# Patient Record
Sex: FEMALE | Race: WHITE | NOT HISPANIC OR LATINO | Employment: OTHER | URBAN - METROPOLITAN AREA
[De-identification: names, ages, dates, MRNs, and addresses within clinical notes are randomized per-mention and may not be internally consistent; named-entity substitution may affect disease eponyms.]

---

## 2017-04-12 ENCOUNTER — APPOINTMENT (OUTPATIENT)
Dept: LAB | Facility: HOSPITAL | Age: 82
End: 2017-04-12
Attending: INTERNAL MEDICINE
Payer: MEDICARE

## 2017-04-12 ENCOUNTER — TRANSCRIBE ORDERS (OUTPATIENT)
Dept: ADMINISTRATIVE | Facility: HOSPITAL | Age: 82
End: 2017-04-12

## 2017-04-12 DIAGNOSIS — E03.9 HYPOTHYROIDISM, UNSPECIFIED TYPE: ICD-10-CM

## 2017-04-12 DIAGNOSIS — E78.5 HYPERLIPIDEMIA, UNSPECIFIED HYPERLIPIDEMIA TYPE: Primary | ICD-10-CM

## 2017-04-12 DIAGNOSIS — I15.9 SECONDARY HYPERTENSION: ICD-10-CM

## 2017-04-12 DIAGNOSIS — E78.5 HYPERLIPIDEMIA, UNSPECIFIED HYPERLIPIDEMIA TYPE: ICD-10-CM

## 2017-04-12 DIAGNOSIS — E55.9 VITAMIN D DEFICIENCY: ICD-10-CM

## 2017-04-12 DIAGNOSIS — M81.0 AGE RELATED OSTEOPOROSIS, UNSPECIFIED PATHOLOGICAL FRACTURE PRESENCE: ICD-10-CM

## 2017-04-12 LAB
25(OH)D3 SERPL-MCNC: 35.2 NG/ML (ref 30–100)
ALBUMIN SERPL BCP-MCNC: 3.6 G/DL (ref 3.5–5)
ALP SERPL-CCNC: 86 U/L (ref 46–116)
ALT SERPL W P-5'-P-CCNC: 34 U/L (ref 12–78)
ANION GAP SERPL CALCULATED.3IONS-SCNC: 8 MMOL/L (ref 4–13)
AST SERPL W P-5'-P-CCNC: 32 U/L (ref 5–45)
BILIRUB SERPL-MCNC: 0.7 MG/DL (ref 0.2–1)
BUN SERPL-MCNC: 31 MG/DL (ref 5–25)
CALCIUM SERPL-MCNC: 9.7 MG/DL (ref 8.3–10.1)
CHLORIDE SERPL-SCNC: 103 MMOL/L (ref 100–108)
CHOLEST SERPL-MCNC: 169 MG/DL (ref 50–200)
CO2 SERPL-SCNC: 30 MMOL/L (ref 21–32)
CREAT SERPL-MCNC: 1.05 MG/DL (ref 0.6–1.3)
ERYTHROCYTE [DISTWIDTH] IN BLOOD BY AUTOMATED COUNT: 12.3 % (ref 11.6–15.1)
GFR SERPL CREATININE-BSD FRML MDRD: 50.6 ML/MIN/1.73SQ M
GLUCOSE P FAST SERPL-MCNC: 86 MG/DL (ref 65–99)
HCT VFR BLD AUTO: 38.3 % (ref 34.8–46.1)
HDLC SERPL-MCNC: 64 MG/DL (ref 40–60)
HGB BLD-MCNC: 12.6 G/DL (ref 11.5–15.4)
LDLC SERPL CALC-MCNC: 84 MG/DL (ref 0–100)
MCH RBC QN AUTO: 32.1 PG (ref 26.8–34.3)
MCHC RBC AUTO-ENTMCNC: 32.9 G/DL (ref 31.4–37.4)
MCV RBC AUTO: 98 FL (ref 82–98)
PLATELET # BLD AUTO: 154 THOUSANDS/UL (ref 149–390)
PMV BLD AUTO: 11 FL (ref 8.9–12.7)
POTASSIUM SERPL-SCNC: 4.2 MMOL/L (ref 3.5–5.3)
PROT SERPL-MCNC: 7.7 G/DL (ref 6.4–8.2)
RBC # BLD AUTO: 3.92 MILLION/UL (ref 3.81–5.12)
SODIUM SERPL-SCNC: 141 MMOL/L (ref 136–145)
T3 SERPL-MCNC: 0.6 NG/ML (ref 0.6–1.8)
T4 FREE SERPL-MCNC: 1.29 NG/DL (ref 0.76–1.46)
TRIGL SERPL-MCNC: 104 MG/DL
TSH SERPL DL<=0.05 MIU/L-ACNC: 1.81 UIU/ML (ref 0.36–3.74)
WBC # BLD AUTO: 8.79 THOUSAND/UL (ref 4.31–10.16)

## 2017-04-12 PROCEDURE — 85027 COMPLETE CBC AUTOMATED: CPT

## 2017-04-12 PROCEDURE — 80061 LIPID PANEL: CPT

## 2017-04-12 PROCEDURE — 82306 VITAMIN D 25 HYDROXY: CPT

## 2017-04-12 PROCEDURE — 84439 ASSAY OF FREE THYROXINE: CPT

## 2017-04-12 PROCEDURE — 80053 COMPREHEN METABOLIC PANEL: CPT

## 2017-04-12 PROCEDURE — 84480 ASSAY TRIIODOTHYRONINE (T3): CPT

## 2017-04-12 PROCEDURE — 36415 COLL VENOUS BLD VENIPUNCTURE: CPT

## 2017-04-12 PROCEDURE — 84443 ASSAY THYROID STIM HORMONE: CPT

## 2017-05-19 ENCOUNTER — HOSPITAL ENCOUNTER (EMERGENCY)
Facility: HOSPITAL | Age: 82
Discharge: HOME | End: 2017-05-19
Attending: EMERGENCY MEDICINE | Admitting: EMERGENCY MEDICINE
Payer: MEDICARE

## 2017-05-19 ENCOUNTER — APPOINTMENT (EMERGENCY)
Dept: CT IMAGING | Facility: HOSPITAL | Age: 82
End: 2017-05-19
Payer: MEDICARE

## 2017-05-19 VITALS
DIASTOLIC BLOOD PRESSURE: 76 MMHG | OXYGEN SATURATION: 96 % | BODY MASS INDEX: 30.66 KG/M2 | HEART RATE: 85 BPM | RESPIRATION RATE: 18 BRPM | SYSTOLIC BLOOD PRESSURE: 178 MMHG | WEIGHT: 173.06 LBS | TEMPERATURE: 97.5 F

## 2017-05-19 DIAGNOSIS — S02.2XXA CLOSED FRACTURE OF NASAL BONE, INITIAL ENCOUNTER: ICD-10-CM
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
## 2017-05-19 DIAGNOSIS — S00.81XA FACIAL ABRASION, INITIAL ENCOUNTER: ICD-10-CM

## 2017-05-19 PROCEDURE — 70450 CT HEAD/BRAIN W/O DYE: CPT

## 2017-05-19 PROCEDURE — 72125 CT NECK SPINE W/O DYE: CPT

## 2017-05-19 PROCEDURE — 99284 EMERGENCY DEPT VISIT MOD MDM: CPT

## 2017-05-19 PROCEDURE — 70486 CT MAXILLOFACIAL W/O DYE: CPT

## 2017-05-19 RX ORDER — BACITRACIN, NEOMYCIN, POLYMYXIN B 400; 3.5; 5 [USP'U]/G; MG/G; [USP'U]/G
1 OINTMENT TOPICAL ONCE
Status: COMPLETED | OUTPATIENT
Start: 2017-05-19 | End: 2017-05-19

## 2017-05-19 RX ORDER — LIDOCAINE HYDROCHLORIDE 20 MG/ML
5 INJECTION, SOLUTION EPIDURAL; INFILTRATION; INTRACAUDAL; PERINEURAL ONCE
Status: DISCONTINUED | OUTPATIENT
Start: 2017-05-19 | End: 2017-05-19 | Stop reason: HOSPADM

## 2017-05-19 RX ADMIN — BACITRACIN ZINC, NEOMYCIN, POLYMYXIN B 1 LARGE APPLICATION: 400; 3.5; 5 OINTMENT TOPICAL at 15:34

## 2017-07-10 ENCOUNTER — APPOINTMENT (OUTPATIENT)
Dept: PHYSICAL THERAPY | Facility: CLINIC | Age: 82
End: 2017-07-10
Payer: MEDICARE

## 2017-07-13 ENCOUNTER — APPOINTMENT (OUTPATIENT)
Dept: PHYSICAL THERAPY | Facility: CLINIC | Age: 82
End: 2017-07-13
Payer: MEDICARE

## 2017-07-13 PROCEDURE — G8991 OTHER PT/OT GOAL STATUS: HCPCS

## 2017-07-13 PROCEDURE — 97161 PT EVAL LOW COMPLEX 20 MIN: CPT

## 2017-07-13 PROCEDURE — G8990 OTHER PT/OT CURRENT STATUS: HCPCS

## 2017-07-13 PROCEDURE — 97140 MANUAL THERAPY 1/> REGIONS: CPT

## 2017-07-17 ENCOUNTER — APPOINTMENT (OUTPATIENT)
Dept: PHYSICAL THERAPY | Facility: CLINIC | Age: 82
End: 2017-07-17
Payer: MEDICARE

## 2017-07-17 PROCEDURE — 97110 THERAPEUTIC EXERCISES: CPT

## 2017-07-17 PROCEDURE — 97140 MANUAL THERAPY 1/> REGIONS: CPT

## 2017-07-20 ENCOUNTER — APPOINTMENT (OUTPATIENT)
Dept: PHYSICAL THERAPY | Facility: CLINIC | Age: 82
End: 2017-07-20
Payer: MEDICARE

## 2017-07-20 PROCEDURE — 97140 MANUAL THERAPY 1/> REGIONS: CPT

## 2017-07-24 ENCOUNTER — APPOINTMENT (OUTPATIENT)
Dept: PHYSICAL THERAPY | Facility: CLINIC | Age: 82
End: 2017-07-24
Payer: MEDICARE

## 2017-07-24 PROCEDURE — 97110 THERAPEUTIC EXERCISES: CPT

## 2017-07-24 PROCEDURE — 97140 MANUAL THERAPY 1/> REGIONS: CPT

## 2017-07-27 ENCOUNTER — APPOINTMENT (OUTPATIENT)
Dept: PHYSICAL THERAPY | Facility: CLINIC | Age: 82
End: 2017-07-27
Payer: MEDICARE

## 2017-07-27 PROCEDURE — 97140 MANUAL THERAPY 1/> REGIONS: CPT

## 2017-07-27 PROCEDURE — 97110 THERAPEUTIC EXERCISES: CPT

## 2017-07-31 ENCOUNTER — APPOINTMENT (OUTPATIENT)
Dept: PHYSICAL THERAPY | Facility: CLINIC | Age: 82
End: 2017-07-31
Payer: MEDICARE

## 2017-07-31 PROCEDURE — 97140 MANUAL THERAPY 1/> REGIONS: CPT

## 2017-07-31 PROCEDURE — 97110 THERAPEUTIC EXERCISES: CPT

## 2017-08-03 ENCOUNTER — APPOINTMENT (OUTPATIENT)
Dept: PHYSICAL THERAPY | Facility: CLINIC | Age: 82
End: 2017-08-03
Payer: MEDICARE

## 2017-08-07 ENCOUNTER — APPOINTMENT (OUTPATIENT)
Dept: PHYSICAL THERAPY | Facility: CLINIC | Age: 82
End: 2017-08-07
Payer: MEDICARE

## 2017-08-07 PROCEDURE — 97140 MANUAL THERAPY 1/> REGIONS: CPT

## 2017-08-07 PROCEDURE — 97110 THERAPEUTIC EXERCISES: CPT

## 2017-08-10 ENCOUNTER — APPOINTMENT (OUTPATIENT)
Dept: PHYSICAL THERAPY | Facility: CLINIC | Age: 82
End: 2017-08-10
Payer: MEDICARE

## 2017-08-10 PROCEDURE — 97110 THERAPEUTIC EXERCISES: CPT

## 2017-08-14 ENCOUNTER — APPOINTMENT (OUTPATIENT)
Dept: PHYSICAL THERAPY | Facility: CLINIC | Age: 82
End: 2017-08-14
Payer: MEDICARE

## 2017-08-14 PROCEDURE — 97110 THERAPEUTIC EXERCISES: CPT

## 2017-08-14 PROCEDURE — 97140 MANUAL THERAPY 1/> REGIONS: CPT

## 2017-08-14 PROCEDURE — G8990 OTHER PT/OT CURRENT STATUS: HCPCS

## 2017-08-14 PROCEDURE — G8991 OTHER PT/OT GOAL STATUS: HCPCS

## 2017-08-16 ENCOUNTER — APPOINTMENT (OUTPATIENT)
Dept: PHYSICAL THERAPY | Facility: CLINIC | Age: 82
End: 2017-08-16
Payer: MEDICARE

## 2017-08-16 PROCEDURE — 97140 MANUAL THERAPY 1/> REGIONS: CPT

## 2017-08-16 PROCEDURE — 97110 THERAPEUTIC EXERCISES: CPT

## 2017-08-21 ENCOUNTER — APPOINTMENT (OUTPATIENT)
Dept: PHYSICAL THERAPY | Facility: CLINIC | Age: 82
End: 2017-08-21
Payer: MEDICARE

## 2017-08-21 PROCEDURE — 97140 MANUAL THERAPY 1/> REGIONS: CPT

## 2017-08-21 PROCEDURE — 97110 THERAPEUTIC EXERCISES: CPT

## 2017-08-23 ENCOUNTER — APPOINTMENT (OUTPATIENT)
Dept: PHYSICAL THERAPY | Facility: CLINIC | Age: 82
End: 2017-08-23
Payer: MEDICARE

## 2017-08-23 PROCEDURE — 97110 THERAPEUTIC EXERCISES: CPT

## 2017-08-23 PROCEDURE — 97140 MANUAL THERAPY 1/> REGIONS: CPT

## 2017-08-28 ENCOUNTER — APPOINTMENT (OUTPATIENT)
Dept: PHYSICAL THERAPY | Facility: CLINIC | Age: 82
End: 2017-08-28
Payer: MEDICARE

## 2017-08-28 PROCEDURE — 97140 MANUAL THERAPY 1/> REGIONS: CPT

## 2017-08-28 PROCEDURE — 97110 THERAPEUTIC EXERCISES: CPT

## 2017-08-31 ENCOUNTER — APPOINTMENT (OUTPATIENT)
Dept: PHYSICAL THERAPY | Facility: CLINIC | Age: 82
End: 2017-08-31
Payer: MEDICARE

## 2017-08-31 PROCEDURE — 97110 THERAPEUTIC EXERCISES: CPT

## 2017-08-31 PROCEDURE — 97140 MANUAL THERAPY 1/> REGIONS: CPT

## 2017-09-05 ENCOUNTER — APPOINTMENT (OUTPATIENT)
Dept: PHYSICAL THERAPY | Facility: CLINIC | Age: 82
End: 2017-09-05
Payer: MEDICARE

## 2017-09-05 PROCEDURE — 97110 THERAPEUTIC EXERCISES: CPT

## 2017-09-05 PROCEDURE — 97140 MANUAL THERAPY 1/> REGIONS: CPT

## 2017-09-07 ENCOUNTER — APPOINTMENT (OUTPATIENT)
Dept: PHYSICAL THERAPY | Facility: CLINIC | Age: 82
End: 2017-09-07
Payer: MEDICARE

## 2017-09-07 PROCEDURE — 97110 THERAPEUTIC EXERCISES: CPT

## 2017-09-07 PROCEDURE — G8990 OTHER PT/OT CURRENT STATUS: HCPCS

## 2017-09-07 PROCEDURE — 97140 MANUAL THERAPY 1/> REGIONS: CPT

## 2017-09-07 PROCEDURE — G8991 OTHER PT/OT GOAL STATUS: HCPCS

## 2017-09-11 ENCOUNTER — APPOINTMENT (OUTPATIENT)
Dept: PHYSICAL THERAPY | Facility: CLINIC | Age: 82
End: 2017-09-11
Payer: MEDICARE

## 2017-09-11 PROCEDURE — 97110 THERAPEUTIC EXERCISES: CPT

## 2017-09-11 PROCEDURE — 97140 MANUAL THERAPY 1/> REGIONS: CPT

## 2017-09-13 ENCOUNTER — APPOINTMENT (OUTPATIENT)
Dept: PHYSICAL THERAPY | Facility: CLINIC | Age: 82
End: 2017-09-13
Payer: MEDICARE

## 2017-09-13 PROCEDURE — 97140 MANUAL THERAPY 1/> REGIONS: CPT

## 2017-09-13 PROCEDURE — 97110 THERAPEUTIC EXERCISES: CPT

## 2017-09-14 ENCOUNTER — APPOINTMENT (OUTPATIENT)
Dept: PHYSICAL THERAPY | Facility: CLINIC | Age: 82
End: 2017-09-14
Payer: MEDICARE

## 2017-09-18 ENCOUNTER — APPOINTMENT (OUTPATIENT)
Dept: PHYSICAL THERAPY | Facility: CLINIC | Age: 82
End: 2017-09-18
Payer: MEDICARE

## 2017-09-21 ENCOUNTER — APPOINTMENT (OUTPATIENT)
Dept: PHYSICAL THERAPY | Facility: CLINIC | Age: 82
End: 2017-09-21
Payer: MEDICARE

## 2017-09-21 PROCEDURE — 97110 THERAPEUTIC EXERCISES: CPT

## 2017-09-25 ENCOUNTER — APPOINTMENT (OUTPATIENT)
Dept: PHYSICAL THERAPY | Facility: CLINIC | Age: 82
End: 2017-09-25
Payer: MEDICARE

## 2017-09-25 PROCEDURE — 97110 THERAPEUTIC EXERCISES: CPT

## 2017-09-27 ENCOUNTER — APPOINTMENT (OUTPATIENT)
Dept: PHYSICAL THERAPY | Facility: CLINIC | Age: 82
End: 2017-09-27
Payer: MEDICARE

## 2017-09-27 PROCEDURE — 97110 THERAPEUTIC EXERCISES: CPT

## 2017-09-27 PROCEDURE — 97140 MANUAL THERAPY 1/> REGIONS: CPT

## 2017-10-02 ENCOUNTER — APPOINTMENT (OUTPATIENT)
Dept: PHYSICAL THERAPY | Facility: CLINIC | Age: 82
End: 2017-10-02
Payer: MEDICARE

## 2017-10-02 PROCEDURE — 97140 MANUAL THERAPY 1/> REGIONS: CPT

## 2017-10-02 PROCEDURE — 97110 THERAPEUTIC EXERCISES: CPT

## 2017-10-04 ENCOUNTER — APPOINTMENT (OUTPATIENT)
Dept: PHYSICAL THERAPY | Facility: CLINIC | Age: 82
End: 2017-10-04
Payer: MEDICARE

## 2017-10-04 PROCEDURE — 97140 MANUAL THERAPY 1/> REGIONS: CPT

## 2017-10-04 PROCEDURE — 97110 THERAPEUTIC EXERCISES: CPT

## 2017-10-09 ENCOUNTER — APPOINTMENT (OUTPATIENT)
Dept: PHYSICAL THERAPY | Facility: CLINIC | Age: 82
End: 2017-10-09
Payer: MEDICARE

## 2017-10-09 PROCEDURE — 97140 MANUAL THERAPY 1/> REGIONS: CPT

## 2017-10-09 PROCEDURE — 97110 THERAPEUTIC EXERCISES: CPT

## 2017-10-12 ENCOUNTER — APPOINTMENT (OUTPATIENT)
Dept: PHYSICAL THERAPY | Facility: CLINIC | Age: 82
End: 2017-10-12
Payer: MEDICARE

## 2017-10-12 PROCEDURE — 97110 THERAPEUTIC EXERCISES: CPT

## 2017-10-12 PROCEDURE — 97140 MANUAL THERAPY 1/> REGIONS: CPT

## 2017-10-16 ENCOUNTER — APPOINTMENT (OUTPATIENT)
Dept: PHYSICAL THERAPY | Facility: CLINIC | Age: 82
End: 2017-10-16
Payer: MEDICARE

## 2017-10-16 PROCEDURE — 97110 THERAPEUTIC EXERCISES: CPT

## 2017-10-16 PROCEDURE — G8990 OTHER PT/OT CURRENT STATUS: HCPCS

## 2017-10-16 PROCEDURE — 97140 MANUAL THERAPY 1/> REGIONS: CPT

## 2017-10-16 PROCEDURE — G8991 OTHER PT/OT GOAL STATUS: HCPCS

## 2017-10-16 PROCEDURE — G8992 OTHER PT/OT  D/C STATUS: HCPCS

## 2017-10-18 ENCOUNTER — APPOINTMENT (OUTPATIENT)
Dept: PHYSICAL THERAPY | Facility: CLINIC | Age: 82
End: 2017-10-18
Payer: MEDICARE

## 2017-11-02 ENCOUNTER — APPOINTMENT (OUTPATIENT)
Dept: LAB | Facility: HOSPITAL | Age: 82
End: 2017-11-02
Attending: INTERNAL MEDICINE
Payer: MEDICARE

## 2017-11-02 ENCOUNTER — TRANSCRIBE ORDERS (OUTPATIENT)
Dept: ADMINISTRATIVE | Facility: HOSPITAL | Age: 82
End: 2017-11-02

## 2017-11-02 ENCOUNTER — APPOINTMENT (OUTPATIENT)
Dept: LAB | Facility: HOSPITAL | Age: 82
End: 2017-11-02
Payer: MEDICARE

## 2017-11-02 DIAGNOSIS — E03.9 MYXEDEMA HEART DISEASE: ICD-10-CM

## 2017-11-02 DIAGNOSIS — E55.9 AVITAMINOSIS D: ICD-10-CM

## 2017-11-02 DIAGNOSIS — I10 ESSENTIAL HYPERTENSION, MALIGNANT: ICD-10-CM

## 2017-11-02 DIAGNOSIS — M81.0 SENILE OSTEOPOROSIS: ICD-10-CM

## 2017-11-02 DIAGNOSIS — M35.00 SICCA SYNDROME (HCC): ICD-10-CM

## 2017-11-02 DIAGNOSIS — E78.2 MIXED HYPERLIPIDEMIA: ICD-10-CM

## 2017-11-02 DIAGNOSIS — I51.9 MYXEDEMA HEART DISEASE: ICD-10-CM

## 2017-11-02 DIAGNOSIS — E03.9 MYXEDEMA HEART DISEASE: Primary | ICD-10-CM

## 2017-11-02 DIAGNOSIS — I51.9 MYXEDEMA HEART DISEASE: Primary | ICD-10-CM

## 2017-11-02 DIAGNOSIS — M35.00 SICCA SYNDROME (HCC): Primary | ICD-10-CM

## 2017-11-02 LAB
25(OH)D3 SERPL-MCNC: 37.4 NG/ML (ref 30–100)
ALBUMIN SERPL BCP-MCNC: 3.7 G/DL (ref 3.5–5)
ALP SERPL-CCNC: 63 U/L (ref 46–116)
ALT SERPL W P-5'-P-CCNC: 33 U/L (ref 12–78)
ANION GAP SERPL CALCULATED.3IONS-SCNC: 7 MMOL/L (ref 4–13)
AST SERPL W P-5'-P-CCNC: 27 U/L (ref 5–45)
BILIRUB SERPL-MCNC: 0.4 MG/DL (ref 0.2–1)
BUN SERPL-MCNC: 41 MG/DL (ref 5–25)
CALCIUM SERPL-MCNC: 9.1 MG/DL (ref 8.3–10.1)
CHLORIDE SERPL-SCNC: 104 MMOL/L (ref 100–108)
CHOLEST SERPL-MCNC: 144 MG/DL (ref 50–200)
CO2 SERPL-SCNC: 29 MMOL/L (ref 21–32)
CREAT SERPL-MCNC: 1.31 MG/DL (ref 0.6–1.3)
ERYTHROCYTE [DISTWIDTH] IN BLOOD BY AUTOMATED COUNT: 14.3 % (ref 11.6–15.1)
GFR SERPL CREATININE-BSD FRML MDRD: 38 ML/MIN/1.73SQ M
GLUCOSE P FAST SERPL-MCNC: 88 MG/DL (ref 65–99)
HCT VFR BLD AUTO: 38.6 % (ref 34.8–46.1)
HDLC SERPL-MCNC: 44 MG/DL (ref 40–60)
HGB BLD-MCNC: 12.8 G/DL (ref 11.5–15.4)
LDLC SERPL CALC-MCNC: 67 MG/DL (ref 0–100)
MCH RBC QN AUTO: 31.2 PG (ref 26.8–34.3)
MCHC RBC AUTO-ENTMCNC: 33.2 G/DL (ref 31.4–37.4)
MCV RBC AUTO: 94 FL (ref 82–98)
PLATELET # BLD AUTO: 159 THOUSANDS/UL (ref 149–390)
PMV BLD AUTO: 10.5 FL (ref 8.9–12.7)
POTASSIUM SERPL-SCNC: 4.4 MMOL/L (ref 3.5–5.3)
PROT SERPL-MCNC: 7.5 G/DL (ref 6.4–8.2)
RBC # BLD AUTO: 4.1 MILLION/UL (ref 3.81–5.12)
SODIUM SERPL-SCNC: 140 MMOL/L (ref 136–145)
T4 FREE SERPL-MCNC: 1.19 NG/DL (ref 0.76–1.46)
TRIGL SERPL-MCNC: 165 MG/DL
TSH SERPL DL<=0.05 MIU/L-ACNC: 4.69 UIU/ML (ref 0.36–3.74)
WBC # BLD AUTO: 5.99 THOUSAND/UL (ref 4.31–10.16)

## 2017-11-02 PROCEDURE — 84443 ASSAY THYROID STIM HORMONE: CPT

## 2017-11-02 PROCEDURE — 84439 ASSAY OF FREE THYROXINE: CPT

## 2017-11-02 PROCEDURE — 85027 COMPLETE CBC AUTOMATED: CPT

## 2017-11-02 PROCEDURE — 36415 COLL VENOUS BLD VENIPUNCTURE: CPT

## 2017-11-02 PROCEDURE — 82306 VITAMIN D 25 HYDROXY: CPT

## 2017-11-02 PROCEDURE — 80061 LIPID PANEL: CPT

## 2017-11-02 PROCEDURE — 80053 COMPREHEN METABOLIC PANEL: CPT

## 2017-12-11 ENCOUNTER — TRANSCRIBE ORDERS (OUTPATIENT)
Dept: ADMINISTRATIVE | Facility: HOSPITAL | Age: 82
End: 2017-12-11

## 2017-12-11 DIAGNOSIS — M54.5 LOW BACK PAIN, UNSPECIFIED BACK PAIN LATERALITY, UNSPECIFIED CHRONICITY, WITH SCIATICA PRESENCE UNSPECIFIED: Primary | ICD-10-CM

## 2017-12-13 ENCOUNTER — GENERIC CONVERSION - ENCOUNTER (OUTPATIENT)
Dept: OTHER | Facility: OTHER | Age: 82
End: 2017-12-13

## 2017-12-13 ENCOUNTER — HOSPITAL ENCOUNTER (OUTPATIENT)
Dept: CT IMAGING | Facility: HOSPITAL | Age: 82
Discharge: HOME | End: 2017-12-13
Payer: MEDICARE

## 2017-12-13 DIAGNOSIS — M54.5 LOW BACK PAIN, UNSPECIFIED BACK PAIN LATERALITY, UNSPECIFIED CHRONICITY, WITH SCIATICA PRESENCE UNSPECIFIED: ICD-10-CM

## 2017-12-13 PROCEDURE — 72131 CT LUMBAR SPINE W/O DYE: CPT

## 2018-01-04 ENCOUNTER — APPOINTMENT (OUTPATIENT)
Dept: PHYSICAL THERAPY | Facility: CLINIC | Age: 83
End: 2018-01-04
Payer: MEDICARE

## 2018-01-04 PROCEDURE — 97110 THERAPEUTIC EXERCISES: CPT

## 2018-01-04 PROCEDURE — G8991 OTHER PT/OT GOAL STATUS: HCPCS

## 2018-01-04 PROCEDURE — G8990 OTHER PT/OT CURRENT STATUS: HCPCS

## 2018-01-04 PROCEDURE — 97162 PT EVAL MOD COMPLEX 30 MIN: CPT

## 2018-01-08 ENCOUNTER — APPOINTMENT (OUTPATIENT)
Dept: PHYSICAL THERAPY | Facility: CLINIC | Age: 83
End: 2018-01-08
Payer: MEDICARE

## 2018-01-08 PROCEDURE — 97110 THERAPEUTIC EXERCISES: CPT

## 2018-01-11 ENCOUNTER — APPOINTMENT (OUTPATIENT)
Dept: PHYSICAL THERAPY | Facility: CLINIC | Age: 83
End: 2018-01-11
Payer: MEDICARE

## 2018-01-11 PROCEDURE — 97110 THERAPEUTIC EXERCISES: CPT

## 2018-01-15 ENCOUNTER — APPOINTMENT (OUTPATIENT)
Dept: PHYSICAL THERAPY | Facility: CLINIC | Age: 83
End: 2018-01-15
Payer: MEDICARE

## 2018-01-17 ENCOUNTER — APPOINTMENT (OUTPATIENT)
Dept: PHYSICAL THERAPY | Facility: CLINIC | Age: 83
End: 2018-01-17
Payer: MEDICARE

## 2018-01-19 ENCOUNTER — APPOINTMENT (OUTPATIENT)
Dept: PHYSICAL THERAPY | Facility: CLINIC | Age: 83
End: 2018-01-19
Payer: MEDICARE

## 2018-01-23 ENCOUNTER — APPOINTMENT (OUTPATIENT)
Dept: PHYSICAL THERAPY | Facility: CLINIC | Age: 83
End: 2018-01-23
Payer: MEDICARE

## 2018-01-23 PROCEDURE — 97110 THERAPEUTIC EXERCISES: CPT

## 2018-01-26 ENCOUNTER — OFFICE VISIT (OUTPATIENT)
Dept: PHYSICAL THERAPY | Facility: CLINIC | Age: 83
End: 2018-01-26
Payer: MEDICARE

## 2018-01-26 DIAGNOSIS — M51.36 DEGENERATION OF LUMBAR INTERVERTEBRAL DISC: ICD-10-CM

## 2018-01-26 DIAGNOSIS — M79.604 RIGHT LEG PAIN: ICD-10-CM

## 2018-01-26 DIAGNOSIS — M47.816 LUMBAR SPONDYLOSIS: Primary | ICD-10-CM

## 2018-01-26 PROCEDURE — 97110 THERAPEUTIC EXERCISES: CPT | Performed by: PHYSICAL THERAPIST

## 2018-01-26 NOTE — PROGRESS NOTES
Daily Note     Today's date: 2018  Patient name: Diana Ronquillo  : 1935  MRN: [id number]  Referring provider: Joanna Bejarano MD  Dx:   Encounter Diagnoses   Name Primary?  Lumbar spondylosis Yes    Degeneration of lumbar intervertebral disc     Right leg pain                   Subjective: states that her back feels good today but was doing a lot of walking yesterday and it hurt      Objective: See treatment diary below    Precautions: falls, anticoagulation    Daily Treatment Diary     Manual              PAs/ UPAs prn                          TrP release prn                                           Exercise Diary              Bike 8'            TM - 4% grade 1 0 mph x  8'            K->C B w/pball 20"x4            LTR pball 10x            Pelvic tilts A-P 10x10"            Abdominal bracing             Seated flexion stretch 20"x4                         Partial curl ups 10x3"            TB: LPD             Hip flexor stretch over edge of table 1' ea            Sciatic nerve slider seated 20x            Piriformis stretch fig 4 w/ pball 20"x4            Sit ->stand 10x                                                                                              Modalities                                                           Assessment: requires min cueing for technique w/ exercises; pain is provoked by walking; added elevated TM this visit w/ emphasis on lumbar stabilization      Plan: Continue per plan of care  and Progress treatment as tolerated

## 2018-01-30 ENCOUNTER — OFFICE VISIT (OUTPATIENT)
Dept: PHYSICAL THERAPY | Facility: CLINIC | Age: 83
End: 2018-01-30
Payer: MEDICARE

## 2018-01-30 DIAGNOSIS — M51.36 DEGENERATION OF LUMBAR INTERVERTEBRAL DISC: ICD-10-CM

## 2018-01-30 DIAGNOSIS — M79.604 RIGHT LEG PAIN: ICD-10-CM

## 2018-01-30 DIAGNOSIS — M47.816 LUMBAR SPONDYLOSIS: Primary | ICD-10-CM

## 2018-01-30 PROCEDURE — 97110 THERAPEUTIC EXERCISES: CPT | Performed by: PHYSICAL THERAPIST

## 2018-01-30 NOTE — PROGRESS NOTES
Daily Note     Today's date: 2018  Patient name: Rosemarie Reason  : 1935  MRN: [id number]  Referring provider: Marina Hoover MD  Dx:   Encounter Diagnoses   Name Primary?  Lumbar spondylosis Yes    Degeneration of lumbar intervertebral disc     Right leg pain                   Subjective: states she is fatigued today - yesterday was a busy day; however, reports no back pain upon arrival to PT today      Objective: See treatment diary below    Precautions: falls, anticoagulation    Daily Treatment Diary     Manual             PAs/ UPAs prn                          TrP release prn                                           Exercise Diary             Bike 8' 8'           TM - 4% grade 1 0 mph x  8' 1 0 mph x  8'           K->C B w/pball 20"x4 20"x5           LTR pball 10x 10x           Pelvic tilts A-P 10x10" 10x           Abdominal bracing             Seated flexion stretch 20"x4 20"x4                        Partial curl ups w/pball 10x3" 2x 10x3"           TB: LPD             Hip flexor stretch over edge of table 1' ea 1'ea           Sciatic nerve slider seated 20x 20x           Piriformis stretch fig 4 w/ pball 20"x4 20"X5           Sit ->stand 10x 10x cueing           hooklying abd w/TB  10x10"                                                                                Modalities                                                           Assessment: able to walk on elevated TM today for 8' w/out pain provocation; transferring sit to stand has tendency to allow knees to move inward into less stable position, performed w/ cueing and mirror feedback to maintain knees over feet; added hooklying abd w/ TB for further hip strengthening      Plan: Continue per plan of care  and Progress treatment as tolerated

## 2018-02-02 ENCOUNTER — OFFICE VISIT (OUTPATIENT)
Dept: PHYSICAL THERAPY | Facility: CLINIC | Age: 83
End: 2018-02-02
Payer: MEDICARE

## 2018-02-02 DIAGNOSIS — M47.816 LUMBAR SPONDYLOSIS: Primary | ICD-10-CM

## 2018-02-02 DIAGNOSIS — M51.36 DEGENERATION OF LUMBAR INTERVERTEBRAL DISC: ICD-10-CM

## 2018-02-02 DIAGNOSIS — M79.604 RIGHT LEG PAIN: ICD-10-CM

## 2018-02-02 PROCEDURE — 97110 THERAPEUTIC EXERCISES: CPT | Performed by: PHYSICAL THERAPIST

## 2018-02-02 PROCEDURE — 97140 MANUAL THERAPY 1/> REGIONS: CPT | Performed by: PHYSICAL THERAPIST

## 2018-02-02 NOTE — PROGRESS NOTES
Daily Note     Today's date: 2018  Patient name: Michael Nuñez  : 1935  MRN: [id number]  Referring provider: Doug Martins MD  Dx:   No diagnosis found  Start Time:   Subjective: states that she had a lot of pain last night; did not do anything different but was doing a lot of sitting at a  bought a new car last night; states that her R foot started tingling last night      Objective: See treatment diary below    Precautions: falls, anticoagulation    Daily Treatment Diary     Manual  2          Lumbar mobs in SL - B Grade lll   performed                       TrP release prn                                           Exercise Diary            Bike 8' 8' 8'          TM - 4% grade 1 0 mph x  8' 1 0 mph x  8' 1 0 mph  5'          K->C B w/pball 20"x4 20"x5 20"x5          LTR pball 10x 10x 10x          Pelvic tilts A-P 10x10" 10x 20x          Abdominal bracing             Seated flexion stretch 20"x4 20"x4 20"x4                       Partial curl ups w/pball 10x3" 2x 10x3" 10x3"  x2          TB: LPD             Hip flexor stretch over edge of table 1' ea 1'ea 1/ea          Sciatic nerve slider seated 20x 20x reviewed          Piriformis stretch fig 4 w/ pball 20"x4 20"X5 np          Sit ->stand 10x 10x cueing 10x          hooklying abd w/TB  10x10" np          Mini squats w/ TB resist abd   GTB  10x5"                                                                  Modalities                                                           Assessment: stopped TM today after 5' due to increased LBP; pain resolved w/ exercises and manuals in therapy today    Plan: Continue per plan of care  and Progress treatment as tolerated

## 2018-02-06 ENCOUNTER — OFFICE VISIT (OUTPATIENT)
Dept: PHYSICAL THERAPY | Facility: CLINIC | Age: 83
End: 2018-02-06
Payer: MEDICARE

## 2018-02-06 DIAGNOSIS — M79.604 RIGHT LEG PAIN: ICD-10-CM

## 2018-02-06 DIAGNOSIS — M51.36 DEGENERATION OF LUMBAR INTERVERTEBRAL DISC: ICD-10-CM

## 2018-02-06 DIAGNOSIS — M47.816 LUMBAR SPONDYLOSIS: Primary | ICD-10-CM

## 2018-02-06 PROCEDURE — 97110 THERAPEUTIC EXERCISES: CPT | Performed by: PHYSICAL THERAPIST

## 2018-02-06 PROCEDURE — 97140 MANUAL THERAPY 1/> REGIONS: CPT | Performed by: PHYSICAL THERAPIST

## 2018-02-06 NOTE — PROGRESS NOTES
Daily Note     Today's date: 2018  Patient name: Michael Nuñez  : 1935  MRN: [id number]  Referring provider: Doug Martins MD  Dx:   Encounter Diagnoses   Name Primary?  Lumbar spondylosis Yes    Degeneration of lumbar intervertebral disc     Right leg pain                   Subjective: reports intermittent pain in the R L/S region, hip RLE; cannot relate symptoms to any particular activity or position    Objective: See treatment diary below    Precautions: falls, anticoagulation    Daily Treatment Diary     Manual           Lumbar mobs in SL - B Grade lll/ prone PA's   performed perfomred                      TrP release R gluteal mm    perfomred                                       Exercise Diary           Bike 8' 8' 8' 8'         TM - 4% grade 1 0 mph x  8' 1 0 mph x  8' 1 0 mph  5' 1 0  Mph  5'         K->C B w/pball 20"x4 20"x5 20"x5 20'X5         LTR pball 10x 10x 10x 10x         Pelvic tilts A-P 10x10" 10x 20x          Abdominal bracing    w/TM         Seated flexion stretch 20"x4 20"x4 20"x4 np                      Partial curl ups w/pball 10x3" 2x 10x3" 10x3"  x2 10x3"         TB: LPD             Hip flexor stretch over edge of table 1' ea 1'ea 1/ea 1' ea         Sciatic nerve slider seated 20x 20x reviewed np         Piriformis stretch fig 4 w/ pball 20"x4 20"X5 np 20"x5         Sit ->stand 10x 10x cueing 10x np         hooklying abd w/TB  10x10" np nv         Mini squats w/ TB resist abd   GTB  10x5" nv         Rolling on mat     nv                                                    Modalities                                                           Assessment: reported onset of anterior R hip pain after walking on TM for 5'; difficulty w/ mat mobility/ rolling and supine<->sit; will work on mobility nv; (+) Trendelenbery RLE which may be contributing to symptoms - needs gluteal strengthening    Plan: Continue per plan of care    and Progress treatment as tolerated

## 2018-02-09 ENCOUNTER — OFFICE VISIT (OUTPATIENT)
Dept: PHYSICAL THERAPY | Facility: CLINIC | Age: 83
End: 2018-02-09
Payer: MEDICARE

## 2018-02-09 DIAGNOSIS — M51.36 DEGENERATION OF LUMBAR INTERVERTEBRAL DISC: ICD-10-CM

## 2018-02-09 DIAGNOSIS — M47.816 LUMBAR SPONDYLOSIS: Primary | ICD-10-CM

## 2018-02-09 DIAGNOSIS — M79.604 RIGHT LEG PAIN: ICD-10-CM

## 2018-02-09 PROCEDURE — 97110 THERAPEUTIC EXERCISES: CPT | Performed by: PHYSICAL THERAPIST

## 2018-02-09 PROCEDURE — 97140 MANUAL THERAPY 1/> REGIONS: CPT | Performed by: PHYSICAL THERAPIST

## 2018-02-09 NOTE — PROGRESS NOTES
Daily Note     Today's date: 2018  Patient name: Tiffanie Sanders  : 1935  MRN: [id number]  Referring provider: Maria Luisa Lainez MD  Dx:   Encounter Diagnoses   Name Primary?     Lumbar spondylosis Yes    Degeneration of lumbar intervertebral disc     Right leg pain                   Subjective: reports pain from R buttock to lateral lower leg; no c/o LBP    Objective: See treatment diary below    Precautions: falls, anticoagulation    Daily Treatment Diary     Manual          Lumbar mobs in SL - B Grade lll/ prone PA's   performed perfomred np                     TrP release R gluteal mm     performed                                      Exercise Diary          Bike 8' 8' 8' 8' 8'        TM - 4% grade 1 0 mph x  8' 1 0 mph x  8' 1 0 mph  5' 1 0  Mph  5' 4'        K->C B w/pball 20"x4 20"x5 20"x5 20'X5 hep        LTR pball 10x 10x 10x 10x hep        Pelvic tilts A-P 10x10" 10x 20x  np        Abdominal bracing    w/TM 2/TM        Seated flexion stretch 20"x4 20"x4 20"x4 np np                     Partial curl ups w/pball 10x3" 2x 10x3" 10x3"  x2 10x3" 10x3"        TB: LPD             Hip flexor stretch over edge of table 1' ea 1'ea 1/ea 1' ea HEP        Sciatic nerve slider seated 20x 20x reviewed np NP        Piriformis stretch fig 4 w/ pball 20"x4 20"X5 np 20"x5 NP        Sit ->stand 10x 10x cueing 10x np NP        hooklying abd w/TB  10x10" np nv 10x10"        Mini squats w/ TB resist abd   GTB  10x5" nv np        Rolling on mat     nv UE and LE : B        clamshells     10x5" ea                                      Modalities                                                           Assessment: exhibits TrP's R gluteal mm, weakness in gluteal mm, (+) Trendelenberg gait and poor movement patterns all of which are likely contributing to symptoms    Plan: Continue PT w/ emphasis on gluteal strengthening, improved movement patterns, increasing core strength

## 2018-02-13 ENCOUNTER — OFFICE VISIT (OUTPATIENT)
Dept: PHYSICAL THERAPY | Facility: CLINIC | Age: 83
End: 2018-02-13
Payer: MEDICARE

## 2018-02-13 DIAGNOSIS — M47.816 LUMBAR SPONDYLOSIS: Primary | ICD-10-CM

## 2018-02-13 DIAGNOSIS — M79.604 RIGHT LEG PAIN: ICD-10-CM

## 2018-02-13 DIAGNOSIS — M51.36 DEGENERATION OF LUMBAR INTERVERTEBRAL DISC: ICD-10-CM

## 2018-02-13 PROCEDURE — 97110 THERAPEUTIC EXERCISES: CPT | Performed by: PHYSICAL THERAPIST

## 2018-02-13 PROCEDURE — 97140 MANUAL THERAPY 1/> REGIONS: CPT | Performed by: PHYSICAL THERAPIST

## 2018-02-13 NOTE — PROGRESS NOTES
Daily Note     Today's date: 2018  Patient name: Kelley Cordero  : 1935  MRN: [id number]  Referring provider: Abimael Carbajal MD  Dx:   Encounter Diagnoses   Name Primary?     Lumbar spondylosis Yes    Degeneration of lumbar intervertebral disc     Right leg pain        Start Time: 817          Subjective: reports no changes in leg symptoms; not feeling well today - has a cold    Objective: See treatment diary below    Precautions: falls, anticoagulation    Daily Treatment Diary     Manual         Lumbar mobs in SL - B Grade lll/ prone PA's   performed perfomred np performed                    TrP release R gluteal mm     performed performed                                     Exercise Diary         Bike 8' 8' 8' 8' 8' 8'       TM - 4% grade 1 0 mph x  8' 1 0 mph x  8' 1 0 mph  5' 1 0  Mph  5' 4' np       K->C B w/pball 20"x4 20"x5 20"x5 20'X5 hep hep       LTR pball 10x 10x 10x 10x hep hep       Pelvic tilts A-P 10x10" 10x 20x  np 15x cues       Abdominal bracing    w/TM w/TM        Seated flexion stretch 20"x4 20"x4 20"x4 np np np                    Partial curl ups w/pball 10x3" 2x 10x3" 10x3"  x2 10x3" 10x3" 10x3"  x2       TB: LPD             Hip flexor stretch over edge of table 1' ea 1'ea 1/ea 1' ea HEP hep       Sciatic nerve slider seated 20x 20x reviewed np NP np       Piriformis stretch fig 4 w/ pball 20"x4 20"X5 np 20"x5 NP np       Sit ->stand 10x 10x cueing 10x np NP np       hooklying abd w/TB  10x10" np nv 10x10" 10x10"  x2 GTB       Mini squats w/ TB resist abd   GTB  10x5" nv np 10x5"  GTB       Rolling on mat     nv UE and LE : B np       Clamshells B     10x5" ea 10x5" x2       Gait training w/ SPC      nv                        Modalities                                                           Assessment: it is recommended that patient use a SPC - states that her doctor gave her the same advice but her  does not want her to use a cane  Updated written    Plan: Continue PT w/ emphasis on gluteal strengthening, improved movement patterns, increasing core strength

## 2018-02-16 ENCOUNTER — APPOINTMENT (OUTPATIENT)
Dept: PHYSICAL THERAPY | Facility: CLINIC | Age: 83
End: 2018-02-16
Payer: MEDICARE

## 2018-02-20 ENCOUNTER — APPOINTMENT (OUTPATIENT)
Dept: PHYSICAL THERAPY | Facility: CLINIC | Age: 83
End: 2018-02-20
Payer: MEDICARE

## 2018-02-23 ENCOUNTER — EVALUATION (OUTPATIENT)
Dept: PHYSICAL THERAPY | Facility: CLINIC | Age: 83
End: 2018-02-23
Payer: MEDICARE

## 2018-02-23 DIAGNOSIS — M47.816 LUMBAR SPONDYLOSIS: Primary | ICD-10-CM

## 2018-02-23 DIAGNOSIS — M51.36 DEGENERATION OF LUMBAR INTERVERTEBRAL DISC: ICD-10-CM

## 2018-02-23 DIAGNOSIS — M79.604 RIGHT LEG PAIN: ICD-10-CM

## 2018-02-23 PROCEDURE — G8990 OTHER PT/OT CURRENT STATUS: HCPCS | Performed by: PHYSICAL THERAPIST

## 2018-02-23 PROCEDURE — G8991 OTHER PT/OT GOAL STATUS: HCPCS | Performed by: PHYSICAL THERAPIST

## 2018-02-23 PROCEDURE — 97140 MANUAL THERAPY 1/> REGIONS: CPT | Performed by: PHYSICAL THERAPIST

## 2018-02-23 PROCEDURE — 97110 THERAPEUTIC EXERCISES: CPT | Performed by: PHYSICAL THERAPIST

## 2018-02-23 NOTE — PROGRESS NOTES
PT Discharge    Today's date: 2018  Patient name: Owen Triplett  : 1935  MRN: [id number]  Referring provider: Angel Bee MD  Dx:   Encounter Diagnosis     ICD-10-CM    1  Lumbar spondylosis M47 816    2  Degeneration of lumbar intervertebral disc M51 36    3  Right leg pain M79 604      Assessment/Plan This patient demonstrates minimal progress since beginning therapy; symptoms are intermittent, severe at times and usually provoked by standing and walking; it has been suggested that patient use a cane for ambulation but she is not currently willing to consider this option  She is independent with hep consisting of ROM and strengthening exercises  Sh has achieved the maximum benefit from therapy at this time and will be DC 'd from Pt  STGs: (2-4 weeks)  1  Increase ROM R hip extension to at least neutral  2  Decrease pain 2-3 levels  3   Reduce/ eliminate RLE symptoms  4  Able to lie on R side w/out pain: partially met  LTGs: (6-8 weeks)  1  Independent HEP -  MET  2  Increase B PSLR to 70 degrees  3  Increase FOTO score   4  Able to transfer sit <->stnad without use of arms without difficulty - partially met  5  Able to wal fo rat lest 20 minutes  6           (-) slump - MET      Subjective; states that at times her RLE pain is good sometimes and really bad other times; is sometimes able to lie on R side, other times not able  Aggravated by walking, sometimes sitting, sometimes hurts when she goes to bed  Relieved by sitting, resting    Objective Precautions: falls, anticoagulation  Pain scale: 0-8/10  Posture: forward flexed at hips in stance, iliac crest elevated, contralateral greater trochanter elevated  Trunk ROM     Flexion AROM   Severe limitation, no reversal of lordosis            Extension  AROM  Moderate limitation         R lateral flexion AROM    Moderate limitation, (+) R buttock pain       L lateral flexion AROM    Moderate limitation        R rotation AROM   Moderate limitation      L rotation AROM   Moderate limitation    FUNCTION:  Standing tolerance: 1 min  Walking tolerance: 5 min           Special tests:    PSLR R 68 degrees (-); L 55 degrees (-)      Neural tension tests:  Slump: (-)    Palpation: decreased tenderness R gluteal region; no pain w/ lumbar PA's    right     Hip   AROM  PROM  Strength  flexion   5/5   extension  -5 degrees 5/5   Abduction   4/5   adduction   5/5   ER   5/5   IR   5/5     Knee   AROM  PROM  Strength  Flexion   5/5   Extension   5/5     Ankle   AROM  PROM  Strength  DF   5/5   PF      Inversion   5/5   Eversion   5/5     right extremity ROM / strength WFL    Precautions: falls, anticoagulation    Daily Treatment Diary     Manual  1/26 1/30 2/2 2/6 2/9 2/12 2/23      Lumbar mobs in SL - B Grade lll/ prone PA's   performed perfomred np performed performed                   TrP release R gluteal mm     performed performed np                                    Exercise Diary  1/30 1/30 2/2 2/6 2/9 2/12 2/23      Bike 8' 8' 8' 8' 8' 8' np      TM - 4% grade 1 0 mph x  8' 1 0 mph x  8' 1 0 mph  5' 1 0  Mph  5' 4' np np      K->C B w/pball 20"x4 20"x5 20"x5 20'X5 hep hep hep      LTR pball 10x 10x 10x 10x hep hep hep      Pelvic tilts A-P 10x10" 10x 20x  np 15x cues 20x cues      Abdominal bracing    w/TM w/TM        Seated flexion stretch 20"x4 20"x4 20"x4 np np np np                   Partial curl ups w/pball 10x3" 2x 10x3" 10x3"  x2 10x3" 10x3" 10x3"  x2 np      HS stretch       Seated  1'      Hip flexor stretch over edge of table 1' ea 1'ea 1/ea 1' ea HEP hep hep      Sciatic nerve slider seated 20x 20x reviewed np NP np np      Piriformis stretch fig 4 w/ pball 20"x4 20"X5 np 20"x5 NP np np      Sit ->stand 10x 10x cueing 10x np NP np np      hooklying abd w/TB  10x10" np nv 10x10" 10x10"  x2 GTB 10x10"      Mini squats w/ TB resist abd   GTB  10x5" nv np 10x5"  GTB np      Rolling on mat     nv UE and LE : B np np Italo B     10x5" ea 10x5" x2 10x5"      Gait training w/ SPC      nv 50'      Hip hike       10x5"          Modalities                                                           Flowsheet Rows    Flowsheet Row Most Recent Value   PT/OT G-Codes   Current Score  51   FOTO information reviewed  Yes   Assessment Type  Re-evaluation   G code set  Other PT/OT Primary   Other PT Primary Current Status ()  CJ   Other PT Primary Goal Status ()  Rebekah Dean

## 2018-02-23 NOTE — LETTER
2018    Debra Wilkinson MD  [address] [address]    Patient: Emi Luis   YOB: 1935   Date of Visit: 2018     Encounter Diagnosis     ICD-10-CM    1  Lumbar spondylosis M47 816    2  Degeneration of lumbar intervertebral disc M51 36    3  Right leg pain M79 604          Debra Wilkinson MD  [address] [address]  VIA Facsimile: [phone]          {SL AMB PT/OT NOTE [id number]}    Today's date: 2018  Patient name: Emi Luis  : 1935  MRN: [id number]  Referring provider: Reinaldo Moeller MD  Dx:   Encounter Diagnosis     ICD-10-CM    1  Lumbar spondylosis M47 816    2  Degeneration of lumbar intervertebral disc M51 36    3   Right leg pain M79 604                   Assessment/Plan    Subjective; states that at times her RLE pain is good sometimes and really bad other times; is sometimes able to lie on R side, other times not able  Aggravated by walking, sometimes sitting, sometimes hurts when she goes to bed  Relieved by sitting, resting    Objective Precautions: falls, anticoagulation  Pain scale: 0-8/10  Posture:  Trunk ROM     Flexion AROM   Severe limitation, no reversal of lordosis            Extension  AROM  Moderate limitation         R lateral flexion AROM    Moderate limitation, (+) R buttock pain       L lateral flexion AROM    Moderate limitation        R rotation AROM   Moderate limitation      L rotation AROM   Moderate limitation    FUNCTION:  Standing tolerance: 1 min  Walking tolerance: 5 min           Special tests:    PSLR R 68 degrees (-); L 55 degrees (-)      Neural tension tests:  Slump: (-)    Palpation:  Soft tissue:   PA's/UPA's:      lum  right     Hip   AROM  PROM  Strength  flexion   5/5   extension  -5 degrees 5/5   Abduction   4/5   adduction   5/5   ER   5/5   IR   5/5         Knee   AROM  PROM  Strength  Flexion   5/5   Extension 5/5     Ankle   AROM  PROM  Strength  DF   5/5   PF      Inversion   5/5   Eversion   5/5     right extremity ROM / strength [facility]    Daily Treatment Diary     Manual  1/26 1/30 2/2 2/6 2/9 2/12 2/23      Lumbar mobs in SL - B Grade lll/ prone PA's   performed perfomred np performed                    TrP release R gluteal mm     performed performed                                     Exercise Diary  1/30 1/30 2/2 2/6 2/9 2/12 2/23      Bike 8' 8' 8' 8' 8' 8' np      TM - 4% grade 1 0 mph x  8' 1 0 mph x  8' 1 0 mph  5' 1 0  Mph  5' 4' np np      K->C B w/pball 20"x4 20"x5 20"x5 20'X5 hep hep       LTR pball 10x 10x 10x 10x hep hep       Pelvic tilts A-P 10x10" 10x 20x  np 15x cues       Abdominal bracing    w/TM w/TM        Seated flexion stretch 20"x4 20"x4 20"x4 np np np                    Partial curl ups w/pball 10x3" 2x 10x3" 10x3"  x2 10x3" 10x3" 10x3"  x2       TB: LPD             Hip flexor stretch over edge of table 1' ea 1'ea 1/ea 1' ea HEP hep       Sciatic nerve slider seated 20x 20x reviewed np NP np       Piriformis stretch fig 4 w/ pball 20"x4 20"X5 np 20"x5 NP np       Sit ->stand 10x 10x cueing 10x np NP np       hooklying abd w/TB  10x10" np nv 10x10" 10x10"  x2 GTB       Mini squats w/ TB resist abd   GTB  10x5" nv np 10x5"  GTB       Rolling on mat     nv UE and LE : B np       Clamshells B     10x5" ea 10x5" x2 10x5"      Gait training w/ SPC      nv                        Modalities                                                               Precautions: ***    Daily Treatment Diary     Manual                                                                                   Exercise Diary                                                                                                                                                                                                                                                                                      Modalities

## 2018-02-23 NOTE — LETTER
2018    Lauri Ordaz MD  [address] [facility] [facility] [address] [id number]    Patient: Li Dixon   YOB: 1935   Date of Visit: 2018     Encounter Diagnosis     ICD-10-CM    1  Lumbar spondylosis M47 816    2  Degeneration of lumbar intervertebral disc M51 36    3  Right leg pain M79 604        Dear Dr Shane German:    Please review the attached Plan of Care from ALEXANDREA LAUREN recent visit  Please verify that you agree therapy should continue by signing the attached document and sending it back to our office  If you have any questions or concerns, please don't hesitate to call  Sincerely,    Sami Thompson PT      Referring Provider:      I certify that I have read the below Plan of Care and certify the need for these services furnished under this plan of treatment while under my care  Lauri Ordaz MD  [address] [facility] [address]  VIA Facsimile: [phone]          {SL AMB PT/OT NOTE PTVN:[id number]}    Today's date: 2018  Patient name: Li Dixon  : 1935  MRN: [id number]  Referring provider: Ree Rogel MD  Dx:   Encounter Diagnosis     ICD-10-CM    1  Lumbar spondylosis M47 816    2  Degeneration of lumbar intervertebral disc M51 36    3   Right leg pain M79 604                   Assessment/Plan    Subjective; states that at times her RLE pain is good sometimes and really bad other times; is sometimes able to lie on R side, other times not able  Aggravated by walking, sometimes sitting, sometimes hurts when she goes to bed  Relieved by sitting, resting    Objective Precautions: falls, anticoagulation  Pain scale: 0-8/10  Posture:  Trunk ROM     Flexion AROM   Severe limitation, no reversal of lordosis            Extension  AROM  Moderate limitation         R lateral flexion AROM    Moderate limitation, (+) R buttock pain       L lateral flexion AROM    Moderate limitation        R rotation AROM   Moderate limitation      L rotation AROM   Moderate limitation    FUNCTION:  Standing tolerance: 1 min  Walking tolerance: 5 min           Special tests:    PSLR R 68 degrees (-); L 55 degrees (-)      Neural tension tests:  Slump: (-)    Palpation:  Soft tissue:   PA's/UPA's:      lum  right     Hip   AROM  PROM  Strength  flexion   5/5   extension  -5 degrees 5/5   Abduction   4/5   adduction   5/5   ER   5/5   IR   5/5         Knee   AROM  PROM  Strength  Flexion   5/5   Extension   5/5     Ankle   AROM  PROM  Strength  DF   5/5   PF      Inversion   5/5   Eversion   5/5     right extremity ROM / strength [facility]    Daily Treatment Diary     Manual  1/26 1/30 2/2 2/6 2/9 2/12 2/23      Lumbar mobs in SL - B Grade lll/ prone PA's   performed perfomred np performed                    TrP release R gluteal mm     performed performed                                     Exercise Diary  1/30 1/30 2/2 2/6 2/9 2/12 2/23      Bike 8' 8' 8' 8' 8' 8' np      TM - 4% grade 1 0 mph x  8' 1 0 mph x  8' 1 0 mph  5' 1 0  Mph  5' 4' np np      K->C B w/pball 20"x4 20"x5 20"x5 20'X5 hep hep       LTR pball 10x 10x 10x 10x hep hep       Pelvic tilts A-P 10x10" 10x 20x  np 15x cues       Abdominal bracing    w/TM w/TM        Seated flexion stretch 20"x4 20"x4 20"x4 np np np                    Partial curl ups w/pball 10x3" 2x 10x3" 10x3"  x2 10x3" 10x3" 10x3"  x2       TB: LPD             Hip flexor stretch over edge of table 1' ea 1'ea 1/ea 1' ea HEP hep       Sciatic nerve slider seated 20x 20x reviewed np NP np       Piriformis stretch fig 4 w/ pball 20"x4 20"X5 np 20"x5 NP np       Sit ->stand 10x 10x cueing 10x np NP np       hooklying abd w/TB  10x10" np nv 10x10" 10x10"  x2 GTB       Mini squats w/ TB resist abd   GTB  10x5" nv np 10x5"  GTB       Rolling on mat     nv UE and LE : B np       Clamshells B     10x5" ea 10x5" x2 10x5"      Gait training w/ SPC      nv Modalities                                                               Precautions: ***    Daily Treatment Diary     Manual                                                                                   Exercise Diary                                                                                                                                                                                                                                                                                      Modalities

## 2018-02-23 NOTE — LETTER
2018    Michael Bar MD  [address] [address]    Patient: Scalret Wang   YOB: 1935   Date of Visit: 2018     Encounter Diagnosis     ICD-10-CM    1  Lumbar spondylosis M47 816    2  Degeneration of lumbar intervertebral disc M51 36    3  Right leg pain M79 604            PT Discharge    Today's date: 2018  Patient name: Scarlet Wang  : 1935  MRN: [id number]  Referring provider: Sabrina Litten, MD  Dx:   Encounter Diagnosis     ICD-10-CM    1  Lumbar spondylosis M47 816    2  Degeneration of lumbar intervertebral disc M51 36    3  Right leg pain M79 604      Assessment/Plan This patient demonstrates minimal progress since beginning therapy; symptoms are intermittent, severe at times and usually provoked by standing and walking; it has been suggested that patient use a cane for ambulation but she is not currently willing to consider this option  She is independent with hep consisting of ROM and strengthening exercises  Sh has achieved the maximum benefit from therapy at this time and will be DC 'd from Pt  STGs: (2-4 weeks)  1  Increase ROM R hip extension to at least neutral  2  Decrease pain 2-3 levels  3   Reduce/ eliminate RLE symptoms  4  Able to lie on R side w/out pain: partially met  LTGs: (6-8 weeks)  1  Independent HEP -  MET  2  Increase B PSLR to 70 degrees  3  Increase FOTO score   4  Able to transfer sit <->stnad without use of arms without difficulty - partially met  5  Able to wal fo rat lest 20 minutes  6           (-) slump - MET      Subjective; states that at times her RLE pain is good sometimes and really bad other times; is sometimes able to lie on R side, other times not able  Aggravated by walking, sometimes sitting, sometimes hurts when she goes to bed  Relieved by sitting, resting    Objective Precautions: falls, anticoagulation  Pain scale: 0-8/10  Posture: forward flexed at hips in stance, iliac crest elevated, contralateral greater trochanter elevated  Trunk ROM     Flexion AROM   Severe limitation, no reversal of lordosis            Extension  AROM  Moderate limitation         R lateral flexion AROM    Moderate limitation, (+) R buttock pain       L lateral flexion AROM    Moderate limitation        R rotation AROM   Moderate limitation      L rotation AROM   Moderate limitation    FUNCTION:  Standing tolerance: 1 min  Walking tolerance: 5 min           Special tests:    PSLR R 68 degrees (-); L 55 degrees (-)      Neural tension tests:  Slump: (-)    Palpation: decreased tenderness R gluteal region; no pain w/ lumbar PA's    right     Hip   AROM  PROM  Strength  flexion   5/5   extension  -5 degrees 5/5   Abduction   4/5   adduction   5/5   ER   5/5   IR   5/5     Knee   AROM  PROM  Strength  Flexion   5/5   Extension   5/5     Ankle   AROM  PROM  Strength  DF   5/5   PF      Inversion   5/5   Eversion   5/5     right extremity ROM / strength WFL    Precautions: falls, anticoagulation    Daily Treatment Diary     Manual  1/26 1/30 2/2 2/6 2/9 2/12 2/23      Lumbar mobs in SL - B Grade lll/ prone PA's   performed perfomred np performed performed                   TrP release R gluteal mm     performed performed np                                    Exercise Diary  1/30 1/30 2/2 2/6 2/9 2/12 2/23      Bike 8' 8' 8' 8' 8' 8' np      TM - 4% grade 1 0 mph x  8' 1 0 mph x  8' 1 0 mph  5' 1 0  Mph  5' 4' np np      K->C B w/pball 20"x4 20"x5 20"x5 20'X5 hep hep hep      LTR pball 10x 10x 10x 10x hep hep hep      Pelvic tilts A-P 10x10" 10x 20x  np 15x cues 20x cues      Abdominal bracing    w/TM w/TM        Seated flexion stretch 20"x4 20"x4 20"x4 np np np np                   Partial curl ups w/pball 10x3" 2x 10x3" 10x3"  x2 10x3" 10x3" 10x3"  x2 np      HS stretch       Seated  1'      Hip flexor stretch over edge of table 1' ea 1'ea 1/ea 1' ea HEP hep hep      Sciatic nerve slider seated 20x 20x reviewed np NP np np      Piriformis stretch fig 4 w/ pball 20"x4 20"X5 np 20"x5 NP np np      Sit ->stand 10x 10x cueing 10x np NP np np      hooklying abd w/TB  10x10" np nv 10x10" 10x10"  x2 GTB 10x10"      Mini squats w/ TB resist abd   GTB  10x5" nv np 10x5"  GTB np      Rolling on mat     nv UE and LE : B np np      Clamshells B     10x5" ea 10x5" x2 10x5"      Gait training w/ SPC      nv 50'      Hip hike       10x5"          Modalities                                                           Flowsheet Rows    Flowsheet Row Most Recent Value   PT/OT G-Codes   Current Score  51   FOTO information reviewed  Yes   Assessment Type  Re-evaluation   G code set  Other PT/OT Primary   Other PT Primary Current Status ()  CJ   Other PT Primary Goal Status ()  Ashley Coronado

## 2018-02-27 ENCOUNTER — APPOINTMENT (OUTPATIENT)
Dept: PHYSICAL THERAPY | Facility: CLINIC | Age: 83
End: 2018-02-27
Payer: MEDICARE

## 2018-03-06 ENCOUNTER — APPOINTMENT (OUTPATIENT)
Dept: LAB | Facility: HOSPITAL | Age: 83
End: 2018-03-06
Attending: INTERNAL MEDICINE
Payer: MEDICARE

## 2018-03-06 ENCOUNTER — TRANSCRIBE ORDERS (OUTPATIENT)
Dept: ADMINISTRATIVE | Facility: HOSPITAL | Age: 83
End: 2018-03-06

## 2018-03-06 DIAGNOSIS — M81.0 SENILE OSTEOPOROSIS: ICD-10-CM

## 2018-03-06 DIAGNOSIS — I10 ESSENTIAL HYPERTENSION, MALIGNANT: ICD-10-CM

## 2018-03-06 DIAGNOSIS — M35.01 KERATOCONJUNCTIVITIS SICCA (HCC): Primary | ICD-10-CM

## 2018-03-06 DIAGNOSIS — I51.9 MYXEDEMA HEART DISEASE: ICD-10-CM

## 2018-03-06 DIAGNOSIS — E55.9 AVITAMINOSIS D: ICD-10-CM

## 2018-03-06 DIAGNOSIS — E78.2 MIXED HYPERLIPIDEMIA: ICD-10-CM

## 2018-03-06 DIAGNOSIS — M35.01 KERATOCONJUNCTIVITIS SICCA (HCC): ICD-10-CM

## 2018-03-06 DIAGNOSIS — E78.2 MIXED HYPERLIPIDEMIA: Primary | ICD-10-CM

## 2018-03-06 DIAGNOSIS — E03.9 MYXEDEMA HEART DISEASE: ICD-10-CM

## 2018-03-06 LAB
ALBUMIN SERPL BCP-MCNC: 3.6 G/DL (ref 3.5–5)
ALP SERPL-CCNC: 54 U/L (ref 46–116)
ALT SERPL W P-5'-P-CCNC: 36 U/L (ref 12–78)
ANION GAP SERPL CALCULATED.3IONS-SCNC: 9 MMOL/L (ref 4–13)
AST SERPL W P-5'-P-CCNC: 33 U/L (ref 5–45)
BASOPHILS # BLD AUTO: 0.04 THOUSANDS/ΜL (ref 0–0.1)
BASOPHILS NFR BLD AUTO: 1 % (ref 0–1)
BILIRUB SERPL-MCNC: 0.5 MG/DL (ref 0.2–1)
BILIRUB UR QL STRIP: NEGATIVE
BUN SERPL-MCNC: 31 MG/DL (ref 5–25)
C3 SERPL-MCNC: 124 MG/DL (ref 90–180)
C4 SERPL-MCNC: 24 MG/DL (ref 10–40)
CALCIUM SERPL-MCNC: 8.8 MG/DL (ref 8.3–10.1)
CHLORIDE SERPL-SCNC: 101 MMOL/L (ref 100–108)
CHOLEST SERPL-MCNC: 162 MG/DL (ref 50–200)
CLARITY UR: CLEAR
CO2 SERPL-SCNC: 30 MMOL/L (ref 21–32)
COLOR UR: YELLOW
CREAT SERPL-MCNC: 1.21 MG/DL (ref 0.6–1.3)
EOSINOPHIL # BLD AUTO: 0.65 THOUSAND/ΜL (ref 0–0.61)
EOSINOPHIL NFR BLD AUTO: 10 % (ref 0–6)
ERYTHROCYTE [DISTWIDTH] IN BLOOD BY AUTOMATED COUNT: 12.9 % (ref 11.6–15.1)
ERYTHROCYTE [SEDIMENTATION RATE] IN BLOOD: 14 MM/HOUR (ref 0–15)
GFR SERPL CREATININE-BSD FRML MDRD: 42 ML/MIN/1.73SQ M
GLUCOSE P FAST SERPL-MCNC: 87 MG/DL (ref 65–99)
GLUCOSE UR STRIP-MCNC: NEGATIVE MG/DL
HCT VFR BLD AUTO: 38.6 % (ref 34.8–46.1)
HDLC SERPL-MCNC: 44 MG/DL (ref 40–60)
HGB BLD-MCNC: 13.2 G/DL (ref 11.5–15.4)
HGB UR QL STRIP.AUTO: NEGATIVE
KETONES UR STRIP-MCNC: NEGATIVE MG/DL
LDLC SERPL CALC-MCNC: 83 MG/DL (ref 0–100)
LEUKOCYTE ESTERASE UR QL STRIP: NEGATIVE
LYMPHOCYTES # BLD AUTO: 2.12 THOUSANDS/ΜL (ref 0.6–4.47)
LYMPHOCYTES NFR BLD AUTO: 34 % (ref 14–44)
MCH RBC QN AUTO: 32 PG (ref 26.8–34.3)
MCHC RBC AUTO-ENTMCNC: 34.2 G/DL (ref 31.4–37.4)
MCV RBC AUTO: 94 FL (ref 82–98)
MONOCYTES # BLD AUTO: 0.79 THOUSAND/ΜL (ref 0.17–1.22)
MONOCYTES NFR BLD AUTO: 13 % (ref 4–12)
NEUTROPHILS # BLD AUTO: 2.67 THOUSANDS/ΜL (ref 1.85–7.62)
NEUTS SEG NFR BLD AUTO: 42 % (ref 43–75)
NITRITE UR QL STRIP: NEGATIVE
PH UR STRIP.AUTO: 7 [PH] (ref 4.5–8)
PLATELET # BLD AUTO: 164 THOUSANDS/UL (ref 149–390)
PMV BLD AUTO: 11 FL (ref 8.9–12.7)
POTASSIUM SERPL-SCNC: 4.2 MMOL/L (ref 3.5–5.3)
PROT SERPL-MCNC: 7.4 G/DL (ref 6.4–8.2)
PROT UR STRIP-MCNC: NEGATIVE MG/DL
RBC # BLD AUTO: 4.13 MILLION/UL (ref 3.81–5.12)
SODIUM SERPL-SCNC: 140 MMOL/L (ref 136–145)
SP GR UR STRIP.AUTO: 1.01 (ref 1–1.03)
T3 SERPL-MCNC: 0.7 NG/ML (ref 0.6–1.8)
T4 FREE SERPL-MCNC: 1.23 NG/DL (ref 0.76–1.46)
TRIGL SERPL-MCNC: 175 MG/DL
TSH SERPL DL<=0.05 MIU/L-ACNC: 3.64 UIU/ML (ref 0.36–3.74)
UROBILINOGEN UR QL STRIP.AUTO: 0.2 E.U./DL
WBC # BLD AUTO: 6.27 THOUSAND/UL (ref 4.31–10.16)

## 2018-03-06 PROCEDURE — 82595 ASSAY OF CRYOGLOBULIN: CPT

## 2018-03-06 PROCEDURE — 84480 ASSAY TRIIODOTHYRONINE (T3): CPT

## 2018-03-06 PROCEDURE — 36415 COLL VENOUS BLD VENIPUNCTURE: CPT

## 2018-03-06 PROCEDURE — 86160 COMPLEMENT ANTIGEN: CPT

## 2018-03-06 PROCEDURE — 84165 PROTEIN E-PHORESIS SERUM: CPT

## 2018-03-06 PROCEDURE — 84443 ASSAY THYROID STIM HORMONE: CPT

## 2018-03-06 PROCEDURE — 80053 COMPREHEN METABOLIC PANEL: CPT

## 2018-03-06 PROCEDURE — 84439 ASSAY OF FREE THYROXINE: CPT

## 2018-03-06 PROCEDURE — 81003 URINALYSIS AUTO W/O SCOPE: CPT | Performed by: INTERNAL MEDICINE

## 2018-03-06 PROCEDURE — 84165 PROTEIN E-PHORESIS SERUM: CPT | Performed by: PATHOLOGY

## 2018-03-06 PROCEDURE — 85652 RBC SED RATE AUTOMATED: CPT

## 2018-03-06 PROCEDURE — 85025 COMPLETE CBC W/AUTO DIFF WBC: CPT

## 2018-03-06 PROCEDURE — 80061 LIPID PANEL: CPT

## 2018-03-08 LAB
ALBUMIN SERPL ELPH-MCNC: 4.46 G/DL (ref 3.5–5)
ALBUMIN SERPL ELPH-MCNC: 60.3 % (ref 52–65)
ALPHA1 GLOB SERPL ELPH-MCNC: 0.29 G/DL (ref 0.1–0.4)
ALPHA1 GLOB SERPL ELPH-MCNC: 3.9 % (ref 2.5–5)
ALPHA2 GLOB SERPL ELPH-MCNC: 0.76 G/DL (ref 0.4–1.2)
ALPHA2 GLOB SERPL ELPH-MCNC: 10.3 % (ref 7–13)
BETA GLOB ABNORMAL SERPL ELPH-MCNC: 0.51 G/DL (ref 0.4–0.8)
BETA1 GLOB SERPL ELPH-MCNC: 6.9 % (ref 5–13)
BETA2 GLOB SERPL ELPH-MCNC: 6.8 % (ref 2–8)
BETA2+GAMMA GLOB SERPL ELPH-MCNC: 0.5 G/DL (ref 0.2–0.5)
GAMMA GLOB ABNORMAL SERPL ELPH-MCNC: 0.87 G/DL (ref 0.5–1.6)
GAMMA GLOB SERPL ELPH-MCNC: 11.8 % (ref 12–22)
IGG/ALB SER: 1.52 {RATIO} (ref 1.1–1.8)
PROT PATTERN SERPL ELPH-IMP: ABNORMAL
PROT SERPL-MCNC: 7.4 G/DL (ref 6.4–8.2)

## 2018-03-13 LAB — CRYOGLOB SER QL 1D COLD INC: NORMAL

## 2018-04-12 DIAGNOSIS — M81.0 AGE-RELATED OSTEOPOROSIS WITHOUT CURRENT PATHOLOGICAL FRACTURE: Primary | ICD-10-CM

## 2018-05-07 ENCOUNTER — TRANSCRIBE ORDERS (OUTPATIENT)
Dept: ADMINISTRATIVE | Facility: HOSPITAL | Age: 83
End: 2018-05-07

## 2018-05-07 DIAGNOSIS — M25.511 RIGHT SHOULDER PAIN, UNSPECIFIED CHRONICITY: Primary | ICD-10-CM

## 2018-05-08 ENCOUNTER — APPOINTMENT (OUTPATIENT)
Dept: LAB | Facility: HOSPITAL | Age: 83
End: 2018-05-08
Attending: INTERNAL MEDICINE
Payer: MEDICARE

## 2018-05-08 DIAGNOSIS — M81.0 AGE-RELATED OSTEOPOROSIS WITHOUT CURRENT PATHOLOGICAL FRACTURE: ICD-10-CM

## 2018-05-08 LAB
ALBUMIN SERPL BCP-MCNC: 4 G/DL (ref 3.5–5)
CALCIUM SERPL-MCNC: 9.6 MG/DL (ref 8.3–10.1)

## 2018-05-08 PROCEDURE — 82310 ASSAY OF CALCIUM: CPT

## 2018-05-08 PROCEDURE — 36415 COLL VENOUS BLD VENIPUNCTURE: CPT

## 2018-05-08 PROCEDURE — 82040 ASSAY OF SERUM ALBUMIN: CPT

## 2018-05-16 ENCOUNTER — HOSPITAL ENCOUNTER (OUTPATIENT)
Dept: RADIOLOGY | Facility: HOSPITAL | Age: 83
Discharge: HOME | End: 2018-05-16
Admitting: RADIOLOGY
Payer: MEDICARE

## 2018-05-16 ENCOUNTER — HOSPITAL ENCOUNTER (OUTPATIENT)
Dept: RADIOLOGY | Facility: HOSPITAL | Age: 83
Discharge: HOME | End: 2018-05-16
Payer: MEDICARE

## 2018-05-16 ENCOUNTER — HOSPITAL ENCOUNTER (OUTPATIENT)
Dept: INFUSION CENTER | Facility: HOSPITAL | Age: 83
Discharge: HOME | End: 2018-05-16
Payer: MEDICARE

## 2018-05-16 VITALS
OXYGEN SATURATION: 93 % | SYSTOLIC BLOOD PRESSURE: 144 MMHG | TEMPERATURE: 97.7 F | HEART RATE: 74 BPM | RESPIRATION RATE: 18 BRPM | DIASTOLIC BLOOD PRESSURE: 61 MMHG

## 2018-05-16 DIAGNOSIS — M25.511 RIGHT SHOULDER PAIN, UNSPECIFIED CHRONICITY: ICD-10-CM

## 2018-05-16 PROCEDURE — 96401 CHEMO ANTI-NEOPL SQ/IM: CPT

## 2018-05-16 PROCEDURE — 73200 CT UPPER EXTREMITY W/O DYE: CPT

## 2018-05-16 PROCEDURE — 77002 NEEDLE LOCALIZATION BY XRAY: CPT

## 2018-05-16 PROCEDURE — 23350 INJECTION FOR SHOULDER X-RAY: CPT

## 2018-05-16 RX ORDER — 0.9 % SODIUM CHLORIDE 0.9 %
5 VIAL (ML) INJECTION
Status: COMPLETED | OUTPATIENT
Start: 2018-05-16 | End: 2018-05-16

## 2018-05-16 RX ORDER — LIDOCAINE HYDROCHLORIDE 10 MG/ML
5 INJECTION, SOLUTION INFILTRATION; PERINEURAL
Status: COMPLETED | OUTPATIENT
Start: 2018-05-16 | End: 2018-05-16

## 2018-05-16 RX ADMIN — SODIUM CHLORIDE 3 ML: 9 INJECTION, SOLUTION INTRAMUSCULAR; INTRAVENOUS; SUBCUTANEOUS at 17:00

## 2018-05-16 RX ADMIN — DENOSUMAB 60 MG: 60 INJECTION SUBCUTANEOUS at 09:20

## 2018-05-16 RX ADMIN — LIDOCAINE HYDROCHLORIDE 2 ML: 10 INJECTION, SOLUTION INFILTRATION; PERINEURAL at 17:00

## 2018-05-16 RX ADMIN — IOHEXOL 3 ML: 300 INJECTION, SOLUTION INTRAVENOUS at 16:58

## 2018-05-16 NOTE — PLAN OF CARE
Patient will remain free of falls Progressing      Patient/family/caregiver demonstrates understanding of disease process, treatment plan, medications, and discharge instructions Progressing

## 2018-07-14 ENCOUNTER — HOSPITAL ENCOUNTER (INPATIENT)
Facility: HOSPITAL | Age: 83
LOS: 3 days | Discharge: HOME | DRG: 919 | End: 2018-07-18
Attending: EMERGENCY MEDICINE | Admitting: INTERNAL MEDICINE
Payer: MEDICARE

## 2018-07-14 DIAGNOSIS — M35.00 SJOGREN'S SYNDROME (HCC): ICD-10-CM

## 2018-07-14 DIAGNOSIS — Z96.611 STATUS POST REPLACEMENT OF RIGHT SHOULDER JOINT: ICD-10-CM

## 2018-07-14 DIAGNOSIS — N18.9 CHRONIC KIDNEY DISEASE: ICD-10-CM

## 2018-07-14 DIAGNOSIS — J90 PLEURAL EFFUSION ON RIGHT: ICD-10-CM

## 2018-07-14 DIAGNOSIS — J30.9 ALLERGIC RHINITIS, UNSPECIFIED SEASONALITY, UNSPECIFIED TRIGGER: ICD-10-CM

## 2018-07-14 DIAGNOSIS — D64.9 SYMPTOMATIC ANEMIA: Primary | ICD-10-CM

## 2018-07-14 DIAGNOSIS — J96.01 ACUTE RESPIRATORY FAILURE WITH HYPOXIA (HCC): ICD-10-CM

## 2018-07-14 DIAGNOSIS — D62 ACUTE POSTHEMORRHAGIC ANEMIA: ICD-10-CM

## 2018-07-14 PROCEDURE — 93005 ELECTROCARDIOGRAM TRACING: CPT

## 2018-07-14 RX ORDER — CELECOXIB 100 MG/1
100 CAPSULE ORAL 2 TIMES DAILY
COMMUNITY
End: 2018-07-18 | Stop reason: HOSPADM

## 2018-07-14 RX ORDER — ESTRADIOL 0.1 MG/G
2 CREAM VAGINAL DAILY
COMMUNITY
End: 2019-05-21

## 2018-07-14 RX ORDER — POLYVINYL ALCOHOL 14 MG/ML
1 SOLUTION/ DROPS OPHTHALMIC AS NEEDED
COMMUNITY

## 2018-07-14 RX ORDER — ASCORBIC ACID 1000 MG
60 TABLET ORAL DAILY
COMMUNITY
End: 2019-05-21

## 2018-07-14 RX ORDER — KETOTIFEN FUMARATE 0.35 MG/ML
1 SOLUTION/ DROPS OPHTHALMIC 2 TIMES DAILY
COMMUNITY
End: 2018-10-03

## 2018-07-15 ENCOUNTER — APPOINTMENT (INPATIENT)
Dept: CT IMAGING | Facility: HOSPITAL | Age: 83
DRG: 919 | End: 2018-07-15
Payer: MEDICARE

## 2018-07-15 ENCOUNTER — APPOINTMENT (EMERGENCY)
Dept: RADIOLOGY | Facility: HOSPITAL | Age: 83
DRG: 919 | End: 2018-07-15
Payer: MEDICARE

## 2018-07-15 PROBLEM — N18.9 CHRONIC KIDNEY DISEASE: Status: ACTIVE | Noted: 2018-07-15

## 2018-07-15 PROBLEM — J96.01 ACUTE RESPIRATORY FAILURE WITH HYPOXIA (HCC): Status: ACTIVE | Noted: 2018-07-15

## 2018-07-15 PROBLEM — Z96.611 STATUS POST REPLACEMENT OF RIGHT SHOULDER JOINT: Status: ACTIVE | Noted: 2018-07-15

## 2018-07-15 PROBLEM — D72.829 LEUKOCYTOSIS: Status: ACTIVE | Noted: 2018-07-15

## 2018-07-15 PROBLEM — D64.9 SYMPTOMATIC ANEMIA: Status: ACTIVE | Noted: 2018-07-15

## 2018-07-15 LAB
ABO GROUP BLD BPU: NORMAL
ABO GROUP BLD: NORMAL
ALBUMIN SERPL BCP-MCNC: 2.5 G/DL (ref 3.5–5)
ALP SERPL-CCNC: 40 U/L (ref 46–116)
ALT SERPL W P-5'-P-CCNC: 21 U/L (ref 12–78)
ANION GAP SERPL CALCULATED.3IONS-SCNC: 6 MMOL/L (ref 4–13)
ANION GAP SERPL CALCULATED.3IONS-SCNC: 8 MMOL/L (ref 4–13)
APTT PPP: 53 SECONDS (ref 24–36)
AST SERPL W P-5'-P-CCNC: 33 U/L (ref 5–45)
BACTERIA UR QL AUTO: ABNORMAL /HPF
BASOPHILS # BLD AUTO: 0 THOUSANDS/ΜL (ref 0–0.1)
BASOPHILS NFR BLD AUTO: 0 % (ref 0–1)
BILIRUB DIRECT SERPL-MCNC: 0.11 MG/DL (ref 0–0.2)
BILIRUB SERPL-MCNC: 0.3 MG/DL (ref 0.2–1)
BILIRUB UR QL STRIP: NEGATIVE
BLD GP AB SCN SERPL QL: NEGATIVE
BPU ID: NORMAL
BUN SERPL-MCNC: 36 MG/DL (ref 5–25)
BUN SERPL-MCNC: 41 MG/DL (ref 5–25)
CALCIUM SERPL-MCNC: 7.8 MG/DL (ref 8.3–10.1)
CALCIUM SERPL-MCNC: 8.1 MG/DL (ref 8.3–10.1)
CHLORIDE SERPL-SCNC: 101 MMOL/L (ref 100–108)
CHLORIDE SERPL-SCNC: 101 MMOL/L (ref 100–108)
CLARITY UR: CLEAR
CO2 SERPL-SCNC: 25 MMOL/L (ref 21–32)
CO2 SERPL-SCNC: 27 MMOL/L (ref 21–32)
COLOR UR: YELLOW
CREAT SERPL-MCNC: 1.16 MG/DL (ref 0.6–1.3)
CREAT SERPL-MCNC: 1.24 MG/DL (ref 0.6–1.3)
CROSSMATCH: NORMAL
DAT POLY-SP REAG RBC QL: NEGATIVE
EOSINOPHIL # BLD AUTO: 0.04 THOUSAND/ΜL (ref 0–0.61)
EOSINOPHIL NFR BLD AUTO: 0 % (ref 0–6)
ERYTHROCYTE [DISTWIDTH] IN BLOOD BY AUTOMATED COUNT: 13.9 % (ref 11.6–15.1)
ERYTHROCYTE [DISTWIDTH] IN BLOOD BY AUTOMATED COUNT: 14.4 % (ref 11.6–15.1)
GFR SERPL CREATININE-BSD FRML MDRD: 41 ML/MIN/1.73SQ M
GFR SERPL CREATININE-BSD FRML MDRD: 45 ML/MIN/1.73SQ M
GLUCOSE SERPL-MCNC: 141 MG/DL (ref 65–140)
GLUCOSE SERPL-MCNC: 144 MG/DL (ref 65–140)
GLUCOSE UR STRIP-MCNC: NEGATIVE MG/DL
HCT VFR BLD AUTO: 13 % (ref 34.8–46.1)
HCT VFR BLD AUTO: 13.6 % (ref 34.8–46.1)
HCT VFR BLD AUTO: 17.5 % (ref 34.8–46.1)
HCT VFR BLD AUTO: 23.1 % (ref 34.8–46.1)
HCT VFR BLD AUTO: 23.8 % (ref 34.8–46.1)
HGB BLD-MCNC: 4.2 G/DL (ref 11.5–15.4)
HGB BLD-MCNC: 4.3 G/DL (ref 11.5–15.4)
HGB BLD-MCNC: 5.6 G/DL (ref 11.5–15.4)
HGB BLD-MCNC: 7.9 G/DL (ref 11.5–15.4)
HGB BLD-MCNC: 8 G/DL (ref 11.5–15.4)
HGB UR QL STRIP.AUTO: NEGATIVE
IMM GRANULOCYTES # BLD AUTO: 0.22 THOUSAND/UL (ref 0–0.2)
IMM GRANULOCYTES NFR BLD AUTO: 2 % (ref 0–2)
INR PPP: 1.39 (ref 0.86–1.17)
KETONES UR STRIP-MCNC: NEGATIVE MG/DL
LDH SERPL-CCNC: 260 U/L (ref 81–234)
LEUKOCYTE ESTERASE UR QL STRIP: ABNORMAL
LYMPHOCYTES # BLD AUTO: 2.37 THOUSANDS/ΜL (ref 0.6–4.47)
LYMPHOCYTES NFR BLD AUTO: 20 % (ref 14–44)
MCH RBC QN AUTO: 30.6 PG (ref 26.8–34.3)
MCH RBC QN AUTO: 31.6 PG (ref 26.8–34.3)
MCHC RBC AUTO-ENTMCNC: 32 G/DL (ref 31.4–37.4)
MCHC RBC AUTO-ENTMCNC: 32.3 G/DL (ref 31.4–37.4)
MCV RBC AUTO: 96 FL (ref 82–98)
MCV RBC AUTO: 98 FL (ref 82–98)
MONOCYTES # BLD AUTO: 2.06 THOUSAND/ΜL (ref 0.17–1.22)
MONOCYTES NFR BLD AUTO: 17 % (ref 4–12)
MUCOUS THREADS UR QL AUTO: ABNORMAL
NEUTROPHILS # BLD AUTO: 7.13 THOUSANDS/ΜL (ref 1.85–7.62)
NEUTS SEG NFR BLD AUTO: 61 % (ref 43–75)
NITRITE UR QL STRIP: NEGATIVE
NON-SQ EPI CELLS URNS QL MICRO: ABNORMAL /HPF
NRBC BLD AUTO-RTO: 1 /100 WBCS
OTHER STN SPEC: ABNORMAL
PH UR STRIP.AUTO: 6 [PH] (ref 4.5–8)
PLATELET # BLD AUTO: 122 THOUSANDS/UL (ref 149–390)
PLATELET # BLD AUTO: 123 THOUSANDS/UL (ref 149–390)
PMV BLD AUTO: 11.3 FL (ref 8.9–12.7)
PMV BLD AUTO: 11.6 FL (ref 8.9–12.7)
POTASSIUM SERPL-SCNC: 4.5 MMOL/L (ref 3.5–5.3)
POTASSIUM SERPL-SCNC: 4.6 MMOL/L (ref 3.5–5.3)
PROT SERPL-MCNC: 5.2 G/DL (ref 6.4–8.2)
PROT UR STRIP-MCNC: NEGATIVE MG/DL
PROTHROMBIN TIME: 16.3 SECONDS (ref 11.8–14.2)
RBC # BLD AUTO: 1.33 MILLION/UL (ref 3.81–5.12)
RBC # BLD AUTO: 1.83 MILLION/UL (ref 3.81–5.12)
RBC #/AREA URNS AUTO: ABNORMAL /HPF
RH BLD: POSITIVE
SODIUM SERPL-SCNC: 134 MMOL/L (ref 136–145)
SODIUM SERPL-SCNC: 134 MMOL/L (ref 136–145)
SP GR UR STRIP.AUTO: 1.01 (ref 1–1.03)
SPECIMEN EXPIRATION DATE: NORMAL
TROPONIN I SERPL-MCNC: 0.03 NG/ML
TROPONIN I SERPL-MCNC: 0.06 NG/ML
TROPONIN I SERPL-MCNC: 0.14 NG/ML
UNIT DISPENSE STATUS: NORMAL
UNIT PRODUCT CODE: NORMAL
UNIT RH: NORMAL
UROBILINOGEN UR QL STRIP.AUTO: 0.2 E.U./DL
WBC # BLD AUTO: 11.82 THOUSAND/UL (ref 4.31–10.16)
WBC # BLD AUTO: 13.58 THOUSAND/UL (ref 4.31–10.16)
WBC #/AREA URNS AUTO: ABNORMAL /HPF

## 2018-07-15 PROCEDURE — 80048 BASIC METABOLIC PNL TOTAL CA: CPT | Performed by: EMERGENCY MEDICINE

## 2018-07-15 PROCEDURE — 83010 ASSAY OF HAPTOGLOBIN QUANT: CPT | Performed by: GENERAL PRACTICE

## 2018-07-15 PROCEDURE — 30233N1 TRANSFUSION OF NONAUTOLOGOUS RED BLOOD CELLS INTO PERIPHERAL VEIN, PERCUTANEOUS APPROACH: ICD-10-PCS | Performed by: EMERGENCY MEDICINE

## 2018-07-15 PROCEDURE — 86850 RBC ANTIBODY SCREEN: CPT | Performed by: EMERGENCY MEDICINE

## 2018-07-15 PROCEDURE — 84484 ASSAY OF TROPONIN QUANT: CPT | Performed by: EMERGENCY MEDICINE

## 2018-07-15 PROCEDURE — 74176 CT ABD & PELVIS W/O CONTRAST: CPT

## 2018-07-15 PROCEDURE — 86920 COMPATIBILITY TEST SPIN: CPT

## 2018-07-15 PROCEDURE — 85014 HEMATOCRIT: CPT | Performed by: GENERAL PRACTICE

## 2018-07-15 PROCEDURE — 86901 BLOOD TYPING SEROLOGIC RH(D): CPT | Performed by: EMERGENCY MEDICINE

## 2018-07-15 PROCEDURE — 96374 THER/PROPH/DIAG INJ IV PUSH: CPT

## 2018-07-15 PROCEDURE — 86880 COOMBS TEST DIRECT: CPT | Performed by: NURSE PRACTITIONER

## 2018-07-15 PROCEDURE — 85730 THROMBOPLASTIN TIME PARTIAL: CPT | Performed by: EMERGENCY MEDICINE

## 2018-07-15 PROCEDURE — 84484 ASSAY OF TROPONIN QUANT: CPT | Performed by: NURSE PRACTITIONER

## 2018-07-15 PROCEDURE — 99223 1ST HOSP IP/OBS HIGH 75: CPT | Performed by: GENERAL PRACTICE

## 2018-07-15 PROCEDURE — 85018 HEMOGLOBIN: CPT | Performed by: EMERGENCY MEDICINE

## 2018-07-15 PROCEDURE — 85025 COMPLETE CBC W/AUTO DIFF WBC: CPT | Performed by: EMERGENCY MEDICINE

## 2018-07-15 PROCEDURE — 36415 COLL VENOUS BLD VENIPUNCTURE: CPT | Performed by: EMERGENCY MEDICINE

## 2018-07-15 PROCEDURE — 99222 1ST HOSP IP/OBS MODERATE 55: CPT | Performed by: ORTHOPAEDIC SURGERY

## 2018-07-15 PROCEDURE — 81001 URINALYSIS AUTO W/SCOPE: CPT | Performed by: EMERGENCY MEDICINE

## 2018-07-15 PROCEDURE — 85027 COMPLETE CBC AUTOMATED: CPT | Performed by: NURSE PRACTITIONER

## 2018-07-15 PROCEDURE — 85018 HEMOGLOBIN: CPT | Performed by: GENERAL PRACTICE

## 2018-07-15 PROCEDURE — 99285 EMERGENCY DEPT VISIT HI MDM: CPT

## 2018-07-15 PROCEDURE — 80076 HEPATIC FUNCTION PANEL: CPT | Performed by: EMERGENCY MEDICINE

## 2018-07-15 PROCEDURE — P9021 RED BLOOD CELLS UNIT: HCPCS

## 2018-07-15 PROCEDURE — 80048 BASIC METABOLIC PNL TOTAL CA: CPT | Performed by: NURSE PRACTITIONER

## 2018-07-15 PROCEDURE — 71045 X-RAY EXAM CHEST 1 VIEW: CPT

## 2018-07-15 PROCEDURE — 83615 LACTATE (LD) (LDH) ENZYME: CPT | Performed by: NURSE PRACTITIONER

## 2018-07-15 PROCEDURE — 71275 CT ANGIOGRAPHY CHEST: CPT

## 2018-07-15 PROCEDURE — 85610 PROTHROMBIN TIME: CPT | Performed by: EMERGENCY MEDICINE

## 2018-07-15 PROCEDURE — 86900 BLOOD TYPING SEROLOGIC ABO: CPT | Performed by: EMERGENCY MEDICINE

## 2018-07-15 PROCEDURE — 85014 HEMATOCRIT: CPT | Performed by: EMERGENCY MEDICINE

## 2018-07-15 RX ORDER — PANTOPRAZOLE SODIUM 40 MG/1
40 TABLET, DELAYED RELEASE ORAL
Status: DISCONTINUED | OUTPATIENT
Start: 2018-07-15 | End: 2018-07-18 | Stop reason: HOSPADM

## 2018-07-15 RX ORDER — OXYCODONE HYDROCHLORIDE 5 MG/1
5 TABLET ORAL EVERY 4 HOURS PRN
Status: DISCONTINUED | OUTPATIENT
Start: 2018-07-15 | End: 2018-07-18 | Stop reason: HOSPADM

## 2018-07-15 RX ORDER — DOCUSATE SODIUM 100 MG/1
100 CAPSULE, LIQUID FILLED ORAL 2 TIMES DAILY
Status: DISCONTINUED | OUTPATIENT
Start: 2018-07-15 | End: 2018-07-18 | Stop reason: HOSPADM

## 2018-07-15 RX ORDER — ONDANSETRON 2 MG/ML
4 INJECTION INTRAMUSCULAR; INTRAVENOUS EVERY 6 HOURS PRN
Status: DISCONTINUED | OUTPATIENT
Start: 2018-07-15 | End: 2018-07-18 | Stop reason: HOSPADM

## 2018-07-15 RX ORDER — ASCORBIC ACID 1000 MG
60 TABLET ORAL DAILY
Status: DISCONTINUED | OUTPATIENT
Start: 2018-07-15 | End: 2018-07-15 | Stop reason: CLARIF

## 2018-07-15 RX ORDER — MAGNESIUM GLUCONATE 27 MG(500)
250 TABLET ORAL DAILY
Status: DISCONTINUED | OUTPATIENT
Start: 2018-07-15 | End: 2018-07-18 | Stop reason: HOSPADM

## 2018-07-15 RX ORDER — LEVOTHYROXINE SODIUM 112 UG/1
112 TABLET ORAL
Status: DISCONTINUED | OUTPATIENT
Start: 2018-07-15 | End: 2018-07-18 | Stop reason: HOSPADM

## 2018-07-15 RX ORDER — ACETAMINOPHEN 325 MG/1
650 TABLET ORAL EVERY 6 HOURS PRN
Status: DISCONTINUED | OUTPATIENT
Start: 2018-07-15 | End: 2018-07-18 | Stop reason: HOSPADM

## 2018-07-15 RX ORDER — POLYVINYL ALCOHOL 14 MG/ML
1 SOLUTION/ DROPS OPHTHALMIC AS NEEDED
Status: DISCONTINUED | OUTPATIENT
Start: 2018-07-15 | End: 2018-07-18 | Stop reason: HOSPADM

## 2018-07-15 RX ORDER — VITAMIN E 268 MG
400 CAPSULE ORAL DAILY
Status: DISCONTINUED | OUTPATIENT
Start: 2018-07-15 | End: 2018-07-18 | Stop reason: HOSPADM

## 2018-07-15 RX ORDER — MELATONIN
2000 DAILY
Status: DISCONTINUED | OUTPATIENT
Start: 2018-07-15 | End: 2018-07-18 | Stop reason: HOSPADM

## 2018-07-15 RX ORDER — HYDROXYCHLOROQUINE SULFATE 200 MG/1
200 TABLET, FILM COATED ORAL DAILY
Status: DISCONTINUED | OUTPATIENT
Start: 2018-07-15 | End: 2018-07-18 | Stop reason: HOSPADM

## 2018-07-15 RX ORDER — PRAVASTATIN SODIUM 40 MG
40 TABLET ORAL
Status: DISCONTINUED | OUTPATIENT
Start: 2018-07-15 | End: 2018-07-18 | Stop reason: HOSPADM

## 2018-07-15 RX ORDER — POLYVINYL ALCOHOL 14 MG/ML
1 SOLUTION/ DROPS OPHTHALMIC EVERY 12 HOURS
Status: DISCONTINUED | OUTPATIENT
Start: 2018-07-15 | End: 2018-07-15

## 2018-07-15 RX ORDER — ESTRADIOL 0.1 MG/G
2 CREAM VAGINAL DAILY
Status: DISCONTINUED | OUTPATIENT
Start: 2018-07-15 | End: 2018-07-18 | Stop reason: HOSPADM

## 2018-07-15 RX ORDER — FENTANYL CITRATE 50 UG/ML
25 INJECTION, SOLUTION INTRAMUSCULAR; INTRAVENOUS ONCE
Status: COMPLETED | OUTPATIENT
Start: 2018-07-15 | End: 2018-07-15

## 2018-07-15 RX ORDER — OXYCODONE HYDROCHLORIDE 5 MG/1
2.5 TABLET ORAL EVERY 4 HOURS PRN
Status: DISCONTINUED | OUTPATIENT
Start: 2018-07-15 | End: 2018-07-18 | Stop reason: HOSPADM

## 2018-07-15 RX ORDER — ASCORBIC ACID 500 MG
1000 TABLET ORAL DAILY
Status: DISCONTINUED | OUTPATIENT
Start: 2018-07-15 | End: 2018-07-18 | Stop reason: HOSPADM

## 2018-07-15 RX ORDER — CHOLECALCIFEROL (VITAMIN D3) 10 MCG
1 TABLET ORAL DAILY
Status: DISCONTINUED | OUTPATIENT
Start: 2018-07-15 | End: 2018-07-18 | Stop reason: HOSPADM

## 2018-07-15 RX ADMIN — IODIXANOL 85 ML: 320 INJECTION, SOLUTION INTRAVASCULAR at 02:25

## 2018-07-15 RX ADMIN — DOCUSATE SODIUM 100 MG: 100 CAPSULE, LIQUID FILLED ORAL at 13:52

## 2018-07-15 RX ADMIN — ACETAMINOPHEN 650 MG: 325 TABLET, FILM COATED ORAL at 13:03

## 2018-07-15 RX ADMIN — Medication 400 UNITS: at 08:33

## 2018-07-15 RX ADMIN — HYDROXYCHLOROQUINE SULFATE 200 MG: 200 TABLET, FILM COATED ORAL at 08:34

## 2018-07-15 RX ADMIN — FENTANYL CITRATE 25 MCG: 50 INJECTION, SOLUTION INTRAMUSCULAR; INTRAVENOUS at 01:27

## 2018-07-15 RX ADMIN — VITAMIN D, TAB 1000IU (100/BT) 2000 UNITS: 25 TAB at 08:32

## 2018-07-15 RX ADMIN — NEPHROCAP 1 CAPSULE: 1 CAP ORAL at 08:32

## 2018-07-15 RX ADMIN — PANTOPRAZOLE SODIUM 40 MG: 40 TABLET, DELAYED RELEASE ORAL at 06:26

## 2018-07-15 RX ADMIN — LEVOTHYROXINE SODIUM 112 MCG: 112 TABLET ORAL at 06:26

## 2018-07-15 RX ADMIN — Medication 30 MG: at 08:33

## 2018-07-15 RX ADMIN — PRAVASTATIN SODIUM 40 MG: 40 TABLET ORAL at 17:07

## 2018-07-15 RX ADMIN — Medication 1 TABLET: at 08:32

## 2018-07-15 RX ADMIN — Medication 250 MG: at 08:34

## 2018-07-15 RX ADMIN — OXYCODONE HYDROCHLORIDE AND ACETAMINOPHEN 1000 MG: 500 TABLET ORAL at 08:32

## 2018-07-15 RX ADMIN — DOCUSATE SODIUM 100 MG: 100 CAPSULE, LIQUID FILLED ORAL at 17:08

## 2018-07-15 RX ADMIN — OXYCODONE HYDROCHLORIDE 2.5 MG: 5 TABLET ORAL at 14:22

## 2018-07-15 RX ADMIN — PANTOPRAZOLE SODIUM 40 MG: 40 TABLET, DELAYED RELEASE ORAL at 17:08

## 2018-07-15 NOTE — H&P
H&P- Gail Garcia 1935, [de-identified] y o  female MRN: [id number]    Unit/Bed#: -01 Encounter: [id number]    Primary Care Provider: Elba Ruffin   Date and time admitted to hospital: 7/14/2018 11:31 PM        * Acute respiratory failure with hypoxia ([facility] [address] )   Assessment & Plan    SpO2 88% for EMS  Patient was placed on 3 L nasal cannula and SpO2 went up to 98%  Continue O2 and titrate to keep SpO2 greater than 92%  Symptomatic anemia   Assessment & Plan    Initial hgb in ED was 4 2  Recheck was 4 3  Pt having generalized weakness and hypoxia  Continue blood transfusion as ordered in ED  Will recheck hgb after second unit is transfused  Administer O2 to keep SPO2 >92%  Status post replacement of right shoulder joint   Assessment & Plan    Patient had arthroplasty of right shoulder on 07/13 at [facility]  Right arm is in immobilizer  Will consult Orthopedics for any recommendations while patient is hospitalized in this facility  Leukocytosis   Assessment & Plan    Most likely due to recent surgery  Patient afebrile  No signs of infection at incision  Recheck CBC in a m  History of pulmonary embolus (PE)   Assessment & Plan    Patient takes Coumadin for remote history of P and DVTs  Coumadin was on hold x1 week for surgery but was restarted yesterday after arriving home  INR currently 1 39  Will hold Coumadin due to hemoglobin of 4 2  Restart on hemoglobin to  Hypertension   Assessment & Plan    Will hold Cozaar he for now as BP running on the low side  Chronic kidney disease   Assessment & Plan    Creatinine 1 24  Down from 1 4 on 07/13 at [facility]  Continue to monitor kidney function  Hypothyroidism in adult   Assessment & Plan    Continue levothyroxine  S[facility]og[facility]'s syndrome ([facility] )   Assessment & Plan    Continue Plaquenil and eyedrops as ordered            VTE Prophylaxis: Pharmacologic VTE Prophylaxis contraindicated due to Hemoglobin 4 2   / sequential compression device   Code Status:  Full code  POLST: There is no POLST form on file for this patient (pre-hospital)  Discussion with family:   present during admission    Anticipated Length of Stay:  Patient will be admitted on an Inpatient basis with an anticipated length of stay of  > 2 midnights  Justification for Hospital Stay:  Blood transfusion    Total Time for Visit, including Counseling / Coordination of Care: 30 minutes  Greater than 50% of this total time spent on direct patient counseling and coordination of care  Chief Complaint:   Generalized weakness    History of Present Illness:    Lola Stephen is a [de-identified] y o  female with history of recent right shoulder replacement, as Sjogren's syndrome, hypothyroidism, PE and DVT, and hypertension who presents with c/o generalized weakness  Pt had R shoulder replacement on 7/13 at [facility]  She returned home yesterday after being hospitalized for 1 day  SPO2 88% on RA noted by EMS  Coumadin was placed on hold for 1 week prior to surgery and was receiving Lovenox injections during that time  Pt coumadin was restarted last evening  In ED hgb was 4 2, WBCs 11 82, he he platelets 674, INR 8 65, troponin 0 03  Chest x-ray negative  CTA to rule out P was done he which was negative for PE and showed soft tissue swelling in right shoulder  Patient had a guaiac-negative stool in the ED  Denies fevers or chills  No nausea, vomiting, or diarrhea  Review of Systems:    Review of Systems   Constitutional: Positive for activity change and fatigue  Negative for appetite change, chills and fever  Respiratory: Negative for apnea, cough and shortness of breath  Cardiovascular: Negative for chest pain, palpitations and leg swelling  Gastrointestinal: Negative for abdominal distention, abdominal pain, constipation, diarrhea, nausea and vomiting  Genitourinary: Negative for dysuria  Musculoskeletal: Positive for arthralgias (R shoulder since surgery) and joint swelling (R shoulder)  Neurological: Positive for weakness  Negative for dizziness, seizures, syncope, facial asymmetry, light-headedness, numbness and headaches  Hematological: Bruises/bleeds easily  Psychiatric/Behavioral: Negative for agitation and confusion  The patient is not nervous/anxious  All other systems reviewed and are negative  Past Medical and Surgical History:     Past Medical History:   Diagnosis Date    Disease of thyroid gland     Hyperlipidemia     Hypertension     [facility]     Sjogren's syndrome ([facility] )        Past Surgical History:   Procedure Laterality Date    HYSTERECTOMY      JOINT REPLACEMENT      SHOULDER SURGERY      right    TOE SURGERY         Meds/Allergies:    Prior to Admission medications    Medication Sig Start Date End Date Taking? Authorizing Provider   celecoxib (CeleBREX) 100 mg capsule Take 100 mg by mouth 2 (two) times a day   Yes Historical Provider, MD   Cranberry 1000 MG CAPS Take 4,200 mg by mouth 2 (two) times a day   Yes Historical Provider, MD   denosumab (PROLIA) 60 mg/mL Inject 60 mg under the skin every 6 (six) months   Yes Historical Provider, MD   estradiol (ESTRACE) 0 1 mg/g vaginal cream Insert 2 g into the vagina daily   Yes Historical Provider, MD   Ginkgo Biloba 40 MG TABS Take 60 mg by mouth daily   Yes Historical Provider, MD   ketotifen (ZADITOR) 0 025 % ophthalmic solution 1 drop 2 (two) times a day   Yes Historical Provider, MD   polyvinyl alcohol (LIQUIFILM TEARS) 1 4 % ophthalmic solution Administer 1 drop to both eyes as needed for dry eyes   Yes Historical Provider, MD   Ascorbic Acid (VITAMIN C) 1000 MG tablet Take 1,000 mg by mouth daily  Historical Provider, MD   b complex vitamins tablet Take 1 tablet by mouth daily      Historical Provider, MD   cevimeline (EVOXAC) 30 MG capsule Take 30 mg by mouth 3 (three) times a day      Historical Provider, MD   Cholecalciferol (VITAMIN D) 2000 UNITS tablet Take 2,000 Units by mouth daily  Historical Provider, MD   Coenzyme Q10 (CO Q 10) 10 MG CAPS Take 1 tablet by mouth daily  Historical Provider, MD   cycloSPORINE (RESTASIS) 0 05 % ophthalmic emulsion Administer 1 drop to both eyes 2 (two) times a day  Historical Provider, MD   DHA-EPA-Flaxseed Oil-Vitamin E (THERA TEARS) CAPS Take 4 capsules by mouth daily  Historical Provider, MD   hydroxychloroquine (PLAQUENIL) 200 mg tablet Take 200 mg by mouth daily  Historical Provider, MD   levothyroxine (SYNTHROID) 112 mcg tablet Take 112 mcg by mouth daily  Historical Provider, MD   LOSARTAN POTASSIUM-HCTZ PO Take 12 5 mg by mouth daily  Historical Provider, MD   Magnesium 250 MG TABS Take 1 tablet by mouth daily  Historical Provider, MD   Multiple Vitamins-Minerals (OCUVITE PO) Take 1 tablet by mouth daily  Historical Provider, MD   omeprazole (PriLOSEC) 20 mg delayed release capsule Take 20 mg by mouth 2 (two) times a day  Historical Provider, MD   Potassium Gluconate  MG TBCR Take 1 tablet by mouth daily  Historical Provider, MD   simvastatin (ZOCOR) 20 mg tablet Take 20 mg by mouth daily at bedtime  Historical Provider, MD   UNABLE TO FIND 2 tablets daily  Osteo bioflex    Historical Provider, MD   vitamin E, tocopherol, 400 units capsule Take 400 Units by mouth daily  Historical Provider, MD   warfarin (COUMADIN) 2 mg tablet Take 1 mg by mouth daily      Historical Provider, MD     I have reviewed home medications with patient personally  Allergies:    Allergies   Allergen Reactions    Sulfa Antibiotics        Social History:     Marital Status: /Civil Union   Occupation: Retired  Patient Pre-hospital Living Situation: Lives with   Patient Pre-hospital Level of Mobility: Independent  Patient Pre-hospital Diet Restrictions: None  Substance Use History:   History   Alcohol Use No History   Smoking Status    Never Smoker   Smokeless Tobacco    Never Used     History   Drug Use No       Family History:    non-contributory    Physical Exam:     Vitals:   Blood Pressure: (!) 95/42 (07/15/18 0330)  Pulse: 89 (07/15/18 0330)  Temperature: 98 1 °F (36 7 °C) (07/15/18 0330)  Temp Source: Oral (07/15/18 0330)  Respirations: 16 (07/15/18 0330)  Height: 5' 3" (160 cm) (07/15/18 0242)  Weight - Scale: 69 8 kg (153 lb 14 1 oz) (07/15/18 0242)  SpO2: 93 % (07/15/18 0242)    Physical Exam   Constitutional: She is oriented to person, place, and time  She appears well-developed and well-nourished  She is cooperative  No distress  Nasal cannula in place  HENT:   Head: Normocephalic and atraumatic  Eyes: EOM are normal  Pupils are equal, round, and reactive to light  Neck: Normal range of motion  Neck supple  Cardiovascular: Normal rate, regular rhythm, normal heart sounds and intact distal pulses  Exam reveals no gallop and no friction rub  No murmur heard  Pulmonary/Chest: Effort normal and breath sounds normal  No respiratory distress  She has no wheezes  She has no rales  Abdominal: Soft  Bowel sounds are normal  She exhibits no distension  There is no tenderness  Musculoskeletal: She exhibits no edema  Right shoulder: She exhibits decreased range of motion, tenderness and swelling  Large amount of swelling noted to right shoulder and upper arm  Neurological: She is alert and oriented to person, place, and time  GCS eye subscore is 4  GCS verbal subscore is 5  GCS motor subscore is 6  Skin: Skin is warm and dry  Ecchymosis (Multiple ecchymotic areas noted to bilateral upper extremities and abdominal area ) noted  Psychiatric: She has a normal mood and affect  Judgment normal    Nursing note and vitals reviewed        Additional Data:     Lab Results: I have personally reviewed pertinent films in PACS      Results from last 7 days  Lab Units 07/15/18  0686 07/15/18  0010   WBC Thousand/uL  --  11 82*   HEMOGLOBIN g/dL 4 3* 4 2*   HEMATOCRIT % 13 6* 13 0*   PLATELETS Thousands/uL  --  123*   NEUTROS PCT %  --  61   LYMPHS PCT %  --  20   MONOS PCT %  --  17*   EOS PCT %  --  0       Results from last 7 days  Lab Units 07/15/18  0010   SODIUM mmol/L 134*   POTASSIUM mmol/L 4 5   CHLORIDE mmol/L 101   CO2 mmol/L 27   BUN mg/dL 41*   CREATININE mg/dL 1 24   CALCIUM mg/dL 7 8*   TOTAL PROTEIN g/dL 5 2*   BILIRUBIN TOTAL mg/dL 0 30   ALK PHOS U/L 40*   ALT U/L 21   AST U/L 33   GLUCOSE RANDOM mg/dL 144*       Results from last 7 days  Lab Units 07/15/18  0010   INR  1 39*               Imaging: I have personally reviewed pertinent reports  XR chest 1 view portable   ED Interpretation by Reilly Thomas DO (07/15 0046)   Unremarkable      CTA ED chest PE study    (Results Pending)       EKG, Pathology, and Other Studies Reviewed on Admission:   · EKG:  Sinus rhythm with new Q-waves in leads III and AVF    Allscripts / Epic Records Reviewed: Yes     ** Please Note: This note has been constructed using a voice recognition system   **

## 2018-07-15 NOTE — NURSING NOTE
In attempting to hang blood to assist primary RNNidia, blood bank system is down according to Jesus Alberto Altamirano in the lab  Unable to scan in unit of PRBC's  Will administer per protocol with two RN's checking

## 2018-07-15 NOTE — ASSESSMENT & PLAN NOTE
SpO2 88% for EMS  Patient was placed on 3 L nasal cannula and SpO2 went up to 98%  Continue O2 and titrate to keep SpO2 greater than 92%

## 2018-07-15 NOTE — CONSULTS
Orthopedics   Tate Garcia [de-identified] y o  female MRN: [id number]  Unit/Bed#: -01      Chief Complaint:   Right shoulder total shoulder    HPI:  [de-identified] y  o female status post right total shoulder 3 days ago at [facility]  Reports her pain is at 6 out 10 but no other issues at this time  Review of Systems:   · Skin: Normal  · Neuro: See HPI  · Musculoskeletal: See HPI  · 14 point review of systems negative except as stated above     Past Medical History:   Past Medical History:   Diagnosis Date    Disease of thyroid gland     Hyperlipidemia     Hypertension     PE [facility]embolism) ([facility] )     Sjogren's syndrome ([facility] )        Past Surgical History:   Past Surgical History:   Procedure Laterality Date    HYSTERECTOMY      JOINT REPLACEMENT      SHOULDER SURGERY      right    TOE SURGERY         Family History:  Family history reviewed and non-contributory  History reviewed  No pertinent family history  Social History:  Social History     Social History    Marital status: /Civil Union     Spouse name: N/A    Number of children: N/A    Years of education: N/A     Social History Main Topics    Smoking status: Never Smoker    Smokeless tobacco: Never Used    Alcohol use No    Drug use: No    Sexual activity: Not Asked     Other Topics Concern    None     Social History Narrative    None       Allergies:    Allergies   Allergen Reactions    Sulfa Antibiotics            Labs:    0  Lab Value Date/Time   HCT 17 5 (L) 07/15/2018 0538   HCT 13 6 (L) 07/15/2018 0136   HCT 13 0 (L) 07/15/2018 0010   HCT 31 7 (L) 01/25/2015 0631   HCT 31 1 (L) 01/23/2015 0645   HCT 32 5 (L) 01/22/2015 0630   HGB 5 6 (LL) 07/15/2018 0538   HGB 4 3 (LL) 07/15/2018 0136   HGB 4 2 (LL) 07/15/2018 0010   HGB 10 3 (L) 01/25/2015 0631   HGB 10 1 (L) 01/23/2015 0645   HGB 11 2 (L) 01/22/2015 0630   INR 1 39 (H) 07/15/2018 0010   INR 1 66 (H) 01/25/2015 0630   WBC 13 58 (H) 07/15/2018 0538   WBC 11 82 (H) 07/15/2018 0010   WBC 6 27 03/06/2018 0729   WBC 7 11 01/25/2015 0631   WBC 9 41 01/23/2015 0645   WBC 9 46 01/22/2015 0630   ESR 14 03/06/2018 0729       Meds:    Current Facility-Administered Medications:     acetaminophen (TYLENOL) tablet 650 mg, 650 mg, Oral, Q6H PRN, CATHERINE Bear    ascorbic acid (VITAMIN C) tablet 1,000 mg, 1,000 mg, Oral, Daily, CATHERINE De Leon, 1,000 mg at 07/15/18 6692    b complex-vitamin C-folic acid (NEPHROCAPS) capsule 1 capsule, 1 capsule, Oral, Daily, CATHERINE De Leon, 1 capsule at 07/15/18 6668    cholecalciferol (VITAMIN D3) tablet 2,000 Units, 2,000 Units, Oral, Daily, CATHERINE Bear, 2,000 Units at 07/15/18 5736    co-enzyme Q-10 capsule 30 mg, 30 mg, Oral, Daily, CATHERINE De Leon, 30 mg at 07/15/18 6688    estradiol (ESTRACE) vaginal cream 2 g, 2 g, Vaginal, Daily, CATHERINE Bear    hydroxychloroquine (PLAQUENIL) tablet 200 mg, 200 mg, Oral, Daily, CATHERINE De Leon, 200 mg at 07/15/18 8716    levothyroxine tablet 112 mcg, 112 mcg, Oral, Early Morning, CATHERINE De Leon, 112 mcg at 07/15/18 5520    magnesium gluconate (MAGONATE) tablet 250 mg, 250 mg, Oral, Daily, CATHERINE De Leon, 250 mg at 07/15/18 0834    multivitamin-minerals (CENTRUM) tablet 1 tablet, 1 tablet, Oral, Daily, CATHERINE Bear, 1 tablet at 07/15/18 0832    ondansetron (ZOFRAN) injection 4 mg, 4 mg, Intravenous, Q6H PRN, CATHERINE Bear    pantoprazole (PROTONIX) EC tablet 40 mg, 40 mg, Oral, BID AC, CATHERINE De Leon, 40 mg at 07/15/18 6285    polyvinyl alcohol (LIQUIFILM TEARS) 1 4 % ophthalmic solution 1 drop, 1 drop, Both Eyes, Q12H, CATHERINE De Leon    polyvinyl alcohol (LIQUIFILM TEARS) 1 4 % ophthalmic solution 1 drop, 1 drop, Both Eyes, PRN, CATHERINE Marie    pravastatin (PRAVACHOL) tablet 40 mg, 40 mg, Oral, Daily With Dinner, CATHERINE Marie    vitamin E (tocopherol) capsule 400 Units, 400 Units, Oral, Daily, Soniya JewellCATHERINE claros, 400 Units at 07/15/18 1638    Blood Culture:   No results found for: BLOODCX    Wound Culture:   No results found for: WOUNDCULT    Ins and Outs:  I/O last 24 hours: In: 850 [I V :500; Blood:350]  Out: -           Physical Exam:   BP (!) 97/47   Pulse 91   Temp 98 3 °F (36 8 °C) (Oral)   Resp 22   Ht 5' 3" (1 6 m)   Wt 69 8 kg (153 lb 14 1 oz)   SpO2 99%   BMI 27 26 kg/m²   Gen: Alert and oriented to person, place, time  HEENT: EOMI, eyes clear, moist mucus membranes, hearing intact  Respiratory: Bilateral chest rise  No audible wheezing found  Cardiovascular: Regular Rate and Rhythm  Abdomen: soft nontender/nondistended  Musculoskeletal: right upper extremity  · Skin incision is clean dry intact there is a dressing on her looks like a Tegaderm dressing there is some ecchymosis as well  There is edema and swelling her compartments are soft  She is neurovascularly intact  · I did not check axillary motor nerve due to pain but sensory distribution of axillary nerve intact  · 2+ rad pulse        Tertiary: no tenderness over all other joints/long bones as except already stated         _*_*_*_*_*_*_*_*_*_*_*_*_*_*_*_*_*_*_*_*_*_*_*_*_*_*_*_*_*_*_*_*_*_*_*_*_*_*_*_*_*    Assessment:  82 y  o female status post right total shoulder  says it was a reverse total shoulder     Plan:   · Defer all postoperative care to primary orthopedic surgeon  Patient should have discharge instructions from this surgeon regarding weight-bearing activity etc   says he does have some of that paperwork at home  I have instructed him to bring that in so we can get a copy and utilize that information  Patient tells me that the dressings do not get change in till she is seen in the office  No further orthopedic intervention follow up with primary orthopedic surgeon at [facility] MARY GRACE Stephens DO

## 2018-07-15 NOTE — ASSESSMENT & PLAN NOTE
Patient had arthroplasty of right shoulder on 07/13 at [facility]  Right arm is in immobilizer  Will consult Orthopedics for any recommendations while patient is hospitalized in this facility

## 2018-07-15 NOTE — ED NOTES
Repeat hemoglobin drawn; IV from EMS finished infusing at [address]  Previous hemoglobin was drawn after IV NSS was turned off for 5 minutes  Will wait for repeat hemoglobin results  Pt was medicated for right shoulder pain  She continues to deny shortness of breath/chest pain  Oxygen saturation has been 98% on 3 liters oxygen; decreased nasal cannula to 2 liters at this time and will monitor oxygen saturations       Cara Gowers, RN  07/15/18 3275

## 2018-07-15 NOTE — ED NOTES
at bedside with pt; is aware that we are waiting for lab results  Immobilizer intact to right shoulder; right shoulder incision is approximated; purplish bruising on right shoulder       Lucia Villa RN  07/15/18 0029

## 2018-07-15 NOTE — ASSESSMENT & PLAN NOTE
Most likely due to recent surgery  Patient afebrile  No signs of infection at incision  Recheck CBC in sanaz Krishnan

## 2018-07-15 NOTE — ED NOTES
IV NSS solution in left hand was turned off for five minutes prior to my starting IV in left antecubital and drawing the labs       Bibi Monk RN  07/15/18 0880

## 2018-07-15 NOTE — ED PROVIDER NOTES
History  Chief Complaint   Patient presents with    Weakness - Generalized     Right shoulder replacement on Thursday at [facility] and discharged home yesterday; today feels very weak  Denies chest pain/sob  Oxygen sats on room air 88% for EMS  History of PE--currently on lovenox  This 47-year-old female with history of Sjogren syndrome, hypertension, hyperlipidemia and remote pulmonary embolism on warfarin anticoagulation complains of weakness  This started after returning home yesterday from a 1 night stay right shoulder surgery at [facility]    She had been taken off Coumadin for 1 week but was using Lovenox during that time  She has started using Coumadin once again  Medics found her to be hypoxemic with a blood pressure 98 systolic  She looks very pale but denies recent black or tarry or bloody stool or any other evidence of bleeding  Patient denies chest pain palpitations nausea vomiting diarrhea new pain and focal neurologic changes  Prior to Admission Medications   Prescriptions Last Dose Informant Patient Reported? Taking? Ascorbic Acid (VITAMIN C) 1000 MG tablet   Yes No   Sig: Take 1,000 mg by mouth daily  Cholecalciferol (VITAMIN D) 2000 UNITS tablet   Yes No   Sig: Take 2,000 Units by mouth daily  Coenzyme Q10 (CO Q 10) 10 MG CAPS   Yes No   Sig: Take 1 tablet by mouth daily  Cranberry 1000 MG CAPS   Yes Yes   Sig: Take 4,200 mg by mouth 2 (two) times a day   DHA-EPA-Flaxseed Oil-Vitamin E (THERA TEARS) CAPS   Yes No   Sig: Take 4 capsules by mouth daily  Ginkgo Biloba 40 MG TABS   Yes Yes   Sig: Take 60 mg by mouth daily   LOSARTAN POTASSIUM-HCTZ PO   Yes No   Sig: Take 12 5 mg by mouth daily  Magnesium 250 MG TABS   Yes No   Sig: Take 1 tablet by mouth daily  Multiple Vitamins-Minerals (OCUVITE PO)   Yes No   Sig: Take 1 tablet by mouth daily  Potassium Gluconate  MG TBCR   Yes No   Sig: Take 1 tablet by mouth daily     UNABLE TO FIND   Yes No   Si tablets daily  Osteo bioflex   b complex vitamins tablet   Yes No   Sig: Take 1 tablet by mouth daily  celecoxib (CeleBREX) 100 mg capsule   Yes Yes   Sig: Take 100 mg by mouth 2 (two) times a day   cevimeline (EVOXAC) 30 MG capsule   Yes No   Sig: Take 30 mg by mouth 3 (three) times a day     cycloSPORINE (RESTASIS) 0 05 % ophthalmic emulsion   Yes No   Sig: Administer 1 drop to both eyes 2 (two) times a day  denosumab (PROLIA) 60 mg/mL   Yes Yes   Sig: Inject 60 mg under the skin every 6 (six) months   estradiol (ESTRACE) 0 1 mg/g vaginal cream   Yes Yes   Sig: Insert 2 g into the vagina daily   hydroxychloroquine (PLAQUENIL) 200 mg tablet   Yes No   Sig: Take 200 mg by mouth daily  ketotifen (ZADITOR) 0 025 % ophthalmic solution   Yes Yes   Si drop 2 (two) times a day   levothyroxine (SYNTHROID) 112 mcg tablet   Yes No   Sig: Take 112 mcg by mouth daily  omeprazole (PriLOSEC) 20 mg delayed release capsule   Yes No   Sig: Take 20 mg by mouth 2 (two) times a day  polyvinyl alcohol (LIQUIFILM TEARS) 1 4 % ophthalmic solution   Yes Yes   Sig: Administer 1 drop to both eyes as needed for dry eyes   simvastatin (ZOCOR) 20 mg tablet   Yes No   Sig: Take 20 mg by mouth daily at bedtime  vitamin E, tocopherol, 400 units capsule   Yes No   Sig: Take 400 Units by mouth daily  warfarin (COUMADIN) 2 mg tablet   Yes No   Sig: Take 1 mg by mouth daily        Facility-Administered Medications: None       Past Medical History:   Diagnosis Date    Disease of thyroid gland     Hyperlipidemia     Hypertension     PE [facility]boembolism) [facility] [address]     Sjogren's syndrome ([facility] [address] )        Past Surgical History:   Procedure Laterality Date    HYSTERECTOMY      JOINT REPLACEMENT      SHOULDER SURGERY      right    TOE SURGERY         History reviewed  No pertinent family history  I have reviewed and agree with the history as documented      Social History   Substance Use Topics    Smoking status: Never Smoker    Smokeless tobacco: Never Used    Alcohol use No        Review of Systems   Constitutional: Positive for activity change, appetite change and fatigue  Negative for chills, diaphoresis and fever  HENT: Negative  Eyes: Negative  Frequent dry eyes   Respiratory: Negative  Cardiovascular: Negative  Gastrointestinal: Positive for constipation  Negative for blood in stool, diarrhea, nausea and vomiting  Endocrine: Negative  Genitourinary: Negative  Musculoskeletal: Negative  Arthralgias:  right shoulder pain status post recent surgery  Skin: Negative  Allergic/Immunologic: Negative  Neurological: Negative  Hematological: Negative  Psychiatric/Behavioral: Negative  All other systems reviewed and are negative  Physical Exam  Physical Exam   Constitutional: She is oriented to person, place, and time  She appears well-developed and well-nourished  No distress  HENT:   Head: Normocephalic and atraumatic  Eyes: Conjunctivae and EOM are normal  Pupils are equal, round, and reactive to light  Neck: Normal range of motion  Neck supple  No JVD present  Cardiovascular: Normal rate, regular rhythm and intact distal pulses  No murmur heard  Pulmonary/Chest: Effort normal and breath sounds normal  No stridor  Abdominal: Soft  Bowel sounds are normal  She exhibits no mass  There is no tenderness  There is no guarding  Genitourinary: Rectal exam shows guaiac negative stool  Musculoskeletal: Normal range of motion  She exhibits no edema  Neurological: She is alert and oriented to person, place, and time  She has normal reflexes  No cranial nerve deficit  Coordination normal    Skin: Skin is warm and dry  Capillary refill takes less than 2 seconds  No rash noted  She is not diaphoretic  There is pallor  Psychiatric: She has a normal mood and affect  Nursing note and vitals reviewed        Vital Signs  ED Triage Vitals [07/14/18 2340] Temperature Pulse Respirations Blood Pressure SpO2   98 2 °F (36 8 °C) 96 16 116/58 98 %      Temp Source Heart Rate Source Patient Position - Orthostatic VS BP Location FiO2 (%)   Temporal Monitor Lying Left arm --      Pain Score       No Pain           Vitals:    07/15/18 0145 07/15/18 0200 07/15/18 0242 07/15/18 0330   BP: (!) 93/45 (!) 94/47 (!) 94/44 (!) 95/42   Pulse: 93 88 98 89   Patient Position - Orthostatic VS:   Lying Lying       Visual Acuity      ED Medications  Medications   ascorbic acid (VITAMIN C) tablet 1,000 mg (not administered)   b complex-vitamin C-folic acid (NEPHROCAPS) capsule 1 capsule (not administered)   cholecalciferol (VITAMIN D3) tablet 2,000 Units (not administered)   Co Q 10 CAPS 10 mg (not administered)   polyvinyl alcohol (LIQUIFILM TEARS) 1 4 % ophthalmic solution 1 drop (not administered)   Ginkgo Biloba TABS 60 mg (not administered)   estradiol (ESTRACE) vaginal cream 2 g (not administered)   hydroxychloroquine (PLAQUENIL) tablet 200 mg (not administered)   levothyroxine tablet 112 mcg (not administered)   magnesium gluconate (MAGONATE) tablet 250 mg (not administered)   multivitamin-minerals (CENTRUM) tablet 1 tablet (not administered)   pantoprazole (PROTONIX) EC tablet 40 mg (not administered)   polyvinyl alcohol (LIQUIFILM TEARS) 1 4 % ophthalmic solution 1 drop (not administered)   pravastatin (PRAVACHOL) tablet 40 mg (not administered)   vitamin E (tocopherol) capsule 400 Units (not administered)   ondansetron (ZOFRAN) injection 4 mg (not administered)   acetaminophen (TYLENOL) tablet 650 mg (not administered)   fentanyl citrate (PF) 100 MCG/2ML 25 mcg (25 mcg Intravenous Given 7/15/18 0127)   iodixanol (VISIPAQUE) 320 MG/ML injection 85 mL (85 mL Intravenous Given 7/15/18 0225)       Diagnostic Studies  Results Reviewed     Procedure Component Value Units Date/Time    UA w Reflex to Microscopic [[id number]] Collected:  07/15/18 9110    Lab Status:  No result Specimen: Urine from Urine, Clean Catch     Hemoglobin and hematocrit, blood [[id number]]  (Abnormal) Collected:  07/15/18 0136    Lab Status:  Final result Specimen:  Blood from Arm, Left Updated:  07/15/18 0231     Hemoglobin 4 3 (LL) g/dL      Hematocrit 13 6 (L) %     Protime-INR [[id number]]  (Abnormal) Collected:  07/15/18 0010    Lab Status:  Final result Specimen:  Blood from Arm, Left Updated:  07/15/18 0101     Protime 16 3 (H) seconds      INR 1 39 (H)    Narrative:       No clots    APTT [[id number]]  (Abnormal) Collected:  07/15/18 0010    Lab Status:  Final result Specimen:  Blood from Arm, Left Updated:  07/15/18 0101     PTT 53 (H) seconds     Narrative:       No clots    CBC and differential [[id number]]  (Abnormal) Collected:  07/15/18 0010    Lab Status:  Final result Specimen:  Blood from Arm, Left Updated:  07/15/18 0058     WBC 11 82 (H) Thousand/uL      RBC 1 33 (L) Million/uL      Hemoglobin 4 2 (LL) g/dL      Hematocrit 13 0 (L) %      MCV 98 fL      MCH 31 6 pg      MCHC 32 3 g/dL      RDW 13 9 %      MPV 11 3 fL      Platelets 215 (L) Thousands/uL      nRBC 1 /100 WBCs      Neutrophils Relative 61 %      Immat GRANS % 2 %      Lymphocytes Relative 20 %      Monocytes Relative 17 (H) %      Eosinophils Relative 0 %      Basophils Relative 0 %      Neutrophils Absolute 7 13 Thousands/µL      Immature Grans Absolute 0 22 (H) Thousand/uL      Lymphocytes Absolute 2 37 Thousands/µL      Monocytes Absolute 2 06 (H) Thousand/µL      Eosinophils Absolute 0 04 Thousand/µL      Basophils Absolute 0 00 Thousands/µL     Basic metabolic panel [[id number]]  (Abnormal) Collected:  07/15/18 0010    Lab Status:  Final result Specimen:  Blood from Arm, Left Updated:  07/15/18 0056     Sodium 134 (L) mmol/L      Potassium 4 5 mmol/L      Chloride 101 mmol/L      CO2 27 mmol/L      Anion Gap 6 mmol/L      BUN 41 (H) mg/dL      Creatinine 1 24 mg/dL      Glucose 144 (H) mg/dL      Calcium 7 8 (L) mg/dL      eGFR 41 ml/min/1 73sq m     Narrative:         National Kidney Disease Education Program recommendations are as follows:  GFR calculation is accurate only with a steady state creatinine  Chronic Kidney disease less than 60 ml/min/1 73 sq  meters  Kidney failure less than 15 ml/min/1 73 sq  meters  Hepatic function panel [[id number]]  (Abnormal) Collected:  07/15/18 0010    Lab Status:  Final result Specimen:  Blood from Arm, Left Updated:  07/15/18 0056     Total Bilirubin 0 30 mg/dL      Bilirubin, Direct 0 11 mg/dL      Alkaline Phosphatase 40 (L) U/L      AST 33 U/L      ALT 21 U/L      Total Protein 5 2 (L) g/dL      Albumin 2 5 (L) g/dL     Troponin I [[id number]]  (Normal) Collected:  07/15/18 0010    Lab Status:  Final result Specimen:  Blood from Arm, Left Updated:  07/15/18 0050     Troponin I 0 03 ng/mL                  XR chest 1 view portable   ED Interpretation by Heidi Oliveira DO (07/15 0046)   Unremarkable      CTA ED chest PE study    (Results Pending)              Procedures  ECG 12 Lead Documentation  Date/Time: 7/14/2018 11:42 PM  Performed by: Lucita Warner by: Elise Marrero     ECG reviewed by me, the ED Provider: yes    Patient location:  ED  Previous ECG:     Previous ECG:  Compared to current    Comparison ECG info:    Compared to January 21, 2015, there new Q-waves in lead 3 and lead AVF  Rate:     ECG rate assessment: normal    Rhythm:     Rhythm: sinus rhythm    Ectopy:     Ectopy: none    QRS:     QRS axis:  Normal  Conduction:     Conduction: normal    ST segments:     ST segments:  Normal  T waves:     T waves: normal    Q waves:     Q waves:  III and aVF           Phone Contacts  ED Phone Contact    ED Course        CTA of the chest reading arrived at 3:00 a m  Impression from [facility]:  No definite pulmonary embolism  Atelectasis or infiltrate in the lower lobes with mild atelectasis in the upper lobes    Soft tissue swelling around the right shoulder replacement consistent with recent postoperative change                          MDM  Number of Diagnoses or Management Options  Chronic kidney disease:   Sjogren's syndrome ([facility] [address] ):   Symptomatic anemia: new and requires workup     Amount and/or Complexity of Data Reviewed  Clinical lab tests: ordered and reviewed  Tests in the radiology section of CPT®: ordered and reviewed  Review and summarize past medical records: yes  Discuss the patient with other providers: yes  Independent visualization of images, tracings, or specimens: yes      CritCare Time    Disposition  Final diagnoses:   Symptomatic anemia   Chronic kidney disease   [facility] )     Time reflects when diagnosis was documented in both MDM as applicable and the Disposition within this note     Time User Action Codes Description Comment    7/15/2018  1:44 AM Anurag Julianne Add [D64 9] Symptomatic anemia     7/15/2018  1:45 AM Anurag Julianne Add [N18 9] Chronic kidney disease     7/15/2018  1:45 AM Soo LOPEZ Add [M35 00] Sjogren's syndrome ([facility] [address] )     7/15/2018  3:32 AM Rina Tinajero Modify [D64 9] Symptomatic anemia     7/15/2018  3:32 AM Macritchie, [facility] Modify [N18 9] Chronic kidney disease     7/15/2018  3:32 AM Macritchie, [facility] Add [F54 614] Status post replacement of right shoulder joint     7/15/2018  3:32 AM Macrit[facility], [facility] Modify [D64 9] Symptomatic anemia     7/15/2018  3:32 AM Macritchie, [facility] Modify [N18 9] Chronic kidney disease     7/15/2018  3:32 AM Macritchie, [facility] Modify [T65 739] Status post replacement of right shoulder joint     7/15/2018  3:32 AM Macritchie, [facility] Modify [D64 9] Symptomatic anemia     7/15/2018  3:32 AM Macritchie, [facility] Modify [N18 9] Chronic kidney disease     7/15/2018  3:32 AM Macritchie, [facility] Modify [D64 9] Symptomatic anemia     7/15/2018  3:32 AM Macritchie, [facility] Modify [N18 9] Chronic kidney disease     7/15/2018  3:36 AM Macritchie, [facility] Modify [D64 9] Symptomatic anemia     7/15/2018  3:36 AM Chuck Hernandez Modify [N18 9] Chronic kidney disease     7/15/2018  3:37 AM LevFrida killian Modify [D64 9] Symptomatic anemia     7/15/2018  3:37 AM Fish Frida Sour Modify [N18 9] Chronic kidney disease       ED Disposition     ED Disposition Condition Comment    Admit  Case was discussed with  Admitting NP and the patient's admission status was agreed to be Admission Status: inpatient status to the service of Dr Hoffman [facility]   Follow-up Information    None         Current Discharge Medication List      CONTINUE these medications which have NOT CHANGED    Details   celecoxib (CeleBREX) 100 mg capsule Take 100 mg by mouth 2 (two) times a day      Cranberry 1000 MG CAPS Take 4,200 mg by mouth 2 (two) times a day      denosumab (PROLIA) 60 mg/mL Inject 60 mg under the skin every 6 (six) months      estradiol (ESTRACE) 0 1 mg/g vaginal cream Insert 2 g into the vagina daily      Ginkgo Biloba 40 MG TABS Take 60 mg by mouth daily      ketotifen (ZADITOR) 0 025 % ophthalmic solution 1 drop 2 (two) times a day      polyvinyl alcohol (LIQUIFILM TEARS) 1 4 % ophthalmic solution Administer 1 drop to both eyes as needed for dry eyes      Ascorbic Acid (VITAMIN C) 1000 MG tablet Take 1,000 mg by mouth daily  b complex vitamins tablet Take 1 tablet by mouth daily  cevimeline (EVOXAC) 30 MG capsule Take 30 mg by mouth 3 (three) times a day        Cholecalciferol (VITAMIN D) 2000 UNITS tablet Take 2,000 Units by mouth daily  Coenzyme Q10 (CO Q 10) 10 MG CAPS Take 1 tablet by mouth daily  cycloSPORINE (RESTASIS) 0 05 % ophthalmic emulsion Administer 1 drop to both eyes 2 (two) times a day  DHA-EPA-Flaxseed Oil-Vitamin E (THERA TEARS) CAPS Take 4 capsules by mouth daily  hydroxychloroquine (PLAQUENIL) 200 mg tablet Take 200 mg by mouth daily  levothyroxine (SYNTHROID) 112 mcg tablet Take 112 mcg by mouth daily  LOSARTAN POTASSIUM-HCTZ PO Take 12 5 mg by mouth daily        Magnesium 250 MG TABS Take 1 tablet by mouth daily  Multiple Vitamins-Minerals (OCUVITE PO) Take 1 tablet by mouth daily  omeprazole (PriLOSEC) 20 mg delayed release capsule Take 20 mg by mouth 2 (two) times a day  Potassium Gluconate  MG TBCR Take 1 tablet by mouth daily  simvastatin (ZOCOR) 20 mg tablet Take 20 mg by mouth daily at bedtime  UNABLE TO FIND 2 tablets daily  Osteo bioflex      vitamin E, tocopherol, 400 units capsule Take 400 Units by mouth daily  warfarin (COUMADIN) 2 mg tablet Take 1 mg by mouth daily             No discharge procedures on file      ED Provider  Electronically Signed by           Dariusz Hurley DO  07/15/18 8648

## 2018-07-15 NOTE — ASSESSMENT & PLAN NOTE
Patient takes Coumadin for remote history of P and DVTs  Coumadin was on hold x1 week for surgery but was restarted yesterday after arriving home  INR currently 1 39  Will hold Coumadin due to hemoglobin of 4 2  Restart on hemoglobin to

## 2018-07-15 NOTE — ASSESSMENT & PLAN NOTE
Creatinine 1 24  Down from 1 4 on 07/13 at [facility]  Continue to monitor kidney function

## 2018-07-15 NOTE — ED NOTES
CTA done; report called to nurse upstairs  RN upstairs will release unit of blood; is aware we still need urine sample and repeat hemoglobin results pending       Eve Wilhelm RN  07/15/18 5505

## 2018-07-15 NOTE — PLAN OF CARE
Problem: Potential for Falls  Goal: Patient will remain free of falls  INTERVENTIONS:  - Assess patient frequently for physical needs  -  Identify cognitive and physical deficits and behaviors that affect risk of falls    -  [place] fall precautions as indicated by assessment   - Educate patient/family on patient safety including physical limitations  - Instruct patient to call for assistance with activity based on assessment  - Modify environment to reduce risk of injury  - Consider OT/PT consult to assist with strengthening/mobility   Outcome: Not Progressing      Problem: PAIN - ADULT  Goal: Verbalizes/displays adequate comfort level or baseline comfort level  Interventions:  - Encourage patient to monitor pain and request assistance  - Assess pain using appropriate pain scale  - Administer analgesics based on type and severity of pain and evaluate response  - Implement non-pharmacological measures as appropriate and evaluate response  - Consider cultural and social influences on pain and pain management  - Notify physician/advanced practitioner if interventions unsuccessful or patient reports new pain  Outcome: Not Progressing      Problem: INFECTION - ADULT  Goal: Absence or prevention of progression during hospitalization  INTERVENTIONS:  - Assess and monitor for signs and symptoms of infection  - Monitor lab/diagnostic results  - Monitor all insertion sites, i e  indwelling lines, tubes, and drains  - Monitor endotracheal (as able) and nasal secretions for changes in amount and color  - [place] appropriate cooling/warming therapies per order  - Administer medications as ordered  - Instruct and encourage patient and family to use good hand hygiene technique  - Identify and instruct in appropriate isolation precautions for identified infection/condition  Outcome: Not Progressing      Problem: SAFETY ADULT  Goal: Patient will remain free of falls  INTERVENTIONS:  - Assess patient frequently for physical needs  -  Identify cognitive and physical deficits and behaviors that affect risk of falls    -  [place] fall precautions as indicated by assessment   - Educate patient/family on patient safety including physical limitations  - Instruct patient to call for assistance with activity based on assessment  - Modify environment to reduce risk of injury  - Consider OT/PT consult to assist with strengthening/mobility   Outcome: Not Progressing    Goal: Maintain or return to baseline ADL function  INTERVENTIONS:  -  Assess patient's ability to carry out ADLs; assess patient's baseline for ADL function and identify physical deficits which impact ability to perform ADLs (bathing, care of mouth/teeth, toileting, grooming, dressing, etc )  - Assess/evaluate cause of self-care deficits   - Assess range of motion  - Assess patient's mobility; develop plan if impaired  - Assess patient's need for assistive devices and provide as appropriate  - Encourage maximum independence but intervene and supervise when necessary  ¯ Involve family in performance of ADLs  ¯ Assess for home care needs following discharge   ¯ Request OT consult to assist with ADL evaluation and planning for discharge  ¯ Provide patient education as appropriate  Outcome: Not Progressing    Goal: Maintain or return mobility status to optimal level  INTERVENTIONS:  - Assess patient's baseline mobility status (ambulation, transfers, stairs, etc )    - Identify cognitive and physical deficits and behaviors that affect mobility  - Identify mobility aids required to assist with transfers and/or ambulation (gait belt, sit-to-stand, lift, walker, cane, etc )  - [place] fall precautions as indicated by assessment  - Record patient progress and toleration of activity level on Mobility SBAR; progress patient to next Phase/Stage  - Instruct patient to call for assistance with activity based on assessment  - Request Rehabilitation consult to assist with strengthening/weightbearing, etc   Outcome: Not Progressing      Problem: DISCHARGE PLANNING  Goal: Discharge to home or other facility with appropriate resources  INTERVENTIONS:  - Identify barriers to discharge w/patient and caregiver  - Arrange for needed discharge resources and transportation as appropriate  - Identify discharge learning needs (meds, wound care, etc )  - Arrange for interpretive services to assist at discharge as needed  - Refer to Case Management Department for coordinating discharge planning if the patient needs post-hospital services based on physician/advanced practitioner order or complex needs related to functional status, cognitive ability, or social support system  Outcome: Not Progressing      Problem: Knowledge Deficit  Goal: Patient/family/caregiver demonstrates understanding of disease process, treatment plan, medications, and discharge instructions  Complete learning assessment and assess knowledge base    Interventions:  - Provide teaching at level of understanding  - Provide teaching via preferred learning methods  Outcome: Not Progressing      Problem: METABOLIC, FLUID AND ELECTROLYTES - ADULT  Goal: Electrolytes maintained within normal limits  INTERVENTIONS:  - Monitor labs and assess patient for signs and symptoms of electrolyte imbalances  - Administer electrolyte replacement as ordered  - Monitor response to electrolyte replacements, including repeat lab results as appropriate  - Instruct patient on fluid and nutrition as appropriate  Outcome: Not Progressing    Goal: Fluid balance maintained  INTERVENTIONS:  - Monitor labs and assess for signs and symptoms of volume excess or deficit  - Monitor I/O and WT  - Instruct patient on fluid and nutrition as appropriate  Outcome: Not Progressing      Problem: HEMATOLOGIC - ADULT  Goal: Maintains hematologic stability  INTERVENTIONS  - Assess for signs and symptoms of bleeding or hemorrhage  - Monitor labs  - Administer supportive blood products/factors as ordered and appropriate  Outcome: Not Progressing

## 2018-07-15 NOTE — ED NOTES
Pt's pain has been relieved from fentanyl; remains alert and oriented       Cristy Dean RN  07/15/18 2181

## 2018-07-15 NOTE — ASSESSMENT & PLAN NOTE
Initial hgb in ED was 4 2  Recheck was 4 3  Pt having generalized weakness and hypoxia  Continue blood transfusion as ordered in ED  Will recheck hgb after second unit is transfused  Administer O2 to keep SPO2 >92%

## 2018-07-16 ENCOUNTER — APPOINTMENT (INPATIENT)
Dept: NON INVASIVE DIAGNOSTICS | Facility: HOSPITAL | Age: 83
DRG: 919 | End: 2018-07-16
Attending: GENERAL PRACTICE
Payer: MEDICARE

## 2018-07-16 LAB
ABO GROUP BLD BPU: NORMAL
ANION GAP SERPL CALCULATED.3IONS-SCNC: 5 MMOL/L (ref 4–13)
ATRIAL RATE: 98 BPM
BPU ID: NORMAL
BUN SERPL-MCNC: 23 MG/DL (ref 5–25)
CALCIUM SERPL-MCNC: 8.3 MG/DL (ref 8.3–10.1)
CHLORIDE SERPL-SCNC: 103 MMOL/L (ref 100–108)
CO2 SERPL-SCNC: 27 MMOL/L (ref 21–32)
CREAT SERPL-MCNC: 1 MG/DL (ref 0.6–1.3)
CROSSMATCH: NORMAL
ERYTHROCYTE [DISTWIDTH] IN BLOOD BY AUTOMATED COUNT: 15.5 % (ref 11.6–15.1)
GFR SERPL CREATININE-BSD FRML MDRD: 53 ML/MIN/1.73SQ M
GLUCOSE SERPL-MCNC: 104 MG/DL (ref 65–140)
HCT VFR BLD AUTO: 22.4 % (ref 34.8–46.1)
HCT VFR BLD AUTO: 23.6 % (ref 34.8–46.1)
HGB BLD-MCNC: 7.6 G/DL (ref 11.5–15.4)
HGB BLD-MCNC: 7.9 G/DL (ref 11.5–15.4)
INR PPP: 1.25 (ref 0.86–1.17)
MAGNESIUM SERPL-MCNC: 1.9 MG/DL (ref 1.6–2.6)
MCH RBC QN AUTO: 30.3 PG (ref 26.8–34.3)
MCHC RBC AUTO-ENTMCNC: 33.9 G/DL (ref 31.4–37.4)
MCV RBC AUTO: 89 FL (ref 82–98)
P AXIS: 58 DEGREES
PHOSPHATE SERPL-MCNC: 1.7 MG/DL (ref 2.3–4.1)
PLATELET # BLD AUTO: 110 THOUSANDS/UL (ref 149–390)
PMV BLD AUTO: 11.2 FL (ref 8.9–12.7)
POTASSIUM SERPL-SCNC: 4.1 MMOL/L (ref 3.5–5.3)
PR INTERVAL: 156 MS
PROTHROMBIN TIME: 15 SECONDS (ref 11.8–14.2)
QRS AXIS: 45 DEGREES
QRSD INTERVAL: 82 MS
QT INTERVAL: 352 MS
QTC INTERVAL: 449 MS
RBC # BLD AUTO: 2.51 MILLION/UL (ref 3.81–5.12)
RETICS # AUTO: ABNORMAL 10*3/UL (ref 14097–95744)
RETICS # CALC: 3.48 % (ref 0.37–1.87)
SODIUM SERPL-SCNC: 135 MMOL/L (ref 136–145)
T WAVE AXIS: 22 DEGREES
UNIT DISPENSE STATUS: NORMAL
UNIT PRODUCT CODE: NORMAL
UNIT RH: NORMAL
VENTRICULAR RATE: 98 BPM
WBC # BLD AUTO: 10.99 THOUSAND/UL (ref 4.31–10.16)

## 2018-07-16 PROCEDURE — G8979 MOBILITY GOAL STATUS: HCPCS

## 2018-07-16 PROCEDURE — 93010 ELECTROCARDIOGRAM REPORT: CPT | Performed by: INTERNAL MEDICINE

## 2018-07-16 PROCEDURE — 85610 PROTHROMBIN TIME: CPT | Performed by: GENERAL PRACTICE

## 2018-07-16 PROCEDURE — 85045 AUTOMATED RETICULOCYTE COUNT: CPT | Performed by: NURSE PRACTITIONER

## 2018-07-16 PROCEDURE — 97163 PT EVAL HIGH COMPLEX 45 MIN: CPT

## 2018-07-16 PROCEDURE — 93306 TTE W/DOPPLER COMPLETE: CPT | Performed by: INTERNAL MEDICINE

## 2018-07-16 PROCEDURE — G8978 MOBILITY CURRENT STATUS: HCPCS

## 2018-07-16 PROCEDURE — 93306 TTE W/DOPPLER COMPLETE: CPT

## 2018-07-16 PROCEDURE — 97110 THERAPEUTIC EXERCISES: CPT

## 2018-07-16 PROCEDURE — 83735 ASSAY OF MAGNESIUM: CPT | Performed by: GENERAL PRACTICE

## 2018-07-16 PROCEDURE — 85027 COMPLETE CBC AUTOMATED: CPT | Performed by: GENERAL PRACTICE

## 2018-07-16 PROCEDURE — 80048 BASIC METABOLIC PNL TOTAL CA: CPT | Performed by: GENERAL PRACTICE

## 2018-07-16 PROCEDURE — 84100 ASSAY OF PHOSPHORUS: CPT | Performed by: GENERAL PRACTICE

## 2018-07-16 PROCEDURE — 85018 HEMOGLOBIN: CPT | Performed by: INTERNAL MEDICINE

## 2018-07-16 PROCEDURE — P9021 RED BLOOD CELLS UNIT: HCPCS

## 2018-07-16 PROCEDURE — 85014 HEMATOCRIT: CPT | Performed by: INTERNAL MEDICINE

## 2018-07-16 PROCEDURE — 99232 SBSQ HOSP IP/OBS MODERATE 35: CPT | Performed by: INTERNAL MEDICINE

## 2018-07-16 RX ORDER — FLUTICASONE PROPIONATE 50 MCG
2 SPRAY, SUSPENSION (ML) NASAL DAILY
Status: DISCONTINUED | OUTPATIENT
Start: 2018-07-16 | End: 2018-07-18 | Stop reason: HOSPADM

## 2018-07-16 RX ORDER — WARFARIN SODIUM 3 MG/1
3 TABLET ORAL
Status: COMPLETED | OUTPATIENT
Start: 2018-07-16 | End: 2018-07-16

## 2018-07-16 RX ADMIN — CEFAZOLIN SODIUM 1000 MG: 1 SOLUTION INTRAVENOUS at 23:30

## 2018-07-16 RX ADMIN — OXYCODONE HYDROCHLORIDE AND ACETAMINOPHEN 1000 MG: 500 TABLET ORAL at 08:43

## 2018-07-16 RX ADMIN — PANTOPRAZOLE SODIUM 40 MG: 40 TABLET, DELAYED RELEASE ORAL at 17:05

## 2018-07-16 RX ADMIN — NEPHROCAP 1 CAPSULE: 1 CAP ORAL at 08:43

## 2018-07-16 RX ADMIN — Medication 400 UNITS: at 08:43

## 2018-07-16 RX ADMIN — MAGNESIUM HYDROXIDE 30 ML: 400 SUSPENSION ORAL at 08:44

## 2018-07-16 RX ADMIN — PANTOPRAZOLE SODIUM 40 MG: 40 TABLET, DELAYED RELEASE ORAL at 06:02

## 2018-07-16 RX ADMIN — HYDROXYCHLOROQUINE SULFATE 200 MG: 200 TABLET, FILM COATED ORAL at 08:43

## 2018-07-16 RX ADMIN — VITAMIN D, TAB 1000IU (100/BT) 2000 UNITS: 25 TAB at 08:43

## 2018-07-16 RX ADMIN — PRAVASTATIN SODIUM 40 MG: 40 TABLET ORAL at 17:04

## 2018-07-16 RX ADMIN — Medication 30 MG: at 08:44

## 2018-07-16 RX ADMIN — FLUTICASONE PROPIONATE 2 SPRAY: 50 SPRAY, METERED NASAL at 08:59

## 2018-07-16 RX ADMIN — Medication 1 TABLET: at 08:43

## 2018-07-16 RX ADMIN — CEFAZOLIN SODIUM 1000 MG: 1 SOLUTION INTRAVENOUS at 08:57

## 2018-07-16 RX ADMIN — LEVOTHYROXINE SODIUM 112 MCG: 112 TABLET ORAL at 06:02

## 2018-07-16 RX ADMIN — WARFARIN SODIUM 3 MG: 3 TABLET ORAL at 17:04

## 2018-07-16 RX ADMIN — DOCUSATE SODIUM 100 MG: 100 CAPSULE, LIQUID FILLED ORAL at 08:43

## 2018-07-16 RX ADMIN — CEFAZOLIN SODIUM 1000 MG: 1 SOLUTION INTRAVENOUS at 16:24

## 2018-07-16 RX ADMIN — Medication 250 MG: at 08:43

## 2018-07-16 NOTE — PROGRESS NOTES
Progress Note - Loreta Garcia [de-identified] y o  female MRN: [id number]    Unit/Bed#: -01 Encounter: [id number]      Assessment:  Principal Problem:    Acute respiratory failure with hypoxia (HCC)  Active Problems:    Symptomatic anemia    Status post replacement of right shoulder joint    Leukocytosis    Sjogren's syndrome ([address] )    Hypertension    Hypothyroidism in adult    Chronic kidney disease    History of pulmonary embolus (PE)  Resolved Problems:    * No resolved hospital problems  *        Plan:  · Acute respiratory failure with hypoxia-now is stable on room air-likely triggered by the severe anemia she presented with  · Postop right shoulder replacement with large hematoma-ortho following-CT scan done to rule out PE shows inflammatory changes across the chest wall-because she did have some low-grade leukocytosis on presentation I will start her on some cefazolin  · History of PE-this occurred postop hysterectomy in 1970s and she has been on Coumadin since then  She is not aware of any testing that was done for inherited disorders-has been on 4 mg alternating with 3 mg as an outpatient and also had been given Lovenox for 4 days bridging after her surgery  INR is subtherapeutic today at 1 25-will resume 3 mg dose today-patient does monitor with the home inr machine  · Hypertension-was low on presentation 89/44-at 109/51-continue to hold on losartan/H CTz meds today  · Hypothyroidism-on Synthroid 112 mics daily  · Mild troponin elevation-may be stress-related-for echo today  · Constipation-will add milk of Mag today  · Sinus congestion-patient uses Flonase at home will restart  this medication    Subjective:   No complaints of shortness of breath this a m   No nausea or vomiting  Shoulder pain is under fairly good control  Is able to move her fingers in the right arm without discomfort    Support sling is in place on the right arm  ROS  Comprehensive system review negative other than noted above    Objective:     Vitals: Blood pressure 109/51, pulse 85, temperature 99 1 °F (37 3 °C), resp  rate 20, height 5' 3" (1 6 m), weight 68 7 kg (151 lb 7 3 oz), SpO2 94 %  ,Body mass index is 26 83 kg/m²    Current Facility-Administered Medications   Medication Dose Route Frequency Provider Last Rate Last Dose    acetaminophen (TYLENOL) tablet 650 mg  650 mg Oral Q6H PRN CATHERINE De Leon   650 mg at 07/15/18 1303    ascorbic acid (VITAMIN C) tablet 1,000 mg  1,000 mg Oral Daily CATHERINE De Leon   1,000 mg at 07/15/18 2187    b complex-vitamin C-folic acid (NEPHROCAPS) capsule 1 capsule  1 capsule Oral Daily CATHERINE De Leon   1 capsule at 07/15/18 6012    ceFAZolin (ANCEF) IVPB (premix) 1,000 mg  1,000 mg Intravenous Q8H Shannon Fly, DO        cholecalciferol (VITAMIN D3) tablet 2,000 Units  2,000 Units Oral Daily CATHERINE De Leon   2,000 Units at 07/15/18 9533    co-enzyme Q-10 capsule 30 mg  30 mg Oral Daily CATHERINE De Leon   30 mg at 07/15/18 1047    docusate sodium (COLACE) capsule 100 mg  100 mg Oral BID Chantell Driscoll, DO   100 mg at 07/15/18 1708    estradiol (ESTRACE) vaginal cream 2 g  2 g Vaginal Daily CATHERINE De Leon        hydroxychloroquine (PLAQUENIL) tablet 200 mg  200 mg Oral Daily CATHERINE De Leon   200 mg at 07/15/18 0834    levothyroxine tablet 112 mcg  112 mcg Oral Early Morning CATHERINE De Leon   112 mcg at 07/16/18 0602    magnesium gluconate (MAGONATE) tablet 250 mg  250 mg Oral Daily CATHERINE De Leon   250 mg at 07/15/18 0834    multivitamin-minerals (CENTRUM) tablet 1 tablet  1 tablet Oral Daily CATHERINE De Leon   1 tablet at 07/15/18 0832    ondansetron (ZOFRAN) injection 4 mg  4 mg Intravenous Q6H PRN Clinton Oscar, CRNP        oxyCODONE (ROXICODONE) IR tablet 2 5 mg  2 5 mg Oral Q4H PRN Chantell Driscoll DO   2 5 mg at 07/15/18 1422    oxyCODONE (ROXICODONE) IR tablet 5 mg  5 mg Oral Q4H PRN Chantell Driscoll,   pantoprazole (PROTONIX) EC tablet 40 mg  40 mg Oral BID AC Soniya Whitten, CRNP   40 mg at 07/16/18 0602    polyvinyl alcohol (LIQUIFILM TEARS) 1 4 % ophthalmic solution 1 drop  1 drop Both Eyes PRN Lunsford Agapito, CATHERINE        pravastatin (PRAVACHOL) tablet 40 mg  40 mg Oral Daily With [employer], CRNP   40 mg at 07/15/18 1707    vitamin E (tocopherol) capsule 400 Units  400 Units Oral Daily [place]unsford Cldonna, CRNP   400 Units at 07/15/18 9154     Prescriptions Prior to Admission   Medication    celecoxib (CeleBREX) 100 mg capsule    Cranberry 1000 MG CAPS    denosumab (PROLIA) 60 mg/mL    estradiol (ESTRACE) 0 1 mg/g vaginal cream    Ginkgo Biloba 40 MG TABS    ketotifen (ZADITOR) 0 025 % ophthalmic solution    polyvinyl alcohol (LIQUIFILM TEARS) 1 4 % ophthalmic solution    Ascorbic Acid (VITAMIN C) 1000 MG tablet    b complex vitamins tablet    cevimeline (EVOXAC) 30 MG capsule    Cholecalciferol (VITAMIN D) 2000 UNITS tablet    Coenzyme Q10 (CO Q 10) 10 MG CAPS    cycloSPORINE (RESTASIS) 0 05 % ophthalmic emulsion    DHA-EPA-Flaxseed Oil-Vitamin E (THERA TEARS) CAPS    hydroxychloroquine (PLAQUENIL) 200 mg tablet    levothyroxine (SYNTHROID) 112 mcg tablet    LOSARTAN POTASSIUM-HCTZ PO    Magnesium 250 MG TABS    Multiple Vitamins-Minerals (OCUVITE PO)    omeprazole (PriLOSEC) 20 mg delayed release capsule    Potassium Gluconate  MG TBCR    simvastatin (ZOCOR) 20 mg tablet    UNABLE TO FIND    vitamin E, tocopherol, 400 units capsule    warfarin (COUMADIN) 2 mg tablet         Intake/Output Summary (Last 24 hours) at 07/16/18 0723  Last data filed at 07/16/18 0601   Gross per 24 hour   Intake              830 ml   Output             1375 ml   Net             -545 ml       Physical Exam:  General appearance: alert and oriented, in no acute distress  Neck: no adenopathy, no JVD and thyroid not enlarged, symmetric, no tenderness/mass/nodules  Lungs: clear to auscultation bilaterally  Heart: regular rate and rhythm, S1, S2 normal, no murmur, click, rub or gallop  Abdomen: soft, non-tender; bowel sounds normal; no masses,  no organomegaly  Extremities: Marked hematoma of the right shoulder extending into the chest wall with no marked increased warmth  Skin: Ecchymosis across the shoulder and upper arm right shoulder incision appears clean without any erythema or drainage  Neurologic:  No focal motor deficits  No sensory loss       Lab, Imaging and other studies: I have personally reviewed pertinent reports  Results from last 7 days  Lab Units 07/16/18  0504 07/15/18  2039 07/15/18  1306 07/15/18  0538  07/15/18  0010   WBC Thousand/uL 10 99*  --   --  13 58*  --  11 82*   HEMOGLOBIN g/dL 7 6* 7 9* 8 0* 5 6*  < > 4 2*   HEMATOCRIT % 22 4* 23 1* 23 8* 17 5*  < > 13 0*   PLATELETS Thousands/uL 110*  --   --  122*  --  123*   NEUTROS PCT %  --   --   --   --   --  61   LYMPHS PCT %  --   --   --   --   --  20   MONOS PCT %  --   --   --   --   --  17*   EOS PCT %  --   --   --   --   --  0   < > = values in this interval not displayed      Results from last 7 days  Lab Units 07/16/18  0504 07/15/18  0538 07/15/18  0010   SODIUM mmol/L 135* 134* 134*   POTASSIUM mmol/L 4 1 4 6 4 5   CHLORIDE mmol/L 103 101 101   CO2 mmol/L 27 25 27   BUN mg/dL 23 36* 41*   CREATININE mg/dL 1 00 1 16 1 24   CALCIUM mg/dL 8 3 8 1* 7 8*   TOTAL PROTEIN g/dL  --   --  5 2*   BILIRUBIN TOTAL mg/dL  --   --  0 30   ALK PHOS U/L  --   --  40*   ALT U/L  --   --  21   AST U/L  --   --  33   GLUCOSE RANDOM mg/dL 104 141* 144*     Lab Results   Component Value Date    TROPONINI 0 14 (H) 07/15/2018    TROPONINI 0 06 (H) 07/15/2018    TROPONINI 0 03 07/15/2018       Results from last 7 days  Lab Units 07/16/18  0504 07/15/18  0010   INR  1 25* 1 39*     No results found for: Stone Aw, SPUTUMCULTUR    Imaging:  Results for orders placed during the hospital encounter of 07/14/18   XR chest 1 view portable    Narrative CHEST     INDICATION:   Hypoxemia  Shortness of breath    COMPARISON:  January 24, 2015, June 13, 2008    EXAM PERFORMED/VIEWS:  XR CHEST PORTABLE      FINDINGS:    The heart mediastinum are stable  The heart is mildly enlarged  The lungs are clear  No pneumothorax or pleural effusion  Osseous structures appear within normal limits for patient age  Impression No acute cardiopulmonary disease  Workstation performed: [id number]       Results for orders placed in visit on 01/21/15   XR chest pa & lateral    Narrative CHEST   INDICATION- Productive cough, fever, chills and weakness beginning last   night  COMPARISON- 4/6/2011  VIEWS-  Frontal and lateral projections    2 images   FINDINGS-   The cardiomediastinal silhouette is stable  Bibasilar consolidation left worse than right concerning for pneumonia  Bony thorax unremarkable  IMPRESSION-   Bibasilar consolidation left worse than right concerning for pneumonia  This concurs with preliminary ED interpretation  Transcribed on- [id number]           GALEN Licea MD        Reading Radiologist- GALEN Stallworth MD        Electronically Signed- GALEN Stallworth MD        Released Date Time- 01/21/15 1906      ------------------------------------------------------------------------------   [address]        PATIENT CENTERED ROUNDS: I have performed rounds with the nursing staff            Selina Dover DO

## 2018-07-16 NOTE — PHYSICAL THERAPY NOTE
Physical Therapy Evaluation ([id number])    Patient Name: Wanda Molina    EOOVJ'G Date: 7/16/2018     Problem List  Patient Active Problem List   Diagnosis    Sjogren's syndrome ([facility] )    History of pulmonary embolus (PE)    Hypertension    Hypothyroidism in adult    Chronic kidney disease    Symptomatic anemia    Status post replacement of right shoulder joint    Leukocytosis    Acute respiratory failure with hypoxia (HCC)        Past Medical History  Past Medical History:   Diagnosis Date    Disease of thyroid gland     Hyperlipidemia     Hypertension     PE (p[facility] thromboembolism) ([facility] )     Sjogren's syndrome ([facility] )         Past Surgical History  Past Surgical History:   Procedure Laterality Date    HYSTERECTOMY      JOINT REPLACEMENT      SHOULDER SURGERY      right    TOE SURGERY        07/16/18 1127   Note Type   Note type Eval/Treat   Pain Assessment   Pain Assessment 0-10   Pain Score No Pain   Home Living   Type of Home House   Home Layout Stairs to enter with rails;Multi-level; Able to live on main level with bedroom/bathroom  (1st setup and 1 MCKENNA)   Bathroom Equipment Grab bars around toilet;Grab bars in [employer] [address];Walker  (was not using prior )   Prior Function   Level of [place] Independent with ADLs and functional mobility   Lives With Spouse   Receives Help From Family   ADL Assistance Needs assistance  (recently requiring assist 2* R TSR ~3 days ago in abd sling )   IADLs Needs assistance   Falls in the last 6 months 0   Vocational Retired   Restrictions/Precautions   [employer] [place] Bearing Precautions Per Order Yes   RUE Weight Bearing Per Order NWB  (in abd sling-)   Braces or Orthoses Sling  (R UE at all times per ortho recs)   Other Precautions Pain; Fall Risk;Telemetry;Multiple lines   General   Additional Pertinent History pt is s/p recent (~3 days PTA) R TSA at [facility] pt and spouse d/c instructions for NWB and to NOT remover sling or participate in therapy whatsoever (besides hand AROM) until scheduled f/u w/ ortho in ~2 weeks- [facility]) here consulted and advises to follow 1* ortho instructions  Family/Caregiver Present Yes  (spouse )   Cognition   Orientation Level Oriented X4   Memory Within functional limits   Following Commands Follows all commands and directions without difficulty   RUE Assessment   RUE Assessment X  (in abd sling- hand AROM WNL )   LUE Assessment   LUE Assessment WFL   RLE Assessment   RLE Assessment WFL   LLE Assessment   LLE Assessment WFL   Coordination   Movements are Fluid and Coordinated 0   Coordination and Movement Description slow guarded; LE instabiltiy    Light Touch   RLE Light Touch Grossly intact   LLE Light Touch Grossly intact   Bed Mobility   Additional Comments pt was OOB to chair on arrival    Transfers   Sit to Stand 4  Minimal assistance  (HHA for balance provided on L )   Stand to Sit 5  Supervision   Additional items Verbal cues   Stand pivot 4  Minimal assistance  (HHA/ CGA)   Additional items Increased time required;Verbal cues   Ambulation/Elevation   Gait pattern Excessively slow   Gait Assistance 4  Minimal assist  (HHA for balance on L )   Additional items Assist x 1;Verbal cues; Tactile cues   Assistive Device None  (HHA on L )   Distance 120' w/ HHA provided on L for balance only    Balance   Static Sitting Normal   Dynamic Sitting Good   Static Standing Fair -   Dynamic Standing Poor +   Ambulatory Poor +   Endurance Deficit   Endurance Deficit Yes   Endurance Deficit Description fatigue and reported "weak" from baseline    Activity Tolerance   Activity Tolerance Patient limited by fatigue   Nurse Made Aware yes- updated   Assessment   Prognosis Good   Problem List Decreased strength;Decreased range of motion;Decreased endurance; Impaired balance;Decreased mobility; Decreased coordination;Obesity; Decreased skin integrity;Orthopedic restrictions;Pain   Assessment Pt is [de-identified] y o  female seen for PT evaluation s/p [facility] w/ dx of acute respiratory failure w/ hypoxia; symptomatic anemia w/ Hgb<5 on admission w/ recent R TSR approx 3 days PTA  Pt is s/p multiple transfusions and undergoing w/u  PT now consulted for assessment of mobility and d/c needs  PMhx (as above) and significant for R TSA; w/ abd sling and NWB to R UE); hx of PE; and Sjogren's syndrome and  personal factors currently affecting pt's physical performance include: MCKENNA; ortho precautions; advanced age and environmental barriers   Prior to admission, pt reports being fully indep and not using AD w/ exception of s/p d/c home from [place] where spouse was "holding hand" walking her 2* recent surgery and weakness- pt was sponge bathig 2* report of being instructed to not remove sling until ortho f/u  Upon evaluation, pt currently is requiring  Trey for functional transfers and Trey for ambulation w/ '  Pt presents functioning below baseline and currently w/ overall mobility deficits 2* to:  generalized weakness/ deconditioning; decreased endurance; decreased activity tolerance; decreased coordination; impaired balance; gait deviations; decreased safety awareness; SOB/CISNEROS; fatigue limited insight into current deficits;  multiple lines; hearing impairment/ visual impairments;   Ortho restriction and ongoing w/u and monitoring in SDU  Pt currently at risk for falls  (Please find additional objective findings from PT assessment regarding body systems outlined above ) Pt will continue to benefit from skilled PT interventions to address stated impairments; to maximize functional potential; for ongoing pt/ family training; and DME needs  PT is Anticipating  that with continued progress pt to d/c home with family support and [facility] PT w/ ortho f/u as planned  when medically cleared  Dax Salinas   Pt/ family agreeable to plan and goals as stated on evaluation  Goals   Patient Goals go home    STG Expiration Date 07/26/18   Short Term Goal #1 In 10 days pt will complete: 1) Bed mobility skills with MI2) Functional transfers with MII 3) Ambulation with least restrictive AD MI NWB to R UE in sling x 200' without LOB and stable vitals for safe home and community ambulation   4) Stair training up/ down 1 step/s with GINGER rail/s to safely enter home  5) Improve balance grades to Good 6) Improve LE strength grades by 1   7) LE HEP independently  8) PT for ongoing pt and family education; DME needs and D/C planning to promote highest level of function in least restrictive environment  Treatment Day 0   Plan   Treatment/Interventions Functional transfer training;LE strengthening/ROM; Elevations; Therapeutic exercise; Endurance training;Patient/family training;Equipment eval/education; Bed mobility;Gait training;Spoke to nursing;OT   PT Frequency (3-5x/wk )   Recommendation   Recommendation Home with family support;Home PT   PT - OK to Discharge Yes  (w/ spouse when medically cleared)   Modified [place] Scale   Modified [place] Scale 4   Barthel Index   Feeding 5   Bathing 0   Grooming Score 0   Dressing Score 5   Bladder Score 10   Bowels Score 10   Toilet Use Score 5   Transfers (Bed/Chair) Score 10   Mobility (Level Surface) Score 10   Stairs Score 0   Barthel Index Score 55            PHYSICAL THERAPY TREATMENT [id number])  Pt seen for skilled PT session following evaluation consisting of instruction in seated therex/ HEP instruction; for increased LE strengthening to assist w/ increasing independence w/ transfers and gait  Pt tolerates therex w/o complaints or increase in pain  2x 15 of GS; AP; LAQ; seated marching   Spouse also present  Will continue to  benefit from repeated instruction for correct technique and compliance      Benji Dobbins, PT

## 2018-07-16 NOTE — SOCIAL WORK
Met with Pt  Pt presents AA&Ox3  Discussed role of   Pt lives with  in [facility], [address]  Pt is independent with adls and ambulation  Pt has cane and walker  Pt [facility] to [facility] on [address]  Acknowledge PT recommendations for home PT  Spoke with Pt, Pt is declining [facility] and home PT services  Will follow

## 2018-07-16 NOTE — PLAN OF CARE
Problem: PHYSICAL THERAPY ADULT  Goal: Performs mobility at highest level of function for planned discharge setting  See evaluation for individualized goals  Treatment/Interventions: Functional transfer training, LE strengthening/ROM, Elevations, Therapeutic exercise, Endurance training, Patient/family training, Equipment eval/education, Bed mobility, Gait training, Spoke to nursing, OT          See flowsheet documentation for full assessment, interventions and recommendations  Prognosis: Good  Problem List: Decreased strength, Decreased range of motion, Decreased endurance, Impaired balance, Decreased mobility, Decreased coordination, Obesity, Decreased skin integrity, Orthopedic restrictions, Pain  Assessment: Pt is [de-identified] y o  female seen for PT evaluation s/p admit to [facility] on 7/14/2018 w/ dx of acute respiratory failure w/ hypoxia; symptomatic anemia w/ Hgb<5 on admission w/ recent R TSR approx 3 days PTA  Pt is s/p multiple transfusions and undergoing w/u  PT now consulted for assessment of mobility and d/c needs  PMhx (as above) and significant for R TSA; w/ abd sling and NWB to R UE); hx of PE; and Sjogren's syndrome and  personal factors currently affecting pt's physical performance include: MCKENNA; ortho precautions; advanced age and environmental barriers   Prior to admission, pt reports being fully indep and not using AD w/ exception of s/p d/c home from [place] where spouse was "holding hand" walking her 2* recent surgery and weakness- pt was sponge bathig 2* report of being instructed to not remove sling until ortho f/u  Upon evaluation, pt currently is requiring  Trey for functional transfers and Trey for ambulation w/ '     Pt presents functioning below baseline and currently w/ overall mobility deficits 2* to:  generalized weakness/ deconditioning; decreased endurance; decreased activity tolerance; decreased coordination; impaired balance; gait deviations; decreased safety awareness; SOB/CISNEROS; fatigue limited insight into current deficits;  multiple lines; hearing impairment/ visual impairments;   Ortho restriction and ongoing w/u and monitoring in SDU  Pt currently at risk for falls  (Please find additional objective findings from PT assessment regarding body systems outlined above ) Pt will continue to benefit from skilled PT interventions to address stated impairments; to maximize functional potential; for ongoing pt/ family training; and DME needs  PT is Anticipating  that with continued progress pt to d/c home with family support and [place] [id number] PT w/ ortho f/u as planned  when medically cleared  Genna Arriaga Pt/ family agreeable to plan and goals as stated on evaluation  Recommendation: Home with family support, Home PT     PT - OK to Discharge: Yes (w/ spouse when medically cleared)    See flowsheet documentation for full assessment

## 2018-07-16 NOTE — CASE MANAGEMENT
Initial Clinical Review    Admission: Date/Time/Statement: 7/15/18 @ 0146     Orders Placed This Encounter   Procedures    Inpatient Admission (expected length of stay for this patient is greater than two midnights)     Standing Status:   Standing     Number of Occurrences:   1     Order Specific Question:   Admitting Physician     Answer:   Kayden Sneed [[id number]]     Order Specific Question:   Level of Care     Answer:   Med Surg [[id number]]     Order Specific Question:   Estimated length of stay     Answer:   More than 2 Midnights     Order Specific Question:   Certification     Answer:   I certify that inpatient services are medically necessary for this patient for a duration of greater than two midnights  See H&P and MD Progress Notes for additional information about the patient's course of treatment  ED: Date/Time/Mode of Arrival:   ED Arrival Information     Expected Arrival Acuity Means of Arrival Escorted By Service Admission Type    - 7/14/2018 23:31 Urgent Ambulance [facility] Adelaida Guerrero) Family Medicine Urgent    Arrival Complaint    WEAKNESS      Chief Complaint:   Chief Complaint   Patient presents with    Weakness - Generalized     Right shoulder replacement on Thursday at [facility] and discharged home yesterday; today feels very weak  Denies chest pain/sob  Oxygen sats on room air 88% for EMS  History of PE--currently on lovenox  History of Illness: This 80-year-old female with history of Sjogren syndrome, hypertension, hyperlipidemia and remote pulmonary embolism on warfarin anticoagulation complains of weakness  This started after returning home yesterday from a 1 night stay right shoulder surgery at [facility]    She had been taken off Coumadin for 1 week but was using Lovenox during that time  She has started using Coumadin once again  Medics found her to be hypoxemic with a blood pressure 98 systolic    She looks very pale but denies recent black or tarry or bloody stool or any other evidence of bleeding  Patient denies chest pain palpitations nausea vomiting diarrhea new pain and focal neurologic changes  ED Vital Signs:   ED Triage Vitals [07/14/18 2340]   Temperature Pulse Respirations Blood Pressure SpO2   98 2 °F (36 8 °C) 96 16 116/58 98 %      Temp Source Heart Rate Source Patient Position - Orthostatic VS BP Location FiO2 (%)   Temporal Monitor Lying Left arm --      Pain Score       No Pain        Wt Readings from Last 1 Encounters:   07/16/18 68 7 kg (151 lb 7 3 oz)   Vital Signs (abnormal):   BP 94/44, 95/42  Abnormal Labs/Diagnostic Test Results:   WBC 11 82 HGB 4 2 PT 16 3 INR 1 39 PTT 53  BUN 41 GLUC 144 CA 7 8 GFR 41 ALK PHOS 40 TPROT 5 2 ALB 2 5  U/A+LEUK  TROP 0 14  CXR=No acute cardiopulmonary disease  CTA CHEST=1  No definite pulmonary embolus  2   Scattered atelectasis and air trapping  3   Postoperative changes of the right shoulder status post recent arthroplasty with suspected hematoma in the supraspinatus muscle  Inflammatory changes throughout the right lateral chest wall and breast all likely postoperative as well  CT A/P=No evidence of retroperitoneal hemorrhage  EKG=Comparison ECG info:    Compared to January 21, 2015, there new Q-waves in lead 3 and lead AVF  Rate:     ECG rate assessment: normal    Rhythm:     Rhythm: sinus rhythm    Ectopy:     Ectopy: none    QRS:     QRS axis:  Normal  Conduction:     Conduction: normal    ST segments:     ST segments:  Normal  T waves:     T waves: normal    Q waves:     Q waves:  III and aVF  ED Treatment:   Medication Administration from 07/14/2018 2331 to 07/15/2018 0231       Date/Time Order Dose Route Action Action by Comments     07/15/2018 0127 fentanyl citrate (PF) 100 MCG/2ML 25 mcg 25 mcg Intravenous Given Jonathan Serna RN      07/15/2018 0225 iodixanol (VISIPAQUE) 320 MG/ML injection 85 mL 85 mL Intravenous Given Curtis Randall       Past Medical/Surgical History:    Active Ambulatory Problems     Diagnosis Date Noted    No Active Ambulatory Problems     Resolved Ambulatory Problems     Diagnosis Date Noted    No Resolved Ambulatory Problems     Past Medical History:   Diagnosis Date    Disease of thyroid gland     Hyperlipidemia     Hypertension     PE [facility]boembol[facility]     Sjogren's syndrome ([facility] [address] )    Admitting Diagnosis: Sjogren's syndrome (HCC) [M35 00]  Weakness [R53 1]  Chronic kidney disease [N18 9]  Symptomatic anemia [D64 9]  Age/Sex: [de-identified] y o  female  Assessment/Plan:   Acute respiratory failure with hypoxia (HCC)   Assessment & Plan     SpO2 88% for EMS  Patient was placed on 3 L nasal cannula and SpO2 went up to 98%  Continue O2 and titrate to keep SpO2 greater than 92%        Symptomatic anemia   Assessment & Plan     Initial hgb in ED was 4 2  Recheck was 4 3  Pt having generalized weakness and hypoxia  Continue blood transfusion as ordered in ED  Will recheck hgb after second unit is transfused  Administer O2 to keep SPO2 >92%  History of pulmonary embolus (PE)   Assessment & Plan     Patient takes Coumadin for remote history of P and DVTs  Coumadin was on hold x1 week for surgery but was restarted yesterday after arriving home  INR currently 1 39  Will hold Coumadin due to hemoglobin of 4 2  Restart on hemoglobin to     Admission Orders:  TELEMETRY  TRANSFUSE PRBC'S  CONSULT ORTHO  O2 TO KEEP SATS>92%  PT/OT EVAL & TX  CONSULT HEMATOLOGY  VENODYNES  Scheduled Meds:   Current Facility-Administered Medications:  acetaminophen 650 mg Oral Q6H PRN CATHERINE Anderson    vitamin C 1,000 mg Oral Daily CATHERINE De Leon    b complex-vitamin C-folic acid 1 capsule Oral Daily CATHERINE De Leon    cefazolin 1,000 mg Intravenous Q8H Shannon Fly, DO Last Rate: Stopped (07/16/18 9840)   cholecalciferol 2,000 Units Oral Daily CATHERINE De Leon    co-enzyme Q-10 30 mg Oral Daily CATHERINE De Leon    docusate sodium 100 mg Oral BID Karis Monique,     estradiol 2 g Vaginal Daily CATHERINE Arnold    fluticasone 2 spray Each Nare Daily Shannon Lyles,     hydroxychloroquine 200 mg Oral Daily [employer], CATHERINE    levothyroxine 112 mcg Oral Early Morning CATHERINE De Leon    magnesium gluconate 250 mg Oral Daily CATHERINE De Leon    magnesium hydroxide 30 mL Oral Daily PRN Berenice Claire,     multivitamin-minerals 1 tablet Oral Daily CATHERINE De Leon    ondansetron 4 mg Intravenous Q6H PRN Lono CATHERINE Yoon    oxyCODONE 2 5 mg Oral Q4H PRN Karis Monique DO    oxyCODONE 5 mg Oral Q4H PRN Karis Monique,     pantoprazole 40 mg Oral BID AC CATHERINE De Leon    polyvinyl alcohol 1 drop Both Eyes PRN [employer], CATHERINE    pravastatin 40 mg Oral Daily With [employer], CATHERINE    vitamin E (tocopherol) 400 Units Oral Daily CATHERINE De Leon    warfarin 3 mg Oral Once (warfarin) Shannon Lyles, DO      Continuous Infusions:    PRN Meds:   acetaminophen    magnesium hydroxide    ondansetron    oxyCODONE    oxyCODONE    polyvinyl alcohol

## 2018-07-17 ENCOUNTER — APPOINTMENT (INPATIENT)
Dept: RADIOLOGY | Facility: HOSPITAL | Age: 83
DRG: 919 | End: 2018-07-17
Payer: MEDICARE

## 2018-07-17 PROBLEM — D72.829 LEUKOCYTOSIS: Status: RESOLVED | Noted: 2018-07-15 | Resolved: 2018-07-17

## 2018-07-17 LAB
ALBUMIN SERPL BCP-MCNC: 2.9 G/DL (ref 3.5–5)
ALP SERPL-CCNC: 60 U/L (ref 46–116)
ALT SERPL W P-5'-P-CCNC: 28 U/L (ref 12–78)
ANION GAP SERPL CALCULATED.3IONS-SCNC: 6 MMOL/L (ref 4–13)
AST SERPL W P-5'-P-CCNC: 47 U/L (ref 5–45)
BILIRUB SERPL-MCNC: 1.4 MG/DL (ref 0.2–1)
BUN SERPL-MCNC: 17 MG/DL (ref 5–25)
CALCIUM SERPL-MCNC: 8.1 MG/DL (ref 8.3–10.1)
CHLORIDE SERPL-SCNC: 100 MMOL/L (ref 100–108)
CO2 SERPL-SCNC: 27 MMOL/L (ref 21–32)
CREAT SERPL-MCNC: 1 MG/DL (ref 0.6–1.3)
ERYTHROCYTE [DISTWIDTH] IN BLOOD BY AUTOMATED COUNT: 15.5 % (ref 11.6–15.1)
GFR SERPL CREATININE-BSD FRML MDRD: 53 ML/MIN/1.73SQ M
GLUCOSE SERPL-MCNC: 131 MG/DL (ref 65–140)
HCT VFR BLD AUTO: 23.4 % (ref 34.8–46.1)
HCT VFR BLD AUTO: 23.9 % (ref 34.8–46.1)
HGB BLD-MCNC: 7.8 G/DL (ref 11.5–15.4)
HGB BLD-MCNC: 7.9 G/DL (ref 11.5–15.4)
INR PPP: 1.24 (ref 0.86–1.17)
MCH RBC QN AUTO: 31.1 PG (ref 26.8–34.3)
MCHC RBC AUTO-ENTMCNC: 33.8 G/DL (ref 31.4–37.4)
MCV RBC AUTO: 92 FL (ref 82–98)
NT-PROBNP SERPL-MCNC: 8916 PG/ML
PLATELET # BLD AUTO: 124 THOUSANDS/UL (ref 149–390)
PMV BLD AUTO: 11.2 FL (ref 8.9–12.7)
POTASSIUM SERPL-SCNC: 4 MMOL/L (ref 3.5–5.3)
PROT SERPL-MCNC: 6.5 G/DL (ref 6.4–8.2)
PROTHROMBIN TIME: 14.9 SECONDS (ref 11.8–14.2)
RBC # BLD AUTO: 2.54 MILLION/UL (ref 3.81–5.12)
SODIUM SERPL-SCNC: 133 MMOL/L (ref 136–145)
WBC # BLD AUTO: 11.81 THOUSAND/UL (ref 4.31–10.16)

## 2018-07-17 PROCEDURE — 85018 HEMOGLOBIN: CPT | Performed by: INTERNAL MEDICINE

## 2018-07-17 PROCEDURE — 85610 PROTHROMBIN TIME: CPT | Performed by: INTERNAL MEDICINE

## 2018-07-17 PROCEDURE — 71045 X-RAY EXAM CHEST 1 VIEW: CPT

## 2018-07-17 PROCEDURE — 85027 COMPLETE CBC AUTOMATED: CPT | Performed by: INTERNAL MEDICINE

## 2018-07-17 PROCEDURE — 99233 SBSQ HOSP IP/OBS HIGH 50: CPT | Performed by: INTERNAL MEDICINE

## 2018-07-17 PROCEDURE — 83880 ASSAY OF NATRIURETIC PEPTIDE: CPT | Performed by: INTERNAL MEDICINE

## 2018-07-17 PROCEDURE — 80053 COMPREHEN METABOLIC PANEL: CPT | Performed by: INTERNAL MEDICINE

## 2018-07-17 PROCEDURE — 85014 HEMATOCRIT: CPT | Performed by: INTERNAL MEDICINE

## 2018-07-17 RX ORDER — FUROSEMIDE 10 MG/ML
40 INJECTION INTRAMUSCULAR; INTRAVENOUS
Status: COMPLETED | OUTPATIENT
Start: 2018-07-17 | End: 2018-07-17

## 2018-07-17 RX ORDER — POTASSIUM CHLORIDE 20 MEQ/1
20 TABLET, EXTENDED RELEASE ORAL DAILY
Status: COMPLETED | OUTPATIENT
Start: 2018-07-17 | End: 2018-07-17

## 2018-07-17 RX ADMIN — CEFAZOLIN SODIUM 1000 MG: 1 SOLUTION INTRAVENOUS at 15:32

## 2018-07-17 RX ADMIN — CEFAZOLIN SODIUM 1000 MG: 1 SOLUTION INTRAVENOUS at 08:09

## 2018-07-17 RX ADMIN — Medication 400 UNITS: at 08:09

## 2018-07-17 RX ADMIN — ONDANSETRON 4 MG: 2 INJECTION, SOLUTION INTRAMUSCULAR; INTRAVENOUS at 01:45

## 2018-07-17 RX ADMIN — PRAVASTATIN SODIUM 40 MG: 40 TABLET ORAL at 15:32

## 2018-07-17 RX ADMIN — Medication 250 MG: at 08:07

## 2018-07-17 RX ADMIN — LEVOTHYROXINE SODIUM 112 MCG: 112 TABLET ORAL at 05:19

## 2018-07-17 RX ADMIN — FUROSEMIDE 40 MG: 10 INJECTION, SOLUTION INTRAMUSCULAR; INTRAVENOUS at 08:05

## 2018-07-17 RX ADMIN — FUROSEMIDE 40 MG: 10 INJECTION, SOLUTION INTRAMUSCULAR; INTRAVENOUS at 15:34

## 2018-07-17 RX ADMIN — POTASSIUM CHLORIDE 20 MEQ: 1500 TABLET, EXTENDED RELEASE ORAL at 08:06

## 2018-07-17 RX ADMIN — Medication 1 TABLET: at 08:06

## 2018-07-17 RX ADMIN — HYDROXYCHLOROQUINE SULFATE 200 MG: 200 TABLET, FILM COATED ORAL at 08:08

## 2018-07-17 RX ADMIN — PANTOPRAZOLE SODIUM 40 MG: 40 TABLET, DELAYED RELEASE ORAL at 15:32

## 2018-07-17 RX ADMIN — VITAMIN D, TAB 1000IU (100/BT) 2000 UNITS: 25 TAB at 08:06

## 2018-07-17 RX ADMIN — Medication 30 MG: at 08:08

## 2018-07-17 RX ADMIN — NEPHROCAP 1 CAPSULE: 1 CAP ORAL at 08:06

## 2018-07-17 RX ADMIN — PANTOPRAZOLE SODIUM 40 MG: 40 TABLET, DELAYED RELEASE ORAL at 06:09

## 2018-07-17 RX ADMIN — OXYCODONE HYDROCHLORIDE AND ACETAMINOPHEN 1000 MG: 500 TABLET ORAL at 08:06

## 2018-07-17 RX ADMIN — DOCUSATE SODIUM 100 MG: 100 CAPSULE, LIQUID FILLED ORAL at 08:06

## 2018-07-17 NOTE — PROGRESS NOTES
Progress Note - Kevan Garcia [de-identified] y o  female MRN: [id number]    Unit/Bed#: -01 Encounter: [id number]      Assessment:    Principal Problem:    Acute respiratory failure with hypoxia ([facility] [address] )  Active Problems:    Sjogren's syndrome ([facility] [address] )    History of pulmonary embolus (PE)    Hypertension    Hypothyroidism in adult    Chronic kidney disease    Symptomatic anemia    Status post replacement of right shoulder joint  Resolved Problems:    Leukocytosis      Plan:  Patient's issues that she still remains hypoxic  Check BNP and a chest x-ray  Give diuretics  Patient clinically appears to be fluid overloaded probably from her fluid resuscitation  Check INR restarting Coumadin  DC Ancef is I see no indication for that  Subjective:   Denies chest pain or shortness of breath  Was hoping she could go home today  The pain in the right shoulder appears to be controlled  Objective:     Vitals: Blood pressure 131/60, pulse 97, temperature 100 °F (37 8 °C), resp  rate 16, height 5' 3" (1 6 m), weight 68 5 kg (151 lb 0 2 oz), SpO2 92 %  ,Body mass index is 26 75 kg/m²  Intake/Output Summary (Last 24 hours) at 07/17/18 0718  Last data filed at 07/17/18 0536   Gross per 24 hour   Intake             1180 ml   Output             1150 ml   Net               30 ml       Physical Exam:    Alert and awake in no acute distress  Neck supple, no lymphadenopathy, no JVD  Lungs few crackles and wheezes  Heart regular rate and rythm, normal heart sounds  Abdomen soft, active bowel sounds, non-tender, non-distended  Extremities: no cyanosis, clubbing or edema          Invasive Devices     Peripheral Intravenous Line            Peripheral IV 07/16/18 Left Arm less than 1 day                            Lab, Imaging and other studies: I have personally reviewed pertinent reports         Results from last 7 days  Lab Units 07/17/18  0523 07/16/18  1805 07/16/18  0504  07/15/18  0538  07/15/18  0010   WBC Thousand/uL 11 81*  --  10 99*  --  13 58*  --  11 82*   HEMOGLOBIN g/dL 7 9* 7 9* 7 6*  < > 5 6*  < > 4 2*   HEMATOCRIT % 23 4* 23 6* 22 4*  < > 17 5*  < > 13 0*   PLATELETS Thousands/uL 124*  --  110*  --  122*  --  123*   NEUTROS PCT %  --   --   --   --   --   --  61   LYMPHS PCT %  --   --   --   --   --   --  20   MONOS PCT %  --   --   --   --   --   --  17*   EOS PCT %  --   --   --   --   --   --  0   < > = values in this interval not displayed  Results from last 7 days  Lab Units 07/17/18  0523 07/16/18  0504 07/15/18  0538 07/15/18  0010   SODIUM mmol/L 133* 135* 134* 134*   POTASSIUM mmol/L 4 0 4 1 4 6 4 5   CHLORIDE mmol/L 100 103 101 101   CO2 mmol/L 27 27 25 27   BUN mg/dL 17 23 36* 41*   CREATININE mg/dL 1 00 1 00 1 16 1 24   CALCIUM mg/dL 8 1* 8 3 8 1* 7 8*   TOTAL PROTEIN g/dL 6 5  --   --  5 2*   BILIRUBIN TOTAL mg/dL 1 40*  --   --  0 30   ALK PHOS U/L 60  --   --  40*   ALT U/L 28  --   --  21   AST U/L 47*  --   --  33   GLUCOSE RANDOM mg/dL 131 104 141* 144*     Lab Results   Component Value Date    TROPONINI 0 14 (H) 07/15/2018    TROPONINI 0 06 (H) 07/15/2018    TROPONINI 0 03 07/15/2018       Results from last 7 days  Lab Units 07/16/18  0504 07/15/18  0010   INR  1 25* 1 39*     No results found for: Francesca Carrillo SPUTUMCULTSOCORRO    Imaging:  Results for orders placed during the hospital encounter of 07/14/18   XR chest 1 view portable    Narrative CHEST     INDICATION:   Hypoxemia  Shortness of breath    COMPARISON:  January 24, 2015, June 13, 2008    EXAM PERFORMED/VIEWS:  XR CHEST PORTABLE      FINDINGS:    The heart mediastinum are stable  The heart is mildly enlarged  The lungs are clear  No pneumothorax or pleural effusion  Osseous structures appear within normal limits for patient age  Impression No acute cardiopulmonary disease          Workstation performed: [id number]       Results for orders placed in visit on 01/21/15   XR chest pa & lateral    Narrative CHEST INDICATION- Productive cough, fever, chills and weakness beginning last   night  COMPARISON- 4/6/2011  VIEWS-  Frontal and lateral projections    2 images   FINDINGS-   The cardiomediastinal silhouette is stable  Bibasilar consolidation left worse than right concerning for pneumonia  Bony thorax unremarkable  IMPRESSION-   Bibasilar consolidation left worse than right concerning for pneumonia  This concurs with preliminary ED interpretation  Transcribed on- [id number]           GALEN Castillo MD        Reading Radiologist- GALEN Lopez MD        Electronically Signed- GALEN Lopez MD        Released Date Time- 01/21/15 1906      ------------------------------------------------------------------------------   [address]        PATIENT CENTERED ROUNDS: I have performed rounds with the nursing staff  This note has been constructed using a voice recognition system      Lien Weiss MD

## 2018-07-18 VITALS
HEART RATE: 92 BPM | OXYGEN SATURATION: 97 % | SYSTOLIC BLOOD PRESSURE: 138 MMHG | RESPIRATION RATE: 18 BRPM | DIASTOLIC BLOOD PRESSURE: 60 MMHG | HEIGHT: 63 IN | BODY MASS INDEX: 26.52 KG/M2 | TEMPERATURE: 98.3 F | WEIGHT: 149.69 LBS

## 2018-07-18 PROBLEM — J90 PLEURAL EFFUSION ON RIGHT: Status: ACTIVE | Noted: 2018-07-18

## 2018-07-18 LAB
ANION GAP SERPL CALCULATED.3IONS-SCNC: 6 MMOL/L (ref 4–13)
ANISOCYTOSIS BLD QL SMEAR: PRESENT
BASOPHILS # BLD MANUAL: 0 THOUSAND/UL (ref 0–0.1)
BASOPHILS NFR MAR MANUAL: 0 % (ref 0–1)
BUN SERPL-MCNC: 21 MG/DL (ref 5–25)
CALCIUM SERPL-MCNC: 7.7 MG/DL (ref 8.3–10.1)
CHLORIDE SERPL-SCNC: 100 MMOL/L (ref 100–108)
CO2 SERPL-SCNC: 30 MMOL/L (ref 21–32)
CREAT SERPL-MCNC: 1.09 MG/DL (ref 0.6–1.3)
EOSINOPHIL # BLD MANUAL: 0.45 THOUSAND/UL (ref 0–0.4)
EOSINOPHIL NFR BLD MANUAL: 5 % (ref 0–6)
ERYTHROCYTE [DISTWIDTH] IN BLOOD BY AUTOMATED COUNT: 15.9 % (ref 11.6–15.1)
GFR SERPL CREATININE-BSD FRML MDRD: 48 ML/MIN/1.73SQ M
GLUCOSE SERPL-MCNC: 106 MG/DL (ref 65–140)
HAPTOGLOB SERPL-MCNC: 96 MG/DL (ref 34–200)
HCT VFR BLD AUTO: 23.1 % (ref 34.8–46.1)
HGB BLD-MCNC: 7.6 G/DL (ref 11.5–15.4)
INR PPP: 1.26 (ref 0.86–1.17)
LYMPHOCYTES # BLD AUTO: 1.69 THOUSAND/UL (ref 0.6–4.47)
LYMPHOCYTES # BLD AUTO: 19 % (ref 14–44)
MACROCYTES BLD QL AUTO: PRESENT
MCH RBC QN AUTO: 30.6 PG (ref 26.8–34.3)
MCHC RBC AUTO-ENTMCNC: 32.9 G/DL (ref 31.4–37.4)
MCV RBC AUTO: 93 FL (ref 82–98)
MICROCYTES BLD QL AUTO: PRESENT
MONOCYTES # BLD AUTO: 0.89 THOUSAND/UL (ref 0–1.22)
MONOCYTES NFR BLD: 10 % (ref 4–12)
MYELOCYTES NFR BLD MANUAL: 1 % (ref 0–1)
NEUTROPHILS # BLD MANUAL: 5.8 THOUSAND/UL (ref 1.85–7.62)
NEUTS BAND NFR BLD MANUAL: 1 % (ref 0–8)
NEUTS SEG NFR BLD AUTO: 64 % (ref 43–75)
NRBC BLD AUTO-RTO: 0 /100 WBCS
PLATELET # BLD AUTO: 123 THOUSANDS/UL (ref 149–390)
PLATELET BLD QL SMEAR: ABNORMAL
PMV BLD AUTO: 11.3 FL (ref 8.9–12.7)
POLYCHROMASIA BLD QL SMEAR: PRESENT
POTASSIUM SERPL-SCNC: 3.4 MMOL/L (ref 3.5–5.3)
PROTHROMBIN TIME: 15.1 SECONDS (ref 11.8–14.2)
RBC # BLD AUTO: 2.48 MILLION/UL (ref 3.81–5.12)
RBC MORPH BLD: PRESENT
SODIUM SERPL-SCNC: 136 MMOL/L (ref 136–145)
TOTAL CELLS COUNTED SPEC: 100
WBC # BLD AUTO: 8.92 THOUSAND/UL (ref 4.31–10.16)

## 2018-07-18 PROCEDURE — 99238 HOSP IP/OBS DSCHRG MGMT 30/<: CPT | Performed by: INTERNAL MEDICINE

## 2018-07-18 PROCEDURE — 80048 BASIC METABOLIC PNL TOTAL CA: CPT | Performed by: INTERNAL MEDICINE

## 2018-07-18 PROCEDURE — 94761 N-INVAS EAR/PLS OXIMETRY MLT: CPT

## 2018-07-18 PROCEDURE — 85610 PROTHROMBIN TIME: CPT | Performed by: INTERNAL MEDICINE

## 2018-07-18 PROCEDURE — 85007 BL SMEAR W/DIFF WBC COUNT: CPT | Performed by: INTERNAL MEDICINE

## 2018-07-18 PROCEDURE — 85027 COMPLETE CBC AUTOMATED: CPT | Performed by: INTERNAL MEDICINE

## 2018-07-18 PROCEDURE — 99233 SBSQ HOSP IP/OBS HIGH 50: CPT | Performed by: INTERNAL MEDICINE

## 2018-07-18 RX ORDER — DOCUSATE SODIUM 100 MG/1
100 CAPSULE, LIQUID FILLED ORAL 2 TIMES DAILY
Qty: 10 CAPSULE | Refills: 0 | Status: SHIPPED | OUTPATIENT
Start: 2018-07-18 | End: 2019-05-21

## 2018-07-18 RX ORDER — PNV NO.95/FERROUS FUM/FOLIC AC 28MG-0.8MG
1 TABLET ORAL 2 TIMES DAILY
Qty: 30 EACH | Refills: 0 | Status: SHIPPED | OUTPATIENT
Start: 2018-07-18 | End: 2019-05-21

## 2018-07-18 RX ORDER — FLUTICASONE PROPIONATE 50 MCG
2 SPRAY, SUSPENSION (ML) NASAL DAILY
Qty: 16 G | Refills: 0 | Status: SHIPPED | OUTPATIENT
Start: 2018-07-19 | End: 2019-05-21

## 2018-07-18 RX ORDER — WARFARIN SODIUM 7.5 MG/1
7.5 TABLET ORAL
Status: DISCONTINUED | OUTPATIENT
Start: 2018-07-18 | End: 2018-07-18

## 2018-07-18 RX ORDER — WARFARIN SODIUM 7.5 MG/1
7.5 TABLET ORAL
Status: COMPLETED | OUTPATIENT
Start: 2018-07-18 | End: 2018-07-18

## 2018-07-18 RX ORDER — FUROSEMIDE 20 MG/1
20 TABLET ORAL DAILY
Qty: 30 TABLET | Refills: 3 | Status: SHIPPED | OUTPATIENT
Start: 2018-07-19 | End: 2018-10-03

## 2018-07-18 RX ORDER — FUROSEMIDE 20 MG/1
20 TABLET ORAL DAILY
Status: DISCONTINUED | OUTPATIENT
Start: 2018-07-18 | End: 2018-07-18 | Stop reason: HOSPADM

## 2018-07-18 RX ADMIN — CEFAZOLIN SODIUM 1000 MG: 1 SOLUTION INTRAVENOUS at 08:52

## 2018-07-18 RX ADMIN — LEVOTHYROXINE SODIUM 112 MCG: 112 TABLET ORAL at 05:56

## 2018-07-18 RX ADMIN — Medication 250 MG: at 08:54

## 2018-07-18 RX ADMIN — Medication 400 UNITS: at 08:53

## 2018-07-18 RX ADMIN — Medication 1 TABLET: at 08:47

## 2018-07-18 RX ADMIN — CEFAZOLIN SODIUM 1000 MG: 1 SOLUTION INTRAVENOUS at 01:00

## 2018-07-18 RX ADMIN — HYDROXYCHLOROQUINE SULFATE 200 MG: 200 TABLET, FILM COATED ORAL at 08:53

## 2018-07-18 RX ADMIN — PANTOPRAZOLE SODIUM 40 MG: 40 TABLET, DELAYED RELEASE ORAL at 06:00

## 2018-07-18 RX ADMIN — NEPHROCAP 1 CAPSULE: 1 CAP ORAL at 08:46

## 2018-07-18 RX ADMIN — FLUTICASONE PROPIONATE 2 SPRAY: 50 SPRAY, METERED NASAL at 09:02

## 2018-07-18 RX ADMIN — WARFARIN SODIUM 7.5 MG: 7.5 TABLET ORAL at 14:00

## 2018-07-18 RX ADMIN — VITAMIN D, TAB 1000IU (100/BT) 2000 UNITS: 25 TAB at 08:46

## 2018-07-18 RX ADMIN — FUROSEMIDE 20 MG: 20 TABLET ORAL at 08:48

## 2018-07-18 RX ADMIN — Medication 30 MG: at 08:53

## 2018-07-18 RX ADMIN — DOCUSATE SODIUM 100 MG: 100 CAPSULE, LIQUID FILLED ORAL at 08:47

## 2018-07-18 RX ADMIN — OXYCODONE HYDROCHLORIDE AND ACETAMINOPHEN 1000 MG: 500 TABLET ORAL at 08:46

## 2018-07-18 NOTE — PROGRESS NOTES
Progress Note - Loreta Garcia [de-identified] y o  female MRN: [id number]    Unit/Bed#: -01 Encounter: [id number]      Assessment:  Principal Problem:    Acute respiratory failure with hypoxia (HCC)  Active Problems:    Symptomatic anemia    Status post replacement of right shoulder joint    Sjogren's syndrome ([address] )    Hypertension    Hypothyroidism in adult    Chronic kidney disease    History of pulmonary embolus (PE)    Pleural effusion on right  Resolved Problems:    Leukocytosis        Plan:  · Acute hypoxic Respiratory failure-is still on 2 L-had some right-sided effusion on chest x-ray yesterday-is now on IV Lasix will switch to p o - will transfer to [facility] surg floor as she has not had any a arrhythmias and attempt to increase activity  Check O2 sats with rest and ambulation  · Anemia-status post bleed with recent right shoulder surgery and large collection on CT in the right chest wall and breast area  · SJogrens syndrome on Plaquenil  · Chronic kidney disease-1 09 stable stage II  · History of PE-will give 7 5 today    Subjective:   Patient without complaints of chest pain or shortness of breath  She is sitting out of bed and chair with right arm sling and spacer in place  No nausea or vomiting  ROS  Comprehensive system review negative other than noted above    Objective:     Vitals: Blood pressure 138/60, pulse 92, temperature 98 3 °F (36 8 °C), resp  rate 18, height 5' 3" (1 6 m), weight 67 9 kg (149 lb 11 1 oz), SpO2 97 %  ,Body mass index is 26 52 kg/m²    Current Facility-Administered Medications   Medication Dose Route Frequency Provider Last Rate Last Dose    acetaminophen (TYLENOL) tablet 650 mg  650 mg Oral Q6H PRN CATHERINE De Leon   650 mg at 07/15/18 1303    ascorbic acid (VITAMIN C) tablet 1,000 mg  1,000 mg Oral Daily CATHERINE De Leon   1,000 mg at 07/17/18 0806    b complex-vitamin C-folic acid (NEPHROCAPS) capsule 1 capsule  1 capsule Oral Daily ChioROSIE PolkNP   1 capsule at 07/17/18 0806    ceFAZolin (ANCEF) IVPB (premix) 1,000 mg  1,000 mg Intravenous Q8H Shannon Fly,  mL/hr at 07/18/18 0100 1,000 mg at 07/18/18 0100    cholecalciferol (VITAMIN D3) tablet 2,000 Units  2,000 Units Oral Daily CATHERINE De Leon   2,000 Units at 07/17/18 0806    co-enzyme Q-10 capsule 30 mg  30 mg Oral Daily CATHERINE De Leon   30 mg at 07/17/18 0237    docusate sodium (COLACE) capsule 100 mg  100 mg Oral BID Bernis Ruse, DO   100 mg at 07/17/18 7794    estradiol (ESTRACE) vaginal cream 2 g  2 g Vaginal Daily Alireza Ano CRTRINIDAD        fluticasone (FLONASE) 50 mcg/act nasal spray 2 spray  2 spray Each Nare Daily Shannon Fly, DO   2 spray at 07/16/18 0859    furosemide (LASIX) tablet 20 mg  20 mg Oral Daily Shannon Fly, DO        hydroxychloroquine (PLAQUENIL) tablet 200 mg  200 mg Oral Daily CATHERINE De Leon   200 mg at 07/17/18 5900    levothyroxine tablet 112 mcg  112 mcg Oral Early Morning CATHERINE De Leon   112 mcg at 07/18/18 0556    magnesium gluconate (MAGONATE) tablet 250 mg  250 mg Oral Daily CATHERINE De Leon   250 mg at 07/17/18 0807    magnesium hydroxide (MILK OF MAGNESIA) 400 mg/5 mL oral suspension 30 mL  30 mL Oral Daily PRN Shannon Fly, DO   30 mL at 07/16/18 0844    multivitamin-minerals (CENTRUM) tablet 1 tablet  1 tablet Oral Daily CATHERINE De Leon   1 tablet at 07/17/18 0806    ondansetron (ZOFRAN) injection 4 mg  4 mg Intravenous Q6H PRN Alireza Ano CRNP   4 mg at 07/17/18 0145    oxyCODONE (ROXICODONE) IR tablet 2 5 mg  2 5 mg Oral Q4H PRN Bernis Ruse, DO   2 5 mg at 07/15/18 1422    oxyCODONE (ROXICODONE) IR tablet 5 mg  5 mg Oral Q4H PRN Anny Calloway DO        pantoprazole (PROTONIX) EC tablet 40 mg  40 mg Oral BID AC CATHERINE De Leon   40 mg at 07/18/18 0600    polyvinyl alcohol (LIQUIFILM TEARS) 1 4 % ophthalmic solution 1 drop  1 drop Both Eyes PRN CATHERINE Cardenas        pravastatin (PRAVACHOL) tablet 40 mg  40 mg Oral Daily With [employer] Oil, CRNP   40 mg at 07/17/18 1532    vitamin E (tocopherol) capsule 400 Units  400 Units Oral Daily Zelda Eck, CRNP   400 Units at 07/17/18 0809    warfarin (COUMADIN) tablet 7 5 mg  7 5 mg Oral Once (warfarin) Clovia Clines, DO         Prescriptions Prior to Admission   Medication    celecoxib (CeleBREX) 100 mg capsule    Cranberry 1000 MG CAPS    denosumab (PROLIA) 60 mg/mL    estradiol (ESTRACE) 0 1 mg/g vaginal cream    Ginkgo Biloba 40 MG TABS    ketotifen (ZADITOR) 0 025 % ophthalmic solution    polyvinyl alcohol (LIQUIFILM TEARS) 1 4 % ophthalmic solution    Ascorbic Acid (VITAMIN C) 1000 MG tablet    b complex vitamins tablet    cevimeline (EVOXAC) 30 MG capsule    Cholecalciferol (VITAMIN D) 2000 UNITS tablet    Coenzyme Q10 (CO Q 10) 10 MG CAPS    cycloSPORINE (RESTASIS) 0 05 % ophthalmic emulsion    DHA-EPA-Flaxseed Oil-Vitamin E (THERA TEARS) CAPS    hydroxychloroquine (PLAQUENIL) 200 mg tablet    levothyroxine (SYNTHROID) 112 mcg tablet    LOSARTAN POTASSIUM-HCTZ PO    Magnesium 250 MG TABS    Multiple Vitamins-Minerals (OCUVITE PO)    omeprazole (PriLOSEC) 20 mg delayed release capsule    Potassium Gluconate  MG TBCR    simvastatin (ZOCOR) 20 mg tablet    UNABLE TO FIND    vitamin E, tocopherol, 400 units capsule    warfarin (COUMADIN) 2 mg tablet         Intake/Output Summary (Last 24 hours) at 07/18/18 0827  Last data filed at 07/18/18 1650   Gross per 24 hour   Intake                0 ml   Output             4300 ml   Net            -4300 ml       Physical Exam:  General appearance: alert and no distress  Neck: no adenopathy, no JVD, supple, symmetrical, trachea midline and thyroid not enlarged, symmetric, no tenderness/mass/nodules  Lungs: No rales or rhonchi  No wheezes    Patient currently on 2 L and appears comfortable with sats 94%  Heart: regular rate and rhythm, S1, S2 normal, no murmur, click, rub or gallop  Abdomen: soft, non-tender; bowel sounds normal; no masses,  no organomegaly  Extremities: right shoulder sling inplace  Skin: Skin color, texture, turgor normal  No rashes or lesions  Neurologic: Grossly normal       Lab, Imaging and other studies: I have personally reviewed pertinent reports  Results from last 7 days  Lab Units 07/18/18  0435 07/17/18  1815 07/17/18  0523  07/16/18  0504  07/15/18  0010   WBC Thousand/uL 8 92  --  11 81*  --  10 99*  < > 11 82*   HEMOGLOBIN g/dL 7 6* 7 8* 7 9*  < > 7 6*  < > 4 2*   HEMATOCRIT % 23 1* 23 9* 23 4*  < > 22 4*  < > 13 0*   PLATELETS Thousands/uL 123*  --  124*  --  110*  < > 123*   NEUTROS PCT %  --   --   --   --   --   --  61   LYMPHS PCT %  --   --   --   --   --   --  20   LYMPHO PCT % 19  --   --   --   --   --   --    MONOS PCT %  --   --   --   --   --   --  17*   MONO PCT MAN % 10  --   --   --   --   --   --    EOS PCT %  --   --   --   --   --   --  0   EOSINO PCT MANUAL % 5  --   --   --   --   --   --    < > = values in this interval not displayed  Results from last 7 days  Lab Units 07/18/18  0435 07/17/18  0523 07/16/18  0504  07/15/18  0010   SODIUM mmol/L 136 133* 135*  < > 134*   POTASSIUM mmol/L 3 4* 4 0 4 1  < > 4 5   CHLORIDE mmol/L 100 100 103  < > 101   CO2 mmol/L 30 27 27  < > 27   BUN mg/dL 21 17 23  < > 41*   CREATININE mg/dL 1 09 1 00 1 00  < > 1 24   CALCIUM mg/dL 7 7* 8 1* 8 3  < > 7 8*   TOTAL PROTEIN g/dL  --  6 5  --   --  5 2*   BILIRUBIN TOTAL mg/dL  --  1 40*  --   --  0 30   ALK PHOS U/L  --  60  --   --  40*   ALT U/L  --  28  --   --  21   AST U/L  --  47*  --   --  33   GLUCOSE RANDOM mg/dL 106 131 104  < > 144*   < > = values in this interval not displayed    Lab Results   Component Value Date    TROPONINI 0 14 (H) 07/15/2018    TROPONINI 0 06 (H) 07/15/2018    TROPONINI 0 03 07/15/2018       Results from last 7 days  Lab Units 07/18/18  0435 07/17/18  0802 07/16/18  0504   INR  1 26* 1 24* 1 25*     No results found for: Ángel Leiva, Eduardo Correa    Imaging:  Results for orders placed during the hospital encounter of 07/14/18   XR chest portable    Narrative CHEST     INDICATION:   Low blood count  Respiratory failure  COMPARISON:  7/15/2018    EXAM PERFORMED/VIEWS:  XR CHEST PORTABLE      FINDINGS:    Cardiomediastinal silhouette appears unremarkable  Mild right basilar atelectasis  No acute infiltrates  Unchanged small right pleural effusion  Right shoulder prosthesis, and multiple old right rib fractures again noted  Impression Right basilar atelectasis and small right pleural effusion  Workstation performed: [id number]       Results for orders placed in visit on 01/21/15   XR chest pa & lateral    Narrative CHEST   INDICATION- Productive cough, fever, chills and weakness beginning last   night  COMPARISON- 4/6/2011  VIEWS-  Frontal and lateral projections    2 images   FINDINGS-   The cardiomediastinal silhouette is stable  Bibasilar consolidation left worse than right concerning for pneumonia  Bony thorax unremarkable  IMPRESSION-   Bibasilar consolidation left worse than right concerning for pneumonia  This concurs with preliminary ED interpretation  Transcribed on- [id number]           GALEN Sawant MD        Reading Radiologist- Lamon Snellen, RAD MD        Electronically Signed- Lamon Snellen, RAD MD        Released Date Time- 01/21/15 1906      ------------------------------------------------------------------------------   [address]        PATIENT CENTERED ROUNDS: I have performed rounds with the nursing staff            Torsten Leung,

## 2018-07-18 NOTE — RESPIRATORY THERAPY NOTE
Home Oxygen Qualifying Test       Patient name: Megha Postal        : [id number]   Date of Test:  2018  Diagnosis:      Home Oxygen Test:    **Medicare Guidelines require item(s) 1-5 on all ambulatory patients or 1 and 2 on on-ambulatory patients  1   Baseline SPO2 on Room Air at rest 90 %  If = or < 88% on room air add O2 via NC and titrate patient  Patient must be ambulated with O2 and titrated to > 88% with exertion  2   SPO2 on Oxygen at rest %  3   SPO2 during exercise on Room Air 89-92%  4   SPO2 during exercise on Oxygen  % at a liter flow of  lpm     5   Exercise performed:    [x] Walking     [] Stairs     [x] Duration 6(min )     [] Distance (ft )       []  Supplemental Home Oxygen is indicated  [x]  Client does not qualify for home oxygen        [place] Signs, RT

## 2018-08-24 ENCOUNTER — OFFICE VISIT (OUTPATIENT)
Dept: ENDOCRINOLOGY | Facility: HOSPITAL | Age: 83
End: 2018-08-24
Payer: MEDICARE

## 2018-08-24 VITALS
HEIGHT: 63 IN | DIASTOLIC BLOOD PRESSURE: 78 MMHG | BODY MASS INDEX: 29.98 KG/M2 | WEIGHT: 169.2 LBS | SYSTOLIC BLOOD PRESSURE: 120 MMHG | HEART RATE: 80 BPM

## 2018-08-24 DIAGNOSIS — E55.9 VITAMIN D DEFICIENCY: ICD-10-CM

## 2018-08-24 DIAGNOSIS — M81.0 OSTEOPOROSIS, UNSPECIFIED OSTEOPOROSIS TYPE, UNSPECIFIED PATHOLOGICAL FRACTURE PRESENCE: ICD-10-CM

## 2018-08-24 DIAGNOSIS — I10 ESSENTIAL HYPERTENSION: ICD-10-CM

## 2018-08-24 DIAGNOSIS — E78.5 HYPERLIPIDEMIA, UNSPECIFIED HYPERLIPIDEMIA TYPE: ICD-10-CM

## 2018-08-24 DIAGNOSIS — E03.9 HYPOTHYROIDISM IN ADULT: ICD-10-CM

## 2018-08-24 PROCEDURE — 99204 OFFICE O/P NEW MOD 45 MIN: CPT | Performed by: INTERNAL MEDICINE

## 2018-08-24 NOTE — PROGRESS NOTES
8/24/2018    Assessment/Plan      Diagnoses and all orders for this visit:    Hypothyroidism in adult  -     TSH, 3rd generation Lab Collect; Future  -     T4, free Lab Collect; Future  -     TSH, 3rd generation Lab Collect; Future  -     T4, free Lab Collect; Future    Essential hypertension  -     Comprehensive metabolic panel Lab Collect; Future  -     Comprehensive metabolic panel Lab Collect; Future    Hyperlipidemia, unspecified hyperlipidemia type  -     Lipid Panel with Direct LDL reflex Lab Collect; Future    Vitamin D deficiency  -     Vitamin D 25 hydroxy Lab Collect; Future  -     Vitamin D 25 hydroxy Lab Collect; Future    Osteoporosis, unspecified osteoporosis type, unspecified pathological fracture presence  -     DXA bone density spine hip and pelvis; Future        Assessment/Plan:  1  Osteoporosis:  The patient received her most recent Prolia injection in April  She will be due for her 10th Prolia injection in October  We will arrange just for a nurse visit  I will then see her back in the office in April and we can give her her Prolia injection at the time that visit  At that time she will be due for another DEXA scan which I have ordered  Instructed her to go to the same facility she weighs goes due for her DEXA scans  I provided labs to be done which include a CMP before her Prolia injection in October as well as her appointment in April 2019     2   Hypothyroidism:  She is currently on levothyroxine 112 mcg daily with an extra half tablet on Sundays  Will check a TSH and free T4 which next set of blood work  3   Hypertension:  Controlled on current regimen  4   Hyperlipidemia:  Continue simvastatin and check lipids before next appointment  5   Vitamin-D deficiency:  Check vitamin-D level before Prolia injection in October        CC:   Osteoporosis and hypothyroidism    History of Present Illness     HPI: Alyx Yee is a [de-identified]y o  year old female with history of hypertension, hyperlipidemia, hypothyroidism, vitamin-D deficiency, osteoporosis, GERD, blood clots who presents to establish care  She was recently hospitalized in July and is recovering from this  Prior to hospitalization she had a shoulder replacement at [facility] and is doing therapy for this at home  She presents today to establish care for osteoporosis, hypothyroidism, vitamin-D deficiency, hypertension, hyperlipidemia  For her hypothyroidism she takes levothyroxine generic 112 mcg tablets but she takes an extra half tablet on Sundays  Overall she is feeling well and is recovering over time  She has a history of osteoporosis and is currently being treated with Prolia  She received her most recent injection which was her 9th injection in April of this year  She is tolerating it well  She did stay she had a fall earlier this year but no other falls recently  Denies any recent fractures, back pain, loss of height  For hypertension she read takes losartan-hydrochlorothiazide combination  For hyperlipidemia she takes simvastatin 20 mg daily  For vitamin-D deficiency she is on 2000 units daily of vitamin-D  Review of Systems   Constitutional: Negative for fatigue  HENT: Negative for trouble swallowing and voice change  Eyes: Negative for visual disturbance  Respiratory: Negative for shortness of breath  Cardiovascular: Negative for palpitations and leg swelling  Gastrointestinal: Negative for abdominal pain, nausea and vomiting  Endocrine: Negative for polydipsia and polyuria  Musculoskeletal: Negative for arthralgias and myalgias  Skin: Negative for rash  Neurological: Negative for dizziness, tremors and weakness  Hematological: Negative for adenopathy  Psychiatric/Behavioral: Negative for agitation and confusion         Historical Information   Past Medical History:   Diagnosis Date    Disease of thyroid gland     Hyperlipidemia     Hypertension     [facility]ism[facility] [address] [facility]     [facility]      Past Surgical History:   Procedure Laterality Date    HYSTERECTOMY      JOINT REPLACEMENT      SHOULDER SURGERY      right    TOE SURGERY       Social History   History   Alcohol Use No     History   Drug Use No     History   Smoking Status    Never Smoker   Smokeless Tobacco    Never Used     Family History:   Family History   Problem Relation Age of Onset    Stroke Father     Ovarian cancer Sister     No Known Problems Son     No Known Problems Son     Ovarian cancer Daughter     No Known Problems Daughter     No Known Problems Daughter        Meds/Allergies   Current Outpatient Prescriptions   Medication Sig Dispense Refill    Ascorbic Acid (VITAMIN C) 1000 MG tablet Take 1,000 mg by mouth daily   b complex vitamins tablet Take 1 tablet by mouth daily   cevimeline (EVOXAC) 30 MG capsule Take 30 mg by mouth 3 (three) times a day        Cholecalciferol (VITAMIN D) 2000 UNITS tablet Take 2,000 Units by mouth daily   Coenzyme Q10 (CO Q 10) 10 MG CAPS Take 1 tablet by mouth daily   Cranberry 1000 MG CAPS Take 4,200 mg by mouth 2 (two) times a day      cycloSPORINE (RESTASIS) 0 05 % ophthalmic emulsion Administer 1 drop to both eyes 2 (two) times a day   denosumab (PROLIA) 60 mg/mL Inject 60 mg under the skin every 6 (six) months      DHA-EPA-Flaxseed Oil-Vitamin E (THERA TEARS) CAPS Take 4 capsules by mouth daily   estradiol (ESTRACE) 0 1 mg/g vaginal cream Insert 2 g into the vagina daily      Ferrous Sulfate (IRON) 325 (65 Fe) MG TABS Take 1 tablet (325 mg total) by mouth 2 (two) times a day 30 each 0    fluticasone (FLONASE) 50 mcg/act nasal spray 2 sprays into each nostril daily 16 g 0    Ginkgo Biloba 40 MG TABS Take 60 mg by mouth daily      hydroxychloroquine (PLAQUENIL) 200 mg tablet Take 200 mg by mouth daily        ketotifen (ZADITOR) 0 025 % ophthalmic solution 1 drop 2 (two) times a day      levothyroxine (SYNTHROID) 112 mcg tablet Take 112 mcg by mouth daily   LOSARTAN POTASSIUM-HCTZ PO Take 12 5 mg by mouth daily   Magnesium 250 MG TABS Take 1 tablet by mouth daily   Multiple Vitamins-Minerals (OCUVITE PO) Take 1 tablet by mouth daily   omeprazole (PriLOSEC) 20 mg delayed release capsule Take 20 mg by mouth 2 (two) times a day   polyvinyl alcohol (LIQUIFILM TEARS) 1 4 % ophthalmic solution Administer 1 drop to both eyes as needed for dry eyes      Potassium Gluconate  MG TBCR Take 1 tablet by mouth daily   simvastatin (ZOCOR) 20 mg tablet Take 20 mg by mouth daily at bedtime   vitamin E, tocopherol, 400 units capsule Take 400 Units by mouth daily   warfarin (COUMADIN) 2 mg tablet Take 1 mg by mouth daily        docusate sodium (COLACE) 100 mg capsule Take 1 capsule (100 mg total) by mouth 2 (two) times a day (Patient not taking: Reported on 8/24/2018 ) 10 capsule 0    furosemide (LASIX) 20 mg tablet Take 1 tablet (20 mg total) by mouth daily (Patient not taking: Reported on 8/24/2018 ) 30 tablet 3     No current facility-administered medications for this visit  Allergies   Allergen Reactions    Sulfa Antibiotics Hives       Objective   Vitals: Blood pressure 120/78, pulse 80, height 5' 3" (1 6 m), weight 76 7 kg (169 lb 3 2 oz)  Invasive Devices          No matching active lines, drains, or airways          Physical Exam   Constitutional: She is oriented to person, place, and time  She appears well-developed and well-nourished  No distress  HENT:   Head: Normocephalic and atraumatic  Eyes: Conjunctivae and EOM are normal  Pupils are equal, round, and reactive to light  Neck: Normal range of motion  Neck supple  No thyromegaly present  Cardiovascular: Normal rate and regular rhythm  No murmur heard  Pulmonary/Chest: Effort normal and breath sounds normal  No respiratory distress  Abdominal: Soft   Bowel sounds are normal  She exhibits no distension  Musculoskeletal: Normal range of motion  She exhibits no edema  Lymphadenopathy:     She has no cervical adenopathy  Neurological: She is alert and oriented to person, place, and time  She exhibits normal muscle tone  Skin: Skin is warm and dry  No rash noted  She is not diaphoretic  Psychiatric: She has a normal mood and affect  Her behavior is normal        The history was obtained from the review of the chart and from the patient  Lab Results:   7/18/18:  Hemoglobin 7 6, platelets 423     19/92/7749:  Calcium 8 1, albumin 2 9  No future appointments

## 2018-08-24 NOTE — LETTER
August 24, 2018     [facility]  [address] [facility]  [address]    Patient: Ciro Church   YOB: 1935   Date of Visit: 8/24/2018       Dear Dr Mitra Pugh: Thank you for referring Jessica Shah to me for evaluation  Below are my notes for this consultation  If you have questions, please do not hesitate to call me  I look forward to following your patient along with you  Sincerely,        Tevin Rosario DO        CC: No Recipients  Tevin Rosario DO  8/24/2018  1:16 PM  Sign at close encounter  8/24/2018    Assessment/Plan      Diagnoses and all orders for this visit:    Hypothyroidism in adult  -     TSH, 3rd generation Lab Collect; Future  -     T4, free Lab Collect; Future  -     TSH, 3rd generation Lab Collect; Future  -     T4, free Lab Collect; Future    Essential hypertension  -     Comprehensive metabolic panel Lab Collect; Future  -     Comprehensive metabolic panel Lab Collect; Future    Hyperlipidemia, unspecified hyperlipidemia type  -     Lipid Panel with Direct LDL reflex Lab Collect; Future    Vitamin D deficiency  -     Vitamin D 25 hydroxy Lab Collect; Future  -     Vitamin D 25 hydroxy Lab Collect; Future    Osteoporosis, unspecified osteoporosis type, unspecified pathological fracture presence  -     DXA bone density spine hip and pelvis; Future        Assessment/Plan:  1  Osteoporosis:  The patient received her most recent Prolia injection in April  She will be due for her 10th Prolia injection in October  We will arrange just for a nurse visit  I will then see her back in the office in April and we can give her her Prolia injection at the time that visit  At that time she will be due for another DEXA scan which I have ordered  Instructed her to go to the same facility she weighs goes due for her DEXA scans    I provided labs to be done which include a CMP before her Prolia injection in October as well as her appointment in April 2019     2   Hypothyroidism:  She is currently on levothyroxine 112 mcg daily with an extra half tablet on Sundays  Will check a TSH and free T4 which next set of blood work  3   Hypertension:  Controlled on current regimen  4   Hyperlipidemia:  Continue simvastatin and check lipids before next appointment  5   Vitamin-D deficiency:  Check vitamin-D level before Prolia injection in October  CC:   Osteoporosis and hypothyroidism    History of Present Illness     HPI: Nico Johnson is a [de-identified]y o  year old female with history of hypertension, hyperlipidemia, hypothyroidism, vitamin-D deficiency, osteoporosis, GERD, blood clots who presents to establish care  She was recently hospitalized in July and is recovering from this  Prior to hospitalization she had a shoulder replacement at [facility] and is doing therapy for this at home  She presents today to establish care for osteoporosis, hypothyroidism, vitamin-D deficiency, hypertension, hyperlipidemia  For her hypothyroidism she takes levothyroxine generic 112 mcg tablets but she takes an extra half tablet on Sundays  Overall she is feeling well and is recovering over time  She has a history of osteoporosis and is currently being treated with Prolia  She received her most recent injection which was her 9th injection in April of this year  She is tolerating it well  She did stay she had a fall earlier this year but no other falls recently  Denies any recent fractures, back pain, loss of height  For hypertension she read takes losartan-hydrochlorothiazide combination  For hyperlipidemia she takes simvastatin 20 mg daily  For vitamin-D deficiency she is on 2000 units daily of vitamin-D  Review of Systems   Constitutional: Negative for fatigue  HENT: Negative for trouble swallowing and voice change  Eyes: Negative for visual disturbance  Respiratory: Negative for shortness of breath      Cardiovascular: Negative for palpitations and leg swelling  Gastrointestinal: Negative for abdominal pain, nausea and vomiting  Endocrine: Negative for polydipsia and polyuria  Musculoskeletal: Negative for arthralgias and myalgias  Skin: Negative for rash  Neurological: Negative for dizziness, tremors and weakness  Hematological: Negative for adenopathy  Psychiatric/Behavioral: Negative for agitation and confusion  Historical Information   Past Medical History:   Diagnosis Date    Disease of thyroid gland     Hyperlipidemia     Hypertension     PE (pulmonary thromboembolism) (HCC)     Sjogren's syndrome (HCC)      Past Surgical History:   Procedure Laterality Date    HYSTERECTOMY      JOINT REPLACEMENT      SHOULDER SURGERY      right    TOE SURGERY       Social History   History   Alcohol Use No     History   Drug Use No     History   Smoking Status    Never Smoker   Smokeless Tobacco    Never Used     Family History:   Family History   Problem Relation Age of Onset    Stroke Father     Ovarian cancer Sister     No Known Problems Son     No Known Problems Son     Ovarian cancer Daughter     No Known Problems Daughter     No Known Problems Daughter        Meds/Allergies   Current Outpatient Prescriptions   Medication Sig Dispense Refill    Ascorbic Acid (VITAMIN C) 1000 MG tablet Take 1,000 mg by mouth daily   b complex vitamins tablet Take 1 tablet by mouth daily   cevimeline (EVOXAC) 30 MG capsule Take 30 mg by mouth 3 (three) times a day        Cholecalciferol (VITAMIN D) 2000 UNITS tablet Take 2,000 Units by mouth daily   Coenzyme Q10 (CO Q 10) 10 MG CAPS Take 1 tablet by mouth daily   Cranberry 1000 MG CAPS Take 4,200 mg by mouth 2 (two) times a day      cycloSPORINE (RESTASIS) 0 05 % ophthalmic emulsion Administer 1 drop to both eyes 2 (two) times a day        denosumab (PROLIA) 60 mg/mL Inject 60 mg under the skin every 6 (six) months      DHA-EPA-Flaxseed Oil-Vitamin E (THERA TEARS) CAPS Take 4 capsules by mouth daily   estradiol (ESTRACE) 0 1 mg/g vaginal cream Insert 2 g into the vagina daily      Ferrous Sulfate (IRON) 325 (65 Fe) MG TABS Take 1 tablet (325 mg total) by mouth 2 (two) times a day 30 each 0    fluticasone (FLONASE) 50 mcg/act nasal spray 2 sprays into each nostril daily 16 g 0    Ginkgo Biloba 40 MG TABS Take 60 mg by mouth daily      hydroxychloroquine (PLAQUENIL) 200 mg tablet Take 200 mg by mouth daily   ketotifen (ZADITOR) 0 025 % ophthalmic solution 1 drop 2 (two) times a day      levothyroxine (SYNTHROID) 112 mcg tablet Take 112 mcg by mouth daily   LOSARTAN POTASSIUM-HCTZ PO Take 12 5 mg by mouth daily   Magnesium 250 MG TABS Take 1 tablet by mouth daily   Multiple Vitamins-Minerals (OCUVITE PO) Take 1 tablet by mouth daily   omeprazole (PriLOSEC) 20 mg delayed release capsule Take 20 mg by mouth 2 (two) times a day   polyvinyl alcohol (LIQUIFILM TEARS) 1 4 % ophthalmic solution Administer 1 drop to both eyes as needed for dry eyes      Potassium Gluconate  MG TBCR Take 1 tablet by mouth daily   simvastatin (ZOCOR) 20 mg tablet Take 20 mg by mouth daily at bedtime   vitamin E, tocopherol, 400 units capsule Take 400 Units by mouth daily   warfarin (COUMADIN) 2 mg tablet Take 1 mg by mouth daily        docusate sodium (COLACE) 100 mg capsule Take 1 capsule (100 mg total) by mouth 2 (two) times a day (Patient not taking: Reported on 8/24/2018 ) 10 capsule 0    furosemide (LASIX) 20 mg tablet Take 1 tablet (20 mg total) by mouth daily (Patient not taking: Reported on 8/24/2018 ) 30 tablet 3     No current facility-administered medications for this visit  Allergies   Allergen Reactions    Sulfa Antibiotics Hives       Objective   Vitals: Blood pressure 120/78, pulse 80, height 5' 3" (1 6 m), weight 76 7 kg (169 lb 3 2 oz)    Invasive Devices          No matching active lines, drains, or airways          Physical Exam   Constitutional: She is oriented to person, place, and time  She appears well-developed and well-nourished  No distress  HENT:   Head: Normocephalic and atraumatic  Eyes: Conjunctivae and EOM are normal  Pupils are equal, round, and reactive to light  Neck: Normal range of motion  Neck supple  No thyromegaly present  Cardiovascular: Normal rate and regular rhythm  No murmur heard  Pulmonary/Chest: Effort normal and breath sounds normal  No respiratory distress  Abdominal: Soft  Bowel sounds are normal  She exhibits no distension  Musculoskeletal: Normal range of motion  She exhibits no edema  Lymphadenopathy:     She has no cervical adenopathy  Neurological: She is alert and oriented to person, place, and time  She exhibits normal muscle tone  Skin: Skin is warm and dry  No rash noted  She is not diaphoretic  Psychiatric: She has a normal mood and affect  Her behavior is normal        The history was obtained from the review of the chart and from the patient  Lab Results:   7/18/18:  Hemoglobin 7 6, platelets 458     49/52/2059:  Calcium 8 1, albumin 2 9  No future appointments

## 2018-09-12 ENCOUNTER — EVALUATION (OUTPATIENT)
Dept: PHYSICAL THERAPY | Facility: CLINIC | Age: 83
End: 2018-09-12
Payer: MEDICARE

## 2018-09-12 DIAGNOSIS — Z96.611 STATUS POST REPLACEMENT OF RIGHT SHOULDER JOINT: Primary | ICD-10-CM

## 2018-09-12 PROCEDURE — G8984 CARRY CURRENT STATUS: HCPCS

## 2018-09-12 PROCEDURE — 97140 MANUAL THERAPY 1/> REGIONS: CPT | Performed by: PHYSICAL THERAPIST

## 2018-09-12 PROCEDURE — 97161 PT EVAL LOW COMPLEX 20 MIN: CPT

## 2018-09-12 PROCEDURE — G8985 CARRY GOAL STATUS: HCPCS

## 2018-09-12 NOTE — PROGRESS NOTES
Initial Evaluation    Today's date: 2018  Patient name: Mildred Ramon  : 1935  MRN: [id number]  Referring provider: Virginia Kinsey MD  Dx:   Encounter Diagnosis     ICD-10-CM    1  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        Start Time: 1115  Stop Time: 1200  Total time in clinic (min): 45 minutes    Assessment  Impairments: abnormal or restricted ROM    Assessment details: Pt presents 2 months post op of a R post op shoulder replacement  Pt displayed limited R shoulder ROM in all directions  Pt displays with deltoid and scapular muscle weakness  Pt has difficulty performing her ADL's such as driving a car, and reaching up into cabinets  Pt is a good candidate for physical therapy to improve her shoulder ROM, strengthen her shoulder muscles, and improve her ability to perform her ADL's  Understanding of Dx/Px/POC: good   Prognosis: good    Goals  ST to 6 weeks  1  Pt will be able to regain shoulder flexion AROM past 90 degrees  2  Pt will be able to gain at least 35 degrees of AROM shoulder ER   3  Pt will be able to begin driving a car again  4  Pt will be able to regain at least 120 degrees of shoulder ABD AROM  LT to 12 weeks  1  Patient will be able to regain at least 150 degrees of shoulder flexion AROM  2  Patient will be able to regain at least 45 degrees of shoulder ER AROM  3  Patient will be able to perform her basic ADL's again such as reaching in a high cabinet, and being able to wash her hair  4  Patient will be able to regain at least 150 of shoulder ABD AROM  5   Patient will be able to gain a 5/5 on all strength assessments for each o the following shoulder motions (Flex, ABD ER)    Plan  Planned modality interventions: cryotherapy  Planned therapy interventions: strengthening, stretching, therapeutic exercise, home exercise program, graded exercise, neuromuscular re-education and patient education  Frequency: 2x week  Duration in weeks: 12        Subjective Evaluation    History of Present Illness  Date of surgery: 7/12/2018  Mechanism of injury: Pt is an [de-identified] y/o female 2 months post op R shoulder replacement  Pain  No pain reported    Patient Goals  Patient goals for therapy: independence with ADLs/IADLs and increased motion          Objective     Tenderness     Additional Tenderness Details  No tenderness  Active Range of Motion   Cervical/Thoracic Spine   Cervical    Flexion: WFL  Extension: WFL  Left rotation: WFL  Right rotation: [facility]  Left Shoulder   Normal active range of motion  Flexion: 180 degrees   Abduction: 180 degrees   External rotation 90°: 60 degrees     Right Shoulder   Flexion: 28 degrees   Abduction: 72 degrees   External rotation 90°: 16 degrees    Left Elbow   Normal active range of motion    Right Elbow   Normal active range of motion    Left Wrist   Normal active range of motion    Right Wrist   Normal active range of motion    Additional Active Range of Motion Details  Pt stated that her ROM just simply wouldn't go anymore because she felt weak  Pt did report slight pain if she tried to force it into more motion, yet it didn't really move anymore when she was straining       Passive Range of Motion   Left Shoulder   Normal passive range of motion    Right Shoulder   Flexion: 110 degrees   Abduction: 85 degrees   External rotation 90°: 40 degrees     Strength/Myotome Testing     Left Elbow   Normal strength    Right Elbow   Normal strength    Left Wrist/Hand   Normal wrist strength    Right Wrist/Hand   Normal wrist strength      Flowsheet Rows      Most Recent Value   PT/OT G-Codes   Current Score  57   Projected Score  63   FOTO information reviewed  Yes   Assessment Type  Evaluation   G code set  Carrying, Moving & Handling Objects   Carrying, Moving and Handling Objects Current Status ()  CK   Carrying, Moving and Handling Objects Goal Status ()  CJ          Precautions: R shoulder Replacement no mobs, no heavy lifting, no ext, or IR    Daily Treatment Diary     Manual  9/12            PROM                                                                     Exercise Diary  9/12            Pendulums             Shoulder AROM              Scap sets             Ball circles             SL ER             Lat pull down             Cane stretch                                                                                                                                                                                          Modalities  9/12            CP

## 2018-09-14 ENCOUNTER — OFFICE VISIT (OUTPATIENT)
Dept: PHYSICAL THERAPY | Facility: CLINIC | Age: 83
End: 2018-09-14
Payer: MEDICARE

## 2018-09-14 DIAGNOSIS — Z96.611 STATUS POST REPLACEMENT OF RIGHT SHOULDER JOINT: Primary | ICD-10-CM

## 2018-09-14 PROCEDURE — 97112 NEUROMUSCULAR REEDUCATION: CPT | Performed by: PHYSICAL THERAPIST

## 2018-09-14 PROCEDURE — 97140 MANUAL THERAPY 1/> REGIONS: CPT | Performed by: PHYSICAL THERAPIST

## 2018-09-14 NOTE — PROGRESS NOTES
Daily Note     Today's date: 2018  Patient name: Serenity Miller  : 1935  MRN: [id number]  Referring provider: Devante Romero MD  Dx:   Encounter Diagnosis     ICD-10-CM    1  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                   Subjective: reports that she drove for the first time today; states that her doctor felt that she should have better ROM at follow up      Objective: See treatment diary below      Assessment: Initialed exercises today  Tolerated treatment well but has tendency to shrug with arm movements  Needs to work on scap stabilizers, neuromuscular control  Patient would benefit from continued PT        Plan: Continue per plan of care         Precautions: R shoulder Replacement protocol, no heavy lifting, no extension  DOS: 18  Daily Treatment Diary     Manual             PROM                                                                     Exercise Diary             Pendulums             Shoulder AROM              Scap retract supine  10x 10"           Ball circles             SL ER             Lat pull down             supine flexion AAROM  10x           Cane standing scaption w/out shrug  10x mirror           Deltoid iso  10x5" ea           Overhead pulleys   3'           Supine sh centering  20x                                                                                                                                    Modalities              CP

## 2018-09-18 ENCOUNTER — OFFICE VISIT (OUTPATIENT)
Dept: PHYSICAL THERAPY | Facility: CLINIC | Age: 83
End: 2018-09-18
Payer: MEDICARE

## 2018-09-18 DIAGNOSIS — Z96.611 STATUS POST REPLACEMENT OF RIGHT SHOULDER JOINT: Primary | ICD-10-CM

## 2018-09-18 PROCEDURE — 97112 NEUROMUSCULAR REEDUCATION: CPT | Performed by: PHYSICAL THERAPIST

## 2018-09-18 PROCEDURE — 97140 MANUAL THERAPY 1/> REGIONS: CPT | Performed by: PHYSICAL THERAPIST

## 2018-09-18 NOTE — PROGRESS NOTES
Daily Note     Today's date: 2018  Patient name: Geetha Harvey  : 1935  MRN: [id number]  Referring provider: Harmony Batista MD  Dx:   Encounter Diagnosis     ICD-10-CM    1  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        Start Time: 0700  Stop Time: 0745  Total time in clinic (min): 45 minutes    Subjective: Pt stated that she had no changes in her symptoms today coming into therapy  Pt stated that the exercises felt good today and that she felt the stretch with the PROM  Objective: See treatment diary below      Assessment: Tolerated treatment well today  Pt has a firm end feel with her PROM and needs continued work to improve her ROM  Pt also displays weakness in her muscles that must be strengthened  Pt is a good candidate for physical therapy to improve her ROM, decrease pain levels and improve her ability to perform her ADL's        Plan: Continue plan of care      Precautions: R shoulder Replacement protocol, no heavy lifting, no extension  DOS: 18  Daily Treatment Diary     Manual            PROM   JR/DL                                                                  Exercise Diary            Pendulums   30 1 way 30 the other          Shoulder AROM    10X 10 sec cane          Scap retract supine  10x 10" 10          Ball circles             SL ER             Lat pull down             supine flexion AAROM  10x 10x cane          Cane standing scaption w/out shrug  10x mirror 10x          Deltoid iso  10x5" ea 10 sec hold 15x          Overhead pulleys   3' 30sec 10x flex ABD 5x 10sec          Supine sh centering  20x nt          scap punches   10x          No moneys   2x 10                                                                                                         Modalities              CP

## 2018-09-20 ENCOUNTER — OFFICE VISIT (OUTPATIENT)
Dept: PHYSICAL THERAPY | Facility: CLINIC | Age: 83
End: 2018-09-20
Payer: MEDICARE

## 2018-09-20 DIAGNOSIS — Z96.611 STATUS POST REPLACEMENT OF RIGHT SHOULDER JOINT: Primary | ICD-10-CM

## 2018-09-20 PROCEDURE — 97110 THERAPEUTIC EXERCISES: CPT | Performed by: PHYSICAL THERAPIST

## 2018-09-20 PROCEDURE — 97112 NEUROMUSCULAR REEDUCATION: CPT | Performed by: PHYSICAL THERAPIST

## 2018-09-20 NOTE — PROGRESS NOTES
Daily Note     Today's date: 2018  Patient name: Wanda Mloina  : 1935  MRN: [id number]  Referring provider: Anni Fowler MD  Dx:   Encounter Diagnosis     ICD-10-CM    1  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        Start Time: 703  Stop Time: 745  Total time in clinic (min): 42 minutes    Subjective: Pt stated that she felt good today and that there were no major changes today  Objective: See treatment diary below      Assessment: Tolerated treatment well today  Pt still displays weak deltoids and weak low trap muscles  Pt also displays scapular hypomobility  Pt is a good candidate for physical therapy in order to increase her strength and improve her ability to perform her ADL's         Plan: Continue plan of care    Precautions: R shoulder Replacement protocol, no heavy lifting, no extension  DOS: 18  Daily Treatment Diary     Manual           PROM   JR/DL JR/DL         PNF                                                        Exercise Diary           Pendulums   30 1 way 30 the other          Shoulder AROM    10X 10 sec cane NT         Scap retract supine  10x 10" 10 10x10         Ball circles    Mod 2min         SL ER             Lat pull down             supine flexion AAROM  10x 10x cane 10x          Cane standing scaption w/out shrug  10x mirror 10x          Deltoid iso  10x5" ea 10 sec hold 15x 10 sec hold 20x FLEX ABD ER EXT         Overhead pulleys   3' 30sec 10x flex ABD 5x 10sec          Supine sh centering  20x nt 20         scap punches   10x nt         No moneys   2x 10 2x10         Red ball Low trap isometric                                                                                               Modalities              CP

## 2018-09-24 ENCOUNTER — OFFICE VISIT (OUTPATIENT)
Dept: PHYSICAL THERAPY | Facility: CLINIC | Age: 83
End: 2018-09-24
Payer: MEDICARE

## 2018-09-24 DIAGNOSIS — Z96.611 STATUS POST REPLACEMENT OF RIGHT SHOULDER JOINT: Primary | ICD-10-CM

## 2018-09-24 PROCEDURE — 97110 THERAPEUTIC EXERCISES: CPT

## 2018-09-24 PROCEDURE — 97140 MANUAL THERAPY 1/> REGIONS: CPT

## 2018-09-24 NOTE — PROGRESS NOTES
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                   Subjective: Pt reports the shld is rarely painful but she just can't elevate it yet  Objective: See treatment diary below      Assessment: Tolerated treatment well  Patient able to elevate the shld into flex and abd in SL w/ tactile cues to scap depression  Plan: Continue per plan of care  Progress treament per protocol     Precautions: R shoulder Replacement protocol, no heavy lifting, no extension  DOS: 18  Daily Treatment Diary     Manual          PROM   JR/DL JR/DL JK        PNF                                            10'            Exercise Diary          Pendulums   30 1 way 30 the other  hep        Shoulder AROM    10X 10 sec cane NT np        Scap retract supine  10x 10" 10 10x10 10x10"        Ball circles    Mod 2min np        SL ER             Lat pull down             supine flexion AAROM  10x 10x cane 10x  10x2        Cane standing scaption w/out shrug  10x mirror 10x  20x        Deltoid iso  10x5" ea 10 sec hold 15x 10 sec hold 20x FLEX ABD ER EXT 10x10"        Overhead pulleys   3' 30sec 10x flex ABD 5x 10sec  3'        Supine sh centering  20x nt 20 20x        scap punches   10x nt 2x10        No moneys   2x 10 2x10 2x10        Red ball Low trap isometric             SL AAROM flex and abd     10x ea                                                                             Modalities              CP

## 2018-09-25 ENCOUNTER — TELEPHONE (OUTPATIENT)
Dept: ENDOCRINOLOGY | Facility: HOSPITAL | Age: 83
End: 2018-09-25

## 2018-09-26 ENCOUNTER — OFFICE VISIT (OUTPATIENT)
Dept: PHYSICAL THERAPY | Facility: CLINIC | Age: 83
End: 2018-09-26
Payer: MEDICARE

## 2018-09-26 DIAGNOSIS — Z96.611 STATUS POST REPLACEMENT OF RIGHT SHOULDER JOINT: Primary | ICD-10-CM

## 2018-09-26 PROCEDURE — 97140 MANUAL THERAPY 1/> REGIONS: CPT

## 2018-09-26 PROCEDURE — 97110 THERAPEUTIC EXERCISES: CPT

## 2018-09-26 NOTE — PROGRESS NOTES
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                   Subjective: Pt reports the shld is feeling good and has no trouble sleeping  Objective: See treatment diary below      Assessment: Tolerated treatment well  Patient slowly grasping scap/GH mov't  Needs cont'd strengtheing  Plan: Continue per plan of care  Progress treatment as tolerated      Precautions: R shoulder Replacement protocol, no heavy lifting, no extension  DOS: 18  Daily Treatment Diary     Manual         PROM   JR/DL JR/DL JK Jk       PNF             scap PROM      JK       AAROM      JK            10' 15'           Exercise Diary         Pendulums   30 1 way 30 the other  hep        Shoulder AROM    10X 10 sec cane NT np 20x       Scap retract supine  10x 10" 10 10x10 10x10" 10x10"       Ball circles    Mod 2min np        SL ER             Lat pull down             supine flexion AAROM  10x 10x cane 10x  10x2 10x2       Cane standing scaption w/out shrug  10x mirror 10x  20x 20x       Deltoid iso  10x5" ea 10 sec hold 15x 10 sec hold 20x FLEX ABD ER EXT 10x10" 10x10"       Overhead pulleys   3' 30sec 10x flex ABD 5x 10sec  3' 3'       Supine sh centering  20x nt 20 20x 20x       scap punches   10x nt 2x10 10x2       No moneys   2x 10 2x10 2x10 10x2       Red ball Low trap isometric             SL AAROM flex and abd     10x ea 10x       SL scap depress and hold      10x10"                                                               Modalities              CP

## 2018-10-01 ENCOUNTER — OFFICE VISIT (OUTPATIENT)
Dept: PHYSICAL THERAPY | Facility: CLINIC | Age: 83
End: 2018-10-01
Payer: MEDICARE

## 2018-10-01 DIAGNOSIS — Z96.611 STATUS POST REPLACEMENT OF RIGHT SHOULDER JOINT: Primary | ICD-10-CM

## 2018-10-01 PROCEDURE — 97112 NEUROMUSCULAR REEDUCATION: CPT | Performed by: PHYSICAL THERAPIST

## 2018-10-01 PROCEDURE — 97140 MANUAL THERAPY 1/> REGIONS: CPT | Performed by: PHYSICAL THERAPIST

## 2018-10-01 NOTE — PROGRESS NOTES
Daily Note     Today's date: 10/1/2018  Patient name: Li Dixon  : 1935  MRN: [id number]  Referring provider: Oscar Ng MD  Dx:   Encounter Diagnosis     ICD-10-CM    1  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        Start Time:   Stop Time: 813  Total time in clinic (min): 42 minutes    Subjective: notes that the shoulder continues to improve       Objective: See treatment diary below      Assessment: Tolerated treatment well  Strength slowly improving but still unable to raise arm to shoulder level without assistance  Plan: Continue per plan of care  Progress treatment as tolerated           Precautions: R shoulder Replacement protocol, no heavy lifting, no extension  DOS: 18  Daily Treatment Diary     Manual  9/12 9/14 9/18 9/20 9/24 9/26 10/1      PROM   JR/DL JR/DL JK Jk JR      PNF UE D1D2       JR      scap PROM      Venkat Friends JR           10' 15' JR          Exercise Diary  9/12 9/14 9/18 9/20 9/24 9/26 10/1      Pendulums   30 1 way 30 the other  hep  hep      Shoulder AAROM    10X 10 sec cane NT np 20x       Scap retract supine  10x 10" 10 10x10 10x10" 10x10" 10x10"      Ball circles    Mod 2min np  2min      SL ER       AA 10      SL abd       AA 10      supine flexion AAROM  10x 10x cane 10x  10x2 10x2 10x      Cane standing scaption w/out shrug  10x mirror 10x  20x 20x       Deltoid iso  10x5" ea 10 sec hold 15x 10 sec hold 20x FLEX ABD ER EXT 10x10" 10x10" 10x10" ea   No ER      Overhead pulleys   3' 30sec 10x flex ABD 5x 10sec  3' 3' 3' scaption      Supine sh centering  20x nt 20 20x 20x 10x      scap punches   10x nt 2x10 10x2 20x      No moneys   2x 10 2x10 2x10 10x2       Pball Low trap isometric       10x10"      SL AAROM flex and abd     10x ea 10x np      SL scap depress and hold      10x10" np                                                              Modalities              CP

## 2018-10-03 ENCOUNTER — OFFICE VISIT (OUTPATIENT)
Dept: OBGYN CLINIC | Facility: CLINIC | Age: 83
End: 2018-10-03
Payer: MEDICARE

## 2018-10-03 VITALS
SYSTOLIC BLOOD PRESSURE: 138 MMHG | DIASTOLIC BLOOD PRESSURE: 76 MMHG | WEIGHT: 169.6 LBS | HEIGHT: 62 IN | BODY MASS INDEX: 31.21 KG/M2

## 2018-10-03 DIAGNOSIS — Z92.89 HISTORY OF MAMMOGRAM: ICD-10-CM

## 2018-10-03 DIAGNOSIS — N89.8 VAGINAL IRRITATION: Primary | ICD-10-CM

## 2018-10-03 DIAGNOSIS — Z90.710 STATUS POST VAGINAL HYSTERECTOMY: ICD-10-CM

## 2018-10-03 DIAGNOSIS — Z86.711 HISTORY OF PULMONARY EMBOLUS (PE): ICD-10-CM

## 2018-10-03 DIAGNOSIS — Z87.42 HISTORY OF UTERINE PROLAPSE: ICD-10-CM

## 2018-10-03 DIAGNOSIS — Z01.419 WOMEN'S ANNUAL ROUTINE GYNECOLOGICAL EXAMINATION: ICD-10-CM

## 2018-10-03 PROCEDURE — G0101 CA SCREEN;PELVIC/BREAST EXAM: HCPCS | Performed by: OBSTETRICS & GYNECOLOGY

## 2018-10-03 RX ORDER — CLOTRIMAZOLE AND BETAMETHASONE DIPROPIONATE 10; .64 MG/G; MG/G
CREAM TOPICAL 2 TIMES DAILY
Qty: 30 G | Refills: 0 | Status: SHIPPED | OUTPATIENT
Start: 2018-10-03 | End: 2018-10-03 | Stop reason: SDUPTHER

## 2018-10-03 RX ORDER — CLOTRIMAZOLE AND BETAMETHASONE DIPROPIONATE 10; .64 MG/G; MG/G
CREAM TOPICAL 2 TIMES DAILY
Qty: 30 G | Refills: 3 | Status: SHIPPED | OUTPATIENT
Start: 2018-10-03 | End: 2019-05-21

## 2018-10-03 NOTE — PROGRESS NOTES
Assessment    annual well-woman exam   history of partial hysterectomy   status post anterior-posterior repair for  Cystocele and rectocele   history of pulmonary embolism   vaginal irritation          Plan    Lotrisone cream b i d  P r n    screening mammography ordered   All questions answered  Breast self exam technique reviewed and patient encouraged to perform self-exam monthly  Subjective  Here for annual exam     Kelley Cordero is a 82 y o  female  5 para 5 who presents for annual exam  Patient had a  partialvaginal hysterectomy many years ago for uterine prolapse  She has had a history of urinary leakage and does have some vaginal irritation from this  Still has some mild urgency symptoms is under the care of urologist   She had a interstim placed however is no longer functioning the wires have become displaced  No other gyn concerns at this time  Subsequently she had anterior and posterior repair Periods are  Absent she has had a hysterectomy  The patient reports that there is not domestic violence in her life  Current contraception: status post hysterectomy  History of abnormal Pap smear: no  Family history of uterine or ovarian cancer: no  Regular self breast exam: yes  History of abnormal mammogram: no  Family history of breast cancer: no  History of abnormal lipids: no  Menstrual History:  OB History     No data available         Menarche age: 17  No LMP recorded (lmp unknown)  Patient has had a hysterectomy  Review of Systems  Pertinent items are noted in HPI        Objective   No acute distress     /76 (BP Location: Left arm, Patient Position: Sitting, Cuff Size: Large)   Ht 5' 1 5" (1 562 m)   Wt 76 9 kg (169 lb 9 6 oz)   LMP  (LMP Unknown)   BMI 31 53 kg/m²     General:   alert and oriented, in no acute distress, alert, appears stated age and cooperative   Heart: regular rate and rhythm, S1, S2 normal, no murmur, click, rub or gallop   Lungs: clear to auscultation bilaterally   Abdomen: soft, non-tender, without masses or organomegaly   Vulva: Bartholin's, Urethra, [place]'s normal, female escutcheon,  Vaginal irritation and erythema without discharge     Vagina: normal discharge, no palpable nodules   Cervix: surgically absent   Uterus: surgically absent   Adnexa: normal adnexa

## 2018-10-04 ENCOUNTER — OFFICE VISIT (OUTPATIENT)
Dept: PHYSICAL THERAPY | Facility: CLINIC | Age: 83
End: 2018-10-04
Payer: MEDICARE

## 2018-10-04 DIAGNOSIS — Z96.611 STATUS POST REPLACEMENT OF RIGHT SHOULDER JOINT: Primary | ICD-10-CM

## 2018-10-04 PROCEDURE — 97112 NEUROMUSCULAR REEDUCATION: CPT | Performed by: PHYSICAL THERAPIST

## 2018-10-04 PROCEDURE — 97140 MANUAL THERAPY 1/> REGIONS: CPT | Performed by: PHYSICAL THERAPIST

## 2018-10-04 NOTE — PROGRESS NOTES
Daily Note     Today's date: 10/4/2018  Patient name: Shannan Paz  : 1935  MRN: [id number]  Referring provider: Melinda Mendoza MD  Dx:   Encounter Diagnosis     ICD-10-CM    1  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        Start Time: 0700  Stop Time: 0743  Total time in clinic (min): 43 minutes    Subjective: little pain in the R shoulder but just can't move it      Objective: See treatment diary below      Assessment: Tolerated treatment well  Requires some cueing to improve technique w/ exercises  Plan: Continue per plan of care  Progress treatment as tolerated  Emphasis on strengthening/ progressing from AAROM to AROM        Precautions: R shoulder Replacement protocol, no heavy lifting, no extension  DOS: 18  Daily Treatment Diary     Manual  9/12 9/14 9/18 9/20 9/24 9/26 10/1 10/4     PROM   JR/DL JR/DL JK Radha Rolando JR     PNF UE D1D2       JR      scap PROM      Hewitt Danas          10' 15'           Exercise Diary  9/12 9/14 9/18 9/20 9/24 9/26 10/1 10/4     Pendulums   30 ea  hep  hep hep     Shoulder AAROM cane   10X 10"  NT np 20x       Scap retract supine  10x 10" 10 10x10 10x10" 10x10" 10x10" 10x10"     Ball circles    Mod 2min np  2min 10x2cw/ccw     SL ER       AA 10 10     SL abd       AA 10 10     supine flexion AAROM  10x 10x cane 10x  10x2 10x2 10x      Cane standing scaption w/out shrug  10x mirror 10x  20x 20x       Deltoid iso  10x5" ea 10 sec hold 15x 10" 20x FLEX ABD ER EXT 10x10" 10x10" 10x10" ea   No ER Flex/ext/abd 10x10" ea     Overhead pulleys   3' 30"10xflex abd 5x  3' 3' 3' scaption 3'     Supine sh centering  20x nt 20 20x 20x 10x 10x2     scap punches   10x nt 2x10 10x2 20x 20x     No moneys   2x 10 2x10 2x10 10x2       Pball Low trap isometric       10x10" 10x10"     SL AAROM flex and abd     10x ea 10x np 10xea aarom     SL scap depress and hold      10x10" np Modalities  9/12            CP

## 2018-10-08 ENCOUNTER — OFFICE VISIT (OUTPATIENT)
Dept: PHYSICAL THERAPY | Facility: CLINIC | Age: 83
End: 2018-10-08
Payer: MEDICARE

## 2018-10-08 ENCOUNTER — TELEPHONE (OUTPATIENT)
Dept: ENDOCRINOLOGY | Facility: HOSPITAL | Age: 83
End: 2018-10-08

## 2018-10-08 DIAGNOSIS — Z96.611 STATUS POST REPLACEMENT OF RIGHT SHOULDER JOINT: Primary | ICD-10-CM

## 2018-10-08 PROCEDURE — 97112 NEUROMUSCULAR REEDUCATION: CPT

## 2018-10-08 PROCEDURE — 97140 MANUAL THERAPY 1/> REGIONS: CPT

## 2018-10-08 NOTE — TELEPHONE ENCOUNTER
Pt just had her calcium level checked the end of September  Is that level ok for the prolia in November?

## 2018-10-08 NOTE — PROGRESS NOTES
Daily Note     Today's date: 10/8/2018  Patient name: Levi Rice  : 1935  MRN: [id number]  Referring provider: Yaquelin Webster MD  Dx:   Encounter Diagnosis     ICD-10-CM    1  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        Start Time: 730  Stop Time: 93  Total time in clinic (min): 47 minutes    Subjective: Patient noted she does not have much pain at the start of the session        Objective: See treatment diary below      Precautions: R shoulder Replacement protocol, no heavy lifting, no extension  DOS: 18  Daily Treatment Diary     Manual  9/12 9/14 9/18 9/20 9/24 9/26 10/1 10/4 10/8    PROM   JR/DL JR/DL IVANNA Ryan     PNF UE D1D2       JR      scap PROM      [place] MW         10' 15'   10'        Exercise Diary  9/12 9/14 9/18 9/20 9/24 9/26 10/1 10/4 10    Pendulums   30 ea  hep  hep hep HEP    Shoulder AAROM cane   10X 10"  NT np 20x   np    Scap retract supine  10x 10" 10 10x10 10x10" 10x10" 10x10" 10x10" 10 x 10"     Ball circles    Mod 2min np  2min 10x2cw/ccw     SL ER       AA 10 10 10    SL abd       AA 10 10 10    supine flexion AAROM  10x 10x cane 10x  10x2 10x2 10x  np    Cane standing scaption w/out shrug  10x mirror 10x  20x 20x   np    Deltoid iso  10x5" ea 10 sec hold 15x 10" 20x FLEX ABD ER EXT 10x10" 10x10" 10x10" ea   No ER Flex/ext/abd 10x10" ea Flex/ext/abd 10 x 10"    Overhead pulleys   3' 30"10xflex abd 5x  3' 3' 3' scaption 3' 3'    Supine sh centering  20x nt 20 20x 20x 10x 10x2 10 x 2    scap punches   10x nt 2x10 10x2 20x 20x 20x    No moneys   2x 10 2x10 2x10 10x2   2 x 10    Pball Low trap isometric       10x10" 10x10" np    SL AAROM flex and abd     10x ea 10x np 10xea aarom 10x ea    SL scap depress and hold      10x10" np  10 x 10"                                                            Modalities  9/12        10/8    CP         np                                    Assessment: Patient demonstrated difficulty with serratus anterior activation in both a supine and standing position  Patient required mod VCs for shoulder elevation during standing exercises  Patient noted no increase in pain throughout the session  Patient would benefit from continued PT to allow the patient to allow the patient to return to her PLOF  Plan: Continue per plan of care

## 2018-10-11 ENCOUNTER — OFFICE VISIT (OUTPATIENT)
Dept: PHYSICAL THERAPY | Facility: CLINIC | Age: 83
End: 2018-10-11
Payer: MEDICARE

## 2018-10-11 DIAGNOSIS — Z96.611 STATUS POST REPLACEMENT OF RIGHT SHOULDER JOINT: Primary | ICD-10-CM

## 2018-10-11 PROCEDURE — 97112 NEUROMUSCULAR REEDUCATION: CPT | Performed by: PHYSICAL THERAPIST

## 2018-10-11 PROCEDURE — 97140 MANUAL THERAPY 1/> REGIONS: CPT | Performed by: PHYSICAL THERAPIST

## 2018-10-11 NOTE — PROGRESS NOTES
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                   Subjective: feels her arm is moving better      Objective: See treatment diary below    Assessment: R shoulder still very weak; substitution noted with shoulder attempts at shoulder elevation  Plan: Continue per plan of care  Emphasis on strengthening                Precautions: R shoulder Replacement protocol, no heavy lifting, no extension  DOS: 18  Daily Treatment Diary     Manual  9/12 9/14 9/18 9/20 9/24 9/26 10/1 10/4 10/8    PROM   JR/DL JR/DL [facility]    PNF UE D1D2       JR      scap PROM      [place] MW    Caryn Safer          10' 15'   10'        Exercise Diary  9/12 9/14 9/18 9/20 9/24 9/26 10/1 10/4 10    Pendulums   30 ea  hep  hep hep HEP    Shoulder AAROM cane   10X 10"  NT np 20x   np    Scap retract supine  10x 10" 10 10x10 10x10" 10x10" 10x10" 10x10" 10 x 10"     Ball circles    Mod 2min np  2min 10x2cw/ccw     SL ER       AA 10 10 10    SL abd       AA 10 10 10    supine flexion AAROM  10x 10x cane 10x  10x2 10x2 10x  np    Cane standing scaption w/out shrug  10x mirror 10x  20x 20x   np    Deltoid iso  10x5" ea 10 sec hold 15x 10" 20x FLEX ABD ER EXT 10x10" 10x10" 10x10" ea   No ER Flex/ext/abd 10x10" ea Flex/ext/abd 10 x 10"    Overhead pulleys   3' 30"10xflex abd 5x  3' 3' 3' scaption 3' 3' 2'   Supine sh centering  20x nt 20 20x 20x 10x 10x2 10 x 2 20x   scap punches   10x nt 2x10 10x2 20x 20x 20x    No moneys   2x 10 2x10 2x10 10x2   2 x 10    Pball Low trap isometric       10x10" 10x10" np    SL AAROM flex and abd     10x ea 10x np 10xea aarom 10x ea    SL scap depress and hold      10x10" np  10 x 10"                                                            Modalities          10    CP np

## 2018-10-15 ENCOUNTER — OFFICE VISIT (OUTPATIENT)
Dept: PHYSICAL THERAPY | Facility: CLINIC | Age: 83
End: 2018-10-15
Payer: MEDICARE

## 2018-10-15 ENCOUNTER — TRANSCRIBE ORDERS (OUTPATIENT)
Dept: PHYSICAL THERAPY | Facility: CLINIC | Age: 83
End: 2018-10-15

## 2018-10-15 DIAGNOSIS — Z96.611 STATUS POST REPLACEMENT OF RIGHT SHOULDER JOINT: Primary | ICD-10-CM

## 2018-10-15 PROCEDURE — 97140 MANUAL THERAPY 1/> REGIONS: CPT | Performed by: PHYSICAL THERAPIST

## 2018-10-15 PROCEDURE — G8985 CARRY GOAL STATUS: HCPCS | Performed by: PHYSICAL THERAPIST

## 2018-10-15 PROCEDURE — 97112 NEUROMUSCULAR REEDUCATION: CPT | Performed by: PHYSICAL THERAPIST

## 2018-10-15 PROCEDURE — G8984 CARRY CURRENT STATUS: HCPCS | Performed by: PHYSICAL THERAPIST

## 2018-10-15 NOTE — LETTER
October 15, 2018    Ricardo Lopez MD  [address] [place] [address] [address]    Patient: Charanjit Robertson   YOB: 1935   Date of Visit: 10/15/2018     Encounter Diagnosis     ICD-10-CM    1  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        Dear Dr Tru Thornton:    Please review the attached Plan of Care from [facility] recent visit  Please verify that you agree therapy should continue by signing the attached document and sending it back to our office  If you have any questions or concerns, please don't hesitate to call  Sincerely,    Elaine Davis, PT      Referring Provider:      I certify that I have read the below Plan of Care and certify the need for these services furnished under this plan of treatment while under my care  Ricrado Lopez MD  [address] [facility] [address]  V[address]          PT Re-Evaluation     Today's date: 10/15/2018  Patient name: Charanjit Robertson  : 1935  MRN: [id number]  Referring provider: Rosa Maria Rankin MD  Dx:   Encounter Diagnosis     ICD-10-CM    1  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                   Assessment/Plan     This patient demonstrates improvement since beginning therapy; Range of Motion has increased and strength and function are improving  Patient is progressing toward LTG's and will benefit from continued therapy to reduce pain and restore function  Interventions to include manual therapy, ROM, stretching, strengthening,  therapeutic activities, neuromuscular re-ed and instruction in HEP  Frequency: 2 times weekly  Duration:  4-6 weeks      Goals  ST to 6 weeks  1  Pt will be able to regain shoulder flexion AROM past 90 degrees partially met  2  Pt will be able to gain at least 35 degrees of AROM shoulder ER MET  3  Pt will be able to begin driving a car again - MET  4   Pt will be able to regain at least 120 degrees of shoulder ABD AROM - partially met  LT to 12 weeks  1  Patient will be able to regain at least 150 degrees of shoulder flexion AROM - partially met  2  Patient will be able to regain at least 45 degrees of shoulder ER AROM - MET  3  Patient will be able to perform her basic ADL's again such as reaching in a high cabinet, and being able to wash her hair - partially met  4  Patient will be able to regain at least 150 of shoulder ABD AROM -  Partially met  5   Patient will be able to gain a 5/5 on all strength assessments for each of the following shoulder motions (Flex, ABD ER) - partially met          Subjective   Reports no pain w/ daily activities; very mild pain w/ some exercises  Independent w/ ADL's and light housework  Unable to reach overhead shelves    Patient Goals  Patient goals for therapy: independence with ADLs/IADLs and increased motion - patient feels she is making progress      Objective  Pain: 0-2/10    Active Range of Motion   Cervical/Thoracic Spine WFL    Left Shoulder WFL    Right Shoulder (active - seated)  Flexion: 30 degrees   Abduction: 35 degrees     Right shoulder (active supine)  External rotation 90°:  55 degrees  Internal rotation  18 degrees    Passive Range of Motion   Left Shoulder   Normal passive range of motion    Right Shoulder   Flexion: 130 degrees   Abduction: 105 degrees   External rotation 90°:  70 degrees   Internal rotation 25 degrees      Strength/Myotome Testing RUE  Shoulder flexion 2/5  Shoulder abd 2/5  Shoulder ER 3/5  Shoulder IR 3/5    Elbow / wrist 5/5        Daily Treatment Diary   Precautions: R shoulder Replacement protocol, no heavy lifting, no extension  DOS: 18  Manual  10/15            PROM JR            PNF UE D1D2 JR            scap PROM Argie Gift                             Exercise Diary  10/15                         Supine shoulder AA/AROM  10x2            Scap retract supine 10x10"            SL flexion 10x2            SL ER 10            SL abd 10x2 supine flexion AAROM 10xx2            Supine shoulder abd 10x2            Deltoid isometric  Flex,abd,ext 10x5"            Overhead pulleys              Supine sh centering 10x2            scap punches 10x2            B sh ER seated             Pball Low trap isometric                          SL scap depress and hold 10x10"x2                                                                    Modalities

## 2018-10-15 NOTE — PROGRESS NOTES
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                   Assessment/Plan     This patient demonstrates improvement since beginning therapy; Range of Motion has increased and strength and function are improving  Patient is progressing toward LTG's and will benefit from continued therapy to reduce pain and restore function  Interventions to include manual therapy, ROM, stretching, strengthening,  therapeutic activities, neuromuscular re-ed and instruction in HEP  Frequency: 2 times weekly  Duration:  4-6 weeks      Goals  ST to 6 weeks  1  Pt will be able to regain shoulder flexion AROM past 90 degrees partially met  2  Pt will be able to gain at least 35 degrees of AROM shoulder ER MET  3  Pt will be able to begin driving a car again - MET  4  Pt will be able to regain at least 120 degrees of shoulder ABD AROM - partially met  LT to 12 weeks  1  Patient will be able to regain at least 150 degrees of shoulder flexion AROM - partially met  2  Patient will be able to regain at least 45 degrees of shoulder ER AROM - MET  3  Patient will be able to perform her basic ADL's again such as reaching in a high cabinet, and being able to wash her hair - partially met  4  Patient will be able to regain at least 150 of shoulder ABD AROM -  Partially met  5   Patient will be able to gain a 5/5 on all strength assessments for each of the following shoulder motions (Flex, ABD ER) - partially met          Subjective   Reports no pain w/ daily activities; very mild pain w/ some exercises  Independent w/ ADL's and light housework  Unable to reach overhead shelves    Patient Goals  Patient goals for therapy: independence with ADLs/IADLs and increased motion - patient feels she is making progress      Objective  Pain: 0-2/10    Active Range of Motion   Cervical/Thoracic Spine WFL    Left Shoulder WFL    Right Shoulder (active - seated)  Flexion: 30 degrees   Abduction: 35 degrees     Right shoulder (active supine)  External rotation 90°: 55 degrees  Internal rotation  18 degrees    Passive Range of Motion   Left Shoulder   Normal passive range of motion    Right Shoulder   Flexion: 130 degrees   Abduction: 105 degrees   External rotation 90°: 70 degrees   Internal rotation 25 degrees      Strength/Myotome Testing RUE  Shoulder flexion 2/5  Shoulder abd 2/5  Shoulder ER 3/5  Shoulder IR 3/5    Elbow / wrist 5/5        Daily Treatment Diary   Precautions: R shoulder Replacement protocol, no heavy lifting, no extension  DOS: 7-12-18  Manual  10/15            PROM JR            PNF UE D1D2 JR            scap PROM [employer]                             Exercise Diary  10/15                         Supine shoulder AA/AROM  10x2            Scap retract supine 10x10"            SL flexion 10x2            SL ER 10            SL abd 10x2            supine flexion AAROM 10xx2            Supine shoulder abd 10x2            Deltoid isometric  Flex,abd,ext 10x5"            Overhead pulleys              Supine sh centering 10x2            scap punches 10x2            B sh ER seated             Pball Low trap isometric                          SL scap depress and hold 10x10"x2                                                                    Modalities

## 2018-10-18 ENCOUNTER — OFFICE VISIT (OUTPATIENT)
Dept: PHYSICAL THERAPY | Facility: CLINIC | Age: 83
End: 2018-10-18
Payer: MEDICARE

## 2018-10-18 DIAGNOSIS — Z96.611 STATUS POST REPLACEMENT OF RIGHT SHOULDER JOINT: Primary | ICD-10-CM

## 2018-10-18 PROCEDURE — 97112 NEUROMUSCULAR REEDUCATION: CPT

## 2018-10-18 PROCEDURE — 97140 MANUAL THERAPY 1/> REGIONS: CPT

## 2018-10-18 NOTE — PROGRESS NOTES
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        Start Time: 0700  Stop Time: 0745  Total time in clinic (min): 45 minutes    Subjective: Patient reports that over head activity is difficult  Patient has no pain to report today  Objective: See treatment diary below    Assessment: Tolerated treatment well  Patient exhibited good technique with therapeutic exercises and would benefit from continued PT  Patient demonstrated guarding that slowly diminished during PROM  Patient tends to go into shoulder extension during SL exercise and is corrected with verbal/tactile cueing  Plan: Continue per plan of care       Daily Treatment Diary   Precautions: R shoulder Replacement protocol, no heavy lifting, no extension  DOS: 18  Manual  10/15 10/18           PROM JR JM           PNF UE D1D2 JR JM           scap PROM JR JM           AAROM JR                           Exercise Diary  10/15 10/18                        Supine shoulder AA/AROM  10x2 10x2           Scap retract supine 10x10" 10x10"           SL flexion 10x2 10x2           SL ER 10            SL abd 10x2 10x2           supine flexion AAROM 10xx2 10x2           Supine shoulder abd 10x2 10x2           Deltoid isometric  Flex,abd,ext 10x5" 10x5"           Overhead pulleys              Supine sh centering 10x2 10x2           scap punches 10x2 10x2           B sh ER seated             Pball Low trap isometric                          SL scap depress and hold 10x10"x2 10x10"x2                                                                 Modalities

## 2018-10-22 ENCOUNTER — OFFICE VISIT (OUTPATIENT)
Dept: PHYSICAL THERAPY | Facility: CLINIC | Age: 83
End: 2018-10-22
Payer: MEDICARE

## 2018-10-22 DIAGNOSIS — Z96.611 STATUS POST REPLACEMENT OF RIGHT SHOULDER JOINT: Primary | ICD-10-CM

## 2018-10-22 PROCEDURE — 97140 MANUAL THERAPY 1/> REGIONS: CPT | Performed by: PHYSICAL THERAPIST

## 2018-10-22 PROCEDURE — 97112 NEUROMUSCULAR REEDUCATION: CPT | Performed by: PHYSICAL THERAPIST

## 2018-10-22 NOTE — PROGRESS NOTES
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        Start Time: 730  Stop Time: 3843  Total time in clinic (min): 41 minutes    Subjective: expresses no new complaints; feels she is improving; states that if it didn't get any better than this she could live with it     Objective: See treatment diary below    Assessment: Tolerated treatment well  Patient exhibited good technique with therapeutic exercises and would benefit from continued PT  Strength is slowly improving; now able to perform AROM shoulder flexion through full available ROM in supine  Plan: Continue per plan of care   Emphasis on increasing AROM/ strength    Daily Treatment Diary   Precautions: R shoulder Replacement protocol, no heavy lifting, no extension  DOS: 18  Manual  10/15 10/18 10/22          PROM JR JM JR          PNF UE D1D2 JR JM JR          scap PROM JR Ronnell Wheat JR                           Exercise Diary  10/15 10/18 10/22                       Supine shoulder AA/AROM  10x2 10x2 AROM flex          Scap retract supine 10x10" 10x10" 10x10"          SL flexion 10x2 10x2 10x2          SL ER 10  10x2          SL abd 10x2 10x2 10x2          supine flexion AAROM 10xx2 10x2 AROM 5x2          Supine shoulder abd 10x2 10x2 20x          Deltoid isometric  Flex,abd,ext 10x5" 10x5" HEP          Overhead pulleys              Supine sh centering 10x2 10x2 20x          scap punches 10x2 10x2 20x          B sh ER seated w/ TB - [place]   10x  10"          Pball Low trap isometric                          SL scap depress and hold 10x10"x2 10x10"x2 20x 10"                                                                Modalities

## 2018-10-25 ENCOUNTER — OFFICE VISIT (OUTPATIENT)
Dept: PHYSICAL THERAPY | Facility: CLINIC | Age: 83
End: 2018-10-25
Payer: MEDICARE

## 2018-10-25 DIAGNOSIS — Z96.611 STATUS POST REPLACEMENT OF RIGHT SHOULDER JOINT: Primary | ICD-10-CM

## 2018-10-25 PROCEDURE — 97112 NEUROMUSCULAR REEDUCATION: CPT | Performed by: PHYSICAL THERAPIST

## 2018-10-25 NOTE — PROGRESS NOTES
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                   Subjective: states she was seen for follow up with doctor; advised to continue with therapy, begin TB resisted ex    Objective: See treatment diary below      Assessment: Tolerated treatment well  Was  Able to add minimal resistance to mat exercises  Continues to exhibit excessive shoulder shrugging w/ flexion and abd, unable to complete full motion AG  Patient would benefit from continued PT      Plan: Continue per plan of care       Daily Treatment Diary   Precautions: R shoulder Replacement protocol, no heavy lifting, no extension  DOS: 18  Manual  10/15 10/18 10/22 10/25         PROM JR Jamal Couch JR         PNF UE D1D2 JR YVON JR          scap PROM Amber Barr JR                           Exercise Diary  10/15 10/18 10/22 10/25                      Supine shoulder AA/AROM  10x2 10x2 AROM flex -- -- -- -- -- -- --   Scap retract supine 10x10" 10x10" 10x10" 10x10"         SL flexion 10x2 10x2 10x2 10x2 1#         SL ER 10  10x2 10x2         SL abd 10x2 10x2 10x2 10x2 1#         supine flexion AAROM 10xx2 10x2 AROM 5x2 10x2         Supine shoulder abd 10x2 10x2 20x --         Deltoid isometric  Flex,abd,ext 10x5" 10x5" HEP hep                      Supine sh centering 10x2 10x2 20x 20x 1#         scap punches 10x2 10x2 20x 20x 1#         B sh ER seated w/ TB - [place]   10x  10" 10x10"         LPD TB                          SL scap depress and hold 10x10"x2 10x10"x2 20x 10" 20x10"                                                               Modalities

## 2018-10-29 ENCOUNTER — OFFICE VISIT (OUTPATIENT)
Dept: PHYSICAL THERAPY | Facility: CLINIC | Age: 83
End: 2018-10-29
Payer: MEDICARE

## 2018-10-29 DIAGNOSIS — Z96.611 STATUS POST REPLACEMENT OF RIGHT SHOULDER JOINT: Primary | ICD-10-CM

## 2018-10-29 PROCEDURE — 97112 NEUROMUSCULAR REEDUCATION: CPT | Performed by: PHYSICAL THERAPIST

## 2018-10-29 NOTE — PROGRESS NOTES
Daily Note     Today's date: 10/29/2018  Patient name: Tiffanie Sanders  : 1935  MRN: [id number]  Referring provider: Kayla Mendosa MD  Dx:   No diagnosis found  Subjective: expresses no new complaints today    Objective: See treatment diary below      Assessment: Tolerated treatment well  Strength is improving slowly Patient would benefit from continued PT      Plan: Continue per plan of care       Daily Treatment Diary   Precautions: R shoulder Replacement protocol, no heavy lifting, no extension  DOS: 18  Manual  10/15 10/18 10/22 10/25 10/29        PROM JR YVON Dai        PNF UE D1D2 JR JM JR  JR        scap PROM JR JM Lilly Rudd            scap PNF     JR          Exercise Diary  10/15 10/18 10/22 10/25 10/29                     Supine shoulder AA/AROM  10x2 10x2 AROM flex -- -- -- -- -- -- --   Scap retract supine 10x10" 10x10" 10x10" 10x10" 10x 10"        SL flexion 10x2 10x2 10x2 10x2 1# 10x2 1#        SL ER 10  10x2 10x2 10x2        SL abd 10x2 10x2 10x2 10x2 1# 10x2 1#        supine flexion AAROM 10xx2 10x2 AROM 5x2 10x2 10x21#        Supine shoulder abd 10x2 10x2 20x -- --        Deltoid isometric  Flex,abd,ext 10x5" 10x5" HEP hep hep        1 arm row             Supine sh centering 10x2 10x2 20x 20x 1# 30x1#        scap punches 10x2 10x2 20x 20x 1# 30x1#        B sh ER seated w/ TB - [place]   10x  10" 10x10" 10x5"        LPD TB     20x        Wall slide w/ lift     attempted hold       SL scap depress and hold 10x10"x2 10x10"x2 20x 10" 20x10"         TB assist flex and abd no shrug     10x ea                                                 Modalities

## 2018-11-01 ENCOUNTER — OFFICE VISIT (OUTPATIENT)
Dept: PHYSICAL THERAPY | Facility: CLINIC | Age: 83
End: 2018-11-01
Payer: MEDICARE

## 2018-11-01 DIAGNOSIS — Z96.611 STATUS POST REPLACEMENT OF RIGHT SHOULDER JOINT: Primary | ICD-10-CM

## 2018-11-01 PROCEDURE — 97112 NEUROMUSCULAR REEDUCATION: CPT | Performed by: PHYSICAL THERAPIST

## 2018-11-01 PROCEDURE — 97140 MANUAL THERAPY 1/> REGIONS: CPT | Performed by: PHYSICAL THERAPIST

## 2018-11-01 NOTE — PROGRESS NOTES
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                   Subjective: states she knows she is getting stronger because she can reach things more easily      Objective: See treatment diary below      Assessment: Tolerated treatment well  Fatigues quickly w/ exercises  Patient would benefit from continued PT      Plan: Continue per plan of care           Daily Treatment Diary   Precautions: R shoulder Replacement protocol, no heavy lifting, no extension  DOS: 18  Manual  10/15 10/18 10/22 10/25 10/29 11/1       PROM JR JM Pepito Del       PNF UE D1D2 JR JM JR  JR JR       scap PROM JR JM Arvid Boehringer            scap PNF     JR          Exercise Diary  10/15 10/18 10/22 10/25 10/29 11/1                    Supine shoulder AA/AROM  10x2 10x2 AROM flex -- -- -- -- -- -- --   Scap retract supine 10x10" 10x10" 10x10" 10x10" 10x 10" hep       SL flexion 10x2 10x2 10x2 10x2 1# 10x2 1# 10x2 1#       SL ER 10  10x2 10x2 10x2        SL abd 10x2 10x2 10x2 10x2 1# 10x2 1# 10x2 1#       supine flexion AAROM 10xx2 10x2 AROM 5x2 10x2 10x2 1# 5x no wt       Supine shoulder abd 10x2 10x2 20x -- -- DC       Deltoid isometric  Flex,abd,ext 10x5" 10x5" HEP hep hep hep       1 arm row             Supine sh centering 10x2 10x2 20x 20x 1# 30x1# 30x1#       scap punches 10x2 10x2 20x 20x 1# 30x1# 30x1#       B sh ER seated w/ TB - [place]   10x  10" 10x10" 10x5" 10x10"       LPD TB     20x OTB 10x2       Wall slide w/ lift     attempted hold       SL scap depress and hold 10x10"x2 10x10"x2 20x 10" 20x10"         TB assist flex and abd no shrug     10x ea 10x ea                                                Modalities

## 2018-11-05 ENCOUNTER — OFFICE VISIT (OUTPATIENT)
Dept: PHYSICAL THERAPY | Facility: CLINIC | Age: 83
End: 2018-11-05
Payer: MEDICARE

## 2018-11-05 DIAGNOSIS — Z96.611 STATUS POST REPLACEMENT OF RIGHT SHOULDER JOINT: Primary | ICD-10-CM

## 2018-11-05 PROCEDURE — 97112 NEUROMUSCULAR REEDUCATION: CPT | Performed by: PHYSICAL THERAPIST

## 2018-11-05 PROCEDURE — 97140 MANUAL THERAPY 1/> REGIONS: CPT | Performed by: PHYSICAL THERAPIST

## 2018-11-05 NOTE — PROGRESS NOTES
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        Start Time: 726          Subjective: expresses no new complaints    Objective: See treatment diary below      Assessment: Tolerated treatment well  Requires occasional cueing with exercises  Patient would benefit from continued PT      Plan: Continue per plan of care   RA/ possible DC nv         Daily Treatment Diary   Precautions: R shoulder Replacement protocol, no heavy lifting, no extension  DOS: 18  Manual  10/15 10/18 10/22 10/25 10/29 11/1 11/5      PROM [address]      PNF UE D1D2 JR YVON Bruce JR      scap PROM JR JM Radha Calderon            scap PNF     Zac Owens        Exercise Diary  10/15 10/18 10/22 10/25 10/29 11/1 11/5                   Supine shoulder AA/AROM  10x2 10x2 AROM flex -- -- -- -- -- -- --   Scap retract supine 10x10" 10x10" 10x10" 10x10" 10x 10" hep       SL flexion 10x2 10x2 10x2 10x2 1# 10x2 1# 10x2 1# 10x2 1#      SL ER 10  10x2 10x2 10x2  10x2      SL abd 10x2 10x2 10x2 10x2 1# 10x2 1# 10x2 1# 10x2 1#      supine flexion AAROM 10xx2 10x2 AROM 5x2 10x2 10x2 1# 5x no wt 10x1#      Supine shoulder abd 10x2 10x2 20x -- -- DC --      Deltoid isometric  Flex,abd,ext 10x5" 10x5" HEP hep hep hep hep      1 arm row       10x      Supine sh centering 10x2 10x2 20x 20x 1# 30x1# 30x1# 10x1#      scap punches 10x2 10x2 20x 20x 1# 30x1# 30x1# 30x 1#      B sh ER seated w/ TB - [place]   10x  10" 10x10" 10x5" 10x10" 10x10"      LPD TB     20x OTB 10x2 OTB 10x2      Wall slide w/ lift     attempted hold --      SL scap depress and hold 10x10"x2 10x10"x2 20x 10" 20x10"   20x10"      TB assist flex and abd no shrug     10x ea 10x ea 10xea OTB      Wt shift on table       10x10"                                  Modalities

## 2018-11-08 ENCOUNTER — OFFICE VISIT (OUTPATIENT)
Dept: PHYSICAL THERAPY | Facility: CLINIC | Age: 83
End: 2018-11-08
Payer: MEDICARE

## 2018-11-08 DIAGNOSIS — Z96.611 STATUS POST REPLACEMENT OF RIGHT SHOULDER JOINT: Primary | ICD-10-CM

## 2018-11-08 PROCEDURE — G8985 CARRY GOAL STATUS: HCPCS | Performed by: PHYSICAL THERAPIST

## 2018-11-08 PROCEDURE — G8986 CARRY D/C STATUS: HCPCS | Performed by: PHYSICAL THERAPIST

## 2018-11-08 PROCEDURE — 97140 MANUAL THERAPY 1/> REGIONS: CPT | Performed by: PHYSICAL THERAPIST

## 2018-11-08 PROCEDURE — 97112 NEUROMUSCULAR REEDUCATION: CPT | Performed by: PHYSICAL THERAPIST

## 2018-11-08 NOTE — PROGRESS NOTES
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        Start Time: 0700  Stop Time: 0740  Total time in clinic (min): 40 minutes    Assessment/Plan   This patient demonstrates improvement since beginning therapy  Strength and ROM have improved slowly  She has not met all of the goals which were set for her but is independent with HEP and is ready for DC to self-directed program       Subjective   Reports no pain in the R shoulder  Independent w/ ADL's and light housework  Unable to reach overhead shelves; able to reach to eye level    Patient Goals  Patient goals for therapy: independence with ADLs/IADLs and increased motion - patient feels she has made progress      Objective    Goals  ST to 6 weeks  1  Pt will be able to regain shoulder flexion AROM past 90 degrees - partially met  2  Pt will be able to gain at least 35 degrees of AROM shoulder ER MET  3  Pt will be able to begin driving a car again - MET  4  Pt will be able to regain at least 120 degrees of shoulder ABD AROM - partially met  LT to 12 weeks  1  Patient will be able to regain at least 150 degrees of shoulder flexion AROM - partially met  2  Patient will be able to regain at least 45 degrees of shoulder ER AROM - MET  3  Patient will be able to perform her basic ADL's again such as reaching in a high cabinet, and being able to wash her hair - partially met  4  Patient will be able to regain at least 150 of shoulder ABD AROM -  Partially met  5   Patient will be able to gain a 5/5 on all strength assessments for each of the following shoulder motions (Flex, ABD ER) - partially met      Objective  Pain: 0/10    Active Range of Motion   Cervical/Thoracic Spine WFL    Left Shoulder WFL    Right Shoulder (active - seated)  Flexion: 50 degrees Abduction: 55 degrees     Right shoulder (active supine)  External rotation 90°: 70 degrees  Internal rotation  40 degrees  Flexion: 140  Abd 90  Passive Range of Motion   Right Shoulder   Flexion: 140 degrees   Abduction: 110 degrees   External rotation 90°: 75 degrees   Internal rotation 45 degrees      Strength/Myotome Testing RUE  Shoulder flexion 3/5  Shoulder abd 3+/5  Shoulder ER 3+/5  Shoulder IR 4+/5    Elbow / wrist 5/5      Flowsheet Rows      Most Recent Value   PT/OT G-Codes   Current Score  66   Projected Score  63            Daily Treatment Diary   Precautions: R shoulder Replacement protocol, no heavy lifting, no extension  DOS: 7-12-18  Manual  10/15 10/18 10/22 10/25 10/29 11/1 11/5 11/8     PROM JR SANZ [place]     PNF UE D1D2 JR YVON Titus JR     scap PROM JR YVON Garcia            scap PNF     [place]        Exercise Diary  10/15 10/18 10/22 10/25 10/29 11/1 11/5 11/8                  Supine shoulder AA/AROM  10x2 10x2 AROM flex -- -- -- -- -- -- --   Scap retract supine 10x10" 10x10" 10x10" 10x10" 10x 10" hep       SL flexion 10x2 10x2 10x2 10x2 1# 10x2 1# 10x2 1# 10x2 1# 10x2 1#     SL ER 10  10x2 10x2 10x2  10x2 10x5" x2     SL abd 10x2 10x2 10x2 10x2 1# 10x2 1# 10x2 1# 10x2 1# 10x2 1#     supine flexion AAROM 10xx2 10x2 AROM 5x2 10x2 10x2 1# 5x no wt 10x1# 15x  10x1#     Supine shoulder abd 10x2 10x2 20x -- -- DC -- --     Deltoid isometric  Flex,abd,ext 10x5" 10x5" HEP hep hep hep hep hep     1 arm row       10x      Supine sh centering 10x2 10x2 20x 20x 1# 30x1# 30x1# 10x1# 10x2 1#     scap punches 10x2 10x2 20x 20x 1# 30x1# 30x1# 30x 1# 30x 1#     B sh ER seated w/ TB - [place]   10x  10" 10x10" 10x5" 10x10" 10x10"      LPD TB     20x OTB 10x2 OTB 10x2      Wall slide w/ lift     attempted hold --      SL scap depress and hold 10x10"x2 10x10"x2 20x 10" 20x10"   20x10"      TB assist flex and abd no shrug     10x ea 10x ea 10xea OTB 10xea OTB     Wt shift on table       10x10"                                  Modalities

## 2018-11-19 ENCOUNTER — CLINICAL SUPPORT (OUTPATIENT)
Dept: ENDOCRINOLOGY | Facility: HOSPITAL | Age: 83
End: 2018-11-19
Payer: MEDICARE

## 2018-11-19 DIAGNOSIS — E03.9 HYPOTHYROIDISM IN ADULT: ICD-10-CM

## 2018-11-19 DIAGNOSIS — M81.0 OSTEOPOROSIS, UNSPECIFIED OSTEOPOROSIS TYPE, UNSPECIFIED PATHOLOGICAL FRACTURE PRESENCE: Primary | ICD-10-CM

## 2018-11-19 PROCEDURE — 96372 THER/PROPH/DIAG INJ SC/IM: CPT | Performed by: INTERNAL MEDICINE

## 2018-11-19 RX ORDER — LEVOTHYROXINE SODIUM 112 UG/1
112 TABLET ORAL DAILY
Qty: 30 TABLET | Refills: 11 | Status: SHIPPED | OUTPATIENT
Start: 2018-11-19 | End: 2019-10-20 | Stop reason: SDUPTHER

## 2018-11-19 NOTE — PROGRESS NOTES
Prolia given in Left arm  Pt states she takes OTC Vitamin D and Calcium supplements  Pt signed necessary consent forms

## 2018-12-17 DIAGNOSIS — I10 HYPERTENSION, UNSPECIFIED TYPE: Primary | ICD-10-CM

## 2018-12-17 RX ORDER — LOSARTAN POTASSIUM AND HYDROCHLOROTHIAZIDE 12.5; 5 MG/1; MG/1
1 TABLET ORAL DAILY
Qty: 90 TABLET | Refills: 1 | Status: SHIPPED | OUTPATIENT
Start: 2018-12-17 | End: 2019-06-10 | Stop reason: SDUPTHER

## 2019-01-06 ENCOUNTER — APPOINTMENT (EMERGENCY)
Dept: CT IMAGING | Facility: HOSPITAL | Age: 84
End: 2019-01-06
Payer: COMMERCIAL

## 2019-01-06 ENCOUNTER — OFFICE VISIT (OUTPATIENT)
Dept: URGENT CARE | Facility: CLINIC | Age: 84
End: 2019-01-06
Payer: COMMERCIAL

## 2019-01-06 ENCOUNTER — APPOINTMENT (EMERGENCY)
Dept: RADIOLOGY | Facility: HOSPITAL | Age: 84
End: 2019-01-06
Payer: COMMERCIAL

## 2019-01-06 ENCOUNTER — HOSPITAL ENCOUNTER (OUTPATIENT)
Facility: HOSPITAL | Age: 84
Setting detail: OBSERVATION
Discharge: HOME | End: 2019-01-07
Attending: EMERGENCY MEDICINE | Admitting: INTERNAL MEDICINE
Payer: COMMERCIAL
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
## 2019-01-06 DIAGNOSIS — H05.232 PERIORBITAL HEMATOMA OF LEFT EYE: ICD-10-CM

## 2019-01-06 DIAGNOSIS — S60.012A CONTUSION OF LEFT THUMB: ICD-10-CM

## 2019-01-06 DIAGNOSIS — S05.12XA ORBITAL CONTUSION, LEFT, INITIAL ENCOUNTER: Primary | ICD-10-CM

## 2019-01-06 DIAGNOSIS — S20.212A CHEST WALL CONTUSION, LEFT, INITIAL ENCOUNTER: ICD-10-CM

## 2019-01-06 DIAGNOSIS — I10 ESSENTIAL HYPERTENSION: ICD-10-CM

## 2019-01-06 DIAGNOSIS — S00.81XA FOREHEAD ABRASION, INITIAL ENCOUNTER: ICD-10-CM

## 2019-01-06 PROBLEM — J96.01 ACUTE RESPIRATORY FAILURE WITH HYPOXIA (HCC): Status: RESOLVED | Noted: 2018-07-15 | Resolved: 2019-01-06

## 2019-01-06 LAB
ABO GROUP BLD: NORMAL
ANION GAP BLD CALC-SCNC: 17 MMOL/L (ref 4–13)
APTT PPP: 32 SECONDS (ref 26–38)
BASOPHILS # BLD AUTO: 0.06 THOUSANDS/ΜL (ref 0–0.1)
BASOPHILS NFR BLD AUTO: 0 % (ref 0–1)
BLD GP AB SCN SERPL QL: POSITIVE
BLOOD GROUP ANTIBODIES SERPL: NORMAL
BLOOD GROUP ANTIBODIES SERPL: NORMAL
BUN BLD-MCNC: 26 MG/DL (ref 5–25)
CA-I BLD-SCNC: 1.26 MMOL/L (ref 1.12–1.32)
CHLORIDE BLD-SCNC: 101 MMOL/L (ref 100–108)
CREAT BLD-MCNC: 0.9 MG/DL (ref 0.6–1.3)
EOSINOPHIL # BLD AUTO: 0.04 THOUSAND/ΜL (ref 0–0.61)
EOSINOPHIL NFR BLD AUTO: 0 % (ref 0–6)
ERYTHROCYTE [DISTWIDTH] IN BLOOD BY AUTOMATED COUNT: 12.4 % (ref 11.6–15.1)
GFR SERPL CREATININE-BSD FRML MDRD: 60 ML/MIN/1.73SQ M
GLUCOSE SERPL-MCNC: 150 MG/DL (ref 65–140)
HCT VFR BLD AUTO: 42.7 % (ref 34.8–46.1)
HCT VFR BLD CALC: 44 % (ref 34.8–46.1)
HGB BLD-MCNC: 14.7 G/DL (ref 11.5–15.4)
HGB BLDA-MCNC: 15 G/DL (ref 11.5–15.4)
IMM GRANULOCYTES # BLD AUTO: 0.09 THOUSAND/UL (ref 0–0.2)
IMM GRANULOCYTES NFR BLD AUTO: 1 % (ref 0–2)
INR PPP: 1.68 (ref 0.86–1.17)
LYMPHOCYTES # BLD AUTO: 1.38 THOUSANDS/ΜL (ref 0.6–4.47)
LYMPHOCYTES NFR BLD AUTO: 10 % (ref 14–44)
MCH RBC QN AUTO: 33.1 PG (ref 26.8–34.3)
MCHC RBC AUTO-ENTMCNC: 34.4 G/DL (ref 31.4–37.4)
MCV RBC AUTO: 96 FL (ref 82–98)
MONOCYTES # BLD AUTO: 0.99 THOUSAND/ΜL (ref 0.17–1.22)
MONOCYTES NFR BLD AUTO: 7 % (ref 4–12)
NEUTROPHILS # BLD AUTO: 10.94 THOUSANDS/ΜL (ref 1.85–7.62)
NEUTS SEG NFR BLD AUTO: 82 % (ref 43–75)
NRBC BLD AUTO-RTO: 0 /100 WBCS
PCO2 BLD: 26 MMOL/L (ref 21–32)
PLATELET # BLD AUTO: 191 THOUSANDS/UL (ref 149–390)
PMV BLD AUTO: 10.5 FL (ref 8.9–12.7)
POTASSIUM BLD-SCNC: 3.7 MMOL/L (ref 3.5–5.3)
PROTHROMBIN TIME: 18.8 SECONDS (ref 11.8–14.2)
RBC # BLD AUTO: 4.44 MILLION/UL (ref 3.81–5.12)
RH BLD: POSITIVE
SODIUM BLD-SCNC: 139 MMOL/L (ref 136–145)
SPECIMEN EXPIRATION DATE: NORMAL
SPECIMEN SOURCE: ABNORMAL
TROPONIN I SERPL-MCNC: <0.02 NG/ML
WBC # BLD AUTO: 13.5 THOUSAND/UL (ref 4.31–10.16)

## 2019-01-06 PROCEDURE — 99220 PR INITIAL OBSERVATION CARE/DAY 70 MINUTES: CPT | Performed by: INTERNAL MEDICINE

## 2019-01-06 PROCEDURE — 85025 COMPLETE CBC W/AUTO DIFF WBC: CPT | Performed by: EMERGENCY MEDICINE

## 2019-01-06 PROCEDURE — 73140 X-RAY EXAM OF FINGER(S): CPT

## 2019-01-06 PROCEDURE — 36415 COLL VENOUS BLD VENIPUNCTURE: CPT | Performed by: EMERGENCY MEDICINE

## 2019-01-06 PROCEDURE — 86905 BLOOD TYPING RBC ANTIGENS: CPT

## 2019-01-06 PROCEDURE — 86900 BLOOD TYPING SEROLOGIC ABO: CPT | Performed by: EMERGENCY MEDICINE

## 2019-01-06 PROCEDURE — 80047 BASIC METABLC PNL IONIZED CA: CPT

## 2019-01-06 PROCEDURE — 86850 RBC ANTIBODY SCREEN: CPT | Performed by: EMERGENCY MEDICINE

## 2019-01-06 PROCEDURE — 84484 ASSAY OF TROPONIN QUANT: CPT | Performed by: EMERGENCY MEDICINE

## 2019-01-06 PROCEDURE — 99213 OFFICE O/P EST LOW 20 MIN: CPT | Performed by: FAMILY MEDICINE

## 2019-01-06 PROCEDURE — 96360 HYDRATION IV INFUSION INIT: CPT

## 2019-01-06 PROCEDURE — 85014 HEMATOCRIT: CPT

## 2019-01-06 PROCEDURE — 86880 COOMBS TEST DIRECT: CPT | Performed by: EMERGENCY MEDICINE

## 2019-01-06 PROCEDURE — 74177 CT ABD & PELVIS W/CONTRAST: CPT

## 2019-01-06 PROCEDURE — 85610 PROTHROMBIN TIME: CPT | Performed by: EMERGENCY MEDICINE

## 2019-01-06 PROCEDURE — 85730 THROMBOPLASTIN TIME PARTIAL: CPT | Performed by: EMERGENCY MEDICINE

## 2019-01-06 PROCEDURE — 70486 CT MAXILLOFACIAL W/O DYE: CPT

## 2019-01-06 PROCEDURE — 86901 BLOOD TYPING SEROLOGIC RH(D): CPT | Performed by: EMERGENCY MEDICINE

## 2019-01-06 PROCEDURE — 70450 CT HEAD/BRAIN W/O DYE: CPT

## 2019-01-06 PROCEDURE — 93005 ELECTROCARDIOGRAM TRACING: CPT

## 2019-01-06 PROCEDURE — 99285 EMERGENCY DEPT VISIT HI MDM: CPT

## 2019-01-06 PROCEDURE — 1123F ACP DISCUSS/DSCN MKR DOCD: CPT | Performed by: INTERNAL MEDICINE

## 2019-01-06 PROCEDURE — 72125 CT NECK SPINE W/O DYE: CPT

## 2019-01-06 PROCEDURE — 71260 CT THORAX DX C+: CPT

## 2019-01-06 PROCEDURE — 86870 RBC ANTIBODY IDENTIFICATION: CPT | Performed by: EMERGENCY MEDICINE

## 2019-01-06 PROCEDURE — S9088 SERVICES PROVIDED IN URGENT: HCPCS | Performed by: FAMILY MEDICINE

## 2019-01-06 RX ORDER — ONDANSETRON 2 MG/ML
4 INJECTION INTRAMUSCULAR; INTRAVENOUS EVERY 6 HOURS PRN
Status: DISCONTINUED | OUTPATIENT
Start: 2019-01-06 | End: 2019-01-07 | Stop reason: HOSPADM

## 2019-01-06 RX ORDER — ACETAMINOPHEN 325 MG/1
650 TABLET ORAL EVERY 6 HOURS PRN
Status: DISCONTINUED | OUTPATIENT
Start: 2019-01-06 | End: 2019-01-07 | Stop reason: HOSPADM

## 2019-01-06 RX ORDER — HYDROXYCHLOROQUINE SULFATE 200 MG/1
200 TABLET, FILM COATED ORAL DAILY
Status: DISCONTINUED | OUTPATIENT
Start: 2019-01-07 | End: 2019-01-07 | Stop reason: HOSPADM

## 2019-01-06 RX ORDER — ACETAMINOPHEN 325 MG/1
975 TABLET ORAL ONCE
Status: COMPLETED | OUTPATIENT
Start: 2019-01-06 | End: 2019-01-06

## 2019-01-06 RX ORDER — GINSENG 100 MG
1 CAPSULE ORAL ONCE
Status: COMPLETED | OUTPATIENT
Start: 2019-01-06 | End: 2019-01-06

## 2019-01-06 RX ORDER — LEVOTHYROXINE SODIUM 112 UG/1
112 TABLET ORAL DAILY
Status: DISCONTINUED | OUTPATIENT
Start: 2019-01-07 | End: 2019-01-07 | Stop reason: HOSPADM

## 2019-01-06 RX ORDER — GINSENG 100 MG
1 CAPSULE ORAL 2 TIMES DAILY
Status: DISCONTINUED | OUTPATIENT
Start: 2019-01-06 | End: 2019-01-07 | Stop reason: HOSPADM

## 2019-01-06 RX ADMIN — SODIUM CHLORIDE 1000 ML: 0.9 INJECTION, SOLUTION INTRAVENOUS at 17:45

## 2019-01-06 RX ADMIN — IOHEXOL 100 ML: 350 INJECTION, SOLUTION INTRAVENOUS at 17:27

## 2019-01-06 RX ADMIN — ACETAMINOPHEN 975 MG: 325 TABLET, FILM COATED ORAL at 17:46

## 2019-01-06 RX ADMIN — BACITRACIN ZINC 1 SMALL APPLICATION: 500 OINTMENT TOPICAL at 22:13

## 2019-01-06 RX ADMIN — BACITRACIN 1 SMALL APPLICATION: 500 OINTMENT TOPICAL at 17:46

## 2019-01-06 RX ADMIN — METOPROLOL TARTRATE 25 MG: 25 TABLET, FILM COATED ORAL at 22:12

## 2019-01-06 NOTE — PROGRESS NOTES
[facility] Now        NAME: Owen Triplett is a 82 y o  female  : 1935    MRN: [id number]  DATE: 2019  TIME: 4:19 PM    Assessment and Plan   Orbital contusion, left, initial encounter [O42 [id number]]  1  Orbital contusion, left, initial encounter  Transfer to other facility         Patient Instructions   There are no Patient Instructions on file for this visit  Proceed to  ER if symptoms worsen  Chief Complaint     Chief Complaint   Patient presents with    Eye Injury     Pt fell  On coumadin  Nausea  History of Present Illness       Patient was attending her grandson's hockey event and missed a step and tripped falling on her face  She has significant left eye swelling and bruising to the extent that her eye is swollen shut  She also had a bloody nose and was coughing up blood afterwards  She is currently on Coumadin for history of PE  She does have a headache, she denies confusion, disorientation, no loss of consciousness  She is accompanied to care now by her   She also complains of chest pain and discomfort since the fall  Denies shortness of breath  She denies neck pain  There is not a recent PT INR to review        Review of Systems   Review of Systems   Constitutional: Negative  HENT: Positive for facial swelling and nosebleeds  Eyes: Negative for visual disturbance  Respiratory: Negative  Cardiovascular: Negative  Skin: Positive for color change and wound  Neurological: Positive for headaches  Negative for dizziness and seizures  Current Medications       Current Outpatient Prescriptions:     Ascorbic Acid (VITAMIN C) 1000 MG tablet, Take 1,000 mg by mouth daily  , Disp: , Rfl:     b complex vitamins tablet, Take 1 tablet by mouth daily  , Disp: , Rfl:     cevimeline (EVOXAC) 30 MG capsule, Take 30 mg by mouth 3 (three) times a day  , Disp: , Rfl:     Cholecalciferol (VITAMIN D) 2000 UNITS tablet, Take 3,000 Units by mouth daily  , Disp: , Rfl:     clotrimazole-betamethasone (LOTRISONE) 1-0 05 % cream, Apply topically 2 (two) times a day, Disp: 30 g, Rfl: 3    Coenzyme Q10 (CO Q 10) 10 MG CAPS, Take 1 tablet by mouth daily  , Disp: , Rfl:     Cranberry 1000 MG CAPS, Take 4,200 mg by mouth 2 (two) times a day, Disp: , Rfl:     cycloSPORINE (RESTASIS) 0 05 % ophthalmic emulsion, Administer 1 drop to both eyes 2 (two) times a day , Disp: , Rfl:     denosumab (PROLIA) 60 mg/mL, Inject 60 mg under the skin every 6 (six) months, Disp: , Rfl:     DHA-EPA-Flaxseed Oil-Vitamin E (THERA TEARS) CAPS, Take 4 capsules by mouth daily  , Disp: , Rfl:     docusate sodium (COLACE) 100 mg capsule, Take 1 capsule (100 mg total) by mouth 2 (two) times a day, Disp: 10 capsule, Rfl: 0    estradiol (ESTRACE) 0 1 mg/g vaginal cream, Insert 2 g into the vagina daily, Disp: , Rfl:     Ferrous Sulfate (IRON) 325 (65 Fe) MG TABS, Take 1 tablet (325 mg total) by mouth 2 (two) times a day, Disp: 30 each, Rfl: 0    fluticasone (FLONASE) 50 mcg/act nasal spray, 2 sprays into each nostril daily, Disp: 16 g, Rfl: 0    Ginkgo Biloba 40 MG TABS, Take 60 mg by mouth daily, Disp: , Rfl:     hydroxychloroquine (PLAQUENIL) 200 mg tablet, Take 200 mg by mouth daily  , Disp: , Rfl:     levothyroxine (SYNTHROID) 112 mcg tablet, Take 1 tablet (112 mcg total) by mouth daily, Disp: 30 tablet, Rfl: 11    losartan-hydrochlorothiazide (HYZAAR) 50-12 5 mg per tablet, Take 1 tablet by mouth daily, Disp: 90 tablet, Rfl: 1    Magnesium 250 MG TABS, Take 1 tablet by mouth daily  , Disp: , Rfl:     Multiple Vitamins-Minerals (OCUVITE PO), Take 1 tablet by mouth daily  , Disp: , Rfl:     omeprazole (PriLOSEC) 20 mg delayed release capsule, Take 20 mg by mouth 2 (two) times a day , Disp: , Rfl:     polyvinyl alcohol (LIQUIFILM TEARS) 1 4 % ophthalmic solution, Administer 1 drop to both eyes as needed for dry eyes, Disp: , Rfl:     Potassium Gluconate  MG TBCR, Take 1 tablet by mouth daily  , Disp: , Rfl:     simvastatin (ZOCOR) 20 mg tablet, Take 20 mg by mouth daily at bedtime  , Disp: , Rfl:     vitamin E, tocopherol, 400 units capsule, Take 400 Units by mouth daily  , Disp: , Rfl:     warfarin (COUMADIN) 2 mg tablet, Take 1 mg by mouth daily  , Disp: , Rfl:     Current Allergies     Allergies as of 01/06/2019 - Reviewed 10/03/2018   Allergen Reaction Noted    Sulfa antibiotics Hives 05/17/2016            The following portions of the patient's history were reviewed and updated as appropriate: allergies, current medications, past family history, past medical history, past social history, past surgical history and problem list      Past Medical History:   Diagnosis Date    Disease of thyroid gland     Hyperlipidemia     Hypertension     [facility]ism) [facility] [address] [facility]     [facility]jogren's syndrome ([facility] [address] [facility]     Sjogren's syndrome ([facility] [address] [facility]     Urinary tract infection        Past Surgical History:   Procedure Laterality Date    HYSTERECTOMY      JOINT REPLACEMENT      KNEE SURGERY      SHOULDER SURGERY      right    SPINE SURGERY      TOE SURGERY         Family History   Problem Relation Age of Onset    Stroke Father     Ovarian cancer Sister     No Known Problems Son     No Known Problems Son     Ovarian cancer Daughter     No Known Problems Daughter     No Known Problems Daughter          Medications have been verified  Objective   LMP  (LMP Unknown)        Physical Exam     Physical Exam   Constitutional: No distress  HENT:   Right Ear: External ear normal    Left Ear: External ear normal    Mouth/Throat: Oropharynx is clear and moist  No oropharyngeal exudate  Eyes:   Left eye diffuse periorbital swelling and ecchymosis, quite tender to palpation superficial laceration on the superior aspect the eyelid  Pupils PERRLA discomfort with extraocular muscle testing  No current epistaxis   Neck: Neck supple     Cardiovascular: Normal rate, regular rhythm and normal heart sounds  Pulmonary/Chest: Effort normal and breath sounds normal    Lymphadenopathy:     She has no cervical adenopathy  Neurological: She is alert   Coordination normal

## 2019-01-06 NOTE — PATIENT INSTRUCTIONS
Due to facial/head trauma and taking Coumadin and advanced age patient was referred to the emergency room for further evaluation/ CT scan

## 2019-01-06 NOTE — ED PROVIDER NOTES
H&P Exam - Trauma   Pamela [place] Garcia 82 y o  female MRN: [id number]  Unit/Bed#: [address] [place] [address]/ [address] Encounter: [id number]    Assessment/Plan   Trauma Alert: Trauma Acuity: Trauma Evaluation  Model of Arrival: Trauma Mode of Arrival: Other (Comment) (walk in) via    Trauma Team: Current Providers  Attending Provider: Brandi Bell DO  Attending Provider: Stephania White MD  ED Technician: Davonte Hernandez  Registered Nurse: Cristian Guerra RN  Registered Nurse: Solange Garcia RN  Consultants: Other: trauma surgeon Dr Cheryl Parra  Time Called [phone]    Trauma Active Problems: fall head injury large left periorbital hematoma and on coumadin    Trauma Plan: ct head face and c-spine  Also, chest wall pain/ contusion - will ct chest abd pelvis elder with anticoagulant use    Chief Complaint:   Chief Complaint   Patient presents with   Sylvia Nine Fall     To ED with c/o fall today on concrete at 1230  Pt states that she missed a step and fell foreward  Denies any LOC  Does note nausea, increased left eye swelling  Also had chest discomfort       History of Present Illness   HPI:  Tiffanie Sanders is a 82 y o  female who presents with fall  Mechanism:Details of Incident: Fall at [facility]  Injury Date: 01/06/19 Injury Time: 1230 Injury Occurence Location - [address] [place] [address] ice [facility]    Patient complains of trip on cement step at hockey rink about 4 hours ago and hit face on step, also has chest pain with bruise there  Takes coumadin  Was able to get up on her own  Tetanus vaccine up to date  Did not pass out  Review of Systems   All other systems reviewed and are negative  Historical Information     Immunizations: There is no immunization history on file for this patient      Past Medical History:   Diagnosis Date    Disease of thyroid gland     Hyperlipidemia     Hypertension     PE (pulmonary thromboembolism) (HCC)     [facility]'s syndrome [facility] [address] [facility]     [address]ogren's syndrome ([address]     Urinary tract infection        Family History   Problem Relation Age of Onset    Stroke Father     Ovarian cancer Sister     No Known Problems Son     No Known Problems Son     Ovarian cancer Daughter     No Known Problems Daughter     No Known Problems Daughter      Past Surgical History:   Procedure Laterality Date    HYSTERECTOMY      JOINT REPLACEMENT      KNEE SURGERY      SHOULDER SURGERY      right    SPINE SURGERY      TOE SURGERY         Social History     Social History    Marital status: /Civil Union     Spouse name: N/A    Number of children: N/A    Years of education: N/A     Social History Main Topics    Smoking status: Never Smoker    Smokeless tobacco: Never Used    Alcohol use No    Drug use: No    Sexual activity: No     Other Topics Concern    None     Social History Narrative    None       Family History: non-contributory    Meds/Allergies   Prior to Admission Medications   Prescriptions Last Dose Informant Patient Reported? Taking? Ascorbic Acid (VITAMIN C) 1000 MG tablet  Self Yes No   Sig: Take 1,000 mg by mouth daily  Cholecalciferol (VITAMIN D) 2000 UNITS tablet  Self Yes No   Sig: Take 3,000 Units by mouth daily     Coenzyme Q10 (CO Q 10) 10 MG CAPS  Self Yes No   Sig: Take 1 tablet by mouth daily  Cranberry 1000 MG CAPS  Self Yes No   Sig: Take 4,200 mg by mouth 2 (two) times a day   DHA-EPA-Flaxseed Oil-Vitamin E (THERA TEARS) CAPS  Self Yes No   Sig: Take 4 capsules by mouth daily  Ferrous Sulfate (IRON) 325 (65 Fe) MG TABS  Self No No   Sig: Take 1 tablet (325 mg total) by mouth 2 (two) times a day   Ginkgo Biloba 40 MG TABS  Self Yes No   Sig: Take 60 mg by mouth daily   Magnesium 250 MG TABS  Self Yes No   Sig: Take 1 tablet by mouth daily  Multiple Vitamins-Minerals (OCUVITE PO)  Self Yes No   Sig: Take 1 tablet by mouth daily  Potassium Gluconate  MG TBCR  Self Yes No   Sig: Take 1 tablet by mouth daily     b complex vitamins tablet  Self Yes No   Sig: Take 1 tablet by mouth daily  cevimeline (EVOXAC) 30 MG capsule  Self Yes No   Sig: Take 30 mg by mouth 3 (three) times a day     clotrimazole-betamethasone (LOTRISONE) 1-0 05 % cream   No No   Sig: Apply topically 2 (two) times a day   cycloSPORINE (RESTASIS) 0 05 % ophthalmic emulsion  Self Yes No   Sig: Administer 1 drop to both eyes 2 (two) times a day  denosumab (PROLIA) 60 mg/mL  Self Yes No   Sig: Inject 60 mg under the skin every 6 (six) months   docusate sodium (COLACE) 100 mg capsule  Self No No   Sig: Take 1 capsule (100 mg total) by mouth 2 (two) times a day   estradiol (ESTRACE) 0 1 mg/g vaginal cream  Self Yes No   Sig: Insert 2 g into the vagina daily   fluticasone (FLONASE) 50 mcg/act nasal spray  Self No No   Si sprays into each nostril daily   hydroxychloroquine (PLAQUENIL) 200 mg tablet  Self Yes No   Sig: Take 200 mg by mouth daily  levothyroxine (SYNTHROID) 112 mcg tablet   No No   Sig: Take 1 tablet (112 mcg total) by mouth daily   losartan-hydrochlorothiazide (HYZAAR) 50-12 5 mg per tablet   No No   Sig: Take 1 tablet by mouth daily   omeprazole (PriLOSEC) 20 mg delayed release capsule  Self Yes No   Sig: Take 20 mg by mouth 2 (two) times a day  polyvinyl alcohol (LIQUIFILM TEARS) 1 4 % ophthalmic solution  Self Yes No   Sig: Administer 1 drop to both eyes as needed for dry eyes   simvastatin (ZOCOR) 20 mg tablet  Self Yes No   Sig: Take 20 mg by mouth daily at bedtime  vitamin E, tocopherol, 400 units capsule  Self Yes No   Sig: Take 400 Units by mouth daily        Facility-Administered Medications: None       Allergies   Allergen Reactions    Sulfa Antibiotics Hives       PHYSICAL EXAM    PE limited by: nothing    Objective   Vitals:   First set: Temperature: 98 1 °F (36 7 °C) (19)  Pulse: 78 (19)  Respirations: 20 (19)  Blood Pressure: (!) 232/98 (19)  SpO2: 98 % (19)    Primary Survey:   (A) Airway: patent  (B) Breathing: clear  (C) Circulation: Pulses:   normal  (D) Disabliity:  GCS Total:  15  (E) Expose:  Completed    Secondary Survey: (Click on Physical Exam tab above)  Physical Exam   Constitutional: She is oriented to person, place, and time  She appears well-developed and well-nourished  HENT:   Head: Normocephalic  Head is with abrasion  Right Ear: Tympanic membrane normal    Left Ear: Tympanic membrane normal    Nose: No sinus tenderness  No epistaxis  Mouth/Throat: Oropharynx is clear and moist and mucous membranes are normal    Large Left periorbital hematoma with small abrasion   Eyes: Pupils are equal, round, and reactive to light  Conjunctivae and EOM are normal    Neck: No spinous process tenderness present  Cardiovascular: Normal rate, regular rhythm, normal heart sounds and intact distal pulses  Pulmonary/Chest: Effort normal and breath sounds normal  She exhibits tenderness  Tenderness and contusion noted to chest   Abdominal: Soft  Bowel sounds are normal    Musculoskeletal: Normal range of motion  She exhibits no tenderness  Thoracic back: She exhibits no bony tenderness  Lumbar back: She exhibits no bony tenderness  Hands:  Left thumb dorsal bruise, non-tender   Neurological: She is alert and oriented to person, place, and time  GCS eye subscore is 4  GCS verbal subscore is 5  GCS motor subscore is 6  Skin: Skin is warm and dry  Psychiatric: She has a normal mood and affect  Nursing note and vitals reviewed        Invasive Devices          No matching active lines, drains, or airways          Lab Results:   Results Reviewed     Procedure Component Value Units Date/Time    Troponin I [[id number]]  (Normal) Collected:  01/06/19 1702    Lab Status:  Final result Specimen:  Blood from Arm, Left Updated:  01/06/19 1727     Troponin I <0 02 ng/mL     Protime-INR [[id number]]  (Abnormal) Collected:  01/06/19 1702    Lab Status:  Final result Specimen:  Blood from Arm, Left Updated:  01/06/19 1721     Protime 18 8 (H) seconds      INR 1 68 (H)    APTT [[id number]]  (Normal) Collected:  01/06/19 1702    Lab Status:  Final result Specimen:  Blood from Arm, Left Updated:  01/06/19 1721     PTT 32 seconds     CBC and differential [[id number]]  (Abnormal) Collected:  01/06/19 1702    Lab Status:  Final result Specimen:  Blood from Arm, Left Updated:  01/06/19 1708     WBC 13 50 (H) Thousand/uL      RBC 4 44 Million/uL      Hemoglobin 14 7 g/dL      Hematocrit 42 7 %      MCV 96 fL      MCH 33 1 pg      MCHC 34 4 g/dL      RDW 12 4 %      MPV 10 5 fL      Platelets 186 Thousands/uL      nRBC 0 /100 WBCs      Neutrophils Relative 82 (H) %      Immat GRANS % 1 %      Lymphocytes Relative 10 (L) %      Monocytes Relative 7 %      Eosinophils Relative 0 %      Basophils Relative 0 %      Neutrophils Absolute 10 94 (H) Thousands/µL      Immature Grans Absolute 0 09 Thousand/uL      Lymphocytes Absolute 1 38 Thousands/µL      Monocytes Absolute 0 99 Thousand/µL      Eosinophils Absolute 0 04 Thousand/µL      Basophils Absolute 0 06 Thousands/µL     POCT Chem 8+ [[id number]]  (Abnormal) Collected:  01/06/19 1702    Lab Status:  Final result Updated:  01/06/19 1706     SODIUM, I-STAT 139 mmol/l      Potassium, i-STAT 3 7 mmol/L      Chloride, istat 101 mmol/L      CO2, i-STAT 26 mmol/L      Anion Gap, i-STAT 17 (H) mmol/L      Calcium, Ionized i-STAT 1 26 mmol/L      BUN, I-STAT 26 (H) mg/dl      Creatinine, i-STAT 0 9 mg/dl      eGFR 60 ml/min/1 73sq m      Glucose, i-STAT 150 (H) mg/dl      Hct, i-STAT 44 %      Hgb, i-STAT 15 0 g/dl      Specimen Type VENOUS                 Imaging Studies:   XR thumb first digit-min 2 views LEFT   ED Interpretation by Kristen Mcclain DO (01/06 1759)   No acute abnl      TRAUMA - CT chest abdomen pelvis w contrast   Final Result by Filippo Singh MD (01/06 1757)      There is no evidence of acute traumatic injury within the chest abdomen or pelvis      12 mm right adnexal cyst     According to current guidelines (J Am Jose Rafael Radiol 2013;10:671-681) in this postmenopausal woman, this should be evaluated with nonemergent pelvic ultrasound  Workstation performed: [id number]         TRAUMA - CT facial bones wo contrast   Final Result by Gamal James MD (01/06 1743)         1  No evidence of acute maxillofacial fracture  2   Left orbital preseptal hematoma  No retrobulbar hematoma or proptosis  Workstation performed: [id number]         TRAUMA - CT spine cervical wo contrast   Final Result by Jennifer Us MD (01/06 1730)      No cervical spine fracture or traumatic malalignment  Workstation performed: [id number]         TRAUMA - CT head wo contrast   Final Result by Jennifer Us MD (01/06 1722)      No acute intracranial abnormality                    Workstation performed: [id number]             Other Studies: none    Code Status: Level 1 - Full Code  Advance Directive and Living Will:      Power of :    POLST:      Procedures  ECG 12 Lead Documentation  Date/Time: 1/6/2019 6:11 PM  Performed by: Miya Kaiser by: Doreen Banegas     Indications / Diagnosis:  Chest injury  ECG reviewed by me, the ED Provider: yes    Patient location:  ED  Previous ECG:     Previous ECG:  Compared to current    Comparison ECG info:  7-18    Similarity:  No change  Interpretation:     Interpretation: normal    Rate:     ECG rate:  74    ECG rate assessment: normal    Rhythm:     Rhythm: sinus rhythm    Ectopy:     Ectopy: none    QRS:     QRS axis:  Normal    QRS intervals:  Normal  Conduction:     Conduction: normal    ST segments:     ST segments:  Normal  T waves:     T waves: normal             Phone Contacts  ED Phone Contact     ED Course  ED Course as of Jan 06 1959   [facility] Jan 06, 2019   [id number] Reviewed case with Dr Ivonne Silverio does not feel patient needs to be transferred or coagulopathy reversed - agrees with observation here at [facility] as I am concerned with spontaneous expansion of hematoma          MDM  Number of Diagnoses or Management Options  Chest wall contusion, left, initial encounter: new and requires workup  Contusion of left thumb: new and requires workup  Fall, initial encounter: new and requires workup  Forehead abrasion, initial encounter: new and requires workup  Periorbital hematoma of left eye: new and requires workup     Amount and/or Complexity of Data Reviewed  Clinical lab tests: ordered and reviewed  Tests in the radiology section of CPT®: ordered and reviewed  Discuss the patient with other providers: yes    Patient Progress  Patient progress: improved    CritCare Time    Disposition  Final diagnoses:   Fall, initial encounter   Periorbital hematoma of left eye   Chest wall contusion, left, initial encounter   Contusion of left thumb   Forehead abrasion, initial encounter     Time reflects when diagnosis was documented in both MDM as applicable and the Disposition within this note     Time User Action Codes Description Comment    1/6/2019  6:07 PM Wyona Beavers Add Danielle Scott  SMCR] Fall, initial encounter     1/6/2019  6:07 PM Wyona Beavers Add [M35 [id number]] Periorbital hematoma of left eye     1/6/2019  6:08 PM Wyona Beavers Add Cherene Nose Chest wall contusion, left, initial encounter     1/6/2019  6:08 PM Wyona Beavers Add [S60 [id number]] Contusion of left thumb     1/6/2019  6:08 PM Wyona Beavers Add [S00 [id number]] Forehead abrasion, initial encounter       ED Disposition     ED Disposition Condition Comment    Admit  Case was discussed with Emma Breen** and the patient's admission status was agreed to be Admission Status: observation status to the service of Dr Aaron Flores   Follow-up Information    None       Current Discharge Medication List      CONTINUE these medications which have NOT CHANGED    Details   Ascorbic Acid (VITAMIN C) 1000 MG tablet Take 1,000 mg by mouth daily        b complex vitamins tablet Take 1 tablet by mouth daily  cevimeline (EVOXAC) 30 MG capsule Take 30 mg by mouth 3 (three) times a day        Cholecalciferol (VITAMIN D) 2000 UNITS tablet Take 3,000 Units by mouth daily        clotrimazole-betamethasone (LOTRISONE) 1-0 05 % cream Apply topically 2 (two) times a day  Qty: 30 g, Refills: 3    Associated Diagnoses: Vaginal irritation      Coenzyme Q10 (CO Q 10) 10 MG CAPS Take 1 tablet by mouth daily  Cranberry 1000 MG CAPS Take 4,200 mg by mouth 2 (two) times a day      cycloSPORINE (RESTASIS) 0 05 % ophthalmic emulsion Administer 1 drop to both eyes 2 (two) times a day  denosumab (PROLIA) 60 mg/mL Inject 60 mg under the skin every 6 (six) months      DHA-EPA-Flaxseed Oil-Vitamin E (THERA TEARS) CAPS Take 4 capsules by mouth daily  docusate sodium (COLACE) 100 mg capsule Take 1 capsule (100 mg total) by mouth 2 (two) times a day  Qty: 10 capsule, Refills: 0    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      estradiol (ESTRACE) 0 1 mg/g vaginal cream Insert 2 g into the vagina daily      Ferrous Sulfate (IRON) 325 (65 Fe) MG TABS Take 1 tablet (325 mg total) by mouth 2 (two) times a day  Qty: 30 each, Refills: 0    Associated Diagnoses: Symptomatic anemia      fluticasone (FLONASE) 50 mcg/act nasal spray 2 sprays into each nostril daily  Qty: 16 g, Refills: 0    Associated Diagnoses: Allergic rhinitis, unspecified seasonality, unspecified trigger      Ginkgo Biloba 40 MG TABS Take 60 mg by mouth daily      hydroxychloroquine (PLAQUENIL) 200 mg tablet Take 200 mg by mouth daily        levothyroxine (SYNTHROID) 112 mcg tablet Take 1 tablet (112 mcg total) by mouth daily  Qty: 30 tablet, Refills: 11    Associated Diagnoses: Hypothyroidism in adult      losartan-hydrochlorothiazide (HYZAAR) 50-12 5 mg per tablet Take 1 tablet by mouth daily  Qty: 90 tablet, Refills: 1    Associated Diagnoses: Hypertension, unspecified type      Magnesium 250 MG TABS Take 1 tablet by mouth daily  Multiple Vitamins-Minerals (OCUVITE PO) Take 1 tablet by mouth daily  omeprazole (PriLOSEC) 20 mg delayed release capsule Take 20 mg by mouth 2 (two) times a day  polyvinyl alcohol (LIQUIFILM TEARS) 1 4 % ophthalmic solution Administer 1 drop to both eyes as needed for dry eyes      Potassium Gluconate  MG TBCR Take 1 tablet by mouth daily  simvastatin (ZOCOR) 20 mg tablet Take 20 mg by mouth daily at bedtime  vitamin E, tocopherol, 400 units capsule Take 400 Units by mouth daily  No discharge procedures on file        ED Provider  Electronically Signed by         Kim Polk DO  01/06/19 1959

## 2019-01-06 NOTE — ASSESSMENT & PLAN NOTE
Will observe the patient on telemetry over night  Patient's INR is 1 68  In the setting of acute subcutaneous bleeding, left orbital hematoma I will not give her warfarin today  Will check INR tomorrow  Will continue cold compresses to right orbit      Patient may go home tomorrow, she is level 1 DNR

## 2019-01-06 NOTE — ASSESSMENT & PLAN NOTE
Continue losartan hydrochlorothiazide, will replace potassium, will add Lopressor for uncontrolled hypertension

## 2019-01-06 NOTE — TRAUMA DOCUMENTATION
Pt assisted to stand and walk to bathroom  Normal gait without assist  Pt passed urine and returned to bed without incident

## 2019-01-06 NOTE — H&P
H&P- Marcial Garcia 1935, 82 y o  female MRN: [id number]    Unit/Bed#: ED 01 Encounter: [id number]    Primary Care Provider: Mariella Drummond   Date and time admitted to hospital: 1/6/2019  4:40 PM        * Traumatic hematoma of left orbit   Assessment & Plan    Will observe the patient on telemetry over night  Patient's INR is 1 68  In the setting of acute subcutaneous bleeding, left orbital hematoma I will not give her warfarin today  Will check INR tomorrow  Will continue cold compresses to right orbit  Will follow her neurological status with serial neuro checks  Patient may go home tomorrow not require more than 2 midnights hospital stay, she is level 1 DNR      Essential hypertension   Assessment & Plan    Continue losartan hydrochlorothiazide, will replace potassium, will add Lopressor for uncontrolled hypertension     History of pulmonary embolus (PE)   Assessment & Plan    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, she takes warfarin 4 mg at home         VTE Prophylaxis: ordered    Code Status: as above    Anticipated Length of Stay: as above    Justification for Hospital Stay: see assessment and plan        Chief Complaint:   Left orbital hematoma    History of Present Illness:    Erica Lambert is a 82 y o  female who presents with above chief compaint  Patient had a mechanical fall about 12 30pm  Today, went home where she felt nauseated and threw up twice a came to the ER for evaluation  Dr Palmer Salinas from the ER spoke with the trauma attending in [facility] who recommended observation for left orbital hematoma  Patient denies any headache, dizziness, vertigo, focal weakness or numbness  The fall was not preceded or followed by dizziness, lightheadedness, chest pain, palpitations, shortness of breath  She had no syncopal episodes  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  She is on Coumadin for this reason taking 4 mg in the evening    INR is 1 68      Review of Systems:    Review of Systems   Constitutional: Negative for fever  HENT: Negative for congestion  Eyes: Negative for visual disturbance  Respiratory: Negative for cough and shortness of breath  Cardiovascular: Negative for chest pain, palpitations and leg swelling  Gastrointestinal: Positive for nausea and vomiting  Negative for abdominal pain, blood in stool and diarrhea  Endocrine: Negative for polydipsia and polyphagia  Genitourinary: Negative for difficulty urinating and dysuria  Musculoskeletal: Negative for joint swelling, myalgias and neck stiffness  Skin: Negative for rash  Neurological: Negative for weakness, numbness and headaches  Hematological: Negative for adenopathy  Psychiatric/Behavioral: Negative for dysphoric mood  All other systems reviewed and are negative  Past Medical and Surgical History:     Past Medical History:   Diagnosis Date    Disease of thyroid gland     Hyperlipidemia     Hypertension     [facility] [facility]     [facility]     [facility]'s syndrome ([facility] )     Urinary tract infection        Past Surgical History:   Procedure Laterality Date    HYSTERECTOMY      JOINT REPLACEMENT      KNEE SURGERY      SHOULDER SURGERY      right    SPINE SURGERY      TOE SURGERY         Meds/Allergies:    Prior to Admission Medications   Prescriptions Last Dose Informant Patient Reported? Taking? Ascorbic Acid (VITAMIN C) 1000 MG tablet  Self Yes No   Sig: Take 1,000 mg by mouth daily  Cholecalciferol (VITAMIN D) 2000 UNITS tablet  Self Yes No   Sig: Take 3,000 Units by mouth daily     Coenzyme Q10 (CO Q 10) 10 MG CAPS  Self Yes No   Sig: Take 1 tablet by mouth daily  Cranberry 1000 MG CAPS  Self Yes No   Sig: Take 4,200 mg by mouth 2 (two) times a day   DHA-EPA-Flaxseed Oil-Vitamin E (THERA TEARS) CAPS  Self Yes No   Sig: Take 4 capsules by mouth daily     Ferrous Sulfate (IRON) 325 (65 Fe) MG TABS  Self No No   Sig: Take 1 tablet (325 mg total) by mouth 2 (two) times a day   Ginkgo Biloba 40 MG TABS  Self Yes No   Sig: Take 60 mg by mouth daily   Magnesium 250 MG TABS  Self Yes No   Sig: Take 1 tablet by mouth daily  Multiple Vitamins-Minerals (OCUVITE PO)  Self Yes No   Sig: Take 1 tablet by mouth daily  Potassium Gluconate  MG TBCR  Self Yes No   Sig: Take 1 tablet by mouth daily  b complex vitamins tablet  Self Yes No   Sig: Take 1 tablet by mouth daily  cevimeline (EVOXAC) 30 MG capsule  Self Yes No   Sig: Take 30 mg by mouth 3 (three) times a day     clotrimazole-betamethasone (LOTRISONE) 1-0 05 % cream   No No   Sig: Apply topically 2 (two) times a day   cycloSPORINE (RESTASIS) 0 05 % ophthalmic emulsion  Self Yes No   Sig: Administer 1 drop to both eyes 2 (two) times a day  denosumab (PROLIA) 60 mg/mL  Self Yes No   Sig: Inject 60 mg under the skin every 6 (six) months   docusate sodium (COLACE) 100 mg capsule  Self No No   Sig: Take 1 capsule (100 mg total) by mouth 2 (two) times a day   estradiol (ESTRACE) 0 1 mg/g vaginal cream  Self Yes No   Sig: Insert 2 g into the vagina daily   fluticasone (FLONASE) 50 mcg/act nasal spray  Self No No   Si sprays into each nostril daily   hydroxychloroquine (PLAQUENIL) 200 mg tablet  Self Yes No   Sig: Take 200 mg by mouth daily  levothyroxine (SYNTHROID) 112 mcg tablet   No No   Sig: Take 1 tablet (112 mcg total) by mouth daily   losartan-hydrochlorothiazide (HYZAAR) 50-12 5 mg per tablet   No No   Sig: Take 1 tablet by mouth daily   omeprazole (PriLOSEC) 20 mg delayed release capsule  Self Yes No   Sig: Take 20 mg by mouth 2 (two) times a day  polyvinyl alcohol (LIQUIFILM TEARS) 1 4 % ophthalmic solution  Self Yes No   Sig: Administer 1 drop to both eyes as needed for dry eyes   simvastatin (ZOCOR) 20 mg tablet  Self Yes No   Sig: Take 20 mg by mouth daily at bedtime  vitamin E, tocopherol, 400 units capsule  Self Yes No   Sig: Take 400 Units by mouth daily  Facility-Administered Medications: None       Allergies: Allergies   Allergen Reactions    Sulfa Antibiotics Hives       Social History:     History   Alcohol Use No     History   Smoking Status    Never Smoker   Smokeless Tobacco    Never Used     History   Drug Use No       Family History:    Family History   Problem Relation Age of Onset    Stroke Father     Ovarian cancer Sister     No Known Problems Son     No Known Problems Son     Ovarian cancer Daughter     No Known Problems Daughter     No Known Problems Daughter        Physical Exam:     Vitals:   Blood Pressure: (!) 200/84 (01/06/19 1750)  Pulse: 75 (01/06/19 1845)  Temperature: 98 1 °F (36 7 °C) (01/06/19 1642)  Temp Source: Tympanic (01/06/19 1642)  Respirations: 17 (01/06/19 1845)  Weight - Scale: 76 9 kg (169 lb 8 5 oz) (01/06/19 1642)  SpO2: 95 % (01/06/19 1845)    Physical Exam   Constitutional: She is oriented to person, place, and time  She appears well-developed  No distress  HENT:   Head: Normocephalic  Mouth/Throat: Oropharynx is clear and moist    Eyes: Conjunctivae are normal    Neck: Neck supple  Cardiovascular: Normal rate and regular rhythm  Pulmonary/Chest: Effort normal  No respiratory distress  She has no wheezes  She has no rales  Abdominal: Soft  Bowel sounds are normal  She exhibits no distension  There is no tenderness  Musculoskeletal: She exhibits no tenderness  Lymphadenopathy:     She has no cervical adenopathy  Neurological: She is alert and oriented to person, place, and time  No cranial nerve deficit  Tongue is midline, there is no facial droop, extremity strength is 5/5 bilaterally equal speech is normal   Skin: Skin is warm and dry  Left orbital hematoma, patient is not able to open her eyes   Psychiatric: She has a normal mood and affect  Vitals reviewed            Additional Data:     Lab Results: I personally reviewed them      Results from last 7 days  Lab Units 01/06/19  1702   WBC Thousand/uL 13 50*   HEMOGLOBIN g/dL 14 7   I STAT HEMOGLOBIN g/dl 15 0   HEMATOCRIT % 42 7   HEMATOCRIT, ISTAT % 44   PLATELETS Thousands/uL 191   NEUTROS PCT % 82*   LYMPHS PCT % 10*   MONOS PCT % 7   EOS PCT % 0       Results from last 7 days  Lab Units 01/06/19  1702   CO2, I-STAT mmol/L 26   GLUCOSE, ISTAT mg/dl 150*       Results from last 7 days  Lab Units 01/06/19  1702   INR  1 68*       Imaging: I personally reviewed them    XR thumb first digit-min 2 views LEFT   ED Interpretation by Frieda Chavez DO (01/06 1759)   No acute abnl      TRAUMA - CT chest abdomen pelvis w contrast   Final Result by Obdulia Chau MD (01/06 1747)      There is no evidence of acute traumatic injury within the chest abdomen or pelvis      12 mm right adnexal cyst     According to current guidelines (J Am Jose Rafael Radiol 2013;10:671-681) in this postmenopausal woman, this should be evaluated with nonemergent pelvic ultrasound  Workstation performed: [id number]         TRAUMA - CT facial bones wo contrast   Final Result by Abel Mckinley MD (01/06 1743)         1  No evidence of acute maxillofacial fracture  2   Left orbital preseptal hematoma  No retrobulbar hematoma or proptosis  Workstation performed: [id number]         TRAUMA - CT spine cervical wo contrast   Final Result by Obdulia Chau MD (01/06 1730)      No cervical spine fracture or traumatic malalignment  Workstation performed: [id number]         TRAUMA - CT head wo contrast   Final Result by Obdulia Chau MD (01/06 1722)      No acute intracranial abnormality  Workstation performed: [id number]             EKG : I personally reviewed      Kaushal Rojas MD    ** Please Note: This note has been constructed using a voice recognition system   **

## 2019-01-07 VITALS
HEIGHT: 62 IN | WEIGHT: 170.42 LBS | HEART RATE: 68 BPM | TEMPERATURE: 97.8 F | DIASTOLIC BLOOD PRESSURE: 70 MMHG | SYSTOLIC BLOOD PRESSURE: 164 MMHG | RESPIRATION RATE: 18 BRPM | OXYGEN SATURATION: 94 % | BODY MASS INDEX: 31.36 KG/M2

## 2019-01-07 LAB
ANION GAP SERPL CALCULATED.3IONS-SCNC: 11 MMOL/L (ref 4–13)
ATRIAL RATE: 74 BPM
B2 GLYCOPROT1 AB SER QL: NEGATIVE
BUN SERPL-MCNC: 18 MG/DL (ref 5–25)
CALCIUM SERPL-MCNC: 9.2 MG/DL (ref 8.3–10.1)
CHLORIDE SERPL-SCNC: 101 MMOL/L (ref 100–108)
CO2 SERPL-SCNC: 27 MMOL/L (ref 21–32)
CREAT SERPL-MCNC: 0.97 MG/DL (ref 0.6–1.3)
DAT POLY-SP REAG RBC QL: NEGATIVE
E AG RBC QL: NEGATIVE
ERYTHROCYTE [DISTWIDTH] IN BLOOD BY AUTOMATED COUNT: 12.2 % (ref 11.6–15.1)
GFR SERPL CREATININE-BSD FRML MDRD: 55 ML/MIN/1.73SQ M
GLUCOSE SERPL-MCNC: 95 MG/DL (ref 65–140)
HCT VFR BLD AUTO: 39.4 % (ref 34.8–46.1)
HGB BLD-MCNC: 13.5 G/DL (ref 11.5–15.4)
INR PPP: 1.6 (ref 0.86–1.17)
MCH RBC QN AUTO: 32.5 PG (ref 26.8–34.3)
MCHC RBC AUTO-ENTMCNC: 34.3 G/DL (ref 31.4–37.4)
MCV RBC AUTO: 95 FL (ref 82–98)
P AXIS: 58 DEGREES
PLATELET # BLD AUTO: 191 THOUSANDS/UL (ref 149–390)
PMV BLD AUTO: 10.9 FL (ref 8.9–12.7)
POTASSIUM SERPL-SCNC: 3.5 MMOL/L (ref 3.5–5.3)
PR INTERVAL: 170 MS
PROTHROMBIN TIME: 18.1 SECONDS (ref 11.8–14.2)
QRS AXIS: 56 DEGREES
QRSD INTERVAL: 84 MS
QT INTERVAL: 424 MS
QTC INTERVAL: 470 MS
RBC # BLD AUTO: 4.16 MILLION/UL (ref 3.81–5.12)
SODIUM SERPL-SCNC: 139 MMOL/L (ref 136–145)
T WAVE AXIS: 28 DEGREES
VENTRICULAR RATE: 74 BPM
WBC # BLD AUTO: 9.21 THOUSAND/UL (ref 4.31–10.16)

## 2019-01-07 PROCEDURE — 85027 COMPLETE CBC AUTOMATED: CPT | Performed by: INTERNAL MEDICINE

## 2019-01-07 PROCEDURE — 93010 ELECTROCARDIOGRAM REPORT: CPT | Performed by: INTERNAL MEDICINE

## 2019-01-07 PROCEDURE — 99217 PR OBSERVATION CARE DISCHARGE MANAGEMENT: CPT | Performed by: INTERNAL MEDICINE

## 2019-01-07 PROCEDURE — 85610 PROTHROMBIN TIME: CPT | Performed by: INTERNAL MEDICINE

## 2019-01-07 PROCEDURE — 80048 BASIC METABOLIC PNL TOTAL CA: CPT | Performed by: INTERNAL MEDICINE

## 2019-01-07 RX ADMIN — LEVOTHYROXINE SODIUM 112 MCG: 112 TABLET ORAL at 06:02

## 2019-01-07 RX ADMIN — METOPROLOL TARTRATE 25 MG: 25 TABLET, FILM COATED ORAL at 09:27

## 2019-01-07 RX ADMIN — LOSARTAN POTASSIUM: 50 TABLET, FILM COATED ORAL at 09:27

## 2019-01-07 RX ADMIN — HYDROXYCHLOROQUINE SULFATE 200 MG: 200 TABLET, FILM COATED ORAL at 09:31

## 2019-01-07 RX ADMIN — BACITRACIN ZINC 1 SMALL APPLICATION: 500 OINTMENT TOPICAL at 09:31

## 2019-01-07 NOTE — DISCHARGE INSTRUCTIONS
Follow-up with the PCP in 3 days  Continue to hold Coumadin and restart tomorrow  If any worsening  Swelling/hematoma is noticed then hold Coumadin and return to ER

## 2019-01-07 NOTE — PROGRESS NOTES
Patient spilled hot soup on her chest while eating  2nd degree burn evident   Will apply Bacitracin and Tegaderm and continue to monitor

## 2019-01-07 NOTE — PLAN OF CARE
DISCHARGE PLANNING     Discharge to home or other facility with appropriate resources Progressing        INFECTION - ADULT     Absence or prevention of progression during hospitalization Progressing     Absence of fever/infection during neutropenic period Progressing        Knowledge Deficit     Patient/family/caregiver demonstrates understanding of disease process, treatment plan, medications, and discharge instructions Progressing        NEUROSENSORY - ADULT     Achieves stable or improved neurological status Progressing        PAIN - ADULT     Verbalizes/displays adequate comfort level or baseline comfort level Progressing        Potential for Falls     Patient will remain free of falls Progressing        SAFETY ADULT     Maintain or return to baseline ADL function Progressing     Maintain or return mobility status to optimal level Progressing     Patient will remain free of falls Progressing

## 2019-01-07 NOTE — DISCHARGE SUMMARY
Discharge Summary - Scarlet Wang 82 y o  female MRN: [id number]  Unit/Bed#: [address] [place] [address] Encounter: [id number]    Admission Date:    1/6/2019   Discharge Date:   01/07/19   Admitting Diagnosis:   Eye injury [S05 90XA]  Contusion of left thumb [S60 012A]  Fall, initial encounter [W19  XXXA]  Forehead abrasion, initial encounter [S00 81XA]  Chest wall contusion, left, initial encounter [S20 212A]  Periorbital hematoma of left eye [E39 333]  Admitting Provider:   Adan Mohr MD  Discharge Provider:   Matteo Torres MD     Primary Care Physician at Discharge:   Kris Mccullough    HPI:    21-year-old female who presented to emergency department with left orbital hematoma  Patient had a mechanical fall  For a detailed HPI please refer to the admission note  Procedures Performed:   Orders Placed This Encounter   Procedures    ED ECG Documentation Only       Hospital Course:   Patient was admitted for observation as per Trauma attending at [address] [facility]  Patient's anticoagulation was held  Patient was continued on her rest of home medications  Patient overnight had spilled some hot soup on her chest and has a 5 cm  A reddened area on the skin  Has no blisters  Patient feels back to baseline and wants to go home  Advised cold compresses to the right orbit  No neurological dysfunction  Vision is intact  Denies any diplopia, blurry vision or any other complaints  Patient was seen by  and denied any home care services  Follow-up with the PCP in 3 days  Continue to hold Coumadin and restart tomorrow  If any worsening  Swelling/hematoma is noticed then hold Coumadin and return to ER      Consulting Providers   Trauma    Complications:   none    Labs:   Lab Results   Component Value Date    WBC 9 21 01/07/2019    WBC 7 11 01/25/2015    RBC 4 16 01/07/2019    RBC 3 01 (L) 01/25/2015    HGB 13 5 01/07/2019    HGB 10 3 (L) 01/25/2015    HCT 39 4 01/07/2019    HCT 31 7 (L) 01/25/2015    MCV 95 01/07/2019     (H) 01/25/2015    MCH 32 5 01/07/2019    MCH 34 2 01/25/2015    RDW 12 2 01/07/2019    RDW 15 0 01/25/2015     01/07/2019     (L) 01/25/2015     Lab Results   Component Value Date    CREATININE 0 97 01/07/2019    CREATININE 0 81 01/25/2015    BUN 18 01/07/2019    BUN 19 01/25/2015     01/25/2015    K 3 5 01/07/2019    K 3 1 (L) 01/25/2015     01/07/2019     01/25/2015    CO2 27 01/07/2019    CO2 26 01/06/2019    GLUCOSE 150 (H) 01/06/2019    GLUCOSE 97 01/25/2015    PROT 6 5 01/21/2015    ALKPHOS 60 07/17/2018    ALKPHOS 164 (H) 01/21/2015    ALT 28 07/17/2018    ALT 13 01/21/2015    AST 47 (H) 07/17/2018    AST 7 01/21/2015    BILIDIR 0 11 07/15/2018       Treatments:  Hyzaar, Synthroid, Plaquenil, bacitracin    Discharge Diagnosis:   Principal Problem:    Traumatic hematoma of left orbit  Active Problems:    History of pulmonary embolus (PE)    Essential hypertension  Resolved Problems:    * No resolved hospital problems  *      Condition at Discharge:   Good     Code Status: Level 1 - Full Code  Advance Directive and Living Will: <no information>  Power of :    POLST:      Discharge instructions/Information to patient and family:   See after visit summary for information provided to patient and family  Provisions for Follow-Up Care:  See after visit summary for information related to follow-up care and any pertinent home health orders  Disposition:   Home    Planned Readmission:   No    Discharge Statement   I spent 35 minutes discharging the patient  This time was spent on the day of discharge  I had direct contact with the patient on the day of discharge  Greater than 50% of the total time was spent examining patient, answering all patient questions, arranging and discussing plan of care with patient as well as directly providing post-discharge instructions   Additional time then spent on discharge activities  Discharge Medications:  See after visit summary for reconciled discharge medications provided to patient and family        "This note has been constructed using a voice recognition system"    Bishop Caro MD  1/7/2019,11:59 AM

## 2019-01-07 NOTE — UTILIZATION REVIEW
Initial Clinical Review    Admission: Date/Time/Statement: 01/06/19 @ 1832 Observation Written     Orders Placed This Encounter   Procedures    Place in Observation (expected length of stay for this patient is less than two midnights)     Standing Status:   Standing     Number of Occurrences:   1     Order Specific Question:   Admitting Physician     Answer:   Annabel Reynoso [[id number]]     Order Specific Question:   Level of Care     Answer:   Med Surg [16]         ED: Date/Time/Mode of Arrival:   ED Arrival Information     Expected Arrival Acuity Means of Arrival Escorted By Service Admission Type    1/6/2019 1/6/2019 16:22 Emergent Walk-In Family Member General Medicine Emergency    Arrival Complaint    FALL/LEFT EYE INJURY          Chief Complaint:   Chief Complaint   Patient presents with   Jullie Liming Fall     To ED with c/o fall today on concrete at 1230  Pt states that she missed a step and fell forward  Denies any LOC  Does note nausea, increased left eye swelling  Also had chest discomfort       History of Illness: Patient complains of trip on cement step at hockey rink about 4 hours ago and hit face on step, also has chest pain with bruise there  Takes coumadin  Was able to get up on her own  Tetanus vaccine up to date  Did not pass out  ED Vital Signs:   ED Triage Vitals [01/06/19 1642]   Temperature Pulse Respirations Blood Pressure SpO2   98 1 °F (36 7 °C) 78 20 (!) 232/98 98 %      Temp Source Heart Rate Source Patient Position - Orthostatic VS BP Location FiO2 (%)   Tympanic Monitor Sitting Right arm --      Pain Score       5        Wt Readings from Last 1 Encounters:   01/06/19 77 3 kg (170 lb 6 7 oz)       Vital Signs (abnormal): TEMP 96 9  RESPS 25  /84, 196/81, 203/56    Abnormal Labs/Diagnostic Test Results:   WBC 13 50  GLUCOSE 150  PT 18 8, INR 1 68    CT FACIAL BONES:    1  No evidence of acute maxillofacial fracture  2   Left orbital preseptal hematoma    No retrobulbar hematoma or proptosis  XR LEFT THUMB:  No acute finding  Degenerative changes 1st carpal metacarpal joint  ED Treatment:   Medication Administration from 01/06/2019 1622 to 01/06/2019 1934       Date/Time Order Dose Route Action     01/06/2019 1745 sodium chloride 0 9 % bolus 1,000 mL 1,000 mL Intravenous New Bag     01/06/2019 1746 acetaminophen (TYLENOL) tablet 975 mg 975 mg Oral Given     01/06/2019 1746 bacitracin topical ointment 1 small application 1 small application Topical Given     01/06/2019 1727 iohexol (OMNIPAQUE) 350 MG/ML injection (SINGLE-DOSE) 100 mL 100 mL Intravenous Given          Past Medical/Surgical History: Active Ambulatory Problems     Diagnosis Date Noted    [facility]'s [facility]     History of pulmonary embolus (PE)     Essential hypertension     Hypothyroidism in adult     Chronic kidney disease 07/15/2018    Symptomatic anemia 07/15/2018    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    Pleural effusion on right 07/18/2018    Hyperlipidemia 08/24/2018    Vitamin D deficiency 08/24/2018    Osteoporosis 08/24/2018     Resolved Ambulatory Problems     Diagnosis Date Noted    Leukocytosis 07/15/2018    Acute respiratory failure with hypoxia ([address] ) 07/15/2018     Past Medical History:   Diagnosis Date    Disease of thyroid gland     Hyperlipidemia     Hypertension     [facility]olism) [facility]     [facility]'s syndrome [facility]     S[facility]ogren's syndrome ([facility]     Urinary tract infection        Admitting Diagnosis: Eye injury [S05 90XA]  Contusion of left thumb [S60 012A]  Fall, initial encounter [W19  XXXA]  Forehead abrasion, initial encounter [S00 81XA]  Chest wall contusion, left, initial encounter [S20 212A]  Periorbital hematoma of left eye [H05 232]    Age/Sex: 82 y o  female    Assessment/Plan:   * Traumatic hematoma of left orbit   Assessment & Plan     Will observe the patient on telemetry over night  Patient's INR is 1 68    In the setting of acute subcutaneous bleeding, left orbital hematoma I will not give her warfarin today  Will check INR tomorrow  Will continue cold compresses to right orbit  Will follow her neurological status with serial neuro checks        Patient may go home tomorrow not require more than 2 midnights hospital stay, she is level 1 DNR       Essential hypertension   Assessment & Plan     Continue losartan hydrochlorothiazide, will replace potassium, will add Lopressor for uncontrolled hypertension      History of pulmonary embolus (PE)   Assessment & Plan     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, she takes warfarin 4 mg at home         VTE Prophylaxis: ordered    Admission Orders:  Clear liquid diet  OOB as leonidas  Apply dsg qs  Daily weights, I/O  Neuro checks q4h  Pt  Ice to affected aread  Sequential compression device    Scheduled Meds:   Current Facility-Administered Medications:  acetaminophen 650 mg Oral Q6H PRN   bacitracin 1 small application Topical BID   hydroxychloroquine 200 mg Oral Daily   levothyroxine 112 mcg Oral Daily   losartan potassium-hydrochlorothiazide (HYZAAR 50/12  5) combination  Oral Daily   metoprolol tartrate 25 mg Oral Q12H MANUEL   ondansetron 4 mg Intravenous Q6H PRN     Continuous Infusions:    PRN Meds:   acetaminophen    ondansetron

## 2019-01-07 NOTE — SOCIAL WORK
Met with Pt  Pt's  at bedside  Pt presents AA&Ox3  Discussed role of   Pt informed of observation and signed observation form  Pt lives with  in [address] [facility], 1st floor setup, [address] with railings  Pt is independent with adls and ambulation  Pt and Pt's  drives and goes grocery shopping  Pt had [address] in past  Pt has been to [facility] for outpt PT in past  Pt plans to return home and does not anticipate discharge needs  Will follow

## 2019-01-07 NOTE — PROGRESS NOTES
Pt  Made this nurse aware, she spilt her broth on her chest  Chest reddened  Made Pa aware  Will continue to monitor

## 2019-01-09 NOTE — UTILIZATION REVIEW
Initial Clinical Review    Admission: Date/Time/Statement: 01/06/19 @ 1832 Observation Written     Orders Placed This Encounter   Procedures    Place in Observation (expected length of stay for this patient is less than two midnights)     Standing Status:   Standing     Number of Occurrences:   1     Order Specific Question:   Admitting Physician     Answer:   Nathalia Sims [[id number]]     Order Specific Question:   Level of Care     Answer:   Med Surg [16]         ED: Date/Time/Mode of Arrival:   ED Arrival Information     Expected Arrival Acuity Means of Arrival Escorted By Service Admission Type    1/6/2019 1/6/2019 16:22 Emergent Walk-In Family Member General Medicine Emergency    Arrival Complaint    FALL/LEFT EYE INJURY          Chief Complaint:   Chief Complaint   Patient presents with   Sergei Gaviria Fall     To ED with c/o fall today on concrete at 1230  Pt states that she missed a step and fell forward  Denies any LOC  Does note nausea, increased left eye swelling  Also had chest discomfort       History of Illness: Patient complains of trip on cement step at hockey rink about 4 hours ago and hit face on step, also has chest pain with bruise there  Takes coumadin  Was able to get up on her own  Tetanus vaccine up to date  Did not pass out  ED Vital Signs:   ED Triage Vitals [01/06/19 1642]   Temperature Pulse Respirations Blood Pressure SpO2   98 1 °F (36 7 °C) 78 20 (!) 232/98 98 %      Temp Source Heart Rate Source Patient Position - Orthostatic VS BP Location FiO2 (%)   Tympanic Monitor Sitting Right arm --      Pain Score       5          Wt Readings from Last 1 Encounters:   01/06/19 77 3 kg (170 lb 6 7 oz)       Vital Signs (abnormal): TEMP 96 9  RESPS 25  /84, 196/81, 203/56    Abnormal Labs/Diagnostic Test Results:   WBC 13 50  GLUCOSE 150  PT 18 8, INR 1 68    CT FACIAL BONES:    1  No evidence of acute maxillofacial fracture  2   Left orbital preseptal hematoma    No retrobulbar hematoma or proptosis  XR LEFT THUMB:  No acute finding  Degenerative changes 1st carpal metacarpal joint  ED Treatment:   Medication Administration from 01/06/2019 1622 to 01/06/2019 1934       Date/Time Order Dose Route Action     01/06/2019 1745 sodium chloride 0 9 % bolus 1,000 mL 1,000 mL Intravenous New Bag     01/06/2019 1746 acetaminophen (TYLENOL) tablet 975 mg 975 mg Oral Given     01/06/2019 1746 bacitracin topical ointment 1 small application 1 small application Topical Given     01/06/2019 1727 iohexol (OMNIPAQUE) 350 MG/ML injection (SINGLE-DOSE) 100 mL 100 mL Intravenous Given          Past Medical/Surgical History: Active Ambulatory Problems     Diagnosis Date Noted    Sjogren's syndrome ([facility]     History of pulmonary embolus (PE)     Essential hypertension     Hypothyroidism in adult     Chronic kidney disease 07/15/2018    Symptomatic anemia 07/15/2018    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    Pleural effusion on right 07/18/2018    Hyperlipidemia 08/24/2018    Vitamin D deficiency 08/24/2018    Osteoporosis 08/24/2018     Resolved Ambulatory Problems     Diagnosis Date Noted    Leukocytosis 07/15/2018    Acute respiratory failure with hypoxia ([address] ) 07/15/2018     Past Medical History:   Diagnosis Date    Disease of thyroid gland     Hyperlipidemia     Hypertension     PE (pulmonary thromboembolism) ([facility] [address] [facility]     Sjogren's syndrome ([facility] [address] [facility]     Sjogren's syndrome ([facility] [address] [facility]     Urinary tract infection        Admitting Diagnosis: Eye injury [S05 90XA]  Contusion of left thumb [S60 012A]  Forehead abrasion, initial encounter [S00 81XA]  Chest wall contusion, left, initial encounter [S20 212A]  Periorbital hematoma of left eye [H05 232]    Age/Sex: 82 y o  female    Assessment/Plan:   * Traumatic hematoma of left orbit   Assessment & Plan     Will observe the patient on telemetry over night  Patient's INR is 1 68    In the setting of acute subcutaneous bleeding, left orbital hematoma I will not give her warfarin today  Will check INR tomorrow  Will continue cold compresses to right orbit  Will follow her neurological status with serial neuro checks        Patient may go home tomorrow not require more than 2 midnights hospital stay, she is level 1 DNR       Essential hypertension   Assessment & Plan     Continue losartan hydrochlorothiazide, will replace potassium, will add Lopressor for uncontrolled hypertension      History of pulmonary embolus (PE)   Assessment & Plan     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, she takes warfarin 4 mg at home         VTE Prophylaxis: ordered    Admission Orders:  Clear liquid diet  OOB as leonidas  Apply dsg qs  Daily weights, I/O  Neuro checks q4h  Pt  Ice to affected aread  Sequential compression device    Scheduled Meds:   Current Facility-Administered Medications:  acetaminophen 650 mg Oral Q6H PRN   bacitracin 1 small application Topical BID   hydroxychloroquine 200 mg Oral Daily   levothyroxine 112 mcg Oral Daily   losartan potassium-hydrochlorothiazide (HYZAAR 50/12  5) combination  Oral Daily   metoprolol tartrate 25 mg Oral Q12H MANUEL   ondansetron 4 mg Intravenous Q6H PRN     Continuous Infusions:   No current facility-administered medications for this encounter  PRN Meds:     Notification of Discharge  This is a Notification of Discharge from our facility [address]  Please be advised that this patient has been discharge from our facility  Below you will find the admission and discharge date and time including the patients disposition  PRESENTATION DATE: 1/6/2019  4:40 PM  IP ADMISSION DATE: N/A N/A  DISCHARGE DATE: 1/7/2019  2:34 PM  DISPOSITION: [address] Utilization Review Department  Phone: [phone]; Fax [phone]  ATTENTION: Please call with any questions or concerns to [phone]  and carefully listen to the prompts so that you are directed to the right person     Send all requests for admission clinical reviews, approved or denied determinations and any other requests to fax [phone]   All voicemails are confidential

## 2019-01-10 NOTE — UTILIZATION REVIEW
Dilshad Kelly  [id number]    Notification of Discharge  This is a Notification of Discharge from our facility [facility]  Please be advised that this patient has been discharge from our facility  Below you will find the admission and discharge date and time including the patients disposition  PRESENTATION DATE: 1/6/2019  4:40 PM  IP ADMISSION DATE: N/A N/A  DISCHARGE DATE: 1/7/2019  2:34 PM  DISPOSITION: [address] Utilization Review Department  Phone: [phone]; Fax [phone]  ATTENTION: Please call with any questions or concerns to [phone]  and carefully listen to the prompts so that you are directed to the right person  Send all requests for admission clinical reviews, approved or denied determinations and any other requests to fax [phone]   All voicemails are confidential

## 2019-02-20 ENCOUNTER — TRANSCRIBE ORDERS (OUTPATIENT)
Dept: ADMINISTRATIVE | Facility: HOSPITAL | Age: 84
End: 2019-02-20

## 2019-02-20 DIAGNOSIS — M54.30 SCIATICA, UNSPECIFIED LATERALITY: Primary | ICD-10-CM

## 2019-03-01 ENCOUNTER — HOSPITAL ENCOUNTER (OUTPATIENT)
Dept: CT IMAGING | Facility: HOSPITAL | Age: 84
Discharge: HOME | End: 2019-03-01
Payer: COMMERCIAL

## 2019-03-01 DIAGNOSIS — M54.30 SCIATICA, UNSPECIFIED LATERALITY: ICD-10-CM

## 2019-03-01 PROCEDURE — 72131 CT LUMBAR SPINE W/O DYE: CPT

## 2019-03-08 ENCOUNTER — TELEPHONE (OUTPATIENT)
Dept: ENDOCRINOLOGY | Facility: HOSPITAL | Age: 84
End: 2019-03-08

## 2019-05-01 ENCOUNTER — TELEPHONE (OUTPATIENT)
Dept: ENDOCRINOLOGY | Facility: HOSPITAL | Age: 84
End: 2019-05-01

## 2019-05-14 ENCOUNTER — HOSPITAL ENCOUNTER (OUTPATIENT)
Dept: BONE DENSITY | Facility: IMAGING CENTER | Age: 84
Discharge: HOME | End: 2019-05-14
Payer: COMMERCIAL

## 2019-05-14 DIAGNOSIS — M81.0 OSTEOPOROSIS, UNSPECIFIED OSTEOPOROSIS TYPE, UNSPECIFIED PATHOLOGICAL FRACTURE PRESENCE: ICD-10-CM

## 2019-05-14 PROCEDURE — 77080 DXA BONE DENSITY AXIAL: CPT

## 2019-05-21 ENCOUNTER — OFFICE VISIT (OUTPATIENT)
Dept: ENDOCRINOLOGY | Facility: HOSPITAL | Age: 84
End: 2019-05-21
Payer: COMMERCIAL

## 2019-05-21 VITALS
DIASTOLIC BLOOD PRESSURE: 80 MMHG | WEIGHT: 162.8 LBS | HEIGHT: 62 IN | HEART RATE: 61 BPM | SYSTOLIC BLOOD PRESSURE: 124 MMHG | BODY MASS INDEX: 29.96 KG/M2

## 2019-05-21 DIAGNOSIS — I10 ESSENTIAL HYPERTENSION: ICD-10-CM

## 2019-05-21 DIAGNOSIS — M81.0 AGE-RELATED OSTEOPOROSIS WITHOUT CURRENT PATHOLOGICAL FRACTURE: ICD-10-CM

## 2019-05-21 DIAGNOSIS — E55.9 VITAMIN D DEFICIENCY: ICD-10-CM

## 2019-05-21 DIAGNOSIS — E78.5 HYPERLIPIDEMIA, UNSPECIFIED HYPERLIPIDEMIA TYPE: ICD-10-CM

## 2019-05-21 DIAGNOSIS — E03.9 HYPOTHYROIDISM IN ADULT: Primary | ICD-10-CM

## 2019-05-21 PROCEDURE — 96372 THER/PROPH/DIAG INJ SC/IM: CPT | Performed by: NURSE PRACTITIONER

## 2019-05-21 PROCEDURE — 99214 OFFICE O/P EST MOD 30 MIN: CPT | Performed by: NURSE PRACTITIONER

## 2019-06-07 ENCOUNTER — TRANSCRIBE ORDERS (OUTPATIENT)
Dept: ADMINISTRATIVE | Facility: HOSPITAL | Age: 84
End: 2019-06-07

## 2019-06-07 DIAGNOSIS — M75.121 COMPLETE TEAR OF RIGHT ROTATOR CUFF, UNSPECIFIED WHETHER TRAUMATIC: Primary | ICD-10-CM

## 2019-06-10 DIAGNOSIS — I10 HYPERTENSION, UNSPECIFIED TYPE: ICD-10-CM

## 2019-06-10 RX ORDER — LOSARTAN POTASSIUM AND HYDROCHLOROTHIAZIDE 12.5; 5 MG/1; MG/1
TABLET ORAL
Qty: 90 TABLET | Refills: 1 | Status: SHIPPED | OUTPATIENT
Start: 2019-06-10 | End: 2019-12-11

## 2019-06-13 ENCOUNTER — HOSPITAL ENCOUNTER (OUTPATIENT)
Dept: CT IMAGING | Facility: HOSPITAL | Age: 84
Discharge: HOME | End: 2019-06-13
Payer: COMMERCIAL

## 2019-06-13 ENCOUNTER — LAB (OUTPATIENT)
Dept: LAB | Facility: HOSPITAL | Age: 84
End: 2019-06-13
Payer: COMMERCIAL

## 2019-06-13 DIAGNOSIS — M81.0 AGE-RELATED OSTEOPOROSIS WITHOUT CURRENT PATHOLOGICAL FRACTURE: ICD-10-CM

## 2019-06-13 DIAGNOSIS — M75.121 COMPLETE TEAR OF RIGHT ROTATOR CUFF, UNSPECIFIED WHETHER TRAUMATIC: ICD-10-CM

## 2019-06-13 LAB
ALBUMIN SERPL BCP-MCNC: 3.9 G/DL (ref 3.5–5)
ALP SERPL-CCNC: 60 U/L (ref 46–116)
ALT SERPL W P-5'-P-CCNC: 28 U/L (ref 12–78)
ANION GAP SERPL CALCULATED.3IONS-SCNC: 9 MMOL/L (ref 4–13)
AST SERPL W P-5'-P-CCNC: 25 U/L (ref 5–45)
BILIRUB SERPL-MCNC: 0.5 MG/DL (ref 0.2–1)
BUN SERPL-MCNC: 31 MG/DL (ref 5–25)
CALCIUM ALBUM COR SERPL-MCNC: 10.4 MG/DL (ref 8.3–10.1)
CALCIUM SERPL-MCNC: 10.3 MG/DL (ref 8.3–10.1)
CHLORIDE SERPL-SCNC: 103 MMOL/L (ref 100–108)
CO2 SERPL-SCNC: 29 MMOL/L (ref 21–32)
CREAT SERPL-MCNC: 1.04 MG/DL (ref 0.6–1.3)
GFR SERPL CREATININE-BSD FRML MDRD: 51 ML/MIN/1.73SQ M
GLUCOSE P FAST SERPL-MCNC: 95 MG/DL (ref 65–99)
POTASSIUM SERPL-SCNC: 4.4 MMOL/L (ref 3.5–5.3)
PROT SERPL-MCNC: 8 G/DL (ref 6.4–8.2)
SODIUM SERPL-SCNC: 141 MMOL/L (ref 136–145)

## 2019-06-13 PROCEDURE — 36415 COLL VENOUS BLD VENIPUNCTURE: CPT

## 2019-06-13 PROCEDURE — 80053 COMPREHEN METABOLIC PANEL: CPT

## 2019-06-13 PROCEDURE — 72131 CT LUMBAR SPINE W/O DYE: CPT

## 2019-06-24 ENCOUNTER — EVALUATION (OUTPATIENT)
Dept: PHYSICAL THERAPY | Facility: CLINIC | Age: 84
End: 2019-06-24
Payer: COMMERCIAL

## 2019-06-24 ENCOUNTER — TRANSCRIBE ORDERS (OUTPATIENT)
Dept: PHYSICAL THERAPY | Facility: CLINIC | Age: 84
End: 2019-06-24

## 2019-06-24 DIAGNOSIS — M84.311D: Primary | ICD-10-CM

## 2019-06-24 PROCEDURE — 97161 PT EVAL LOW COMPLEX 20 MIN: CPT

## 2019-06-28 ENCOUNTER — OFFICE VISIT (OUTPATIENT)
Dept: PHYSICAL THERAPY | Facility: CLINIC | Age: 84
End: 2019-06-28
Payer: COMMERCIAL

## 2019-06-28 DIAGNOSIS — M84.311D: Primary | ICD-10-CM

## 2019-06-28 PROCEDURE — 97112 NEUROMUSCULAR REEDUCATION: CPT | Performed by: PHYSICAL THERAPIST

## 2019-06-28 PROCEDURE — 97110 THERAPEUTIC EXERCISES: CPT | Performed by: PHYSICAL THERAPIST

## 2019-07-01 ENCOUNTER — OFFICE VISIT (OUTPATIENT)
Dept: PHYSICAL THERAPY | Facility: CLINIC | Age: 84
End: 2019-07-01
Payer: COMMERCIAL

## 2019-07-01 DIAGNOSIS — M84.311D: Primary | ICD-10-CM

## 2019-07-01 PROCEDURE — 97112 NEUROMUSCULAR REEDUCATION: CPT | Performed by: PHYSICAL THERAPIST

## 2019-07-01 PROCEDURE — 97110 THERAPEUTIC EXERCISES: CPT | Performed by: PHYSICAL THERAPIST

## 2019-07-01 NOTE — PROGRESS NOTES
Daily Note     Today's date: 2019  Patient name: Raegan Fonseca  : 1935  MRN: [id number]  Referring provider: Nikolas Marvin MD  Dx:   Encounter Diagnosis     ICD-10-CM    1  Stress fracture of right shoulder with routine healing M84 311D        Start Time: 1528  Stop Time: 1608  Total time in clinic (min): 40 minutes    Patient treated by AHMET Conn under my direct supervision  Subjective: Patient reports feeling good today and she was able to reach her second shelf in her kitchen cabinets  Objective: See treatment diary below      Assessment: Patient tolerated treatment well today and was able to perform all exercises with no pain  Initiated new manual assisted strengthening and stabilization interventions, were tolerated with good form and no pain  She is still progressing into AROM flexion/abduction, would benefit from continued skilled physical therapy to address R shoulder strength and AROM deficits  Plan: Continue plan of care, strengthen shoulder muscles       Daily Treatment Record:    POC expires:   PRECAUTIONS: none  PMH:osteoporosis, HTN, skin cancer, thyroid disease, PE, DVT, stress fx R acromion, 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        Manual Therapy                R shoulder PROM  DL                PNF diagonals    DL, 8x                                                                           Exercise Diary                 Shoulder isometrics flex/abd/ext  15xea HEP               AAROM Cane flex/abd  Flex  supine Flex   supine               T-band rows                  t-band LPD  Orange 15x Orange 20x               t-band ecc ER   attempted                Standing scaption                  AAROM t-band flex/abd  15x flex 15x flex               ecc lat raise  attemtpted                Horizontal abd prone  15x 15x               Eccentric abd S/L  1# 15x 1# 20x               Shoulder ext prone  1# 15x 1# 15x               Pulleys scaption/flexion  1-2' ea 2' ea               Ecc ER S/L   2x10 15x               S/L flexion   10x 20x                wall climb abd/flex      10x flex only                  wall wash w/ ball      20x ea                  Rhythmic stabilization      3x20"                                                                                               Modalities

## 2019-07-08 ENCOUNTER — OFFICE VISIT (OUTPATIENT)
Dept: PHYSICAL THERAPY | Facility: CLINIC | Age: 84
End: 2019-07-08
Payer: COMMERCIAL

## 2019-07-08 DIAGNOSIS — M84.311D: Primary | ICD-10-CM

## 2019-07-08 PROCEDURE — 97140 MANUAL THERAPY 1/> REGIONS: CPT | Performed by: PHYSICAL THERAPIST

## 2019-07-08 PROCEDURE — 97112 NEUROMUSCULAR REEDUCATION: CPT | Performed by: PHYSICAL THERAPIST

## 2019-07-08 NOTE — PROGRESS NOTES
Daily Note     Today's date: 2019  Patient name: Bolivar Vee  : 1935  MRN: [id number]  Referring provider: Ravindra Salguero MD  Dx:   Encounter Diagnosis     ICD-10-CM    1  Stress fracture of right shoulder with routine healing M84 311D        Start Time: 1030  Stop Time: 1110  Total time in clinic (min): 40 minutes    Patient treated by AHMET Correai under my direct supervision  Subjective: Patient said she feels good today, she noticed she has more ROM in her right shoulder  Objective: See treatment diary below      Assessment: Patient tolerated treatment well today and was able to perform more shoulder strengthening interventions  She was able to move into greater AROM without hiking her shoulder  Patient would benefit from continued skilled physical therapy to improve shoulder AROM and functional mobility  Plan: Continue plan of care, strengthen shoulder muscles       Daily Treatment Record:    POC expires:   PRECAUTIONS: none  PMH:osteoporosis, HTN, skin cancer, thyroid disease, PE, DVT, stress fx R acromion, 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        Manual Therapy               R shoulder PROM  DL                PNF diagonals    DL, 8x DL, 12x                                                                          Exercise Diary   7/8              Shoulder isometrics flex/abd/ext  15xea HEP               AAROM Cane flex/abd  Flex  supine Flex  supine np              T-band rows                  t-band LPD  Orange 15x Orange 20x Green 20x              t-band ecc ER   attempted                Standing scaption                  AAROM t-band flex/abd  15x flex 15x flex 15x flex              ecc lat raise  attemtpted                Horizontal abd prone  15x 15x 15x              Eccentric abd S/L  1# 15x 1# 20x 1#20x              Shoulder ext prone  1# 15x 1# 15x 1# 15x              Pulleys scaption/flexion  1-2' ea 2' ea 2' scapt                Ecc ER S/L 2x10 15x 15x              S/L flexion   10x 20x 15x               wall climb abd/flex      10x flex only  15x flex only                wall wash w/ ball      20x ea                  Rhythmic stabilization      3x20"  3x20"                t-band IR        15x orange                scap pushup        10x on elbows                shoulder flexion supine        1# 20x                     Modalities

## 2019-07-11 ENCOUNTER — APPOINTMENT (OUTPATIENT)
Dept: PHYSICAL THERAPY | Facility: CLINIC | Age: 84
End: 2019-07-11
Payer: COMMERCIAL

## 2019-07-15 ENCOUNTER — OFFICE VISIT (OUTPATIENT)
Dept: PHYSICAL THERAPY | Facility: CLINIC | Age: 84
End: 2019-07-15
Payer: COMMERCIAL

## 2019-07-15 DIAGNOSIS — M84.311D: Primary | ICD-10-CM

## 2019-07-15 NOTE — PROGRESS NOTES
Daily Note     Today's date: 7/15/2019  Patient name: Tani Glaser  : 1935  MRN: [id number]  Referring provider: Juventino Cardoso MD  Dx:   Encounter Diagnosis     ICD-10-CM    1  Stress fracture of right shoulder with routine healing M84 311D        Start Time:   Stop Time: 1104  Total time in clinic (min): 35 minutes    Patient treated by AHMET Olguin under my direct supervision  Subjective: Patient reports feeling good today, missed last session because she was put on a new medication and it made her feel sick  Said she thinks she is doing better with therapy  Objective: See treatment diary below      Assessment: Patient tolerated treatment well today, AROM continues to improve but still fatigues fairly quickly into flexion  MRE's and PNF's have shown she is very week in flexion starting at about 60 degrees  Patient would benefit from continued skilled physical therapy to address weakness in R shoulder and improve functional mobility  Plan: Continue plan of care, strengthen shoulder muscles        Daily Treatment Record:    POC expires:   PRECAUTIONS: none  PMH:osteoporosis, HTN, skin cancer, thyroid disease, PE, DVT, stress fx R acromion, 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        Manual Therapy 6/24 6/28 7/1 7/8 7/15             R shoulder PROM  DL   DL             PNF diagonals    DL, 8x DL, 12x DL 15x             MRE flexion     10x                                                       Exercise Diary  6/24 6/28 7/1 7/8 7/15             Shoulder isometrics flex/abd/ext  15xea HEP               AAROM Cane flex/abd  Flex  supine Flex   supine np              T-band rows     15x green             t-band LPD  Orange 15x Orange 20x Green 20x Green 20x             t-band ecc ER   attempted                Standing scaption                  AAROM t-band flex/abd  15x flex 15x flex 15x flex 15x flex             ecc lat raise  attemtpted                Horizontal abd prone  15x 15x 15x 15x Eccentric abd S/L  1# 15x 1# 20x 1#20x 1# 20x             Shoulder ext prone  1# 15x 1# 15x 1# 15x 1# 15x             Pulleys scaption/flexion  1-2' ea 2' ea 2' scapt  1' ea             Ecc ER S/L   2x10 15x 15x 15x             S/L flexion   10x 20x 15x 15x              wall climb abd/flex      10x flex only  15x flex only  15x flex only              wall wash w/ ball      20x ea                  Rhythmic stabilization      3x20"  3x20"  3x20"              t-band IR        15x orange  15x orange              scap pushup        10x on elbows  10x on elbows              shoulder flexion supine        1# 20x  1# 20x             Standing ER grav  elim       15x                                                     Modalities

## 2019-07-17 ENCOUNTER — APPOINTMENT (OUTPATIENT)
Dept: PHYSICAL THERAPY | Facility: CLINIC | Age: 84
End: 2019-07-17
Payer: COMMERCIAL

## 2019-07-29 ENCOUNTER — OFFICE VISIT (OUTPATIENT)
Dept: PHYSICAL THERAPY | Facility: CLINIC | Age: 84
End: 2019-07-29
Payer: COMMERCIAL

## 2019-07-29 DIAGNOSIS — M84.311D: Primary | ICD-10-CM

## 2019-07-29 PROCEDURE — 97112 NEUROMUSCULAR REEDUCATION: CPT | Performed by: PHYSICAL THERAPIST

## 2019-07-29 NOTE — PROGRESS NOTES
Daily Note     Today's date: 2019  Patient name: Anthony Sol  : 1935  MRN: [id number]  Referring provider: John Johnson MD  Dx:   Encounter Diagnosis     ICD-10-CM    1  Stress fracture of right shoulder with routine healing M84 311D        Start Time:   Stop Time: 927  Total time in clinic (min): 37 minutes    Patient treated by AHMET Perez under my direct supervision  Subjective: Patient said she has been feeling good, was on vacation for two weeks but still thinks she is getting better  Objective: See treatment diary below      Assessment: Patient tolerated treatment well today, still lacks flex/abd AROM but can achieve a further range with AAROM  Patient would benefit from continued skilled physical therapy to strengthen her R shoulder and improve AROM  Plan: Continue plan of care, strengthen shoulder muscles        Daily Treatment Record:    POC expires:   PRECAUTIONS: none  PMH:osteoporosis, HTN, skin cancer, thyroid disease, PE, DVT, stress fx R acromion, 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        Manual Therapy 6/24 6/28 7/1 7/8 7/15  7/29           R shoulder PROM  DL   DL  DL           PNF diagonals    DL, 8x DL, 12x DL 15x  DL 15x           MRE flexion     10x  DL                                                     Exercise Diary  6/24 6/28 7/1 7/8 7/15  7/29           Shoulder isometrics flex/abd/ext  15xea HEP               AAROM Cane flex/abd  Flex  supine Flex   supine np              T-band rows     15x green  30x green           t-band LPD  Orange 15x Orange 20x Green 20x Green 20x  20x green           t-band ecc ER   attempted                Standing scaption                  AAROM t-band flex/abd  15x flex 15x flex 15x flex 15x flex  15x flex           ecc lat raise  attemtpted                Horizontal abd prone  15x 15x 15x 15x  15x           Eccentric abd S/L  1# 15x 1# 20x 1#20x 1# 20x  1# 20x           Shoulder ext prone  1# 15x 1# 15x 1# 15x 1# 15x Pulleys scaption/flexion  1-2' ea 2' ea 2' scapt  1' ea  np           Ecc ER S/L   2x10 15x 15x 15x  15x           S/L flexion   10x 20x 15x 15x  15x            wall climb abd/flex      10x flex only  15x flex only  15x flex only  15x flex only            wall wash w/ ball      20x ea                  Rhythmic stabilization      3x20"  3x20"  3x20"  3x20"            t-band IR        15x orange  15x orange  15x orange            scap pushup        10x on elbows  10x on elbows  10x on elbows            shoulder flexion supine        1# 20x  1# 20x  1# 20x           Standing ER grav  elim       15x 20x       Reach to shelf      10x cones                                       Modalities

## 2019-07-31 ENCOUNTER — OFFICE VISIT (OUTPATIENT)
Dept: PHYSICAL THERAPY | Facility: CLINIC | Age: 84
End: 2019-07-31
Payer: COMMERCIAL

## 2019-07-31 DIAGNOSIS — M84.311D: Primary | ICD-10-CM

## 2019-07-31 PROCEDURE — 97110 THERAPEUTIC EXERCISES: CPT | Performed by: PHYSICAL THERAPIST

## 2019-07-31 PROCEDURE — 97140 MANUAL THERAPY 1/> REGIONS: CPT | Performed by: PHYSICAL THERAPIST

## 2019-07-31 NOTE — PROGRESS NOTES
PT Re-Evaluation  and PT Discharge    Today's date: 2019  Patient name: Nico Johnson  : 1935  MRN: [id number]  Referring provider: Katherine Bull MD  Dx:   Encounter Diagnosis     ICD-10-CM    1  Stress fracture of right shoulder with routine healing M84 311D        Start Time: 845  Stop Time: 09  Total time in clinic (min): 35 minutes      Patient treated by AHMET Boyer under my direct supervision  ASSESSMENT/PLAN: Patient presents with limitations in R shoulder ROM and strength but improved functional mobility  The patient is compliant with her HEP and feels like she can perform all of her daily activities  Patient will be discharged as she is able to complete her daily functional activities and her ROM has stalled  Frequency/Duration: discharge      STG:(4 weeks)  1) Patient independent with HEP  - MET  2) Patient R shoulder flexion AROM > 75 degrees - partially MET  3) Patient R shoulder abduction AROM > 70 degrees - MET    LTG:(8 weeks)  1) Patient able to reach into second shelf of cabinet  - partially MET (patient goal)  2) Patient R shoulder flexion AROM 100-110 degrees - not met  3) Patient R shoulder abduction AROM 100-110 degrees - not met       Interventions: HEP    SUBJECTIVE: Patient reports having difficulty reaching into the second shelf of the cabinet but she is able to manage  She feels like she can do everything she wants to do, sometimes she has to use her left arm but that does not bother her  Pain levels:    Best- 0/10 most of the time   Worst- 0/10    OBJECTIVE:     Observation/Posture: rounded shoulders, increased thoracic kyphosis    Cervical screen:     All motions pain free with overpressure, bilateral lateral flexion moderately limited    Special Tests:   Resisted ER: R(+), L(-)   Drop arm: R(-), L(-)    Shoulder      Strength       AROM    PROM                R               L                  R               L             R             L Abduction 3+/5 3+/5 75 145 115 WNL   Flexion 3-/5 3+/5 69 157 132 WNL   Extension 4/5 4/5       ER 3-/5 4+/5 R posterior upper trap  70 90   IR 4+/5 5/5 R PSIS  50 50                       Elbow/Wrist/Hand     Strength       AROM    PROM                R               L                  R               L             R             L   Elbow flex 5/5 5/5       Elbow ext 5/5 5/5       Pronation         Supination         Wrist flex 5/5 5/5       Wrist ext 5/5 5/5                                   Daily Treatment Record:    POC expires: 7/24  PRECAUTIONS: none  PMH:osteoporosis, HTN, skin cancer, thyroid disease, PE, DVT, stress fx R acromion, R reverse TSA 7/12/18        Manual Therapy 6/24 6/28 7/1 7/8 7/15  7/29  7/31         R shoulder PROM  DL   DL  DL  DL         PNF diagonals    DL, 8x DL, 12x DL 15x  DL 15x           MRE flexion     10x  DL                                                     Exercise Diary  6/24 6/28 7/1 7/8 7/15  7/29  7/31         Shoulder isometrics flex/abd/ext  15xea HEP               AAROM Cane flex/abd  Flex  supine Flex  supine np              T-band rows     15x green  30x green  20x green          t-band LPD  Orange 15x Orange 20x Green 20x Green 20x  20x green  20x green         t-band ecc ER   attempted                Standing scaption                  AAROM t-band flex/abd  15x flex 15x flex 15x flex 15x flex  15x flex  15x flex         ecc lat raise  attemtpted                Horizontal abd prone  15x 15x 15x 15x  15x           Eccentric abd S/L  1# 15x 1# 20x 1#20x 1# 20x  1# 20x           Shoulder ext prone  1# 15x 1# 15x 1# 15x 1# 15x             Pulleys scaption/flexion  1-2' ea 2' ea 2' scapt   1' ea  np           Ecc ER S/L   2x10 15x 15x 15x  15x           S/L flexion   10x 20x 15x 15x  15x            wall climb abd/flex      10x flex only  15x flex only  15x flex only  15x flex only  15x flex only          wall wash w/ ball      20x ea                  Rhythmic stabilization      3x20"  3x20"  3x20"  3x20"            t-band IR        15x orange  15x orange  15x orange  20x orange          scap pushup        10x on elbows  10x on elbows  10x on elbows  10x on elbows          shoulder flexion supine        1# 20x  1# 20x  1# 20x           Standing ER grav  elim       15x 20x 20x      Reach to shelf      10x cones 12x cones                                      Modalities

## 2019-08-05 ENCOUNTER — HOSPITAL ENCOUNTER (OUTPATIENT)
Dept: NON INVASIVE DIAGNOSTICS | Facility: HOSPITAL | Age: 84
Discharge: HOME | End: 2019-08-05
Payer: COMMERCIAL

## 2019-08-05 ENCOUNTER — TRANSCRIBE ORDERS (OUTPATIENT)
Dept: ADMINISTRATIVE | Facility: HOSPITAL | Age: 84
End: 2019-08-05

## 2019-08-05 DIAGNOSIS — R60.0 LOCALIZED EDEMA: Primary | ICD-10-CM

## 2019-08-05 DIAGNOSIS — R60.0 LOCALIZED EDEMA: ICD-10-CM

## 2019-08-05 PROCEDURE — 93971 EXTREMITY STUDY: CPT

## 2019-08-05 PROCEDURE — 93971 EXTREMITY STUDY: CPT | Performed by: INTERNAL MEDICINE

## 2019-09-10 ENCOUNTER — TRANSCRIBE ORDERS (OUTPATIENT)
Dept: ADMINISTRATIVE | Facility: HOSPITAL | Age: 84
End: 2019-09-10

## 2019-09-10 DIAGNOSIS — Z12.39 BREAST SCREENING, UNSPECIFIED: Primary | ICD-10-CM

## 2019-09-11 ENCOUNTER — TELEPHONE (OUTPATIENT)
Dept: ENDOCRINOLOGY | Facility: HOSPITAL | Age: 84
End: 2019-09-11

## 2019-09-11 NOTE — TELEPHONE ENCOUNTER
Pt's insurance [facility] called to let me know that the Prior Auth for Prolia is covered from 2019 till the patient no longer has the plan  The Authorization will not  as long as she has this insurance plan  Auth# [id number]

## 2019-09-11 NOTE — TELEPHONE ENCOUNTER
Yesterday I got an email from Iesha Calderon in regards to pt's Prolia injection on 5/21  Pt's insurance changed on 1/1/19 but she did not make us aware till the day of that appointment  Her Prolia needed a Prior Auth and I didn't do it  When patient checked in gave the correct insurance but didn't say anything to me so I wouldn't have known because I get everything ready a month in advance  [facility] ran her benefits and did not catch the change in insurance  In order to get the injection paid for they want you to write a letter to insurance that basically shows sympathy to the patient due to her age and conditions  She wouldn't understand the process of everything and she wouldn't know the importance of telling us ahead of time  She gave the correct insurance at the time if visit and that's all she figured she needed to do  I spoke to Ludivina with [employer] and he said the letter should say include that we gave the Injection in good blue that everything was correct  It's a continuation of therapy and it would be detrimental to her health if she did not have it  State her age and conditions and that she did not present the correct insurance ahead of time

## 2019-09-11 NOTE — TELEPHONE ENCOUNTER
Looks good  I sent it to Arturo Bahena from [address] [place] [address] to see what he thinks  Letter is printed and signed

## 2019-09-30 DIAGNOSIS — I10 HYPERTENSION, UNSPECIFIED TYPE: Primary | ICD-10-CM

## 2019-09-30 RX ORDER — LOSARTAN POTASSIUM 50 MG/1
50 TABLET ORAL DAILY
Qty: 30 TABLET | Refills: 2 | Status: SHIPPED | OUTPATIENT
Start: 2019-09-30 | End: 2019-12-11 | Stop reason: SDUPTHER

## 2019-09-30 RX ORDER — HYDROCHLOROTHIAZIDE 12.5 MG/1
12.5 TABLET ORAL DAILY
Qty: 30 TABLET | Refills: 2 | Status: SHIPPED | OUTPATIENT
Start: 2019-09-30 | End: 2019-12-23 | Stop reason: SDUPTHER

## 2019-09-30 NOTE — TELEPHONE ENCOUNTER
Patient called, she was notified by the pharmacist that the losartan-hydrochlorothiazide is on back order and she will need two separate scripts for these medications sent over  She uses the [facility]

## 2019-10-04 ENCOUNTER — HOSPITAL ENCOUNTER (OUTPATIENT)
Dept: BONE DENSITY | Facility: IMAGING CENTER | Age: 84
Discharge: HOME | End: 2019-10-04
Payer: COMMERCIAL

## 2019-10-04 ENCOUNTER — TELEPHONE (OUTPATIENT)
Dept: ENDOCRINOLOGY | Facility: HOSPITAL | Age: 84
End: 2019-10-04

## 2019-10-04 VITALS — HEIGHT: 62 IN | BODY MASS INDEX: 29.81 KG/M2 | WEIGHT: 162 LBS

## 2019-10-04 DIAGNOSIS — Z12.39 BREAST SCREENING: ICD-10-CM

## 2019-10-04 PROCEDURE — 77067 SCR MAMMO BI INCL CAD: CPT

## 2019-10-04 PROCEDURE — 77063 BREAST TOMOSYNTHESIS BI: CPT

## 2019-10-04 NOTE — TELEPHONE ENCOUNTER
Left message for call back  I re-ran benefits for Prolia since pt has new insurance  [place] is already done for it but [employer] still has the old insurance  I want to make sure pt is ok with cost again if it comes back as 20%

## 2019-10-11 ENCOUNTER — TELEPHONE (OUTPATIENT)
Dept: ENDOCRINOLOGY | Facility: HOSPITAL | Age: 84
End: 2019-10-11

## 2019-10-11 NOTE — TELEPHONE ENCOUNTER
Left message for call back if pt wants numbers to [facility] and [facility] to help with cost of Prolia         [facility]: [phone]    [facility]: [phone]

## 2019-10-20 DIAGNOSIS — E03.9 HYPOTHYROIDISM IN ADULT: ICD-10-CM

## 2019-10-21 RX ORDER — LEVOTHYROXINE SODIUM 112 UG/1
TABLET ORAL
Qty: 90 TABLET | Refills: 3 | Status: SHIPPED | OUTPATIENT
Start: 2019-10-21 | End: 2020-08-05 | Stop reason: SDUPTHER

## 2019-10-30 ENCOUNTER — OFFICE VISIT (OUTPATIENT)
Dept: OBGYN CLINIC | Facility: CLINIC | Age: 84
End: 2019-10-30
Payer: COMMERCIAL

## 2019-10-30 ENCOUNTER — APPOINTMENT (OUTPATIENT)
Dept: RADIOLOGY | Facility: CLINIC | Age: 84
End: 2019-10-30
Payer: COMMERCIAL

## 2019-10-30 VITALS
SYSTOLIC BLOOD PRESSURE: 131 MMHG | WEIGHT: 163 LBS | DIASTOLIC BLOOD PRESSURE: 75 MMHG | HEIGHT: 63 IN | BODY MASS INDEX: 28.88 KG/M2

## 2019-10-30 DIAGNOSIS — M70.61 TROCHANTERIC BURSITIS OF RIGHT HIP: Primary | ICD-10-CM

## 2019-10-30 DIAGNOSIS — M25.551 PAIN IN RIGHT HIP: ICD-10-CM

## 2019-10-30 PROCEDURE — 99214 OFFICE O/P EST MOD 30 MIN: CPT | Performed by: ORTHOPAEDIC SURGERY

## 2019-10-30 PROCEDURE — 20610 DRAIN/INJ JOINT/BURSA W/O US: CPT | Performed by: ORTHOPAEDIC SURGERY

## 2019-10-30 PROCEDURE — 73502 X-RAY EXAM HIP UNI 2-3 VIEWS: CPT

## 2019-10-30 RX ORDER — METHYLPREDNISOLONE ACETATE 40 MG/ML
1 INJECTION, SUSPENSION INTRA-ARTICULAR; INTRALESIONAL; INTRAMUSCULAR; SOFT TISSUE
Status: COMPLETED | OUTPATIENT
Start: 2019-10-30 | End: 2019-10-30

## 2019-10-30 RX ORDER — BUPIVACAINE HYDROCHLORIDE 2.5 MG/ML
4 INJECTION, SOLUTION INFILTRATION; PERINEURAL
Status: COMPLETED | OUTPATIENT
Start: 2019-10-30 | End: 2019-10-30

## 2019-10-30 RX ADMIN — METHYLPREDNISOLONE ACETATE 1 ML: 40 INJECTION, SUSPENSION INTRA-ARTICULAR; INTRALESIONAL; INTRAMUSCULAR; SOFT TISSUE at 11:52

## 2019-10-30 RX ADMIN — BUPIVACAINE HYDROCHLORIDE 4 ML: 2.5 INJECTION, SOLUTION INFILTRATION; PERINEURAL at 11:52

## 2019-10-30 NOTE — PROGRESS NOTES
Orthopaedic Surgery Note    CC: Right Hip Pain      HPI:  Talia Harley is a 82 y  o female with a history of right hip pain  She reports that this has been present for several years  Is mostly located over the lateral aspect of the hip but also at times over the anterior and buttocks  She states taht this radiates down the lateral aspect of the leg to the knee but not below the knee  Describes pain as aching, throbbing, spasm at times  It is worse with laying down and better with sitting up  She states that she has tried tylenol for this but didn't help  Cannot take NSAIDs as she is on coumadin for hx DVT  No other treatment to date  Rated as severe pain, 8/10  Of note, she denied any numbness or tingling  This is different from the spinal stenosis symptoms that she had in the past, which she reports as being more in the posterior aspect of the leg and would radiate down below the knee  ALLERGIES:  Allergies   Allergen Reactions    Sulfa Antibiotics Hives       CURRENT MEDICATIONS:  Current Outpatient Medications   Medication Sig Dispense Refill    Cholecalciferol (VITAMIN D) 2000 UNITS tablet Take 3,000 Units by mouth daily        cycloSPORINE (RESTASIS) 0 05 % ophthalmic emulsion Administer 1 drop to both eyes 2 (two) times a day   denosumab (PROLIA) 60 mg/mL Inject 60 mg under the skin every 6 (six) months      DHA-EPA-Flaxseed Oil-Vitamin E (THERA TEARS) CAPS Take 4 capsules by mouth daily   hydrochlorothiazide (HYDRODIURIL) 12 5 mg tablet Take 1 tablet (12 5 mg total) by mouth daily 30 tablet 2    hydroxychloroquine (PLAQUENIL) 200 mg tablet Take 200 mg by mouth daily        levothyroxine 112 mcg tablet TAKE 1 TABLET BY MOUTH EVERY DAY 90 tablet 3    losartan (COZAAR) 50 mg tablet Take 1 tablet (50 mg total) by mouth daily 30 tablet 2    losartan-hydrochlorothiazide (HYZAAR) 50-12 5 mg per tablet TAKE 1 TABLET BY MOUTH EVERY DAY 90 tablet 1    Multiple Vitamins-Minerals (OCUVITE PO) Take 1 tablet by mouth daily   omeprazole (PriLOSEC) 20 mg delayed release capsule Take 20 mg by mouth 2 (two) times a day   polyvinyl alcohol (LIQUIFILM TEARS) 1 4 % ophthalmic solution Administer 1 drop to both eyes as needed for dry eyes      simvastatin (ZOCOR) 20 mg tablet Take 20 mg by mouth daily at bedtime   metoprolol tartrate (LOPRESSOR) 25 mg tablet Take 1 tablet (25 mg total) by mouth every 12 (twelve) hours for 30 days 60 tablet 0     No current facility-administered medications for this visit          PAST MEDICAL HISTORY  Past Medical History:   Diagnosis Date    Cancer ([place] [address] )     squamous cell - face    Disease of thyroid gland     Hyperlipidemia     Hypertension     Osteoporosis     [facility]     [facility]'s syndrome ([facility] )     [facility]ogren's syndrome ([facility])     Urinary tract infection        SURGICAL HISTORY  Past Surgical History:   Procedure Laterality Date    HYSTERECTOMY      JOINT REPLACEMENT      KNEE SURGERY      OOPHORECTOMY      SHOULDER SURGERY      right    SPINE SURGERY      TOE SURGERY         FAMILY HISTORY  Family History   Problem Relation Age of Onset    Stroke Father     Ovarian cancer Sister     No Known Problems Son     No Known Problems Son     Ovarian cancer Daughter     No Known Problems Daughter     No Known Problems Daughter     No Known Problems Maternal Aunt     No Known Problems Paternal Aunt        SOCIAL HISTORY  Social History     Socioeconomic History    Marital status: /Civil Union     Spouse name: Not on file    Number of children: Not on file    Years of education: Not on file    Highest education level: Not on file   Occupational History    Not on file   Social Needs    Financial resource strain: Not on file    Food insecurity:     Worry: Not on file     Inability: Not on file    Transportation needs:     Medical: Not on file     Non-medical: Not on file Tobacco Use    Smoking status: Never Smoker    Smokeless tobacco: Never Used   Substance and Sexual Activity    Alcohol use: No    Drug use: No    Sexual activity: Never   Lifestyle    Physical activity:     Days per week: Not on file     Minutes per session: Not on file    Stress: Not on file   Relationships    Social connections:     Talks on phone: Not on file     Gets together: Not on file     Attends Christianity service: Not on file     Active member of club or organization: Not on file     Attends meetings of clubs or organizations: Not on file     Relationship status: Not on file    Intimate partner violence:     Fear of current or ex partner: Not on file     Emotionally abused: Not on file     Physically abused: Not on file     Forced sexual activity: Not on file   Other Topics Concern    Not on file   Social History Narrative    Not on file         Review of Systems   Metal Allergy: no  History of MRSA:no  History of DVT or PE:yes  Active dental issues:no  Anesthesia complications:no    Positive for hearing loss (wears hearing aids), autoimmune disease, thyroid problems, frequent urination, excessive bleeding  Otherwise negative except per HPI  Physical Exam    Vitals  Vitals:    10/30/19 1056   BP: 131/75       BMI  Body mass index is 28 87 kg/m²  Cardio: regular rate  Lungs: no increased work of breathing  Abdomen: non-distended      right Hip Exam  Extension: 5   Flexion: 120  Internal/External Rotation: 30/30  Leg is noted to be similar in length to other leg    Imaging  A) Imaging modality available  Radiographs: yes  MRI scan: no  CT scan: no    B) Imaging findings  Subchondral cysts: no  Subchondral sclerosis: yes  Periarticular osteophytes: no  Joint subluxation: no  Joint space narrowing: yes (mild)  Bone-on-bone articulations: no  Avascular necrosis: no    Assessment and Plan    Left Hip Pain:    - history, exam, imaging do not suggest this is due to significant hip arthritis  - most likely this is trochanteric bursitis  Discussed with patietn and  the diagnosis and also the prognosis, including slow improvement with therapy and HEP and no surgery  Offered CSI today, and patient wanted to proceed  [facility] place PT referal     followup prn if needed, could repeat injection in 3 months  Large joint arthrocentesis: L greater trochanteric bursa  Date/Time: 10/30/2019 11:52 AM  Consent given by: patient  Site marked: site marked  Timeout: Immediately prior to procedure a time out was called to verify the correct patient, procedure, equipment, support staff and site/side marked as required   Supporting Documentation  Indications: pain   Procedure Details  Location: hip - L greater trochanteric bursa  Needle size: 22 G  Approach: lateral  Medications administered: 4 mL bupivacaine 0 25 %; 1 mL methylPREDNISolone acetate 40 mg/mL    Patient tolerance: patient tolerated the procedure well with no immediate complications  Dressing:  Sterile dressing applied        I have spent 25 minutes with Patient and family today in which greater than 50% of this time was spent in counseling/coordination of care regarding Diagnostic results, Prognosis, Risks and benefits of tx options, Intructions for management, Patient and family education and Impressions  Yamini Correa MD  Adult Reconstruction Surgery  Department [address]  11:46 AM

## 2019-11-06 ENCOUNTER — EVALUATION (OUTPATIENT)
Dept: PHYSICAL THERAPY | Facility: CLINIC | Age: 84
End: 2019-11-06
Payer: COMMERCIAL

## 2019-11-06 ENCOUNTER — APPOINTMENT (OUTPATIENT)
Dept: PHYSICAL THERAPY | Facility: CLINIC | Age: 84
End: 2019-11-06
Payer: COMMERCIAL

## 2019-11-06 DIAGNOSIS — M70.61 TROCHANTERIC BURSITIS OF RIGHT HIP: ICD-10-CM

## 2019-11-06 PROCEDURE — 97162 PT EVAL MOD COMPLEX 30 MIN: CPT | Performed by: PHYSICAL THERAPIST

## 2019-11-06 NOTE — PROGRESS NOTES
PT Evaluation     Today's date: 2019  Patient name: Nikolas Dowling  : 1935  MRN: [id number]  Referring provider: Bria Lee MD  Dx:   Encounter Diagnosis     ICD-10-CM    1  Trochanteric bursitis of right hip M70 61 Ambulatory referral to Physical Therapy       Start Time:   Stop Time: 1253  Total time in clinic (min): 48 minutes    Assessment/Plan    This patient presents w/ chronic R L/S and hip region pain  She exhibits severe hypomobility lumbar spine, myofascial tightness/ TrP's R gluteal mm, B gluteal weakness, increased neural tension B, Trendelenburg gait  Significant co-morbidities: hx multiple lumbar surgeries  This patient is a good candidate for skilled physical therapy to reduce pain and improve function  Interventions to include manual therapy, ROM, stretching, strengthening, gait and balance training, therapeutic activities, neuromuscular re-ed and instruction in HEP  Frequency and duration:  x/week for  weeks    STGs: 4 weeks  1  Increase B PSLR to 65-70 degrees  2  Decrease pain 2-3 levels  3  Restore pain-free trunk AROM                  LTGs: 6-8 weeks  1  Independent HEP  2  Increase strength B gluteus medius by one grade  3  Normalize gait  4  Reduce / eliminate RLE symptoms  5  Decrease pain w/ functional activities to 0-4/10            Subjective  This is a 82 y o  female who reports hx of R hip region pain for many years    Current complaint: R buttock pain down lateral thigh to knee, sometimes anterior R hip pain; states that her bones click, like they're rubbing  Aggravating factors: lying down (R SL and supine)  Relieving factors: sitting, resting with legs up in recliner, heat  Previous treatment: lumbar surgery x 5 (laminectomy, fusion)  Dx tests: x-rays R hip last week  Occupation: retired    Patients goal: get rid of the pain    Objective  Pain scale: 0-8/10  Posture: L iliac crest elevated, greater trochanters level  Gait: (+) Trendelenberg  Trunk ROM     Flexion AROM      Severe limitation        Extension  AROM     Severe limitation * comparable sign        R lateral flexion AROM     Severe limitation * comparable sign        L lateral flexion AROM    Mod-severe limitaiton           R rotation AROM      Min limitation       L rotation AROM      Min limitation        Special tests:    PSLR  R 60 degrees (-); L 53 degrees (-)    Hip Scour (-) B  Neural tension tests:    Slump: (+) B for increased neural tension but without comparable sign  Palpation:    Very tender R gluteal mm  Sensation:   LT intact BLE's  Myotomal testing    L2 - Psoas =  5/5    L3 - Quadriceps =  5/5    L4/5 - Ant   Tibialis = 5/5    L5 - Glut med = R 4/5; L  3-/5    L5 - EHL = NT    S1-2 -  Glut taj = 5/5    S1 - EDL = NT    ROM    BLE WFL but most limited in extension (0 degrees B) and abd          Flowsheet Rows      Most Recent Value   PT/OT G-Codes   Current Score  43   Projected Score  49               Precautions/ PMH: Sjogren's, osteoporosis, skin cancer, HTN, multiple lumbar surgeries  POC expires 12/6  Daily Treatment Diary     Manual  11/6                                      TrP release L glute JR                                          Exercise Diary  11/6                         hooklying abd w/ TB             R glute stretch             LTR             Pelvic tilts A-P             Abdominal bracing             Seated flexion stretch             QL stretch             Partial curl ups             Sciatic n glides             HS stretch             SLS                                                                                                                         Modalities              CP prn

## 2019-11-12 ENCOUNTER — OFFICE VISIT (OUTPATIENT)
Dept: PHYSICAL THERAPY | Facility: CLINIC | Age: 84
End: 2019-11-12
Payer: COMMERCIAL

## 2019-11-12 DIAGNOSIS — M70.61 TROCHANTERIC BURSITIS OF RIGHT HIP: Primary | ICD-10-CM

## 2019-11-12 PROCEDURE — 97112 NEUROMUSCULAR REEDUCATION: CPT | Performed by: PHYSICAL THERAPIST

## 2019-11-12 NOTE — PROGRESS NOTES
Daily Note     Today's date: 2019  Patient name: Peter Marshall  : 1935  MRN: [id number]  Referring provider: Andrea Daley MD  Dx:   Encounter Diagnosis     ICD-10-CM    1  Trochanteric bursitis of right hip M70 61        Start Time: 4180  Stop Time: 0940  Total time in clinic (min): 37 minutes    Subjective: reports pain R buttock and down the leg to the knee      Objective: See treatment diary below      Assessment: initiated exercises as noted below  Exhibits significant neural tension sciatic nerve B with improved mobility and decreased pain p nerve glides  Tolerated treatment well  Reports no pain at end of session  Issued written hep  Patient would benefit from continued PT      Plan: Continue per plan of care          Precautions/ PMH: Sjogren's, osteoporosis, skin cancer, HTN, multiple lumbar surgeries  POC expires   Daily Treatment Diary     Manual                                       TrP release L glute JR JR           Gluteal stretch  JR                            Exercise Diary             poornima  L SL 10x5"           hooklying abd w/ TB             R glute stretch  20"x3           LTR w/ pball  15x           Pelvic tilts A-P  15x           Bride w/ pball  10x5"           Seated flexion stretch             QL stretch             Partial curl ups             Sciatic n sliders  15xea           HS stretch             SLS                                                                                                                         Modalities              CP prn

## 2019-11-14 ENCOUNTER — OFFICE VISIT (OUTPATIENT)
Dept: PHYSICAL THERAPY | Facility: CLINIC | Age: 84
End: 2019-11-14
Payer: COMMERCIAL

## 2019-11-14 DIAGNOSIS — M70.61 TROCHANTERIC BURSITIS OF RIGHT HIP: Primary | ICD-10-CM

## 2019-11-14 PROCEDURE — 97112 NEUROMUSCULAR REEDUCATION: CPT | Performed by: PHYSICAL THERAPIST

## 2019-11-14 NOTE — PROGRESS NOTES
Daily Note     Today's date: 2019  Patient name: Samanta Cerna  : 1935  MRN: [id number]  Referring provider: Sonali Walker MD  Dx:   Encounter Diagnosis     ICD-10-CM    1  Trochanteric bursitis of right hip M70 61        Start Time:   Stop Time: 945  Total time in clinic (min): 41 minutes    Subjective: intermittent R buttock / R thigh pain      Objective: See treatment diary below      Assessment: No TrP's noted R gluteal region today  Tolerated treatment well - no pain complaints during exercises  Needs continued work on strengthening  Patient would benefit from continued PT      Plan: Continue per plan of care  Focus on hip strengthening          Precautions/ PMH: Sjogren's, osteoporosis, skin cancer, HTN, multiple lumbar surgeries  POC expires   Daily Treatment Diary     Manual                                      TrP release L glute JR JR           Gluteal stretch  JR                            Exercise Diary            clamshells  L SL 10x5" 15  x5" ea          hooklying abd w/ TB   GTB 15x10"          R glute stretch  20"x3 np          LTR w/ pball  15x 15x          Pelvic tilts A-P  15x 15x          Bride w/ pball  10x5" 15x 10"          Seated flexion stretch             QL stretch             Partial curl ups             Sciatic n sliders  15xea 15x ea          HS stretch             SLS   10x 10" ea                                                                                                                      Modalities              CP prn

## 2019-11-19 ENCOUNTER — OFFICE VISIT (OUTPATIENT)
Dept: PHYSICAL THERAPY | Facility: CLINIC | Age: 84
End: 2019-11-19
Payer: COMMERCIAL

## 2019-11-19 DIAGNOSIS — M70.61 TROCHANTERIC BURSITIS OF RIGHT HIP: Primary | ICD-10-CM

## 2019-11-19 PROCEDURE — 97112 NEUROMUSCULAR REEDUCATION: CPT | Performed by: PHYSICAL THERAPIST

## 2019-11-19 NOTE — PROGRESS NOTES
Daily Note     Today's date: 2019  Patient name: Flora Matthews  : 1935  MRN: [id number]  Referring provider: Garret López MD  Dx:   Encounter Diagnosis     ICD-10-CM    1  Trochanteric bursitis of right hip M70 61        Start Time: 1114  Stop Time: 1206  Total time in clinic (min): 52 minutes    Subjective: reports no R sided leg or back pain today but states that her L knee is bothering her       Objective: See treatment diary below      Assessment:  Tolerated treatment well  Advanced program and updated written exercises  Patient would benefit from continued PT      Plan: Continue per plan of care  Focus on hip strengthening          Precautions/ PMH: Sjogren's, osteoporosis, skin cancer, HTN, multiple lumbar surgeries  POC expires   Daily Treatment Diary     Manual                                     TrP release L glute JR JR           Gluteal stretch  JR                            Exercise Diary           clamshells  L SL 10x5" 15  x5" ea 15x5" ea         hooklying abd w/ TB   GTB 15x10" GTB 15x10"         R glute stretch  20"x3 np          LTR w/ pball  15x 15x 15x         Pelvic tilts A-P  15x 15x 15x         BridGe w/ pball  10x5" 15x 10" 15x10"         Seated flexion stretch    20"x5         QL stretch             Partial curl ups             Sciatic n sliders  15xea 15x ea 15xea         HS stretch             SLS   10x 10" ea 10x10" ea         Mini squat    10x5"         Side step    3 laps         March in place    10x ue sup         Bird dog    10x                                                                 Modalities              CP prn

## 2019-11-21 ENCOUNTER — APPOINTMENT (OUTPATIENT)
Dept: PHYSICAL THERAPY | Facility: CLINIC | Age: 84
End: 2019-11-21
Payer: COMMERCIAL

## 2019-11-25 ENCOUNTER — LAB (OUTPATIENT)
Dept: LAB | Facility: HOSPITAL | Age: 84
End: 2019-11-25
Payer: COMMERCIAL

## 2019-11-25 ENCOUNTER — TRANSCRIBE ORDERS (OUTPATIENT)
Dept: ADMINISTRATIVE | Facility: HOSPITAL | Age: 84
End: 2019-11-25

## 2019-11-25 DIAGNOSIS — E03.9 HYPOTHYROIDISM IN ADULT: ICD-10-CM

## 2019-11-25 DIAGNOSIS — I82.891 CHRONIC EMBOLISM AND THROMBOSIS OF OTHER SPECIFIED VEINS: ICD-10-CM

## 2019-11-25 DIAGNOSIS — I82.891 CHRONIC EMBOLISM AND THROMBOSIS OF OTHER SPECIFIED VEINS: Primary | ICD-10-CM

## 2019-11-25 DIAGNOSIS — M81.0 AGE-RELATED OSTEOPOROSIS WITHOUT CURRENT PATHOLOGICAL FRACTURE: ICD-10-CM

## 2019-11-25 DIAGNOSIS — I10 ESSENTIAL HYPERTENSION: ICD-10-CM

## 2019-11-25 LAB
ALBUMIN SERPL BCP-MCNC: 4.7 G/DL (ref 3.5–5)
ALP SERPL-CCNC: 66 U/L (ref 46–116)
ALT SERPL W P-5'-P-CCNC: 38 U/L (ref 12–78)
ANION GAP SERPL CALCULATED.3IONS-SCNC: 7 MMOL/L (ref 4–13)
AST SERPL W P-5'-P-CCNC: 27 U/L (ref 5–45)
BILIRUB SERPL-MCNC: 0.54 MG/DL (ref 0.2–1)
BUN SERPL-MCNC: 27 MG/DL (ref 5–25)
CALCIUM ALBUM COR SERPL-MCNC: 10.2 MG/DL (ref 8.3–10.1)
CALCIUM SERPL-MCNC: 10.8 MG/DL (ref 8.3–10.1)
CHLORIDE SERPL-SCNC: 101 MMOL/L (ref 100–108)
CO2 SERPL-SCNC: 29 MMOL/L (ref 21–32)
CREAT SERPL-MCNC: 1.03 MG/DL (ref 0.6–1.3)
GFR SERPL CREATININE-BSD FRML MDRD: 51 ML/MIN/1.73SQ M
GLUCOSE SERPL-MCNC: 99 MG/DL (ref 65–140)
INR PPP: 1.43 (ref 0.84–1.19)
POTASSIUM SERPL-SCNC: 4.1 MMOL/L (ref 3.5–5.3)
PROT SERPL-MCNC: 8.2 G/DL (ref 6.4–8.2)
PROTHROMBIN TIME: 17 SECONDS (ref 11.6–14.5)
SODIUM SERPL-SCNC: 137 MMOL/L (ref 136–145)
T4 FREE SERPL-MCNC: 1.34 NG/DL (ref 0.76–1.46)
TSH SERPL DL<=0.05 MIU/L-ACNC: 2.5 UIU/ML (ref 0.36–3.74)

## 2019-11-25 PROCEDURE — 36415 COLL VENOUS BLD VENIPUNCTURE: CPT

## 2019-11-25 PROCEDURE — 85610 PROTHROMBIN TIME: CPT

## 2019-11-25 PROCEDURE — 80053 COMPREHEN METABOLIC PANEL: CPT

## 2019-11-25 PROCEDURE — 84439 ASSAY OF FREE THYROXINE: CPT

## 2019-11-25 PROCEDURE — 84443 ASSAY THYROID STIM HORMONE: CPT

## 2019-11-26 ENCOUNTER — OFFICE VISIT (OUTPATIENT)
Dept: PHYSICAL THERAPY | Facility: CLINIC | Age: 84
End: 2019-11-26
Payer: COMMERCIAL

## 2019-11-26 ENCOUNTER — OFFICE VISIT (OUTPATIENT)
Dept: ENDOCRINOLOGY | Facility: HOSPITAL | Age: 84
End: 2019-11-26
Payer: COMMERCIAL

## 2019-11-26 VITALS
HEIGHT: 63 IN | HEART RATE: 76 BPM | SYSTOLIC BLOOD PRESSURE: 136 MMHG | BODY MASS INDEX: 28.84 KG/M2 | DIASTOLIC BLOOD PRESSURE: 80 MMHG | WEIGHT: 162.8 LBS

## 2019-11-26 DIAGNOSIS — E55.9 VITAMIN D DEFICIENCY: ICD-10-CM

## 2019-11-26 DIAGNOSIS — E03.9 HYPOTHYROIDISM IN ADULT: Primary | ICD-10-CM

## 2019-11-26 DIAGNOSIS — M81.0 AGE-RELATED OSTEOPOROSIS WITHOUT CURRENT PATHOLOGICAL FRACTURE: ICD-10-CM

## 2019-11-26 DIAGNOSIS — E78.5 HYPERLIPIDEMIA, UNSPECIFIED HYPERLIPIDEMIA TYPE: ICD-10-CM

## 2019-11-26 DIAGNOSIS — M70.61 TROCHANTERIC BURSITIS OF RIGHT HIP: Primary | ICD-10-CM

## 2019-11-26 DIAGNOSIS — I10 ESSENTIAL HYPERTENSION: ICD-10-CM

## 2019-11-26 PROCEDURE — 96372 THER/PROPH/DIAG INJ SC/IM: CPT | Performed by: NURSE PRACTITIONER

## 2019-11-26 PROCEDURE — 97112 NEUROMUSCULAR REEDUCATION: CPT | Performed by: PHYSICAL THERAPIST

## 2019-11-26 PROCEDURE — 97110 THERAPEUTIC EXERCISES: CPT | Performed by: PHYSICAL THERAPIST

## 2019-11-26 PROCEDURE — 99214 OFFICE O/P EST MOD 30 MIN: CPT | Performed by: NURSE PRACTITIONER

## 2019-11-26 NOTE — PROGRESS NOTES
Brisa Garcia 82 y o  female MRN: [id number]    Encounter: [id number]      Assessment/Plan     Assessment: This is a 82y o -year-old female with hypothyroidism, osteoporosis, hypertension, hyperlipidemia and vitamin-D deficiency  Plan:  1   Osteoporosis:  Her most recent  DEXA scan from May 14, 2019 shows a T-score of -2 5 in the left forearm   Baltazar Marc is due for her 12th Prolia injection today  She will continue supplementation of calcium and vitamin-D  Her serum calcium level is just slightly elevated on recent lab work  Recheck in approximately 4 weeks to maintain surveillance  Discussed fall risk and safety at length during the time of the visit  I also suggested that, for stability and safety, she continue to use a cane/walker for safety and stability  Check comprehensive metabolic panel prior to next office visit      2   Hypothyroidism:  Her most recent TSH is normal   She will continue levothyroxine 112 mcg daily with an extra half tablet on Sundays   Check a TSH and free T4 prior to next office visit      3   Hypertension:  She is normotensive in the office today  Continue current regimen      4   Hyperlipidemia:  Managed by PCP   Continue simvastatin      5   Vitamin-D deficiency:   Continue supplementation with vitamin D3 daily  Check 25 hydroxy vitamin-D level prior to next visit  CC: Hypothyroidism/osteoporosis follow-up    History of Present Illness     HPI:  82 y  o  year old female with history of hypertension, hyperlipidemia, hypothyroidism, vitamin-D deficiency, osteoporosis, GERD, blood clots who presents for follow up  Baltazar Marc was recently hospitalized for a right sided stress fracture following her shoulder replacement and is recovering from this and wearing a sling       For her hypothyroidism, she takes levothyroxine 112 mcg tablets but she takes an extra half tablet on Sundays  Her most recent TSH from November 25, 2019 is 2 500 with a free T4 of 1 34   Overall, she is feeling well but complains of some lingering fatigue      She has a history of osteoporosis and is currently being treated with Prolia  Her most recent  DEXA scan from May 14, 2019 shows a T-score of -2 5 in the left forearm indicative of osteoporosis   She received her most recent injection which was her 11th injection in May of 2019  She is tolerating it well   She did sustain a fall earlier this year on January 6, 2019 but denies any falls since        For hypertension, she takes losartan 50 mg daily and hydrochlorothiazide 12 5 mg daily        For hyperlipidemia, she takes simvastatin 20 mg daily        For vitamin-D deficiency, she is on 2000 units daily of vitamin-D  Review of Systems   Constitutional: Negative  Negative for chills, fatigue and fever  HENT: Positive for hearing loss (hearing aids)  Negative for trouble swallowing and voice change  Eyes: Negative  Negative for visual disturbance  Respiratory: Negative  Negative for chest tightness and shortness of breath  Cardiovascular: Negative  Negative for chest pain  Gastrointestinal: Negative  Negative for abdominal pain, constipation, diarrhea and vomiting  Endocrine: Negative for cold intolerance, heat intolerance, polydipsia, polyphagia and polyuria  Genitourinary: Negative  Musculoskeletal: Positive for arthralgias (Right shoulder/right hip) and back pain (chronic)  Skin: Negative  Allergic/Immunologic: Negative  Neurological: Negative  Negative for dizziness, syncope, light-headedness and headaches  Hematological: Negative  Psychiatric/Behavioral: Negative  All other systems reviewed and are negative        Historical Information   Past Medical History:   Diagnosis Date    Cancer ([facility] [address] )     squamous cell - face    Disease of thyroid gland     Hyperlipidemia     Hypertension     Osteoporosis     PE (pulmonary thromboembolism) ([facility])     Sjogren's syndrome ([facility])     Sjogren's syndrome ([facility])     Urinary tract infection      Past Surgical History:   Procedure Laterality Date    HYSTERECTOMY      JOINT REPLACEMENT      KNEE SURGERY      OOPHORECTOMY      SHOULDER SURGERY      right    SPINE SURGERY      TOE SURGERY       Social History   Social History     Substance and Sexual Activity   Alcohol Use No     Social History     Substance and Sexual Activity   Drug Use No     Social History     Tobacco Use   Smoking Status Never Smoker   Smokeless Tobacco Never Used     Family History:   Family History   Problem Relation Age of Onset    Stroke Father     Ovarian cancer Sister     No Known Problems Son     No Known Problems Son     Ovarian cancer Daughter     No Known Problems Daughter     No Known Problems Daughter     No Known Problems Maternal Aunt     No Known Problems Paternal Aunt        Meds/Allergies   Current Outpatient Medications   Medication Sig Dispense Refill    Cholecalciferol (VITAMIN D) 2000 UNITS tablet Take 3,000 Units by mouth daily        cycloSPORINE (RESTASIS) 0 05 % ophthalmic emulsion Administer 1 drop to both eyes 2 (two) times a day   denosumab (PROLIA) 60 mg/mL Inject 60 mg under the skin every 6 (six) months      DHA-EPA-Flaxseed Oil-Vitamin E (THERA TEARS) CAPS Take 4 capsules by mouth daily   hydrochlorothiazide (HYDRODIURIL) 12 5 mg tablet Take 1 tablet (12 5 mg total) by mouth daily 30 tablet 2    hydroxychloroquine (PLAQUENIL) 200 mg tablet Take 200 mg by mouth daily   levothyroxine 112 mcg tablet TAKE 1 TABLET BY MOUTH EVERY DAY 90 tablet 3    losartan (COZAAR) 50 mg tablet Take 1 tablet (50 mg total) by mouth daily 30 tablet 2    losartan-hydrochlorothiazide (HYZAAR) 50-12 5 mg per tablet TAKE 1 TABLET BY MOUTH EVERY DAY 90 tablet 1    metoprolol tartrate (LOPRESSOR) 25 mg tablet Take 1 tablet (25 mg total) by mouth every 12 (twelve) hours for 30 days 60 tablet 0    Multiple Vitamins-Minerals (OCUVITE PO) Take 1 tablet by mouth daily        omeprazole (PriLOSEC) 20 mg delayed release capsule Take 20 mg by mouth 2 (two) times a day   polyvinyl alcohol (LIQUIFILM TEARS) 1 4 % ophthalmic solution Administer 1 drop to both eyes as needed for dry eyes      simvastatin (ZOCOR) 20 mg tablet Take 20 mg by mouth daily at bedtime  No current facility-administered medications for this visit  Allergies   Allergen Reactions    Sulfa Antibiotics Hives       Objective   Vitals: not currently breastfeeding  Physical Exam   Constitutional: She is oriented to person, place, and time  She appears well-developed and well-nourished  HENT:   Head: Normocephalic and atraumatic  Mouth/Throat: Oropharynx is clear and moist    Eyes: Pupils are equal, round, and reactive to light  Conjunctivae and EOM are normal    Wears glasses   Neck: Normal range of motion  Neck supple  Cardiovascular: Normal rate, regular rhythm and intact distal pulses  Murmur heard  Pulmonary/Chest: Effort normal and breath sounds normal    Abdominal: Soft  Bowel sounds are normal    Musculoskeletal: Normal range of motion  Neurological: She is alert and oriented to person, place, and time  Skin: Skin is warm and dry  Dry skin   Psychiatric: She has a normal mood and affect  Her behavior is normal  Judgment and thought content normal    Vitals reviewed  Lab Results:   Lab Results   Component Value Date/Time    TSH 3RD GENERATON 2 500 11/25/2019 10:06 AM    Free T4 1 34 11/25/2019 10:06 AM     Portions of the record may have been created with voice recognition software  Occasional wrong word or "sound a like" substitutions may have occurred due to the inherent limitations of voice recognition software  Read the chart carefully and recognize, using context, where substitutions have occurred

## 2019-11-26 NOTE — PATIENT INSTRUCTIONS
Consider utilizing a cane for stability  Continue Levothyroxine at current dose      Continue supplementation with vitamin D      Continue treatment with Prolia      Please obtain lab work in approximately 1 month to reassess your calcium level      Please obtain lab work prior to next office visit

## 2019-11-26 NOTE — PROGRESS NOTES
Daily Note     Today's date: 2019  Patient name: Duane Musca  : 1935  MRN: [id number]  Referring provider: Eran England MD  Dx:   Encounter Diagnosis     ICD-10-CM    1  Trochanteric bursitis of right hip M70 61        Start Time: 1032  Stop Time: 1135  Total time in clinic (min): 63 minutes    Subjective: back is feeling good but still has pain in the latera lR thigh L knee is still bothering her  Was seen by orthopedic specialist regarding her knee      Objective: See treatment diary below      Assessment:  Reported R lateral hip pain after doing 10 reps of SLS on that leg but symptoms resolved upon stopping the exercise  Exhibits funcitonal valgus position with closed chain activities, especially min squat, weakness B hip abduction  Needs continued work on strengthening  Patient would benefit from continued PT      Plan: Continue per plan of care  Focus on hip strengthening          Precautions/ PMH: Sjogren's, osteoporosis, skin cancer, HTN, multiple lumbar surgeries  POC expires   Daily Treatment Diary     Manual                                    TrP release L glute JR JR   JR        Gluteal stretch  JR                            Exercise Diary          clamshells  L SL 10x5" 15  x5" ea 15x5" ea 15x5"        hooklying abd w/ TB   GTB 15x10" GTB 15x10" 15x10"        R glute stretch  20"x3 np          LTR w/ pball  15x 15x 15x 15x        Pelvic tilts A-P  15x 15x 15x 15x        Bridge w/ pball  10x5" 15x 10" 15x10" 15x10"        Seated flexion stretch    20"x5 np        QL stretch             Partial curl ups             Sciatic n sliders  15xea 15x ea 15xea 15xea        HS stretch     30"x3 ea        SLS   10x 10" ea 10x10" ea 10x10"        Mini squat    10x5" 10x5"        Side step    3 laps 4 laps        March in place    10x ue sup 10x        Bird dog modified    10x 10x Modalities              CP prn

## 2019-11-29 ENCOUNTER — APPOINTMENT (OUTPATIENT)
Dept: PHYSICAL THERAPY | Facility: CLINIC | Age: 84
End: 2019-11-29
Payer: COMMERCIAL

## 2019-12-03 ENCOUNTER — OFFICE VISIT (OUTPATIENT)
Dept: PHYSICAL THERAPY | Facility: CLINIC | Age: 84
End: 2019-12-03
Payer: COMMERCIAL

## 2019-12-03 DIAGNOSIS — M70.61 TROCHANTERIC BURSITIS OF RIGHT HIP: Primary | ICD-10-CM

## 2019-12-03 PROCEDURE — 97112 NEUROMUSCULAR REEDUCATION: CPT | Performed by: PHYSICAL THERAPIST

## 2019-12-03 NOTE — PROGRESS NOTES
Daily Note     Today's date: 12/3/2019  Patient name: Fransico Silva  : 1935  MRN: [id number]  Referring provider: Rosa Elena Jacobo MD  Dx:   Encounter Diagnosis     ICD-10-CM    1  Trochanteric bursitis of right hip M70 61        Start Time: 945  Stop Time: 104  Total time in clinic (min): 56 minutes    Subjective: reports feeling pretty good today      Objective: See treatment diary below      Assessment:  Requires cueing to avoid functional valgus position B knees, very weak hip abductors, especially R  Difficulty w/ SLS due to hip weakness  Added hip hiking for gluteus medius strengthening  Patient would benefit from continued PT      Plan: Continue per plan of care  Focus on hip strengthening          Precautions/ PMH: Sjogren's, osteoporosis, skin cancer, HTN, multiple lumbar surgeries  POC expires   Daily Treatment Diary     Manual  11/6 11/12 11/14 11/19 11/26 12/3                                 TrP release L glute JR JR   JR        Gluteal stretch  JR                            Exercise Diary  11/6 11/12 11/14 11/19 11/26 12/3       clamshells  L SL 10x5" 15  x5" ea 15x5" ea 15x5" 15x5"       hooklying abd w/ TB   GTB 15x10" GTB 15x10" 15x10" 15x10"       R glute stretch  20"x3 np          LTR w/ pball  15x 15x 15x 15x 15x       Pelvic tilts A-P  15x 15x 15x 15x 15x       Bridge w/ pball  10x5" 15x 10" 15x10" 15x10" 15x10"       Seated flexion stretch    20"x5 np        QL stretch             Partial curl ups             Sciatic n sliders  15xea 15x ea 15xea 15xea        HS stretch     30"x3 ea 30"x3       SLS   10x 10" ea 10x10" ea 10x10" 10x10"       Mini squat    10x5" 10x5" 15x5"       Side step    3 laps 4 laps 4 laps       March in place    10x ue sup 10x 15x       Bird dog modified    10x 10x 10x       Hip hike      10x5" ea                                 Recumbent bike      8'           Modalities              CP prn

## 2019-12-05 ENCOUNTER — OFFICE VISIT (OUTPATIENT)
Dept: PHYSICAL THERAPY | Facility: CLINIC | Age: 84
End: 2019-12-05
Payer: COMMERCIAL

## 2019-12-05 DIAGNOSIS — M25.552 LEFT HIP PAIN: ICD-10-CM

## 2019-12-05 DIAGNOSIS — M70.61 TROCHANTERIC BURSITIS OF RIGHT HIP: Primary | ICD-10-CM

## 2019-12-05 PROCEDURE — 97112 NEUROMUSCULAR REEDUCATION: CPT | Performed by: PHYSICAL THERAPIST

## 2019-12-05 NOTE — PROGRESS NOTES
Daily Note     Today's date: 2019  Patient name: Jerry Warren  : 1935  MRN: [id number]  Referring provider: Lisa Hackett MD  Dx:   Encounter Diagnosis     ICD-10-CM    1  Trochanteric bursitis of right hip M70 61        Start Time:           Subjective: pain lateral R thigh dependent on activity; aggravated by doing ball exercises in therapy but not standing exercises  Reports that pain has decreased since beginning therapy and does not occur every day  Had a very busy day yesterday and had pain when doing exercises at the end of the day  Objective: See treatment diary below      Assessment:  Continues to require cueing for hip abd/ER to maintain neutral LE alignment  Able to oerfomr all exercise sin therapy without pain provocation  Patient would benefit from continued PT      Plan: Continue per plan of care  Focus on hip strengthening          Precautions/ PMH: Sjogren's, osteoporosis, skin cancer, HTN, multiple lumbar surgeries  POC expires   Daily Treatment Diary     Manual  11/6 11/12 11/14 11/19 11/26 12/3 12/5                                TrP release L glute JR JR   JR        Gluteal stretch  JR                            Exercise Diary  11/6 11/12 11/14 11/19 11/26 12/3 12      clamshells  L SL 10x5" 15  x5" ea 15x5" ea 15x5" 15x5"       hooklying abd w/ TB   GTB 15x10" GTB 15x10" 15x10" 15x10"       R glute stretch  20"x3 np          LTR w/ pball  15x 15x 15x 15x 15x 15x      Pelvic tilts A-P  15x 15x 15x 15x 15x 15x      Bridge w/ pball  10x5" 15x 10" 15x10" 15x10" 15x10" 15x5"      Seated flexion stretch    20"x5 np  10"x10      QL stretch             Partial curl ups             Sciatic n sliders  15xea 15x ea 15xea 15xea        HS stretch     30"x3 ea 30"x3 30"x3      SLS   10x 10" ea 10x10" ea 10x10" 10x10" 10x10"      Mini squat    10x5" 10x5" 15x5" 15x5"      Side step    3 laps 4 laps 4 laps 4 laps      March in place    10x ue sup 10x 15x 15x      Bird dog modified    10x 10x 10x 10x      Hip hike      10x5" ea 10x5"                                Recumbent bike      8' 8'          Modalities              CP prn

## 2019-12-10 ENCOUNTER — OFFICE VISIT (OUTPATIENT)
Dept: PHYSICAL THERAPY | Facility: CLINIC | Age: 84
End: 2019-12-10
Payer: COMMERCIAL

## 2019-12-10 DIAGNOSIS — M25.552 LEFT HIP PAIN: ICD-10-CM

## 2019-12-10 DIAGNOSIS — M70.61 TROCHANTERIC BURSITIS OF RIGHT HIP: Primary | ICD-10-CM

## 2019-12-10 PROCEDURE — 97110 THERAPEUTIC EXERCISES: CPT | Performed by: PHYSICAL THERAPIST

## 2019-12-10 NOTE — PROGRESS NOTES
Daily Note     Today's date: 12/10/2019  Patient name: Jerri Eastman  : 1935  MRN: [id number]  Referring provider: Yelitza Escamilla MD  Dx:   Encounter Diagnosis     ICD-10-CM    1  Trochanteric bursitis of right hip M70 61    2  Left hip pain M25 552        Start Time: 0900  Stop Time: 0950  Total time in clinic (min): 50 minutes    Subjective: states her L knee gave out twice today but her hip/ back is feeling pretty good      Objective: See treatment diary below      Assessment:   Able to perform exercises with minimal cueing, no pain complaints in therapy  May be ready for transition to hep  Plan: Continue per plan of care    RA possible DC nv        Precautions/ PMH: Sjogren's, osteoporosis, skin cancer, HTN, multiple lumbar surgeries  POC expires   Daily Treatment Diary     Manual  11/6 11/12 11/14 11/19 11/26 12/3 12/5 12/10                               TrP release L glute JR JR   JR        Gluteal stretch  JR                            Exercise Diary  11/6 11/12 11/14 11/19 11/26 12/3 12/5 12/10     clamshells  L SL 10x5" 15  x5" ea 15x5" ea 15x5" 15x5"  15x5"     hooklying abd w/ TB   GTB 15x10" GTB 15x10" 15x10" 15x10"  15x10"     R glute stretch  20"x3 np          LTR w/ pball  15x 15x 15x 15x 15x 15x 15x     Pelvic tilts A-P  15x 15x 15x 15x 15x 15x 15x     Bridge w/ pball  10x5" 15x 10" 15x10" 15x10" 15x10" 15x5" 15x5"     Seated flexion stretch    20"x5 np  10"x10 10x10"     QL stretch             Partial curl ups             Sciatic n sliders  15xea 15x ea 15xea 15xea        HS stretch     30"x3 ea 30"x3 30"x3 30"x3     SLS   10x 10" ea 10x10" ea 10x10" 10x10" 10x10" 15x5"     Mini squat    10x5" 10x5" 15x5" 15x5" 15x5"     Side step    3 laps 4 laps 4 laps 4 laps 4 laps     March in place    10x ue sup 10x 15x 15x 15x     Bird dog modified    10x 10x 10x 10x 15x     Hip hike      10x5" ea 10x5" 10x5"                               Recumbent bike      8 8' 10' Modalities              CP prn

## 2019-12-11 DIAGNOSIS — I10 HYPERTENSION, UNSPECIFIED TYPE: ICD-10-CM

## 2019-12-11 RX ORDER — LOSARTAN POTASSIUM 50 MG/1
TABLET ORAL
Qty: 90 TABLET | Refills: 0 | Status: SHIPPED | OUTPATIENT
Start: 2019-12-11 | End: 2020-02-17 | Stop reason: SDUPTHER

## 2019-12-12 ENCOUNTER — OFFICE VISIT (OUTPATIENT)
Dept: PHYSICAL THERAPY | Facility: CLINIC | Age: 84
End: 2019-12-12
Payer: COMMERCIAL

## 2019-12-12 DIAGNOSIS — M70.61 TROCHANTERIC BURSITIS OF RIGHT HIP: Primary | ICD-10-CM

## 2019-12-12 DIAGNOSIS — M25.552 LEFT HIP PAIN: ICD-10-CM

## 2019-12-12 PROCEDURE — 97164 PT RE-EVAL EST PLAN CARE: CPT | Performed by: PHYSICAL THERAPIST

## 2019-12-12 PROCEDURE — 97110 THERAPEUTIC EXERCISES: CPT | Performed by: PHYSICAL THERAPIST

## 2019-12-12 NOTE — PROGRESS NOTES
PT Re-Evaluation  and PT Discharge    Today's date: 2019  Patient name: Sofia Goyal  : 1935  MRN: [id number]  Referring provider: Indu Salazar MD  Dx:   Encounter Diagnosis     ICD-10-CM    1  Trochanteric bursitis of right hip M70 61    2  Left hip pain M25 552        Start Time: 904  Stop Time: 928  Total time in clinic (min): 24 minutes    Assessment/Plan  This patient demonstrates improvement since beginning therapy and has met most of the goals which were set for her  She no longer has the pain for which se came to therapy, is independent with HEP and is ready for DC to self-directed program       STGs: 4 weeks  1  Increase B PSLR to 65-70 degrees - partially met  2  Decrease pain 2-3 levels - MET  3  Restore pain-free trunk AROM - MET                  LTGs: 6-8 weeks  1  Independent HEP - MET  2  Increase strength B gluteus medius by one grade - MET  3  Normalize gait - partially met  4  Reduce / eliminate RLE symptoms - MET  5  Decrease pain w/ functional activities to 0-4/10 - MET            Subjective  This is a 82 y o  female who reports hx of R hip region pain for many years  Current complaint: No longer having any hip or R buttock region pain; only complaint at this time is L knee pain  Previous treatment: lumbar surgery x 5 (laminectomy, fusion)  Dx tests: x-rays R hip last week  Occupation: retired    Patients goal: get rid of the pain - MET    Objective  Pain scale: 0/10  Gait:  (+) Trendelenberg  Trunk ROM     Flexion AROM      Severe limitation        Extension  AROM     Severe limitation      R lateral flexion AROM     Severe limitation         L lateral flexion AROM    Mod-severe limitaiton           R rotation AROM      Min limitation       L rotation AROM      Min limitation        Special tests:    PSLR  R 68 degrees (-); L 55 degrees (-)   Myotomal testing    L2 - Psoas =  5/5    L3 - Quadriceps =  5/5    L4/5 - Ant   Tibialis = 5/5    L5 - Glut med = R 5/5; L  4/5    L5 - EHL = NT    S1-2 -  Glut taj = 5/5  ROM    BLE WFL but most limited in extension (0 degrees L; 5 degrees R) and abd          Flowsheet Rows      Most Recent Value   PT/OT G-Codes   Current Score  73   Projected Score  49                 Precautions/ PMH: Sjogren's, osteoporosis, skin cancer, HTN, multiple lumbar surgeries  POC expires 12/6  Daily Treatment Diary     Manual  11/6 11/12 11/14 11/19 11/26 12/3 12/5 12/10 12/12                              TrP release L glute JR JR   JR        Gluteal stretch  JR                            Exercise Diary  11/6 11/12 11/14 11/19 11/26 12/3 12/5 12/10 12/12    clamshells  L SL 10x5" 15  x5" ea 15x5" ea 15x5" 15x5"  15x5"     hooklying abd w/ TB   GTB 15x10" GTB 15x10" 15x10" 15x10"  15x10"     R glute stretch  20"x3 np          LTR w/ pball  15x 15x 15x 15x 15x 15x 15x     Pelvic tilts A-P  15x 15x 15x 15x 15x 15x 15x     Bridge w/ pball  10x5" 15x 10" 15x10" 15x10" 15x10" 15x5" 15x5"     Seated flexion stretch    20"x5 np  10"x10 10x10"     QL stretch             Partial curl ups             Sciatic n sliders  15xea 15x ea 15xea 15xea        HS stretch     30"x3 ea 30"x3 30"x3 30"x3     SLS   10x 10" ea 10x10" ea 10x10" 10x10" 10x10" 15x5"     Mini squat    10x5" 10x5" 15x5" 15x5" 15x5"     Side step    3 laps 4 laps 4 laps 4 laps 4 laps     March in place    10x ue sup 10x 15x 15x 15x     Bird dog modified    10x 10x 10x 10x 15x     Hip hike      10x5" ea 10x5" 10x5"                               Recumbent bike      8' 8' 10' 10'        Modalities              CP prn

## 2019-12-13 ENCOUNTER — TRANSCRIBE ORDERS (OUTPATIENT)
Dept: ADMINISTRATIVE | Facility: HOSPITAL | Age: 84
End: 2019-12-13

## 2019-12-13 ENCOUNTER — APPOINTMENT (OUTPATIENT)
Dept: LAB | Facility: HOSPITAL | Age: 84
End: 2019-12-13
Payer: COMMERCIAL

## 2019-12-13 DIAGNOSIS — M15.9 PRIMARY OSTEOARTHRITIS INVOLVING MULTIPLE JOINTS: ICD-10-CM

## 2019-12-13 DIAGNOSIS — G89.29 CHRONIC PAIN OF LEFT KNEE: Primary | ICD-10-CM

## 2019-12-13 DIAGNOSIS — T84.89XA: ICD-10-CM

## 2019-12-13 DIAGNOSIS — M25.562 CHRONIC PAIN OF LEFT KNEE: Primary | ICD-10-CM

## 2019-12-13 DIAGNOSIS — M15.9 PRIMARY OSTEOARTHRITIS INVOLVING MULTIPLE JOINTS: Primary | ICD-10-CM

## 2019-12-13 DIAGNOSIS — M17.12 OSTEOARTHRITIS OF LEFT KNEE, UNSPECIFIED OSTEOARTHRITIS TYPE: ICD-10-CM

## 2019-12-13 LAB
CRP SERPL QL: <3 MG/L
ERYTHROCYTE [SEDIMENTATION RATE] IN BLOOD: 17 MM/HOUR (ref 0–20)

## 2019-12-13 PROCEDURE — 85652 RBC SED RATE AUTOMATED: CPT

## 2019-12-13 PROCEDURE — 86140 C-REACTIVE PROTEIN: CPT

## 2019-12-13 PROCEDURE — 36415 COLL VENOUS BLD VENIPUNCTURE: CPT

## 2019-12-19 ENCOUNTER — APPOINTMENT (OUTPATIENT)
Dept: PHYSICAL THERAPY | Facility: CLINIC | Age: 84
End: 2019-12-19
Payer: COMMERCIAL

## 2019-12-19 ENCOUNTER — TRANSCRIBE ORDERS (OUTPATIENT)
Dept: ADMINISTRATIVE | Facility: HOSPITAL | Age: 84
End: 2019-12-19

## 2019-12-19 ENCOUNTER — HOSPITAL ENCOUNTER (OUTPATIENT)
Dept: CT IMAGING | Facility: HOSPITAL | Age: 84
Discharge: HOME | End: 2019-12-19
Payer: COMMERCIAL

## 2019-12-19 DIAGNOSIS — M25.562 CHRONIC PAIN OF LEFT KNEE: ICD-10-CM

## 2019-12-19 DIAGNOSIS — G89.29 CHRONIC PAIN OF LEFT KNEE: ICD-10-CM

## 2019-12-19 DIAGNOSIS — M17.12 OSTEOARTHRITIS OF LEFT KNEE, UNSPECIFIED OSTEOARTHRITIS TYPE: ICD-10-CM

## 2019-12-19 PROCEDURE — 73700 CT LOWER EXTREMITY W/O DYE: CPT

## 2019-12-23 DIAGNOSIS — I10 HYPERTENSION, UNSPECIFIED TYPE: ICD-10-CM

## 2019-12-23 RX ORDER — HYDROCHLOROTHIAZIDE 12.5 MG/1
TABLET ORAL
Qty: 90 TABLET | Refills: 0 | Status: SHIPPED | OUTPATIENT
Start: 2019-12-23

## 2019-12-24 ENCOUNTER — LAB (OUTPATIENT)
Dept: LAB | Facility: HOSPITAL | Age: 84
End: 2019-12-24
Payer: COMMERCIAL

## 2019-12-24 ENCOUNTER — TRANSCRIBE ORDERS (OUTPATIENT)
Dept: ADMINISTRATIVE | Facility: HOSPITAL | Age: 84
End: 2019-12-24

## 2019-12-24 ENCOUNTER — HOSPITAL ENCOUNTER (OUTPATIENT)
Dept: RADIOLOGY | Facility: HOSPITAL | Age: 84
Discharge: HOME | End: 2019-12-24
Payer: COMMERCIAL

## 2019-12-24 DIAGNOSIS — R01.1 UNDIAGNOSED CARDIAC MURMURS: ICD-10-CM

## 2019-12-24 DIAGNOSIS — I10 ESSENTIAL HYPERTENSION, MALIGNANT: Primary | ICD-10-CM

## 2019-12-24 DIAGNOSIS — M81.0 AGE-RELATED OSTEOPOROSIS WITHOUT CURRENT PATHOLOGICAL FRACTURE: ICD-10-CM

## 2019-12-24 DIAGNOSIS — I10 ESSENTIAL HYPERTENSION, MALIGNANT: ICD-10-CM

## 2019-12-24 LAB
ALBUMIN SERPL BCP-MCNC: 4.1 G/DL (ref 3.5–5)
ALP SERPL-CCNC: 60 U/L (ref 46–116)
ALT SERPL W P-5'-P-CCNC: 36 U/L (ref 12–78)
ANION GAP SERPL CALCULATED.3IONS-SCNC: 2 MMOL/L (ref 4–13)
AST SERPL W P-5'-P-CCNC: 25 U/L (ref 5–45)
BILIRUB SERPL-MCNC: 0.43 MG/DL (ref 0.2–1)
BUN SERPL-MCNC: 28 MG/DL (ref 5–25)
CALCIUM SERPL-MCNC: 9.7 MG/DL (ref 8.3–10.1)
CHLORIDE SERPL-SCNC: 108 MMOL/L (ref 100–108)
CO2 SERPL-SCNC: 31 MMOL/L (ref 21–32)
CREAT SERPL-MCNC: 1.02 MG/DL (ref 0.6–1.3)
GFR SERPL CREATININE-BSD FRML MDRD: 51 ML/MIN/1.73SQ M
GLUCOSE P FAST SERPL-MCNC: 90 MG/DL (ref 65–99)
POTASSIUM SERPL-SCNC: 4.6 MMOL/L (ref 3.5–5.3)
PROT SERPL-MCNC: 7.8 G/DL (ref 6.4–8.2)
SODIUM SERPL-SCNC: 141 MMOL/L (ref 136–145)

## 2019-12-24 PROCEDURE — 71046 X-RAY EXAM CHEST 2 VIEWS: CPT

## 2019-12-24 PROCEDURE — 36415 COLL VENOUS BLD VENIPUNCTURE: CPT

## 2019-12-24 PROCEDURE — 80053 COMPREHEN METABOLIC PANEL: CPT

## 2019-12-26 ENCOUNTER — APPOINTMENT (OUTPATIENT)
Dept: PHYSICAL THERAPY | Facility: CLINIC | Age: 84
End: 2019-12-26
Payer: COMMERCIAL

## 2019-12-27 ENCOUNTER — APPOINTMENT (OUTPATIENT)
Dept: RADIOLOGY | Facility: CLINIC | Age: 84
End: 2019-12-27
Payer: COMMERCIAL

## 2019-12-27 ENCOUNTER — OFFICE VISIT (OUTPATIENT)
Dept: OBGYN CLINIC | Facility: CLINIC | Age: 84
End: 2019-12-27
Payer: COMMERCIAL

## 2019-12-27 VITALS
HEIGHT: 63 IN | SYSTOLIC BLOOD PRESSURE: 142 MMHG | WEIGHT: 164 LBS | BODY MASS INDEX: 29.06 KG/M2 | DIASTOLIC BLOOD PRESSURE: 65 MMHG

## 2019-12-27 DIAGNOSIS — M25.562 LEFT KNEE PAIN, UNSPECIFIED CHRONICITY: ICD-10-CM

## 2019-12-27 DIAGNOSIS — T84.018A FAILURE OF TOTAL KNEE REPLACEMENT, INITIAL ENCOUNTER (HCC): Primary | ICD-10-CM

## 2019-12-27 DIAGNOSIS — Z96.659 FAILURE OF TOTAL KNEE REPLACEMENT, INITIAL ENCOUNTER (HCC): Primary | ICD-10-CM

## 2019-12-27 PROCEDURE — 20610 DRAIN/INJ JOINT/BURSA W/O US: CPT | Performed by: ORTHOPAEDIC SURGERY

## 2019-12-27 PROCEDURE — 73562 X-RAY EXAM OF KNEE 3: CPT

## 2019-12-27 PROCEDURE — 77073 BONE LENGTH STUDIES: CPT

## 2019-12-27 PROCEDURE — 99214 OFFICE O/P EST MOD 30 MIN: CPT | Performed by: ORTHOPAEDIC SURGERY

## 2019-12-27 RX ORDER — BUPIVACAINE HYDROCHLORIDE 2.5 MG/ML
4 INJECTION, SOLUTION EPIDURAL; INFILTRATION; INTRACAUDAL
Status: COMPLETED | OUTPATIENT
Start: 2019-12-27 | End: 2019-12-27

## 2019-12-27 RX ORDER — METHYLPREDNISOLONE ACETATE 40 MG/ML
1 INJECTION, SUSPENSION INTRA-ARTICULAR; INTRALESIONAL; INTRAMUSCULAR; SOFT TISSUE
Status: COMPLETED | OUTPATIENT
Start: 2019-12-27 | End: 2019-12-27

## 2019-12-27 RX ADMIN — METHYLPREDNISOLONE ACETATE 1 ML: 40 INJECTION, SUSPENSION INTRA-ARTICULAR; INTRALESIONAL; INTRAMUSCULAR; SOFT TISSUE at 08:03

## 2019-12-27 RX ADMIN — BUPIVACAINE HYDROCHLORIDE 4 ML: 2.5 INJECTION, SOLUTION EPIDURAL; INFILTRATION; INTRACAUDAL at 08:03

## 2019-12-27 NOTE — RESULT ENCOUNTER NOTE
Please call the patient regarding result  Calcium level is now normal   BUN is elevated on comprehensive metabolic panel  Please stay hydrated with water

## 2019-12-27 NOTE — PROGRESS NOTES
Orthopaedic Surgery Note    CC: Left Knee Pain      HPI:  Mykel Perry is a 82 y  o female with a history of left hip pain that was diagnosed as trochanteric bursitis in the office back in October  She underwent CSI and PT and reports that her pain in the hip/buttocks has now almost completely resolved  She was discharged from PT earlier this month  Unfortunately, she has developed left knee pain  She underwent bilateral TKA with Dr Samanta Salgado at [facility] 10/20/2008  Her insurance no longer accepted at [facility]  Reports that left knee pain started back in the summer  No injury or discreet trauma  Pain is in the back of the knee and she does feel a mass behind the knee  The pain is also on the medial and lateral aspect of the knee  Pain is not getting better or worse over time  She has previously seen Dr Sonam Montalvo for this problem and he informed her, per her report, that there is nothing to do and to check back with xray in 1 year  He did order ESR and CRP and these were normal       ALLERGIES:  Allergies   Allergen Reactions    Sulfa Antibiotics Hives       CURRENT MEDICATIONS:  Current Outpatient Medications   Medication Sig Dispense Refill    Cholecalciferol (VITAMIN D) 2000 UNITS tablet Take 3,000 Units by mouth daily        cycloSPORINE (RESTASIS) 0 05 % ophthalmic emulsion Administer 1 drop to both eyes 2 (two) times a day   denosumab (PROLIA) 60 mg/mL Inject 60 mg under the skin every 6 (six) months      DHA-EPA-Flaxseed Oil-Vitamin E (THERA TEARS) CAPS Take 4 capsules by mouth daily   hydrochlorothiazide (HYDRODIURIL) 12 5 mg tablet TAKE 1 TABLET BY MOUTH EVERY DAY 90 tablet 0    hydroxychloroquine (PLAQUENIL) 200 mg tablet Take 200 mg by mouth daily        levothyroxine 112 mcg tablet TAKE 1 TABLET BY MOUTH EVERY DAY 90 tablet 3    losartan (COZAAR) 50 mg tablet TAKE 1 TABLET BY MOUTH EVERY DAY 90 tablet 0    Multiple Vitamins-Minerals (OCUVITE PO) Take 1 tablet by mouth daily       omeprazole (PriLOSEC) 20 mg delayed release capsule Take 20 mg by mouth 2 (two) times a day   polyvinyl alcohol (LIQUIFILM TEARS) 1 4 % ophthalmic solution Administer 1 drop to both eyes as needed for dry eyes      simvastatin (ZOCOR) 20 mg tablet Take 20 mg by mouth daily at bedtime   metoprolol tartrate (LOPRESSOR) 25 mg tablet Take 1 tablet (25 mg total) by mouth every 12 (twelve) hours for 30 days 60 tablet 0     No current facility-administered medications for this visit          PAST MEDICAL HISTORY  Past Medical History:   Diagnosis Date    Cancer ([facility] [address] )     squamous cell - face    Disease of thyroid gland     Hyperlipidemia     Hypertension     Osteoporosis     PE (pulmonary thromboembolism) (HCC)     Sjogren's syndrome (HCC)     Sjogren's syndrome (HCC)     Urinary tract infection        SURGICAL HISTORY  Past Surgical History:   Procedure Laterality Date    HYSTERECTOMY      JOINT REPLACEMENT      KNEE SURGERY      OOPHORECTOMY      SHOULDER SURGERY      right    SPINE SURGERY      TOE SURGERY         FAMILY HISTORY  Family History   Problem Relation Age of Onset    Stroke Father     Ovarian cancer Sister     No Known Problems Son     No Known Problems Son     Ovarian cancer Daughter     No Known Problems Daughter     No Known Problems Daughter     No Known Problems Maternal Aunt     No Known Problems Paternal Aunt        SOCIAL HISTORY  Social History     Socioeconomic History    Marital status: /Civil Union     Spouse name: Not on file    Number of children: Not on file    Years of education: Not on file    Highest education level: Not on file   Occupational History    Not on file   Social Needs    Financial resource strain: Not on file    Food insecurity:     Worry: Not on file     Inability: Not on file    Transportation needs:     Medical: Not on file     Non-medical: Not on file   Tobacco Use    Smoking status: Never Smoker    Smokeless tobacco: Never Used   Substance and Sexual Activity    Alcohol use: No    Drug use: No    Sexual activity: Never   Lifestyle    Physical activity:     Days per week: Not on file     Minutes per session: Not on file    Stress: Not on file   Relationships    Social connections:     Talks on phone: Not on file     Gets together: Not on file     Attends Buddhism service: Not on file     Active member of club or organization: Not on file     Attends meetings of clubs or organizations: Not on file     Relationship status: Not on file    Intimate partner violence:     Fear of current or ex partner: Not on file     Emotionally abused: Not on file     Physically abused: Not on file     Forced sexual activity: Not on file   Other Topics Concern    Not on file   Social History Narrative    Not on file         Review of Systems   Patient indicated positive for dry eyes, hearing loss, urinary frequency, pain with walking, easy bruising  Otherwise negative except per above and HPI  Physical Exam    Vitals  Vitals:    12/27/19 0702   BP: 142/65       BMI  Body mass index is 29 05 kg/m²  GENERAL: No acute distress  Alert and oriented  Well nourished and well hydrated  Appears stated age  HEENT : Normocephalic, atraumatic  Extraocular movements intact  Mucous membranes moist  NECK: Supple, trachea midline  LUNGS: Adequate and symmetric respiratory effort  No intercostal retractions or accessory muscle use  HEART: Extremities warm and perfused  ABDOMEN: Nondistended  SKIN: Warm and dry, no rash  Left  Knee   Inspection/Appearance:       Swelling: No      Patella is midline  Palpable mass posteromedial knee just off midline  No palpable pulsation  Alignment:  Knee is in mild valgus  Palpation - Soft Tissue: normal without effusion  ROM:       Extension - 5          Flexion - 120      extensor lag: no    Stability:  demonstrates no varus, valgus, anterior drawer or posterior drawer    Opens 4-5mm laterally with stress, firm endpoint  Patella: stable, tracks normally  Motor:        5/5 quadriceps       5/5 hamstrings  Vascular: Knee is warm and sensate with normal refill  Imaging  A) Imaging modality available  Radiographs: yes  MRI scan: no  CT scan: yes  US: yes    B) Imaging findings  Bilateral TKA in place  R knee in mechanical alignment  L TKA in valgus alignemnt with mechanical axis passing through the lateral third of tibial baseplate  Cemented components, appears well fixed  US 8/2019 demonstrated cystic structure behind the left knee, non-vascular  Assessment and Plan  Failed L TKA    - reviewed etiology of pain following TKA, including infection, loosening, poly wear, abnormal mechanical loading of the knee  At this point ESR, CRP being normal provides good evidence against infection  I do not think the radiographs or CT scan demonstrate gross loosening  The valgus alignment of the knee and the unbalanced soft-tissue tension are, in my opinion the most likely source of her complaints  At this point in time I think the definitive solution would be revision, however, patient is not eager to undergo revision  Offered CSI to help alleviate symptoms, reviewed risks, including infection  If in 2-3 weeks the injection has not provided signficant relief I have asked patient to phone office and we will plan to get her setup for geniculate blockade with pain mgmt  followup 3 months to review      Large joint arthrocentesis: L knee  Date/Time: 12/27/2019 8:03 AM  Consent given by: patient  Site marked: site marked  Timeout: Immediately prior to procedure a time out was called to verify the correct patient, procedure, equipment, support staff and site/side marked as required   Supporting Documentation  Indications: pain   Procedure Details  Location: knee - L knee  Needle size: 20 G  Approach: anterior  Medications administered: 1 mL methylPREDNISolone acetate 40 mg/mL; 4 mL bupivacaine (PF) 0 25 %    Patient tolerance: patient tolerated the procedure well with no immediate complications  Dressing:  Sterile dressing applied            I have spent 25 minutes with Patient  today in which greater than 50% of this time was spent in counseling/coordination of care regarding Diagnostic results, Prognosis, Risks and benefits of tx options, Patient and family education and Impressions  Darshan Kennedy MD  Adult Reconstruction Surgery  Department of [place] [id number]  7:22 AM

## 2020-02-17 DIAGNOSIS — I10 HYPERTENSION, UNSPECIFIED TYPE: Primary | ICD-10-CM

## 2020-02-17 RX ORDER — LOSARTAN POTASSIUM 50 MG/1
50 TABLET ORAL DAILY
Qty: 90 TABLET | Refills: 3 | Status: SHIPPED | OUTPATIENT
Start: 2020-02-17 | End: 2021-01-27 | Stop reason: SDUPTHER

## 2020-02-17 NOTE — TELEPHONE ENCOUNTER
Pt needs a refill of her Losartan sent to mail order please  Pt saw you in November and has a follow up in May

## 2020-04-22 ENCOUNTER — OFFICE VISIT (OUTPATIENT)
Dept: OBGYN CLINIC | Facility: CLINIC | Age: 85
End: 2020-04-22
Payer: COMMERCIAL

## 2020-04-22 VITALS
TEMPERATURE: 96.7 F | SYSTOLIC BLOOD PRESSURE: 124 MMHG | DIASTOLIC BLOOD PRESSURE: 68 MMHG | HEIGHT: 63 IN | WEIGHT: 164 LBS | BODY MASS INDEX: 29.06 KG/M2

## 2020-04-22 DIAGNOSIS — T84.018A FAILURE OF TOTAL KNEE REPLACEMENT, INITIAL ENCOUNTER (HCC): Primary | ICD-10-CM

## 2020-04-22 DIAGNOSIS — Z96.659 FAILURE OF TOTAL KNEE REPLACEMENT, INITIAL ENCOUNTER (HCC): Primary | ICD-10-CM

## 2020-04-22 PROCEDURE — 99213 OFFICE O/P EST LOW 20 MIN: CPT | Performed by: ORTHOPAEDIC SURGERY

## 2020-04-23 ENCOUNTER — LAB (OUTPATIENT)
Dept: LAB | Facility: HOSPITAL | Age: 85
End: 2020-04-23
Payer: COMMERCIAL

## 2020-04-23 ENCOUNTER — TRANSCRIBE ORDERS (OUTPATIENT)
Dept: ADMINISTRATIVE | Facility: HOSPITAL | Age: 85
End: 2020-04-23

## 2020-04-23 DIAGNOSIS — M81.0 AGE-RELATED OSTEOPOROSIS WITHOUT CURRENT PATHOLOGICAL FRACTURE: ICD-10-CM

## 2020-04-23 DIAGNOSIS — M35.00 SICCA SYNDROME (HCC): ICD-10-CM

## 2020-04-23 DIAGNOSIS — I10 ESSENTIAL HYPERTENSION, MALIGNANT: Primary | ICD-10-CM

## 2020-04-23 DIAGNOSIS — E55.9 VITAMIN D DEFICIENCY: ICD-10-CM

## 2020-04-23 DIAGNOSIS — E03.9 ACQUIRED HYPOTHYROIDISM: ICD-10-CM

## 2020-04-23 DIAGNOSIS — E03.9 HYPOTHYROIDISM IN ADULT: ICD-10-CM

## 2020-04-23 DIAGNOSIS — I10 ESSENTIAL HYPERTENSION: ICD-10-CM

## 2020-04-23 DIAGNOSIS — I10 ESSENTIAL HYPERTENSION, MALIGNANT: ICD-10-CM

## 2020-04-23 LAB
25(OH)D3 SERPL-MCNC: 47 NG/ML (ref 30–100)
ALBUMIN SERPL BCP-MCNC: 3.9 G/DL (ref 3.5–5)
ALP SERPL-CCNC: 67 U/L (ref 46–116)
ALT SERPL W P-5'-P-CCNC: 30 U/L (ref 12–78)
ANION GAP SERPL CALCULATED.3IONS-SCNC: 4 MMOL/L (ref 4–13)
AST SERPL W P-5'-P-CCNC: 23 U/L (ref 5–45)
BACTERIA UR QL AUTO: ABNORMAL /HPF
BASOPHILS # BLD AUTO: 0.11 THOUSANDS/ΜL (ref 0–0.1)
BASOPHILS NFR BLD AUTO: 1 % (ref 0–1)
BILIRUB SERPL-MCNC: 0.4 MG/DL (ref 0.2–1)
BILIRUB UR QL STRIP: NEGATIVE
BUN SERPL-MCNC: 23 MG/DL (ref 5–25)
CALCIUM ALBUM COR SERPL-MCNC: 10.8 MG/DL (ref 8.3–10.1)
CALCIUM SERPL-MCNC: 10.7 MG/DL (ref 8.3–10.1)
CHLORIDE SERPL-SCNC: 108 MMOL/L (ref 100–108)
CHOLEST SERPL-MCNC: 151 MG/DL (ref 50–200)
CLARITY UR: ABNORMAL
CO2 SERPL-SCNC: 30 MMOL/L (ref 21–32)
COLOR UR: YELLOW
CREAT SERPL-MCNC: 1.03 MG/DL (ref 0.6–1.3)
EOSINOPHIL # BLD AUTO: 0.33 THOUSAND/ΜL (ref 0–0.61)
EOSINOPHIL NFR BLD AUTO: 3 % (ref 0–6)
ERYTHROCYTE [DISTWIDTH] IN BLOOD BY AUTOMATED COUNT: 12.7 % (ref 11.6–15.1)
GFR SERPL CREATININE-BSD FRML MDRD: 51 ML/MIN/1.73SQ M
GLUCOSE P FAST SERPL-MCNC: 95 MG/DL (ref 65–99)
GLUCOSE UR STRIP-MCNC: NEGATIVE MG/DL
HCT VFR BLD AUTO: 42.3 % (ref 34.8–46.1)
HDLC SERPL-MCNC: 43 MG/DL
HGB BLD-MCNC: 13.7 G/DL (ref 11.5–15.4)
HGB UR QL STRIP.AUTO: NEGATIVE
HYALINE CASTS #/AREA URNS LPF: ABNORMAL /LPF
IMM GRANULOCYTES # BLD AUTO: 0.04 THOUSAND/UL (ref 0–0.2)
IMM GRANULOCYTES NFR BLD AUTO: 0 % (ref 0–2)
KETONES UR STRIP-MCNC: NEGATIVE MG/DL
LDLC SERPL CALC-MCNC: 70 MG/DL (ref 0–100)
LEUKOCYTE ESTERASE UR QL STRIP: ABNORMAL
LYMPHOCYTES # BLD AUTO: 2.03 THOUSANDS/ΜL (ref 0.6–4.47)
LYMPHOCYTES NFR BLD AUTO: 19 % (ref 14–44)
MCH RBC QN AUTO: 32.3 PG (ref 26.8–34.3)
MCHC RBC AUTO-ENTMCNC: 32.4 G/DL (ref 31.4–37.4)
MCV RBC AUTO: 100 FL (ref 82–98)
MONOCYTES # BLD AUTO: 1.19 THOUSAND/ΜL (ref 0.17–1.22)
MONOCYTES NFR BLD AUTO: 11 % (ref 4–12)
NEUTROPHILS # BLD AUTO: 7.22 THOUSANDS/ΜL (ref 1.85–7.62)
NEUTS SEG NFR BLD AUTO: 66 % (ref 43–75)
NITRITE UR QL STRIP: NEGATIVE
NON-SQ EPI CELLS URNS QL MICRO: ABNORMAL /HPF
NRBC BLD AUTO-RTO: 0 /100 WBCS
PH UR STRIP.AUTO: 7 [PH]
PLATELET # BLD AUTO: 227 THOUSANDS/UL (ref 149–390)
PMV BLD AUTO: 11.1 FL (ref 8.9–12.7)
POTASSIUM SERPL-SCNC: 4.4 MMOL/L (ref 3.5–5.3)
PROT SERPL-MCNC: 8.2 G/DL (ref 6.4–8.2)
PROT UR STRIP-MCNC: ABNORMAL MG/DL
RBC # BLD AUTO: 4.24 MILLION/UL (ref 3.81–5.12)
RBC #/AREA URNS AUTO: ABNORMAL /HPF
SODIUM SERPL-SCNC: 142 MMOL/L (ref 136–145)
SP GR UR STRIP.AUTO: 1.01 (ref 1–1.03)
T4 FREE SERPL-MCNC: 1.37 NG/DL (ref 0.76–1.46)
TRIGL SERPL-MCNC: 188 MG/DL
TSH SERPL DL<=0.05 MIU/L-ACNC: 3.06 UIU/ML (ref 0.36–3.74)
UROBILINOGEN UR QL STRIP.AUTO: 0.2 E.U./DL
WBC # BLD AUTO: 10.92 THOUSAND/UL (ref 4.31–10.16)
WBC #/AREA URNS AUTO: ABNORMAL /HPF

## 2020-04-23 PROCEDURE — 36415 COLL VENOUS BLD VENIPUNCTURE: CPT

## 2020-04-23 PROCEDURE — 84443 ASSAY THYROID STIM HORMONE: CPT

## 2020-04-23 PROCEDURE — 85025 COMPLETE CBC W/AUTO DIFF WBC: CPT

## 2020-04-23 PROCEDURE — 84439 ASSAY OF FREE THYROXINE: CPT

## 2020-04-23 PROCEDURE — 82306 VITAMIN D 25 HYDROXY: CPT

## 2020-04-23 PROCEDURE — 80061 LIPID PANEL: CPT

## 2020-04-23 PROCEDURE — 81001 URINALYSIS AUTO W/SCOPE: CPT | Performed by: INTERNAL MEDICINE

## 2020-04-23 PROCEDURE — 80053 COMPREHEN METABOLIC PANEL: CPT

## 2020-04-29 DIAGNOSIS — M81.0 OSTEOPOROSIS, UNSPECIFIED OSTEOPOROSIS TYPE, UNSPECIFIED PATHOLOGICAL FRACTURE PRESENCE: ICD-10-CM

## 2020-04-29 DIAGNOSIS — E03.9 HYPOTHYROIDISM IN ADULT: ICD-10-CM

## 2020-04-29 DIAGNOSIS — I10 HYPERTENSION, UNSPECIFIED TYPE: Primary | ICD-10-CM

## 2020-05-05 ENCOUNTER — TRANSCRIBE ORDERS (OUTPATIENT)
Dept: ADMINISTRATIVE | Facility: HOSPITAL | Age: 85
End: 2020-05-05

## 2020-05-05 ENCOUNTER — LAB (OUTPATIENT)
Dept: LAB | Facility: HOSPITAL | Age: 85
End: 2020-05-05
Payer: COMMERCIAL

## 2020-05-05 DIAGNOSIS — E03.9 MYXEDEMA HEART DISEASE: ICD-10-CM

## 2020-05-05 DIAGNOSIS — I10 ESSENTIAL HYPERTENSION, MALIGNANT: ICD-10-CM

## 2020-05-05 DIAGNOSIS — M35.00 SICCA SYNDROME (HCC): Primary | ICD-10-CM

## 2020-05-05 DIAGNOSIS — M81.0 OSTEOPOROSIS, UNSPECIFIED OSTEOPOROSIS TYPE, UNSPECIFIED PATHOLOGICAL FRACTURE PRESENCE: ICD-10-CM

## 2020-05-05 DIAGNOSIS — I51.9 MYXEDEMA HEART DISEASE: ICD-10-CM

## 2020-05-05 DIAGNOSIS — E03.9 HYPOTHYROIDISM IN ADULT: ICD-10-CM

## 2020-05-05 DIAGNOSIS — M35.00 SICCA SYNDROME (HCC): ICD-10-CM

## 2020-05-05 LAB
ALBUMIN SERPL BCP-MCNC: 3.8 G/DL (ref 3.5–5)
ALP SERPL-CCNC: 70 U/L (ref 46–116)
ALT SERPL W P-5'-P-CCNC: 31 U/L (ref 12–78)
ANION GAP SERPL CALCULATED.3IONS-SCNC: 7 MMOL/L (ref 4–13)
AST SERPL W P-5'-P-CCNC: 24 U/L (ref 5–45)
BACTERIA UR QL AUTO: ABNORMAL /HPF
BASOPHILS # BLD AUTO: 0.12 THOUSANDS/ΜL (ref 0–0.1)
BASOPHILS NFR BLD AUTO: 1 % (ref 0–1)
BILIRUB SERPL-MCNC: 0.36 MG/DL (ref 0.2–1)
BILIRUB UR QL STRIP: NEGATIVE
BUN SERPL-MCNC: 25 MG/DL (ref 5–25)
CALCIUM ALBUM COR SERPL-MCNC: 10.9 MG/DL (ref 8.3–10.1)
CALCIUM SERPL-MCNC: 10.7 MG/DL (ref 8.3–10.1)
CHLORIDE SERPL-SCNC: 107 MMOL/L (ref 100–108)
CHOLEST SERPL-MCNC: 146 MG/DL (ref 50–200)
CLARITY UR: ABNORMAL
CO2 SERPL-SCNC: 29 MMOL/L (ref 21–32)
COLOR UR: YELLOW
CREAT SERPL-MCNC: 1.08 MG/DL (ref 0.6–1.3)
EOSINOPHIL # BLD AUTO: 0.36 THOUSAND/ΜL (ref 0–0.61)
EOSINOPHIL NFR BLD AUTO: 3 % (ref 0–6)
ERYTHROCYTE [DISTWIDTH] IN BLOOD BY AUTOMATED COUNT: 12.5 % (ref 11.6–15.1)
GFR SERPL CREATININE-BSD FRML MDRD: 48 ML/MIN/1.73SQ M
GLUCOSE P FAST SERPL-MCNC: 96 MG/DL (ref 65–99)
GLUCOSE UR STRIP-MCNC: NEGATIVE MG/DL
HCT VFR BLD AUTO: 39.7 % (ref 34.8–46.1)
HDLC SERPL-MCNC: 43 MG/DL
HGB BLD-MCNC: 13 G/DL (ref 11.5–15.4)
HGB UR QL STRIP.AUTO: NEGATIVE
HYALINE CASTS #/AREA URNS LPF: ABNORMAL /LPF
IMM GRANULOCYTES # BLD AUTO: 0.04 THOUSAND/UL (ref 0–0.2)
IMM GRANULOCYTES NFR BLD AUTO: 0 % (ref 0–2)
KETONES UR STRIP-MCNC: NEGATIVE MG/DL
LDLC SERPL CALC-MCNC: 78 MG/DL (ref 0–100)
LEUKOCYTE ESTERASE UR QL STRIP: ABNORMAL
LYMPHOCYTES # BLD AUTO: 2.4 THOUSANDS/ΜL (ref 0.6–4.47)
LYMPHOCYTES NFR BLD AUTO: 22 % (ref 14–44)
MCH RBC QN AUTO: 32.3 PG (ref 26.8–34.3)
MCHC RBC AUTO-ENTMCNC: 32.7 G/DL (ref 31.4–37.4)
MCV RBC AUTO: 99 FL (ref 82–98)
MONOCYTES # BLD AUTO: 1.25 THOUSAND/ΜL (ref 0.17–1.22)
MONOCYTES NFR BLD AUTO: 12 % (ref 4–12)
NEUTROPHILS # BLD AUTO: 6.66 THOUSANDS/ΜL (ref 1.85–7.62)
NEUTS SEG NFR BLD AUTO: 62 % (ref 43–75)
NITRITE UR QL STRIP: NEGATIVE
NON-SQ EPI CELLS URNS QL MICRO: ABNORMAL /HPF
NONHDLC SERPL-MCNC: 103 MG/DL
NRBC BLD AUTO-RTO: 0 /100 WBCS
PH UR STRIP.AUTO: 7 [PH]
PLATELET # BLD AUTO: 203 THOUSANDS/UL (ref 149–390)
PMV BLD AUTO: 10.5 FL (ref 8.9–12.7)
POTASSIUM SERPL-SCNC: 4.6 MMOL/L (ref 3.5–5.3)
PROT SERPL-MCNC: 8.2 G/DL (ref 6.4–8.2)
PROT UR STRIP-MCNC: NEGATIVE MG/DL
PTH-INTACT SERPL-MCNC: 42.1 PG/ML (ref 18.4–80.1)
RBC # BLD AUTO: 4.03 MILLION/UL (ref 3.81–5.12)
RBC #/AREA URNS AUTO: ABNORMAL /HPF
SODIUM SERPL-SCNC: 143 MMOL/L (ref 136–145)
SP GR UR STRIP.AUTO: 1.01 (ref 1–1.03)
T4 FREE SERPL-MCNC: 1.27 NG/DL (ref 0.76–1.46)
TRIGL SERPL-MCNC: 126 MG/DL
TSH SERPL DL<=0.05 MIU/L-ACNC: 4.82 UIU/ML (ref 0.36–3.74)
UROBILINOGEN UR QL STRIP.AUTO: 0.2 E.U./DL
WBC # BLD AUTO: 10.83 THOUSAND/UL (ref 4.31–10.16)
WBC #/AREA URNS AUTO: ABNORMAL /HPF

## 2020-05-05 PROCEDURE — 84439 ASSAY OF FREE THYROXINE: CPT

## 2020-05-05 PROCEDURE — 84443 ASSAY THYROID STIM HORMONE: CPT

## 2020-05-05 PROCEDURE — 80061 LIPID PANEL: CPT

## 2020-05-05 PROCEDURE — 81001 URINALYSIS AUTO W/SCOPE: CPT | Performed by: INTERNAL MEDICINE

## 2020-05-05 PROCEDURE — 80053 COMPREHEN METABOLIC PANEL: CPT

## 2020-05-05 PROCEDURE — 36415 COLL VENOUS BLD VENIPUNCTURE: CPT

## 2020-05-05 PROCEDURE — 85025 COMPLETE CBC W/AUTO DIFF WBC: CPT

## 2020-05-05 PROCEDURE — 83970 ASSAY OF PARATHORMONE: CPT

## 2020-05-26 ENCOUNTER — TELEMEDICINE (OUTPATIENT)
Dept: ENDOCRINOLOGY | Facility: HOSPITAL | Age: 85
End: 2020-05-26
Payer: COMMERCIAL

## 2020-05-26 DIAGNOSIS — E78.5 HYPERLIPIDEMIA, UNSPECIFIED HYPERLIPIDEMIA TYPE: ICD-10-CM

## 2020-05-26 DIAGNOSIS — E55.9 VITAMIN D DEFICIENCY: ICD-10-CM

## 2020-05-26 DIAGNOSIS — I10 ESSENTIAL HYPERTENSION: ICD-10-CM

## 2020-05-26 DIAGNOSIS — E03.9 HYPOTHYROIDISM IN ADULT: Primary | ICD-10-CM

## 2020-05-26 DIAGNOSIS — M81.0 AGE-RELATED OSTEOPOROSIS WITHOUT CURRENT PATHOLOGICAL FRACTURE: ICD-10-CM

## 2020-05-26 PROCEDURE — 99443 PR PHYS/QHP TELEPHONE EVALUATION 21-30 MIN: CPT | Performed by: NURSE PRACTITIONER

## 2020-06-17 ENCOUNTER — OFFICE VISIT (OUTPATIENT)
Dept: OBGYN CLINIC | Facility: CLINIC | Age: 85
End: 2020-06-17
Payer: COMMERCIAL

## 2020-06-17 VITALS
WEIGHT: 155 LBS | BODY MASS INDEX: 27.46 KG/M2 | TEMPERATURE: 99.5 F | SYSTOLIC BLOOD PRESSURE: 136 MMHG | DIASTOLIC BLOOD PRESSURE: 65 MMHG | HEIGHT: 63 IN

## 2020-06-17 DIAGNOSIS — M47.818 ARTHRITIS OF RIGHT SACROILIAC JOINT: Primary | ICD-10-CM

## 2020-06-17 PROCEDURE — 99214 OFFICE O/P EST MOD 30 MIN: CPT | Performed by: ORTHOPAEDIC SURGERY

## 2020-06-18 ENCOUNTER — TELEPHONE (OUTPATIENT)
Dept: OBGYN CLINIC | Facility: HOSPITAL | Age: 85
End: 2020-06-18

## 2020-06-18 ENCOUNTER — TELEPHONE (OUTPATIENT)
Dept: ENDOCRINOLOGY | Facility: HOSPITAL | Age: 85
End: 2020-06-18

## 2020-07-02 ENCOUNTER — CLINICAL SUPPORT (OUTPATIENT)
Dept: ENDOCRINOLOGY | Facility: HOSPITAL | Age: 85
End: 2020-07-02
Payer: COMMERCIAL

## 2020-07-02 ENCOUNTER — LAB (OUTPATIENT)
Dept: LAB | Facility: HOSPITAL | Age: 85
End: 2020-07-02
Payer: COMMERCIAL

## 2020-07-02 DIAGNOSIS — M81.0 AGE-RELATED OSTEOPOROSIS WITHOUT CURRENT PATHOLOGICAL FRACTURE: Primary | ICD-10-CM

## 2020-07-02 DIAGNOSIS — E55.9 VITAMIN D DEFICIENCY: ICD-10-CM

## 2020-07-02 DIAGNOSIS — E03.9 HYPOTHYROIDISM IN ADULT: ICD-10-CM

## 2020-07-02 DIAGNOSIS — M81.0 AGE-RELATED OSTEOPOROSIS WITHOUT CURRENT PATHOLOGICAL FRACTURE: ICD-10-CM

## 2020-07-02 DIAGNOSIS — I10 ESSENTIAL HYPERTENSION: ICD-10-CM

## 2020-07-02 DIAGNOSIS — E78.5 HYPERLIPIDEMIA, UNSPECIFIED HYPERLIPIDEMIA TYPE: ICD-10-CM

## 2020-07-02 LAB
25(OH)D3 SERPL-MCNC: 39.4 NG/ML (ref 30–100)
ALBUMIN SERPL BCP-MCNC: 3.8 G/DL (ref 3.5–5)
ALP SERPL-CCNC: 92 U/L (ref 46–116)
ALT SERPL W P-5'-P-CCNC: 26 U/L (ref 12–78)
ANION GAP SERPL CALCULATED.3IONS-SCNC: 4 MMOL/L (ref 4–13)
AST SERPL W P-5'-P-CCNC: 18 U/L (ref 5–45)
BILIRUB SERPL-MCNC: 0.43 MG/DL (ref 0.2–1)
BUN SERPL-MCNC: 25 MG/DL (ref 5–25)
CALCIUM ALBUM COR SERPL-MCNC: 10.6 MG/DL (ref 8.3–10.1)
CALCIUM SERPL-MCNC: 10.4 MG/DL (ref 8.3–10.1)
CHLORIDE SERPL-SCNC: 107 MMOL/L (ref 100–108)
CHOLEST SERPL-MCNC: 128 MG/DL (ref 50–200)
CO2 SERPL-SCNC: 27 MMOL/L (ref 21–32)
CREAT SERPL-MCNC: 1.04 MG/DL (ref 0.6–1.3)
GFR SERPL CREATININE-BSD FRML MDRD: 50 ML/MIN/1.73SQ M
GLUCOSE P FAST SERPL-MCNC: 100 MG/DL (ref 65–99)
HDLC SERPL-MCNC: 41 MG/DL
LDLC SERPL CALC-MCNC: 60 MG/DL (ref 0–100)
NONHDLC SERPL-MCNC: 87 MG/DL
POTASSIUM SERPL-SCNC: 4.7 MMOL/L (ref 3.5–5.3)
PROT SERPL-MCNC: 8.1 G/DL (ref 6.4–8.2)
SODIUM SERPL-SCNC: 138 MMOL/L (ref 136–145)
T4 FREE SERPL-MCNC: 1.61 NG/DL (ref 0.76–1.46)
TRIGL SERPL-MCNC: 135 MG/DL
TSH SERPL DL<=0.05 MIU/L-ACNC: 2.18 UIU/ML (ref 0.36–3.74)

## 2020-07-02 PROCEDURE — 80053 COMPREHEN METABOLIC PANEL: CPT

## 2020-07-02 PROCEDURE — 96372 THER/PROPH/DIAG INJ SC/IM: CPT | Performed by: INTERNAL MEDICINE

## 2020-07-02 PROCEDURE — 80061 LIPID PANEL: CPT

## 2020-07-02 PROCEDURE — 82306 VITAMIN D 25 HYDROXY: CPT

## 2020-07-02 PROCEDURE — 36415 COLL VENOUS BLD VENIPUNCTURE: CPT

## 2020-07-02 PROCEDURE — 84443 ASSAY THYROID STIM HORMONE: CPT

## 2020-07-02 PROCEDURE — 84439 ASSAY OF FREE THYROXINE: CPT

## 2020-07-08 ENCOUNTER — CONSULT (OUTPATIENT)
Dept: PAIN MEDICINE | Facility: CLINIC | Age: 85
End: 2020-07-08
Payer: COMMERCIAL

## 2020-07-08 VITALS
TEMPERATURE: 97.7 F | WEIGHT: 156 LBS | HEART RATE: 69 BPM | DIASTOLIC BLOOD PRESSURE: 78 MMHG | HEIGHT: 63 IN | SYSTOLIC BLOOD PRESSURE: 136 MMHG | BODY MASS INDEX: 27.64 KG/M2

## 2020-07-08 DIAGNOSIS — M46.1 SACROILIITIS (HCC): Primary | ICD-10-CM

## 2020-07-08 DIAGNOSIS — M47.818 ARTHRITIS OF RIGHT SACROILIAC JOINT: ICD-10-CM

## 2020-07-08 PROBLEM — S43.035A: Status: ACTIVE | Noted: 2020-07-08

## 2020-07-08 PROBLEM — M19.90 OSTEOARTHRITIS: Status: ACTIVE | Noted: 2020-07-08

## 2020-07-08 PROBLEM — Z34.00 SUPERVISION OF NORMAL FIRST PREGNANCY: Status: ACTIVE | Noted: 2020-07-08

## 2020-07-08 PROBLEM — Z96.611 STATUS POST REVERSE ARTHROPLASTY OF RIGHT SHOULDER: Status: ACTIVE | Noted: 2018-07-12

## 2020-07-08 PROCEDURE — 99204 OFFICE O/P NEW MOD 45 MIN: CPT | Performed by: ANESTHESIOLOGY

## 2020-07-08 NOTE — RESULT ENCOUNTER NOTE
Please call the patient regarding abnormal result  Calcium remains elevated on comprehensive metabolic panel  TSH has improved but free T4 slightly elevated  Did she get her lab work completed just after taking her levothyroxine in the morning? This may explain the slight elevation in her free T4  Fasting lipid panel looks okay    Vitamin-D level is normal

## 2020-07-08 NOTE — PROGRESS NOTES
Assessment  1  Sacroiliitis ([facility] [address] )    2  Arthritis of right sacroiliac joint        Plan  The patient's low back pain persists despite time, relative rest, activity modification and therapy  Based on the patient's symptoms and examination, I suspect that their pain is being generated by the sacroiliac joint  I will schedule the patient for intra-articular sacroiliac joint steroid injection under fluoroscopic guidance to address any inflammatory component of the pain  This would serve both diagnostic and hopefully therapeutic purposes  If the patient does not receive significant relief following the injection, it is possible that there is another pain source as the sacroiliac joint is a common pain referral site  It is also possible that the pain is being manifested by an extra-articular sacroiliac pain generator which would not be addressed with an intra-articular injection  In the office today, we reviewed the nature of the patient's pathology in depth using  diagrams and models  We discussed the approach I would use for the sacroiliac joint injection and provided literature for home review  The patient understands the risks associated with the procedure including bleeding, infection, tissue injury, allergic reaction and paralysis and provided written and verbal consent in the office today  My impressions and treatment recommendations were discussed in detail with the patient who verbalized understanding and had no further questions  Discharge instructions were provided  I personally saw and examined the patient and I agree with the above discussed plan of care  This note is created using dictation transcription  It may contain typographical errors, grammatical errors, improperly dictated words, background noise and other errors      Orders Placed This Encounter   Procedures    FL spine and pain procedure     Standing Status:   Future     Standing Expiration Date:   7/8/2024     Order Specific Question:   Reason for Exam:     Answer:   Right SI joint injection     Order Specific Question:   Anticoagulant hold needed? Answer:   No     No orders of the defined types were placed in this encounter  REFERRED BY DR Sachi Elkins Handled by DR Karina Wang     History of Present Illness    Kelley Cordero is a 82 y o  female with approximately six week history of right-sided low back pain  She is unaware of any clear precipitating event denies any trauma oInjury  Patient has history of multiple lumbar spine surgery with lumbar spine fusion  Her pain is severe rates as 10/10 on the visual analog scale is nearly constant shooting sharp with subjective weakness of the lower limbs  She uses a cane occasionally walker for ambulation  Sitting decreases symptoms while lying down bending coughing aggravate them  She denies bowel or bladder dysfunction  I have personally reviewed and/or updated the patient's past medical history, past surgical history, family history, social history, current medications, allergies, and vital signs today  Review of Systems   Constitutional: Negative for fever and unexpected weight change  HENT: Negative for trouble swallowing  Eyes: Negative for visual disturbance  Respiratory: Negative for shortness of breath and wheezing  Cardiovascular: Negative for chest pain and palpitations  Gastrointestinal: Negative for constipation, diarrhea, nausea and vomiting  Endocrine: Negative for cold intolerance, heat intolerance and polydipsia  Genitourinary: Negative for difficulty urinating and frequency  Musculoskeletal: Negative for arthralgias, gait problem, joint swelling and myalgias  Skin: Negative for rash  Neurological: Negative for dizziness, seizures, syncope, weakness and headaches  Hematological: Does not bruise/bleed easily  Psychiatric/Behavioral: Negative for dysphoric mood  All other systems reviewed and are negative        Patient Active Problem List   Diagnosis    Sjogren's syndrome ([place] [address] )    History of pulmonary embolus (PE)    Essential hypertension    Hypothyroidism in adult    Chronic kidney disease    Symptomatic anemia    Status post replacement of right shoulder joint    Pleural effusion on right    Hyperlipidemia    Vitamin D deficiency    Osteoporosis    Traumatic hematoma of left orbit    [facility]plasty ([facility] )    Carpal tunnel syndrome    Closed inferior dislocation of left shoulder    Generalized osteoarthrosis, unspecified site    Long-term use of hydroxychloroquine    Obstructive sleep apnea    Osteoarthritis    Other and unspecified nonspecific immunological findings    Other pulmonary embolism and infarction    Phlebitis and thrombophlebitis of other deep vessels of lower extremities    Pure hypercholesterolemia    Rectocele    Restless legs syndrome (RLS)    Spinal stenosis of lumbar region without neurogenic claudication    Status post reverse arthroplasty of right shoulder    Supervision of normal first pregnancy    Synovitis and tenosynovitis, unspecified    Temporal arteritis (HCC)    Xeroderma of eyelid       Past Medical History:   Diagnosis Date    Cancer ([place] [address] )     squamous cell - face    Disease of thyroid gland     Hyperlipidemia     Hypertension     Osteoporosis     PE (pulmonary thromboembolism) ([place] [address] )     Sjogren's syndrome ([place] [address] )     Sjogren's syndrome ([place] [address] )     Urinary tract infection        Past Surgical History:   Procedure Laterality Date    HYSTERECTOMY      JOINT REPLACEMENT      KNEE SURGERY      OOPHORECTOMY      SHOULDER SURGERY      right    SPINE SURGERY      TOE SURGERY         Family History   Problem Relation Age of Onset    Stroke Father     Ovarian cancer Sister     No Known Problems Son     No Known Problems Son     Ovarian cancer Daughter     No Known Problems Daughter     No Known Problems Daughter     No Known Problems Maternal Aunt  No Known Problems Paternal Aunt        Social History     Occupational History    Not on file   Tobacco Use    Smoking status: Never Smoker    Smokeless tobacco: Never Used   Substance and Sexual Activity    Alcohol use: No    Drug use: No    Sexual activity: Never       Current Outpatient Medications on File Prior to Visit   Medication Sig    Cholecalciferol (VITAMIN D) 2000 UNITS tablet Take 3,000 Units by mouth daily      cycloSPORINE (RESTASIS) 0 05 % ophthalmic emulsion Administer 1 drop to both eyes 2 (two) times a day   denosumab (PROLIA) 60 mg/mL Inject 60 mg under the skin every 6 (six) months    DHA-EPA-Flaxseed Oil-Vitamin E (THERA TEARS) CAPS Take 4 capsules by mouth daily   diclofenac sodium (VOLTAREN) 1 % Apply 2 g topically 4 (four) times a day    hydrochlorothiazide (HYDRODIURIL) 12 5 mg tablet TAKE 1 TABLET BY MOUTH EVERY DAY    hydroxychloroquine (PLAQUENIL) 200 mg tablet Take 200 mg by mouth daily   levothyroxine 112 mcg tablet TAKE 1 TABLET BY MOUTH EVERY DAY    losartan (COZAAR) 50 mg tablet Take 1 tablet (50 mg total) by mouth daily    Multiple Vitamins-Minerals (OCUVITE PO) Take 1 tablet by mouth daily   omeprazole (PriLOSEC) 20 mg delayed release capsule Take 20 mg by mouth 2 (two) times a day   polyvinyl alcohol (LIQUIFILM TEARS) 1 4 % ophthalmic solution Administer 1 drop to both eyes as needed for dry eyes    simvastatin (ZOCOR) 20 mg tablet Take 20 mg by mouth daily at bedtime   metoprolol tartrate (LOPRESSOR) 25 mg tablet Take 1 tablet (25 mg total) by mouth every 12 (twelve) hours for 30 days     No current facility-administered medications on file prior to visit          Allergies   Allergen Reactions    Sulfa Antibiotics Hives       Physical Exam    /78   Pulse 69   Temp 97 7 °F (36 5 °C)   Ht 5' 3" (1 6 m)   Wt 70 8 kg (156 lb)   LMP  (LMP Unknown)   BMI 27 63 kg/m²     Constitutional: normal, well developed, well nourished, alert, in no distress and non-toxic and no overt pain behavior  and overweight  Eyes: anicteric  HEENT: grossly intact  Neck: supple, symmetric, trachea midline and no masses   Pulmonary:even and unlabored  Cardiovascular:No edema or pitting edema present  Skin:Normal without rashes or lesions and well hydrated  Psychiatric:Mood and affect appropriate  Neurologic:Cranial Nerves II-XII grossly intact  Musculoskeletal:normal and ambulates with cane, difficulty going from sitting to standing sitting position no obvious skin lesions or erythema lumbar sacral spine with well-healed surgical scar, deep tendon reflexes diminished but symmetrical bilateral patella Achilles is pain to palpation along the right PSIS negative the left negative bilateral greater trochanteric tenderness, no focal motor deficit appreciated lower limbs, positive Fermin maneuver on the right and gain sling maneuver on the right negative bilateral straight leg raise    Imaging  CT LUMBAR SPINE     INDICATION:   M75 121: Complete rotator cuff tear or rupture of right shoulder, not specified as traumatic      COMPARISON: CT 3/1/2019     TECHNIQUE:  Contiguous axial images through the lumbar spine were obtained  Sagittal and coronal reconstructions were performed        Radiation dose length product (DLP) for this visit:  447 62 mGy-cm   This examination, like all CT scans performed in the [facility], was performed utilizing techniques to minimize radiation dose exposure, including the use of iterative   reconstruction and automated exposure control        IMAGE QUALITY:  Diagnostic      FINDINGS: L5 is sacralized and L4 is partially used on L5      ALIGNMENT:  L1 is retrolisthesed slightly, in respect to L2, degenerative anterolisthesis of L3 on L4 is stable  Ankylosis of the facets of L4 on L5 L5 is sacralized    Asymmetric loss of disc height at the L4-5 level to the left this results in the left   word tilting of the lumbar spine as it ascends to the thoracolumbar junction      VERTEBRAL BODIES:  No acute fracture  No lytic or blastic lesion  Chronic fracture through the right L3 pedicle descending caudally into the L3 vertebral body, image 54 and nonunion of left L3 laminar fracture, image 57      DEGENERATIVE CHANGES:     Lower Thoracic spine:  Minor facet arthrosis and marginal osteophytes low dorsal spine ]     L1-2:  Slight degenerative retrolisthesis of L1 on L2, facet subluxation without significant stenosis, stable in appearance      L2-3:  Nitrogenous degeneration of the nucleus, circumferential bulging of the disc  Decompressive laminectomy  Stable Asymmetric soft tissue in the right foramen, advanced bilateral facet arthrosis  Potentially significant canal stenosis      L3-4:  Left laminectomy, advanced bilateral facet arthrosis partially ankylosed  Foramen and canal are patent  Grade 1 anterolisthesis        L4-5:  Advanced bilateral facet arthrosis, circumferential bulge with marginal osteophytes  Ankylosis of the facet joints with decompressive laminectomy      L5-S1:  Sacralization of L5 with the advanced facet arthrosis      PARASPINAL SOFT TISSUES: Small amount of Facet density material herniates through the posterior aspect of the right diaphragm, into the right hemithorax      IMPRESSION:     Stable CT appearance of the spine  L5 is sacralized and L4 is partially fused and L5         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

## 2020-07-08 NOTE — PATIENT INSTRUCTIONS
Lower Back Exercises   WHAT YOU NEED TO KNOW:   What do I need to know about lower back exercises? Lower back exercises help heal and strengthen your back muscles to prevent another injury  Ask your healthcare provider if you need to see a physical therapist for more advanced exercises  · Do the exercises on a mat or firm surface  (not on a bed) to support your spine and prevent low back pain  · Move slowly and smoothly  Avoid fast or jerky motions  · Breathe normally  Do not hold your breath  · Stop if you feel pain  It is normal to feel some discomfort at first  Regular exercise will help decrease your discomfort over time  How do I perform lower back exercises safely? Your healthcare provider may recommend that you do back exercises 10 to 30 minutes each day  He may also recommend that you do exercises 1 to 3 times each day  Ask your healthcare provider which exercises are best for you and how often to do them  · Ankle pumps:  Lie on your back  Move your foot up (with your toes pointing toward your head)  Then, move your foot down (with your toes pointing away from you)  Repeat this exercise 10 times on each side  · Heel slides:  Lie on your back  Slowly bend one leg and then straighten it  Next, bend the other leg and then straighten it  Repeat 10 times on each side  · Pelvic tilt:  Lie on your back with your knees bent and feet flat on the floor  Place your arms in a relaxed position beside your body  Tighten the muscles of your abdomen and flatten your back against the floor  Hold for 5 seconds  Repeat 5 times  · Back stretch:  Lie on your back with your hands behind your head  Bend your knees and turn the lower half of your body to one side  Hold this position for 10 seconds  Repeat 3 times on each side  · Straight leg raises:  Lie on your back with one leg straight  Bend the other knee   Tighten your abdomen and then slowly lift the straight leg up about 6 to 12 inches off the floor  Hold for 1 to 5 seconds  Lower your leg slowly  Repeat 10 times on each leg  · Knee-to-chest:  Lie on your back with your knees bent and feet flat on the floor  Pull one of your knees toward your chest and hold it there for 5 seconds  Return your leg to the starting position  Lift the other knee toward your chest and hold for 5 seconds  Do this 5 times on each side  · Cat and camel:  Place your hands and knees on the floor  Arch your back upward toward the ceiling and lower your head  Round out your spine as much as you can  Hold for 5 seconds  Lift your head upward and push your chest downward toward the floor  Hold for 5 seconds  Do 3 sets or as directed  · Wall squats:  Stand with your back against a wall  Tighten the muscles of your abdomen  Slowly lower your body until your knees are bent at a 45 degree angle  Hold this position for 5 seconds  Slowly move back up to a standing position  Repeat 10 times  · Curl up:  Lie on your back with your knees bent and feet flat on the floor  Place your hands, palms down, underneath the curve in your lower back  Next, with your elbows on the floor, lift your shoulders and chest 2 to 3 inches  Keep your head in line with your shoulders  Hold this position for 5 seconds  When you can do this exercise without pain for 10 to 15 seconds, you may add a rotation  While your shoulders and chest are lifted off the ground, turn slightly to the left and hold  Repeat on the other side  · Bird dog:  Place your hands and knees on the floor  Keep your wrists directly below your shoulders and your knees directly below your hips  Pull your belly button in toward your spine  Do not flatten or arch your back  Tighten your abdominal muscles  Raise one arm straight out so that it is aligned with your head  Next, raise the leg opposite your arm  Hold this position for 15 seconds   Lower your arm and leg slowly and change sides  Do 5 sets  When should I seek immediate care? · You have severe pain that prevents you from moving  When should I contact my healthcare provider? · Your pain becomes worse  · You have new pain  · You have questions or concerns about your condition or care  CARE AGREEMENT:   You have the right to help plan your care  Learn about your health condition and how it may be treated  Discuss treatment options with your caregivers to decide what care you want to receive  You always have the right to refuse treatment  The above information is an  only  It is not intended as medical advice for individual conditions or treatments  Talk to your doctor, nurse or pharmacist before following any medical regimen to see if it is safe and effective for you  © 2017 [employer]  Information is for End User's use only and may not be sold, redistributed or otherwise used for commercial purposes  All illustrations and images included in CareNotes® are the copyrighted property of [employer]  or [employer]

## 2020-07-09 DIAGNOSIS — E03.9 HYPOTHYROIDISM IN ADULT: Primary | ICD-10-CM

## 2020-07-13 ENCOUNTER — TELEPHONE (OUTPATIENT)
Dept: PAIN MEDICINE | Facility: CLINIC | Age: 85
End: 2020-07-13

## 2020-07-13 NOTE — TELEPHONE ENCOUNTER
Patient called stating she is requesting to get an injection, but was told she needed to wait until she heard back about her coumadin hold process   Please advise, martha    Call back# [phone]

## 2020-07-27 NOTE — TELEPHONE ENCOUNTER
S/w pt, asked if she is able to eat morning of procedure, advised she is only required to fast for one hour prior to procedure time  Additionally, pt asked about restrictions following procedure  Advised pt she will be asked to relax the day of procedure, but can start to resume normal activities as tolerated the day after procedure  Pt verbalized understanding and appreciation

## 2020-07-29 ENCOUNTER — TELEPHONE (OUTPATIENT)
Dept: ENDOCRINOLOGY | Facility: HOSPITAL | Age: 85
End: 2020-07-29

## 2020-07-29 DIAGNOSIS — Z86.73 HISTORY OF STROKE: Primary | ICD-10-CM

## 2020-07-29 NOTE — TELEPHONE ENCOUNTER
Patient has been having mini strokes  Please issue an internal referral to Neurology  Once it is in [address], they will call her to schedule an appointment

## 2020-07-30 ENCOUNTER — HOSPITAL ENCOUNTER (OUTPATIENT)
Dept: RADIOLOGY | Facility: CLINIC | Age: 85
Discharge: HOME | End: 2020-07-30
Attending: ANESTHESIOLOGY
Payer: COMMERCIAL

## 2020-07-30 VITALS
RESPIRATION RATE: 20 BRPM | DIASTOLIC BLOOD PRESSURE: 77 MMHG | SYSTOLIC BLOOD PRESSURE: 185 MMHG | OXYGEN SATURATION: 94 % | HEART RATE: 63 BPM | TEMPERATURE: 97.9 F

## 2020-07-30 DIAGNOSIS — M47.818 ARTHRITIS OF RIGHT SACROILIAC JOINT: ICD-10-CM

## 2020-07-30 DIAGNOSIS — M46.1 SACROILIITIS (HCC): ICD-10-CM

## 2020-07-30 PROCEDURE — 27096 INJECT SACROILIAC JOINT: CPT | Performed by: ANESTHESIOLOGY

## 2020-07-30 RX ORDER — WARFARIN SODIUM 4 MG/1
TABLET ORAL
COMMUNITY
Start: 2020-07-21 | End: 2020-12-30 | Stop reason: HOSPADM

## 2020-07-30 RX ORDER — LIDOCAINE HYDROCHLORIDE 10 MG/ML
5 INJECTION, SOLUTION EPIDURAL; INFILTRATION; INTRACAUDAL; PERINEURAL ONCE
Status: COMPLETED | OUTPATIENT
Start: 2020-07-30 | End: 2020-07-30

## 2020-07-30 RX ORDER — WARFARIN SODIUM 1 MG/1
TABLET ORAL
COMMUNITY
Start: 2020-07-20 | End: 2020-12-30 | Stop reason: HOSPADM

## 2020-07-30 RX ORDER — METHYLPREDNISOLONE ACETATE 80 MG/ML
80 INJECTION, SUSPENSION INTRA-ARTICULAR; INTRALESIONAL; INTRAMUSCULAR; PARENTERAL; SOFT TISSUE ONCE
Status: COMPLETED | OUTPATIENT
Start: 2020-07-30 | End: 2020-07-30

## 2020-07-30 RX ADMIN — LIDOCAINE HYDROCHLORIDE 3 ML: 10 INJECTION, SOLUTION EPIDURAL; INFILTRATION; INTRACAUDAL; PERINEURAL at 11:10

## 2020-07-30 RX ADMIN — IOHEXOL 1 ML: 300 INJECTION, SOLUTION INTRAVENOUS at 11:10

## 2020-07-30 RX ADMIN — METHYLPREDNISOLONE ACETATE 80 MG: 80 INJECTION, SUSPENSION INTRA-ARTICULAR; INTRALESIONAL; INTRAMUSCULAR; SOFT TISSUE at 11:15

## 2020-07-30 RX ADMIN — LIDOCAINE HYDROCHLORIDE 2 ML: 20 INJECTION, SOLUTION EPIDURAL; INFILTRATION; INTRACAUDAL at 11:14

## 2020-07-30 NOTE — DISCHARGE INSTRUCTIONS
Steroid Joint Injection   WHAT YOU NEED TO KNOW:   A steroid joint injection is a procedure to inject steroid medicine into a joint  Steroid medicine decreases pain and inflammation  The injection may also contain an anesthetic (numbing medicine) to decrease pain  It may be done to treat conditions such as arthritis, gout, or carpal tunnel syndrome  The injections may be given in your knee, ankle, shoulder, elbow, wrist, ankle or sacroiliac joint  1  Do not apply heat to any area that is numb  If you have discomfort or soreness at the injection site, you may apply ice today, 20 minutes on and 20 minutes off  Tomorrow you may use ice or warm, moist heat  Do not apply ice or heat directly to the skin  2  You may have an increase or change in the discomfort for 36-48 hours after your treatment  Apply ice and continue with any pain medicine you have been prescribed  3  Do not do anything strenuous today  You may shower, but no tub baths or hot tubs today  You may resume your normal activities tomorrow, but do not overdo it  Resume normal activities slowly when you are feeling better  4  If you experience redness, drainage or swelling at the injection site, or if you develop a fever above 100 degrees, please call [facility] at [phone] or go to the Emergency Room  5  Continue to take all routine medicines prescribed by your primary care physician unless otherwise instructed by our staff  Most blood thinners should be started again according to your regularly scheduled dosing  If you have any questions, please give our office a call  If you have a problem specifically related to your procedure, please call our office at [phone]  Problems not related to your procedure should be directed to your primary care physician

## 2020-07-30 NOTE — H&P
History of Present Illness: The patient is a 82 y o  female who presents with complaints of low back pain      Patient Active Problem List   Diagnosis    Sjogren's syndrome ([facility] )    History of pulmonary embolus (PE)    Essential hypertension    Hypothyroidism in adult    Chronic kidney disease    Symptomatic anemia    Status post replacement of right shoulder joint    Pleural effusion on right    Hyperlipidemia    Vitamin D deficiency    Osteoporosis    Traumatic hematoma of left orbit    Failed total knee arthroplasty (HCC)    Carpal tunnel syndrome    Closed inferior dislocation of left shoulder    Generalized osteoarthrosis, unspecified site    Long-term use of hydroxychloroquine    Obstructive sleep apnea    Osteoarthritis    Other and unspecified nonspecific immunological findings    Other pulmonary embolism and infarction    Phlebitis and thrombophlebitis of other deep vessels of lower extremities    Pure hypercholesterolemia    Rectocele    Restless legs syndrome (RLS)    Spinal stenosis of lumbar region without neurogenic claudication    Status post reverse arthroplasty of right shoulder    Supervision of normal first pregnancy    Synovitis and tenosynovitis, unspecified    Temporal arteritis (HCC)    Xeroderma of eyelid       Past Medical History:   Diagnosis Date    Cancer ([facility] )     squamous cell - face    Disease of thyroid gland     Hyperlipidemia     Hypertension     Osteoporosis     PE (pulmonary thromboembolism) ([facility] )     Sjogren's syndrome ([facility] )     [facility]     Urinary tract infection        Past Surgical History:   Procedure Laterality Date    HYSTERECTOMY      JOINT REPLACEMENT      KNEE SURGERY      OOPHORECTOMY      SHOULDER SURGERY      right    SPINE SURGERY      TOE SURGERY           Current Outpatient Medications:     Cholecalciferol (VITAMIN D) 2000 UNITS tablet, Take 3,000 Units by mouth daily  , Disp: , Rfl:     cycloSPORINE (RESTASIS) 0 05 % ophthalmic emulsion, Administer 1 drop to both eyes 2 (two) times a day , Disp: , Rfl:     denosumab (PROLIA) 60 mg/mL, Inject 60 mg under the skin every 6 (six) months, Disp: , Rfl:     DHA-EPA-Flaxseed Oil-Vitamin E (THERA TEARS) CAPS, Take 4 capsules by mouth daily  , Disp: , Rfl:     diclofenac sodium (VOLTAREN) 1 %, Apply 2 g topically 4 (four) times a day, Disp: 1 Tube, Rfl: 1    hydrochlorothiazide (HYDRODIURIL) 12 5 mg tablet, TAKE 1 TABLET BY MOUTH EVERY DAY, Disp: 90 tablet, Rfl: 0    hydroxychloroquine (PLAQUENIL) 200 mg tablet, Take 200 mg by mouth daily  , Disp: , Rfl:     levothyroxine 112 mcg tablet, TAKE 1 TABLET BY MOUTH EVERY DAY, Disp: 90 tablet, Rfl: 3    losartan (COZAAR) 50 mg tablet, Take 1 tablet (50 mg total) by mouth daily, Disp: 90 tablet, Rfl: 3    metoprolol tartrate (LOPRESSOR) 25 mg tablet, Take 1 tablet (25 mg total) by mouth every 12 (twelve) hours for 30 days, Disp: 60 tablet, Rfl: 0    Multiple Vitamins-Minerals (OCUVITE PO), Take 1 tablet by mouth daily  , Disp: , Rfl:     omeprazole (PriLOSEC) 20 mg delayed release capsule, Take 20 mg by mouth 2 (two) times a day , Disp: , Rfl:     polyvinyl alcohol (LIQUIFILM TEARS) 1 4 % ophthalmic solution, Administer 1 drop to both eyes as needed for dry eyes, Disp: , Rfl:     simvastatin (ZOCOR) 20 mg tablet, Take 20 mg by mouth daily at bedtime  , Disp: , Rfl:     warfarin (COUMADIN) 1 mg tablet, , Disp: , Rfl:     warfarin (COUMADIN) 4 mg tablet, , Disp: , Rfl:     Allergies   Allergen Reactions    Sulfa Antibiotics Hives       Physical Exam:   General: Awake, Alert, Oriented x 3, Mood and affect appropriate  Respiratory: Respirations even and unlabored  Cardiovascular: Peripheral pulses intact; no edema  Musculoskeletal Exam:  Unsteady gait    ASA Score: II    Patient/Chart Verification  Patient ID Verified: Verbal  ID Band Applied: No  Consents Confirmed: Procedural, To be obtained in the Pre-Procedure area  H&P( within 30 days) Verified: To be obtained in the Pre-Procedure area  Interval H&P(within 24 hr) Complete (required for Outpatients and Surgery Admit only): To be obtained in the Pre-Procedure area  Allergies Reviewed: Yes  Anticoag/NSAID held?: NA(coumadin no need to hold)  Currently on antibiotics?: No    Assessment:   1  Arthritis of right sacroiliac joint    2   Sacroiliitis ([address] )        Plan: Right SI joint injection

## 2020-08-03 ENCOUNTER — TELEPHONE (OUTPATIENT)
Dept: NEUROLOGY | Facility: CLINIC | Age: 85
End: 2020-08-03

## 2020-08-03 NOTE — TELEPHONE ENCOUNTER
Best contact number for [facility]:[phone]    Emergency Contact name and number:  Letty Roberts [phone]  Referring provider and telephone number:  Larry Bright [phone]  Primary Care Provider Name and if affiliated with [facility]:   Nancy Novak No  Reason for Appointment/Dx:  History of stroke  Neurology Location patient would like to be seen:  [facility]ak[facility] Kincaid received? Yes                                                 Records Received? Yes    Have you ever seen another Neurologist?       No    Insurance Information    Insurance Name:[employer]     ID/Policy #:[id number]    Secondary Insurance:    ID/Policy#: Workman's Comp/ Accident/ School  Information      Workman's Comp/Accident/School related?        No    If yes name of Insurance company:    Date of Injury:    Type of Injury:    [address] Name and Telephone Number:    Notes:                   Appointment date:   9/16/2020

## 2020-08-05 DIAGNOSIS — E03.9 HYPOTHYROIDISM IN ADULT: ICD-10-CM

## 2020-08-05 RX ORDER — LEVOTHYROXINE SODIUM 112 UG/1
112 TABLET ORAL DAILY
Qty: 90 TABLET | Refills: 1 | Status: SHIPPED | OUTPATIENT
Start: 2020-08-05 | End: 2020-12-03 | Stop reason: SDUPTHER

## 2020-08-06 ENCOUNTER — TELEPHONE (OUTPATIENT)
Dept: PAIN MEDICINE | Facility: CLINIC | Age: 85
End: 2020-08-06

## 2020-08-13 ENCOUNTER — LAB (OUTPATIENT)
Dept: LAB | Facility: HOSPITAL | Age: 85
End: 2020-08-13
Payer: COMMERCIAL

## 2020-08-13 ENCOUNTER — TELEPHONE (OUTPATIENT)
Dept: ENDOCRINOLOGY | Facility: HOSPITAL | Age: 85
End: 2020-08-13

## 2020-08-13 ENCOUNTER — OFFICE VISIT (OUTPATIENT)
Dept: PAIN MEDICINE | Facility: CLINIC | Age: 85
End: 2020-08-13
Payer: COMMERCIAL

## 2020-08-13 ENCOUNTER — APPOINTMENT (OUTPATIENT)
Dept: RADIOLOGY | Facility: CLINIC | Age: 85
End: 2020-08-13
Payer: COMMERCIAL

## 2020-08-13 VITALS
HEIGHT: 63 IN | WEIGHT: 153 LBS | HEART RATE: 52 BPM | TEMPERATURE: 98.1 F | BODY MASS INDEX: 27.11 KG/M2 | DIASTOLIC BLOOD PRESSURE: 82 MMHG | SYSTOLIC BLOOD PRESSURE: 130 MMHG

## 2020-08-13 DIAGNOSIS — M70.62 GREATER TROCHANTERIC BURSITIS OF LEFT HIP: ICD-10-CM

## 2020-08-13 DIAGNOSIS — M53.3 SACROILIAC JOINT DYSFUNCTION OF RIGHT SIDE: ICD-10-CM

## 2020-08-13 DIAGNOSIS — M25.552 HIP PAIN, ACUTE, LEFT: ICD-10-CM

## 2020-08-13 DIAGNOSIS — M54.50 CHRONIC RIGHT-SIDED LOW BACK PAIN WITHOUT SCIATICA: Primary | ICD-10-CM

## 2020-08-13 DIAGNOSIS — M46.1 SACROILIITIS (HCC): ICD-10-CM

## 2020-08-13 DIAGNOSIS — M48.061 SPINAL STENOSIS OF LUMBAR REGION WITHOUT NEUROGENIC CLAUDICATION: ICD-10-CM

## 2020-08-13 DIAGNOSIS — G89.29 CHRONIC RIGHT-SIDED LOW BACK PAIN WITHOUT SCIATICA: Primary | ICD-10-CM

## 2020-08-13 DIAGNOSIS — M47.818 ARTHRITIS OF RIGHT SACROILIAC JOINT: ICD-10-CM

## 2020-08-13 DIAGNOSIS — E03.9 HYPOTHYROIDISM IN ADULT: ICD-10-CM

## 2020-08-13 LAB
T4 FREE SERPL-MCNC: 1.51 NG/DL (ref 0.76–1.46)
TSH SERPL DL<=0.05 MIU/L-ACNC: 1.24 UIU/ML (ref 0.36–3.74)

## 2020-08-13 PROCEDURE — 84439 ASSAY OF FREE THYROXINE: CPT

## 2020-08-13 PROCEDURE — 84443 ASSAY THYROID STIM HORMONE: CPT

## 2020-08-13 PROCEDURE — 73502 X-RAY EXAM HIP UNI 2-3 VIEWS: CPT

## 2020-08-13 PROCEDURE — 36415 COLL VENOUS BLD VENIPUNCTURE: CPT

## 2020-08-13 PROCEDURE — 99213 OFFICE O/P EST LOW 20 MIN: CPT | Performed by: NURSE PRACTITIONER

## 2020-08-13 NOTE — PROGRESS NOTES
Assessment:  1  Chronic right-sided low back pain without sciatica    2  Hip pain, acute, left    3  Greater trochanteric bursitis of left hip    4  Sacroiliitis ([address] )    5  Sacroiliac joint dysfunction of right side    6  Arthritis of right sacroiliac joint    7  Spinal stenosis of lumbar region without neurogenic claudication        Plan:  While the patient was in the office today, I did have a thorough conversation with the patient regarding their chronic pain syndrome, symptoms, medication regimen, and treatment plan  I discussed with the patient at this point since she did have a fall and she continues with left hip and greater trochanter bursa pain, I do feel would be beneficial proceed with an x-ray of the left hip and pelvis to evaluate for any fractures or other etiology that could be causing her pain symptoms  I explained the patient once we receive the results of the x-rays, our office will give her a call to review results and depending on the results of the x-rays, we may consider a left greater trochanter bursa injection with Dr Medhat Silverio  The patient was agreeable and verbalized an understanding  With regards to the right-sided low back and SI joint pain, I explained the patient that since she is noting at least moderate stable relief of that pain symptoms from the SI joint injection, for now, we will hold off any repeat injections for at least another 2-3 months, if not longer  The patient was agreeable and verbalized an understanding  I discussed with the patient that at this point time she can followup with our office on an as-needed basis  I did review the patient that if her pain symptoms should change, worsen, and/or if she would experience any new symptoms as she would like to be evaluated for, she should give our office a call  The patient was agreeable and verbalized an understanding  History of Present Illness:     The patient is a 82 y o  female last seen on 7/30/2020 who presents for a follow up office visit in regards to chronic right-sided low back and SI joint pain secondary to sacroiliac joint dysfunction with sacroiliitis as well as lumbar stenosis  The patient currently reports that at this point time with regards to her right-sided low back and SI joint pain she feels there is at least a 40-50% overall improvement as a result of the right sacroiliac joint injection on July 30, 2020 with Dr Michelle Sims  However, the patient continues with left-sided hip and greater trochanter bursa pain which she reports is a result of a fall that she had a day or 2 before her SI joint injection and is just concerned that maybe there might be a fracture or some kind of issue as it is still quite painful even though it has been almost 3 weeks since she fell  She reports that she did not have any imaging or evaluation and just want to know if maybe we could consider ordering an x-ray just to make sure nothing was fractured  I have personally reviewed and/or updated the patient's past medical history, past surgical history, family history, social history, current medications, allergies, and vital signs today  Review of Systems:    Review of Systems   Respiratory: Negative for shortness of breath  Cardiovascular: Negative for chest pain  Gastrointestinal: Negative for constipation, diarrhea, nausea and vomiting  Musculoskeletal: Positive for gait problem  Negative for arthralgias, joint swelling (joint stiffness) and myalgias  Skin: Negative for rash  Neurological: Negative for dizziness, seizures and weakness  All other systems reviewed and are negative          Past Medical History:   Diagnosis Date    Cancer ([facility] )     squamous cell - face    Disease of thyroid gland     Hyperlipidemia     Hypertension     Osteoporosis     PE (pulmonary thromboembolism) (HCC)     S[facility]ogren's syndrome ([facility] )     Sjogren's syndrome ([facility] )     Urinary tract infection        Past Surgical History:   Procedure Laterality Date    HYSTERECTOMY      JOINT REPLACEMENT      KNEE SURGERY      OOPHORECTOMY      SHOULDER SURGERY      right    SPINE SURGERY      TOE SURGERY         Family History   Problem Relation Age of Onset    Stroke Father     Ovarian cancer Sister     No Known Problems Son     No Known Problems Son     Ovarian cancer Daughter     No Known Problems Daughter     No Known Problems Daughter     No Known Problems Maternal Aunt     No Known Problems Paternal Aunt        Social History     Occupational History    Not on file   Tobacco Use    Smoking status: Never Smoker    Smokeless tobacco: Never Used   Substance and Sexual Activity    Alcohol use: No    Drug use: No    Sexual activity: Never         Current Outpatient Medications:     Cholecalciferol (VITAMIN D) 2000 UNITS tablet, Take 3,000 Units by mouth daily  , Disp: , Rfl:     cycloSPORINE (RESTASIS) 0 05 % ophthalmic emulsion, Administer 1 drop to both eyes 2 (two) times a day , Disp: , Rfl:     denosumab (PROLIA) 60 mg/mL, Inject 60 mg under the skin every 6 (six) months, Disp: , Rfl:     DHA-EPA-Flaxseed Oil-Vitamin E (THERA TEARS) CAPS, Take 4 capsules by mouth daily  , Disp: , Rfl:     diclofenac sodium (VOLTAREN) 1 %, Apply 2 g topically 4 (four) times a day, Disp: 1 Tube, Rfl: 1    hydrochlorothiazide (HYDRODIURIL) 12 5 mg tablet, TAKE 1 TABLET BY MOUTH EVERY DAY, Disp: 90 tablet, Rfl: 0    hydroxychloroquine (PLAQUENIL) 200 mg tablet, Take 200 mg by mouth daily  , Disp: , Rfl:     levothyroxine 112 mcg tablet, Take 1 tablet (112 mcg total) by mouth daily, Disp: 90 tablet, Rfl: 1    losartan (COZAAR) 50 mg tablet, Take 1 tablet (50 mg total) by mouth daily, Disp: 90 tablet, Rfl: 3    metoprolol tartrate (LOPRESSOR) 25 mg tablet, Take 1 tablet (25 mg total) by mouth every 12 (twelve) hours for 30 days, Disp: 60 tablet, Rfl: 0    Multiple Vitamins-Minerals (OCUVITE PO), Take 1 tablet by mouth daily  , Disp: , Rfl:     omeprazole (PriLOSEC) 20 mg delayed release capsule, Take 20 mg by mouth 2 (two) times a day , Disp: , Rfl:     polyvinyl alcohol (LIQUIFILM TEARS) 1 4 % ophthalmic solution, Administer 1 drop to both eyes as needed for dry eyes, Disp: , Rfl:     simvastatin (ZOCOR) 20 mg tablet, Take 20 mg by mouth daily at bedtime  , Disp: , Rfl:     warfarin (COUMADIN) 1 mg tablet, , Disp: , Rfl:     warfarin (COUMADIN) 4 mg tablet, , Disp: , Rfl:     Allergies   Allergen Reactions    Sulfa Antibiotics Hives       Physical Exam:    /82 (BP Location: Left arm, Patient Position: Sitting, Cuff Size: Standard)   Pulse (!) 52   Temp 98 1 °F (36 7 °C)   Ht 5' 3" (1 6 m)   Wt 69 4 kg (153 lb)   LMP  (LMP Unknown)   BMI 27 10 kg/m²     Constitutional:normal, well developed, well nourished, alert, in no distress and non-toxic and no overt pain behavior  Eyes:anicteric  HEENT:grossly intact  Neck:supple, symmetric, trachea midline and no masses   Pulmonary:even and unlabored  Cardiovascular:No edema or pitting edema present  Skin:Normal without rashes or lesions and well hydrated  Psychiatric:Mood and affect appropriate  Neurologic:Cranial Nerves II-XII grossly intact  Musculoskeletal:The patient's gait was slightly antalgic, painful, but steady with the use of a single cane  Moderate to significant tenderness noted upon exam of the left greater trochanter bursa        Imaging  No orders to display         Orders Placed This Encounter   Procedures    XR hip/pelv 2-3 vws left if performed

## 2020-08-13 NOTE — RESULT ENCOUNTER NOTE
Please call the patient regarding abnormal result  TSH is normal with a slightly elevated free T4  Did she take her levothyroxine prior to her lab work?

## 2020-08-18 ENCOUNTER — TELEPHONE (OUTPATIENT)
Dept: PAIN MEDICINE | Facility: MEDICAL CENTER | Age: 85
End: 2020-08-18

## 2020-08-18 DIAGNOSIS — M25.552 CHRONIC LEFT HIP PAIN: ICD-10-CM

## 2020-08-18 DIAGNOSIS — G89.29 CHRONIC LEFT HIP PAIN: ICD-10-CM

## 2020-08-18 DIAGNOSIS — M70.62 GREATER TROCHANTERIC BURSITIS OF LEFT HIP: Primary | ICD-10-CM

## 2020-08-20 NOTE — TELEPHONE ENCOUNTER
RN s/w pt regarding previous  Per pt she would like to proceed with the injection offered  Pt aware that  will call back to schedule same

## 2020-08-20 NOTE — TELEPHONE ENCOUNTER
Can you please call the patient and let her know that her Left Hip X-rays showed mild, but stable OA without any evidence of a fracture  I would recommend a Left Greater Trochanter bursa injection with Dr Tina Tripp as we discussed at her OV  Does she want to proceed?

## 2020-08-24 NOTE — TELEPHONE ENCOUNTER
I put in the order for the Left Greater troch bursa injection  Please call the patient and schedule  Thank you

## 2020-08-28 ENCOUNTER — TELEPHONE (OUTPATIENT)
Dept: NEUROLOGY | Facility: CLINIC | Age: 85
End: 2020-08-28

## 2020-08-28 NOTE — TELEPHONE ENCOUNTER
Lm advise patient appointment on 9- reschedule 10- 10:30am with Dr Paz Reusing reschedule letter sent

## 2020-09-02 NOTE — DISCHARGE SUMMARY
"Discharge Summary - Sara Garcia [de-identified] y o  female MRN: [id number]    Unit/Bed#: -01 Encounter: [id number]    Admission Date: 7/14/2018     Admitting Diagnosis: Sjogren's syndrome ([facility] ) [M35 00]  Weakness [R53 1]  Chronic kidney disease [N18 9]  Symptomatic anemia [D64 9]    HPI:  42-year-old female who had recent right shoulder replacement surgery at [facility] and presented with weakness and symptomatic anemia with shortness of breath  Consults  OT/PT  Orthopedic surgery  Gastroenterology  Procedures Performed:   Orders Placed This Encounter   Procedures    ED ECG Documentation Only       Hospital Course:  Patient was admitted with severe blood loss anemia with hemoglobin at 4 2 on presentation to the emergency room and had complaints of weakness and was found to be hypoxic  Patient's O2 sat was 88% on arrival of EMS and CT scan done in the ER showed a large amount of soft tissue region blood in the breast and shoulder and chest area  She chronically is on Coumadin for previous PE and was placed on full dose Lovenox for bridging post procedure which likely caused worsening of the postop bleeding  Patient was transfused 2 units packed red blood cells and given iron with stabilization of hemoglobin approximately 7 9-patient will require repeat labs and follow up with her primary physician and will be discharged on Feosol 325 mg b i d  Coumadin anticoagulation will be resumed showed be given 7 5 mg on the day of discharge and then she will resume her usual dosing 3 alternating with 4 mg  She should have a repeat INR on Monday  Patient had hypoxia on presentation and some signs of fluid overload with so was started on low-dose Lasix at 20 mg daily    Oximetry with ambulation did not show significant hypoxia - on the day of discharge her O2 sats were at 92% with ambulation    Significant Findings, Care, Treatment and Services Provided:   CT ABDOMEN AND PELVIS WITHOUT IV CONTRAST     INDICATION:   " severe anemia suspect retroperitoneal hemorrhage      COMPARISON: CT pulmonary angiogram July 15, 2018, earlier in the day      TECHNIQUE:  CT examination of the abdomen and pelvis was performed without intravenous contrast   Axial, sagittal, and coronal 2D reformatted images were created from the source data and submitted for interpretation       Radiation dose length product (DLP) for this visit:   This examination, like all CT scans performed in the [facility], was performed utilizing techniques to minimize radiation dose exposure, including the use of iterative reconstruction   and automated exposure control       Enteric contrast was not administered       FINDINGS:     ABDOMEN     LOWER CHEST:  Small bilateral pleural effusions, right side greater than left  Bibasilar infiltrates, most compatible with atelectasis      The heart is enlarged  Valvular calcifications      LIVER/BILIARY TREE:  Unremarkable      GALLBLADDER: Vicarious excretion of contrast material into the gallbladder from earlier CT pulmonary angiogram   No calcified gallstones  No pericholecystic inflammatory change      SPLEEN:  Unremarkable      PANCREAS:  Unremarkable      ADRENAL GLANDS:  Unremarkable      KIDNEYS/URETERS:  Unremarkable  No hydronephrosis      STOMACH AND BOWEL:    Evaluation limited due to lack of oral contrast material      Moderate-sized hiatal hernia  Stomach incompletely distended with recently ingested food products      Small bowel unremarkable      Moderate amount of feces throughout the colon, compatible with constipation      APPENDIX: Not identified  No findings to suggest appendicitis      ABDOMINOPELVIC CAVITY:  No ascites or free intraperitoneal air   No lymphadenopathy      VESSELS:  Unremarkable for patient's age      PELVIS     REPRODUCTIVE ORGANS:  Surgically absent      URINARY BLADDER:  Unremarkable      ABDOMINAL WALL/INGUINAL REGIONS:  Edema along the right lateral chest wall, consistent with recent surgery      OSSEOUS STRUCTURES:  No acute fracture or destructive osseous lesion  Degenerative changes lumbar spine      IMPRESSION:  No evidence of retroperitoneal hemorrhage      Workstation performed: [id number]   CTA - CHEST WITH IV CONTRAST - PULMONARY ANGIOGRAM     INDICATION:   Shortness of breath  Recent right shoulder arthroplasty      COMPARISON: CT chest 10/17/2006     TECHNIQUE: CTA examination of the chest was performed using angiographic technique according to a protocol specifically tailored to evaluate for pulmonary embolism  Axial, sagittal, and coronal 2D reformatted images were created from the source data and   submitted for interpretation  In addition, coronal 3D MIP postprocessing was performed on the acquisition scanner        Radiation dose length product (DLP) for this visit:  475 mGy-cm   This examination, like all CT scans performed in the [facility], was performed utilizing techniques to minimize radiation dose exposure, including the use of iterative   reconstruction and automated exposure control      IV Contrast:  85 mL of iodixanol (VISIPAQUE)     FINDINGS:     PULMONARY ARTERIAL TREE:  No pulmonary embolus is seen cavity for respiratory artifact at the lung bases       LUNGS:  In addition to bibasilar dependent atelectasis, there are scattered ground glass opacities throughout both lungs likely air trapping due to atelectasis  No tracheal or endobronchial lesions      PLEURA:  No pleural effusions or pneumothorax      HEART/AORTA:  The heart is enlarged with no pericardial effusion      MEDIASTINUM AND TIMA:  No pathologic lymphadenopathy or mediastinal hematoma      CHEST WALL AND LOWER NECK: Ulnar embolus There is extensive soft tissue and intramuscular edema centered at the right shoulder girdle status post total arthroplasty    There is asymmetric enlargement with diminished attenuation in the supraspinatus muscle   concerning for intramuscular hematoma  Inflammatory changes extend along the right lateral chest wall and into the breast, again likely postoperative  Clinical surveillance recommended  No discrete enhancing fluid collections to suggest abscess nor   is or subcutaneous emphysema      VISUALIZED STRUCTURES IN THE UPPER ABDOMEN:  Unremarkable      OSSEOUS STRUCTURES:  No acute fracture or destructive osseous lesion      IMPRESSION:        1  No definite pulmonary embolus  2   Scattered atelectasis and air trapping  3   Postoperative changes of the right shoulder status post recent arthroplasty with suspected hematoma in the supraspinatus muscle  Inflammatory changes throughout the right lateral chest wall and breast all likely postoperative as well          Complications: none    Discharge Diagnosis: Principal Problem:    Acute respiratory failure with hypoxia (HCC)  Active Problems:    Symptomatic anemia    Status post replacement of right shoulder joint    Pleural effusion on right    Sjogren's syndrome ([facility] )    Hypertension    Hypothyroidism in adult    Chronic kidney disease    History of pulmonary embolus (PE)        Condition at Discharge: good     Discharge instructions/Information to patient and family:   See after visit summary for information provided to patient and family  Provisions for Follow-Up Care:  See after visit summary for information related to follow-up care and any pertinent home health orders  Disposition: Home    Planned Readmission: No    Discharge Statement   I spent 30 minutes discharging the patient  This time was spent on the day of discharge  I had direct contact with the patient on the day of discharge  Additional documentation is required if more than 30 minutes were spent on discharge  Discharge Medications:  See after visit summary for reconciled discharge medications provided to patient and family          Yesica Daily DO not applicable (Male)

## 2020-09-14 ENCOUNTER — HOSPITAL ENCOUNTER (OUTPATIENT)
Dept: RADIOLOGY | Facility: CLINIC | Age: 85
Discharge: HOME | End: 2020-09-14
Attending: ANESTHESIOLOGY
Payer: COMMERCIAL

## 2020-09-14 VITALS
DIASTOLIC BLOOD PRESSURE: 69 MMHG | OXYGEN SATURATION: 90 % | SYSTOLIC BLOOD PRESSURE: 183 MMHG | HEART RATE: 74 BPM | RESPIRATION RATE: 18 BRPM | TEMPERATURE: 98.1 F

## 2020-09-14 DIAGNOSIS — M25.552 CHRONIC LEFT HIP PAIN: ICD-10-CM

## 2020-09-14 DIAGNOSIS — G89.29 CHRONIC LEFT HIP PAIN: ICD-10-CM

## 2020-09-14 DIAGNOSIS — M70.62 GREATER TROCHANTERIC BURSITIS OF LEFT HIP: ICD-10-CM

## 2020-09-14 PROCEDURE — 20610 DRAIN/INJ JOINT/BURSA W/O US: CPT | Performed by: ANESTHESIOLOGY

## 2020-09-14 PROCEDURE — 77002 NEEDLE LOCALIZATION BY XRAY: CPT | Performed by: ANESTHESIOLOGY

## 2020-09-14 PROCEDURE — 77002 NEEDLE LOCALIZATION BY XRAY: CPT

## 2020-09-14 RX ORDER — LIDOCAINE HYDROCHLORIDE 10 MG/ML
5 INJECTION, SOLUTION EPIDURAL; INFILTRATION; INTRACAUDAL; PERINEURAL ONCE
Status: COMPLETED | OUTPATIENT
Start: 2020-09-14 | End: 2020-09-14

## 2020-09-14 RX ORDER — METHYLPREDNISOLONE ACETATE 80 MG/ML
80 INJECTION, SUSPENSION INTRA-ARTICULAR; INTRALESIONAL; INTRAMUSCULAR; PARENTERAL; SOFT TISSUE ONCE
Status: COMPLETED | OUTPATIENT
Start: 2020-09-14 | End: 2020-09-14

## 2020-09-14 RX ADMIN — IOHEXOL 1 ML: 300 INJECTION, SOLUTION INTRAVENOUS at 15:09

## 2020-09-14 RX ADMIN — METHYLPREDNISOLONE ACETATE 80 MG: 80 INJECTION, SUSPENSION INTRA-ARTICULAR; INTRALESIONAL; INTRAMUSCULAR; SOFT TISSUE at 15:10

## 2020-09-14 RX ADMIN — LIDOCAINE HYDROCHLORIDE 3 ML: 10 INJECTION, SOLUTION EPIDURAL; INFILTRATION; INTRACAUDAL; PERINEURAL at 15:06

## 2020-09-14 RX ADMIN — LIDOCAINE HYDROCHLORIDE 2 ML: 20 INJECTION, SOLUTION EPIDURAL; INFILTRATION; INTRACAUDAL at 15:09

## 2020-09-14 NOTE — H&P
History of Present Illness: The patient is a 82 y o  female who presents with complaints of left lateral thigh pain      Patient Active Problem List   Diagnosis    [facility]jogren's syndrome ([facility]    History of pulmonary embolus (PE)    Essential hypertension    Hypothyroidism in adult    Chronic kidney disease    Symptomatic anemia    Status post replacement of right shoulder joint    Pleural effusion on right    Hyperlipidemia    Vitamin D deficiency    Osteoporosis    Traumatic hematoma of left orbit    Failed total knee arthroplasty (HCC)    Carpal tunnel syndrome    Closed inferior dislocation of left shoulder    Generalized osteoarthrosis, unspecified site    Long-term use of hydroxychloroquine    Obstructive sleep apnea    Osteoarthritis    Other and unspecified nonspecific immunological findings    Other pulmonary embolism and infarction    Phlebitis and thrombophlebitis of other deep vessels of lower extremities    Pure hypercholesterolemia    Rectocele    Restless legs syndrome (RLS)    Spinal stenosis of lumbar region without neurogenic claudication    Status post reverse arthroplasty of right shoulder    Supervision of normal first pregnancy    Synovitis and tenosynovitis, unspecified    [facility]    Xeroderma of eyelid    Arthritis of right sacroiliac joint    Sacroiliitis ([facility]    Chronic left hip pain    Greater trochanteric bursitis of left hip       Past Medical History:   Diagnosis Date    Cancer ([facility] [facility] )     squamous cell - face    Disease of thyroid gland     Hyperlipidemia     Hypertension     Osteoporosis     PE (pulmonary thromboembolism) ([facility] )     Sjogren's syndrome ([facility] )     Sjogren's syndrome ([facility] )     Urinary tract infection        Past Surgical History:   Procedure Laterality Date    HYSTERECTOMY      JOINT REPLACEMENT      KNEE SURGERY      OOPHORECTOMY      SHOULDER SURGERY      right    SPINE SURGERY      TOE SURGERY Current Outpatient Medications:     Cholecalciferol (VITAMIN D) 2000 UNITS tablet, Take 3,000 Units by mouth daily  , Disp: , Rfl:     cycloSPORINE (RESTASIS) 0 05 % ophthalmic emulsion, Administer 1 drop to both eyes 2 (two) times a day , Disp: , Rfl:     denosumab (PROLIA) 60 mg/mL, Inject 60 mg under the skin every 6 (six) months, Disp: , Rfl:     DHA-EPA-Flaxseed Oil-Vitamin E (THERA TEARS) CAPS, Take 4 capsules by mouth daily  , Disp: , Rfl:     diclofenac sodium (VOLTAREN) 1 %, Apply 2 g topically 4 (four) times a day, Disp: 1 Tube, Rfl: 1    hydrochlorothiazide (HYDRODIURIL) 12 5 mg tablet, TAKE 1 TABLET BY MOUTH EVERY DAY, Disp: 90 tablet, Rfl: 0    hydroxychloroquine (PLAQUENIL) 200 mg tablet, Take 200 mg by mouth daily  , Disp: , Rfl:     levothyroxine 112 mcg tablet, Take 1 tablet (112 mcg total) by mouth daily, Disp: 90 tablet, Rfl: 1    losartan (COZAAR) 50 mg tablet, Take 1 tablet (50 mg total) by mouth daily, Disp: 90 tablet, Rfl: 3    metoprolol tartrate (LOPRESSOR) 25 mg tablet, Take 1 tablet (25 mg total) by mouth every 12 (twelve) hours for 30 days, Disp: 60 tablet, Rfl: 0    Multiple Vitamins-Minerals (OCUVITE PO), Take 1 tablet by mouth daily  , Disp: , Rfl:     omeprazole (PriLOSEC) 20 mg delayed release capsule, Take 20 mg by mouth 2 (two) times a day , Disp: , Rfl:     polyvinyl alcohol (LIQUIFILM TEARS) 1 4 % ophthalmic solution, Administer 1 drop to both eyes as needed for dry eyes, Disp: , Rfl:     simvastatin (ZOCOR) 20 mg tablet, Take 20 mg by mouth daily at bedtime  , Disp: , Rfl:     warfarin (COUMADIN) 1 mg tablet, , Disp: , Rfl:     warfarin (COUMADIN) 4 mg tablet, , Disp: , Rfl:     Current Facility-Administered Medications:     iohexol (OMNIPAQUE) 300 mg/mL injection 50 mL, 50 mL, Intra-articular, Once, Stan Szymanski,     lidocaine (PF) (XYLOCAINE-MPF) 1 % injection 5 mL, 5 mL, Other, Once, Stan Szymanski, DO    lidocaine (PF) (XYLOCAINE-MPF) 2 % injection 5 mL, 5 mL, Intra-articular, Once, Stan Szymanski DO    methylPREDNISolone acetate (DEPO-MEDROL) injection 80 mg, 80 mg, Intra-articular, Once, Stan Szymanski DO    Allergies   Allergen Reactions    Sulfa Antibiotics Hives       Physical Exam:   General: Awake, Alert, Oriented x 3, Mood and affect appropriate  Respiratory: Respirations even and unlabored  Cardiovascular: Peripheral pulses intact; no edema  Musculoskeletal Exam:  Left lateral thigh pain    ASA Score: II         Assessment:   1  Greater trochanteric bursitis of left hip    2   Chronic left hip pain        Plan: Left Greater Troch Bursa Injection

## 2020-09-14 NOTE — DISCHARGE INSTRUCTIONS
1  Do not apply heat to any area that is numb  If you have discomfort or soreness at the injection site, you may apply ice today, 20 minutes on and 20 minutes off  Tomorrow you may use ice or warm, moist heat  Do not apply ice or heat directly to the skin  2  If you experience severe shortness of breath, go to the Emergency Room  3  You may have numbness for several hours from the local anesthetic  Please use caution and common sense, especially with weight-bearing activities  4  You may have an increase or change in the discomfort for 36-48 hours after your treatment  Apply ice and continue with any pain medicine you have been prescribed  5  Do not do anything strenuous today  You may shower, but no tub baths or hot tubs today  You may resume your normal activities tomorrow, but do not overdo it  Resume normal activities slowly when you are feeling better  6  If you experience redness, drainage or swelling at the injection site, or if you develop a fever above 100 degrees, please call [facility] at [phone] or go to the Emergency Room  7  Continue to take all routine medicines prescribed by your primary care physician unless otherwise instructed by our staff  Most blood thinners should be started again according to your regularly scheduled dosing  If you have any questions, please give our office a call  If you have a problem specifically related to your procedure, please call our office at [phone]  Problems not related to your procedure should be directed to your primary care physician

## 2020-09-16 ENCOUNTER — OFFICE VISIT (OUTPATIENT)
Dept: OBGYN CLINIC | Facility: CLINIC | Age: 85
End: 2020-09-16
Payer: COMMERCIAL

## 2020-09-16 VITALS
DIASTOLIC BLOOD PRESSURE: 70 MMHG | SYSTOLIC BLOOD PRESSURE: 122 MMHG | BODY MASS INDEX: 31.89 KG/M2 | HEIGHT: 63 IN | TEMPERATURE: 97.3 F | WEIGHT: 180 LBS

## 2020-09-16 DIAGNOSIS — T84.018A FAILURE OF TOTAL KNEE REPLACEMENT, INITIAL ENCOUNTER (HCC): Primary | ICD-10-CM

## 2020-09-16 DIAGNOSIS — Z96.659 FAILURE OF TOTAL KNEE REPLACEMENT, INITIAL ENCOUNTER (HCC): Primary | ICD-10-CM

## 2020-09-16 PROCEDURE — 99214 OFFICE O/P EST MOD 30 MIN: CPT | Performed by: ORTHOPAEDIC SURGERY

## 2020-09-16 NOTE — PROGRESS NOTES
Orthopaedic Surgery Note    CC: Left Knee Pain      HPI:  Nikki Osorio is a 82 y  o female with a history of left TKA and now with 1-2 years of knee pain  We have reviewed this in the past, she reports that this continues to bother her  She also has back pain and is under care with Dr Lauryn Connolly for this  She did have ESR, CRP previously that were normal   We did try one CSI to the L knee previously and this provided minimal relief and short lived  ALLERGIES:  Allergies   Allergen Reactions    Sulfa Antibiotics Hives       CURRENT MEDICATIONS:  Current Outpatient Medications   Medication Sig Dispense Refill    Cholecalciferol (VITAMIN D) 2000 UNITS tablet Take 3,000 Units by mouth daily        cycloSPORINE (RESTASIS) 0 05 % ophthalmic emulsion Administer 1 drop to both eyes 2 (two) times a day   denosumab (PROLIA) 60 mg/mL Inject 60 mg under the skin every 6 (six) months      DHA-EPA-Flaxseed Oil-Vitamin E (THERA TEARS) CAPS Take 4 capsules by mouth daily   diclofenac sodium (VOLTAREN) 1 % Apply 2 g topically 4 (four) times a day 1 Tube 1    hydrochlorothiazide (HYDRODIURIL) 12 5 mg tablet TAKE 1 TABLET BY MOUTH EVERY DAY 90 tablet 0    hydroxychloroquine (PLAQUENIL) 200 mg tablet Take 200 mg by mouth daily   levothyroxine 112 mcg tablet Take 1 tablet (112 mcg total) by mouth daily 90 tablet 1    losartan (COZAAR) 50 mg tablet Take 1 tablet (50 mg total) by mouth daily 90 tablet 3    Multiple Vitamins-Minerals (OCUVITE PO) Take 1 tablet by mouth daily   omeprazole (PriLOSEC) 20 mg delayed release capsule Take 20 mg by mouth 2 (two) times a day   polyvinyl alcohol (LIQUIFILM TEARS) 1 4 % ophthalmic solution Administer 1 drop to both eyes as needed for dry eyes      simvastatin (ZOCOR) 20 mg tablet Take 20 mg by mouth daily at bedtime        warfarin (COUMADIN) 1 mg tablet       warfarin (COUMADIN) 4 mg tablet       metoprolol tartrate (LOPRESSOR) 25 mg tablet Take 1 tablet (25 mg total) by mouth every 12 (twelve) hours for 30 days 60 tablet 0     No current facility-administered medications for this visit          PAST MEDICAL HISTORY  Past Medical History:   Diagnosis Date    Cancer ([facility] [address] )     squamous cell - face    Disease of thyroid gland     Hyperlipidemia     Hypertension     Osteoporosis     PE (pulmonary thromboembolism) (HCC)     Sjogren's syndrome (HCC)     Sjogren's syndrome (HCC)     Urinary tract infection        SURGICAL HISTORY  Past Surgical History:   Procedure Laterality Date    HYSTERECTOMY      JOINT REPLACEMENT      KNEE SURGERY      OOPHORECTOMY      SHOULDER SURGERY      right    SPINE SURGERY      TOE SURGERY         FAMILY HISTORY  Family History   Problem Relation Age of Onset    Stroke Father     Ovarian cancer Sister     No Known Problems Son     No Known Problems Son     Ovarian cancer Daughter     No Known Problems Daughter     No Known Problems Daughter     No Known Problems Maternal Aunt     No Known Problems Paternal Aunt        SOCIAL HISTORY  Social History     Socioeconomic History    Marital status: /Civil Union     Spouse name: Not on file    Number of children: Not on file    Years of education: Not on file    Highest education level: Not on file   Occupational History    Not on file   Social Needs    Financial resource strain: Not on file    Food insecurity     Worry: Not on file     Inability: Not on file    Transportation needs     Medical: Not on file     Non-medical: Not on file   Tobacco Use    Smoking status: Never Smoker    Smokeless tobacco: Never Used   Substance and Sexual Activity    Alcohol use: No    Drug use: No    Sexual activity: Never   Lifestyle    Physical activity     Days per week: Not on file     Minutes per session: Not on file    Stress: Not on file   Relationships    Social connections     Talks on phone: Not on file     Gets together: Not on file     Attends Mandaeism service: Not on file     Active member of club or organization: Not on file     Attends meetings of clubs or organizations: Not on file     Relationship status: Not on file    Intimate partner violence     Fear of current or ex partner: Not on file     Emotionally abused: Not on file     Physically abused: Not on file     Forced sexual activity: Not on file   Other Topics Concern    Not on file   Social History Narrative    Not on file         Review of Systems   Patient indicated positive for dry eyes, hearing loss, urinary frequency, pain with walking, easy bruising  Otherwise negative except per above and HPI  Physical Exam    Vitals  Vitals:    09/16/20 0936   BP: 122/70   Temp: (!) 97 3 °F (36 3 °C)       BMI  Body mass index is 31 89 kg/m²  GENERAL: No acute distress  Alert and oriented  Well nourished and well hydrated  Appears stated age  HEENT : Normocephalic, atraumatic  Extraocular movements intact  Mask in place  NECK: Supple, trachea midline  LUNGS: Adequate and symmetric respiratory effort  No intercostal retractions or accessory muscle use  HEART: Extremities warm and perfused  ABDOMEN: Nondistended  SKIN: Warm and dry, no rash  Left  Knee   Inspection/Appearance:       Swelling: No      Patella is midline  Palpable mass posteromedial knee just off midline  No palpable pulsation  Alignment:  Knee is in mild valgus  Palpation - Soft Tissue: normal without effusion  ROM:       Extension - 5          Flexion - 120      extensor lag: no    Stability:  demonstrates instability in flexion and extension  Patella: stable, tracks normally  Motor:        5/5 quadriceps       5/5 hamstrings  Vascular: Knee is warm and sensate with normal refill  Imaging  No new imaging  Bilateral TKA in place  R knee in mechanical alignment  L TKA in valgus alignemnt with mechanical axis passing through the lateral third of tibial baseplate  Cemented components, appears well fixed      [address] 8/2019 demonstrated cystic structure behind the left knee, non-vascular  Assessment and Plan  Failed L TKA    - again discussed with patient taht radiographs reveal valgus alignment of components and on exam she does have instability  Popliteal cyst present on exam   - she has had ESR, CRP both WNL    - discussed that solution to her problem would be a complete revision  This would be a significant undertaking given her age and history of DVTs  We had a long disucssion of what this would entail  She would like to think about her options  Paty Low MD  Adult Reconstruction Surgery  Department of Orthopaedic Surgery  [address]  10:05 AM

## 2020-09-21 ENCOUNTER — TELEPHONE (OUTPATIENT)
Dept: PAIN MEDICINE | Facility: CLINIC | Age: 85
End: 2020-09-21

## 2020-09-24 NOTE — TELEPHONE ENCOUNTER
LMOM with Leesa Millard in regards to confirming upcoming appointment with Dr William Irene    Appointment is scheduled for     Date:  9/30/2020  Time:  9:00  Location: [place]        If patient calls back, please ask the patient:    Has the patient ever seen a Neurologist?  If so, where and the name? Has the patient had any recent studies? ( MRI, CT, EEG)    Did the patient receive the NP paperwork?     Thank you

## 2020-09-25 NOTE — TELEPHONE ENCOUNTER
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

## 2020-09-28 ENCOUNTER — TELEPHONE (OUTPATIENT)
Dept: NEUROLOGY | Facility: CLINIC | Age: 85
End: 2020-09-28

## 2020-09-28 NOTE — TELEPHONE ENCOUNTER
The patient called and requested that the new patient paperwork be sent to her on Friday but she didn't receive it   I emailed it to her at [email or web address]

## 2020-09-30 ENCOUNTER — CONSULT (OUTPATIENT)
Dept: NEUROLOGY | Facility: CLINIC | Age: 85
End: 2020-09-30
Payer: COMMERCIAL

## 2020-09-30 VITALS
WEIGHT: 152 LBS | HEIGHT: 63 IN | SYSTOLIC BLOOD PRESSURE: 193 MMHG | BODY MASS INDEX: 26.93 KG/M2 | HEART RATE: 56 BPM | DIASTOLIC BLOOD PRESSURE: 81 MMHG | TEMPERATURE: 98 F

## 2020-09-30 DIAGNOSIS — I10 ESSENTIAL HYPERTENSION: Primary | ICD-10-CM

## 2020-09-30 DIAGNOSIS — I74.9 THROMBOEMBOLISM (HCC): ICD-10-CM

## 2020-09-30 DIAGNOSIS — R29.818 TRANSIENT NEUROLOGICAL SYMPTOMS: ICD-10-CM

## 2020-09-30 PROCEDURE — 99204 OFFICE O/P NEW MOD 45 MIN: CPT | Performed by: STUDENT IN AN ORGANIZED HEALTH CARE EDUCATION/TRAINING PROGRAM

## 2020-10-02 ENCOUNTER — TELEPHONE (OUTPATIENT)
Dept: ENDOCRINOLOGY | Facility: HOSPITAL | Age: 85
End: 2020-10-02

## 2020-10-14 ENCOUNTER — TRANSCRIBE ORDERS (OUTPATIENT)
Dept: ADMINISTRATIVE | Facility: HOSPITAL | Age: 85
End: 2020-10-14

## 2020-10-14 ENCOUNTER — HOSPITAL ENCOUNTER (OUTPATIENT)
Dept: CT IMAGING | Facility: HOSPITAL | Age: 85
Discharge: HOME | End: 2020-10-14
Attending: STUDENT IN AN ORGANIZED HEALTH CARE EDUCATION/TRAINING PROGRAM
Payer: COMMERCIAL

## 2020-10-14 ENCOUNTER — LAB (OUTPATIENT)
Dept: LAB | Facility: HOSPITAL | Age: 85
End: 2020-10-14
Payer: COMMERCIAL

## 2020-10-14 DIAGNOSIS — Z00.00 ROUTINE GENERAL MEDICAL EXAMINATION AT A HEALTH CARE FACILITY: ICD-10-CM

## 2020-10-14 DIAGNOSIS — Z00.00 ROUTINE GENERAL MEDICAL EXAMINATION AT A HEALTH CARE FACILITY: Primary | ICD-10-CM

## 2020-10-14 DIAGNOSIS — R29.818 TRANSIENT NEUROLOGICAL SYMPTOMS: ICD-10-CM

## 2020-10-14 DIAGNOSIS — Z12.31 SCREENING MAMMOGRAM FOR HIGH-RISK PATIENT: ICD-10-CM

## 2020-10-14 LAB
ALBUMIN SERPL BCP-MCNC: 4.5 G/DL (ref 3.5–5)
ALP SERPL-CCNC: 49 U/L (ref 46–116)
ALT SERPL W P-5'-P-CCNC: 33 U/L (ref 12–78)
ANION GAP SERPL CALCULATED.3IONS-SCNC: 6 MMOL/L (ref 4–13)
AST SERPL W P-5'-P-CCNC: 22 U/L (ref 5–45)
BASOPHILS # BLD AUTO: 0.06 THOUSANDS/ΜL (ref 0–0.1)
BASOPHILS NFR BLD AUTO: 1 % (ref 0–1)
BILIRUB SERPL-MCNC: 0.6 MG/DL (ref 0.2–1)
BUN SERPL-MCNC: 19 MG/DL (ref 5–25)
CALCIUM SERPL-MCNC: 10.7 MG/DL (ref 8.3–10.1)
CHLORIDE SERPL-SCNC: 105 MMOL/L (ref 100–108)
CHOLEST SERPL-MCNC: 164 MG/DL (ref 50–200)
CO2 SERPL-SCNC: 29 MMOL/L (ref 21–32)
CREAT SERPL-MCNC: 0.85 MG/DL (ref 0.6–1.3)
EOSINOPHIL # BLD AUTO: 0.16 THOUSAND/ΜL (ref 0–0.61)
EOSINOPHIL NFR BLD AUTO: 2 % (ref 0–6)
ERYTHROCYTE [DISTWIDTH] IN BLOOD BY AUTOMATED COUNT: 13 % (ref 11.6–15.1)
GFR SERPL CREATININE-BSD FRML MDRD: 64 ML/MIN/1.73SQ M
GLUCOSE P FAST SERPL-MCNC: 88 MG/DL (ref 65–99)
HCT VFR BLD AUTO: 41.2 % (ref 34.8–46.1)
HDLC SERPL-MCNC: 50 MG/DL
HGB BLD-MCNC: 13.9 G/DL (ref 11.5–15.4)
IMM GRANULOCYTES # BLD AUTO: 0.02 THOUSAND/UL (ref 0–0.2)
IMM GRANULOCYTES NFR BLD AUTO: 0 % (ref 0–2)
LDLC SERPL CALC-MCNC: 76 MG/DL (ref 0–100)
LYMPHOCYTES # BLD AUTO: 2.46 THOUSANDS/ΜL (ref 0.6–4.47)
LYMPHOCYTES NFR BLD AUTO: 37 % (ref 14–44)
MCH RBC QN AUTO: 33 PG (ref 26.8–34.3)
MCHC RBC AUTO-ENTMCNC: 33.7 G/DL (ref 31.4–37.4)
MCV RBC AUTO: 98 FL (ref 82–98)
MONOCYTES # BLD AUTO: 1.05 THOUSAND/ΜL (ref 0.17–1.22)
MONOCYTES NFR BLD AUTO: 16 % (ref 4–12)
NEUTROPHILS # BLD AUTO: 2.98 THOUSANDS/ΜL (ref 1.85–7.62)
NEUTS SEG NFR BLD AUTO: 44 % (ref 43–75)
NRBC BLD AUTO-RTO: 0 /100 WBCS
PLATELET # BLD AUTO: 205 THOUSANDS/UL (ref 149–390)
PMV BLD AUTO: 10.6 FL (ref 8.9–12.7)
POTASSIUM SERPL-SCNC: 3.8 MMOL/L (ref 3.5–5.3)
PROT SERPL-MCNC: 8.5 G/DL (ref 6.4–8.2)
RBC # BLD AUTO: 4.21 MILLION/UL (ref 3.81–5.12)
SODIUM SERPL-SCNC: 140 MMOL/L (ref 136–145)
TRIGL SERPL-MCNC: 191 MG/DL
TSH SERPL DL<=0.05 MIU/L-ACNC: 1.34 UIU/ML (ref 0.36–3.74)
WBC # BLD AUTO: 6.73 THOUSAND/UL (ref 4.31–10.16)

## 2020-10-14 PROCEDURE — 70450 CT HEAD/BRAIN W/O DYE: CPT

## 2020-10-14 PROCEDURE — 80053 COMPREHEN METABOLIC PANEL: CPT

## 2020-10-14 PROCEDURE — 84443 ASSAY THYROID STIM HORMONE: CPT

## 2020-10-14 PROCEDURE — G1004 CDSM NDSC: HCPCS

## 2020-10-14 PROCEDURE — 80061 LIPID PANEL: CPT

## 2020-10-14 PROCEDURE — 36415 COLL VENOUS BLD VENIPUNCTURE: CPT

## 2020-10-14 PROCEDURE — 85025 COMPLETE CBC W/AUTO DIFF WBC: CPT

## 2020-10-22 ENCOUNTER — ANNUAL EXAM (OUTPATIENT)
Dept: OBGYN CLINIC | Facility: CLINIC | Age: 85
End: 2020-10-22
Payer: COMMERCIAL

## 2020-10-22 VITALS
HEIGHT: 60 IN | WEIGHT: 151.6 LBS | TEMPERATURE: 96.9 F | BODY MASS INDEX: 29.76 KG/M2 | DIASTOLIC BLOOD PRESSURE: 76 MMHG | SYSTOLIC BLOOD PRESSURE: 140 MMHG

## 2020-10-22 DIAGNOSIS — Z86.718 HISTORY OF DVT (DEEP VEIN THROMBOSIS): ICD-10-CM

## 2020-10-22 DIAGNOSIS — I10 ESSENTIAL HYPERTENSION: ICD-10-CM

## 2020-10-22 DIAGNOSIS — E03.9 HYPOTHYROIDISM (ACQUIRED): ICD-10-CM

## 2020-10-22 DIAGNOSIS — Z90.710 STATUS POST VAGINAL HYSTERECTOMY: ICD-10-CM

## 2020-10-22 DIAGNOSIS — Z86.711 HISTORY OF PULMONARY EMBOLUS (PE): ICD-10-CM

## 2020-10-22 DIAGNOSIS — N95.1 MENOPAUSAL STATE: ICD-10-CM

## 2020-10-22 DIAGNOSIS — Z01.419 WOMEN'S ANNUAL ROUTINE GYNECOLOGICAL EXAMINATION: Primary | ICD-10-CM

## 2020-10-22 DIAGNOSIS — Z87.39 HISTORY OF OSTEOPOROSIS: ICD-10-CM

## 2020-10-22 PROCEDURE — G0101 CA SCREEN;PELVIC/BREAST EXAM: HCPCS | Performed by: OBSTETRICS & GYNECOLOGY

## 2020-10-23 ENCOUNTER — HOSPITAL ENCOUNTER (OUTPATIENT)
Dept: NEUROLOGY | Facility: CLINIC | Age: 85
Discharge: HOME | End: 2020-10-23
Payer: COMMERCIAL

## 2020-10-23 DIAGNOSIS — R29.818 TRANSIENT NEUROLOGICAL SYMPTOMS: ICD-10-CM

## 2020-10-23 PROCEDURE — 95819 EEG AWAKE AND ASLEEP: CPT

## 2020-10-23 PROCEDURE — 95816 EEG AWAKE AND DROWSY: CPT | Performed by: PSYCHIATRY & NEUROLOGY

## 2020-11-02 ENCOUNTER — TELEPHONE (OUTPATIENT)
Dept: NEUROLOGY | Facility: CLINIC | Age: 85
End: 2020-11-02

## 2020-11-23 ENCOUNTER — LAB (OUTPATIENT)
Dept: LAB | Facility: HOSPITAL | Age: 85
End: 2020-11-23
Payer: COMMERCIAL

## 2020-11-23 ENCOUNTER — TRANSCRIBE ORDERS (OUTPATIENT)
Dept: ADMINISTRATIVE | Facility: HOSPITAL | Age: 85
End: 2020-11-23

## 2020-11-23 DIAGNOSIS — I51.9 MYXEDEMA HEART DISEASE: Primary | ICD-10-CM

## 2020-11-23 DIAGNOSIS — I51.9 MYXEDEMA HEART DISEASE: ICD-10-CM

## 2020-11-23 DIAGNOSIS — E03.9 MYXEDEMA HEART DISEASE: Primary | ICD-10-CM

## 2020-11-23 DIAGNOSIS — E03.9 MYXEDEMA HEART DISEASE: ICD-10-CM

## 2020-11-23 LAB
25(OH)D3 SERPL-MCNC: 44 NG/ML (ref 30–100)
ALBUMIN SERPL BCP-MCNC: 4.4 G/DL (ref 3.5–5)
ALP SERPL-CCNC: 53 U/L (ref 46–116)
ALT SERPL W P-5'-P-CCNC: 32 U/L (ref 12–78)
ANION GAP SERPL CALCULATED.3IONS-SCNC: 3 MMOL/L (ref 4–13)
AST SERPL W P-5'-P-CCNC: 24 U/L (ref 5–45)
BILIRUB SERPL-MCNC: 0.61 MG/DL (ref 0.2–1)
BUN SERPL-MCNC: 24 MG/DL (ref 5–25)
CALCIUM SERPL-MCNC: 11.1 MG/DL (ref 8.3–10.1)
CHLORIDE SERPL-SCNC: 105 MMOL/L (ref 100–108)
CHOLEST SERPL-MCNC: 159 MG/DL (ref 50–200)
CO2 SERPL-SCNC: 30 MMOL/L (ref 21–32)
CREAT SERPL-MCNC: 1.06 MG/DL (ref 0.6–1.3)
GFR SERPL CREATININE-BSD FRML MDRD: 49 ML/MIN/1.73SQ M
GLUCOSE P FAST SERPL-MCNC: 95 MG/DL (ref 65–99)
HDLC SERPL-MCNC: 52 MG/DL
LDLC SERPL CALC-MCNC: 68 MG/DL (ref 0–100)
NONHDLC SERPL-MCNC: 107 MG/DL
POTASSIUM SERPL-SCNC: 4.2 MMOL/L (ref 3.5–5.3)
PROT SERPL-MCNC: 8.3 G/DL (ref 6.4–8.2)
SODIUM SERPL-SCNC: 138 MMOL/L (ref 136–145)
T4 FREE SERPL-MCNC: 1.42 NG/DL (ref 0.76–1.46)
TRIGL SERPL-MCNC: 195 MG/DL
TSH SERPL DL<=0.05 MIU/L-ACNC: 1.97 UIU/ML (ref 0.36–3.74)

## 2020-11-23 PROCEDURE — 82306 VITAMIN D 25 HYDROXY: CPT

## 2020-11-23 PROCEDURE — 80061 LIPID PANEL: CPT

## 2020-11-23 PROCEDURE — 84439 ASSAY OF FREE THYROXINE: CPT

## 2020-11-23 PROCEDURE — 80053 COMPREHEN METABOLIC PANEL: CPT

## 2020-11-23 PROCEDURE — 84443 ASSAY THYROID STIM HORMONE: CPT

## 2020-11-23 PROCEDURE — 36415 COLL VENOUS BLD VENIPUNCTURE: CPT

## 2020-11-27 ENCOUNTER — HOSPITAL ENCOUNTER (OUTPATIENT)
Dept: MAMMOGRAPHY | Facility: IMAGING CENTER | Age: 85
Discharge: HOME | End: 2020-11-27
Payer: COMMERCIAL

## 2020-11-27 VITALS — HEIGHT: 63 IN | BODY MASS INDEX: 28.7 KG/M2 | WEIGHT: 162 LBS

## 2020-11-27 DIAGNOSIS — Z12.31 VISIT FOR SCREENING MAMMOGRAM: ICD-10-CM

## 2020-11-27 DIAGNOSIS — Z00.00 ROUTINE GENERAL MEDICAL EXAMINATION AT A HEALTH CARE FACILITY: ICD-10-CM

## 2020-11-27 PROCEDURE — 77063 BREAST TOMOSYNTHESIS BI: CPT

## 2020-11-27 PROCEDURE — 77067 SCR MAMMO BI INCL CAD: CPT

## 2020-12-01 ENCOUNTER — OFFICE VISIT (OUTPATIENT)
Dept: GASTROENTEROLOGY | Facility: CLINIC | Age: 85
End: 2020-12-01
Payer: COMMERCIAL

## 2020-12-01 ENCOUNTER — APPOINTMENT (OUTPATIENT)
Dept: LAB | Facility: HOSPITAL | Age: 85
End: 2020-12-01
Payer: COMMERCIAL

## 2020-12-01 ENCOUNTER — TELEPHONE (OUTPATIENT)
Dept: GASTROENTEROLOGY | Facility: CLINIC | Age: 85
End: 2020-12-01

## 2020-12-01 VITALS
BODY MASS INDEX: 26.58 KG/M2 | WEIGHT: 150 LBS | SYSTOLIC BLOOD PRESSURE: 120 MMHG | DIASTOLIC BLOOD PRESSURE: 78 MMHG | HEIGHT: 63 IN

## 2020-12-01 DIAGNOSIS — K21.00 GASTROESOPHAGEAL REFLUX DISEASE WITH ESOPHAGITIS, UNSPECIFIED WHETHER HEMORRHAGE: ICD-10-CM

## 2020-12-01 DIAGNOSIS — D68.59 HYPERCOAGULABLE STATE (HCC): ICD-10-CM

## 2020-12-01 DIAGNOSIS — R19.7 DIARRHEA, UNSPECIFIED TYPE: Primary | ICD-10-CM

## 2020-12-01 DIAGNOSIS — R63.4 WEIGHT LOSS: ICD-10-CM

## 2020-12-01 DIAGNOSIS — R19.7 DIARRHEA, UNSPECIFIED TYPE: ICD-10-CM

## 2020-12-01 DIAGNOSIS — Z79.01 CHRONIC ANTICOAGULATION: ICD-10-CM

## 2020-12-01 DIAGNOSIS — Z86.711 HISTORY OF PULMONARY EMBOLUS (PE): ICD-10-CM

## 2020-12-01 LAB
ERYTHROCYTE [DISTWIDTH] IN BLOOD BY AUTOMATED COUNT: 12.5 % (ref 11.6–15.1)
HCT VFR BLD AUTO: 41 % (ref 34.8–46.1)
HGB BLD-MCNC: 13.6 G/DL (ref 11.5–15.4)
MCH RBC QN AUTO: 32.8 PG (ref 26.8–34.3)
MCHC RBC AUTO-ENTMCNC: 33.2 G/DL (ref 31.4–37.4)
MCV RBC AUTO: 99 FL (ref 82–98)
PLATELET # BLD AUTO: 195 THOUSANDS/UL (ref 149–390)
PMV BLD AUTO: 10.4 FL (ref 8.9–12.7)
RBC # BLD AUTO: 4.15 MILLION/UL (ref 3.81–5.12)
WBC # BLD AUTO: 8.47 THOUSAND/UL (ref 4.31–10.16)

## 2020-12-01 PROCEDURE — 36415 COLL VENOUS BLD VENIPUNCTURE: CPT

## 2020-12-01 PROCEDURE — 85027 COMPLETE CBC AUTOMATED: CPT

## 2020-12-01 PROCEDURE — 99204 OFFICE O/P NEW MOD 45 MIN: CPT | Performed by: NURSE PRACTITIONER

## 2020-12-01 RX ORDER — DIPHENOXYLATE HYDROCHLORIDE AND ATROPINE SULFATE 2.5; .025 MG/1; MG/1
1 TABLET ORAL 4 TIMES DAILY PRN
Qty: 60 TABLET | Refills: 2 | Status: SHIPPED | OUTPATIENT
Start: 2020-12-01 | End: 2021-07-08

## 2020-12-02 ENCOUNTER — TELEPHONE (OUTPATIENT)
Dept: GASTROENTEROLOGY | Facility: CLINIC | Age: 85
End: 2020-12-02

## 2020-12-03 ENCOUNTER — OFFICE VISIT (OUTPATIENT)
Dept: ENDOCRINOLOGY | Facility: HOSPITAL | Age: 85
End: 2020-12-03
Payer: COMMERCIAL

## 2020-12-03 VITALS
DIASTOLIC BLOOD PRESSURE: 90 MMHG | SYSTOLIC BLOOD PRESSURE: 140 MMHG | WEIGHT: 151 LBS | HEIGHT: 63 IN | BODY MASS INDEX: 26.75 KG/M2 | HEART RATE: 67 BPM

## 2020-12-03 DIAGNOSIS — I10 ESSENTIAL HYPERTENSION: ICD-10-CM

## 2020-12-03 DIAGNOSIS — E55.9 VITAMIN D DEFICIENCY: ICD-10-CM

## 2020-12-03 DIAGNOSIS — E03.9 HYPOTHYROIDISM IN ADULT: Primary | ICD-10-CM

## 2020-12-03 DIAGNOSIS — E78.5 HYPERLIPIDEMIA, UNSPECIFIED HYPERLIPIDEMIA TYPE: ICD-10-CM

## 2020-12-03 DIAGNOSIS — M81.0 AGE-RELATED OSTEOPOROSIS WITHOUT CURRENT PATHOLOGICAL FRACTURE: ICD-10-CM

## 2020-12-03 PROCEDURE — 99214 OFFICE O/P EST MOD 30 MIN: CPT | Performed by: NURSE PRACTITIONER

## 2020-12-03 RX ORDER — LEVOTHYROXINE SODIUM 112 UG/1
TABLET ORAL
Qty: 38 TABLET | Refills: 1 | Status: SHIPPED | OUTPATIENT
Start: 2020-12-03 | End: 2020-12-03 | Stop reason: SDUPTHER

## 2020-12-03 RX ORDER — LEVOTHYROXINE SODIUM 112 UG/1
TABLET ORAL
Qty: 104 TABLET | Refills: 1 | Status: SHIPPED | OUTPATIENT
Start: 2020-12-03 | End: 2020-12-07 | Stop reason: SDUPTHER

## 2020-12-04 ENCOUNTER — TELEPHONE (OUTPATIENT)
Dept: GASTROENTEROLOGY | Facility: CLINIC | Age: 85
End: 2020-12-04

## 2020-12-04 DIAGNOSIS — R19.7 DIARRHEA, UNSPECIFIED TYPE: Primary | ICD-10-CM

## 2020-12-07 DIAGNOSIS — E03.9 HYPOTHYROIDISM IN ADULT: ICD-10-CM

## 2020-12-07 RX ORDER — LEVOTHYROXINE SODIUM 112 UG/1
TABLET ORAL
Qty: 102 TABLET | Refills: 1 | Status: SHIPPED | OUTPATIENT
Start: 2020-12-07 | End: 2021-03-08 | Stop reason: SDUPTHER

## 2020-12-07 RX ORDER — LEVOTHYROXINE SODIUM 112 UG/1
TABLET ORAL
Qty: 34 TABLET | Refills: 0 | Status: SHIPPED | OUTPATIENT
Start: 2020-12-07 | End: 2020-12-07

## 2020-12-10 ENCOUNTER — APPOINTMENT (OUTPATIENT)
Dept: LAB | Facility: HOSPITAL | Age: 85
End: 2020-12-10
Payer: COMMERCIAL

## 2020-12-10 DIAGNOSIS — R19.7 DIARRHEA, UNSPECIFIED TYPE: ICD-10-CM

## 2020-12-10 PROCEDURE — 87209 SMEAR COMPLEX STAIN: CPT

## 2020-12-10 PROCEDURE — 87505 NFCT AGENT DETECTION GI: CPT

## 2020-12-10 PROCEDURE — 87177 OVA AND PARASITES SMEARS: CPT

## 2020-12-11 LAB
C DIFF TOX B TCDB STL QL NAA+PROBE: NEGATIVE
CAMPYLOBACTER DNA SPEC NAA+PROBE: NORMAL
SALMONELLA DNA SPEC QL NAA+PROBE: NORMAL
SHIGA TOXIN STX GENE SPEC NAA+PROBE: NORMAL
SHIGELLA DNA SPEC QL NAA+PROBE: NORMAL

## 2020-12-12 LAB — O+P STL CONC: NORMAL

## 2020-12-14 ENCOUNTER — TELEPHONE (OUTPATIENT)
Dept: GASTROENTEROLOGY | Facility: CLINIC | Age: 85
End: 2020-12-14

## 2020-12-14 DIAGNOSIS — R19.7 DIARRHEA, UNSPECIFIED TYPE: Primary | ICD-10-CM

## 2020-12-14 RX ORDER — CHOLESTYRAMINE 4 G/9G
1 POWDER, FOR SUSPENSION ORAL DAILY
Qty: 30 PACKET | Refills: 2 | Status: SHIPPED | OUTPATIENT
Start: 2020-12-14 | End: 2021-02-26 | Stop reason: ALTCHOICE

## 2020-12-15 ENCOUNTER — LAB (OUTPATIENT)
Dept: LAB | Facility: HOSPITAL | Age: 85
End: 2020-12-15
Payer: COMMERCIAL

## 2020-12-15 DIAGNOSIS — M81.0 AGE-RELATED OSTEOPOROSIS WITHOUT CURRENT PATHOLOGICAL FRACTURE: ICD-10-CM

## 2020-12-15 LAB
ALBUMIN SERPL BCP-MCNC: 3.6 G/DL (ref 3.5–5)
ALP SERPL-CCNC: 49 U/L (ref 46–116)
ALT SERPL W P-5'-P-CCNC: 47 U/L (ref 12–78)
ANION GAP SERPL CALCULATED.3IONS-SCNC: 8 MMOL/L (ref 4–13)
AST SERPL W P-5'-P-CCNC: 42 U/L (ref 5–45)
BILIRUB SERPL-MCNC: 0.51 MG/DL (ref 0.2–1)
BUN SERPL-MCNC: 18 MG/DL (ref 5–25)
CALCIUM SERPL-MCNC: 10.6 MG/DL (ref 8.3–10.1)
CHLORIDE SERPL-SCNC: 105 MMOL/L (ref 100–108)
CO2 SERPL-SCNC: 25 MMOL/L (ref 21–32)
CREAT SERPL-MCNC: 0.82 MG/DL (ref 0.6–1.3)
GFR SERPL CREATININE-BSD FRML MDRD: 66 ML/MIN/1.73SQ M
GLUCOSE P FAST SERPL-MCNC: 99 MG/DL (ref 65–99)
POTASSIUM SERPL-SCNC: 4.2 MMOL/L (ref 3.5–5.3)
PROT SERPL-MCNC: 7.2 G/DL (ref 6.4–8.2)
SODIUM SERPL-SCNC: 138 MMOL/L (ref 136–145)

## 2020-12-15 PROCEDURE — 80053 COMPREHEN METABOLIC PANEL: CPT

## 2020-12-15 PROCEDURE — 36415 COLL VENOUS BLD VENIPUNCTURE: CPT

## 2020-12-18 ENCOUNTER — TELEMEDICINE (OUTPATIENT)
Dept: GASTROENTEROLOGY | Facility: CLINIC | Age: 85
End: 2020-12-18

## 2020-12-18 ENCOUNTER — TELEPHONE (OUTPATIENT)
Dept: ENDOCRINOLOGY | Facility: HOSPITAL | Age: 85
End: 2020-12-18

## 2020-12-18 VITALS — BODY MASS INDEX: 25.69 KG/M2 | WEIGHT: 145 LBS | HEIGHT: 63 IN

## 2020-12-18 DIAGNOSIS — R63.4 WEIGHT LOSS: ICD-10-CM

## 2020-12-18 DIAGNOSIS — R19.7 DIARRHEA, UNSPECIFIED TYPE: Primary | ICD-10-CM

## 2020-12-18 RX ORDER — SODIUM, POTASSIUM,MAG SULFATES 17.5-3.13G
SOLUTION, RECONSTITUTED, ORAL ORAL
Qty: 2 BOTTLE | Refills: 0 | Status: SHIPPED | OUTPATIENT
Start: 2020-12-18 | End: 2021-02-26 | Stop reason: ALTCHOICE

## 2020-12-22 ENCOUNTER — ANESTHESIA EVENT (OUTPATIENT)
Dept: GASTROENTEROLOGY | Facility: HOSPITAL | Age: 85
End: 2020-12-22

## 2020-12-24 ENCOUNTER — ANESTHESIA (OUTPATIENT)
Dept: GASTROENTEROLOGY | Facility: HOSPITAL | Age: 85
End: 2020-12-24

## 2020-12-24 ENCOUNTER — HOSPITAL ENCOUNTER (OUTPATIENT)
Dept: GASTROENTEROLOGY | Facility: HOSPITAL | Age: 85
Setting detail: OUTPATIENT SURGERY
Discharge: HOME | End: 2020-12-24
Attending: INTERNAL MEDICINE
Payer: COMMERCIAL

## 2020-12-24 VITALS
DIASTOLIC BLOOD PRESSURE: 67 MMHG | BODY MASS INDEX: 25.16 KG/M2 | TEMPERATURE: 97.3 F | RESPIRATION RATE: 16 BRPM | OXYGEN SATURATION: 97 % | WEIGHT: 142 LBS | SYSTOLIC BLOOD PRESSURE: 152 MMHG | HEIGHT: 63 IN | HEART RATE: 67 BPM

## 2020-12-24 VITALS — HEART RATE: 65 BPM

## 2020-12-24 DIAGNOSIS — K21.00 GASTROESOPHAGEAL REFLUX DISEASE WITH ESOPHAGITIS, UNSPECIFIED WHETHER HEMORRHAGE: ICD-10-CM

## 2020-12-24 DIAGNOSIS — R63.4 WEIGHT LOSS: ICD-10-CM

## 2020-12-24 DIAGNOSIS — R19.7 DIARRHEA, UNSPECIFIED TYPE: ICD-10-CM

## 2020-12-24 PROCEDURE — 88305 TISSUE EXAM BY PATHOLOGIST: CPT | Performed by: PATHOLOGY

## 2020-12-24 PROCEDURE — 45380 COLONOSCOPY AND BIOPSY: CPT | Performed by: INTERNAL MEDICINE

## 2020-12-24 PROCEDURE — 43239 EGD BIOPSY SINGLE/MULTIPLE: CPT | Performed by: INTERNAL MEDICINE

## 2020-12-24 RX ORDER — METOCLOPRAMIDE HYDROCHLORIDE 5 MG/ML
10 INJECTION INTRAMUSCULAR; INTRAVENOUS ONCE AS NEEDED
Status: CANCELLED | OUTPATIENT
Start: 2020-12-24

## 2020-12-24 RX ORDER — SODIUM CHLORIDE 9 MG/ML
50 INJECTION, SOLUTION INTRAVENOUS CONTINUOUS
Status: DISCONTINUED | OUTPATIENT
Start: 2020-12-24 | End: 2020-12-28 | Stop reason: HOSPADM

## 2020-12-24 RX ORDER — MEPERIDINE HYDROCHLORIDE 25 MG/ML
12.5 INJECTION INTRAMUSCULAR; INTRAVENOUS; SUBCUTANEOUS
Status: CANCELLED | OUTPATIENT
Start: 2020-12-24

## 2020-12-24 RX ORDER — HYDRALAZINE HYDROCHLORIDE 20 MG/ML
5 INJECTION INTRAMUSCULAR; INTRAVENOUS
Status: CANCELLED | OUTPATIENT
Start: 2020-12-24

## 2020-12-24 RX ORDER — FENTANYL CITRATE/PF 50 MCG/ML
12.5 SYRINGE (ML) INJECTION
Status: CANCELLED | OUTPATIENT
Start: 2020-12-24

## 2020-12-24 RX ORDER — PROPOFOL 10 MG/ML
INJECTION, EMULSION INTRAVENOUS AS NEEDED
Status: DISCONTINUED | OUTPATIENT
Start: 2020-12-24 | End: 2020-12-24

## 2020-12-24 RX ORDER — ONDANSETRON 2 MG/ML
4 INJECTION INTRAMUSCULAR; INTRAVENOUS ONCE AS NEEDED
Status: CANCELLED | OUTPATIENT
Start: 2020-12-24

## 2020-12-24 RX ORDER — LABETALOL 20 MG/4 ML (5 MG/ML) INTRAVENOUS SYRINGE
5
Status: CANCELLED | OUTPATIENT
Start: 2020-12-24

## 2020-12-24 RX ADMIN — SODIUM CHLORIDE 50 ML/HR: 0.9 INJECTION, SOLUTION INTRAVENOUS at 07:20

## 2020-12-24 RX ADMIN — PROPOFOL 60 MG: 10 INJECTION, EMULSION INTRAVENOUS at 07:54

## 2020-12-24 RX ADMIN — PROPOFOL 40 MG: 10 INJECTION, EMULSION INTRAVENOUS at 08:11

## 2020-12-24 RX ADMIN — PROPOFOL 40 MG: 10 INJECTION, EMULSION INTRAVENOUS at 08:07

## 2020-12-24 RX ADMIN — PROPOFOL 50 MG: 10 INJECTION, EMULSION INTRAVENOUS at 08:02

## 2020-12-24 RX ADMIN — PROPOFOL 50 MG: 10 INJECTION, EMULSION INTRAVENOUS at 07:55

## 2020-12-28 ENCOUNTER — HOSPITAL ENCOUNTER (INPATIENT)
Facility: HOSPITAL | Age: 85
LOS: 2 days | Discharge: HOME | DRG: 378 | End: 2020-12-30
Attending: EMERGENCY MEDICINE | Admitting: INTERNAL MEDICINE
Payer: COMMERCIAL

## 2020-12-28 ENCOUNTER — APPOINTMENT (EMERGENCY)
Dept: CT IMAGING | Facility: HOSPITAL | Age: 85
DRG: 378 | End: 2020-12-28
Payer: COMMERCIAL

## 2020-12-28 ENCOUNTER — LAB (OUTPATIENT)
Dept: LAB | Facility: HOSPITAL | Age: 85
DRG: 378 | End: 2020-12-28
Attending: INTERNAL MEDICINE
Payer: COMMERCIAL

## 2020-12-28 ENCOUNTER — TELEPHONE (OUTPATIENT)
Dept: GASTROENTEROLOGY | Facility: CLINIC | Age: 85
End: 2020-12-28

## 2020-12-28 DIAGNOSIS — K92.1 BLOOD IN STOOL: Primary | ICD-10-CM

## 2020-12-28 DIAGNOSIS — K92.1 GASTROINTESTINAL HEMORRHAGE WITH MELENA: ICD-10-CM

## 2020-12-28 DIAGNOSIS — D62 ACUTE BLOOD LOSS ANEMIA: Primary | ICD-10-CM

## 2020-12-28 DIAGNOSIS — K92.1 BLOOD IN STOOL: ICD-10-CM

## 2020-12-28 DIAGNOSIS — K92.1 MELENA: ICD-10-CM

## 2020-12-28 PROBLEM — K59.1 FUNCTIONAL DIARRHEA: Status: ACTIVE | Noted: 2020-12-28

## 2020-12-28 LAB
ABO GROUP BLD: NORMAL
ALBUMIN SERPL BCP-MCNC: 3.3 G/DL (ref 3.5–5)
ALP SERPL-CCNC: 50 U/L (ref 46–116)
ALT SERPL W P-5'-P-CCNC: 54 U/L (ref 12–78)
ANION GAP SERPL CALCULATED.3IONS-SCNC: 7 MMOL/L (ref 4–13)
APTT PPP: 33 SECONDS (ref 23–37)
AST SERPL W P-5'-P-CCNC: 31 U/L (ref 5–45)
BASOPHILS # BLD AUTO: 0.05 THOUSANDS/ΜL (ref 0–0.1)
BASOPHILS # BLD AUTO: 0.05 THOUSANDS/ΜL (ref 0–0.1)
BASOPHILS NFR BLD AUTO: 1 % (ref 0–1)
BASOPHILS NFR BLD AUTO: 1 % (ref 0–1)
BILIRUB DIRECT SERPL-MCNC: 0.08 MG/DL (ref 0–0.2)
BILIRUB SERPL-MCNC: 0.2 MG/DL (ref 0.2–1)
BLD GP AB SCN SERPL QL: NEGATIVE
BUN SERPL-MCNC: 31 MG/DL (ref 5–25)
CALCIUM SERPL-MCNC: 9.7 MG/DL (ref 8.3–10.1)
CHLORIDE SERPL-SCNC: 106 MMOL/L (ref 100–108)
CO2 SERPL-SCNC: 27 MMOL/L (ref 21–32)
CREAT SERPL-MCNC: 0.84 MG/DL (ref 0.6–1.3)
EOSINOPHIL # BLD AUTO: 0.22 THOUSAND/ΜL (ref 0–0.61)
EOSINOPHIL # BLD AUTO: 0.25 THOUSAND/ΜL (ref 0–0.61)
EOSINOPHIL NFR BLD AUTO: 2 % (ref 0–6)
EOSINOPHIL NFR BLD AUTO: 3 % (ref 0–6)
ERYTHROCYTE [DISTWIDTH] IN BLOOD BY AUTOMATED COUNT: 13.2 % (ref 11.6–15.1)
ERYTHROCYTE [DISTWIDTH] IN BLOOD BY AUTOMATED COUNT: 13.3 % (ref 11.6–15.1)
GFR SERPL CREATININE-BSD FRML MDRD: 64 ML/MIN/1.73SQ M
GLUCOSE SERPL-MCNC: 96 MG/DL (ref 65–140)
HCT VFR BLD AUTO: 27.6 % (ref 34.8–46.1)
HCT VFR BLD AUTO: 29.4 % (ref 34.8–46.1)
HGB BLD-MCNC: 9 G/DL (ref 11.5–15.4)
HGB BLD-MCNC: 9.4 G/DL (ref 11.5–15.4)
IMM GRANULOCYTES # BLD AUTO: 0.04 THOUSAND/UL (ref 0–0.2)
IMM GRANULOCYTES # BLD AUTO: 0.07 THOUSAND/UL (ref 0–0.2)
IMM GRANULOCYTES NFR BLD AUTO: 1 % (ref 0–2)
IMM GRANULOCYTES NFR BLD AUTO: 1 % (ref 0–2)
INR PPP: 1.51 (ref 0.84–1.19)
INR PPP: 1.51 (ref 0.84–1.19)
LYMPHOCYTES # BLD AUTO: 2.32 THOUSANDS/ΜL (ref 0.6–4.47)
LYMPHOCYTES # BLD AUTO: 2.4 THOUSANDS/ΜL (ref 0.6–4.47)
LYMPHOCYTES NFR BLD AUTO: 23 % (ref 14–44)
LYMPHOCYTES NFR BLD AUTO: 30 % (ref 14–44)
MCH RBC QN AUTO: 32.5 PG (ref 26.8–34.3)
MCH RBC QN AUTO: 33 PG (ref 26.8–34.3)
MCHC RBC AUTO-ENTMCNC: 32 G/DL (ref 31.4–37.4)
MCHC RBC AUTO-ENTMCNC: 32.6 G/DL (ref 31.4–37.4)
MCV RBC AUTO: 101 FL (ref 82–98)
MCV RBC AUTO: 102 FL (ref 82–98)
MONOCYTES # BLD AUTO: 0.88 THOUSAND/ΜL (ref 0.17–1.22)
MONOCYTES # BLD AUTO: 1.27 THOUSAND/ΜL (ref 0.17–1.22)
MONOCYTES NFR BLD AUTO: 11 % (ref 4–12)
MONOCYTES NFR BLD AUTO: 12 % (ref 4–12)
NEUTROPHILS # BLD AUTO: 4.18 THOUSANDS/ΜL (ref 1.85–7.62)
NEUTROPHILS # BLD AUTO: 6.42 THOUSANDS/ΜL (ref 1.85–7.62)
NEUTS SEG NFR BLD AUTO: 54 % (ref 43–75)
NEUTS SEG NFR BLD AUTO: 61 % (ref 43–75)
NRBC BLD AUTO-RTO: 0 /100 WBCS
NRBC BLD AUTO-RTO: 0 /100 WBCS
PLATELET # BLD AUTO: 201 THOUSANDS/UL (ref 149–390)
PLATELET # BLD AUTO: 223 THOUSANDS/UL (ref 149–390)
PMV BLD AUTO: 11.2 FL (ref 8.9–12.7)
PMV BLD AUTO: 12.1 FL (ref 8.9–12.7)
POTASSIUM SERPL-SCNC: 4 MMOL/L (ref 3.5–5.3)
PROT SERPL-MCNC: 6.3 G/DL (ref 6.4–8.2)
PROTHROMBIN TIME: 18.2 SECONDS (ref 11.6–14.5)
PROTHROMBIN TIME: 18.2 SECONDS (ref 11.6–14.5)
RBC # BLD AUTO: 2.73 MILLION/UL (ref 3.81–5.12)
RBC # BLD AUTO: 2.89 MILLION/UL (ref 3.81–5.12)
RH BLD: POSITIVE
SODIUM SERPL-SCNC: 140 MMOL/L (ref 136–145)
SPECIMEN EXPIRATION DATE: NORMAL
WBC # BLD AUTO: 10.46 THOUSAND/UL (ref 4.31–10.16)
WBC # BLD AUTO: 7.69 THOUSAND/UL (ref 4.31–10.16)

## 2020-12-28 PROCEDURE — 99285 EMERGENCY DEPT VISIT HI MDM: CPT

## 2020-12-28 PROCEDURE — 86900 BLOOD TYPING SEROLOGIC ABO: CPT | Performed by: PHYSICIAN ASSISTANT

## 2020-12-28 PROCEDURE — 86850 RBC ANTIBODY SCREEN: CPT | Performed by: PHYSICIAN ASSISTANT

## 2020-12-28 PROCEDURE — 36415 COLL VENOUS BLD VENIPUNCTURE: CPT

## 2020-12-28 PROCEDURE — 85610 PROTHROMBIN TIME: CPT | Performed by: PHYSICIAN ASSISTANT

## 2020-12-28 PROCEDURE — 99285 EMERGENCY DEPT VISIT HI MDM: CPT | Performed by: PHYSICIAN ASSISTANT

## 2020-12-28 PROCEDURE — 80076 HEPATIC FUNCTION PANEL: CPT | Performed by: PHYSICIAN ASSISTANT

## 2020-12-28 PROCEDURE — 74177 CT ABD & PELVIS W/CONTRAST: CPT

## 2020-12-28 PROCEDURE — 85730 THROMBOPLASTIN TIME PARTIAL: CPT | Performed by: PHYSICIAN ASSISTANT

## 2020-12-28 PROCEDURE — 83550 IRON BINDING TEST: CPT | Performed by: INTERNAL MEDICINE

## 2020-12-28 PROCEDURE — 85610 PROTHROMBIN TIME: CPT

## 2020-12-28 PROCEDURE — 99222 1ST HOSP IP/OBS MODERATE 55: CPT | Performed by: INTERNAL MEDICINE

## 2020-12-28 PROCEDURE — 80048 BASIC METABOLIC PNL TOTAL CA: CPT | Performed by: PHYSICIAN ASSISTANT

## 2020-12-28 PROCEDURE — 86921 COMPATIBILITY TEST INCUBATE: CPT

## 2020-12-28 PROCEDURE — 93005 ELECTROCARDIOGRAM TRACING: CPT

## 2020-12-28 PROCEDURE — G1004 CDSM NDSC: HCPCS

## 2020-12-28 PROCEDURE — 83540 ASSAY OF IRON: CPT | Performed by: INTERNAL MEDICINE

## 2020-12-28 PROCEDURE — 85025 COMPLETE CBC W/AUTO DIFF WBC: CPT

## 2020-12-28 PROCEDURE — 82728 ASSAY OF FERRITIN: CPT | Performed by: INTERNAL MEDICINE

## 2020-12-28 PROCEDURE — 86901 BLOOD TYPING SEROLOGIC RH(D): CPT | Performed by: PHYSICIAN ASSISTANT

## 2020-12-28 PROCEDURE — 96365 THER/PROPH/DIAG IV INF INIT: CPT

## 2020-12-28 PROCEDURE — C9113 INJ PANTOPRAZOLE SODIUM, VIA: HCPCS | Performed by: PHYSICIAN ASSISTANT

## 2020-12-28 PROCEDURE — 86922 COMPATIBILITY TEST ANTIGLOB: CPT

## 2020-12-28 PROCEDURE — 85025 COMPLETE CBC W/AUTO DIFF WBC: CPT | Performed by: PHYSICIAN ASSISTANT

## 2020-12-28 RX ORDER — MELATONIN
3000 DAILY
Status: DISCONTINUED | OUTPATIENT
Start: 2020-12-29 | End: 2020-12-30 | Stop reason: HOSPADM

## 2020-12-28 RX ORDER — LEVOTHYROXINE SODIUM 112 UG/1
112 TABLET ORAL
Status: DISCONTINUED | OUTPATIENT
Start: 2020-12-29 | End: 2020-12-30 | Stop reason: HOSPADM

## 2020-12-28 RX ORDER — CHOLESTYRAMINE LIGHT 4 G/5.7G
4 POWDER, FOR SUSPENSION ORAL DAILY
Status: DISCONTINUED | OUTPATIENT
Start: 2020-12-29 | End: 2020-12-30 | Stop reason: HOSPADM

## 2020-12-28 RX ORDER — CHOLESTYRAMINE LIGHT 4 G/5.7G
4 POWDER, FOR SUSPENSION ORAL 4 TIMES DAILY
Status: DISCONTINUED | OUTPATIENT
Start: 2020-12-28 | End: 2020-12-28

## 2020-12-28 RX ORDER — SODIUM CHLORIDE 9 MG/ML
75 INJECTION, SOLUTION INTRAVENOUS CONTINUOUS
Status: DISCONTINUED | OUTPATIENT
Start: 2020-12-28 | End: 2020-12-29

## 2020-12-28 RX ORDER — DIPHENOXYLATE HYDROCHLORIDE AND ATROPINE SULFATE 2.5; .025 MG/1; MG/1
1 TABLET ORAL 4 TIMES DAILY PRN
Status: DISCONTINUED | OUTPATIENT
Start: 2020-12-28 | End: 2020-12-30 | Stop reason: HOSPADM

## 2020-12-28 RX ADMIN — SODIUM CHLORIDE 75 ML/HR: 0.9 INJECTION, SOLUTION INTRAVENOUS at 22:09

## 2020-12-28 RX ADMIN — GLYCERIN, HYPROMELLOSE, POLYETHYLENE GLYCOL 1 DROP: .2; .2; 1 LIQUID OPHTHALMIC at 21:11

## 2020-12-28 RX ADMIN — PANTOPRAZOLE SODIUM 8 MG/HR: 40 INJECTION, POWDER, FOR SOLUTION INTRAVENOUS at 18:54

## 2020-12-28 RX ADMIN — IOHEXOL 100 ML: 350 INJECTION, SOLUTION INTRAVENOUS at 18:27

## 2020-12-28 NOTE — Clinical Note
Case was discussed with Dr Posey Severin and the patient's admission status was agreed to be Admission Status: inpatient status to the service of Dr Posey Severin

## 2020-12-28 NOTE — ED PROVIDER NOTES
History  Chief Complaint   Patient presents with    GI Bleeding     pt reports that she had a colonoscopy on Christmas eve due to having diarrhea for a long period of time, and since then has been experiencing rectal bleeding  was sent to the er by her doctor  42-year-old female with history of Sjogren syndrome, hypertension, hyperlipidemia and prior PE on Coumadin presents emergency department for evaluation melanotic stool and generalized malaise  Patient states she had chronic diarrhea was being followed by GI, underwent a colonoscopy on 12/24 which was unremarkable  She had held her Coumadin for 5 days leading up to this and continues to be off the Coumadin at this time  States that after discharge from her procedure she noted dark tarry stool  Having increasing frequency of dark tarry bowel movements  Denies any fevers or chills, denies abdominal pain or distention  Has not resumed her Coumadin yet  Had outpatient labs checked today showing a hemoglobin of 9 4, acutely decreased from a prior hemoglobin of 13 3 weeks prior          Prior to Admission Medications   Prescriptions Last Dose Informant Patient Reported? Taking? Cholecalciferol (VITAMIN D) 2000 UNITS tablet  Self Yes No   Sig: Take 3,000 Units by mouth daily     DHA-EPA-Flaxseed Oil-Vitamin E (THERA TEARS) CAPS  Self Yes No   Sig: Take 4 capsules by mouth daily  Multiple Vitamins-Minerals (OCUVITE PO)  Self Yes No   Sig: Take 1 tablet by mouth daily  Na Sulfate-K Sulfate-Mg Sulf (Suprep Bowel Prep Kit) 17 5-3 13-1 6 GM/177ML SOLN   No No   Sig: As directed (1 kit)   cholestyramine (QUESTRAN) 4 g packet  Self No No   Sig: Take 1 packet (4 g total) by mouth daily 1 hr after or 4 hrs before any other medication   cycloSPORINE (RESTASIS) 0 05 % ophthalmic emulsion  Self Yes No   Sig: Administer 1 drop to both eyes 2 (two) times a day     denosumab (PROLIA) 60 mg/mL  Self Yes No   Sig: Inject 60 mg under the skin every 6 (six) months diclofenac sodium (VOLTAREN) 1 %  Self No No   Sig: Apply 2 g topically 4 (four) times a day   diphenoxylate-atropine (LOMOTIL) 2 5-0 025 mg per tablet  Self No No   Sig: Take 1 tablet by mouth 4 (four) times a day as needed for diarrhea   hydrochlorothiazide (HYDRODIURIL) 12 5 mg tablet  Self No No   Sig: TAKE 1 TABLET BY MOUTH EVERY DAY   hydroxychloroquine (PLAQUENIL) 200 mg tablet  Self Yes No   Sig: Take 200 mg by mouth daily  levothyroxine 112 mcg tablet  Self No No   Si TABLET MONDAY THROUGH SATURDAY AND 2 TABLETS ON    losartan (COZAAR) 50 mg tablet  Self No No   Sig: Take 1 tablet (50 mg total) by mouth daily   metoprolol tartrate (LOPRESSOR) 25 mg tablet  Self No No   Sig: Take 1 tablet (25 mg total) by mouth every 12 (twelve) hours for 30 days   omeprazole (PriLOSEC) 20 mg delayed release capsule  Self Yes No   Sig: Take 20 mg by mouth 2 (two) times a day  polyvinyl alcohol (LIQUIFILM TEARS) 1 4 % ophthalmic solution  Self Yes No   Sig: Administer 1 drop to both eyes as needed for dry eyes   simvastatin (ZOCOR) 20 mg tablet  Self Yes No   Sig: Take 20 mg by mouth daily at bedtime     warfarin (COUMADIN) 1 mg tablet  Self Yes No   warfarin (COUMADIN) 4 mg tablet  Self Yes No      Facility-Administered Medications: None       Past Medical History:   Diagnosis Date    Cancer ([place] [address] )     squamous cell - face    Disease of thyroid gland     GERD (gastroesophageal reflux disease)     Heart murmur     Hypercoagulable state ([place] [address] )     Hyperlipidemia     Hypertension     Microscopic colitis     Osteoporosis     PE (pulmonary thromboembolism) ([place] [address] )     Sjogren's syndrome ([place] [address] )     Urinary tract infection        Past Surgical History:   Procedure Laterality Date    COLONOSCOPY  2016     grade 2 internal hemorrhoids but otherwise normal, pathology negative for microscopic colitis    HYSTERECTOMY      JOINT REPLACEMENT      KNEE SURGERY      OOPHORECTOMY      SHOULDER SURGERY right    SPINE SURGERY      TOE SURGERY      TONSILLECTOMY      UPPER GASTROINTESTINAL ENDOSCOPY  01/09/2015     reflux esophagitis, gastritis, duodenitis in the bulb, pathology negative for H pylori, Puckett's, EOE       Family History   Problem Relation Age of Onset    Stroke Father     Ovarian cancer Sister     No Known Problems Son     No Known Problems Son     Ovarian cancer Daughter     No Known Problems Daughter     No Known Problems Daughter     No Known Problems Maternal Aunt     No Known Problems Paternal Aunt     Colon cancer Neg Hx     Colon polyps Neg Hx      I have reviewed and agree with the history as documented  E-Cigarette/Vaping    E-Cigarette Use Never User      E-Cigarette/Vaping Substances     Social History     Tobacco Use    Smoking status: Never Smoker    Smokeless tobacco: Never Used   Substance Use Topics    Alcohol use: No     Frequency: Never     Binge frequency: Never    Drug use: No       Review of Systems   Constitutional: Positive for fatigue  Negative for chills, diaphoresis and fever  Eyes: Negative for visual disturbance  Respiratory: Negative for cough and shortness of breath  Cardiovascular: Negative for chest pain and palpitations  Gastrointestinal: Positive for blood in stool  Negative for abdominal pain, diarrhea, nausea and vomiting  Genitourinary: Negative for dysuria, flank pain and frequency  Musculoskeletal: Negative for arthralgias and myalgias  Skin: Negative for color change, rash and wound  Allergic/Immunologic: Negative for immunocompromised state  Neurological: Positive for weakness  Negative for dizziness and light-headedness  Hematological: Does not bruise/bleed easily  Psychiatric/Behavioral: Negative for confusion  The patient is not nervous/anxious  Physical Exam  Physical Exam  Vitals signs reviewed  Constitutional:       General: She is not in acute distress  Appearance: She is well-developed   She is not diaphoretic  HENT:      Head: Normocephalic and atraumatic  Mouth/Throat:      Mouth: Mucous membranes are moist    Eyes:      General: No scleral icterus  Pupils: Pupils are equal, round, and reactive to light  Neck:      Vascular: No JVD  Cardiovascular:      Rate and Rhythm: Normal rate and regular rhythm  Heart sounds: No murmur  No friction rub  No gallop  Pulmonary:      Effort: No respiratory distress  Breath sounds: No wheezing or rales  Abdominal:      General: Bowel sounds are normal  There is no distension  Palpations: Abdomen is soft  There is no mass  Tenderness: There is no abdominal tenderness  There is no guarding or rebound  Genitourinary:     Comments: Grossly melanotic stool, no BRBPR  Skin:     General: Skin is warm and dry  Capillary Refill: Capillary refill takes less than 2 seconds  Coloration: Skin is pale  Neurological:      Mental Status: She is alert and oriented to person, place, and time     Psychiatric:         Mood and Affect: Mood normal          Behavior: Behavior normal          Vital Signs  ED Triage Vitals   Temperature Pulse Respirations Blood Pressure SpO2   12/28/20 1853 12/28/20 1720 12/28/20 1720 12/28/20 1720 12/28/20 1720   98 1 °F (36 7 °C) 80 18 (!) 171/70 98 %      Temp src Heart Rate Source Patient Position - Orthostatic VS BP Location FiO2 (%)   -- 12/28/20 1720 12/28/20 1720 12/28/20 1720 --    Monitor Lying Left arm       Pain Score       --                  Vitals:    12/28/20 1720 12/28/20 1900   BP: (!) 171/70 154/68   Pulse: 80 79   Patient Position - Orthostatic VS: Lying Lying         Visual Acuity      ED Medications  Medications   pantoprazole (PROTONIX) 80 mg in sodium chloride 0 9 % 100 mL infusion (8 mg/hr Intravenous New Bag 12/28/20 1854)   iohexol (OMNIPAQUE) 350 MG/ML injection (MULTI-DOSE) 100 mL (100 mL Intravenous Given 12/28/20 1827)       Diagnostic Studies  Results Reviewed     Procedure Component Value Units Date/Time    Protime-INR [[id number]]  (Abnormal) Collected: 12/28/20 1747    Lab Status: Final result Specimen: Blood from Arm, Left Updated: 12/28/20 1812     Protime 18 2 seconds      INR 1 51    APTT [[id number]]  (Normal) Collected: 12/28/20 1747    Lab Status: Final result Specimen: Blood from Arm, Left Updated: 12/28/20 1812     PTT 33 seconds     Basic metabolic panel [[id number]]  (Abnormal) Collected: 12/28/20 1747    Lab Status: Final result Specimen: Blood from Arm, Left Updated: 12/28/20 1810     Sodium 140 mmol/L      Potassium 4 0 mmol/L      Chloride 106 mmol/L      CO2 27 mmol/L      ANION GAP 7 mmol/L      BUN 31 mg/dL      Creatinine 0 84 mg/dL      Glucose 96 mg/dL      Calcium 9 7 mg/dL      eGFR 64 ml/min/1 73sq m     Narrative:      [place] guidelines for Chronic Kidney Disease (CKD):     Stage 1 with normal or high GFR (GFR > 90 mL/min/1 73 square meters)    Stage 2 Mild CKD (GFR = 60-89 mL/min/1 73 square meters)    Stage 3A Moderate CKD (GFR = 45-59 mL/min/1 73 square meters)    Stage 3B Moderate CKD (GFR = 30-44 mL/min/1 73 square meters)    Stage 4 Severe CKD (GFR = 15-29 mL/min/1 73 square meters)    Stage 5 End Stage CKD (GFR <15 mL/min/1 73 square meters)  Note: GFR calculation is accurate only with a steady state creatinine    Hepatic function panel [[id number]]  (Abnormal) Collected: 12/28/20 1747    Lab Status: Final result Specimen: Blood from Arm, Left Updated: 12/28/20 1810     Total Bilirubin 0 20 mg/dL      Bilirubin, Direct 0 08 mg/dL      Alkaline Phosphatase 50 U/L      AST 31 U/L      ALT 54 U/L      Total Protein 6 3 g/dL      Albumin 3 3 g/dL     CBC and differential [[id number]]  (Abnormal) Collected: 12/28/20 1747    Lab Status: Final result Specimen: Blood from Arm, Left Updated: 12/28/20 1754     WBC 7 69 Thousand/uL      RBC 2 73 Million/uL      Hemoglobin 9 0 g/dL      Hematocrit 27 6 %       fL      MCH 33 0 pg      MCHC 32 6 g/dL      RDW 13 3 %      MPV 11 2 fL      Platelets 711 Thousands/uL      nRBC 0 /100 WBCs      Neutrophils Relative 54 %      Immat GRANS % 1 %      Lymphocytes Relative 30 %      Monocytes Relative 11 %      Eosinophils Relative 3 %      Basophils Relative 1 %      Neutrophils Absolute 4 18 Thousands/µL      Immature Grans Absolute 0 04 Thousand/uL      Lymphocytes Absolute 2 32 Thousands/µL      Monocytes Absolute 0 88 Thousand/µL      Eosinophils Absolute 0 22 Thousand/µL      Basophils Absolute 0 05 Thousands/µL                  CT abdomen pelvis with contrast   Final Result by Romana Galloway, MD (12/28 1839)      No evidence of acute abnormality in the abdomen or pelvis  No ascites, fluid collection or free air to suggest bowel perforation as questioned  Moderate compression deformity and increased sclerosis of L2 vertebral body, favored to be chronic  Additional chronic findings as described           Workstation performed: [id number]                    Procedures  ECG 12 Lead Documentation Only    Date/Time: 12/28/2020 5:40 PM  Performed by: Michael Cole PA-C  Authorized by: Michael Cole PA-C     Indications / Diagnosis:  Melena  ECG reviewed by me, the ED Provider: yes    Patient location:  ED  Previous ECG:     Previous ECG:  Compared to current    Similarity:  No change  Interpretation:     Interpretation: normal    Rate:     ECG rate:  77    ECG rate assessment: normal    Rhythm:     Rhythm: sinus rhythm    Ectopy:     Ectopy: none    QRS:     QRS axis:  Normal    QRS intervals:  Normal  Conduction:     Conduction: normal    ST segments:     ST segments:  Normal  T waves:     T waves: normal    Comments:      QTc 441              ED Course                                           MDM  Number of Diagnoses or Management Options  Acute blood loss anemia: new and requires workup  Melena: new and requires workup  Diagnosis management comments:  82-year-old female presents with acute melena and anemia  Interestingly she recently underwent EGD and colonoscopy which were unremarkable however melena began shortly thereafter  Significant hemoglobin drop from 13 1-9 0 within 3-4 weeks  Will be admitted overnight for serial hemoglobins and potential transfusion as needed       Amount and/or Complexity of Data Reviewed  Clinical lab tests: ordered and reviewed  Tests in the radiology section of CPT®: ordered and reviewed  Tests in the medicine section of CPT®: ordered and reviewed  Review and summarize past medical records: yes  Discuss the patient with other providers: yes  Independent visualization of images, tracings, or specimens: yes        Disposition  Final diagnoses:   Acute blood loss anemia   Melena     Time reflects when diagnosis was documented in both MDM as applicable and the Disposition within this note     Time User Action Codes Description Comment    12/28/2020  6:49 PM Emily Neff [[id number]] Acute blood loss anemia     12/28/2020  6:49 PM Emily Neff [[id number]] Melena       ED Disposition     ED Disposition Condition Date/Time Comment    Admit Stable Mon Dec 28, 2020  7:12 PM Case was discussed with Dr Nancy Whelan and the patient's admission status was agreed to be Admission Status: inpatient status to the service of Dr Mumtaz Whitmore   Follow-up Information    None         Patient's Medications   Discharge Prescriptions    No medications on file     No discharge procedures on file      PDMP Review     None          ED Provider  Electronically Signed by           Hemanth Camargo PA-C  12/28/20 6424

## 2020-12-29 LAB
ANION GAP SERPL CALCULATED.3IONS-SCNC: 6 MMOL/L (ref 4–13)
BUN SERPL-MCNC: 20 MG/DL (ref 5–25)
CALCIUM SERPL-MCNC: 8.7 MG/DL (ref 8.3–10.1)
CHLORIDE SERPL-SCNC: 109 MMOL/L (ref 100–108)
CO2 SERPL-SCNC: 26 MMOL/L (ref 21–32)
CREAT SERPL-MCNC: 0.7 MG/DL (ref 0.6–1.3)
FERRITIN SERPL-MCNC: 126 NG/ML (ref 8–388)
GFR SERPL CREATININE-BSD FRML MDRD: 80 ML/MIN/1.73SQ M
GLUCOSE SERPL-MCNC: 90 MG/DL (ref 65–140)
HGB BLD-MCNC: 7.6 G/DL (ref 11.5–15.4)
HGB BLD-MCNC: 8.2 G/DL (ref 11.5–15.4)
HGB BLD-MCNC: 9.5 G/DL (ref 11.5–15.4)
IRON SATN MFR SERPL: 37 %
IRON SERPL-MCNC: 100 UG/DL (ref 50–170)
POTASSIUM SERPL-SCNC: 3.5 MMOL/L (ref 3.5–5.3)
SODIUM SERPL-SCNC: 141 MMOL/L (ref 136–145)
TIBC SERPL-MCNC: 273 UG/DL (ref 250–450)

## 2020-12-29 PROCEDURE — P9016 RBC LEUKOCYTES REDUCED: HCPCS

## 2020-12-29 PROCEDURE — 86902 BLOOD TYPE ANTIGEN DONOR EA: CPT

## 2020-12-29 PROCEDURE — 30233N1 TRANSFUSION OF NONAUTOLOGOUS RED BLOOD CELLS INTO PERIPHERAL VEIN, PERCUTANEOUS APPROACH: ICD-10-PCS | Performed by: INTERNAL MEDICINE

## 2020-12-29 PROCEDURE — 99223 1ST HOSP IP/OBS HIGH 75: CPT | Performed by: INTERNAL MEDICINE

## 2020-12-29 PROCEDURE — 85018 HEMOGLOBIN: CPT | Performed by: NURSE PRACTITIONER

## 2020-12-29 PROCEDURE — 85018 HEMOGLOBIN: CPT | Performed by: INTERNAL MEDICINE

## 2020-12-29 PROCEDURE — 80048 BASIC METABOLIC PNL TOTAL CA: CPT | Performed by: INTERNAL MEDICINE

## 2020-12-29 PROCEDURE — C9113 INJ PANTOPRAZOLE SODIUM, VIA: HCPCS | Performed by: INTERNAL MEDICINE

## 2020-12-29 RX ADMIN — GLYCERIN, HYPROMELLOSE, POLYETHYLENE GLYCOL 1 DROP: .2; .2; 1 LIQUID OPHTHALMIC at 09:16

## 2020-12-29 RX ADMIN — PANTOPRAZOLE SODIUM 8 MG/HR: 40 INJECTION, POWDER, FOR SOLUTION INTRAVENOUS at 04:26

## 2020-12-29 RX ADMIN — PANTOPRAZOLE SODIUM 8 MG/HR: 40 INJECTION, POWDER, FOR SOLUTION INTRAVENOUS at 15:50

## 2020-12-29 RX ADMIN — LEVOTHYROXINE SODIUM 112 MCG: 112 TABLET ORAL at 05:58

## 2020-12-29 RX ADMIN — CHOLESTYRAMINE 4 G: 4 POWDER, FOR SUSPENSION ORAL at 09:07

## 2020-12-29 RX ADMIN — GLYCERIN, HYPROMELLOSE, POLYETHYLENE GLYCOL 1 DROP: .2; .2; 1 LIQUID OPHTHALMIC at 20:25

## 2020-12-29 RX ADMIN — SODIUM CHLORIDE 75 ML/HR: 0.9 INJECTION, SOLUTION INTRAVENOUS at 05:58

## 2020-12-29 RX ADMIN — Medication 3000 UNITS: at 09:06

## 2020-12-29 NOTE — ASSESSMENT & PLAN NOTE
· POA a/e/b hemoglobin of 9 4 on presentation( baseline is normal) due to most likely upper gastrointestinal bleed   · Recent EGD/colonoscopy for chronic diarrhea, according to documentation in chart, unrevealing  · EGD 12/024: normal duodenum, Medium sliding hiatal hernia   · Colonoscopy 12/024: normal appearing   · Had biopsies taken   · Was started on Lomotil and cholestyramine post procedure for diarrhea with improvement in frequency     · 12/28 CT A/P negative for acute findings   Showing Colonic diverticulosis with no diverticulitis, no inflammatory changes  · Continue Q6H HGB monitoring  · Hgb 7 6 this AM, will Transfuse 1 PRBC for goal > 8 0  · GI consult pending

## 2020-12-29 NOTE — PLAN OF CARE
Problem: Potential for Falls  Goal: Patient will remain free of falls  Description: INTERVENTIONS:  - Assess patient frequently for physical needs  -  Identify cognitive and physical deficits and behaviors that affect risk of falls    -  [place] fall precautions as indicated by assessment   - Educate patient/family on patient safety including physical limitations  - Instruct patient to call for assistance with activity based on assessment  - Modify environment to reduce risk of injury  - Consider OT/PT consult to assist with strengthening/mobility  Outcome: Progressing     Problem: PAIN - ADULT  Goal: Verbalizes/displays adequate comfort level or baseline comfort level  Description: Interventions:  - Encourage patient to monitor pain and request assistance  - Assess pain using appropriate pain scale  - Administer analgesics based on type and severity of pain and evaluate response  - Implement non-pharmacological measures as appropriate and evaluate response  - Consider cultural and social influences on pain and pain management  - Notify physician/advanced practitioner if interventions unsuccessful or patient reports new pain  Outcome: Progressing     Problem: INFECTION - ADULT  Goal: Absence or prevention of progression during hospitalization  Description: INTERVENTIONS:  - Assess and monitor for signs and symptoms of infection  - Monitor lab/diagnostic results  - Monitor all insertion sites, i e  indwelling lines, tubes, and drains  - Monitor endotracheal if appropriate and nasal secretions for changes in amount and color  - [place] appropriate cooling/warming therapies per order  - Administer medications as ordered  - Instruct and encourage patient and family to use good hand hygiene technique  - Identify and instruct in appropriate isolation precautions for identified infection/condition  Outcome: Progressing  Goal: Absence of fever/infection during neutropenic period  Description: INTERVENTIONS:  - Monitor WBC    Outcome: Progressing     Problem: SAFETY ADULT  Goal: Patient will remain free of falls  Description: INTERVENTIONS:  - Assess patient frequently for physical needs  -  Identify cognitive and physical deficits and behaviors that affect risk of falls    -  [place] fall precautions as indicated by assessment   - Educate patient/family on patient safety including physical limitations  - Instruct patient to call for assistance with activity based on assessment  - Modify environment to reduce risk of injury  - Consider OT/PT consult to assist with strengthening/mobility  Outcome: Progressing  Goal: Maintain or return to baseline ADL function  Description: INTERVENTIONS:  -  Assess patient's ability to carry out ADLs; assess patient's baseline for ADL function and identify physical deficits which impact ability to perform ADLs (bathing, care of mouth/teeth, toileting, grooming, dressing, etc )  - Assess/evaluate cause of self-care deficits   - Assess range of motion  - Assess patient's mobility; develop plan if impaired  - Assess patient's need for assistive devices and provide as appropriate  - Encourage maximum independence but intervene and supervise when necessary  - Involve family in performance of ADLs  - Assess for home care needs following discharge   - Consider OT consult to assist with ADL evaluation and planning for discharge  - Provide patient education as appropriate  Outcome: Progressing  Goal: Maintain or return mobility status to optimal level  Description: INTERVENTIONS:  - Assess patient's baseline mobility status (ambulation, transfers, stairs, etc )    - Identify cognitive and physical deficits and behaviors that affect mobility  - Identify mobility aids required to assist with transfers and/or ambulation (gait belt, sit-to-stand, lift, walker, cane, etc )  - [place] fall precautions as indicated by assessment  - Record patient progress and toleration of activity level on Mobility SBAR; progress patient to next Phase/Stage  - Instruct patient to call for assistance with activity based on assessment  - Consider rehabilitation consult to assist with strengthening/weightbearing, etc   Outcome: Progressing     Problem: DISCHARGE PLANNING  Goal: Discharge to home or other facility with appropriate resources  Description: INTERVENTIONS:  - Identify barriers to discharge w/patient and caregiver  - Arrange for needed discharge resources and transportation as appropriate  - Identify discharge learning needs (meds, wound care, etc )  - Arrange for interpretive services to assist at discharge as needed  - Refer to Case Management Department for coordinating discharge planning if the patient needs post-hospital services based on physician/advanced practitioner order or complex needs related to functional status, cognitive ability, or social support system  Outcome: Progressing     Problem: Knowledge Deficit  Goal: Patient/family/caregiver demonstrates understanding of disease process, treatment plan, medications, and discharge instructions  Description: Complete learning assessment and assess knowledge base    Interventions:  - Provide teaching at level of understanding  - Provide teaching via preferred learning methods  Outcome: Progressing

## 2020-12-29 NOTE — CONSULTS
Consultation - Chito Garcia 82 y o  female MRN: [id number]  Unit/Bed#: -01 Encounter: [id number]    Consults  ASSESSMENT and PLAN    3 82-year-old female who underwent upper and lower endoscopy December 24, 2020 for iron deficiency anemia  A large hiatal hernia with Pinkie Ji lesions was noted which was thought to be the cause of the anemia  Colonoscopy was negative but biopsies raised the question of microscopic colitis  Duodenal biopsies were also slightly abnormal possibly consistent with celiac disease  She has had dark stool on a daily basis since then with a drop in hemoglobin  Jack Arias She was on Coumadin 4-5 days prior to the procedure but it was held for the procedure itself  There was no unusual bleeding at the time of the procedures  Differential diagnosis includes bleeding from a biopsy site I suppose upper would be more likely  Given the dark stool  The other possibility is that this could be an ongoing small-bowel bleed and the procedures themselves have nothing to do with it  I would start with upper endoscopy tomorrow  If it is negative capsule endoscopy versus enteroscopy  I do not think she should go back on anticoagulation until we have an answer  Chief Complaint   Patient presents with    GI Bleeding     pt reports that she had a colonoscopy on Christmas eve due to having diarrhea for a long period of time, and since then has been experiencing rectal bleeding  was sent to the er by her doctor  Physician Requesting Consult: MD LARISSA Mcqueen Pedro Good is a 82y o  year old female who presents with melena  She underwent upper and lower endoscopy by me on June on December 24th  The procedures themselves were uneventful  We noted a good size hiatal hernia with Pinkie Ji lesions took biopsies from the stomach and the small bowel as well as the colon  She said that later that same day she had a black stool    She did not think much of it but it continued over the next several days and she presented to the emergency room hemoglobin having dropped to 9  She was on Coumadin prior to the procedure but it was held for 4-5 days  She has not resumed it  She has no active GI symptoms      Historical Information   Past Medical History:   Diagnosis Date    Cancer ([facility] )     squamous cell - face    Disease of thyroid gland     GERD (gastroesophageal reflux disease)     Heart murmur     Hypercoagulable state ([facility] )     Hyperlipidemia     Hypertension     Microscopic colitis     Osteoporosis     PE (pulmonary thromboembolism) (HCC)     Sjogren's syndrome (HCC)     Urinary tract infection      Past Surgical History:   Procedure Laterality Date    COLONOSCOPY  07/18/2016     grade 2 internal hemorrhoids but otherwise normal, pathology negative for microscopic colitis    HYSTERECTOMY      JOINT REPLACEMENT      KNEE SURGERY      OOPHORECTOMY      SHOULDER SURGERY      right    SPINE SURGERY      TOE SURGERY      TONSILLECTOMY      UPPER GASTROINTESTINAL ENDOSCOPY  01/09/2015     reflux esophagitis, gastritis, duodenitis in the bulb, pathology negative for H pylori, Puckett's, EOE     Social History   Social History     Substance and Sexual Activity   Alcohol Use Never    Frequency: Never    Binge frequency: Never     Social History     Substance and Sexual Activity   Drug Use No     Social History     Tobacco Use   Smoking Status Never Smoker   Smokeless Tobacco Never Used     Family History   Problem Relation Age of Onset    Stroke Father     Ovarian cancer Sister     No Known Problems Son     No Known Problems Son     Ovarian cancer Daughter     No Known Problems Daughter     No Known Problems Daughter     No Known Problems Maternal Aunt     No Known Problems Paternal Aunt     Colon cancer Neg Hx     Colon polyps Neg Hx        Meds/Allergies   Current Facility-Administered Medications   Medication Dose Route Frequency    cholecalciferol (VITAMIN D3) tablet 3,000 Units  3,000 Units Oral Daily    cholestyramine sugar free (QUESTRAN LIGHT) packet 4 g  4 g Oral Daily    diphenoxylate-atropine (LOMOTIL) 2 5-0 025 mg per tablet 1 tablet  1 tablet Oral 4x Daily PRN    glycerin-hypromellose- (ARTIFICIAL TEARS) ophthalmic solution 1 drop  1 drop Both Eyes BID    levothyroxine tablet 112 mcg  112 mcg Oral Early Morning    pantoprazole (PROTONIX) 80 mg in sodium chloride 0 9 % 100 mL infusion  8 mg/hr Intravenous Continuous     Medications Prior to Admission   Medication    Cholecalciferol (VITAMIN D) 2000 UNITS tablet    cycloSPORINE (RESTASIS) 0 05 % ophthalmic emulsion    DHA-EPA-Flaxseed Oil-Vitamin E (THERA TEARS) CAPS    diphenoxylate-atropine (LOMOTIL) 2 5-0 025 mg per tablet    hydrochlorothiazide (HYDRODIURIL) 12 5 mg tablet    hydroxychloroquine (PLAQUENIL) 200 mg tablet    levothyroxine 112 mcg tablet    losartan (COZAAR) 50 mg tablet    Multiple Vitamins-Minerals (OCUVITE PO)    omeprazole (PriLOSEC) 20 mg delayed release capsule    polyvinyl alcohol (LIQUIFILM TEARS) 1 4 % ophthalmic solution    simvastatin (ZOCOR) 20 mg tablet    warfarin (COUMADIN) 1 mg tablet    warfarin (COUMADIN) 4 mg tablet    cholestyramine (QUESTRAN) 4 g packet    denosumab (PROLIA) 60 mg/mL    diclofenac sodium (VOLTAREN) 1 %    metoprolol tartrate (LOPRESSOR) 25 mg tablet    Na Sulfate-K Sulfate-Mg Sulf (Suprep Bowel Prep Kit) 17 5-3 13-1 6 GM/177ML SOLN       Allergies   Allergen Reactions    Sulfa Antibiotics Hives       PHYSICALEXAM  Blood pressure 143/60, pulse 85, temperature 98 4 °F (36 9 °C), resp  rate 18, height 5' 3" (1 6 m), weight 66 9 kg (147 lb 7 8 oz), SpO2 95 %, not currently breastfeeding  Body mass index is 26 13 kg/m²  General Appearance: NAD, cooperative, alert  Eyes: Anicteric, PERRLA, EOMI  ENT:  Normocephalic, atraumatic, normal mucosa      Neck:  Supple, symmetrical, trachea midline  Cardiovascular pulmonary and abdominal exam unremarkable per the hospitalist exam   Rectal: Deferred  Musculoskeletal: No cyanosis, clubbing or edema  Normal ROM  Skin:  No jaundice, rashes, or lesions   Heme/Lymph: No palpable cervical lymphadenopathy  Psych: Normal affect, good eye contact  Neuro: No gross deficits, AAOx3    Lab Results   Component Value Date    GLUCOSE 150 (H) 01/06/2019    CALCIUM 8 7 12/29/2020     01/25/2015    K 3 5 12/29/2020    CO2 26 12/29/2020     (H) 12/29/2020    BUN 20 12/29/2020    CREATININE 0 70 12/29/2020     Lab Results   Component Value Date    WBC 7 69 12/28/2020    HGB 7 6 (L) 12/29/2020    HCT 27 6 (L) 12/28/2020     (H) 12/28/2020     12/28/2020     Lab Results   Component Value Date    ALT 54 12/28/2020    AST 31 12/28/2020    ALKPHOS 50 12/28/2020    BILITOT 0 6 01/21/2015     No results found for: AMYLASE  No results found for: LIPASE  Lab Results   Component Value Date    IRON 100 12/28/2020    TIBC 273 12/28/2020    FERRITIN 126 12/28/2020     Lab Results   Component Value Date    INR 1 51 (H) 12/28/2020       Imaging Studies: I have personally reviewed pertinent reports  EKG, Pathology, and Other Studies: I have personally reviewed pertinent reports  REVIEW OF SYSTEMS:    CONSTITUTIONAL: Denies any fever, chills, rigors, and weight loss  HEENT: No earache or tinnitus  Denies hearing loss or visual disturbances  CARDIOVASCULAR: No chest pain or palpitations  RESPIRATORY: Denies any cough, hemoptysis, shortness of breath or dyspnea on exertion  GASTROINTESTINAL: As noted in the History of Present Illness  GENITOURINARY: No problems with urination  Denies any hematuria or dysuria  NEUROLOGIC: No dizziness or vertigo, denies headaches  MUSCULOSKELETAL: Denies any muscle or joint pain  SKIN: Denies skin rashes or itching  ENDOCRINE: Denies excessive thirst  Denies intolerance to heat or cold  PSYCHOSOCIAL: Denies depression or anxiety  Denies any recent memory loss

## 2020-12-29 NOTE — ASSESSMENT & PLAN NOTE
· Is on coumadin for PE, currently held prior to colonoscopy on 12/24 and remains off now with melena   · INR is 1 5  · Will keep her off coumadin at this time as she continues to have melanotic stools and hgb declining requiring transfusion

## 2020-12-29 NOTE — ASSESSMENT & PLAN NOTE
Is on coumadin for PE, currently held prior to colonoscopy on 12/24 and remains off it  INR is 1 5  Will keep her off coumadin at this time as she continues to have melanotic stools

## 2020-12-29 NOTE — ASSESSMENT & PLAN NOTE
POA a/e/b hemoglobin of 9 4 on presentation( baseline is normal) due to most likely upper gastrointestinal bleed   Recent EGD/colonoscopy for chronic diarrhea, according to documentation in chart, unrevealing  · EGD 12/024: normal duodenum, Medium sliding hiatal hernia   · Colonoscopy 12/024: normal appearing   Had biopsies taken   Was started on Lomotil and cholestyramine for diarrhea with improvement

## 2020-12-29 NOTE — ASSESSMENT & PLAN NOTE
POA most likely upper GI ?  Cause   NPO  Protonix gtt  H/H q6hrs   Goal transfusion level is hb < 8  Blood consent taken, in chart   GI consult placed

## 2020-12-29 NOTE — PROGRESS NOTES
Progress Note Nic Valera 1935, 82 y o  female MRN: [id number]    Unit/Bed#: -01 Encounter: [id number]    Primary Care Provider: Erum Mcdermott   Date and time admitted to hospital: 12/28/2020  5:16 PM        * Gastrointestinal hemorrhage with melena  Assessment & Plan  · POA, pt reporting melena, most likely upper GI ? Cause   · Continue NPO  · GI consult pending  · Continue Protonix gtt  · Continue H/H q6hrs, hgb this AM 7 6  · Will transfuse 1 PRBC for goal hgb > 8 (pt at base has normal hgb)  · Blood consent in chart         Acute blood loss anemia  Assessment & Plan  · POA a/e/b hemoglobin of 9 4 on presentation( baseline is normal) due to most likely upper gastrointestinal bleed   · Recent EGD/colonoscopy for chronic diarrhea, according to documentation in chart, unrevealing  · EGD 12/024: normal duodenum, Medium sliding hiatal hernia   · Colonoscopy 12/024: normal appearing   · Had biopsies taken   · Was started on Lomotil and cholestyramine post procedure for diarrhea with improvement in frequency     · 12/28 CT A/P negative for acute findings   Showing Colonic diverticulosis with no diverticulitis, no inflammatory changes  · Continue Q6H HGB monitoring  · Hgb 7 6 this AM, will Transfuse 1 PRBC for goal > 8 0  · GI consult pending      Functional diarrhea  Assessment & Plan  · History of diarrhea s/p 12/24 EDG colonoscopy which was unrevealing   · Started on Lomotil and cholestyramine for diarrhea with improvement (formerly with about 5 episodes/ day and now has just 1)  · Continue Lomotil and cholestyramine while inpatient     Chronic anticoagulation  Assessment & Plan  · Is on coumadin for PE, currently held prior to colonoscopy on 12/24 and remains off now with melena   · INR is 1 5  · Will keep her off coumadin at this time as she continues to have melanotic stools and hgb declining requiring transfusion     Gastroesophageal reflux disease with esophagitis  Assessment & Plan  · On Omeprazole 20 mg bid at home  · Currently on Protonix gtt now with UGIB    Hypothyroidism in adult  Assessment & Plan  Continue home synthroid    Essential hypertension  Assessment & Plan  · Managed at home on HCTZ 12 5 mg daily, metoprolol 25 mg bid and Losartan 50 mg daily   · Home medications held in the setting of UGIB  · SBPs 135-155 mmHg   · Continue to hold home meds today, consider restarting tomorrow if hgb stable      VTE Pharmacologic Prophylaxis:   Pharmacologic: Pharmacologic VTE Prophylaxis contraindicated due to Active GIB  Mechanical VTE Prophylaxis in Place: Yes    Discussions with Specialists or Other Care Team Provider: Dr Vern Bowling    Education and Discussions with patient about current status and plan    Time Spent for Care: 20 minutes  More than 50% of total time spent on counseling and coordination of care as described above  Current Length of Stay: 1 day(s)    Current Patient Status: Inpatient   Certification Statement: The patient will continue to require additional inpatient hospital stay due to [facility], UGIB    Discharge Plan: Pending GI consult and Hgb stability     Code Status: Level 1 - Full Code      Subjective:   Denies pain and SOB, reports 1 BM in the last 24 hrs  No other complaints, wants to go home  Objective:     Vitals:   Temp (24hrs), Av 1 °F (36 7 °C), Min:97 8 °F (36 6 °C), Max:98 4 °F (36 9 °C)    Temp:  [97 8 °F (36 6 °C)-98 4 °F (36 9 °C)] 98 4 °F (36 9 °C)  HR:  [73-85] 85  Resp:  [15-18] 18  BP: (134-171)/(60-78) 143/60  SpO2:  [93 %-98 %] 95 %  Body mass index is 26 13 kg/m²  Input and Output Summary (last 24 hours): Intake/Output Summary (Last 24 hours) at 2020 0951  Last data filed at 2020 0720  Gross per 24 hour   Intake    Output 300 ml   Net -300 ml       Physical Exam:     Physical Exam  Vitals signs and nursing note reviewed     Constitutional:       Comments: Elderly female, OOB in chair on RA during exam   HENT:      Head: Normocephalic and atraumatic  Nose: Nose normal       Mouth/Throat:      Mouth: Mucous membranes are moist       Pharynx: Oropharynx is clear  Eyes:      Extraocular Movements: Extraocular movements intact  Conjunctiva/sclera: Conjunctivae normal       Pupils: Pupils are equal, round, and reactive to light  Neck:      Musculoskeletal: Normal range of motion and neck supple  Cardiovascular:      Rate and Rhythm: Normal rate and regular rhythm  Pulses: Normal pulses  Heart sounds: Normal heart sounds  Comments: HR 82 bpm  Pulmonary:      Effort: Pulmonary effort is normal       Breath sounds: Normal breath sounds  Comments: On RA with SpO2 97%, LS CTA posteriorly and anteriorly on exam  Abdominal:      General: Bowel sounds are normal  There is no distension  Palpations: Abdomen is soft  Tenderness: There is no abdominal tenderness  Musculoskeletal: Normal range of motion  General: No swelling or tenderness  Skin:     General: Skin is warm and dry  Neurological:      General: No focal deficit present  Mental Status: She is oriented to person, place, and time  Mental status is at baseline  Cranial Nerves: No cranial nerve deficit  Psychiatric:         Mood and Affect: Mood normal          Additional Data:     Labs:    Results from last 7 days   Lab Units 12/29/20  0559  12/28/20  1747   WBC Thousand/uL  --   --  7 69   HEMOGLOBIN g/dL 7 6*   < > 9 0*   HEMATOCRIT %  --   --  27 6*   PLATELETS Thousands/uL  --   --  201   NEUTROS PCT %  --   --  54   LYMPHS PCT %  --   --  30   MONOS PCT %  --   --  11   EOS PCT %  --   --  3    < > = values in this interval not displayed       Results from last 7 days   Lab Units 12/29/20  0559 12/28/20  1747   SODIUM mmol/L 141 140   POTASSIUM mmol/L 3 5 4 0   CHLORIDE mmol/L 109* 106   CO2 mmol/L 26 27   BUN mg/dL 20 31*   CREATININE mg/dL 0 70 0 84   ANION GAP mmol/L 6 7   CALCIUM mg/dL 8 7 9 7   ALBUMIN g/dL  -- 3 3*   TOTAL BILIRUBIN mg/dL  --  0 20   ALK PHOS U/L  --  50   ALT U/L  --  54   AST U/L  --  31   GLUCOSE RANDOM mg/dL 90 96     Results from last 7 days   Lab Units 12/28/20  1747   INR  1 51*                       * I Have Reviewed All Lab Data Listed Above  * Additional Pertinent Lab Tests Reviewed: Brad 66 Admission Reviewed    Imaging:    Imaging Reports Reviewed Today Include: 12/28 CT A/P    Recent Cultures (last 7 days):           Last 24 Hours Medication List:   Current Facility-Administered Medications   Medication Dose Route Frequency Provider Last Rate    cholecalciferol  3,000 Units Oral Daily Edith Mcrae MD      cholestyramine sugar free  4 g Oral Daily Edith Mcrae MD      diphenoxylate-atropine  1 tablet Oral 4x Daily PRN Edith Mcrae MD      glycerin-hypromellose-  1 drop Both Eyes BID Edith Mcrae MD      levothyroxine  112 mcg Oral Early Morning Edith Mcrae MD      pantoprozole (PROTONIX) infusion (Continuous)  8 mg/hr Intravenous Continuous Edith Mcrae MD 8 mg/hr (12/29/20 5060)        Today, Patient Was Seen By: CATHERINE Cr    ** Please Note: Dictation voice to text software may have been used in the creation of this document   **

## 2020-12-29 NOTE — CASE MANAGEMENT
LOS: 1, URR score: 13 and Green, Pt is not a 30 day re-admit or Medicare bundle  CM met with pt at bedside and explained role  Pt reports she lives with her spouse Shelby Ramírez in a 2 story home; stays on 1st floor, [address]  Pt uses cane to ambulate  Also has RW, commode and shower chair at home  Pt reports she is completely independent with ADLs  She continues to drive  Pt PCP is Dr Yolanda Ryan  She uses [email or web address] or [employer] order for medications and denies any barriers to obtaining them  Pt has history of HHC through [address]  She denies any past history of STR, MH and D&A treatment  She denies any current discharge needs  Her spouse will transport her home when stable for discharge  CM awaiting PT/OT and will continue to follow  CM reviewed d/c planning process including the following: identifying help at home, patient preference for d/c planning needs, availability of treatment team to discuss questions or concerns patient and/or family may have regarding understanding medications and recognizing signs and symptoms once discharged  CM also encouraged patient to follow up with all recommended appointments after discharge  Patient advised of importance for patient and family to participate in managing patients medical well being

## 2020-12-29 NOTE — UTILIZATION REVIEW
Initial Clinical Review    Admission: Date/Time/Statement:   Admission Orders (From admission, onward)     Ordered        20 191  Inpatient Admission  Once                   Orders Placed This Encounter   Procedures    Inpatient Admission     Standing Status:   Standing     Number of Occurrences:   1     Order Specific Question:   Admitting Physician     Answer:   Mauricio Rodriguez [[id number]]     Order Specific Question:   Level of Care     Answer:   Med Surg [[id number]]     Order Specific Question:   Estimated length of stay     Answer:   More than 2 Midnights     Order Specific Question:   Certification     Answer:   I certify that inpatient services are medically necessary for this patient for a duration of greater than two midnights  See H&P and MD Progress Notes for additional information about the patient's course of treatment  ED Arrival Information     Expected Arrival Acuity Means of Arrival Escorted By Service Admission Type    - 2020 16:57 Emergent Walk-In Family Member General Medicine Emergency    Arrival Complaint    ANEMIA        Chief Complaint   Patient presents with    GI Bleeding     pt reports that she had a colonoscopy on  due to having diarrhea for a long period of time, and since then has been experiencing rectal bleeding  was sent to the er by her doctor  Assessment/Plan: this is a 82year old female from home to ED admitted inpatient due to GIB with melena, suspect upper/acute blood loss anemia  History of PE on coumadin, GERD, hypothyroid, hpt, Sjogren syndrome, recent functional diarrhea work up  Presented due to dark tarry stools with hemoglobin of 9 4,  from 13 3 last week  Dark stools started after colonoscopy done 2020 for diarrhea, coumadin on hold since 2020  On exam stool grossly melanotic  INR 1 51  Bun 31  Creatinine 0 84  H&H   6  Ct abdomen without acute findings  In the ED started on Protonix gtt and IVF    Plan  Is continued Protonix gtt and IVF, serial H&H with transfusion if hgb less than 8  Consult GI      12/29/2020-  1 dark stool  Hemoglobin to 7 6  Protonix gtt conitnues, transfuse PRBC  12/29/2020 per GI- patient had upper and lower endoscopy December 24, 2020 for iron deficiency anemia and found to have large hiatal hernia with [place] lesions  Colonoscopy negative with biopsy showing possible microscopic colitis  Differential diagnosis is bleeding from biopsy site, small bowel bleed  For upper endoscopy tomorrow  Hold coumadin  ED Triage Vitals   Temperature Pulse Respirations Blood Pressure SpO2   12/28/20 1853 12/28/20 1720 12/28/20 1720 12/28/20 1720 12/28/20 1720   98 1 °F (36 7 °C) 80 18 (!) 171/70 98 %      Temp Source Heart Rate Source Patient Position - Orthostatic VS BP Location FiO2 (%)   12/29/20 0053 12/28/20 1720 12/28/20 1720 12/28/20 1720 --   Oral Monitor Lying Left arm       Pain Score       12/29/20 0056       No Pain          Wt Readings from Last 1 Encounters:   12/29/20 66 9 kg (147 lb 7 8 oz)     Additional Vital Signs:   12/29/20 07:20:50  98 4 °F (36 9 °C)  85  18  143/60  88  95 %       12/29/20 00:53:22  97 8 °F (36 6 °C)  79  18  134/78  97  95 %  None (Room air)  Lying   12/29/20 0000    79  17  150/67  96  94 %  None (Room air)  Lying   12/28/20 2300    76  16  152/69  99  94 %  None (Room air)  Lying   12/28/20 2200    73  15  154/67  96  93 %  None (Room air)  Lying   12/28/20 2130    74  15  148/68  98  94 %  None (Room air)  Lying   12/28/20 2030    75  17  156/69  99  96 %  None (Room air)  Lying   12/28/20 1930    76  16  153/69  99  95 %  None (Room air)         Pertinent Labs/Diagnostic Test Results:   12/28/2020 ct abdomen No evidence of acute abnormality in the abdomen or pelvis  No ascites, fluid collection or free air to suggest bowel perforation as questioned    Moderate compression deformity and increased sclerosis of L2 vertebral body, favored to be chronic     12/28/2020 EKG - sinus    Normal ST and T   QTc 441         Results from last 7 days   Lab Units 12/29/20  0559 12/29/20  0030 12/28/20  1747 12/28/20  1146   WBC Thousand/uL  --   --  7 69 10 46*   HEMOGLOBIN g/dL 7 6* 8 2* 9 0* 9 4*   HEMATOCRIT %  --   --  27 6* 29 4*   PLATELETS Thousands/uL  --   --  201 223   NEUTROS ABS Thousands/µL  --   --  4 18 6 42     Results from last 7 days   Lab Units 12/29/20  0559 12/28/20  1747   SODIUM mmol/L 141 140   POTASSIUM mmol/L 3 5 4 0   CHLORIDE mmol/L 109* 106   CO2 mmol/L 26 27   ANION GAP mmol/L 6 7   BUN mg/dL 20 31*   CREATININE mg/dL 0 70 0 84   EGFR ml/min/1 73sq m 80 64   CALCIUM mg/dL 8 7 9 7     Results from last 7 days   Lab Units 12/28/20  1747   AST U/L 31   ALT U/L 54   ALK PHOS U/L 50   TOTAL PROTEIN g/dL 6 3*   ALBUMIN g/dL 3 3*   TOTAL BILIRUBIN mg/dL 0 20   BILIRUBIN DIRECT mg/dL 0 08     Results from last 7 days   Lab Units 12/29/20  0559 12/28/20  1747   GLUCOSE RANDOM mg/dL 90 96     Results from last 7 days   Lab Units 12/28/20  1747 12/28/20  1146   PROTIME seconds 18 2* 18 2*   INR  1 51* 1 51*   PTT seconds 33  --      Results from last 7 days   Lab Units 12/28/20  1747   FERRITIN ng/mL 126       ED Treatment:   Medication Administration from 12/28/2020 1657 to 12/29/2020 0025       Date/Time Order Dose Route Action Comments     12/28/2020 1854 pantoprazole (PROTONIX) 80 mg in sodium chloride 0 9 % 100 mL infusion 8 mg/hr Intravenous New Bag      12/28/2020 2111 glycerin-hypromellose- (ARTIFICIAL TEARS) ophthalmic solution 1 drop 1 drop Both Eyes Given      12/28/2020 2209 sodium chloride 0 9 % infusion 75 mL/hr Intravenous New Bag         Past Medical History:   Diagnosis Date    [facility]     squamous cell - face    Disease of thyroid gland     GERD (gastroesophageal reflux disease)     Heart murmur     Hypercoagulable state ([facility] )     Hyperlipidemia     Hypertension     Microscopic colitis     Osteoporosis     PE [facility]ulmonary throm[facility]embolism) ([facility] )     Sjogren's syndrome ([facility] )     Urinary tract infection      Present on Admission:   Essential hypertension   Hypothyroidism in adult   Gastroesophageal reflux disease with esophagitis   Acute blood loss anemia   Gastrointestinal hemorrhage with melena   Functional diarrhea      Admitting Diagnosis: Melena [K92 1]  Acute blood loss anemia [D62]  GI bleed [K92 2]  Age/Sex: 82 y o  female  Admission Orders: 12/28/2020 1912 inpatient   Scheduled Medications:  cholecalciferol, 3,000 Units, Oral, Daily  cholestyramine sugar free, 4 g, Oral, Daily  glycerin-hypromellose-, 1 drop, Both Eyes, BID  levothyroxine, 112 mcg, Oral, Early Morning      Continuous IV Infusions:  pantoprozole (PROTONIX) infusion (Continuous), 8 mg/hr, Intravenous, Continuous  sodium chloride 0 9 % infusion   Rate: 75 mL/hr Dose: 75 mL/hr  Freq: Continuous Route: IV  Indications of Use: IV Hydration  Last Dose: 75 mL/hr (12/29/20 0558)  Start: 12/28/20 2200 End: 12/29/20 0946    PRN Meds:not used   diphenoxylate-atropine, 1 tablet, Oral, 4x Daily PRN      12/29/2020 transfuse 1 unit leukoreduced RBC  clears  IP CONSULT TO GASTROENTEROLOGY      Network Utilization Review Department  ATTENTION: Please call with any questions or concerns to [phone] and carefully listen to the prompts so that you are directed to the right person  All voicemails are confidential   Briana Alvares all requests for admission clinical reviews, approved or denied determinations and any other requests to dedicated fax number below belonging to the campus where the patient is receiving treatment   List of dedicated fax numbers for the Facilities:  [address] DENIALS (Administrative/Medical Necessity) [phone]   [address] (Maternity/NICU/Pediatrics) [phone]   [address] [place] [address] 4373 [facility] [phone]   [facility] [address] [address] [place] [address] [address] [phone]   [place] [id number] [phone]

## 2020-12-29 NOTE — H&P
H&P- Alfredo Garcia 1935, 82 y o  female MRN: [id number]    Unit/Bed#: ED 11 Encounter: [id number]    Primary Care Provider: Alize Wells   Date and time admitted to hospital: 12/28/2020  5:16 PM        Functional diarrhea  Assessment & Plan  History of diarrhea s/p EDG colonoscopy which was unrevealing   Started on Lomotil and cholestyramine for diarrhea with improvement ( formerly with about 5 episodes/ day and now has just 1)  Continue Lomotil and cholestyramine while inpatient     Gastrointestinal hemorrhage with melena  Assessment & Plan  POA most likely upper GI ? Cause       CT abd/pelvis:Small hiatal hernia  Colonic diverticulosis without evidence       of acute diverticulitis  No acute inflammatory change  No disproportionate     dilation of small or large bowel loops  NPO  Protonix gtt  H/H q6hrs   Goal transfusion level is hb < 8  Blood consent taken, in chart   GI consult placed       Acute blood loss anemia  Assessment & Plan  POA a/e/b hemoglobin of 9 4 on presentation( baseline is normal) due to most likely upper gastrointestinal bleed   Recent EGD/colonoscopy for chronic diarrhea, according to documentation in chart, unrevealing  · EGD 12/024: normal duodenum, Medium sliding hiatal hernia   · Colonoscopy 12/024: normal appearing   Had biopsies taken   Was started on Lomotil and cholestyramine for diarrhea with improvement       Chronic anticoagulation  Assessment & Plan  Is on coumadin for PE, currently held prior to colonoscopy on 12/24 and remains off it  Will keep her off coumadin at this time as she continues to have melanotic stools      Gastroesophageal reflux disease with esophagitis  Assessment & Plan  On Omeprazole 20 mg bid at home  Currently on Protonix gtt     Hypothyroidism in adult  Assessment & Plan  Continue synthroid    Essential hypertension  Assessment & Plan  Managed on HCTZ 12 5 mg daily, metoprolol 25 mg bid and Losartan 50 mg daily             VTE Prophylaxis: Pharmacologic VTE Prophylaxis contraindicated due to GI bleed  / foot pump applied   Code Status: Full  POLST: There is no POLST form on file for this patient (pre-hospital)  Discussion with family: No declined calling family     Anticipated Length of Stay:  Patient will be admitted on an Inpatient basis with an anticipated length of stay of  Greater than 2 midnights  Justification for Hospital Stay: GI bleed    Total Time for Visit, including Counseling / Coordination of Care: 45 minutes  Greater than 50% of this total time spent on direct patient counseling and coordination of care  Chief Complaint:   Melena stool    History of Present Illness:    Citlali Chinchilla is a 82 y o  female who presents with melena stool, malaise  PMH includes Sjogren syndrome, hypertension, hyperlipidemia and prior PE on Coumadin  Patient states she has had chronic diarrhea for about 3 months seen by GI, underwent a EGD/colonoscopy on 12/24 which was unremarkable  She had held her Coumadin for 5 days leading up to this and has not resumed taking it  She was started on Cholestyramine and lomotil which have given her improvement  Shortly after her procedure she noted melena stool  She denies any fevers or chills, denies shortness of breath, chest pain, abdominal pain or distention  Review of Systems:    Review of Systems   Constitutional: Positive for fatigue  Negative for activity change, appetite change, chills, diaphoresis and fever  Respiratory: Negative for cough, chest tightness, shortness of breath and wheezing  Cardiovascular: Negative for chest pain, palpitations and leg swelling  Gastrointestinal: Positive for blood in stool and diarrhea  Negative for abdominal pain, constipation, nausea, rectal pain and vomiting  Genitourinary: Negative for dysuria, flank pain, frequency, hematuria and urgency  Neurological: Negative for dizziness, syncope, light-headedness, numbness and headaches         Past Medical and Surgical History:     Past Medical History:   Diagnosis Date    Cancer ([place] [address] )     squamous cell - face    Disease of thyroid gland     GERD (gastroesophageal reflux disease)     Heart murmur     Hypercoagulable state ([place] [address] )     Hyperlipidemia     Hypertension     Microscopic colitis     Osteoporosis     PE (pulmonary thromboembolism) ([facility])     Sjogren's syndrome ([place] [address] )     Urinary tract infection        Past Surgical History:   Procedure Laterality Date    COLONOSCOPY  07/18/2016     grade 2 internal hemorrhoids but otherwise normal, pathology negative for microscopic colitis    HYSTERECTOMY      JOINT REPLACEMENT      KNEE SURGERY      OOPHORECTOMY      SHOULDER SURGERY      right    SPINE SURGERY      TOE SURGERY      TONSILLECTOMY      UPPER GASTROINTESTINAL ENDOSCOPY  01/09/2015     reflux esophagitis, gastritis, duodenitis in the bulb, pathology negative for H pylori, Puckett's, EOE       Meds/Allergies:    Prior to Admission medications    Medication Sig Start Date End Date Taking? Authorizing Provider   Cholecalciferol (VITAMIN D) 2000 UNITS tablet Take 3,000 Units by mouth daily      Historical Provider, MD   cholestyramine (QUESTRAN) 4 g packet Take 1 packet (4 g total) by mouth daily 1 hr after or 4 hrs before any other medication 12/14/20   CATHERINE Hines   cycloSPORINE (RESTASIS) 0 05 % ophthalmic emulsion Administer 1 drop to both eyes 2 (two) times a day  Historical Provider, MD   denosumab (PROLIA) 60 mg/mL Inject 60 mg under the skin every 6 (six) months    Historical Provider, MD   DHA-EPA-Flaxseed Oil-Vitamin E (THERA TEARS) CAPS Take 4 capsules by mouth daily      Historical Provider, MD   diclofenac sodium (VOLTAREN) 1 % Apply 2 g topically 4 (four) times a day 6/17/20   Dexter Hdez MD   diphenoxylate-atropine (LOMOTIL) 2 5-0 025 mg per tablet Take 1 tablet by mouth 4 (four) times a day as needed for diarrhea 12/1/20   Tish MUSA CATHERINE Garcia   hydrochlorothiazide (HYDRODIURIL) 12 5 mg tablet TAKE 1 TABLET BY MOUTH EVERY DAY 12/23/19   Kimber Duarte DO   hydroxychloroquine (PLAQUENIL) 200 mg tablet Take 200 mg by mouth daily  Historical Provider, MD   levothyroxine 112 mcg tablet 1 TABLET MONDAY THROUGH SATURDAY AND 2 TABLETS ON SUNDAY 12/7/20   Kimber Duarte DO   losartan (COZAAR) 50 mg tablet Take 1 tablet (50 mg total) by mouth daily 2/17/20   CATHERINE Salinas   metoprolol tartrate (LOPRESSOR) 25 mg tablet Take 1 tablet (25 mg total) by mouth every 12 (twelve) hours for 30 days 1/7/19 12/24/20  Stacy Bowman MD   Multiple Vitamins-Minerals (OCUVITE PO) Take 1 tablet by mouth daily  Historical Provider, MD   Na Sulfate-K Sulfate-Mg Sulf (Suprep Bowel Prep Kit) 17 5-3 13-1 6 GM/177ML SOLN As directed (1 kit) 12/18/20   CATHERINE Saleh   omeprazole (PriLOSEC) 20 mg delayed release capsule Take 20 mg by mouth 2 (two) times a day  Historical Provider, MD   polyvinyl alcohol (LIQUIFILM TEARS) 1 4 % ophthalmic solution Administer 1 drop to both eyes as needed for dry eyes    Historical Provider, MD   simvastatin (ZOCOR) 20 mg tablet Take 20 mg by mouth daily at bedtime  Historical Provider, MD   warfarin (COUMADIN) 1 mg tablet  7/20/20   Historical Provider, MD   warfarin (COUMADIN) 4 mg tablet  7/21/20   Historical Provider, MD     I have reviewed home medications using allscripts  Allergies:    Allergies   Allergen Reactions    Sulfa Antibiotics Hives       Social History:     Marital Status: /Civil Union   Occupation:   Patient Pre-hospital Living Situation:   Patient Pre-hospital Level of Mobility:   Patient Pre-hospital Diet Restrictions:   Substance Use History:   Social History     Substance and Sexual Activity   Alcohol Use No    Frequency: Never    Binge frequency: Never     Social History     Tobacco Use   Smoking Status Never Smoker   Smokeless Tobacco Never Used     Social History     Substance and Sexual Activity   Drug Use No       Family History:    Family History   Problem Relation Age of Onset    Stroke Father     Ovarian cancer Sister     No Known Problems Son     No Known Problems Son     Ovarian cancer Daughter     No Known Problems Daughter     No Known Problems Daughter     No Known Problems Maternal Aunt     No Known Problems Paternal Aunt     Colon cancer Neg Hx     Colon polyps Neg Hx        Physical Exam:     Vitals:   Blood Pressure: 148/68 (12/28/20 2130)  Pulse: 74 (12/28/20 2130)  Temperature: 98 1 °F (36 7 °C) (12/28/20 1853)  Respirations: 15 (12/28/20 2130)  Height: 5' 3" (160 cm) (12/28/20 1720)  Weight - Scale: 64 4 kg (142 lb) (12/28/20 1720)  SpO2: 94 % (12/28/20 2130)    Physical Exam  Vitals signs reviewed  Constitutional:       General: She is not in acute distress  Appearance: Normal appearance  She is not ill-appearing, toxic-appearing or diaphoretic  HENT:      Head: Normocephalic  Eyes:      Extraocular Movements: Extraocular movements intact  Pupils: Pupils are equal, round, and reactive to light  Neck:      Musculoskeletal: Normal range of motion and neck supple  No neck rigidity or muscular tenderness  Vascular: No carotid bruit  Cardiovascular:      Rate and Rhythm: Normal rate and regular rhythm  Pulses: Normal pulses  Heart sounds: Murmur present  No friction rub  Pulmonary:      Effort: Pulmonary effort is normal       Breath sounds: Normal breath sounds  No stridor  No wheezing, rhonchi or rales  Chest:      Chest wall: No tenderness  Abdominal:      General: Abdomen is flat  Bowel sounds are normal  There is no distension  Palpations: Abdomen is soft  Tenderness: There is no abdominal tenderness  There is no guarding or rebound  Musculoskeletal: Normal range of motion  General: No swelling or tenderness  Right lower leg: No edema  Left lower leg: No edema  Skin:     General: Skin is warm and dry  Coloration: Skin is not jaundiced  Neurological:      General: No focal deficit present  Mental Status: She is alert and oriented to person, place, and time  Cranial Nerves: No cranial nerve deficit  Sensory: No sensory deficit  Additional Data:     Lab Results: I have personally reviewed pertinent films in PACS    Results from last 7 days   Lab Units 12/28/20  1747   WBC Thousand/uL 7 69   HEMOGLOBIN g/dL 9 0*   HEMATOCRIT % 27 6*   PLATELETS Thousands/uL 201   NEUTROS PCT % 54   LYMPHS PCT % 30   MONOS PCT % 11   EOS PCT % 3     Results from last 7 days   Lab Units 12/28/20  1747   SODIUM mmol/L 140   POTASSIUM mmol/L 4 0   CHLORIDE mmol/L 106   CO2 mmol/L 27   BUN mg/dL 31*   CREATININE mg/dL 0 84   ANION GAP mmol/L 7   CALCIUM mg/dL 9 7   ALBUMIN g/dL 3 3*   TOTAL BILIRUBIN mg/dL 0 20   ALK PHOS U/L 50   ALT U/L 54   AST U/L 31   GLUCOSE RANDOM mg/dL 96     Results from last 7 days   Lab Units 12/28/20  1747   INR  1 51*                   Imaging: I have personally reviewed pertinent reports  CT abdomen pelvis with contrast   Final Result by Adalgisa Stanley MD (12/28 1839)      No evidence of acute abnormality in the abdomen or pelvis  No ascites, fluid collection or free air to suggest bowel perforation as questioned  Moderate compression deformity and increased sclerosis of L2 vertebral body, favored to be chronic  Additional chronic findings as described  Workstation performed: [id number]             EKG, Pathology, and Other Studies Reviewed on Admission:   · EKG:     Allscripts / Epic Records Reviewed: Yes     ** Please Note: This note has been constructed using a voice recognition system   **

## 2020-12-29 NOTE — ASSESSMENT & PLAN NOTE
· Managed at home on HCTZ 12 5 mg daily, metoprolol 25 mg bid and Losartan 50 mg daily   · Home medications held in the setting of UGIB  · SBPs 135-155 mmHg   · Continue to hold home meds today, consider restarting tomorrow if hgb stable

## 2020-12-29 NOTE — ASSESSMENT & PLAN NOTE
· POA, pt reporting melena, most likely upper GI ?  Cause   · Continue NPO  · GI consult pending  · Continue Protonix gtt  · Continue H/H q6hrs, hgb this AM 7 6  · Will transfuse 1 PRBC for goal hgb > 8 (pt at base has normal hgb)  · Blood consent in chart

## 2020-12-29 NOTE — PLAN OF CARE
Problem: Potential for Falls  Goal: Patient will remain free of falls  Description: INTERVENTIONS:  - Assess patient frequently for physical needs  -  Identify cognitive and physical deficits and behaviors that affect risk of falls    -  [place] fall precautions as indicated by assessment   - Educate patient/family on patient safety including physical limitations  - Instruct patient to call for assistance with activity based on assessment  - Modify environment to reduce risk of injury  - Consider OT/PT consult to assist with strengthening/mobility  Outcome: Progressing

## 2020-12-29 NOTE — ASSESSMENT & PLAN NOTE
· History of diarrhea s/p 12/24 EDG colonoscopy which was unrevealing   · Started on Lomotil and cholestyramine for diarrhea with improvement (formerly with about 5 episodes/ day and now has just 1)  · Continue Lomotil and cholestyramine while inpatient

## 2020-12-29 NOTE — ASSESSMENT & PLAN NOTE
History of diarrhea s/p EDG colonoscopy which was unrevealing   Started on Lomotil and cholestyramine for diarrhea with improvement ( formerly with about 5 episodes/ day and now has just 1)  Continue Lomotil and cholestyramine while inpatient

## 2020-12-29 NOTE — UTILIZATION REVIEW
Notification of Inpatient Admission/Inpatient Authorization Request   This is a Notification of Inpatient Admission for [address]  Be advised that this patient was admitted to our facility under Inpatient Status  Contact Otilia Porter at [phone] for additional admission information  [address] [facility] DEPT  DEDICATED -[phone]  Patient Name:   Aliyah Mayes   YOB: 1935       [address]   [address]:   [address]  Tax ID: [id number]  NPI: [id number] Attending Provider/NPI:  Phone:  Address: Sofia Jacques Md [[id number]]  [phone]  SAME AS FACILITY   Place of Service Code: 24 Place of Service Name:  [address] [place] [address]   Start Date: 12/28/20 1912 Discharge Date & Time: No discharge date for patient encounter  Type of Admission: Inpatient Status Discharge Disposition (if discharged): Home/Self Care   Patient Diagnoses: Melena [K92 1]  Acute blood loss anemia [D62]  GI bleed [K92 2]     Orders: Admission Orders (From admission, onward)     Ordered        12/28/20 1912  Inpatient Admission  Once                    Assigned Utilization Review Contact: [facility]  Best  Utilization   Network Utilization Review Department  Phone: [phone]; Fax [phone]  Email: [email or web address]  [email or web address]   ATTENTION PAYERS: Please call the assigned Utilization  directly with any questions or concerns ALL voicemails in the department are confidential  Send all requests for admission clinical reviews, approved or denied determinations and any other requests to dedicated fax number belonging to the campus where the patient is receiving treatment

## 2020-12-30 ENCOUNTER — ANESTHESIA (INPATIENT)
Dept: GASTROENTEROLOGY | Facility: HOSPITAL | Age: 85
DRG: 378 | End: 2020-12-30
Payer: COMMERCIAL

## 2020-12-30 ENCOUNTER — ANESTHESIA EVENT (INPATIENT)
Dept: GASTROENTEROLOGY | Facility: HOSPITAL | Age: 85
DRG: 378 | End: 2020-12-30
Payer: COMMERCIAL

## 2020-12-30 ENCOUNTER — APPOINTMENT (INPATIENT)
Dept: GASTROENTEROLOGY | Facility: HOSPITAL | Age: 85
DRG: 378 | End: 2020-12-30
Attending: INTERNAL MEDICINE
Payer: COMMERCIAL

## 2020-12-30 VITALS
BODY MASS INDEX: 26.13 KG/M2 | TEMPERATURE: 97.6 F | WEIGHT: 147.49 LBS | SYSTOLIC BLOOD PRESSURE: 142 MMHG | DIASTOLIC BLOOD PRESSURE: 76 MMHG | RESPIRATION RATE: 16 BRPM | HEART RATE: 86 BPM | HEIGHT: 63 IN | OXYGEN SATURATION: 96 %

## 2020-12-30 VITALS — HEART RATE: 89 BPM

## 2020-12-30 LAB
ABO GROUP BLD BPU: NORMAL
ANION GAP SERPL CALCULATED.3IONS-SCNC: 9 MMOL/L (ref 4–13)
ATRIAL RATE: 77 BPM
BPU ID: NORMAL
BUN SERPL-MCNC: 12 MG/DL (ref 5–25)
CALCIUM SERPL-MCNC: 9.6 MG/DL (ref 8.3–10.1)
CHLORIDE SERPL-SCNC: 108 MMOL/L (ref 100–108)
CO2 SERPL-SCNC: 26 MMOL/L (ref 21–32)
CREAT SERPL-MCNC: 0.73 MG/DL (ref 0.6–1.3)
CROSSMATCH: NORMAL
ERYTHROCYTE [DISTWIDTH] IN BLOOD BY AUTOMATED COUNT: 13.5 % (ref 11.6–15.1)
GFR SERPL CREATININE-BSD FRML MDRD: 76 ML/MIN/1.73SQ M
GLUCOSE SERPL-MCNC: 92 MG/DL (ref 65–140)
HCT VFR BLD AUTO: 29.8 % (ref 34.8–46.1)
HGB BLD-MCNC: 10.2 G/DL (ref 11.5–15.4)
HGB BLD-MCNC: 9.2 G/DL (ref 11.5–15.4)
HGB BLD-MCNC: 9.7 G/DL (ref 11.5–15.4)
MAGNESIUM SERPL-MCNC: 1.8 MG/DL (ref 1.6–2.6)
MCH RBC QN AUTO: 32.3 PG (ref 26.8–34.3)
MCHC RBC AUTO-ENTMCNC: 32.6 G/DL (ref 31.4–37.4)
MCV RBC AUTO: 99 FL (ref 82–98)
P AXIS: 45 DEGREES
PHOSPHATE SERPL-MCNC: 3.5 MG/DL (ref 2.3–4.1)
PLATELET # BLD AUTO: 185 THOUSANDS/UL (ref 149–390)
PMV BLD AUTO: 11.4 FL (ref 8.9–12.7)
POTASSIUM SERPL-SCNC: 4 MMOL/L (ref 3.5–5.3)
PR INTERVAL: 148 MS
QRS AXIS: 26 DEGREES
QRSD INTERVAL: 82 MS
QT INTERVAL: 390 MS
QTC INTERVAL: 441 MS
RBC # BLD AUTO: 3 MILLION/UL (ref 3.81–5.12)
SODIUM SERPL-SCNC: 143 MMOL/L (ref 136–145)
T WAVE AXIS: 15 DEGREES
UNIT DISPENSE STATUS: NORMAL
UNIT PRODUCT CODE: NORMAL
UNIT RH: NORMAL
VENTRICULAR RATE: 77 BPM
WBC # BLD AUTO: 7.25 THOUSAND/UL (ref 4.31–10.16)

## 2020-12-30 PROCEDURE — 85018 HEMOGLOBIN: CPT | Performed by: NURSE PRACTITIONER

## 2020-12-30 PROCEDURE — 0W3P8ZZ CONTROL BLEEDING IN GASTROINTESTINAL TRACT, VIA NATURAL OR ARTIFICIAL OPENING ENDOSCOPIC: ICD-10-PCS | Performed by: INTERNAL MEDICINE

## 2020-12-30 PROCEDURE — 84100 ASSAY OF PHOSPHORUS: CPT | Performed by: INTERNAL MEDICINE

## 2020-12-30 PROCEDURE — 85027 COMPLETE CBC AUTOMATED: CPT | Performed by: INTERNAL MEDICINE

## 2020-12-30 PROCEDURE — C9113 INJ PANTOPRAZOLE SODIUM, VIA: HCPCS | Performed by: INTERNAL MEDICINE

## 2020-12-30 PROCEDURE — 3E0H8GC INTRODUCTION OF OTHER THERAPEUTIC SUBSTANCE INTO LOWER GI, VIA NATURAL OR ARTIFICIAL OPENING ENDOSCOPIC: ICD-10-PCS | Performed by: INTERNAL MEDICINE

## 2020-12-30 PROCEDURE — 99239 HOSP IP/OBS DSCHRG MGMT >30: CPT | Performed by: INTERNAL MEDICINE

## 2020-12-30 PROCEDURE — 93010 ELECTROCARDIOGRAM REPORT: CPT | Performed by: INTERNAL MEDICINE

## 2020-12-30 PROCEDURE — 43255 EGD CONTROL BLEEDING ANY: CPT | Performed by: INTERNAL MEDICINE

## 2020-12-30 PROCEDURE — 80048 BASIC METABOLIC PNL TOTAL CA: CPT | Performed by: INTERNAL MEDICINE

## 2020-12-30 PROCEDURE — 83735 ASSAY OF MAGNESIUM: CPT | Performed by: INTERNAL MEDICINE

## 2020-12-30 RX ORDER — LABETALOL 20 MG/4 ML (5 MG/ML) INTRAVENOUS SYRINGE
5
Status: DISCONTINUED | OUTPATIENT
Start: 2020-12-30 | End: 2020-12-30 | Stop reason: HOSPADM

## 2020-12-30 RX ORDER — PROPOFOL 10 MG/ML
INJECTION, EMULSION INTRAVENOUS AS NEEDED
Status: DISCONTINUED | OUTPATIENT
Start: 2020-12-30 | End: 2020-12-30

## 2020-12-30 RX ORDER — PANTOPRAZOLE SODIUM 40 MG/1
40 TABLET, DELAYED RELEASE ORAL
Status: DISCONTINUED | OUTPATIENT
Start: 2020-12-30 | End: 2020-12-30 | Stop reason: HOSPADM

## 2020-12-30 RX ORDER — EPINEPHRINE 1 MG/ML
INJECTION, SOLUTION, CONCENTRATE INTRAVENOUS CODE/TRAUMA/SEDATION MEDICATION
Status: COMPLETED | OUTPATIENT
Start: 2020-12-30 | End: 2020-12-30

## 2020-12-30 RX ORDER — SODIUM CHLORIDE 9 MG/ML
INJECTION, SOLUTION INTRAVENOUS CONTINUOUS PRN
Status: DISCONTINUED | OUTPATIENT
Start: 2020-12-30 | End: 2020-12-30

## 2020-12-30 RX ORDER — PANTOPRAZOLE SODIUM 40 MG/1
40 TABLET, DELAYED RELEASE ORAL
Qty: 60 TABLET | Refills: 0 | Status: SHIPPED | OUTPATIENT
Start: 2020-12-30 | End: 2021-07-21

## 2020-12-30 RX ADMIN — LABETALOL HYDROCHLORIDE 5 MG: 5 INJECTION, SOLUTION INTRAVENOUS at 11:32

## 2020-12-30 RX ADMIN — EPINEPHRINE 0.05 MG: 1 INJECTION, SOLUTION, CONCENTRATE INTRAVENOUS at 10:35

## 2020-12-30 RX ADMIN — PANTOPRAZOLE SODIUM 8 MG/HR: 40 INJECTION, POWDER, FOR SOLUTION INTRAVENOUS at 03:29

## 2020-12-30 RX ADMIN — PROPOFOL 70 MG: 10 INJECTION, EMULSION INTRAVENOUS at 10:29

## 2020-12-30 RX ADMIN — SODIUM CHLORIDE: 9 INJECTION, SOLUTION INTRAVENOUS at 10:25

## 2020-12-30 RX ADMIN — LEVOTHYROXINE SODIUM 112 MCG: 112 TABLET ORAL at 05:43

## 2020-12-30 RX ADMIN — PROPOFOL 30 MG: 10 INJECTION, EMULSION INTRAVENOUS at 10:32

## 2020-12-30 RX ADMIN — PROPOFOL 30 MG: 10 INJECTION, EMULSION INTRAVENOUS at 10:36

## 2020-12-30 NOTE — DISCHARGE INSTR - AVS FIRST PAGE
Dear Duane Musca,     It was our pleasure to care for you here at [facility], [address] [place] [address]  It is our hope that we were always able to exceed the expected standards for your care during your stay  You were hospitalized due to gastrointestinal bleed  You had an endoscopy which revealed the source of the bleed and was treated  You are being discharged in stable condition  You were cared for on the medicne floor by Rita Gamez MD with the [facility] who covers for your primary care physician (PCP), Kaz Andino, while you were hospitalized  If you have any questions or concerns related to this hospitalization, you may contact us at [phone]  For follow up as well as any medication refills, we recommend that you follow up with your primary care physician  A registered nurse will reach out to you by phone within a few days after your discharge to answer any additional questions that you may have after going home  However, at this time we provide for you here, the most important instructions / recommendations at discharge:     · Notable Medication Adjustments -   · Protonix 40 mg bid for 1 month   · Coumadin remains on hold till you get a repeat blood test showing stable hemoglobin in 1 week  Please have test done on 01/06/2021    · Testing Required after Discharge -   · CBC on 01/06/2021  · Important follow up information -   · PCP in 1 week  Please discuss restarting your coumadin   · Other Instructions -   ·   · Please review this entire after visit summary as additional general instructions including medication list, appointments, activity, diet, any pertinent wound care, and other additional recommendations from your care team that may be provided for you        Sincerely,     Rita Gamez MD

## 2020-12-30 NOTE — ASSESSMENT & PLAN NOTE
POA, pt reporting melena, iatrogenic due oozing at duodenal biopsy site, injected with epinephrine and treated with clip, resolved  Stable hb prior to d/c  Recheck cbc in 1 week   Resume coumadin with stable hb

## 2020-12-30 NOTE — ASSESSMENT & PLAN NOTE
· History of diarrhea s/p 12/24 EDG colonoscopy which was unrevealing   · Started on Lomotil and cholestyramine for diarrhea with improvement (formerly with about 5 episodes/ day and now has just 1)  · Continue Lomotil and cholestyramine while inpatient andon di/c

## 2020-12-30 NOTE — DISCHARGE INSTRUCTIONS
Rectal Bleeding   WHAT YOU NEED TO KNOW:   Rectal bleeding can be caused by constipation, hemorrhoids, or anal fissures  It may also be caused by polyps, tumors, or medical conditions, such as colitis or diverticulitis  DISCHARGE INSTRUCTIONS:   Medicines:   · Pain medicine: You may be given medicine to take away or decrease pain  Do not wait until the pain is severe before you take your medicine  · Iron supplement:  Iron helps your body make more red blood cells  · Steroids: This medicine decreases inflammation in your rectum  It may be applied as a cream, ointment, or lotion  · Take your medicine as directed  Contact your healthcare provider if you think your medicine is not helping or if you have side effects  Tell him or her if you are allergic to any medicine  Keep a list of the medicines, vitamins, and herbs you take  Include the amounts, and when and why you take them  Bring the list or the pill bottles to follow-up visits  Carry your medicine list with you in case of an emergency  Follow up with your healthcare provider as directed:  Write down your questions so you remember to ask them during your visits  Drink liquids as directed:  Ask your healthcare provider how much liquid to drink each day and which liquids are best for you  This will help prevent dehydration and constipation  Contact your healthcare provider if:   · You have a fever  · Your rectal bleeding stopped for a time, but has started again  · You have nausea  · You have cold, sweaty, pale skin  · You have changes in your bowel movements, such as diarrhea  · You have questions or concerns about your condition or care  Seek care immediately or call 911 if:   · You are breathing faster than usual     · You are dizzy, lightheaded, or feel faint  · You are confused or cannot think clearly  · You urinate less than usual or not at all  · Your rectal bleeding is constant or heavy      · You have severe abdominal pain or cramping  © Copyright [address] Information is for End User's use only and may not be sold, redistributed or otherwise used for commercial purposes  All illustrations and images included in CareNotes® are the copyrighted property of A D A M , Inc  or Glenna Ugalde  The above information is an  only  It is not intended as medical advice for individual conditions or treatments  Talk to your doctor, nurse or pharmacist before following any medical regimen to see if it is safe and effective for you

## 2020-12-30 NOTE — ASSESSMENT & PLAN NOTE
· POA a/e/b hemoglobin of 9 4 on presentation( baseline is normal) due to most likely upper gastrointestinal bleed   · Hb dropped to 706 and was transfused with 1 unit of blood   Had EGD 12/30 which showed oozing at duodenal biopsy site, injected with epinephrine and treated with clip   Repeat hb is 10 2  Stable for d/c home off coumadin until hb is rechecked outpatient in 1 week and stable    C/w Protonix 40 mg bid

## 2020-12-30 NOTE — DISCHARGE SUMMARY
Discharge- Aliyah Cons 1935, 82 y o  female MRN: [id number]    Unit/Bed#: -01 Encounter: [id number]    Primary Care Provider: Vernon [facility]rera   Date and time admitted to hospital: 12/28/2020  5:16 PM        * Gastrointestinal hemorrhage with melena  Assessment & Plan  POA, a/e/b melena stool, iatrogenic as patient is s/p EGD on 12/24 with duodenal biopsy treated with GI consult, EGD, injection with epinephrine and clipping  GI bleed resolved  Stable hb prior to d/c  Recheck cbc in 1 week  Resume coumadin with stable hb     Acute blood loss anemia  Assessment & Plan  · POA a/e/b hemoglobin of 9 4 on presentation( baseline is normal) due to most likely upper gastrointestinal bleed   · Hb dropped to 706 and was transfused with 1 unit of blood   Had EGD 12/30 which single ulcer in the 2nd part of the duodenum with oozing hemorrhage and oozing at duodenal biopsy site, injected with epinephrine and treated with clip     Repeat hb is 10 2  Stable for d/c home off coumadin until hb is rechecked outpatient in 1 week and stable  C/w Protonix 40 mg bid     Functional diarrhea  Assessment & Plan  · History of diarrhea s/p 12/24 EDG colonoscopy which was unrevealing   · Started on Lomotil and cholestyramine for diarrhea with improvement (formerly with about 5 episodes/ day and now has just 1)  · Continue Lomotil and cholestyramine while inpatient andon di/c    Chronic anticoagulation  Assessment & Plan  · Is on coumadin for PE, currently on hold   To be restarted outpatient     Gastroesophageal reflux disease with esophagitis  Assessment & Plan  · On Omeprazole 20 mg bid at home, increased to 40 mg bid given GIB       Hypothyroidism in adult  Assessment & Plan  Continue home synthroid    Essential hypertension  Assessment & Plan  · D/c on home meds HCTZ 12 5 mg daily, metoprolol 25 mg bid and Losartan 50 mg daily           Discharging Physician / Practitioner: Lenard Mckeon MD  PCP: Layne Hammer Nicki  Admission Date:   Admission Orders (From admission, onward)     Ordered        12/28/20 1912  Inpatient Admission  Once                   Discharge Date: 12/30/20    Resolved Problems  Date Reviewed: 12/29/2020    None          Consultations During Hospital Stay:  · GI    Procedures Performed:   EGD: Oozing at duodenal biopsy site, injected with epinephrine and treated with clip     Significant Findings / Test Results:   Oozing at duodenal biopsy site, injected with epinephrine and treated with clip        Incidental Findings:   ·      Test Results Pending at Discharge (will require follow up):   ·      Outpatient Tests Requested:  ·   CBC on 21/33/1787  Complications:      Reason for Admission: melena stool     Hospital Course: Betty Ybarra is a 82 y o  female patient who originally presented to the hospital on 12/28/2020 due to melena stool due to upper GI bleed confirmed on EGD which showed oozing at biopsy site of prior EGD which was clipped  During her stay, she required a unit of blood for hb of 7 6  Hb on discharge is 10 2  She was started on Protonix 40 mg bid and Coumadin was held  She will need a repeat CBC in 1 week prior to restarting coumadin  The patient, initially admitted to the hospital as inpatient, was discharged earlier than expected given the following: GIB controlled   Please see above list of diagnoses and related plan for additional information  Condition at Discharge: stable     Discharge Day Visit / Exam:     Subjective:  Seen after EGD  No complaints  BP high, treated with labetalol  No other complaints to offer     Vitals: Blood Pressure: 142/76 (12/30/20 1512)  Pulse: 86 (12/30/20 1512)  Temperature: 97 6 °F (36 4 °C) (12/30/20 1004)  Temp Source: Temporal (12/30/20 1004)  Respirations: 16 (12/30/20 1147)  Height: 5' 3" (160 cm) (12/28/20 1720)  Weight - Scale: 66 9 kg (147 lb 7 8 oz) (12/29/20 0538)  SpO2: 96 % (12/30/20 1512)  Exam:   Physical Exam  Vitals signs reviewed  Constitutional:       General: She is not in acute distress  Appearance: Normal appearance  She is not ill-appearing, toxic-appearing or diaphoretic  HENT:      Head: Normocephalic  Eyes:      Extraocular Movements: Extraocular movements intact  Pupils: Pupils are equal, round, and reactive to light  Neck:      Musculoskeletal: Normal range of motion and neck supple  No neck rigidity or muscular tenderness  Vascular: No carotid bruit  Cardiovascular:      Rate and Rhythm: Normal rate and regular rhythm  Pulses: Normal pulses  Heart sounds: Normal heart sounds  No murmur  No friction rub  Pulmonary:      Effort: Pulmonary effort is normal       Breath sounds: Normal breath sounds  No stridor  No wheezing, rhonchi or rales  Chest:      Chest wall: No tenderness  Abdominal:      General: Abdomen is flat  Bowel sounds are normal  There is no distension  Palpations: Abdomen is soft  Tenderness: There is no abdominal tenderness  There is no guarding or rebound  Musculoskeletal: Normal range of motion  General: No swelling or tenderness  Right lower leg: No edema  Left lower leg: No edema  Skin:     General: Skin is warm and dry  Coloration: Skin is not jaundiced  Neurological:      General: No focal deficit present  Mental Status: She is alert and oriented to person, place, and time  Cranial Nerves: No cranial nerve deficit  Sensory: No sensory deficit  Discussion with Family:      Discharge instructions/Information to patient and family:   See after visit summary for information provided to patient and family  Provisions for Follow-Up Care:  See after visit summary for information related to follow-up care and any pertinent home health orders        Disposition:     Home    For Discharges to [facility]:   · Not Applicable to this Patient - Not Applicable to this Patient    Planned Readmission: no     Discharge Statement:  I spent 45 minutes discharging the patient  This time was spent on the day of discharge  I had direct contact with the patient on the day of discharge  Greater than 50% of the total time was spent examining patient, answering all patient questions, arranging and discussing plan of care with patient as well as directly providing post-discharge instructions  Additional time then spent on discharge activities  Discharge Medications:  See after visit summary for reconciled discharge medications provided to patient and family        ** Please Note: This note has been constructed using a voice recognition system **

## 2020-12-30 NOTE — OP NOTE
EGD IMPRESSION:  Oozing at duodenal biopsy site, injected with epinephrine and treated with clip     RECOMMENDATION:  Resume diet  Stable for discharge this afternoon if hemoglobin remains stable  Repeat hemoglobin early next week, resume Coumadin if it remains stable

## 2020-12-31 ENCOUNTER — TELEPHONE (OUTPATIENT)
Dept: NEUROLOGY | Facility: CLINIC | Age: 85
End: 2020-12-31

## 2020-12-31 NOTE — TELEPHONE ENCOUNTER
Called pt to confirm upcoming apt in 2 weeks on 01/15/21 with Dr Arnulfo Monroy  According to chart pt admitted to hospital on 12/28/20    Did not make call  Asked pt if any new/worsening symptoms to report? Asked pt if they are unable to keep apt or interested in virtual apt to please call office, also advised of no show fee  Advised we will call closer to day of apt for COVID screening

## 2020-12-31 NOTE — UTILIZATION REVIEW
Notification of Discharge  This is a Notification of Discharge from our facility [address]  Please be advised that this patient has been discharge from our facility  Below you will find the admission and discharge date and time including the patients disposition  PRESENTATION DATE: 12/28/2020  5:16 PM  OBS ADMISSION DATE:   IP ADMISSION DATE: 12/28/20 1912   DISCHARGE DATE: 12/30/2020  6:15 AM  DISPOSITION: Home/Self Care Home/Self Care   Admission Orders listed below:  Admission Orders (From admission, onward)     Ordered        12/28/20 1912  Inpatient Admission  Once                   Please contact the UR Department if additional information is required to close this patient's authorization/case  Andrey Spence  [facility] Utilization Review Department  Main: [phone] x carefully listen to the prompts  All voicemails are confidential   [email or web address]  Send all requests for admission clinical reviews, approved or denied determinations and any other requests to dedicated fax number below belonging to the campus where the patient is receiving treatment   List of dedicated fax numbers:  [address] DENIALS (Administrative/Medical Necessity) [phone]   [address] (Maternity/NICU/Pediatrics) [phone]   [facility] [phone]   [facility] [phone]   [facility] [phone]   [facility] [address] [place] [address] [phone]   [facility]  [phone]   [address] [facility] [address]  [address] [facility] [address] [facility] [phone]

## 2021-01-04 ENCOUNTER — CLINICAL SUPPORT (OUTPATIENT)
Dept: ENDOCRINOLOGY | Facility: HOSPITAL | Age: 86
End: 2021-01-04
Payer: COMMERCIAL

## 2021-01-04 ENCOUNTER — LAB (OUTPATIENT)
Dept: LAB | Facility: HOSPITAL | Age: 86
End: 2021-01-04
Attending: INTERNAL MEDICINE
Payer: COMMERCIAL

## 2021-01-04 DIAGNOSIS — M81.0 AGE-RELATED OSTEOPOROSIS WITHOUT CURRENT PATHOLOGICAL FRACTURE: Primary | ICD-10-CM

## 2021-01-04 DIAGNOSIS — K92.1 GASTROINTESTINAL HEMORRHAGE WITH MELENA: ICD-10-CM

## 2021-01-04 LAB
ERYTHROCYTE [DISTWIDTH] IN BLOOD BY AUTOMATED COUNT: 13.8 % (ref 11.6–15.1)
HCT VFR BLD AUTO: 33.7 % (ref 34.8–46.1)
HGB BLD-MCNC: 10.5 G/DL (ref 11.5–15.4)
MCH RBC QN AUTO: 32.2 PG (ref 26.8–34.3)
MCHC RBC AUTO-ENTMCNC: 31.2 G/DL (ref 31.4–37.4)
MCV RBC AUTO: 103 FL (ref 82–98)
PLATELET # BLD AUTO: 244 THOUSANDS/UL (ref 149–390)
PMV BLD AUTO: 11.7 FL (ref 8.9–12.7)
RBC # BLD AUTO: 3.26 MILLION/UL (ref 3.81–5.12)
WBC # BLD AUTO: 8.72 THOUSAND/UL (ref 4.31–10.16)

## 2021-01-04 PROCEDURE — 99202 OFFICE O/P NEW SF 15 MIN: CPT | Performed by: INTERNAL MEDICINE

## 2021-01-04 PROCEDURE — 36415 COLL VENOUS BLD VENIPUNCTURE: CPT

## 2021-01-04 PROCEDURE — 85027 COMPLETE CBC AUTOMATED: CPT

## 2021-01-04 PROCEDURE — 96372 THER/PROPH/DIAG INJ SC/IM: CPT | Performed by: INTERNAL MEDICINE

## 2021-01-07 ENCOUNTER — TELEPHONE (OUTPATIENT)
Dept: GASTROENTEROLOGY | Facility: CLINIC | Age: 86
End: 2021-01-07

## 2021-01-07 NOTE — TELEPHONE ENCOUNTER
Called pt back and relayed Dr Shae Palafox recommendations  She is still feeling fine and able to keep some water down since her episodes  She will continue with clear liquids today and call back with any questions or concerns  She is aware Dr KUMARI is on call umair

## 2021-01-07 NOTE — TELEPHONE ENCOUNTER
Pt called stating she had a colonoscopy 12/24 and was then [facility] for a GI bleed on 12/28-12/30  She has been feeling well up until today when she vomited (3 episodes together) and had 1 episode of diarrhea at 11:30 am   She is feeling fine now and but states "I'm just sitting here "  Presently denies nausea, bloating, fever, pain, chest pain or dizziness  She denies seeing any blood with the vomiting or diarrhea  She did have follow up labs done 1/4 with an H/H of 10 5/33 7  She is taking Protonix BID post discharge  She is going to try a sip of water but take it very slowly  Instructed pt to call back with any additional problems until hears back from our office today  Pt states will wait for Dr Rai Alanis recommendations

## 2021-01-07 NOTE — TELEPHONE ENCOUNTER
Recommend clear liquids today  If she feels okay tomorrow advance diet  It does not seem to me like this is related to the previous EGD/ colonoscopy and question of bleeding  Viral enteritis? Suggest she stay hydrated  If the vomiting persist/reoccurs asked her to call back or go to the emergency room

## 2021-01-08 ENCOUNTER — TELEPHONE (OUTPATIENT)
Dept: NEUROLOGY | Facility: CLINIC | Age: 86
End: 2021-01-08

## 2021-01-08 NOTE — TELEPHONE ENCOUNTER
Lmom for patient to call us back about the change in location I gave her the [facility] office and ask her to call us back to let me know that she is aware of the location change

## 2021-01-12 ENCOUNTER — TELEPHONE (OUTPATIENT)
Dept: GASTROENTEROLOGY | Facility: CLINIC | Age: 86
End: 2021-01-12

## 2021-01-15 ENCOUNTER — OFFICE VISIT (OUTPATIENT)
Dept: NEUROLOGY | Facility: CLINIC | Age: 86
End: 2021-01-15
Payer: COMMERCIAL

## 2021-01-15 VITALS
HEART RATE: 64 BPM | WEIGHT: 148 LBS | DIASTOLIC BLOOD PRESSURE: 64 MMHG | BODY MASS INDEX: 26.22 KG/M2 | SYSTOLIC BLOOD PRESSURE: 130 MMHG

## 2021-01-15 DIAGNOSIS — R29.818 TRANSIENT NEUROLOGICAL SYMPTOMS: Primary | ICD-10-CM

## 2021-01-15 PROCEDURE — 99204 OFFICE O/P NEW MOD 45 MIN: CPT | Performed by: STUDENT IN AN ORGANIZED HEALTH CARE EDUCATION/TRAINING PROGRAM

## 2021-01-15 NOTE — PROGRESS NOTES
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, HTN, HLD presenting to the [place] [address] [facility] for evaluation of transient episodes of speech problems  Her neurological exam is normal today except for an antalgic gait due to chronic left knee pain s/p knee replacement  The transient episodes of speech arrest have resolved since the summer  The etiology of these events is unclear, the differential includes hypoglycemia, panic attack, hypotension  EEG and CTH do not suggest seizure tendency or identify a epileptogenic lesion  Focal seizures with retained awareness seems unlikely at this point  Additionally, my concern for recurrent TIAs is low as well  Plan outlined below:     #Transient neurological symptoms (language deficits)  - Comprehensive work up unremarkable  - Continue to monitor in case events return     #Personal and family history of recurrent thrombus formation    - 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  - Family has undergone hematologic work up but no cause found  - Currently on [facility] which seems appropriate  - Defer hypercoagulable workup at this time per patient request but could be done in the future if interested      #Essential HTN  - At first appointment the BP was high with SBP in the 190s    - SBP today is 130  - C/w management by PCP     Follow up on an as needed basis  Daron    Duane Musca is a 82 y o  female with a PMH of recurrent 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, HTN, HLD presenting to the [address] [facility] for evaluation of transient episodes of speech problems        For review: Starting in July 2020, she noticed that there were three episodes of speech problems  Each time they happened three weeks apart  The main symptoms were both slurring of words and nonsensical words  Episodes lasted 5 minutes   There were no preceding or residual symptoms  All episodes occurred at home, two while standing and one while sitting  Patient ate well that day  She denies skipping meals or feeling dehydrated the days of events  There was no associated feelings of dizziness or panic  There was no loss of awareness  She is concerned that these episodes were "mini-strokes"  In addition to her most concerning symptoms, she has had multiple DVTs and PEs in the past, currently on warfarin  None in the last 10 years  Her mother, sister and son have had blood clots in their lives  Some of her family have had work up for this but no definitive diagnosis has been made  She was seen for the first time in this clinic on 9/30/2020  Plan at that time was to get a CTH (no MRI due to bladder stimulator) and routine awake and sleep EEG  Since last seen, the [facility] was completed and was normal  EEG was completed on 10/23/2020 and showed moderate generalized slowing and focal left temporal delta slowing but no epileptiform discharges  The patient had a coloscopy and endoscopy which was complicated by bleeding  She required hospitalization and blood transfusions  There were no long term complications since then  She has been doing great regarding the episodes of speech arrest  No concerning findings  Epilepsy Risk Factors:  A niece was diagnosed with epilepsy at age 15  No severe falls with LOC  Family history of stroke       Current AEDs:  None     Prior AEDs:  None     Prior Evaluation:  [facility] 1/6/2019: No acute pathology (ordered for fall)  [facility] 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  Routine EEG 10/23/2020: Continuous diffuse background irregularity and intermixed theta slowing, and intermittent left temporal delta slowing  Findings are consistent with moderate diffuse cerebral dysfunction with superimposed focal neuronal dysfunction in the left temporal lobe  No interictal epileptiform discharges were noted   No electrographic seizures occurred during the recording       History Reviewed: The following were reviewed and updated as appropriate: allergies, current medications, past family history, past medical history, past social history, past surgical history and problem list     Psychiatric History:  None     Social History:   Driving: Yes  Lives Alone: Yes  Occupation: retired    History Reviewed: The following were reviewed and updated as appropriate: allergies, current medications, past family history, past medical history, past social history, past surgical history and problem list    ROS:  Review of Systems   Constitutional: Positive for appetite change (Does not have much of an appetite)  Negative for fever  HENT: Negative  Negative for hearing loss, tinnitus, trouble swallowing and voice change  Eyes: Negative  Negative for photophobia and pain  Respiratory: Negative  Negative for shortness of breath  Cardiovascular: Negative  Negative for palpitations  Gastrointestinal: Negative  Negative for nausea and vomiting  Endocrine: Negative  Negative for cold intolerance  Genitourinary: Negative  Negative for dysuria, frequency and urgency  Musculoskeletal: Positive for neck stiffness (Some)  Negative for myalgias and neck pain  Balance issues     Skin: Negative  Negative for rash  Allergic/Immunologic: Negative  Neurological: Negative  Negative for dizziness, tremors, seizures, syncope, facial asymmetry, speech difficulty, weakness, light-headedness, numbness and headaches  Hematological: Bruises/bleeds easily  Psychiatric/Behavioral: Negative  Negative for confusion, hallucinations and sleep disturbance  All other systems reviewed and are negative  Objective    /64 (BP Location: Left arm, Patient Position: Sitting, Cuff Size: Standard)   Pulse 64   Wt 67 1 kg (148 lb)   LMP  (LMP Unknown)   BMI 26 22 kg/m²      General Exam  General: well developed, no acute distress    HEENT: mucous membranes moist, anicteric sclera  Neck: supple, good ROM  Chest: clear to auscultation bilaterally  Extremities: no clubbing, cyanosis or edema  Skin: no rash on visible skin  Neurological Exam  Mental Status: awake, alert, and fully oriented to person, place, time, and situation  Attention and memory intact  Fund of knowledge is appropriate for age and education  There is no neglect  Language: fluency, and comprehension normal        Cranial Nerves: Pupils equal and reactive to light  Visual fields full to confrontation  Extraocular motions intact with full versions, normal pursuits and saccades  Facial strength full and symmetric  Tongue protrudes to midline  Palate elevates symmetrically  Speech clear without notable dysarthria  Shoulder shrug activation full and symmetric  Motor: Normal bulk and tone  No pronator drift  Strength is 5/5 proximally and distally in all 4 extremities  No involuntary movements  Sensory: Sensation intact to light touch distally in all extremities  Coordination: Normal finger-to-nose  Normal rapid alternating movements  Station and gait: Antalgic gait, tandem testing deferred  Normal Romberg  Reflexes: Reflexes 2+ throughout and symmetric  Both toes down going        Samir Kay MD   [facility]  [facility] [address] [facility] Clinical Neurophysiology

## 2021-01-20 DIAGNOSIS — Z23 ENCOUNTER FOR IMMUNIZATION: ICD-10-CM

## 2021-01-27 DIAGNOSIS — I10 HYPERTENSION, UNSPECIFIED TYPE: Primary | ICD-10-CM

## 2021-01-27 RX ORDER — LOSARTAN POTASSIUM 50 MG/1
50 TABLET ORAL DAILY
Qty: 90 TABLET | Refills: 3 | Status: SHIPPED | OUTPATIENT
Start: 2021-01-27 | End: 2022-01-11 | Stop reason: SDUPTHER

## 2021-02-05 ENCOUNTER — IMMUNIZATIONS (OUTPATIENT)
Dept: FAMILY MEDICINE CLINIC | Facility: HOSPITAL | Age: 86
End: 2021-02-05

## 2021-02-05 DIAGNOSIS — Z23 ENCOUNTER FOR IMMUNIZATION: Primary | ICD-10-CM

## 2021-02-05 PROCEDURE — 0011A SARS-COV-2 / COVID-19 MRNA VACCINE (MODERNA) 100 MCG: CPT

## 2021-02-05 PROCEDURE — 91301 SARS-COV-2 / COVID-19 MRNA VACCINE (MODERNA) 100 MCG: CPT

## 2021-02-26 ENCOUNTER — OFFICE VISIT (OUTPATIENT)
Dept: GASTROENTEROLOGY | Facility: CLINIC | Age: 86
End: 2021-02-26
Payer: COMMERCIAL

## 2021-02-26 VITALS
SYSTOLIC BLOOD PRESSURE: 118 MMHG | DIASTOLIC BLOOD PRESSURE: 68 MMHG | WEIGHT: 137.8 LBS | HEIGHT: 63 IN | BODY MASS INDEX: 24.41 KG/M2

## 2021-02-26 DIAGNOSIS — R19.7 DIARRHEA, UNSPECIFIED TYPE: Primary | ICD-10-CM

## 2021-02-26 PROCEDURE — 1036F TOBACCO NON-USER: CPT | Performed by: INTERNAL MEDICINE

## 2021-02-26 PROCEDURE — 1160F RVW MEDS BY RX/DR IN RCRD: CPT | Performed by: INTERNAL MEDICINE

## 2021-02-26 PROCEDURE — 3074F SYST BP LT 130 MM HG: CPT | Performed by: INTERNAL MEDICINE

## 2021-02-26 PROCEDURE — 99213 OFFICE O/P EST LOW 20 MIN: CPT | Performed by: INTERNAL MEDICINE

## 2021-02-26 PROCEDURE — 3078F DIAST BP <80 MM HG: CPT | Performed by: INTERNAL MEDICINE

## 2021-02-26 NOTE — PROGRESS NOTES
[facility]up [facility]  Jerri Eastman 82 y o  female MRN: [id number]  Encounter: [id number]    ASSESSMENT AND PLAN:      1  Diarrhea, unspecified type  Developed in November, labs and stools unremarkable  Upper endoscopy showed nonspecific inflammation in the duodenum and colonoscopy negative for   IBD but with mild increase in lymphocytes  Symptoms are now infrequent and improve with Lomotil as needed  She will keep an eye out for food triggers and will contact us if symptoms progress, otherwise hold off on further workup  If symptoms recur will  Discuss treatment for  Lymphocytic colitis    2  GI bleed- oozing at duodenal biopsy site, no recurrence      Followup Appointment:  As needed  ______________________________________________________________________    Chief Complaint   Patient presents with    Follow-up     HPI:   The patient returns for follow-up on diarrhea  She was evaluated in the office December 1st   A noninvasive workup including labs and stools was unrevealing  She had an upper endoscopy and colonoscopy December 24th that was unremarkable  Duodenal biopsies showed mildly increased intraepithelial lymphocytes, gastric biopsies were negative for H pylori I and colonic biopsies showed mild increase in lymphocytes  She contacted our office with black stools on December 28th  Hemoglobin was down to 9 from her baseline of 13  She was admitted to [address] [place] [address] and a repeat EGD showed oozing at the duodenal biopsy site, treated with epinephrine injection and a resolution clip with success  She has had no bleeding or dark stool since then  Stools are well controlled  She rarely has issues with diarrhea and when she does they resolved immediately with Lomotil  She is very satisfied with symptom resolution      Historical Information   Past Medical History:   Diagnosis Date    Cancer ([facility] [address] )     squamous cell - face    Disease of thyroid gland     GERD (gastroesophageal reflux disease)     Heart murmur     Hypercoagulable state ([facility] [address] )     Hyperlipidemia     Hypertension     Microscopic colitis     Osteoporosis     PE (pulmonary thromboembolism) (HCC)     Sjogren's syndrome (HCC)     Urinary tract infection      Past Surgical History:   Procedure Laterality Date    COLONOSCOPY  07/18/2016     grade 2 internal hemorrhoids but otherwise normal, pathology negative for microscopic colitis    HYSTERECTOMY      JOINT REPLACEMENT      KNEE SURGERY      OOPHORECTOMY      SHOULDER SURGERY      right    SPINE SURGERY      TOE SURGERY      TONSILLECTOMY      UPPER GASTROINTESTINAL ENDOSCOPY  01/09/2015     reflux esophagitis, gastritis, duodenitis in the bulb, pathology negative for H pylori, Puckett's, EOE     Social History     Substance and Sexual Activity   Alcohol Use Never    Frequency: Never    Binge frequency: Never     Social History     Substance and Sexual Activity   Drug Use No     Social History     Tobacco Use   Smoking Status Never Smoker   Smokeless Tobacco Never Used     Family History   Problem Relation Age of Onset    Stroke Father     Ovarian cancer Sister     No Known Problems Son     No Known Problems Son     Ovarian cancer Daughter     No Known Problems Daughter     No Known Problems Daughter     No Known Problems Maternal Aunt     No Known Problems Paternal Aunt     Colon cancer Neg Hx     Colon polyps Neg Hx          Current Outpatient Medications:     Cholecalciferol (VITAMIN D) 2000 UNITS tablet    cycloSPORINE (RESTASIS) 0 05 % ophthalmic emulsion    denosumab (PROLIA) 60 mg/mL    DHA-EPA-Flaxseed Oil-Vitamin E (THERA TEARS) CAPS    diphenoxylate-atropine (LOMOTIL) 2 5-0 025 mg per tablet    hydrochlorothiazide (HYDRODIURIL) 12 5 mg tablet    hydroxychloroquine (PLAQUENIL) 200 mg tablet    levothyroxine 112 mcg tablet    losartan (COZAAR) 50 mg tablet    metoprolol tartrate (LOPRESSOR) 25 mg tablet    Multiple Vitamins-Minerals (OCUVITE PO)    pantoprazole (PROTONIX) 40 mg tablet    polyvinyl alcohol (LIQUIFILM TEARS) 1 4 % ophthalmic solution    simvastatin (ZOCOR) 20 mg tablet    diclofenac sodium (VOLTAREN) 1 %  Allergies   Allergen Reactions    Sulfa Antibiotics Hives     Reviewed medications and allergies and updated as indicated    PHYSICAL EXAM:    Blood pressure 118/68, height 5' 3" (1 6 m), weight 62 5 kg (137 lb 12 8 oz), not currently breastfeeding  Body mass index is 24 41 kg/m²  General Appearance: NAD, cooperative, alert  Eyes: Anicteric, PERRLA, EOMI  ENT:  Normocephalic, atraumatic, normal mucosa  Neck:  Supple, symmetrical, trachea midline    GI:  Soft, non-tender, non-distended; normal bowel sounds; no masses, no organomegaly   Rectal: Deferred  Musculoskeletal: No cyanosis, clubbing or edema  Normal ROM  Skin:  No jaundice, rashes, or lesions   Heme/Lymph: No palpable cervical lymphadenopathy  Psych: Normal affect, good eye contact  Neuro: No gross deficits, AAOx3    Lab Results:   Lab Results   Component Value Date    WBC 8 72 01/04/2021    HGB 10 5 (L) 01/04/2021    HCT 33 7 (L) 01/04/2021     (H) 01/04/2021     01/04/2021     Lab Results   Component Value Date     01/25/2015    K 4 0 12/30/2020     12/30/2020    CO2 26 12/30/2020    ANIONGAP 10 01/25/2015    BUN 12 12/30/2020    CREATININE 0 73 12/30/2020    GLUCOSE 150 (H) 01/06/2019    GLUF 99 12/15/2020    CALCIUM 9 6 12/30/2020    CORRECTEDCA 10 6 (H) 07/02/2020    AST 31 12/28/2020    ALT 54 12/28/2020    ALKPHOS 50 12/28/2020    PROT 6 5 01/21/2015    BILITOT 0 6 01/21/2015    EGFR 76 12/30/2020     Lab Results   Component Value Date    IRON 100 12/28/2020    TIBC 273 12/28/2020    FERRITIN 126 12/28/2020     No results found for: LIPASE    Radiology Results:   No results found

## 2021-03-03 ENCOUNTER — IMMUNIZATIONS (OUTPATIENT)
Dept: FAMILY MEDICINE CLINIC | Facility: HOSPITAL | Age: 86
End: 2021-03-03

## 2021-03-03 DIAGNOSIS — Z23 ENCOUNTER FOR IMMUNIZATION: Primary | ICD-10-CM

## 2021-03-03 PROCEDURE — 91301 SARS-COV-2 / COVID-19 MRNA VACCINE (MODERNA) 100 MCG: CPT

## 2021-03-03 PROCEDURE — 0012A SARS-COV-2 / COVID-19 MRNA VACCINE (MODERNA) 100 MCG: CPT

## 2021-03-08 DIAGNOSIS — E03.9 HYPOTHYROIDISM IN ADULT: ICD-10-CM

## 2021-03-08 RX ORDER — LEVOTHYROXINE SODIUM 112 UG/1
TABLET ORAL
Qty: 102 TABLET | Refills: 1 | Status: SHIPPED | OUTPATIENT
Start: 2021-03-08 | End: 2021-08-05

## 2021-05-17 ENCOUNTER — HOSPITAL ENCOUNTER (OUTPATIENT)
Dept: BONE DENSITY | Facility: IMAGING CENTER | Age: 86
Discharge: HOME | End: 2021-05-17
Payer: COMMERCIAL

## 2021-05-17 DIAGNOSIS — M81.0 AGE-RELATED OSTEOPOROSIS WITHOUT CURRENT PATHOLOGICAL FRACTURE: ICD-10-CM

## 2021-05-17 PROCEDURE — 77080 DXA BONE DENSITY AXIAL: CPT

## 2021-05-19 ENCOUNTER — TRANSCRIBE ORDERS (OUTPATIENT)
Dept: ADMINISTRATIVE | Facility: HOSPITAL | Age: 86
End: 2021-05-19

## 2021-05-19 ENCOUNTER — LAB (OUTPATIENT)
Dept: LAB | Facility: HOSPITAL | Age: 86
End: 2021-05-19
Payer: COMMERCIAL

## 2021-05-19 DIAGNOSIS — E03.9 HYPOTHYROIDISM IN ADULT: ICD-10-CM

## 2021-05-19 DIAGNOSIS — M35.01 KERATITIS SICCA (HCC): ICD-10-CM

## 2021-05-19 DIAGNOSIS — M35.01 KERATITIS SICCA (HCC): Primary | ICD-10-CM

## 2021-05-19 DIAGNOSIS — I10 MALIGNANT HYPERTENSION: Primary | ICD-10-CM

## 2021-05-19 DIAGNOSIS — I10 MALIGNANT HYPERTENSION: ICD-10-CM

## 2021-05-19 LAB
25(OH)D3 SERPL-MCNC: 48.2 NG/ML (ref 30–100)
ALBUMIN SERPL BCP-MCNC: 4 G/DL (ref 3.5–5)
ALP SERPL-CCNC: 62 U/L (ref 46–116)
ALT SERPL W P-5'-P-CCNC: 34 U/L (ref 12–78)
ANION GAP SERPL CALCULATED.3IONS-SCNC: 10 MMOL/L (ref 4–13)
AST SERPL W P-5'-P-CCNC: 26 U/L (ref 5–45)
BACTERIA UR QL AUTO: ABNORMAL /HPF
BASOPHILS # BLD AUTO: 0.07 THOUSANDS/ΜL (ref 0–0.1)
BASOPHILS NFR BLD AUTO: 1 % (ref 0–1)
BILIRUB SERPL-MCNC: 0.6 MG/DL (ref 0.2–1)
BILIRUB UR QL STRIP: NEGATIVE
BUN SERPL-MCNC: 27 MG/DL (ref 5–25)
C4 SERPL-MCNC: 32 MG/DL (ref 10–40)
CALCIUM SERPL-MCNC: 9.8 MG/DL (ref 8.3–10.1)
CHLORIDE SERPL-SCNC: 103 MMOL/L (ref 100–108)
CHOLEST SERPL-MCNC: 158 MG/DL (ref 50–200)
CLARITY UR: CLEAR
CO2 SERPL-SCNC: 29 MMOL/L (ref 21–32)
COLOR UR: YELLOW
CREAT SERPL-MCNC: 0.92 MG/DL (ref 0.6–1.3)
CREAT UR-MCNC: <13 MG/DL
EOSINOPHIL # BLD AUTO: 0.18 THOUSAND/ΜL (ref 0–0.61)
EOSINOPHIL NFR BLD AUTO: 3 % (ref 0–6)
ERYTHROCYTE [DISTWIDTH] IN BLOOD BY AUTOMATED COUNT: 14.4 % (ref 11.6–15.1)
ERYTHROCYTE [SEDIMENTATION RATE] IN BLOOD: 31 MM/HOUR (ref 0–29)
GFR SERPL CREATININE-BSD FRML MDRD: 58 ML/MIN/1.73SQ M
GLUCOSE P FAST SERPL-MCNC: 92 MG/DL (ref 65–99)
GLUCOSE UR STRIP-MCNC: NEGATIVE MG/DL
HCT VFR BLD AUTO: 39.7 % (ref 34.8–46.1)
HDLC SERPL-MCNC: 47 MG/DL
HGB BLD-MCNC: 12.9 G/DL (ref 11.5–15.4)
HGB UR QL STRIP.AUTO: NEGATIVE
IMM GRANULOCYTES # BLD AUTO: 0.01 THOUSAND/UL (ref 0–0.2)
IMM GRANULOCYTES NFR BLD AUTO: 0 % (ref 0–2)
KETONES UR STRIP-MCNC: NEGATIVE MG/DL
LDLC SERPL CALC-MCNC: 92 MG/DL (ref 0–100)
LEUKOCYTE ESTERASE UR QL STRIP: NEGATIVE
LYMPHOCYTES # BLD AUTO: 1.93 THOUSANDS/ΜL (ref 0.6–4.47)
LYMPHOCYTES NFR BLD AUTO: 31 % (ref 14–44)
MCH RBC QN AUTO: 31.5 PG (ref 26.8–34.3)
MCHC RBC AUTO-ENTMCNC: 32.5 G/DL (ref 31.4–37.4)
MCV RBC AUTO: 97 FL (ref 82–98)
MONOCYTES # BLD AUTO: 0.86 THOUSAND/ΜL (ref 0.17–1.22)
MONOCYTES NFR BLD AUTO: 14 % (ref 4–12)
NEUTROPHILS # BLD AUTO: 3.25 THOUSANDS/ΜL (ref 1.85–7.62)
NEUTS SEG NFR BLD AUTO: 51 % (ref 43–75)
NITRITE UR QL STRIP: NEGATIVE
NON-SQ EPI CELLS URNS QL MICRO: ABNORMAL /HPF
NRBC BLD AUTO-RTO: 0 /100 WBCS
PH UR STRIP.AUTO: 7.5 [PH]
PLATELET # BLD AUTO: 197 THOUSANDS/UL (ref 149–390)
PMV BLD AUTO: 11.1 FL (ref 8.9–12.7)
POTASSIUM SERPL-SCNC: 4 MMOL/L (ref 3.5–5.3)
PROT SERPL-MCNC: 8.7 G/DL (ref 6.4–8.2)
PROT UR STRIP-MCNC: NEGATIVE MG/DL
PROT UR-MCNC: 13 MG/DL
PROT/CREAT UR: >1 MG/G{CREAT} (ref 0–0.1)
RBC # BLD AUTO: 4.1 MILLION/UL (ref 3.81–5.12)
RBC #/AREA URNS AUTO: ABNORMAL /HPF
RHEUMATOID FACT SER QL LA: NEGATIVE
SODIUM SERPL-SCNC: 142 MMOL/L (ref 136–145)
SP GR UR STRIP.AUTO: 1.01 (ref 1–1.03)
T4 FREE SERPL-MCNC: 1.34 NG/DL (ref 0.76–1.46)
TRIGL SERPL-MCNC: 96 MG/DL
TSH SERPL DL<=0.05 MIU/L-ACNC: 1.09 UIU/ML (ref 0.36–3.74)
UROBILINOGEN UR QL STRIP.AUTO: 0.2 E.U./DL
WBC # BLD AUTO: 6.3 THOUSAND/UL (ref 4.31–10.16)
WBC #/AREA URNS AUTO: ABNORMAL /HPF

## 2021-05-19 PROCEDURE — 84156 ASSAY OF PROTEIN URINE: CPT | Performed by: INTERNAL MEDICINE

## 2021-05-19 PROCEDURE — 86430 RHEUMATOID FACTOR TEST QUAL: CPT

## 2021-05-19 PROCEDURE — 80061 LIPID PANEL: CPT

## 2021-05-19 PROCEDURE — 36415 COLL VENOUS BLD VENIPUNCTURE: CPT

## 2021-05-19 PROCEDURE — 82570 ASSAY OF URINE CREATININE: CPT | Performed by: INTERNAL MEDICINE

## 2021-05-19 PROCEDURE — 85025 COMPLETE CBC W/AUTO DIFF WBC: CPT

## 2021-05-19 PROCEDURE — 80053 COMPREHEN METABOLIC PANEL: CPT

## 2021-05-19 PROCEDURE — 86160 COMPLEMENT ANTIGEN: CPT

## 2021-05-19 PROCEDURE — 82306 VITAMIN D 25 HYDROXY: CPT

## 2021-05-19 PROCEDURE — 84443 ASSAY THYROID STIM HORMONE: CPT

## 2021-05-19 PROCEDURE — 81001 URINALYSIS AUTO W/SCOPE: CPT | Performed by: INTERNAL MEDICINE

## 2021-05-19 PROCEDURE — 84439 ASSAY OF FREE THYROXINE: CPT

## 2021-05-19 PROCEDURE — 85652 RBC SED RATE AUTOMATED: CPT

## 2021-05-20 ENCOUNTER — APPOINTMENT (OUTPATIENT)
Dept: LAB | Facility: HOSPITAL | Age: 86
End: 2021-05-20
Payer: COMMERCIAL

## 2021-05-20 ENCOUNTER — TRANSCRIBE ORDERS (OUTPATIENT)
Dept: LAB | Facility: HOSPITAL | Age: 86
End: 2021-05-20

## 2021-05-20 DIAGNOSIS — M35.01 KERATITIS SICCA (HCC): ICD-10-CM

## 2021-05-20 DIAGNOSIS — M35.01 SJOGREN'S SYNDROME WITH KERATOCONJUNCTIVITIS SICCA (HCC): Primary | ICD-10-CM

## 2021-05-20 PROCEDURE — 36415 COLL VENOUS BLD VENIPUNCTURE: CPT

## 2021-05-20 NOTE — RESULT ENCOUNTER NOTE
Please call the patient regarding result  Osteoporosis persists in the left forearm  Continue Prolia

## 2021-05-20 NOTE — RESULT ENCOUNTER NOTE
Please call the patient regarding  Result  TSH and  Free T4 are normal   BUN is slightly elevated on comprehensive metabolic panel  Please stay hydrated with water

## 2021-05-25 ENCOUNTER — APPOINTMENT (OUTPATIENT)
Dept: LAB | Facility: HOSPITAL | Age: 86
End: 2021-05-25
Payer: COMMERCIAL

## 2021-05-25 DIAGNOSIS — M35.01 KERATITIS SICCA (HCC): ICD-10-CM

## 2021-05-25 PROCEDURE — 36415 COLL VENOUS BLD VENIPUNCTURE: CPT

## 2021-05-25 PROCEDURE — 82595 ASSAY OF CRYOGLOBULIN: CPT

## 2021-06-02 LAB — CRYOGLOB SER QL 1D COLD INC: NORMAL

## 2021-06-29 ENCOUNTER — TELEPHONE (OUTPATIENT)
Dept: ENDOCRINOLOGY | Facility: HOSPITAL | Age: 86
End: 2021-06-29

## 2021-06-29 NOTE — TELEPHONE ENCOUNTER
Patient has appointment on 7/6 for follow up and Prolia  She had a CMP done in May  Do you want another one before Tuesday?

## 2021-07-06 DIAGNOSIS — R19.7 DIARRHEA, UNSPECIFIED TYPE: ICD-10-CM

## 2021-07-08 RX ORDER — DIPHENOXYLATE HYDROCHLORIDE AND ATROPINE SULFATE 2.5; .025 MG/1; MG/1
1 TABLET ORAL 4 TIMES DAILY PRN
Qty: 60 TABLET | Refills: 3 | Status: SHIPPED | OUTPATIENT
Start: 2021-07-08

## 2021-07-21 ENCOUNTER — OFFICE VISIT (OUTPATIENT)
Dept: ENDOCRINOLOGY | Facility: HOSPITAL | Age: 86
End: 2021-07-21
Payer: COMMERCIAL

## 2021-07-21 VITALS
WEIGHT: 134.6 LBS | HEIGHT: 63 IN | BODY MASS INDEX: 23.85 KG/M2 | DIASTOLIC BLOOD PRESSURE: 94 MMHG | SYSTOLIC BLOOD PRESSURE: 170 MMHG | HEART RATE: 68 BPM

## 2021-07-21 DIAGNOSIS — I10 HYPERTENSION, UNSPECIFIED TYPE: ICD-10-CM

## 2021-07-21 DIAGNOSIS — E03.9 HYPOTHYROIDISM IN ADULT: Primary | ICD-10-CM

## 2021-07-21 DIAGNOSIS — E55.9 VITAMIN D DEFICIENCY: ICD-10-CM

## 2021-07-21 DIAGNOSIS — M81.0 AGE-RELATED OSTEOPOROSIS WITHOUT CURRENT PATHOLOGICAL FRACTURE: ICD-10-CM

## 2021-07-21 DIAGNOSIS — E78.5 HYPERLIPIDEMIA, UNSPECIFIED HYPERLIPIDEMIA TYPE: ICD-10-CM

## 2021-07-21 DIAGNOSIS — I10 ESSENTIAL HYPERTENSION: ICD-10-CM

## 2021-07-21 PROCEDURE — 3080F DIAST BP >= 90 MM HG: CPT | Performed by: NURSE PRACTITIONER

## 2021-07-21 PROCEDURE — 96372 THER/PROPH/DIAG INJ SC/IM: CPT | Performed by: NURSE PRACTITIONER

## 2021-07-21 PROCEDURE — 1160F RVW MEDS BY RX/DR IN RCRD: CPT | Performed by: NURSE PRACTITIONER

## 2021-07-21 PROCEDURE — 3077F SYST BP >= 140 MM HG: CPT | Performed by: NURSE PRACTITIONER

## 2021-07-21 PROCEDURE — 99214 OFFICE O/P EST MOD 30 MIN: CPT | Performed by: NURSE PRACTITIONER

## 2021-07-21 PROCEDURE — 1036F TOBACCO NON-USER: CPT | Performed by: NURSE PRACTITIONER

## 2021-07-21 NOTE — PROGRESS NOTES
Gay Garcia 82 y o  female MRN: [id number]    Encounter: [id number]      Assessment/Plan     Assessment: This is a 82y o -year-old female with hypothyroidism, osteoporosis, hypertension, hyperlipidemia and vitamin-D deficiency  Plan:  1   Osteoporosis:  Her most recent  DEXA scan from May 17, 2021shows a T-score of -2 8 in the left forearm  She will continue supplementation of calcium and vitamin-D  Recheck calcium level prior to next Prolia injection   Discussed fall risk and safety at length during the time of the visit  Guin Kath also suggested that, for stability and safety, she continue to use a cane/walker for safety and stability    Check comprehensive metabolic panel prior to next office visit      2   Hypothyroidism:  Most recent TSH and free T4 are normal   She states that she is taking her levothyroxine consistently and in the proper manner   I have asked her to increase her levothyroxine to 112 mcg Monday through Saturday and 2 tablets on Sunday  Check TSH and free T4 prior to next office visit      3   Hypertension: Reassessed at the end of the visit to 144/84  Continue current regimen  Check comprehensive metabolic panel prior to next visit      4   Hyperlipidemia:  Stable  Managed by PCP   Continue simvastatin      5   Vitamin-D deficiency:  Stable at 48 2   Continue supplementation with vitamin D3 daily       CC:   Hypothyroidism/ osteoporosis follow-up    History of Present Illness     HPI:  82 y  o  female with history of hypertension, hyperlipidemia, hypothyroidism, vitamin-D deficiency, osteoporosis, GERD, blood clots who presents for follow up       For her hypothyroidism, she takes levothyroxine 112 mcg tablets but she takes 2 tablets on Sundays   Her most recent TSH from May 19, 2021 is 1 095 with a free T4 of 1 42   Kayla Naik is feeling well but complains of some lingering fatigue      She has a history of osteoporosis and is currently being treated with Prolia   Her most recent  DEXA scan from May 17, 2021 shows a T-score of -2 8 in the left forearm indicative of osteoporosis  La Nena Douglas will be due for her 15th injection of Prolia today  She is tolerating it well   She did sustain a fall on January 6, 2019 but denies any falls since   Her most recent calcium from May 19, 2021 is 9 8     For hypertension, she takes losartan 50 mg daily, metoprolol 25 mg twice daily and hydrochlorothiazide 12 5 mg daily        For hyperlipidemia, she takes simvastatin 20 mg daily        For vitamin-D deficiency, she is on 2000 units daily of vitamin-D  Her most recent 25 hydroxy vitamin-D level from May 19, 2021 is 48 2    Review of Systems   Constitutional: Negative for chills, fatigue and fever  HENT: Positive for hearing loss ( hearing aids)  Negative for trouble swallowing and voice change  Eyes: Negative  Negative for visual disturbance  Respiratory: Negative  Negative for chest tightness and shortness of breath  Cardiovascular: Negative  Negative for chest pain  Gastrointestinal: Negative  Negative for abdominal pain, constipation, diarrhea and vomiting  Endocrine: Negative for cold intolerance, heat intolerance, polydipsia, polyphagia and polyuria  Genitourinary: Negative  Musculoskeletal: Positive for arthralgias ( right shoulder and hip) and back pain ( chronic)  Skin: Negative  Allergic/Immunologic: Negative  Neurological: Negative  Negative for dizziness, syncope, light-headedness and headaches  Hematological: Negative  Psychiatric/Behavioral: Negative  All other systems reviewed and are negative        Historical Information   Past Medical History:   Diagnosis Date    Cancer ([place] [address] )     squamous cell - face    Disease of thyroid gland     GERD (gastroesophageal reflux disease)     Heart murmur     [facility] )     Hyperlipidemia     Hypertension     Microscopic colitis     Osteoporosis     PE (pulmonary thromboembolism) ([place] [address] )     Sjogren's syndrome ([facility] [address] )     Urinary tract infection      Past Surgical History:   Procedure Laterality Date    COLONOSCOPY  07/18/2016     grade 2 internal hemorrhoids but otherwise normal, pathology negative for microscopic colitis    HYSTERECTOMY      JOINT REPLACEMENT      KNEE SURGERY      OOPHORECTOMY      SHOULDER SURGERY      right    SPINE SURGERY      TOE SURGERY      TONSILLECTOMY      UPPER GASTROINTESTINAL ENDOSCOPY  01/09/2015     reflux esophagitis, gastritis, duodenitis in the bulb, pathology negative for H pylori, Puckett's, EOE     Social History   Social History     Substance and Sexual Activity   Alcohol Use Never     Social History     Substance and Sexual Activity   Drug Use No     Social History     Tobacco Use   Smoking Status Never Smoker   Smokeless Tobacco Never Used     Family History:   Family History   Problem Relation Age of Onset    Stroke Father     Ovarian cancer Sister     No Known Problems Son     No Known Problems Son     Ovarian cancer Daughter     No Known Problems Daughter     No Known Problems Daughter     No Known Problems Maternal Aunt     No Known Problems Paternal Aunt     Colon cancer Neg Hx     Colon polyps Neg Hx        Meds/Allergies   Current Outpatient Medications   Medication Sig Dispense Refill    Cholecalciferol (VITAMIN D) 2000 UNITS tablet Take 3,000 Units by mouth daily        cycloSPORINE (RESTASIS) 0 05 % ophthalmic emulsion Administer 1 drop to both eyes 2 (two) times a day   denosumab (PROLIA) 60 mg/mL Inject 60 mg under the skin every 6 (six) months      DHA-EPA-Flaxseed Oil-Vitamin E (THERA TEARS) CAPS Take 4 capsules by mouth daily        diphenoxylate-atropine (LOMOTIL) 2 5-0 025 mg per tablet TAKE 1 TABLET BY MOUTH 4 (FOUR) TIMES A DAY AS NEEDED FOR DIARRHEA 60 tablet 3    hydrochlorothiazide (HYDRODIURIL) 12 5 mg tablet TAKE 1 TABLET BY MOUTH EVERY DAY 90 tablet 0    hydroxychloroquine (PLAQUENIL) 200 mg tablet Take 200 mg by mouth daily   levothyroxine 112 mcg tablet Take 1 tablet Monday through saturday and 2 tablets on sundays 102 tablet 1    losartan (COZAAR) 50 mg tablet Take 1 tablet (50 mg total) by mouth daily 90 tablet 3    metoprolol tartrate (LOPRESSOR) 25 mg tablet Take 1 tablet (25 mg total) by mouth every 12 (twelve) hours for 30 days 60 tablet 0    Multiple Vitamins-Minerals (OCUVITE PO) Take 1 tablet by mouth daily   polyvinyl alcohol (LIQUIFILM TEARS) 1 4 % ophthalmic solution Administer 1 drop to both eyes as needed for dry eyes      simvastatin (ZOCOR) 20 mg tablet Take 20 mg by mouth daily at bedtime   diclofenac sodium (VOLTAREN) 1 % Apply 2 g topically 4 (four) times a day (Patient not taking: Reported on 12/29/2020) 1 Tube 1    pantoprazole (PROTONIX) 40 mg tablet Take 1 tablet (40 mg total) by mouth 2 (two) times a day before meals 60 tablet 0     No current facility-administered medications for this visit  Allergies   Allergen Reactions    Sulfa Antibiotics Hives       Objective   Vitals: Blood pressure 170/94, pulse 68, height 5' 3" (1 6 m), weight 61 1 kg (134 lb 9 6 oz), not currently breastfeeding  Physical Exam  Vitals reviewed  Constitutional:       Appearance: She is well-developed  HENT:      Head: Normocephalic and atraumatic  Eyes:      Conjunctiva/sclera: Conjunctivae normal       Pupils: Pupils are equal, round, and reactive to light  Comments:  Wears glasses   Cardiovascular:      Rate and Rhythm: Normal rate and regular rhythm  Heart sounds: Murmur heard  Pulmonary:      Effort: Pulmonary effort is normal       Breath sounds: Normal breath sounds  Abdominal:      General: Bowel sounds are normal       Palpations: Abdomen is soft  Musculoskeletal:         General: Normal range of motion  Cervical back: Normal range of motion and neck supple  Skin:     General: Skin is warm and dry     Neurological:      Mental Status: She is alert and oriented to person, place, and time  Psychiatric:         Behavior: Behavior normal          Thought Content: Thought content normal          Judgment: Judgment normal          Lab Results:   Lab Results   Component Value Date/Time    TSH [address] [place] [address] 1 095 05/19/2021 07:09 AM    TSH [address] [place] [address] 1 970 11/23/2020 06:36 AM    TSH [address] [place] [address] 1 340 10/14/2020 08:02 AM    Free T4 1 34 05/19/2021 07:09 AM    Free T4 1 42 11/23/2020 06:36 AM    Free T4 1 51 (H) 08/13/2020 06:35 AM     Portions of the record may have been created with voice recognition software  Occasional wrong word or "sound a like" substitutions may have occurred due to the inherent limitations of voice recognition software  Read the chart carefully and recognize, using context, where substitutions have occurred

## 2021-07-21 NOTE — PATIENT INSTRUCTIONS
Continue a cane/walker for stability      Continue Levothyroxine to 112 mcg daily Monday or Saturday and 2 tablets on Sunday      Continue supplementation with vitamin D      Continue treatment with Prolia      Please obtain lab work prior to next office visit

## 2021-08-05 DIAGNOSIS — E03.9 HYPOTHYROIDISM IN ADULT: ICD-10-CM

## 2021-08-05 RX ORDER — LEVOTHYROXINE SODIUM 112 UG/1
TABLET ORAL
Qty: 102 TABLET | Refills: 1 | Status: SHIPPED | OUTPATIENT
Start: 2021-08-05 | End: 2021-12-13

## 2021-12-10 ENCOUNTER — NEW PATIENT (OUTPATIENT)
Dept: URBAN - METROPOLITAN AREA CLINIC 6 | Facility: CLINIC | Age: 86
End: 2021-12-10

## 2021-12-10 DIAGNOSIS — H04.123: ICD-10-CM

## 2021-12-10 DIAGNOSIS — Z96.1: ICD-10-CM

## 2021-12-10 DIAGNOSIS — H26.493: ICD-10-CM

## 2021-12-10 PROCEDURE — 92004 COMPRE OPH EXAM NEW PT 1/>: CPT

## 2021-12-10 PROCEDURE — G8427 DOCREV CUR MEDS BY ELIG CLIN: HCPCS

## 2021-12-10 PROCEDURE — 1036F TOBACCO NON-USER: CPT

## 2021-12-10 PROCEDURE — 92134 CPTRZ OPH DX IMG PST SGM RTA: CPT | Mod: NC

## 2021-12-10 ASSESSMENT — TONOMETRY
OD_IOP_MMHG: 11
OS_IOP_MMHG: 12

## 2021-12-10 ASSESSMENT — VISUAL ACUITY
OS_SC: 20/50
OS_CC: J1
OS_PH: 20/40+2
OD_CC: J3
OD_SC: 20/30+2

## 2021-12-13 DIAGNOSIS — E03.9 HYPOTHYROIDISM IN ADULT: ICD-10-CM

## 2021-12-13 RX ORDER — LEVOTHYROXINE SODIUM 112 UG/1
TABLET ORAL
Qty: 102 TABLET | Refills: 1 | Status: SHIPPED | OUTPATIENT
Start: 2021-12-13 | End: 2022-05-13

## 2021-12-22 ENCOUNTER — LAB (OUTPATIENT)
Dept: LAB | Facility: HOSPITAL | Age: 86
End: 2021-12-22
Payer: COMMERCIAL

## 2021-12-22 DIAGNOSIS — I10 HYPERTENSION, UNSPECIFIED TYPE: ICD-10-CM

## 2021-12-22 DIAGNOSIS — E55.9 VITAMIN D DEFICIENCY: ICD-10-CM

## 2021-12-22 DIAGNOSIS — Z86.711 PERSONAL HISTORY OF PE (PULMONARY EMBOLISM): ICD-10-CM

## 2021-12-22 DIAGNOSIS — Z79.01 LONG TERM (CURRENT) USE OF ANTICOAGULANTS: ICD-10-CM

## 2021-12-22 DIAGNOSIS — I10 ESSENTIAL HYPERTENSION, MALIGNANT: ICD-10-CM

## 2021-12-22 DIAGNOSIS — E78.5 HYPERLIPIDEMIA, UNSPECIFIED HYPERLIPIDEMIA TYPE: ICD-10-CM

## 2021-12-22 DIAGNOSIS — I51.9 MYXEDEMA HEART DISEASE: ICD-10-CM

## 2021-12-22 DIAGNOSIS — M81.0 AGE-RELATED OSTEOPOROSIS WITHOUT CURRENT PATHOLOGICAL FRACTURE: ICD-10-CM

## 2021-12-22 DIAGNOSIS — E78.2 MIXED HYPERLIPIDEMIA: ICD-10-CM

## 2021-12-22 DIAGNOSIS — I10 ESSENTIAL HYPERTENSION: ICD-10-CM

## 2021-12-22 DIAGNOSIS — E03.9 MYXEDEMA HEART DISEASE: ICD-10-CM

## 2021-12-22 DIAGNOSIS — E03.9 HYPOTHYROIDISM IN ADULT: ICD-10-CM

## 2021-12-22 LAB
25(OH)D3 SERPL-MCNC: 57.4 NG/ML (ref 30–100)
ALBUMIN SERPL BCP-MCNC: 4.1 G/DL (ref 3.5–5)
ALP SERPL-CCNC: 58 U/L (ref 46–116)
ALT SERPL W P-5'-P-CCNC: 33 U/L (ref 12–78)
ANION GAP SERPL CALCULATED.3IONS-SCNC: 5 MMOL/L (ref 4–13)
AST SERPL W P-5'-P-CCNC: 31 U/L (ref 5–45)
BASOPHILS # BLD AUTO: 0.05 THOUSANDS/ΜL (ref 0–0.1)
BASOPHILS NFR BLD AUTO: 1 % (ref 0–1)
BILIRUB SERPL-MCNC: 0.43 MG/DL (ref 0.2–1)
BUN SERPL-MCNC: 25 MG/DL (ref 5–25)
CALCIUM SERPL-MCNC: 10.5 MG/DL (ref 8.3–10.1)
CHLORIDE SERPL-SCNC: 105 MMOL/L (ref 100–108)
CHOLEST SERPL-MCNC: 147 MG/DL
CO2 SERPL-SCNC: 28 MMOL/L (ref 21–32)
CREAT SERPL-MCNC: 1 MG/DL (ref 0.6–1.3)
EOSINOPHIL # BLD AUTO: 0.15 THOUSAND/ΜL (ref 0–0.61)
EOSINOPHIL NFR BLD AUTO: 3 % (ref 0–6)
ERYTHROCYTE [DISTWIDTH] IN BLOOD BY AUTOMATED COUNT: 12.6 % (ref 11.6–15.1)
GFR SERPL CREATININE-BSD FRML MDRD: 51 ML/MIN/1.73SQ M
GLUCOSE P FAST SERPL-MCNC: 95 MG/DL (ref 65–99)
HCT VFR BLD AUTO: 42.2 % (ref 34.8–46.1)
HDLC SERPL-MCNC: 48 MG/DL
HGB BLD-MCNC: 13.9 G/DL (ref 11.5–15.4)
IMM GRANULOCYTES # BLD AUTO: 0 THOUSAND/UL (ref 0–0.2)
IMM GRANULOCYTES NFR BLD AUTO: 0 % (ref 0–2)
LDLC SERPL CALC-MCNC: 75 MG/DL (ref 0–100)
LYMPHOCYTES # BLD AUTO: 2.02 THOUSANDS/ΜL (ref 0.6–4.47)
LYMPHOCYTES NFR BLD AUTO: 38 % (ref 14–44)
MCH RBC QN AUTO: 31.9 PG (ref 26.8–34.3)
MCHC RBC AUTO-ENTMCNC: 32.9 G/DL (ref 31.4–37.4)
MCV RBC AUTO: 97 FL (ref 82–98)
MONOCYTES # BLD AUTO: 0.87 THOUSAND/ΜL (ref 0.17–1.22)
MONOCYTES NFR BLD AUTO: 16 % (ref 4–12)
NEUTROPHILS # BLD AUTO: 2.22 THOUSANDS/ΜL (ref 1.85–7.62)
NEUTS SEG NFR BLD AUTO: 42 % (ref 43–75)
NONHDLC SERPL-MCNC: 99 MG/DL
NRBC BLD AUTO-RTO: 0 /100 WBCS
PLATELET # BLD AUTO: 188 THOUSANDS/UL (ref 149–390)
PMV BLD AUTO: 11.2 FL (ref 8.9–12.7)
POTASSIUM SERPL-SCNC: 4.6 MMOL/L (ref 3.5–5.3)
PROT SERPL-MCNC: 8 G/DL (ref 6.4–8.2)
RBC # BLD AUTO: 4.36 MILLION/UL (ref 3.81–5.12)
SODIUM SERPL-SCNC: 138 MMOL/L (ref 136–145)
T4 FREE SERPL-MCNC: 1.44 NG/DL (ref 0.76–1.46)
T4 FREE SERPL-MCNC: 1.57 NG/DL (ref 0.76–1.46)
TRIGL SERPL-MCNC: 122 MG/DL
TSH SERPL DL<=0.05 MIU/L-ACNC: 0.17 UIU/ML (ref 0.36–3.74)
WBC # BLD AUTO: 5.31 THOUSAND/UL (ref 4.31–10.16)

## 2021-12-22 PROCEDURE — 84439 ASSAY OF FREE THYROXINE: CPT

## 2021-12-22 PROCEDURE — 85025 COMPLETE CBC W/AUTO DIFF WBC: CPT

## 2021-12-22 PROCEDURE — 80061 LIPID PANEL: CPT

## 2021-12-22 PROCEDURE — 80053 COMPREHEN METABOLIC PANEL: CPT

## 2021-12-22 PROCEDURE — 82306 VITAMIN D 25 HYDROXY: CPT

## 2021-12-22 PROCEDURE — 36415 COLL VENOUS BLD VENIPUNCTURE: CPT

## 2021-12-22 PROCEDURE — 84443 ASSAY THYROID STIM HORMONE: CPT

## 2021-12-28 ENCOUNTER — TELEPHONE (OUTPATIENT)
Dept: ENDOCRINOLOGY | Facility: HOSPITAL | Age: 86
End: 2021-12-28

## 2021-12-28 DIAGNOSIS — E03.9 HYPOTHYROIDISM IN ADULT: Primary | ICD-10-CM

## 2022-01-10 ENCOUNTER — SURGERY/PROCEDURE (OUTPATIENT)
Dept: URBAN - METROPOLITAN AREA SURGICAL CENTER 6 | Facility: SURGICAL CENTER | Age: 87
End: 2022-01-10

## 2022-01-10 DIAGNOSIS — Z96.1: ICD-10-CM

## 2022-01-10 DIAGNOSIS — H26.492: ICD-10-CM

## 2022-01-10 PROCEDURE — 66821 AFTER CATARACT LASER SURGERY: CPT

## 2022-01-11 DIAGNOSIS — I10 HYPERTENSION, UNSPECIFIED TYPE: ICD-10-CM

## 2022-01-12 RX ORDER — LOSARTAN POTASSIUM 50 MG/1
50 TABLET ORAL DAILY
Qty: 90 TABLET | Refills: 3 | Status: SHIPPED | OUTPATIENT
Start: 2022-01-12

## 2022-01-14 ENCOUNTER — APPOINTMENT (OUTPATIENT)
Dept: LAB | Facility: HOSPITAL | Age: 87
End: 2022-01-14
Attending: INTERNAL MEDICINE
Payer: COMMERCIAL

## 2022-01-14 DIAGNOSIS — E03.9 HYPOTHYROIDISM IN ADULT: ICD-10-CM

## 2022-01-14 LAB
T4 FREE SERPL-MCNC: 1.47 NG/DL (ref 0.76–1.46)
TSH SERPL DL<=0.05 MIU/L-ACNC: 0.32 UIU/ML (ref 0.36–3.74)

## 2022-01-14 PROCEDURE — 84439 ASSAY OF FREE THYROXINE: CPT

## 2022-01-14 PROCEDURE — 84443 ASSAY THYROID STIM HORMONE: CPT

## 2022-01-14 PROCEDURE — 36415 COLL VENOUS BLD VENIPUNCTURE: CPT

## 2022-01-19 ENCOUNTER — TELEPHONE (OUTPATIENT)
Dept: ENDOCRINOLOGY | Facility: HOSPITAL | Age: 87
End: 2022-01-19

## 2022-01-19 NOTE — TELEPHONE ENCOUNTER
Patient has an appointment with you on 1/25/22 with her Prolia injection  She had a CMP done in December  Please review for injection next week

## 2022-01-24 ENCOUNTER — TELEPHONE (OUTPATIENT)
Dept: ENDOCRINOLOGY | Facility: HOSPITAL | Age: 87
End: 2022-01-24

## 2022-01-24 NOTE — TELEPHONE ENCOUNTER
L/m for call back  I still don't have her summary of benefits for tomorrow  She can still come in and come back for her Prolia or reschedule for another day  I did call [employer] and they are putting a rush on her benefits so I may or may not have them back in time

## 2022-01-25 ENCOUNTER — OFFICE VISIT (OUTPATIENT)
Dept: ENDOCRINOLOGY | Facility: HOSPITAL | Age: 87
End: 2022-01-25
Payer: COMMERCIAL

## 2022-01-25 VITALS
SYSTOLIC BLOOD PRESSURE: 124 MMHG | HEIGHT: 63 IN | HEART RATE: 64 BPM | BODY MASS INDEX: 23.92 KG/M2 | WEIGHT: 135 LBS | DIASTOLIC BLOOD PRESSURE: 80 MMHG

## 2022-01-25 DIAGNOSIS — I10 ESSENTIAL HYPERTENSION: ICD-10-CM

## 2022-01-25 DIAGNOSIS — E03.9 HYPOTHYROIDISM IN ADULT: Primary | ICD-10-CM

## 2022-01-25 DIAGNOSIS — E78.5 HYPERLIPIDEMIA, UNSPECIFIED HYPERLIPIDEMIA TYPE: ICD-10-CM

## 2022-01-25 DIAGNOSIS — M81.0 AGE-RELATED OSTEOPOROSIS WITHOUT CURRENT PATHOLOGICAL FRACTURE: ICD-10-CM

## 2022-01-25 DIAGNOSIS — E55.9 VITAMIN D DEFICIENCY: ICD-10-CM

## 2022-01-25 PROCEDURE — 99214 OFFICE O/P EST MOD 30 MIN: CPT | Performed by: NURSE PRACTITIONER

## 2022-01-25 PROCEDURE — 96372 THER/PROPH/DIAG INJ SC/IM: CPT | Performed by: NURSE PRACTITIONER

## 2022-01-25 RX ORDER — WARFARIN SODIUM 4 MG/1
TABLET ORAL
COMMUNITY
Start: 2021-12-29

## 2022-01-25 RX ORDER — WARFARIN SODIUM 1 MG/1
TABLET ORAL
COMMUNITY
Start: 2022-01-05

## 2022-01-25 RX ORDER — PILOCARPINE HYDROCHLORIDE 5 MG/1
5 TABLET, FILM COATED ORAL 4 TIMES DAILY
COMMUNITY
Start: 2021-12-15

## 2022-01-25 RX ORDER — OMEPRAZOLE 20 MG/1
20 CAPSULE, DELAYED RELEASE ORAL DAILY
COMMUNITY
Start: 2021-11-09

## 2022-01-25 NOTE — PROGRESS NOTES
Cory Garcia 82 y o  female MRN: [id number]    Encounter: [id number]      Assessment/Plan     Assessment: This is a 82y o -year-old female with hypothyroidism, osteoporosis, hypertension, hyperlipidemia and vitamin-D deficiency  Plan:  1   Osteoporosis:  Her most recent  DEXA scan from May 17, 2021 shows a T-score of -2 8 in the left forearm   She will continue supplementation of calcium and vitamin-D  Recheck calcium level prior to next Prolia injection  She is due for her next injection today   Discussed fall risk and safety at length during the time of the visit  Alberto Sparrow also suggested that, for stability and safety, she continue to use a cane/walker for safety and stability    Check comprehensive metabolic panel prior to next office visit      2   Hypothyroidism:  TSH is slightly low with a slightly elevated free T4   She states that she is taking her levothyroxine consistently and in the proper manner   I have asked her to decrease her levothyroxine to 112 mcg daily  Check TSH and free T4  in 6 weeks to reassess prior to next office visit      3   Hypertension: She is normotensive in the office today  Continue current regimen   Check comprehensive metabolic panel prior to next visit      4   Hyperlipidemia:  Managed by PCP   Continue simvastatin      5   Vitamin-D deficiency:  Stable at 57  4   Continue supplementation with vitamin D3 daily       CC: Hypothyroidism/ osteoporosis follow-up    History of Present Illness     HPI:  85 y  o  female with history of hypertension, hyperlipidemia, hypothyroidism, vitamin-D deficiency, osteoporosis, GERD, blood clots who presents for follow up       For her hypothyroidism, she takes levothyroxine 112 mcg tablets but she takes 2 tablets on Sundays   Her most recent TSH from  January 14, 2022 is 0 319 with a free T4 of 1 47   Riccardo Johnson is feeling well but complains of some lingering fatigue      She has a history of osteoporosis and is currently being treated with Prolia   Her most recent  DEXA scan from May 17, 2021 shows a T-score of -2 8 in the left forearm indicative of osteoporosis  Daniele Baez will be due for her 15th injection of Prolia today  She is tolerating it well   She did sustain a fall on January 6, 2019 but denies any falls since   Her most recent calcium from December 22, 2021 is 10 4     For hypertension, she takes losartan 50 mg daily, metoprolol 25 mg twice daily and hydrochlorothiazide 12 5 mg daily        For hyperlipidemia, she takes simvastatin 20 mg daily        For vitamin-D deficiency, she is on 2000 units daily of vitamin-D   Her most recent 25 hydroxy vitamin-D level from  January 14, 2022 is 57  4        Review of Systems   Constitutional: Negative  Negative for chills, fatigue and fever  HENT: Positive for hearing loss (hearing aids)  Negative for trouble swallowing and voice change  Eyes: Negative  Negative for visual disturbance  Respiratory: Negative  Negative for chest tightness and shortness of breath  Cardiovascular: Negative  Negative for chest pain  Gastrointestinal: Negative  Negative for abdominal pain, constipation, diarrhea and vomiting  Endocrine: Negative for cold intolerance, heat intolerance, polydipsia, polyphagia and polyuria  Genitourinary: Negative  Musculoskeletal: Positive for arthralgias (right shoulder and hips) and back pain (chronic)  Skin: Negative  Allergic/Immunologic: Negative  Neurological: Negative  Negative for dizziness, syncope, light-headedness and headaches  Hematological: Negative  Psychiatric/Behavioral: Negative  All other systems reviewed and are negative        Historical Information   Past Medical History:   Diagnosis Date    Cancer ([facility] )     squamous cell - face    Disease of thyroid gland     GERD (gastroesophageal reflux disease)     Heart murmur     [facility]     Hyperlipidemia     Hypertension     Microscopic colitis     Osteoporosis     [facility]ism[facility]     Sjogren's syndrome ([facility] )     Urinary tract infection      Past Surgical History:   Procedure Laterality Date    COLONOSCOPY  07/18/2016     grade 2 internal hemorrhoids but otherwise normal, pathology negative for microscopic colitis    HYSTERECTOMY      JOINT REPLACEMENT      KNEE SURGERY      OOPHORECTOMY      SHOULDER SURGERY      right    SPINE SURGERY      TOE SURGERY      TONSILLECTOMY      UPPER GASTROINTESTINAL ENDOSCOPY  01/09/2015     reflux esophagitis, gastritis, duodenitis in the bulb, pathology negative for H pylori, Puckett's, EOE     Social History   Social History     Substance and Sexual Activity   Alcohol Use Never     Social History     Substance and Sexual Activity   Drug Use No     Social History     Tobacco Use   Smoking Status Never Smoker   Smokeless Tobacco Never Used     Family History:   Family History   Problem Relation Age of Onset    Stroke Father     Ovarian cancer Sister     No Known Problems Son     No Known Problems Son     Ovarian cancer Daughter     No Known Problems Daughter     No Known Problems Daughter     No Known Problems Maternal Aunt     No Known Problems Paternal Aunt     Colon cancer Neg Hx     Colon polyps Neg Hx        Meds/Allergies   Current Outpatient Medications   Medication Sig Dispense Refill    Cholecalciferol (VITAMIN D) 2000 UNITS tablet Take 3,000 Units by mouth daily        cycloSPORINE (RESTASIS) 0 05 % ophthalmic emulsion Administer 1 drop to both eyes 2 (two) times a day   denosumab (PROLIA) 60 mg/mL Inject 60 mg under the skin every 6 (six) months      DHA-EPA-Flaxseed Oil-Vitamin E (THERA TEARS) CAPS Take 4 capsules by mouth daily        diphenoxylate-atropine (LOMOTIL) 2 5-0 025 mg per tablet TAKE 1 TABLET BY MOUTH 4 (FOUR) TIMES A DAY AS NEEDED FOR DIARRHEA 60 tablet 3    hydrochlorothiazide (HYDRODIURIL) 12 5 mg tablet TAKE 1 TABLET BY MOUTH EVERY DAY 90 tablet 0    hydroxychloroquine (PLAQUENIL) 200 mg tablet Take 200 mg by mouth daily   levothyroxine 112 mcg tablet TAKE 1 TABLET BY MOUTH ONCE DAILY MONDAY THROUGH SATURDAY AND 2 TABLETS ON SUNDAYS 102 tablet 1    losartan (COZAAR) 50 mg tablet Take 1 tablet (50 mg total) by mouth daily 90 tablet 3    metoprolol tartrate (LOPRESSOR) 25 mg tablet Take 1 tablet (25 mg total) by mouth every 12 (twelve) hours for 30 days 60 tablet 0    Multiple Vitamins-Minerals (OCUVITE PO) Take 1 tablet by mouth daily   polyvinyl alcohol (LIQUIFILM TEARS) 1 4 % ophthalmic solution Administer 1 drop to both eyes as needed for dry eyes      simvastatin (ZOCOR) 20 mg tablet Take 20 mg by mouth daily at bedtime  No current facility-administered medications for this visit  Allergies   Allergen Reactions    Sulfa Antibiotics Hives       Objective   Vitals: not currently breastfeeding  Physical Exam  Vitals reviewed  Constitutional:       Appearance: She is well-developed  HENT:      Head: Normocephalic and atraumatic  Eyes:      Conjunctiva/sclera: Conjunctivae normal       Pupils: Pupils are equal, round, and reactive to light  Comments: Wears glasses   Cardiovascular:      Rate and Rhythm: Normal rate and regular rhythm  Heart sounds: Murmur heard  Pulmonary:      Effort: Pulmonary effort is normal       Breath sounds: Normal breath sounds  Abdominal:      General: Bowel sounds are normal       Palpations: Abdomen is soft  Musculoskeletal:         General: Normal range of motion  Cervical back: Normal range of motion and neck supple  Skin:     General: Skin is warm and dry  Neurological:      Mental Status: She is alert and oriented to person, place, and time  Psychiatric:         Behavior: Behavior normal          Thought Content:  Thought content normal          Judgment: Judgment normal        Lab Results:   Lab Results   Component Value Date/Time    TSH 3RD GENERATON 0 319 (L) 01/14/2022 10:03 AM    TSH 3RD GENERATON 0 173 (L) 12/22/2021 07:11 AM    TSH 3RD GENERATON 1 095 05/19/2021 07:09 AM    Free T4 1 47 (H) 01/14/2022 10:03 AM    Free T4 1 57 (H) 12/22/2021 07:11 AM    Free T4 1 44 12/22/2021 07:11 AM       Portions of the record may have been created with voice recognition software  Occasional wrong word or "sound a like" substitutions may have occurred due to the inherent limitations of voice recognition software  Read the chart carefully and recognize, using context, where substitutions have occurred

## 2022-01-25 NOTE — PATIENT INSTRUCTIONS
Continue Levothyroxine to 112 mcg daily - All 7 days of the week  Check thyroid lab work in about 6-8 weeks to reassess      Continue supplementation with vitamin D      Continue treatment with Prolia      Please obtain lab work prior to next office visit

## 2022-01-27 ENCOUNTER — POST-OP CHECK (OUTPATIENT)
Dept: URBAN - METROPOLITAN AREA CLINIC 6 | Facility: CLINIC | Age: 87
End: 2022-01-27

## 2022-01-27 DIAGNOSIS — Z96.1: ICD-10-CM

## 2022-01-27 PROCEDURE — 99024 POSTOP FOLLOW-UP VISIT: CPT

## 2022-01-27 ASSESSMENT — TONOMETRY
OS_IOP_MMHG: 9
OD_IOP_MMHG: 10

## 2022-01-27 ASSESSMENT — VISUAL ACUITY
OS_SC: 20/40+1
OD_SC: 20/30+1
OS_PH: 20/25-2

## 2022-02-08 ENCOUNTER — HOSPITAL ENCOUNTER (OUTPATIENT)
Dept: MAMMOGRAPHY | Facility: IMAGING CENTER | Age: 87
Discharge: HOME | End: 2022-02-08
Payer: COMMERCIAL

## 2022-02-08 VITALS — HEIGHT: 63 IN | BODY MASS INDEX: 23.92 KG/M2 | WEIGHT: 135 LBS

## 2022-02-08 DIAGNOSIS — Z01.419 WOMEN'S ANNUAL ROUTINE GYNECOLOGICAL EXAMINATION: ICD-10-CM

## 2022-02-08 DIAGNOSIS — Z12.31 ENCOUNTER FOR SCREENING MAMMOGRAM FOR MALIGNANT NEOPLASM OF BREAST: ICD-10-CM

## 2022-02-08 PROCEDURE — 77063 BREAST TOMOSYNTHESIS BI: CPT

## 2022-02-08 PROCEDURE — 77067 SCR MAMMO BI INCL CAD: CPT

## 2022-02-18 ENCOUNTER — OFFICE VISIT (OUTPATIENT)
Dept: GASTROENTEROLOGY | Facility: CLINIC | Age: 87
End: 2022-02-18
Payer: COMMERCIAL

## 2022-02-18 VITALS
SYSTOLIC BLOOD PRESSURE: 124 MMHG | WEIGHT: 138 LBS | DIASTOLIC BLOOD PRESSURE: 72 MMHG | BODY MASS INDEX: 24.45 KG/M2 | HEIGHT: 63 IN

## 2022-02-18 DIAGNOSIS — K59.1 FUNCTIONAL DIARRHEA: Primary | ICD-10-CM

## 2022-02-18 PROCEDURE — 99213 OFFICE O/P EST LOW 20 MIN: CPT | Performed by: INTERNAL MEDICINE

## 2022-02-18 NOTE — PROGRESS NOTES
[address] [facility] - Outpatient Follow-up Note  Jeronimo Fenton 82 y o  female MRN: [id number]  Encounter: [id number]    ASSESSMENT AND PLAN:      1  Diarrhea, functional  Developed in November 2020, labs and stools unremarkable  EGD negative for celiac, colonoscopy showed mild increase in lymphocytes  Has intermittent diarrhea, less than once per month with symptoms controlled with Lomotil  Currently no need for budesonide or other daily therapy for lymphocytic colitis but she will contact me if symptoms flare in the future    Followup Appointment:  1 year  ______________________________________________________________________    Chief Complaint   Patient presents with    Follow-up     annual visit     HPI:  The patient presents for follow-up on diarrhea  She was last seen in the office 1 year ago  Diarrhea has become less frequent  Less than once per month she has a flare-up that last a day or 2 and improved quickly with Lomotil  She is unaware of any specific food triggers  She denies any alarm symptoms like rectal bleeding or weight loss  She denies any upper GI symptoms like reflux, dysphagia or early satiety      Historical Information   Past Medical History:   Diagnosis Date    Cancer ([place] [address] )     squamous cell - face    Disease of thyroid gland     GERD (gastroesophageal reflux disease)     Heart murmur     [facility]coagulable state ([facility] )     Hyperlipidemia     Hypertension     Microscopic colitis     Osteoporosis     PE (pulmonary thromboembolism) ([facility] )     Sjogren's syndrome ([place] [address] )     Urinary tract infection      Past Surgical History:   Procedure Laterality Date    COLONOSCOPY  07/18/2016     grade 2 internal hemorrhoids but otherwise normal, pathology negative for microscopic colitis    HYSTERECTOMY      JOINT REPLACEMENT      KNEE SURGERY      OOPHORECTOMY      SHOULDER SURGERY      right    SPINE SURGERY      TOE SURGERY      TONSILLECTOMY      UPPER GASTROINTESTINAL ENDOSCOPY  01/09/2015     reflux esophagitis, gastritis, duodenitis in the bulb, pathology negative for H pylori, Puckett's, EOE     Social History     Substance and Sexual Activity   Alcohol Use Never     Social History     Substance and Sexual Activity   Drug Use No     Social History     Tobacco Use   Smoking Status Never Smoker   Smokeless Tobacco Never Used     Family History   Problem Relation Age of Onset    Stroke Father     Ovarian cancer Sister     No Known Problems Son     No Known Problems Son     Ovarian cancer Daughter     No Known Problems Daughter     No Known Problems Daughter     No Known Problems Maternal Aunt     No Known Problems Paternal Aunt     Colon cancer Neg Hx     Colon polyps Neg Hx          Current Outpatient Medications:     Cholecalciferol (VITAMIN D) 2000 UNITS tablet    cycloSPORINE (RESTASIS) 0 05 % ophthalmic emulsion    denosumab (PROLIA) 60 mg/mL    DHA-EPA-Flaxseed Oil-Vitamin E (THERA TEARS) CAPS    Diclofenac Sodium (VOLTAREN) 1 %    diphenoxylate-atropine (LOMOTIL) 2 5-0 025 mg per tablet    hydrochlorothiazide (HYDRODIURIL) 12 5 mg tablet    hydroxychloroquine (PLAQUENIL) 200 mg tablet    levothyroxine 112 mcg tablet    losartan (COZAAR) 50 mg tablet    metoprolol tartrate (LOPRESSOR) 25 mg tablet    Multiple Vitamins-Minerals (OCUVITE PO)    omeprazole (PriLOSEC) 20 mg delayed release capsule    pilocarpine (SALAGEN) 5 mg tablet    polyvinyl alcohol (LIQUIFILM TEARS) 1 4 % ophthalmic solution    simvastatin (ZOCOR) 20 mg tablet    warfarin (COUMADIN) 1 mg tablet    warfarin (COUMADIN) 4 mg tablet  Allergies   Allergen Reactions    Sulfa Antibiotics Hives     Reviewed medications and allergies and updated as indicated    PHYSICAL EXAM:    Blood pressure 124/72, height 5' 3" (1 6 m), weight 62 6 kg (138 lb), not currently breastfeeding  Body mass index is 24 45 kg/m²    General Appearance: NAD, cooperative, alert  Eyes: Anicteric, PERRLA, EOMI  ENT:  Normocephalic, atraumatic, normal mucosa  Neck:  Supple, symmetrical, trachea midline  Resp:  Clear to auscultation bilaterally; no rales, rhonchi or wheezing; respirations unlabored   CV:  S1 S2, Regular rate and rhythm; no murmur, rub, or gallop  GI:  Soft, non-tender, non-distended; normal bowel sounds; no masses, no organomegaly   Rectal: Deferred  Musculoskeletal: No cyanosis, clubbing or edema  Normal ROM  Skin:  No jaundice, rashes, or lesions   Heme/Lymph: No palpable cervical lymphadenopathy  Psych: Normal affect, good eye contact  Neuro: No gross deficits, AAOx3    Lab Results:   Lab Results   Component Value Date    WBC 5 31 12/22/2021    HGB 13 9 12/22/2021    HCT 42 2 12/22/2021    MCV 97 12/22/2021     12/22/2021     Lab Results   Component Value Date     01/25/2015    K 4 6 12/22/2021     12/22/2021    CO2 28 12/22/2021    ANIONGAP 10 01/25/2015    BUN 25 12/22/2021    CREATININE 1 00 12/22/2021    GLUCOSE 150 (H) 01/06/2019    GLUF 95 12/22/2021    CALCIUM 10 5 (H) 12/22/2021    CORRECTEDCA 10 6 (H) 07/02/2020    AST 31 12/22/2021    ALT 33 12/22/2021    ALKPHOS 58 12/22/2021    PROT 6 5 01/21/2015    BILITOT 0 6 01/21/2015    EGFR 51 12/22/2021     Lab Results   Component Value Date    IRON 100 12/28/2020    TIBC 273 12/28/2020    FERRITIN 126 12/28/2020     No results found for: LIPASE    Radiology Results:   Mammo screening bilateral w 3d & cad    Result Date: 2/9/2022  Narrative: DIAGNOSIS: Women's annual routine gynecological examination; Encounter for screening mammogram for malignant neoplasm of breast TECHNIQUE: Digital screening mammography was performed  Computer Aided Detection (CAD) analyzed all applicable images  COMPARISONS: Prior breast imaging dated: 11/27/2020, 03/15/2020, 10/04/2019, 10/03/2018, and 12/31/2013 RELEVANT HISTORY: Family Breast Cancer History: No known family history of breast cancer   Family Medical History: Family medical history includes ovarian cancer in 2 relatives (daughter, sister)  Personal History: No known relevant hormone history  Surgical history includes hysterectomy and oophorectomy  No known relevant medical history  The patient is scheduled in a reminder system for screening mammography  8-10% of cancers will be missed on mammography  Management of a palpable abnormality must be based on clinical grounds  Patients will be notified of their results via letter from our facility  Accredited by [address] [place] [address] of Radiology and [facility]  RISK ASSESSMENT: 5 Year Tyrer-Cuzick: No Score 10 Year Tyrer-Cuzick: No Score Lifetime Tyrer-Cuzick: 0 11 % TISSUE DENSITY: There are scattered areas of fibroglandular density  INDICATION: Meryl Hinton is a 82 y o  female presenting for screening mammography  FINDINGS: Bilateral There are no suspicious masses, grouped microcalcifications or areas of unexplained architectural distortion  The skin and nipple areolar complex are unremarkable  Extensive atherosclerotic calcifications are noted  Impression: No mammographic evidence of malignancy  ASSESSMENT/BI-RADS CATEGORY: Left: 2 - Benign Right: 2 - Benign Overall: 2 - Benign RECOMMENDATION:      - Routine screening mammogram in 1 year for both breasts   Workstation ID: [id number]

## 2022-03-08 ENCOUNTER — LAB (OUTPATIENT)
Dept: LAB | Facility: HOSPITAL | Age: 87
End: 2022-03-08
Payer: COMMERCIAL

## 2022-03-08 DIAGNOSIS — E03.9 HYPOTHYROIDISM IN ADULT: ICD-10-CM

## 2022-03-08 LAB
T4 FREE SERPL-MCNC: 1.45 NG/DL (ref 0.76–1.46)
TSH SERPL DL<=0.05 MIU/L-ACNC: 1.01 UIU/ML (ref 0.36–3.74)

## 2022-03-08 PROCEDURE — 36415 COLL VENOUS BLD VENIPUNCTURE: CPT

## 2022-03-08 PROCEDURE — 84443 ASSAY THYROID STIM HORMONE: CPT

## 2022-03-08 PROCEDURE — 84439 ASSAY OF FREE THYROXINE: CPT

## 2022-03-09 NOTE — RESULT ENCOUNTER NOTE
Please call the patient regarding result  TSH and free T4 are now normal   Continue current regimen

## 2022-06-06 ENCOUNTER — LAB (OUTPATIENT)
Dept: LAB | Facility: HOSPITAL | Age: 87
End: 2022-06-06
Payer: COMMERCIAL

## 2022-06-06 DIAGNOSIS — I10 ESSENTIAL HYPERTENSION: ICD-10-CM

## 2022-06-06 DIAGNOSIS — E78.2: ICD-10-CM

## 2022-06-06 DIAGNOSIS — E55.9 VITAMIN D DEFICIENCY: ICD-10-CM

## 2022-06-06 DIAGNOSIS — Z79.01 ANTICOAGULATION MONITORING, SPECIAL RANGE: ICD-10-CM

## 2022-06-06 DIAGNOSIS — D68.61 ANTIPHOSPHOLIPID ANTIBODY SYNDROME (HCC): ICD-10-CM

## 2022-06-06 DIAGNOSIS — M35.00 SJOGREN'S SYNDROME WITHOUT EXTRAGLANDULAR INVOLVEMENT (HCC): ICD-10-CM

## 2022-06-06 DIAGNOSIS — E03.9 HYPOTHYROIDISM IN ADULT: ICD-10-CM

## 2022-06-06 DIAGNOSIS — M81.0 AGE-RELATED OSTEOPOROSIS WITHOUT CURRENT PATHOLOGICAL FRACTURE: ICD-10-CM

## 2022-06-06 DIAGNOSIS — E78.5 HYPERLIPIDEMIA, UNSPECIFIED HYPERLIPIDEMIA TYPE: ICD-10-CM

## 2022-06-06 LAB
25(OH)D3 SERPL-MCNC: 59.6 NG/ML (ref 30–100)
ALBUMIN SERPL BCP-MCNC: 3.9 G/DL (ref 3.5–5)
ALP SERPL-CCNC: 54 U/L (ref 46–116)
ALT SERPL W P-5'-P-CCNC: 45 U/L (ref 12–78)
ANION GAP SERPL CALCULATED.3IONS-SCNC: 7 MMOL/L (ref 4–13)
AST SERPL W P-5'-P-CCNC: 30 U/L (ref 5–45)
BASOPHILS # BLD AUTO: 0.07 THOUSANDS/ΜL (ref 0–0.1)
BASOPHILS NFR BLD AUTO: 1 % (ref 0–1)
BILIRUB SERPL-MCNC: 0.69 MG/DL (ref 0.2–1)
BUN SERPL-MCNC: 22 MG/DL (ref 5–25)
CALCIUM SERPL-MCNC: 9.4 MG/DL (ref 8.3–10.1)
CHLORIDE SERPL-SCNC: 105 MMOL/L (ref 100–108)
CHOLEST SERPL-MCNC: 145 MG/DL
CO2 SERPL-SCNC: 26 MMOL/L (ref 21–32)
CREAT SERPL-MCNC: 0.88 MG/DL (ref 0.6–1.3)
EOSINOPHIL # BLD AUTO: 0.29 THOUSAND/ΜL (ref 0–0.61)
EOSINOPHIL NFR BLD AUTO: 5 % (ref 0–6)
ERYTHROCYTE [DISTWIDTH] IN BLOOD BY AUTOMATED COUNT: 13.2 % (ref 11.6–15.1)
ERYTHROCYTE [SEDIMENTATION RATE] IN BLOOD: 13 MM/HOUR (ref 0–29)
GFR SERPL CREATININE-BSD FRML MDRD: 60 ML/MIN/1.73SQ M
GLUCOSE P FAST SERPL-MCNC: 92 MG/DL (ref 65–99)
HCT VFR BLD AUTO: 39.6 % (ref 34.8–46.1)
HDLC SERPL-MCNC: 45 MG/DL
HGB BLD-MCNC: 13.4 G/DL (ref 11.5–15.4)
IMM GRANULOCYTES # BLD AUTO: 0.02 THOUSAND/UL (ref 0–0.2)
IMM GRANULOCYTES NFR BLD AUTO: 0 % (ref 0–2)
LDLC SERPL CALC-MCNC: 71 MG/DL (ref 0–100)
LYMPHOCYTES # BLD AUTO: 1.95 THOUSANDS/ΜL (ref 0.6–4.47)
LYMPHOCYTES NFR BLD AUTO: 32 % (ref 14–44)
MCH RBC QN AUTO: 32.1 PG (ref 26.8–34.3)
MCHC RBC AUTO-ENTMCNC: 33.8 G/DL (ref 31.4–37.4)
MCV RBC AUTO: 95 FL (ref 82–98)
MONOCYTES # BLD AUTO: 0.98 THOUSAND/ΜL (ref 0.17–1.22)
MONOCYTES NFR BLD AUTO: 16 % (ref 4–12)
NEUTROPHILS # BLD AUTO: 2.75 THOUSANDS/ΜL (ref 1.85–7.62)
NEUTS SEG NFR BLD AUTO: 46 % (ref 43–75)
NRBC BLD AUTO-RTO: 0 /100 WBCS
PLATELET # BLD AUTO: 173 THOUSANDS/UL (ref 149–390)
PMV BLD AUTO: 11.5 FL (ref 8.9–12.7)
POTASSIUM SERPL-SCNC: 4.3 MMOL/L (ref 3.5–5.3)
PROT SERPL-MCNC: 7.8 G/DL (ref 6.4–8.2)
RBC # BLD AUTO: 4.17 MILLION/UL (ref 3.81–5.12)
SODIUM SERPL-SCNC: 138 MMOL/L (ref 136–145)
T4 FREE SERPL-MCNC: 1.26 NG/DL (ref 0.76–1.46)
TRIGL SERPL-MCNC: 146 MG/DL
TSH SERPL DL<=0.05 MIU/L-ACNC: 1.9 UIU/ML (ref 0.45–4.5)
WBC # BLD AUTO: 6.06 THOUSAND/UL (ref 4.31–10.16)

## 2022-06-06 PROCEDURE — 80053 COMPREHEN METABOLIC PANEL: CPT

## 2022-06-06 PROCEDURE — 84443 ASSAY THYROID STIM HORMONE: CPT

## 2022-06-06 PROCEDURE — 80061 LIPID PANEL: CPT

## 2022-06-06 PROCEDURE — 85652 RBC SED RATE AUTOMATED: CPT

## 2022-06-06 PROCEDURE — 82306 VITAMIN D 25 HYDROXY: CPT

## 2022-06-06 PROCEDURE — 85025 COMPLETE CBC W/AUTO DIFF WBC: CPT

## 2022-06-06 PROCEDURE — 84439 ASSAY OF FREE THYROXINE: CPT

## 2022-06-06 PROCEDURE — 36415 COLL VENOUS BLD VENIPUNCTURE: CPT

## 2022-07-20 ENCOUNTER — TELEPHONE (OUTPATIENT)
Dept: ENDOCRINOLOGY | Facility: HOSPITAL | Age: 87
End: 2022-07-20

## 2022-07-20 NOTE — TELEPHONE ENCOUNTER
Patient has a follow up on 8/2 with Prolia  Are her labs from June ok, or do you want a more recent CMP?

## 2022-07-27 ENCOUNTER — APPOINTMENT (OUTPATIENT)
Dept: LAB | Facility: HOSPITAL | Age: 87
End: 2022-07-27
Payer: COMMERCIAL

## 2022-07-27 DIAGNOSIS — M35.01 KERATOCONJUNCTIVITIS SICCA (HCC): ICD-10-CM

## 2022-07-27 LAB
BASOPHILS # BLD AUTO: 0.06 THOUSANDS/ΜL (ref 0–0.1)
BASOPHILS NFR BLD AUTO: 1 % (ref 0–1)
C4 SERPL-MCNC: 31 MG/DL (ref 10–40)
EOSINOPHIL # BLD AUTO: 0.46 THOUSAND/ΜL (ref 0–0.61)
EOSINOPHIL NFR BLD AUTO: 8 % (ref 0–6)
ERYTHROCYTE [DISTWIDTH] IN BLOOD BY AUTOMATED COUNT: 13 % (ref 11.6–15.1)
ERYTHROCYTE [SEDIMENTATION RATE] IN BLOOD: 21 MM/HOUR (ref 0–29)
HCT VFR BLD AUTO: 38.6 % (ref 34.8–46.1)
HGB BLD-MCNC: 12.9 G/DL (ref 11.5–15.4)
IMM GRANULOCYTES # BLD AUTO: 0.02 THOUSAND/UL (ref 0–0.2)
IMM GRANULOCYTES NFR BLD AUTO: 0 % (ref 0–2)
LYMPHOCYTES # BLD AUTO: 1.73 THOUSANDS/ΜL (ref 0.6–4.47)
LYMPHOCYTES NFR BLD AUTO: 29 % (ref 14–44)
MCH RBC QN AUTO: 32.7 PG (ref 26.8–34.3)
MCHC RBC AUTO-ENTMCNC: 33.4 G/DL (ref 31.4–37.4)
MCV RBC AUTO: 98 FL (ref 82–98)
MONOCYTES # BLD AUTO: 0.72 THOUSAND/ΜL (ref 0.17–1.22)
MONOCYTES NFR BLD AUTO: 12 % (ref 4–12)
NEUTROPHILS # BLD AUTO: 3.02 THOUSANDS/ΜL (ref 1.85–7.62)
NEUTS SEG NFR BLD AUTO: 50 % (ref 43–75)
NRBC BLD AUTO-RTO: 0 /100 WBCS
PLATELET # BLD AUTO: 168 THOUSANDS/UL (ref 149–390)
PMV BLD AUTO: 10.8 FL (ref 8.9–12.7)
RBC # BLD AUTO: 3.95 MILLION/UL (ref 3.81–5.12)
RHEUMATOID FACT SER QL LA: NEGATIVE
TSH SERPL DL<=0.05 MIU/L-ACNC: 1.22 UIU/ML (ref 0.45–4.5)
WBC # BLD AUTO: 6.01 THOUSAND/UL (ref 4.31–10.16)

## 2022-07-27 PROCEDURE — 36415 COLL VENOUS BLD VENIPUNCTURE: CPT

## 2022-07-27 PROCEDURE — 84443 ASSAY THYROID STIM HORMONE: CPT

## 2022-07-27 PROCEDURE — 85652 RBC SED RATE AUTOMATED: CPT

## 2022-07-27 PROCEDURE — 86160 COMPLEMENT ANTIGEN: CPT

## 2022-07-27 PROCEDURE — 85025 COMPLETE CBC W/AUTO DIFF WBC: CPT

## 2022-07-27 PROCEDURE — 86430 RHEUMATOID FACTOR TEST QUAL: CPT

## 2022-08-02 ENCOUNTER — OFFICE VISIT (OUTPATIENT)
Dept: ENDOCRINOLOGY | Facility: HOSPITAL | Age: 87
End: 2022-08-02
Payer: COMMERCIAL

## 2022-08-02 ENCOUNTER — TELEPHONE (OUTPATIENT)
Dept: ENDOCRINOLOGY | Facility: HOSPITAL | Age: 87
End: 2022-08-02

## 2022-08-02 VITALS
BODY MASS INDEX: 25.49 KG/M2 | HEIGHT: 61 IN | SYSTOLIC BLOOD PRESSURE: 118 MMHG | WEIGHT: 135 LBS | DIASTOLIC BLOOD PRESSURE: 60 MMHG | HEART RATE: 70 BPM

## 2022-08-02 DIAGNOSIS — E55.9 VITAMIN D DEFICIENCY: ICD-10-CM

## 2022-08-02 DIAGNOSIS — I10 ESSENTIAL HYPERTENSION: ICD-10-CM

## 2022-08-02 DIAGNOSIS — M81.0 AGE-RELATED OSTEOPOROSIS WITHOUT CURRENT PATHOLOGICAL FRACTURE: ICD-10-CM

## 2022-08-02 DIAGNOSIS — E03.9 HYPOTHYROIDISM IN ADULT: Primary | ICD-10-CM

## 2022-08-02 DIAGNOSIS — I10 HYPERTENSION, UNSPECIFIED TYPE: ICD-10-CM

## 2022-08-02 PROCEDURE — 99214 OFFICE O/P EST MOD 30 MIN: CPT | Performed by: NURSE PRACTITIONER

## 2022-08-02 PROCEDURE — 96372 THER/PROPH/DIAG INJ SC/IM: CPT | Performed by: NURSE PRACTITIONER

## 2022-08-02 RX ORDER — OMEPRAZOLE 20 MG/1
20 CAPSULE, DELAYED RELEASE ORAL DAILY
COMMUNITY

## 2022-08-02 NOTE — PATIENT INSTRUCTIONS
TSH is normal       Continue Levothyroxine to 112 mcg daily - All 7 days of the week  Check thyroid function lab work prior to next office visit  Continue supplementation with vitamin D  Continue treatment with Prolia  Please obtain lab work prior to next office visit  You will be due for another DEXA scan in May 2023

## 2022-08-02 NOTE — PROGRESS NOTES
Becca Garcia 82 y o  female MRN: [id number]    Encounter: [id number]      Assessment/Plan     Assessment: This is a 82y o -year-old female with  hypothyroidism, osteoporosis, hypertension, hyperlipidemia and vitamin-D deficiency  Plan:  1   Osteoporosis:  Her most recent  DEXA scan from May 17, 2021 shows a T-score of -3 6 in the left forearm   She will continue supplementation of calcium and vitamin-D  Recheck calcium level prior to next Prolia injection  She is due for her next injection today   Discussed fall risk and safety at length during the time of the visit  Florina Glenda also suggested that, for stability and safety, she continue to use a cane/walker for safety and stability    Check comprehensive metabolic panel prior to next office visit      2   Hypothyroidism:  TSH is slightly low with a slightly elevated free T4   She states that she is taking her levothyroxine consistently and in the proper manner   I have asked her to decrease her levothyroxine to 112 mcg daily  Check TSH and free T4 prior to next office visit      3   Hypertension: She is normotensive in the office today  Continue current regimen   Check comprehensive metabolic panel prior to next visit      4   Hyperlipidemia:  Managed by PCP   Continue simvastatin      5   Vitamin-D deficiency:  Stable at 57  4   Continue supplementation with vitamin D3 daily       CC:  Hypothyroidism/Osteoporosis follow-up    History of Present Illness     HPI:  86 y  o  female with history of hypertension, hyperlipidemia, hypothyroidism, vitamin-D deficiency, osteoporosis, GERD, blood clots who presents for follow up       For her hypothyroidism, she takes levothyroxine 112 mcg daily   Her most recent TSH from July 27, 2022 is 1 215   Milderd Labella is feeling well but complains of some lingering fatigue      She has a history of osteoporosis and is currently being treated with Prolia   Her most recent  DEXA scan from May 17, 2021 shows a T-score of -3 6 in the left forearm indicative of osteoporosis  Calderon Roach will be due for her 16th injection of Prolia today  She is tolerating it well   She did sustain a fall on January 6, 2019 but denies any falls since   Her most recent calcium from June 6, 2022 is 9 5      For hypertension, she takes losartan 50 mg daily, metoprolol 25 mg twice daily and hydrochlorothiazide 12 5 mg daily        For hyperlipidemia, she takes simvastatin 20 mg daily        For vitamin-D deficiency, she is on 2000 units daily of vitamin-D   Her most recent 25 hydroxy vitamin-D level from June 6, 2022 is 59 6  Review of Systems   Constitutional: Positive for fatigue  Negative for chills and fever  HENT: Positive for hearing loss (Hearing aids)  Negative for trouble swallowing and voice change  Eyes: Negative  Negative for visual disturbance  Respiratory: Negative  Negative for chest tightness and shortness of breath  Cardiovascular: Negative  Negative for chest pain  Gastrointestinal: Negative  Negative for abdominal pain, constipation, diarrhea and vomiting  Endocrine: Negative for cold intolerance, heat intolerance, polydipsia, polyphagia and polyuria  Genitourinary: Negative  Musculoskeletal: Positive for arthralgias ( bilateral hips) and back pain ( chronic)  Skin: Negative  Allergic/Immunologic: Negative  Neurological: Negative  Negative for dizziness, syncope, light-headedness and headaches  Hematological: Negative  Psychiatric/Behavioral: Negative  All other systems reviewed and are negative        Historical Information   Past Medical History:   Diagnosis Date    Cancer ([place] [id number] )     squamous cell - face    Disease of thyroid gland     GERD (gastroesophageal reflux disease)     Heart murmur     Hypercoagulable state ([facility] )     Hyperlipidemia     Hypertension     Microscopic colitis     Osteoporosis     PE (pulmonary thromboembolism) ([facility] )     Sjogren's syndrome ([facility] )     Urinary tract infection Past Surgical History:   Procedure Laterality Date    COLONOSCOPY  07/18/2016     grade 2 internal hemorrhoids but otherwise normal, pathology negative for microscopic colitis    HYSTERECTOMY      JOINT REPLACEMENT      KNEE SURGERY      OOPHORECTOMY      SHOULDER SURGERY      right    SPINE SURGERY      TOE SURGERY      TONSILLECTOMY      UPPER GASTROINTESTINAL ENDOSCOPY  01/09/2015     reflux esophagitis, gastritis, duodenitis in the bulb, pathology negative for H pylori, Puckett's, EOE     Social History   Social History     Substance and Sexual Activity   Alcohol Use Never     Social History     Substance and Sexual Activity   Drug Use No     Social History     Tobacco Use   Smoking Status Never Smoker   Smokeless Tobacco Never Used     Family History:   Family History   Problem Relation Age of Onset    Stroke Father     Ovarian cancer Sister     No Known Problems Son     No Known Problems Son     Ovarian cancer Daughter     No Known Problems Daughter     No Known Problems Daughter     No Known Problems Maternal Aunt     No Known Problems Paternal Aunt     Colon cancer Neg Hx     Colon polyps Neg Hx        Meds/Allergies   Current Outpatient Medications   Medication Sig Dispense Refill    Cholecalciferol (VITAMIN D) 2000 UNITS tablet Take 3,000 Units by mouth daily        cycloSPORINE (RESTASIS) 0 05 % ophthalmic emulsion Administer 1 drop to both eyes 2 (two) times a day   denosumab (PROLIA) 60 mg/mL Inject 60 mg under the skin every 6 (six) months      DHA-EPA-Flaxseed Oil-Vitamin E (THERA TEARS) CAPS Take 4 capsules by mouth daily        Diclofenac Sodium (VOLTAREN) 1 %       diphenoxylate-atropine (LOMOTIL) 2 5-0 025 mg per tablet TAKE 1 TABLET BY MOUTH 4 (FOUR) TIMES A DAY AS NEEDED FOR DIARRHEA 60 tablet 3    hydrochlorothiazide (HYDRODIURIL) 12 5 mg tablet TAKE 1 TABLET BY MOUTH EVERY DAY 90 tablet 0    hydroxychloroquine (PLAQUENIL) 200 mg tablet Take 200 mg by mouth daily       levothyroxine 112 mcg tablet TAKE 1 TABLET BY MOUTH ONCE DAILY 102 tablet 1    losartan (COZAAR) 50 mg tablet Take 1 tablet (50 mg total) by mouth daily 90 tablet 3    Multiple Vitamins-Minerals (OCUVITE PO) Take 1 tablet by mouth daily   omeprazole (PriLOSEC) 20 mg delayed release capsule Take 20 mg by mouth daily      pilocarpine (SALAGEN) 5 mg tablet Take 5 mg by mouth 4 (four) times a day      polyvinyl alcohol (LIQUIFILM TEARS) 1 4 % ophthalmic solution Administer 1 drop to both eyes as needed for dry eyes      simvastatin (ZOCOR) 20 mg tablet Take 20 mg by mouth daily at bedtime   warfarin (COUMADIN) 1 mg tablet       warfarin (COUMADIN) 4 mg tablet       metoprolol tartrate (LOPRESSOR) 25 mg tablet Take 1 tablet (25 mg total) by mouth every 12 (twelve) hours for 30 days 60 tablet 0    omeprazole (PriLOSEC) 20 mg delayed release capsule Take 20 mg by mouth daily (Patient not taking: Reported on 8/2/2022)       No current facility-administered medications for this visit  Allergies   Allergen Reactions    Sulfa Antibiotics Hives       Objective   Vitals: Blood pressure 118/60, pulse 70, height 5' 1" (1 549 m), weight 61 2 kg (135 lb), not currently breastfeeding  Physical Exam  Vitals reviewed  Constitutional:       Appearance: She is well-developed  HENT:      Head: Normocephalic and atraumatic  Eyes:      Conjunctiva/sclera: Conjunctivae normal       Pupils: Pupils are equal, round, and reactive to light  Comments: Wears glasses   Cardiovascular:      Rate and Rhythm: Normal rate and regular rhythm  Heart sounds: Murmur heard  Pulmonary:      Effort: Pulmonary effort is normal       Breath sounds: Normal breath sounds  Abdominal:      General: Bowel sounds are normal       Palpations: Abdomen is soft  Musculoskeletal:         General: Normal range of motion  Cervical back: Normal range of motion and neck supple     Skin:     General: Skin is warm and dry  Neurological:      Mental Status: She is alert and oriented to person, place, and time  Psychiatric:         Behavior: Behavior normal          Thought Content: Thought content normal          Judgment: Judgment normal        Lab Results:   Lab Results   Component Value Date/Time    Potassium 4 3 06/06/2022 07:10 AM    Potassium 4 6 12/22/2021 07:11 AM    Chloride 105 06/06/2022 07:10 AM    Chloride 105 12/22/2021 07:11 AM    CO2 26 06/06/2022 07:10 AM    CO2 28 12/22/2021 07:11 AM    BUN 22 06/06/2022 07:10 AM    BUN 25 12/22/2021 07:11 AM    Creatinine 0 88 06/06/2022 07:10 AM    Creatinine 1 00 12/22/2021 07:11 AM    Glucose, Fasting 92 06/06/2022 07:10 AM    Glucose, Fasting 95 12/22/2021 07:11 AM    Calcium 9 4 06/06/2022 07:10 AM    Calcium 10 5 (H) 12/22/2021 07:11 AM    eGFR 60 06/06/2022 07:10 AM    eGFR 51 12/22/2021 07:11 AM    TSH 3RD GENERATON 1 215 07/27/2022 07:12 AM    TSH 3RD GENERATON 1 900 06/06/2022 07:10 AM    TSH 3RD GENERATON 1 010 03/08/2022 01:46 PM    Free T4 1 26 06/06/2022 07:10 AM    Free T4 1 45 03/08/2022 01:46 PM    Free T4 1 47 (H) 01/14/2022 10:03 AM    Vit D, 25-Hydroxy 59 6 06/06/2022 07:10 AM    Vit D, 25-Hydroxy 57 4 12/22/2021 07:11 AM         Results for orders placed during the hospital encounter of 05/17/21    DXA bone density spine hip and pelvis    Impression  1  Osteoporosis  [Based on the left radius]    2  Since a DXA study from 5/14/2019, there has been:  A  STATISTICALLY SIGNIFICANT decrease in bone mineral density of -0 019 g/cm2 (-3 6)% in the left forearm  3   The 10 year risk of hip fracture is 5 0% with the 10 year risk of major osteoporotic fracture being 16% as calculated by the [facility]/WHO fracture risk assessment tool (FRAX)        4   The current NOF guidelines recommend treating patients with a T-score of -2 5 or less in the lumbar spine or hips, or in post-menopausal women and men over the age of 48 with low bone mass (osteopenia) and a FRAX 10 year risk score of >3% for hip  fracture and/or >20% for major osteoporotic fracture  5   The NOF recommends follow-up DXA in 1-2 years after initiating therapy for osteoporosis and every 2 years thereafter  More frequent evaluation is appropriate for patients with conditions associated with rapid bone loss, such as glucocorticoid  therapy  The interval between DXA screenings may be longer for individuals without major risk factors and initial T-score in the normal or upper low bone mass range  The FRAX algorithm has certain limitations:  -FRAX has not been validated in patients currently or previously treated with pharmacotherapy for osteoporosis  In such patients, clinical judgment must be exercised in interpreting FRAX scores  -Prior hip, vertebral and humeral fragility fractures appear to confer greater risk of subsequent fracture than fractures at other sites (this is especially true for individuals with severe vertebral fractures), but quantification of this incremental  risk is not possible with FRAX  -FRAX underestimates fracture risk in patients with history of multiple fragility fractures  -FRAX may underestimate fracture risk in patients with history of frequent falls   -It is not appropriate to use FRAX to monitor treatment response  WHO CLASSIFICATION:  Normal (a T-score of -1 0 or higher)  Low bone mineral density (a T-score of less than -1 0 but higher than -2 5)  Osteoporosis (a T-score of -2 5 or less)  Severe osteoporosis (a T-score of -2 5 or less with a fragility fracture)      LEAST SIGNIFICANT CHANGE (AT 95% C  I):  Lumbar spine: 0 025 g/cm2; 2 8%  Total hip: 0 025 g/cm2; 3 7%  Forearm: 0 012 g/cm2; 1 9%                Workstation performed: [id number]      Portions of the record may have been created with voice recognition software   Occasional wrong word or "sound a like" substitutions may have occurred due to the inherent limitations of voice recognition software  Read the chart carefully and recognize, using context, where substitutions have occurred

## 2022-10-28 ENCOUNTER — APPOINTMENT (OUTPATIENT)
Dept: LAB | Facility: HOSPITAL | Age: 87
End: 2022-10-28
Payer: COMMERCIAL

## 2022-10-28 DIAGNOSIS — K21.9 CARDIOCHALASIA: ICD-10-CM

## 2022-10-28 DIAGNOSIS — D68.61 APL (ANTIPHOSPHOLIPID SYNDROME) (HCC): ICD-10-CM

## 2022-10-28 DIAGNOSIS — E03.9 HYPOTHYROIDISM IN ADULT: ICD-10-CM

## 2022-10-28 DIAGNOSIS — E78.2 REMNANT HYPERLIPOPROTEINEMIA: ICD-10-CM

## 2022-10-28 DIAGNOSIS — Z79.01 ANTICOAGULATION MONITORING, INR RANGE 2-3: ICD-10-CM

## 2022-10-28 DIAGNOSIS — M31.6 GCA (GIANT CELL ARTERITIS) (HCC): ICD-10-CM

## 2022-10-28 LAB
ALBUMIN SERPL BCP-MCNC: 4.2 G/DL (ref 3.5–5)
ALP SERPL-CCNC: 58 U/L (ref 46–116)
ALT SERPL W P-5'-P-CCNC: 42 U/L (ref 12–78)
ANION GAP SERPL CALCULATED.3IONS-SCNC: 4 MMOL/L (ref 4–13)
AST SERPL W P-5'-P-CCNC: 27 U/L (ref 5–45)
BASOPHILS # BLD AUTO: 0.08 THOUSANDS/ÂΜL (ref 0–0.1)
BASOPHILS NFR BLD AUTO: 1 % (ref 0–1)
BILIRUB SERPL-MCNC: 0.65 MG/DL (ref 0.2–1)
BUN SERPL-MCNC: 27 MG/DL (ref 5–25)
CALCIUM SERPL-MCNC: 10.4 MG/DL (ref 8.3–10.1)
CHLORIDE SERPL-SCNC: 105 MMOL/L (ref 96–108)
CHOLEST SERPL-MCNC: 133 MG/DL
CO2 SERPL-SCNC: 27 MMOL/L (ref 21–32)
CREAT SERPL-MCNC: 0.94 MG/DL (ref 0.6–1.3)
EOSINOPHIL # BLD AUTO: 0.3 THOUSAND/ÂΜL (ref 0–0.61)
EOSINOPHIL NFR BLD AUTO: 4 % (ref 0–6)
ERYTHROCYTE [DISTWIDTH] IN BLOOD BY AUTOMATED COUNT: 13.5 % (ref 11.6–15.1)
ERYTHROCYTE [SEDIMENTATION RATE] IN BLOOD: 19 MM/HOUR (ref 0–29)
GFR SERPL CREATININE-BSD FRML MDRD: 55 ML/MIN/1.73SQ M
GLUCOSE P FAST SERPL-MCNC: 94 MG/DL (ref 65–99)
HCT VFR BLD AUTO: 39.3 % (ref 34.8–46.1)
HDLC SERPL-MCNC: 46 MG/DL
HGB BLD-MCNC: 13 G/DL (ref 11.5–15.4)
IMM GRANULOCYTES # BLD AUTO: 0.04 THOUSAND/UL (ref 0–0.2)
IMM GRANULOCYTES NFR BLD AUTO: 1 % (ref 0–2)
LDLC SERPL CALC-MCNC: 54 MG/DL (ref 0–100)
LYMPHOCYTES # BLD AUTO: 1.9 THOUSANDS/ÂΜL (ref 0.6–4.47)
LYMPHOCYTES NFR BLD AUTO: 23 % (ref 14–44)
MCH RBC QN AUTO: 32.3 PG (ref 26.8–34.3)
MCHC RBC AUTO-ENTMCNC: 33.1 G/DL (ref 31.4–37.4)
MCV RBC AUTO: 98 FL (ref 82–98)
MONOCYTES # BLD AUTO: 1.14 THOUSAND/ÂΜL (ref 0.17–1.22)
MONOCYTES NFR BLD AUTO: 14 % (ref 4–12)
NEUTROPHILS # BLD AUTO: 4.9 THOUSANDS/ÂΜL (ref 1.85–7.62)
NEUTS SEG NFR BLD AUTO: 57 % (ref 43–75)
NRBC BLD AUTO-RTO: 0 /100 WBCS
PLATELET # BLD AUTO: 212 THOUSANDS/UL (ref 149–390)
PMV BLD AUTO: 11.2 FL (ref 8.9–12.7)
POTASSIUM SERPL-SCNC: 5 MMOL/L (ref 3.5–5.3)
PROT SERPL-MCNC: 8.2 G/DL (ref 6.4–8.4)
RBC # BLD AUTO: 4.03 MILLION/UL (ref 3.81–5.12)
SODIUM SERPL-SCNC: 136 MMOL/L (ref 135–147)
TRIGL SERPL-MCNC: 165 MG/DL
TSH SERPL DL<=0.05 MIU/L-ACNC: 1.55 UIU/ML (ref 0.45–4.5)
WBC # BLD AUTO: 8.36 THOUSAND/UL (ref 4.31–10.16)

## 2022-10-28 PROCEDURE — 80053 COMPREHEN METABOLIC PANEL: CPT

## 2022-10-28 PROCEDURE — 85652 RBC SED RATE AUTOMATED: CPT

## 2022-10-28 PROCEDURE — 80061 LIPID PANEL: CPT

## 2022-10-28 PROCEDURE — 84443 ASSAY THYROID STIM HORMONE: CPT

## 2022-10-28 PROCEDURE — 36415 COLL VENOUS BLD VENIPUNCTURE: CPT

## 2022-10-28 PROCEDURE — 85025 COMPLETE CBC W/AUTO DIFF WBC: CPT

## 2022-10-28 RX ORDER — LEVOTHYROXINE SODIUM 112 UG/1
TABLET ORAL
Qty: 90 TABLET | Refills: 1 | Status: SHIPPED | OUTPATIENT
Start: 2022-10-28

## 2022-11-17 ENCOUNTER — ANNUAL EXAM (OUTPATIENT)
Dept: GYNECOLOGY | Facility: CLINIC | Age: 87
End: 2022-11-17

## 2022-11-17 VITALS
SYSTOLIC BLOOD PRESSURE: 140 MMHG | DIASTOLIC BLOOD PRESSURE: 80 MMHG | BODY MASS INDEX: 25.79 KG/M2 | WEIGHT: 136.6 LBS | HEIGHT: 61 IN

## 2022-11-17 DIAGNOSIS — Z12.31 ENCOUNTER FOR SCREENING MAMMOGRAM FOR BREAST CANCER: ICD-10-CM

## 2022-11-17 DIAGNOSIS — Z01.419 WOMEN'S ANNUAL ROUTINE GYNECOLOGICAL EXAMINATION: ICD-10-CM

## 2022-11-17 DIAGNOSIS — Z90.710 STATUS POST VAGINAL HYSTERECTOMY: ICD-10-CM

## 2022-11-17 DIAGNOSIS — N89.8 VAGINAL IRRITATION: ICD-10-CM

## 2022-11-17 DIAGNOSIS — E55.9 VITAMIN D DEFICIENCY: ICD-10-CM

## 2022-11-17 DIAGNOSIS — Z87.39 HISTORY OF OSTEOPOROSIS: ICD-10-CM

## 2022-11-17 DIAGNOSIS — Z86.718 HISTORY OF DVT (DEEP VEIN THROMBOSIS): ICD-10-CM

## 2022-11-17 DIAGNOSIS — Z86.711 HISTORY OF PULMONARY EMBOLUS (PE): ICD-10-CM

## 2022-11-17 DIAGNOSIS — E03.9 HYPOTHYROIDISM IN ADULT: ICD-10-CM

## 2022-11-17 DIAGNOSIS — E78.00 PURE HYPERCHOLESTEROLEMIA: ICD-10-CM

## 2022-11-17 RX ORDER — CLOTRIMAZOLE AND BETAMETHASONE DIPROPIONATE 10; .64 MG/G; MG/G
CREAM TOPICAL 2 TIMES DAILY
Qty: 30 G | Refills: 1 | Status: SHIPPED | OUTPATIENT
Start: 2022-11-17

## 2022-11-17 NOTE — PROGRESS NOTES
Assessment    Annual well-woman exam   Status post vaginal hysterectomy  History of PE  History DVT  Hypercholesteremia  Hypothyroidism  History of osteoporosis  Vaginal irritation        Plan   Lotrisone cream for vaginal irritation  Screening mammography ordered  Pap smear no longer indicated, post hysterectomy menopausal state   All questions answered  Subjective here for annual exam     Alda Dunlap is a 82 y o  female  5 para 5 who presents for annual exam   Patient is menopausal   She has had a vaginal hysterectomy many years ago  She is on Coumadin daily history of BP, history of DVT  History of hypercholesteremia hypothyroidism and osteoporosis  She does take Prolia twice yearly also calcium with vitamin-D daily  Noted some vaginal irritation on pelvic exam recommend trial of Lotrisone topical cream   Otherwise normal exam  The patient reports that there is not domestic violence in her life  Current contraception: post menopausal status  History of abnormal Pap smear: no  Family history of uterine or ovarian cancer: no  Regular self breast exam: yes  History of abnormal mammogram: no  Family history of breast cancer: no  History of abnormal lipids: yes - hypercholesteremia  Menstrual History:  OB History        5    Para   5    Term   5            AB        Living           SAB        IAB        Ectopic        Multiple        Live Births                    Menarche age: 17  No LMP recorded (lmp unknown)  Patient has had a hysterectomy  Review of Systems  Pertinent items are noted in HPI        Objective no acute distress   /80 (BP Location: Left arm, Patient Position: Sitting, Cuff Size: Standard)   Ht 5' 1" (1 549 m)   Wt 62 kg (136 lb 9 6 oz)   LMP  (LMP Unknown)   BMI 25 81 kg/m²     General:   alert and oriented, in no acute distress, alert, appears stated age and cooperative   Heart: regular rate and rhythm, S1, S2 normal, no murmur, click, rub or gallop   Lungs: clear to auscultation bilaterally   Abdomen: soft, non-tender, without masses or organomegaly   Vulva: normal, Bartholin's, Urethra, [place]'s normal, female escutcheon   Vagina: normal mucosa, normal discharge, no palpable nodules   Cervix: surgically absent   Uterus: surgically absent   Adnexa: surgically absent   Bilateral breast exam in the sitting and supine position with chaperone present, no visible or palpable breast lesions identified  No breast masses noted  No supraclavicular or axillary lymphadenopathy noted  No nipple discharge  Reviewed self-breast exam techniques     Rectal exam,  deferred

## 2022-12-07 DIAGNOSIS — I10 HYPERTENSION, UNSPECIFIED TYPE: ICD-10-CM

## 2022-12-08 RX ORDER — LOSARTAN POTASSIUM 50 MG/1
50 TABLET ORAL DAILY
Qty: 90 TABLET | Refills: 3 | Status: SHIPPED | OUTPATIENT
Start: 2022-12-08

## 2023-01-26 ENCOUNTER — ESTABLISHED COMPREHENSIVE EXAM (OUTPATIENT)
Dept: URBAN - METROPOLITAN AREA CLINIC 6 | Facility: CLINIC | Age: 88
End: 2023-01-26

## 2023-01-26 DIAGNOSIS — H04.123: ICD-10-CM

## 2023-01-26 DIAGNOSIS — H26.491: ICD-10-CM

## 2023-01-26 DIAGNOSIS — Z96.1: ICD-10-CM

## 2023-01-26 DIAGNOSIS — H35.62: ICD-10-CM

## 2023-01-26 DIAGNOSIS — H43.812: ICD-10-CM

## 2023-01-26 PROCEDURE — 92014 COMPRE OPH EXAM EST PT 1/>: CPT

## 2023-01-26 ASSESSMENT — TONOMETRY
OS_IOP_MMHG: 11
OD_IOP_MMHG: 10

## 2023-01-26 ASSESSMENT — VISUAL ACUITY
OD_SC: 20/50
OU_SC: 20/40-2
OD_PH: 20/30-2
OS_SC: 20/40

## 2023-01-27 ENCOUNTER — LAB (OUTPATIENT)
Dept: LAB | Facility: HOSPITAL | Age: 88
End: 2023-01-27

## 2023-01-27 DIAGNOSIS — I10 HYPERTENSION, UNSPECIFIED TYPE: ICD-10-CM

## 2023-01-27 DIAGNOSIS — E03.9 HYPOTHYROIDISM IN ADULT: ICD-10-CM

## 2023-01-27 DIAGNOSIS — M81.0 AGE-RELATED OSTEOPOROSIS WITHOUT CURRENT PATHOLOGICAL FRACTURE: ICD-10-CM

## 2023-01-27 DIAGNOSIS — E55.9 VITAMIN D DEFICIENCY: ICD-10-CM

## 2023-01-27 DIAGNOSIS — I10 ESSENTIAL HYPERTENSION: ICD-10-CM

## 2023-01-27 LAB
25(OH)D3 SERPL-MCNC: 56 NG/ML (ref 30–100)
ALBUMIN SERPL BCP-MCNC: 3.8 G/DL (ref 3.5–5)
ALP SERPL-CCNC: 63 U/L (ref 46–116)
ALT SERPL W P-5'-P-CCNC: 35 U/L (ref 12–78)
ANION GAP SERPL CALCULATED.3IONS-SCNC: 3 MMOL/L (ref 4–13)
AST SERPL W P-5'-P-CCNC: 26 U/L (ref 5–45)
BASOPHILS # BLD AUTO: 0.06 THOUSANDS/ÂΜL (ref 0–0.1)
BASOPHILS NFR BLD AUTO: 1 % (ref 0–1)
BILIRUB SERPL-MCNC: 0.5 MG/DL (ref 0.2–1)
BUN SERPL-MCNC: 25 MG/DL (ref 5–25)
CALCIUM SERPL-MCNC: 10.4 MG/DL (ref 8.3–10.1)
CHLORIDE SERPL-SCNC: 108 MMOL/L (ref 96–108)
CO2 SERPL-SCNC: 28 MMOL/L (ref 21–32)
CREAT SERPL-MCNC: 0.98 MG/DL (ref 0.6–1.3)
EOSINOPHIL # BLD AUTO: 0.33 THOUSAND/ÂΜL (ref 0–0.61)
EOSINOPHIL NFR BLD AUTO: 5 % (ref 0–6)
ERYTHROCYTE [DISTWIDTH] IN BLOOD BY AUTOMATED COUNT: 12.9 % (ref 11.6–15.1)
GFR SERPL CREATININE-BSD FRML MDRD: 52 ML/MIN/1.73SQ M
GLUCOSE P FAST SERPL-MCNC: 96 MG/DL (ref 65–99)
HCT VFR BLD AUTO: 37.7 % (ref 34.8–46.1)
HGB BLD-MCNC: 12.6 G/DL (ref 11.5–15.4)
IMM GRANULOCYTES # BLD AUTO: 0.03 THOUSAND/UL (ref 0–0.2)
IMM GRANULOCYTES NFR BLD AUTO: 0 % (ref 0–2)
LYMPHOCYTES # BLD AUTO: 1.93 THOUSANDS/ÂΜL (ref 0.6–4.47)
LYMPHOCYTES NFR BLD AUTO: 28 % (ref 14–44)
MCH RBC QN AUTO: 31.8 PG (ref 26.8–34.3)
MCHC RBC AUTO-ENTMCNC: 33.4 G/DL (ref 31.4–37.4)
MCV RBC AUTO: 95 FL (ref 82–98)
MONOCYTES # BLD AUTO: 0.98 THOUSAND/ÂΜL (ref 0.17–1.22)
MONOCYTES NFR BLD AUTO: 14 % (ref 4–12)
NEUTROPHILS # BLD AUTO: 3.57 THOUSANDS/ÂΜL (ref 1.85–7.62)
NEUTS SEG NFR BLD AUTO: 52 % (ref 43–75)
NRBC BLD AUTO-RTO: 0 /100 WBCS
PLATELET # BLD AUTO: 176 THOUSANDS/UL (ref 149–390)
PMV BLD AUTO: 10.8 FL (ref 8.9–12.7)
POTASSIUM SERPL-SCNC: 4.4 MMOL/L (ref 3.5–5.3)
PROT SERPL-MCNC: 7.6 G/DL (ref 6.4–8.4)
RBC # BLD AUTO: 3.96 MILLION/UL (ref 3.81–5.12)
SODIUM SERPL-SCNC: 139 MMOL/L (ref 135–147)
T4 FREE SERPL-MCNC: 1.36 NG/DL (ref 0.76–1.46)
TSH SERPL DL<=0.05 MIU/L-ACNC: 2.2 UIU/ML (ref 0.45–4.5)
WBC # BLD AUTO: 6.9 THOUSAND/UL (ref 4.31–10.16)

## 2023-02-07 ENCOUNTER — TELEPHONE (OUTPATIENT)
Dept: ENDOCRINOLOGY | Facility: HOSPITAL | Age: 88
End: 2023-02-07

## 2023-02-07 ENCOUNTER — OFFICE VISIT (OUTPATIENT)
Dept: ENDOCRINOLOGY | Facility: HOSPITAL | Age: 88
End: 2023-02-07

## 2023-02-07 VITALS
HEIGHT: 61 IN | DIASTOLIC BLOOD PRESSURE: 70 MMHG | HEART RATE: 58 BPM | OXYGEN SATURATION: 98 % | BODY MASS INDEX: 25.3 KG/M2 | WEIGHT: 134 LBS | SYSTOLIC BLOOD PRESSURE: 118 MMHG

## 2023-02-07 DIAGNOSIS — M81.0 AGE-RELATED OSTEOPOROSIS WITHOUT CURRENT PATHOLOGICAL FRACTURE: Primary | ICD-10-CM

## 2023-02-07 DIAGNOSIS — E55.9 VITAMIN D DEFICIENCY: ICD-10-CM

## 2023-02-07 DIAGNOSIS — M81.0 AGE-RELATED OSTEOPOROSIS WITHOUT CURRENT PATHOLOGICAL FRACTURE: ICD-10-CM

## 2023-02-07 DIAGNOSIS — I10 ESSENTIAL HYPERTENSION: ICD-10-CM

## 2023-02-07 DIAGNOSIS — E03.9 HYPOTHYROIDISM IN ADULT: Primary | ICD-10-CM

## 2023-02-07 DIAGNOSIS — E78.5 HYPERLIPIDEMIA, UNSPECIFIED HYPERLIPIDEMIA TYPE: ICD-10-CM

## 2023-02-07 RX ORDER — WARFARIN SODIUM 5 MG/1
TABLET ORAL
COMMUNITY

## 2023-02-07 NOTE — PROGRESS NOTES
Olga Garcia 82 y o  female MRN: [id number]    Encounter: [id number]      Assessment/Plan     Assessment: This is a 82y o -year-old female with  hypothyroidism, osteoporosis, hypertension, hyperlipidemia and vitamin-D deficiency  Plan:  1   Osteoporosis:  Her most recent  DEXA scan from May 17, 2021 shows a T-score of -3 6 in the left forearm   She will continue supplementation of calcium and vitamin-D  Recheck calcium level prior to next Prolia injection  She is due for her next injection today  She is due for a repeat DEXA Scan in late May 2023  Discussed fall risk and safety at length during the time of the visit  Reyna Worley also suggested that, for stability and safety, she continue to use a cane/walker for safety and stability    Check comprehensive metabolic panel prior to next office visit      2   Hypothyroidism:  TSH and free T4 are normal   She states that she is taking her levothyroxine consistently and in the proper manner   I have asked her to decrease her levothyroxine to 112 mcg daily  Check TSH and free T4 prior to next office visit      3   Hypertension: She is normotensive in the office today  Continue current regimen   Check comprehensive metabolic panel prior to next visit      4   Hyperlipidemia:  Managed by PCP   Continue simvastatin      5   Vitamin-D deficiency:  Stable at 56  0   Continue supplementation with vitamin D3 daily       CC: Hypothyroidism/Osteoporosis follow-up    History of Present Illness     HPI:  86 y  o  female with history of hypertension, hyperlipidemia, hypothyroidism, vitamin-D deficiency, osteoporosis, GERD, blood clots who presents for follow up       For her hypothyroidism, she takes levothyroxine 112 mcg daily   Her most recent TSH from January 27, 2023 is 2 20   Chiqui Ceballos is feeling well but complains of some lingering fatigue      She has a history of osteoporosis and is currently being treated with Prolia   Her most recent  DEXA scan from May 17, 2021 shows a T-score of -3 6 in the left forearm indicative of osteoporosis  Neda William will be due for her 17th injection of Prolia today  She is tolerating it well   She did sustain a fall on January 6, 2019 but denies any falls since   Her most recent calcium from January 27, 2023 is 10 5      For hypertension, she takes losartan 50 mg daily, metoprolol 25 mg twice daily and hydrochlorothiazide 12 5 mg daily        For hyperlipidemia, she takes simvastatin 20 mg daily        For vitamin-D deficiency, she is on 2000 units daily of vitamin-D   Her most recent 25 hydroxy vitamin-D level from January 27, 2023 is 56  0         Review of Systems   Constitutional: Negative  Negative for chills, fatigue and fever  HENT: Positive for hearing loss (Hearing aids)  Negative for trouble swallowing and voice change  Eyes: Negative  Negative for photophobia, pain, discharge, redness, itching and visual disturbance  Respiratory: Negative  Negative for chest tightness and shortness of breath  Cardiovascular: Negative  Negative for chest pain  Gastrointestinal: Negative  Negative for abdominal pain, constipation, diarrhea and vomiting  Endocrine: Negative for cold intolerance, heat intolerance, polydipsia, polyphagia and polyuria  Genitourinary: Negative  Musculoskeletal: Positive for arthralgias, back pain (chronic) and neck pain  Skin: Negative  Allergic/Immunologic: Negative  Neurological: Negative  Negative for dizziness, syncope, light-headedness and headaches  Hematological: Negative  Psychiatric/Behavioral: Negative  All other systems reviewed and are negative        Historical Information   Past Medical History:   Diagnosis Date   • Cancer ([facility] )     squamous cell - face   • Disease of thyroid gland    • GERD (gastroesophageal reflux disease)    • Heart murmur    • Hypercoagulable state ([facility] )    • Hyperlipidemia    • Hypertension    • Microscopic colitis    • Osteoporosis    • PE (pulmonary thromboembolism) ([facility] )    • Sjogren's syndrome ([facility] )    • Urinary tract infection      Past Surgical History:   Procedure Laterality Date   • COLONOSCOPY  07/18/2016     grade 2 internal hemorrhoids but otherwise normal, pathology negative for microscopic colitis   • HYSTERECTOMY     • JOINT REPLACEMENT     • KNEE SURGERY     • OOPHORECTOMY     • SHOULDER SURGERY      right   • SPINE SURGERY     • TOE SURGERY     • TONSILLECTOMY     • UPPER GASTROINTESTINAL ENDOSCOPY  01/09/2015     reflux esophagitis, gastritis, duodenitis in the bulb, pathology negative for H pylori, Puckett's, EOE     Social History   Social History     Substance and Sexual Activity   Alcohol Use Never     Social History     Substance and Sexual Activity   Drug Use No     Social History     Tobacco Use   Smoking Status Never   Smokeless Tobacco Never     Family History:   Family History   Problem Relation Age of Onset   • Stroke Father    • Ovarian cancer Sister    • No Known Problems Son    • No Known Problems Son    • Ovarian cancer Daughter    • No Known Problems Daughter    • No Known Problems Daughter    • No Known Problems Maternal Aunt    • No Known Problems Paternal Aunt    • Colon cancer Neg Hx    • Colon polyps Neg Hx        Meds/Allergies   Current Outpatient Medications   Medication Sig Dispense Refill   • Cholecalciferol (VITAMIN D) 2000 UNITS tablet Take 3,000 Units by mouth daily       • clotrimazole-betamethasone (LOTRISONE) 1-0 05 % cream Apply topically 2 (two) times a day 30 g 1   • cycloSPORINE (RESTASIS) 0 05 % ophthalmic emulsion Administer 1 drop to both eyes 2 (two) times a day  • denosumab (PROLIA) 60 mg/mL Inject 60 mg under the skin every 6 (six) months     • DHA-EPA-Flaxseed Oil-Vitamin E (THERA TEARS) CAPS Take 4 capsules by mouth daily       • Diclofenac Sodium (VOLTAREN) 1 %      • diphenoxylate-atropine (LOMOTIL) 2 5-0 025 mg per tablet TAKE 1 TABLET BY MOUTH 4 (FOUR) TIMES A DAY AS NEEDED FOR DIARRHEA 60 tablet 3   • hydrochlorothiazide (HYDRODIURIL) 12 5 mg tablet TAKE 1 TABLET BY MOUTH EVERY DAY 90 tablet 0   • hydroxychloroquine (PLAQUENIL) 200 mg tablet Take 200 mg by mouth daily  • levothyroxine 112 mcg tablet TAKE ONE TABLET BY MOUTH EVERY DAY 90 tablet 1   • losartan (COZAAR) 50 mg tablet Take 1 tablet (50 mg total) by mouth daily 90 tablet 3   • metoprolol tartrate (LOPRESSOR) 25 mg tablet Take 1 tablet (25 mg total) by mouth every 12 (twelve) hours for 30 days 60 tablet 0   • omeprazole (PriLOSEC) 20 mg delayed release capsule Take 20 mg by mouth daily     • pilocarpine (SALAGEN) 5 mg tablet Take 5 mg by mouth 4 (four) times a day     • polyvinyl alcohol (LIQUIFILM TEARS) 1 4 % ophthalmic solution Administer 1 drop to both eyes as needed for dry eyes     • simvastatin (ZOCOR) 20 mg tablet Take 20 mg by mouth daily at bedtime  • warfarin (COUMADIN) 5 mg tablet Take by mouth daily       No current facility-administered medications for this visit  Allergies   Allergen Reactions   • Sulfa Antibiotics Hives       Objective   Vitals: Height 5' 1" (1 549 m), weight 60 8 kg (134 lb), not currently breastfeeding  Physical Exam  Vitals reviewed  Constitutional:       Appearance: She is well-developed  HENT:      Head: Normocephalic and atraumatic  Eyes:      Conjunctiva/sclera: Conjunctivae normal       Pupils: Pupils are equal, round, and reactive to light  Comments: Wears glasses   Cardiovascular:      Rate and Rhythm: Normal rate and regular rhythm  Heart sounds: Murmur heard  Pulmonary:      Effort: Pulmonary effort is normal       Breath sounds: Normal breath sounds  Abdominal:      General: Bowel sounds are normal       Palpations: Abdomen is soft  Musculoskeletal:         General: Normal range of motion  Cervical back: Normal range of motion and neck supple  Skin:     General: Skin is warm and dry     Neurological:      Mental Status: She is alert and oriented to person, place, and time  Psychiatric:         Behavior: Behavior normal          Thought Content: Thought content normal          Judgment: Judgment normal        Lab Results:   Lab Results   Component Value Date/Time    WBC 6 90 01/27/2023 07:10 AM    WBC 8 36 10/28/2022 11:38 AM    WBC 6 01 07/27/2022 07:12 AM    Hemoglobin 12 6 01/27/2023 07:10 AM    Hemoglobin 13 0 10/28/2022 11:38 AM    Hemoglobin 12 9 07/27/2022 07:12 AM    Hematocrit 37 7 01/27/2023 07:10 AM    Hematocrit 39 3 10/28/2022 11:38 AM    Hematocrit 38 6 07/27/2022 07:12 AM    MCV 95 01/27/2023 07:10 AM    MCV 98 10/28/2022 11:38 AM    MCV 98 07/27/2022 07:12 AM    Platelets 366 69/93/9413 07:10 AM    Platelets 292 15/62/4700 11:38 AM    Platelets 589 70/36/2546 07:12 AM    BUN 25 01/27/2023 07:10 AM    BUN 27 (H) 10/28/2022 11:38 AM    BUN 22 06/06/2022 07:10 AM    Potassium 4 4 01/27/2023 07:10 AM    Potassium 5 0 10/28/2022 11:38 AM    Potassium 4 3 06/06/2022 07:10 AM    Chloride 108 01/27/2023 07:10 AM    Chloride 105 10/28/2022 11:38 AM    Chloride 105 06/06/2022 07:10 AM    CO2 28 01/27/2023 07:10 AM    CO2 27 10/28/2022 11:38 AM    CO2 26 06/06/2022 07:10 AM    Creatinine 0 98 01/27/2023 07:10 AM    Creatinine 0 94 10/28/2022 11:38 AM    Creatinine 0 88 06/06/2022 07:10 AM    AST 26 01/27/2023 07:10 AM    AST 27 10/28/2022 11:38 AM    AST 30 06/06/2022 07:10 AM    ALT 35 01/27/2023 07:10 AM    ALT 42 10/28/2022 11:38 AM    ALT 45 06/06/2022 07:10 AM    Albumin 3 8 01/27/2023 07:10 AM    Albumin 4 2 10/28/2022 11:38 AM    Albumin 3 9 06/06/2022 07:10 AM    HDL, Direct 46 (L) 10/28/2022 11:38 AM    HDL, Direct 45 (L) 06/06/2022 07:10 AM    Triglycerides 165 (H) 10/28/2022 11:38 AM    Triglycerides 146 06/06/2022 07:10 AM     Portions of the record may have been created with voice recognition software  Occasional wrong word or "sound a like" substitutions may have occurred due to the inherent limitations of voice recognition software   Read the chart carefully and recognize, using context, where substitutions have occurred

## 2023-02-07 NOTE — PROGRESS NOTES
Assessment/Plan:    Stuart Jacob came into the [facility]'s Endocrinology Office today 02/07/23 to receive Prolia injection  Verbal consent obtained  Consent given by: patient    patient states patient has been medically healthy with no underlining concerns/complications  Stuart Jacob presents with no symptoms today  All insturctions were reviewed with the patient  If the patient should have any questions/concerns, advised patient to contacted [facility]  Subjective:     History provided by: patient    Patient ID: Stuart Jacob is a 82 y o  female      Objective:    Vitals:    02/07/23 1039   BP: 118/70   Pulse: 58   SpO2: 98%   Weight: 60 8 kg (134 lb)   Height: 5' 1" (1 549 m)       Patient tolerated the injection well without any complications  Injection site/s Right arm  Medication was provided by Gustavo Pierson  Patient signed consent form yes   Patient signed [employer] Dine form yes (If no patient is not a medicare patient)  Patient waited 15 minutes after injection no (This only applies to patient's receiving first time injection)         Last Visit: 12/7/2022  Next visit:2/7/2023

## 2023-02-07 NOTE — PATIENT INSTRUCTIONS
TSH is normal        Continue Levothyroxine 112 mcg daily - All 7 days of the week  Check thyroid function lab work prior to next office visit  Continue supplementation with vitamin D  Continue treatment with Prolia  Please obtain lab work prior to next office visit  You will be due for another DEXA scan in late May 2023

## 2023-02-10 ENCOUNTER — HOSPITAL ENCOUNTER (OUTPATIENT)
Dept: MAMMOGRAPHY | Facility: IMAGING CENTER | Age: 88
Discharge: HOME | End: 2023-02-10

## 2023-02-10 VITALS — BODY MASS INDEX: 25.3 KG/M2 | HEIGHT: 61 IN | WEIGHT: 134 LBS

## 2023-02-10 DIAGNOSIS — Z12.31 ENCOUNTER FOR SCREENING MAMMOGRAM FOR BREAST CANCER: ICD-10-CM

## 2023-03-17 ENCOUNTER — HOSPITAL ENCOUNTER (OUTPATIENT)
Facility: HOSPITAL | Age: 88
Setting detail: OBSERVATION
Discharge: HOME | End: 2023-03-18
Attending: EMERGENCY MEDICINE | Admitting: HOSPITALIST

## 2023-03-17 ENCOUNTER — APPOINTMENT (EMERGENCY)
Dept: CT IMAGING | Facility: HOSPITAL | Age: 88
End: 2023-03-17

## 2023-03-17 DIAGNOSIS — R10.9 ABDOMINAL PAIN: ICD-10-CM

## 2023-03-17 DIAGNOSIS — Z79.01 CHRONIC ANTICOAGULATION: ICD-10-CM

## 2023-03-17 DIAGNOSIS — R19.7 DIARRHEA: ICD-10-CM

## 2023-03-17 DIAGNOSIS — R94.31 PROLONGED QT INTERVAL: ICD-10-CM

## 2023-03-17 DIAGNOSIS — R11.2 NAUSEA AND VOMITING: Primary | ICD-10-CM

## 2023-03-17 PROBLEM — R17 SERUM TOTAL BILIRUBIN ELEVATED: Status: ACTIVE | Noted: 2023-03-17

## 2023-03-17 PROBLEM — R74.8 ELEVATED LIPASE: Status: ACTIVE | Noted: 2023-03-17

## 2023-03-17 PROBLEM — K52.9 GASTROENTERITIS: Status: ACTIVE | Noted: 2023-03-17

## 2023-03-17 LAB
2HR DELTA HS TROPONIN: 6 NG/L
ALBUMIN SERPL BCP-MCNC: 4.7 G/DL (ref 3.5–5)
ALP SERPL-CCNC: 63 U/L (ref 34–104)
ALT SERPL W P-5'-P-CCNC: 27 U/L (ref 7–52)
ANION GAP SERPL CALCULATED.3IONS-SCNC: 11 MMOL/L (ref 4–13)
AST SERPL W P-5'-P-CCNC: 26 U/L (ref 13–39)
BACTERIA UR QL AUTO: ABNORMAL /HPF
BASOPHILS # BLD MANUAL: 0 THOUSAND/UL (ref 0–0.1)
BASOPHILS NFR MAR MANUAL: 0 % (ref 0–1)
BILIRUB SERPL-MCNC: 1.18 MG/DL (ref 0.2–1)
BILIRUB UR QL STRIP: NEGATIVE
BUN SERPL-MCNC: 29 MG/DL (ref 5–25)
CALCIUM SERPL-MCNC: 9.3 MG/DL (ref 8.4–10.2)
CARDIAC TROPONIN I PNL SERPL HS: 14 NG/L
CARDIAC TROPONIN I PNL SERPL HS: 8 NG/L
CHLORIDE SERPL-SCNC: 104 MMOL/L (ref 96–108)
CLARITY UR: CLEAR
CO2 SERPL-SCNC: 23 MMOL/L (ref 21–32)
COLOR UR: YELLOW
CREAT SERPL-MCNC: 0.89 MG/DL (ref 0.6–1.3)
EOSINOPHIL # BLD MANUAL: 0.27 THOUSAND/UL (ref 0–0.4)
EOSINOPHIL NFR BLD MANUAL: 2 % (ref 0–6)
ERYTHROCYTE [DISTWIDTH] IN BLOOD BY AUTOMATED COUNT: 13.4 % (ref 11.6–15.1)
EST. AVERAGE GLUCOSE BLD GHB EST-MCNC: 103 MG/DL
GFR SERPL CREATININE-BSD FRML MDRD: 59 ML/MIN/1.73SQ M
GLUCOSE SERPL-MCNC: 223 MG/DL (ref 65–140)
GLUCOSE UR STRIP-MCNC: NEGATIVE MG/DL
HBA1C MFR BLD: 5.2 %
HCT VFR BLD AUTO: 42.7 % (ref 34.8–46.1)
HGB BLD-MCNC: 14.1 G/DL (ref 11.5–15.4)
HGB UR QL STRIP.AUTO: ABNORMAL
INR PPP: 2.17 (ref 0.84–1.19)
KETONES UR STRIP-MCNC: NEGATIVE MG/DL
LEUKOCYTE ESTERASE UR QL STRIP: NEGATIVE
LIPASE SERPL-CCNC: 132 U/L (ref 11–82)
LYMPHOCYTES # BLD AUTO: 0.41 THOUSAND/UL (ref 0.6–4.47)
LYMPHOCYTES # BLD AUTO: 3 % (ref 14–44)
MCH RBC QN AUTO: 31.5 PG (ref 26.8–34.3)
MCHC RBC AUTO-ENTMCNC: 33 G/DL (ref 31.4–37.4)
MCV RBC AUTO: 96 FL (ref 82–98)
MONOCYTES # BLD AUTO: 0.95 THOUSAND/UL (ref 0–1.22)
MONOCYTES NFR BLD: 7 % (ref 4–12)
MUCOUS THREADS UR QL AUTO: ABNORMAL
NEUTROPHILS # BLD MANUAL: 11.92 THOUSAND/UL (ref 1.85–7.62)
NEUTS BAND NFR BLD MANUAL: 17 % (ref 0–8)
NEUTS SEG NFR BLD AUTO: 71 % (ref 43–75)
NITRITE UR QL STRIP: NEGATIVE
NON-SQ EPI CELLS URNS QL MICRO: ABNORMAL /HPF
PH UR STRIP.AUTO: 6 [PH]
PLATELET # BLD AUTO: 169 THOUSANDS/UL (ref 149–390)
PLATELET BLD QL SMEAR: ADEQUATE
PMV BLD AUTO: 11.2 FL (ref 8.9–12.7)
POTASSIUM SERPL-SCNC: 3.7 MMOL/L (ref 3.5–5.3)
PROCALCITONIN SERPL-MCNC: 0.11 NG/ML
PROT SERPL-MCNC: 8.1 G/DL (ref 6.4–8.4)
PROT UR STRIP-MCNC: ABNORMAL MG/DL
PROTHROMBIN TIME: 25.4 SECONDS (ref 11.6–14.5)
RBC # BLD AUTO: 4.47 MILLION/UL (ref 3.81–5.12)
RBC #/AREA URNS AUTO: ABNORMAL /HPF
RBC MORPH BLD: NORMAL
SODIUM SERPL-SCNC: 138 MMOL/L (ref 135–147)
SP GR UR STRIP.AUTO: 1.01 (ref 1–1.03)
TSH SERPL DL<=0.05 MIU/L-ACNC: 2.22 UIU/ML (ref 0.45–4.5)
UROBILINOGEN UR STRIP-ACNC: <2 MG/DL
WBC # BLD AUTO: 13.55 THOUSAND/UL (ref 4.31–10.16)
WBC #/AREA URNS AUTO: ABNORMAL /HPF

## 2023-03-17 RX ORDER — MELATONIN
3000 DAILY
Status: DISCONTINUED | OUTPATIENT
Start: 2023-03-17 | End: 2023-03-18 | Stop reason: HOSPADM

## 2023-03-17 RX ORDER — LOSARTAN POTASSIUM 50 MG/1
50 TABLET ORAL DAILY
Status: DISCONTINUED | OUTPATIENT
Start: 2023-03-17 | End: 2023-03-18 | Stop reason: HOSPADM

## 2023-03-17 RX ORDER — WARFARIN SODIUM 5 MG/1
5 TABLET ORAL
Status: DISCONTINUED | OUTPATIENT
Start: 2023-03-17 | End: 2023-03-18

## 2023-03-17 RX ORDER — ONDANSETRON 2 MG/ML
4 INJECTION INTRAMUSCULAR; INTRAVENOUS ONCE
Status: COMPLETED | OUTPATIENT
Start: 2023-03-17 | End: 2023-03-17

## 2023-03-17 RX ORDER — PANTOPRAZOLE SODIUM 40 MG/1
40 TABLET, DELAYED RELEASE ORAL
Status: DISCONTINUED | OUTPATIENT
Start: 2023-03-18 | End: 2023-03-18 | Stop reason: HOSPADM

## 2023-03-17 RX ORDER — PRAVASTATIN SODIUM 40 MG
40 TABLET ORAL
Status: DISCONTINUED | OUTPATIENT
Start: 2023-03-17 | End: 2023-03-18 | Stop reason: HOSPADM

## 2023-03-17 RX ORDER — SODIUM CHLORIDE 9 MG/ML
60 INJECTION, SOLUTION INTRAVENOUS CONTINUOUS
Status: DISCONTINUED | OUTPATIENT
Start: 2023-03-17 | End: 2023-03-18

## 2023-03-17 RX ORDER — LEVOTHYROXINE SODIUM 112 UG/1
112 TABLET ORAL
Status: DISCONTINUED | OUTPATIENT
Start: 2023-03-18 | End: 2023-03-18 | Stop reason: HOSPADM

## 2023-03-17 RX ORDER — PILOCARPINE HYDROCHLORIDE 5 MG/1
5 TABLET, FILM COATED ORAL 4 TIMES DAILY
Status: DISCONTINUED | OUTPATIENT
Start: 2023-03-17 | End: 2023-03-18 | Stop reason: HOSPADM

## 2023-03-17 RX ORDER — ACETAMINOPHEN 325 MG/1
650 TABLET ORAL EVERY 6 HOURS PRN
Status: DISCONTINUED | OUTPATIENT
Start: 2023-03-17 | End: 2023-03-18 | Stop reason: HOSPADM

## 2023-03-17 RX ORDER — HYDROXYCHLOROQUINE SULFATE 200 MG/1
200 TABLET, FILM COATED ORAL DAILY
Status: DISCONTINUED | OUTPATIENT
Start: 2023-03-17 | End: 2023-03-18 | Stop reason: HOSPADM

## 2023-03-17 RX ORDER — ENOXAPARIN SODIUM 100 MG/ML
40 INJECTION SUBCUTANEOUS DAILY
Status: DISCONTINUED | OUTPATIENT
Start: 2023-03-17 | End: 2023-03-17

## 2023-03-17 RX ORDER — PANTOPRAZOLE SODIUM 40 MG/10ML
40 INJECTION, POWDER, LYOPHILIZED, FOR SOLUTION INTRAVENOUS ONCE
Status: COMPLETED | OUTPATIENT
Start: 2023-03-17 | End: 2023-03-17

## 2023-03-17 RX ADMIN — METOPROLOL TARTRATE 25 MG: 25 TABLET, FILM COATED ORAL at 14:52

## 2023-03-17 RX ADMIN — LOSARTAN POTASSIUM 50 MG: 50 TABLET, FILM COATED ORAL at 14:53

## 2023-03-17 RX ADMIN — PANTOPRAZOLE SODIUM 40 MG: 40 INJECTION, POWDER, FOR SOLUTION INTRAVENOUS at 05:34

## 2023-03-17 RX ADMIN — Medication 3000 UNITS: at 14:51

## 2023-03-17 RX ADMIN — GLYCERIN, HYPROMELLOSE, POLYETHYLENE GLYCOL 1 DROP: .2; .2; 1 LIQUID OPHTHALMIC at 16:26

## 2023-03-17 RX ADMIN — SODIUM CHLORIDE 60 ML/HR: 0.9 INJECTION, SOLUTION INTRAVENOUS at 23:10

## 2023-03-17 RX ADMIN — IOHEXOL 100 ML: 350 INJECTION, SOLUTION INTRAVENOUS at 06:52

## 2023-03-17 RX ADMIN — GLYCERIN, HYPROMELLOSE, POLYETHYLENE GLYCOL 1 DROP: .2; .2; 1 LIQUID OPHTHALMIC at 23:02

## 2023-03-17 RX ADMIN — METOPROLOL TARTRATE 25 MG: 25 TABLET, FILM COATED ORAL at 23:00

## 2023-03-17 RX ADMIN — PILOCARPINE HYDROCHLORIDE 5 MG: 5 TABLET, FILM COATED ORAL at 16:24

## 2023-03-17 RX ADMIN — SODIUM CHLORIDE 1000 ML: 0.9 INJECTION, SOLUTION INTRAVENOUS at 05:35

## 2023-03-17 RX ADMIN — SODIUM CHLORIDE 60 ML/HR: 0.9 INJECTION, SOLUTION INTRAVENOUS at 14:55

## 2023-03-17 RX ADMIN — PILOCARPINE HYDROCHLORIDE 5 MG: 5 TABLET, FILM COATED ORAL at 23:00

## 2023-03-17 RX ADMIN — HYDROXYCHLOROQUINE SULFATE 200 MG: 200 TABLET, FILM COATED ORAL at 16:24

## 2023-03-17 RX ADMIN — WARFARIN SODIUM 5 MG: 5 TABLET ORAL at 23:00

## 2023-03-17 RX ADMIN — ONDANSETRON 4 MG: 2 INJECTION INTRAMUSCULAR; INTRAVENOUS at 05:35

## 2023-03-17 NOTE — H&P
[place]  H&P- Aydee Nur 1935, 82 y o  female MRN: [id number]  Unit/Bed#: ED 06 Encounter: [id number]  Primary Care Provider: Master Riggs MD   Date and time admitted to hospital: 3/17/2023  5:29 AM    * Gastroenteritis  Assessment & Plan  Pt presented to the ED with complaints of n/v/d last evening   · CT A/P- Possible enteritis with fluid-filled small bowel loops proximally with prominent mucosa     · C/w supportive care  · S/p 1LNS bolus in the ER along with protonix IV and zofran with resolution of n/v/d  · C/w monitoring   · Avoid zofran at this time given QT prolongation   · Add normal saline at 60ml/hr     QT prolongation  Assessment & Plan  · Noted in the ED   · Monitor, c/w telemetry   · Avoid QT prolongating medications   · Repeat EKG now and again in am     Serum total bilirubin elevated  Assessment & Plan  · In the setting of n/v/d/enteritis   · Repeat CMP in am     Elevated lipase  Assessment & Plan  · In the setting of n/v and enteritis   · Monitor   · S/p 1L of NSS, add normal saline 60ml/hr   · Repeat am lipase     Gastroesophageal reflux disease with esophagitis  Assessment & Plan  · C/w PPI     Osteoporosis  Assessment & Plan  · C/w vitamin D and prolia outpatient     Hyperlipidemia  Assessment & Plan  · C/w statin- zocor substituted for pravastatin    Hypothyroidism in adult  Assessment & Plan  · C/w synthroid   · Check TSH     Essential hypertension  Assessment & Plan  · BP stable   · Hold HCTZ in the setting of n/v/d  · C/w losartan 50mg daily and metoprolol 25mg BID with hold parameters     History of pulmonary embolus (PE)  Assessment & Plan  · History of PE and DVT in the past  · C/w coumadin   · Pt reports taking coumadin 5mg daily for 5 days a week, no coumadin 2 days a week  · Check INR     Sjogren's syndrome (HCC)  Assessment & Plan  · C/w plaquenil and Salagen  · C/w artificial tears   · Supportive care     VTE Pharmacologic Prophylaxis: VTE Score: 3 Moderate Risk (Score 3-4) - Pharmacological DVT Prophylaxis Ordered: enoxaparin (Lovenox)  Code Status: Full Code   Discussion with family: Patient declined call to   Anticipated Length of Stay: Patient will be admitted on an observation basis with an anticipated length of stay of less than 2 midnights secondary to gastroenteritis and prolonged QT  Total Time Spent on Date of Encounter in care of patient: 65 minutes This time was spent on one or more of the following: performing physical exam; counseling and coordination of care; obtaining or reviewing history; documenting in the medical record; reviewing/ordering tests, medications or procedures; communicating with other healthcare professionals and discussing with patient's family/caregivers  Chief Complaint: nausea, vomiting and diarrhea     History of Present Illness:  Mercy Jefferson is a 82 y o  female with a PMH of Sjogren's, hypertension, hyperlipidemia, osteoporosis, history of DVT, hypothyroidism who presents with nausea vomiting and diarrhea that began the evening of admission  The patient reports that she started with nausea vomiting diarrhea around 1030 last night  Reports that she had several episodes and was having intermittent cramping abdominal pain  Currently the patient is laying in bed and denying any further episodes of nausea vomiting or diarrhea and overall reports feeling improved since being hospitalized  The patient was given a liter of normal saline, Zofran, Protonix IV which helped with her symptoms  She had a CT scan of the abdomen and pelvis which was consistent with enteritis  Patient denies any hematochezia or melena  Denies any recent antibiotic use  Patient denies any chest pain, shortness of breath or fevers  Patient did admit to some generalized myalgias  Denies any dizziness, lightheadedness, syncope or weakness    While in the emergency room she was noted to have a prolonged QT on her EKG per the emergency room physician and bandemia and the decision was made to admit the patient and observe her in the hospital     Review of Systems:  Review of Systems   Constitutional: Negative for chills, fever and unexpected weight change  HENT: Negative for congestion, ear pain, sore throat and trouble swallowing  Respiratory: Negative for cough, shortness of breath and wheezing  Cardiovascular: Negative for chest pain, palpitations and leg swelling  Gastrointestinal: Positive for diarrhea, nausea and vomiting  Negative for abdominal pain, blood in stool and constipation  Genitourinary: Negative for difficulty urinating, dysuria, frequency and hematuria  Musculoskeletal: Positive for myalgias  Skin: Negative for color change and rash  Neurological: Negative for dizziness, syncope, speech difficulty, weakness, light-headedness, numbness and headaches  Psychiatric/Behavioral: The patient is not nervous/anxious  All other systems reviewed and are negative        Past Medical and Surgical History:   Past Medical History:   Diagnosis Date   • BRCA1 negative    • BRCA2 negative    • Cancer ([place] [address] ) 2018    squamous cell - face   • Disease of thyroid gland    • GERD (gastroesophageal reflux disease)    • Heart murmur    • Hypercoagulable state ([place] [address] )    • Hyperlipidemia    • Hypertension    • Microscopic colitis    • Osteoporosis    • PE (pulmonary thromboembolism) ([place] [address] )    • Sjogren's syndrome ([place] [address] )    • Urinary tract infection        Past Surgical History:   Procedure Laterality Date   • COLONOSCOPY  07/18/2016     grade 2 internal hemorrhoids but otherwise normal, pathology negative for microscopic colitis   • HYSTERECTOMY  1965   • JOINT REPLACEMENT     • KNEE SURGERY     • SHOULDER SURGERY      right   • SPINE SURGERY     • TOE SURGERY     • TONSILLECTOMY     • UPPER GASTROINTESTINAL ENDOSCOPY  01/09/2015     reflux esophagitis, gastritis, duodenitis in the bulb, pathology negative for H pylori, Puckett's, EOE       Meds/Allergies:  Prior to Admission medications    Medication Sig Start Date End Date Taking? Authorizing Provider   Cholecalciferol (VITAMIN D) 2000 UNITS tablet Take 3,000 Units by mouth daily      Historical Provider, MD   clotrimazole-betamethasone (LOTRISONE) 1-0 05 % cream Apply topically 2 (two) times a day 11/17/22   VALERIE Gage DO   cycloSPORINE (RESTASIS) 0 05 % ophthalmic emulsion Administer 1 drop to both eyes 2 (two) times a day  Historical Provider, MD   denosumab (PROLIA) 60 mg/mL Inject 60 mg under the skin every 6 (six) months    Historical Provider, MD   DHA-EPA-Flaxseed Oil-Vitamin E (THERA TEARS) CAPS Take 4 capsules by mouth daily  Historical Provider, MD   Diclofenac Sodium (VOLTAREN) 1 %  11/1/21   Historical Provider, MD   diphenoxylate-atropine (LOMOTIL) 2 5-0 025 mg per tablet TAKE 1 TABLET BY MOUTH 4 (FOUR) TIMES A DAY AS NEEDED FOR DIARRHEA 7/8/21   [facility] DO   hydrochlorothiazide (HYDRODIURIL) 12 5 mg tablet TAKE 1 TABLET BY MOUTH EVERY DAY 12/23/19   Andrea Rodriguez,    hydroxychloroquine (PLAQUENIL) 200 mg tablet Take 200 mg by mouth daily      Historical Provider, MD   levothyroxine 112 mcg tablet TAKE ONE TABLET BY MOUTH EVERY DAY 10/28/22   CATHERINE Weldon   losartan (COZAAR) 50 mg tablet Take 1 tablet (50 mg total) by mouth daily 12/8/22   CATHERINE Weldon   metoprolol tartrate (LOPRESSOR) 25 mg tablet Take 1 tablet (25 mg total) by mouth every 12 (twelve) hours for 30 days 1/7/19 2/7/23  Arun Daily MD   omeprazole (PriLOSEC) 20 mg delayed release capsule Take 20 mg by mouth daily    Historical Provider, MD   pilocarpine (SALAGEN) 5 mg tablet Take 5 mg by mouth 4 (four) times a day 12/15/21   Historical Provider, MD   polyvinyl alcohol (LIQUIFILM TEARS) 1 4 % ophthalmic solution Administer 1 drop to both eyes as needed for dry eyes    Historical Provider, MD   simvastatin (ZOCOR) 20 mg tablet Take 20 mg by mouth daily at bedtime  Historical Provider, MD   warfarin (COUMADIN) 5 mg tablet Take by mouth daily    Historical Provider, MD     I have reviewed home medications with patient personally  Allergies: Allergies   Allergen Reactions   • Sulfa Antibiotics Hives       Social History:  Marital Status: /Civil Union   Patient Pre-hospital Living Situation: Home  Patient Pre-hospital Level of Mobility: walks with cane occasionally as needed   Patient Pre-hospital Diet Restrictions: none   Substance Use History:   Social History     Substance and Sexual Activity   Alcohol Use Never     Social History     Tobacco Use   Smoking Status Never   Smokeless Tobacco Never     Social History     Substance and Sexual Activity   Drug Use No       Family History:  Family History   Problem Relation Age of Onset   • No Known Problems Mother    • Stroke Father    • Ovarian cancer Sister 78   • Ovarian cancer Daughter 40   • No Known Problems Daughter    • No Known Problems Daughter    • No Known Problems Maternal Grandmother    • No Known Problems Maternal Grandfather    • No Known Problems Paternal Grandmother    • No Known Problems Paternal Grandfather    • No Known Problems Son    • No Known Problems Son    • No Known Problems Maternal Aunt    • No Known Problems Maternal Aunt    • No Known Problems Paternal Aunt    • Colon cancer Neg Hx    • Colon polyps Neg Hx        Physical Exam:     Vitals:   Blood Pressure: 141/65 (03/17/23 1400)  Pulse: 71 (03/17/23 1400)  Temperature: 97 7 °F (36 5 °C) (03/17/23 0531)  Temp Source: Temporal (03/17/23 0531)  Respirations: 16 (03/17/23 1400)  Weight - Scale: 59 kg (130 lb) (03/17/23 0531)  SpO2: 94 % (03/17/23 1400)    Physical Exam  Vitals and nursing note reviewed  Constitutional:       General: She is not in acute distress  Appearance: She is well-developed  HENT:      Head: Normocephalic and atraumatic        Mouth/Throat:      Mouth: Mucous membranes are moist    Eyes: General: No scleral icterus  Conjunctiva/sclera: Conjunctivae normal       Pupils: Pupils are equal, round, and reactive to light  Cardiovascular:      Rate and Rhythm: Normal rate and regular rhythm  Heart sounds: Murmur heard  Pulmonary:      Effort: Pulmonary effort is normal  No respiratory distress  Breath sounds: Normal breath sounds  No wheezing or rales  Abdominal:      General: Bowel sounds are normal  There is no distension  Palpations: Abdomen is soft  Tenderness: There is no abdominal tenderness  Musculoskeletal:         General: No swelling or tenderness  Cervical back: Neck supple  Skin:     General: Skin is warm and dry  Neurological:      General: No focal deficit present  Mental Status: She is alert  Mental status is at baseline  Motor: No weakness  Psychiatric:         Mood and Affect: Mood normal           Additional Data:     Lab Results:  Results from last 7 days   Lab Units 03/17/23  0535   WBC Thousand/uL 13 55*   HEMOGLOBIN g/dL 14 1   HEMATOCRIT % 42 7   PLATELETS Thousands/uL 169   BANDS PCT % 17*   LYMPHO PCT % 3*   MONO PCT % 7   EOS PCT % 2     Results from last 7 days   Lab Units 03/17/23  0535   SODIUM mmol/L 138   POTASSIUM mmol/L 3 7   CHLORIDE mmol/L 104   CO2 mmol/L 23   BUN mg/dL 29*   CREATININE mg/dL 0 89   ANION GAP mmol/L 11   CALCIUM mg/dL 9 3   ALBUMIN g/dL 4 7   TOTAL BILIRUBIN mg/dL 1 18*   ALK PHOS U/L 63   ALT U/L 27   AST U/L 26   GLUCOSE RANDOM mg/dL 223*                       Lines/Drains:  Invasive Devices     Peripheral Intravenous Line  Duration           Peripheral IV Left Antecubital -- days                    Imaging: Reviewed radiology reports from this admission including: abdominal/pelvic CT  CT abdomen pelvis with contrast   Final Result by Dory Favre, MD (03/17 2214)      Possible enteritis with fluid-filled small bowel loops proximally with prominent mucosa              Workstation performed: [id number]             EKG and Other Studies Reviewed on Admission:   · EKG: unable to personally review EKG, not available in the ED or in the electronic chart- currently pending scan from medical records  Per ED physician noted to have prolonged QT on admission  Will repeat EKG now     ** Please Note: This note has been constructed using a voice recognition system   **

## 2023-03-17 NOTE — ED PROVIDER NOTES
History  Chief Complaint   Patient presents with   • Vomiting     Pt to ED c/o N/V/D that started last night before she went to bed  Woke up this morning and was still vomiting  Pt denies chest pain, SOB  82year old female with hx of htn hld, sjorgens syndrome presents for evaluation of N/v/d nonbloody nonbilious, no melena or hematochezia started last night  Intermittent crampy abdominal pain  Body aches but no CP or SOB  No medications PTA  No hx of abdominal surgeries  No recent Abx use  No history of c diff collitis          Prior to Admission Medications   Prescriptions Last Dose Informant Patient Reported? Taking? Cholecalciferol (VITAMIN D) 2000 UNITS tablet   Yes No   Sig: Take 3,000 Units by mouth daily     DHA-EPA-Flaxseed Oil-Vitamin E (THERA TEARS) CAPS   Yes No   Sig: Take 4 capsules by mouth daily  Diclofenac Sodium (VOLTAREN) 1 %   Yes No   clotrimazole-betamethasone (LOTRISONE) 1-0 05 % cream   No No   Sig: Apply topically 2 (two) times a day   cycloSPORINE (RESTASIS) 0 05 % ophthalmic emulsion   Yes No   Sig: Administer 1 drop to both eyes 2 (two) times a day  denosumab (PROLIA) 60 mg/mL   Yes No   Sig: Inject 60 mg under the skin every 6 (six) months   diphenoxylate-atropine (LOMOTIL) 2 5-0 025 mg per tablet   No No   Sig: TAKE 1 TABLET BY MOUTH 4 (FOUR) TIMES A DAY AS NEEDED FOR DIARRHEA   hydrochlorothiazide (HYDRODIURIL) 12 5 mg tablet   No No   Sig: TAKE 1 TABLET BY MOUTH EVERY DAY   hydroxychloroquine (PLAQUENIL) 200 mg tablet   Yes No   Sig: Take 200 mg by mouth daily     levothyroxine 112 mcg tablet   No No   Sig: TAKE ONE TABLET BY MOUTH EVERY DAY   losartan (COZAAR) 50 mg tablet   No No   Sig: Take 1 tablet (50 mg total) by mouth daily   metoprolol tartrate (LOPRESSOR) 25 mg tablet   No No   Sig: Take 1 tablet (25 mg total) by mouth every 12 (twelve) hours for 30 days   omeprazole (PriLOSEC) 20 mg delayed release capsule   Yes No   Sig: Take 20 mg by mouth daily   pilocarpine (SALAGEN) 5 mg tablet   Yes No   Sig: Take 5 mg by mouth 4 (four) times a day   polyvinyl alcohol (LIQUIFILM TEARS) 1 4 % ophthalmic solution   Yes No   Sig: Administer 1 drop to both eyes as needed for dry eyes   simvastatin (ZOCOR) 20 mg tablet   Yes No   Sig: Take 20 mg by mouth daily at bedtime     warfarin (COUMADIN) 5 mg tablet   Yes No   Sig: Take by mouth daily      Facility-Administered Medications: None       Past Medical History:   Diagnosis Date   • BRCA1 negative    • BRCA2 negative    • Cancer ([place] [address] ) 2018    squamous cell - face   • Disease of thyroid gland    • GERD (gastroesophageal reflux disease)    • Heart murmur    • Hypercoagulable state ([facility] )    • Hyperlipidemia    • Hypertension    • Microscopic colitis    • Osteoporosis    • PE (pulmonary thromboembolism) (HCC)    • Sjogren's syndrome (HCC)    • Urinary tract infection        Past Surgical History:   Procedure Laterality Date   • COLONOSCOPY  07/18/2016     grade 2 internal hemorrhoids but otherwise normal, pathology negative for microscopic colitis   • HYSTERECTOMY  1965   • JOINT REPLACEMENT     • KNEE SURGERY     • SHOULDER SURGERY      right   • SPINE SURGERY     • TOE SURGERY     • TONSILLECTOMY     • UPPER GASTROINTESTINAL ENDOSCOPY  01/09/2015     reflux esophagitis, gastritis, duodenitis in the bulb, pathology negative for H pylori, Puckett's, EOE       Family History   Problem Relation Age of Onset   • No Known Problems Mother    • Stroke Father    • Ovarian cancer Sister 78   • Ovarian cancer Daughter 40   • No Known Problems Daughter    • No Known Problems Daughter    • No Known Problems Maternal Grandmother    • No Known Problems Maternal Grandfather    • No Known Problems Paternal Grandmother    • No Known Problems Paternal Grandfather    • No Known Problems Son    • No Known Problems Son    • No Known Problems Maternal Aunt    • No Known Problems Maternal Aunt    • No Known Problems Paternal Aunt    • Colon cancer Neg Hx    • Colon polyps Neg Hx      I have reviewed and agree with the history as documented  E-Cigarette/Vaping   • E-Cigarette Use Never User      E-Cigarette/Vaping Substances     Social History     Tobacco Use   • Smoking status: Never   • Smokeless tobacco: Never   Vaping Use   • Vaping Use: Never used   Substance Use Topics   • Alcohol use: Never   • Drug use: No       Review of Systems   Gastrointestinal: Positive for abdominal pain, diarrhea, nausea and vomiting  Physical Exam  Physical Exam  Vitals and nursing note reviewed  Constitutional:       General: She is not in acute distress  Appearance: She is well-developed  She is ill-appearing  HENT:      Head: Normocephalic and atraumatic  Mouth/Throat:      Mouth: Mucous membranes are dry  Eyes:      Pupils: Pupils are equal, round, and reactive to light  Cardiovascular:      Rate and Rhythm: Normal rate and regular rhythm  Heart sounds: No murmur heard  No friction rub  No gallop  Pulmonary:      Effort: Pulmonary effort is normal       Breath sounds: No wheezing or rales  Chest:      Chest wall: No tenderness  Abdominal:      General: There is no distension  Palpations: Abdomen is soft  There is no mass  Tenderness: There is abdominal tenderness  There is no guarding or rebound  Comments: Diffusely tender   Musculoskeletal:         General: Normal range of motion  Cervical back: Normal range of motion and neck supple  Right lower leg: No edema  Left lower leg: No edema  Skin:     General: Skin is warm and dry  Neurological:      Mental Status: She is alert and oriented to person, place, and time           Vital Signs  ED Triage Vitals   Temperature Pulse Respirations Blood Pressure SpO2   03/17/23 0531 03/17/23 0531 03/17/23 0531 03/17/23 0539 03/17/23 0531   97 7 °F (36 5 °C) 88 18 110/70 95 %      Temp Source Heart Rate Source Patient Position - Orthostatic VS BP Location FiO2 (%)   03/17/23 7682 03/17/23 0531 03/17/23 0539 03/17/23 0715 --   Temporal Monitor Sitting Left arm       Pain Score       03/17/23 0531       No Pain           Vitals:    03/17/23 0531 03/17/23 0539 03/17/23 0715   BP:  110/70 137/64   Pulse: 88  85   Patient Position - Orthostatic VS:  Sitting Lying         Visual Acuity      ED Medications  Medications   sodium chloride 0 9 % bolus 1,000 mL (0 mL Intravenous Stopped 3/17/23 0650)   ondansetron (ZOFRAN) injection 4 mg (4 mg Intravenous Given 3/17/23 0535)   pantoprazole (PROTONIX) injection 40 mg (40 mg Intravenous Given 3/17/23 0534)   iohexol (OMNIPAQUE) 350 MG/ML injection (SINGLE-DOSE) 100 mL (100 mL Intravenous Given 3/17/23 0652)       Diagnostic Studies  Results Reviewed     Procedure Component Value Units Date/Time    HS Troponin I 4hr [[id number]]     Lab Status: No result Specimen: Blood     HS Troponin I 2hr [[id number]] Collected: 03/17/23 0727    Lab Status:  In process Specimen: Blood from Arm, Left Updated: 03/17/23 0729    UA w Reflex to Microscopic w Reflex to Culture [[id number]]     Lab Status: No result Specimen: Urine     Manual Differential(PHLEBS Do Not Order) [[id number]]  (Abnormal) Collected: 03/17/23 0535    Lab Status: Final result Specimen: Blood from Arm, Left Updated: 03/17/23 0607     Segmented % 71 %      Bands % 17 %      Lymphocytes % 3 %      Monocytes % 7 %      Eosinophils, % 2 %      Basophils % 0 %      Absolute Neutrophils 11 92 Thousand/uL      Lymphocytes Absolute 0 41 Thousand/uL      Monocytes Absolute 0 95 Thousand/uL      Eosinophils Absolute 0 27 Thousand/uL      Basophils Absolute 0 00 Thousand/uL      Total Counted --     RBC Morphology Normal     Platelet Estimate Adequate    CBC and differential [[id number]]  (Abnormal) Collected: 03/17/23 0535    Lab Status: Final result Specimen: Blood from Arm, Left Updated: 03/17/23 0607     WBC 13 55 Thousand/uL      RBC 4 47 Million/uL      Hemoglobin 14 1 g/dL      Hematocrit 42 7 %      MCV 96 fL      MCH 31 5 pg      MCHC 33 0 g/dL      RDW 13 4 %      MPV 11 2 fL      Platelets 534 Thousands/uL     Narrative: This is an appended report  These results have been appended to a previously verified report  HS Troponin 0hr (reflex protocol) [[id number]]  (Normal) Collected: 03/17/23 0535    Lab Status: Final result Specimen: Blood from Arm, Left Updated: 03/17/23 0603     hs TnI 0hr 8 ng/L     Lipase [[id number]]  (Abnormal) Collected: 03/17/23 0535    Lab Status: Final result Specimen: Blood from Arm, Left Updated: 03/17/23 0559     Lipase 132 u/L     Comprehensive metabolic panel [[id number]]  (Abnormal) Collected: 03/17/23 0535    Lab Status: Final result Specimen: Blood from Arm, Left Updated: 03/17/23 0559     Sodium 138 mmol/L      Potassium 3 7 mmol/L      Chloride 104 mmol/L      CO2 23 mmol/L      ANION GAP 11 mmol/L      BUN 29 mg/dL      Creatinine 0 89 mg/dL      Glucose 223 mg/dL      Calcium 9 3 mg/dL      AST 26 U/L      ALT 27 U/L      Alkaline Phosphatase 63 U/L      Total Protein 8 1 g/dL      Albumin 4 7 g/dL      Total Bilirubin 1 18 mg/dL      eGFR 59 ml/min/1 73sq m     Narrative:      [place] guidelines for Chronic Kidney Disease (CKD):   •  Stage 1 with normal or high GFR (GFR > 90 mL/min/1 73 square meters)  •  Stage 2 Mild CKD (GFR = 60-89 mL/min/1 73 square meters)  •  Stage 3A Moderate CKD (GFR = 45-59 mL/min/1 73 square meters)  •  Stage 3B Moderate CKD (GFR = 30-44 mL/min/1 73 square meters)  •  Stage 4 Severe CKD (GFR = 15-29 mL/min/1 73 square meters)  •  Stage 5 End Stage CKD (GFR <15 mL/min/1 73 square meters)  Note: GFR calculation is accurate only with a steady state creatinine                 CT abdomen pelvis with contrast   Final Result by Naz Sommers MD (03/17 7280)      Possible enteritis with fluid-filled small bowel loops proximally with prominent mucosa              Workstation performed: [id number] Procedures  Procedures         ED Course  ED Course as of 03/17/23 0751   Melodie Jefferson Mar 17, 2023   4355 2/23 endo note reviewed, currently stable on outpatient medications  No medication changes anticipated   0547 WBC(!): 13 55  Previously 6 9 1 month ago   0552 Procedure Note: EKG  Date/Time: 03/17/23 5:52 AM   Performed by: Barb Anaya  Authorized by: Barb Anaya  Indications / Diagnosis:  Abdominal pain  ECG reviewed by me, the ED Provider: yes   The EKG demonstrates:  Rhythm: normal sinus  Intervals:   Axis: normal axis  QRS/Blocks: normal QRS  ST Changes: nonspecific ST chanages, somewhat more pronounced from previous  No MCKENNA        0611 Bands Relative(!): 17   0728 Feeling better no further nausea, however patient with prolonged QT, bandemia , will likely admit for observation and symptomatic management                                             Medical Decision Making  82year old female w/ n/v/d abdominal pain  Ddx: enteritis, collitis infectious vs inflammatory, less likely acs, arrythmia, intraabdominal surgical pathology however given abdominal tenderness will obtain imaging  Will re-evaluate    Amount and/or Complexity of Data Reviewed  Labs: ordered  Decision-making details documented in ED Course  Risk  Prescription drug management            Disposition  Final diagnoses:   Nausea and vomiting   Diarrhea   Abdominal pain   Prolonged QT interval     Time reflects when diagnosis was documented in both MDM as applicable and the Disposition within this note     Time User Action Codes Description Comment    3/17/2023  7:37 AM Maik Earing Add [R11 2] Nausea and vomiting     3/17/2023  7:37 AM Maik Earing Add [R19 7] Diarrhea     3/17/2023  7:37 AM Maik Earing Add [R10 9] Abdominal pain     3/17/2023  7:37 AM Maik Earing Add [R94 31] Prolonged QT interval       ED Disposition     ED Disposition   Admit    Condition   Stable    Date/Time   Fri Mar 17, 2023  7:49 AM    Comment   Case was discussed with SLIM and the patient's admission status was agreed to be Admission Status: observation status to the service of Dr Jj Louis   Follow-up Information    None         Patient's Medications   Discharge Prescriptions    No medications on file       No discharge procedures on file      PDMP Review       Value Time User    PDMP Reviewed  Yes 7/8/2021 12:19 PM Ana Thornton DO          ED Provider  Electronically Signed by           Criselda Francis DO  03/17/23 0103

## 2023-03-17 NOTE — ASSESSMENT & PLAN NOTE
· History of PE and DVT in the past  · C/w coumadin   · Pt reports taking coumadin 5mg daily for 5 days a week, no coumadin 2 days a week  · Check INR

## 2023-03-17 NOTE — ASSESSMENT & PLAN NOTE
· In the setting of n/v and enteritis   · Monitor   · S/p 1L of NSS, add normal saline 60ml/hr   · Repeat am lipase

## 2023-03-17 NOTE — ASSESSMENT & PLAN NOTE
· BP stable   · Hold HCTZ in the setting of n/v/d  · C/w losartan 50mg daily and metoprolol 25mg BID with hold parameters

## 2023-03-17 NOTE — ED NOTES
Pt provided pretzels with medications   Provider aware and okay with pt eating      Zaina Velarde RN  03/17/23 0033

## 2023-03-17 NOTE — ED NOTES
[address] [facility] out via TT for diet order      Donal Cross RN  03/17/23 1147

## 2023-03-17 NOTE — ASSESSMENT & PLAN NOTE
· Noted in the ED   · Monitor, c/w telemetry   · Avoid QT prolongating medications   · Repeat EKG now and again in am

## 2023-03-17 NOTE — CASE MANAGEMENT
Case Management Assessment & Discharge Planning Note    Patient name Katelyn Cordova  Location ED 06/ED 06 MRN [id number]  : 1935 Date 3/17/2023       Current Admission Date: 3/17/2023  Current Admission Diagnosis:Gastroenteritis   Patient Active Problem List    Diagnosis Date Noted   • Gastroenteritis 2023   • QT prolongation 2023   • Elevated lipase 2023   • Serum total bilirubin elevated 2023   • History of DVT (deep vein thrombosis) 2022   • Status post vaginal hysterectomy 2022   • History of osteoporosis 2022   • Acute blood loss anemia 2020   • Gastrointestinal hemorrhage with melena 2020   • Functional diarrhea 2020   • Diarrhea 2020   • Weight loss 2020   • Gastroesophageal reflux disease with esophagitis 2020   • Chronic anticoagulation 2020   • Hypercoagulable state ([facility] ) 2020   • Transient neurological symptoms 2020   • Chronic left hip pain    • Greater trochanteric bursitis of left hip    • Arthritis of right sacroiliac joint    • Sacroiliitis (HCC)    • Closed inferior dislocation of left shoulder 2020   • Osteoarthritis 2020   • Supervision of normal first pregnancy 2020   • Failed [facility] ) 2020   • Traumatic hematoma of left orbit 2019   • Hyperlipidemia 2018   • Vitamin D deficiency 2018   • Osteoporosis 2018   • Pleural effusion on right 2018   • Chronic kidney disease 07/15/2018   • Symptomatic anemia 07/15/2018   • 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   • Sjogren's syndrome ([facility] )    • History of pulmonary embolus (PE)    • Essential hypertension    • Hypothyroidism in adult    • Status post reverse arthroplasty of right shoulder 2018   • Long-term use of hydroxychloroquine 2016   • Rectocele 2013   • Carpal tunnel syndrome 2008   • Generalized osteoarthrosis, unspecified site 05/22/2008   • Obstructive sleep apnea 05/22/2008   • Other and unspecified nonspecific immunological findings 05/22/2008   • Thromboembolism ([facility] [address] ) 05/22/2008   • Phlebitis and thrombophlebitis of other deep vessels of lower extremities 05/22/2008   • Pure hypercholesterolemia 05/22/2008   • Restless legs syndrome (RLS) 05/22/2008   • Spinal stenosis of lumbar region without neurogenic claudication 05/22/2008   • Synovitis and tenosynovitis, unspecified 05/22/2008   • Temporal arteritis ([facility] ) 05/22/2008   • 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      LOS (days): 0  Geometric Mean LOS (GMLOS) (days):   Days to GMLOS:     OBJECTIVE:              Current admission status: Observation       Preferred Pharmacy:   [facility]/pharmacy #[id number] Maya - [address] [place] [address]  Phone: [phone] Fax: Hanane Hollis [address]03, [address] [place] [address] [address] [place] [address]  Phone: [phone] Fax: [phone]    Primary Care Provider: Selma White MD    Primary Insurance: [facility]  Secondary Insurance:     ASSESSMENT:  [address] [facility] [address] [facility]   Primary Phone: [phone] (Mobile)  Home Phone: [phone]               Advance Directives  Does patient have a [address]?: Yes  Does patient have Advance Directives?: Yes  Advance Directives: Living will, Power of  for health care  Primary Contact: meek Garcia         Readmission Root Cause  30 Day Readmission: No    Patient Information  Admitted from[de-identified] Home  Mental Status: Alert  During Assessment patient was accompanied by: Spouse  Assessment information provided by[de-identified] Patient  Primary Caregiver: Self  Support Systems: Spouse/significant other  [place] of Residence: [facility] do you live in?: [address] [place] [address] entry access options   Select all that apply : Stairs  Number of steps to enter home : 3  Do the steps have railings?: No  Type of Current Residence: 2 story home  Upon entering residence, is there a bedroom on the main floor (no further steps)?: Yes  Upon entering residence, is there a bathroom on the main floor (no further steps)?: Yes  In the last 12 months, was there a time when you were not able to pay the mortgage or rent on time?: No  In the last 12 months, how many places have you lived?: 1  In the last 12 months, was there a time when you did not have a steady place to sleep or slept in a shelter (including now)?: No  Homeless/housing insecurity resource given?: N/A  Living Arrangements: Lives w/ Spouse/significant other  Is patient a ?: No    Activities of Daily Living Prior to Admission  Functional Status: Independent  Completes ADLs independently?: Yes  Ambulates independently?: Yes  Does patient use assisted devices?: Yes  Assisted Devices (DME) used: Straight Cane, Nebulizer, Shower Chair, CPAP  DME Company Name (respiratory supplies): [employer]  Does patient currently own DME?: Yes  What DME does the patient currently own?: Wheelchair, Walker, Straight Cane, Bedside Commode, Shower Chair, CPAP  Does patient have a history of Outpatient Therapy (PT/OT)?: Yes  Does the patient have a history of Short-Term Rehab?: No  Does patient have a history of HHC?: Yes  Does patient currently have [facility]?: No         Patient Information Continued  Income Source: Pension/FPC  Does patient have prescription coverage?: Yes  Within the past 12 months, you worried that your food would run out before you got the money to buy more : Never true  Within the past 12 months, the food you bought just didn't last and you didn't have money to get more : Never true  Food insecurity resource given?: N/A  Does patient receive dialysis treatments?: No  Does patient have a history of substance abuse?: No  Does patient have a history of Mental Health Diagnosis?: No         Means of Transportation  Means of Transport to [facility][de-identified] Family transport  In the past 12 months, has lack of transportation kept you from medical appointments or from getting medications?: No  In the past 12 months, has lack of transportation kept you from meetings, work, or from getting things needed for daily living?: No  Was application for public transport provided?: N/A        DISCHARGE DETAILS:    Discharge planning discussed with[de-identified] Patient and her spouse in ED  Freedom of Choice: Yes  Comments - Freedom of Choice: discussed dc planning and cm role  CM contacted family/caregiver?: Yes  Were Treatment Team discharge recommendations reviewed with patient/caregiver?: Yes  Did patient/caregiver verbalize understanding of patient care needs?: Yes       Contacts  Patient Contacts: Amalia Bailey:    Relationship to Patient[de-identified] Family  Contact Method: In Person  Reason/Outcome: Discharge [address]         Is the patient interested in [facility] at discharge?: No         Other Referral/Resources/Interventions Provided:  Interventions: None Indicated, [facility]  Referral Comments: Pt reports she is indpt with a cane at baseline and she has not used her CPAP in a long time  Additional Comments: Cm met with pt and her spouse in ED  Pt states she is indpt at home  She uses a cane to ambulate but also owns a walker,wc, sc and bsc  Pt has a nebulizer and CPAP from [employer]  She has not used her CPAP in a long time  Pt states her spouse is her POA  She sometimes drives but her spouse drives mostly  Pt sees [place] PCP and uses [facility] pharmacy  Pt states she has not had issues with her ambulation recent  No reported fall or concerns for DC  No needs anticpated at this time

## 2023-03-17 NOTE — ASSESSMENT & PLAN NOTE
Pt presented to the ED with complaints of n/v/d last evening   · CT A/P- Possible enteritis with fluid-filled small bowel loops proximally with prominent mucosa     · C/w supportive care  · S/p 1LNS bolus in the ER along with protonix IV and zofran with resolution of n/v/d  · C/w monitoring   · Avoid zofran at this time given QT prolongation   · Add normal saline at 60ml/hr

## 2023-03-18 VITALS
HEART RATE: 70 BPM | BODY MASS INDEX: 24.56 KG/M2 | TEMPERATURE: 97.3 F | OXYGEN SATURATION: 93 % | WEIGHT: 130 LBS | DIASTOLIC BLOOD PRESSURE: 70 MMHG | SYSTOLIC BLOOD PRESSURE: 155 MMHG | RESPIRATION RATE: 18 BRPM

## 2023-03-18 PROBLEM — R74.8 ELEVATED LIPASE: Status: RESOLVED | Noted: 2023-03-17 | Resolved: 2023-03-18

## 2023-03-18 LAB
ALBUMIN SERPL BCP-MCNC: 3.4 G/DL (ref 3.5–5)
ALP SERPL-CCNC: 37 U/L (ref 34–104)
ALT SERPL W P-5'-P-CCNC: 34 U/L (ref 7–52)
ANION GAP SERPL CALCULATED.3IONS-SCNC: 7 MMOL/L (ref 4–13)
AST SERPL W P-5'-P-CCNC: 28 U/L (ref 13–39)
BASOPHILS # BLD AUTO: 0.04 THOUSANDS/ÂΜL (ref 0–0.1)
BASOPHILS NFR BLD AUTO: 1 % (ref 0–1)
BILIRUB DIRECT SERPL-MCNC: 0.12 MG/DL (ref 0–0.2)
BILIRUB SERPL-MCNC: 0.57 MG/DL (ref 0.2–1)
BUN SERPL-MCNC: 31 MG/DL (ref 5–25)
CALCIUM SERPL-MCNC: 7.3 MG/DL (ref 8.4–10.2)
CHLORIDE SERPL-SCNC: 108 MMOL/L (ref 96–108)
CO2 SERPL-SCNC: 20 MMOL/L (ref 21–32)
CREAT SERPL-MCNC: 0.94 MG/DL (ref 0.6–1.3)
EOSINOPHIL # BLD AUTO: 0.07 THOUSAND/ÂΜL (ref 0–0.61)
EOSINOPHIL NFR BLD AUTO: 1 % (ref 0–6)
ERYTHROCYTE [DISTWIDTH] IN BLOOD BY AUTOMATED COUNT: 14 % (ref 11.6–15.1)
GFR SERPL CREATININE-BSD FRML MDRD: 55 ML/MIN/1.73SQ M
GLUCOSE P FAST SERPL-MCNC: 92 MG/DL (ref 65–99)
GLUCOSE SERPL-MCNC: 92 MG/DL (ref 65–140)
HCT VFR BLD AUTO: 33.8 % (ref 34.8–46.1)
HGB BLD-MCNC: 11.2 G/DL (ref 11.5–15.4)
IMM GRANULOCYTES # BLD AUTO: 0.05 THOUSAND/UL (ref 0–0.2)
IMM GRANULOCYTES NFR BLD AUTO: 1 % (ref 0–2)
INR PPP: 3.48 (ref 0.84–1.19)
LIPASE SERPL-CCNC: 57 U/L (ref 11–82)
LYMPHOCYTES # BLD AUTO: 0.73 THOUSANDS/ÂΜL (ref 0.6–4.47)
LYMPHOCYTES NFR BLD AUTO: 8 % (ref 14–44)
MCH RBC QN AUTO: 32.1 PG (ref 26.8–34.3)
MCHC RBC AUTO-ENTMCNC: 33.1 G/DL (ref 31.4–37.4)
MCV RBC AUTO: 97 FL (ref 82–98)
MONOCYTES # BLD AUTO: 1.37 THOUSAND/ÂΜL (ref 0.17–1.22)
MONOCYTES NFR BLD AUTO: 16 % (ref 4–12)
NEUTROPHILS # BLD AUTO: 6.49 THOUSANDS/ÂΜL (ref 1.85–7.62)
NEUTS SEG NFR BLD AUTO: 73 % (ref 43–75)
NRBC BLD AUTO-RTO: 0 /100 WBCS
PLATELET # BLD AUTO: 144 THOUSANDS/UL (ref 149–390)
PMV BLD AUTO: 11.5 FL (ref 8.9–12.7)
POTASSIUM SERPL-SCNC: 3.7 MMOL/L (ref 3.5–5.3)
PROT SERPL-MCNC: 6 G/DL (ref 6.4–8.4)
PROTHROMBIN TIME: 36.6 SECONDS (ref 11.6–14.5)
RBC # BLD AUTO: 3.49 MILLION/UL (ref 3.81–5.12)
SODIUM SERPL-SCNC: 135 MMOL/L (ref 135–147)
WBC # BLD AUTO: 8.75 THOUSAND/UL (ref 4.31–10.16)

## 2023-03-18 RX ORDER — WARFARIN SODIUM 5 MG/1
5 TABLET ORAL
Refills: 0
Start: 2023-03-20

## 2023-03-18 RX ORDER — SODIUM BICARBONATE 650 MG/1
650 TABLET ORAL
Status: COMPLETED | OUTPATIENT
Start: 2023-03-18 | End: 2023-03-18

## 2023-03-18 RX ADMIN — Medication 3000 UNITS: at 09:29

## 2023-03-18 RX ADMIN — HYDROXYCHLOROQUINE SULFATE 200 MG: 200 TABLET, FILM COATED ORAL at 09:34

## 2023-03-18 RX ADMIN — PILOCARPINE HYDROCHLORIDE 5 MG: 5 TABLET, FILM COATED ORAL at 12:01

## 2023-03-18 RX ADMIN — GLYCERIN, HYPROMELLOSE, POLYETHYLENE GLYCOL 1 DROP: .2; .2; 1 LIQUID OPHTHALMIC at 09:34

## 2023-03-18 RX ADMIN — PANTOPRAZOLE SODIUM 40 MG: 40 TABLET, DELAYED RELEASE ORAL at 05:38

## 2023-03-18 RX ADMIN — PILOCARPINE HYDROCHLORIDE 5 MG: 5 TABLET, FILM COATED ORAL at 09:29

## 2023-03-18 RX ADMIN — SODIUM BICARBONATE 650 MG TABLET 650 MG: at 12:01

## 2023-03-18 RX ADMIN — PILOCARPINE HYDROCHLORIDE 5 MG: 5 TABLET, FILM COATED ORAL at 17:26

## 2023-03-18 RX ADMIN — PRAVASTATIN SODIUM 40 MG: 40 TABLET ORAL at 16:55

## 2023-03-18 RX ADMIN — LEVOTHYROXINE SODIUM 112 MCG: 112 TABLET ORAL at 05:38

## 2023-03-18 RX ADMIN — METOPROLOL TARTRATE 25 MG: 25 TABLET, FILM COATED ORAL at 09:29

## 2023-03-18 RX ADMIN — LOSARTAN POTASSIUM 50 MG: 50 TABLET, FILM COATED ORAL at 09:29

## 2023-03-18 RX ADMIN — SODIUM BICARBONATE 650 MG TABLET 650 MG: at 16:55

## 2023-03-18 NOTE — PLAN OF CARE
Problem: PAIN - ADULT  Goal: Verbalizes/displays adequate comfort level or baseline comfort level  Description: Interventions:  - Encourage patient to monitor pain and request assistance  - Assess pain using appropriate pain scale  - Administer analgesics based on type and severity of pain and evaluate response  - Implement non-pharmacological measures as appropriate and evaluate response  - Consider cultural and social influences on pain and pain management  - Notify physician/advanced practitioner if interventions unsuccessful or patient reports new pain  Outcome: Progressing     Problem: INFECTION - ADULT  Goal: Absence or prevention of progression during hospitalization  Description: INTERVENTIONS:  - Assess and monitor for signs and symptoms of infection  - Monitor lab/diagnostic results  - Monitor all insertion sites, i e  indwelling lines, tubes, and drains  - Monitor endotracheal if appropriate and nasal secretions for changes in amount and color  - Sidney appropriate cooling/warming therapies per order  - Administer medications as ordered  - Instruct and encourage patient and family to use good hand hygiene technique  - Identify and instruct in appropriate isolation precautions for identified infection/condition  Outcome: Progressing  Goal: Absence of fever/infection during neutropenic period  Description: INTERVENTIONS:  - Monitor WBC    Outcome: Progressing     Problem: SAFETY ADULT  Goal: Patient will remain free of falls  Description: INTERVENTIONS:  - Educate patient/family on patient safety including physical limitations  - Instruct patient to call for assistance with activity   - Consult OT/PT to assist with strengthening/mobility   - Keep Call bell within reach  - Keep bed low and locked with side rails adjusted as appropriate  - Keep care items and personal belongings within reach  - Initiate and maintain comfort rounds  - Make Fall Risk Sign visible to staff  - Offer Toileting every x Hours, in advance of need  - Initiate/Maintain xalarm  - Obtain necessary fall risk management equipment: x  - Apply yellow socks and bracelet for high fall risk patients  - Consider moving patient to room near nurses station  Outcome: Progressing  Goal: Maintain or return to baseline ADL function  Description: INTERVENTIONS:  -  Assess patient's ability to carry out ADLs; assess patient's baseline for ADL function and identify physical deficits which impact ability to perform ADLs (bathing, care of mouth/teeth, toileting, grooming, dressing, etc )  - Assess/evaluate cause of self-care deficits   - Assess range of motion  - Assess patient's mobility; develop plan if impaired  - Assess patient's need for assistive devices and provide as appropriate  - Encourage maximum independence but intervene and supervise when necessary  - Involve family in performance of ADLs  - Assess for home care needs following discharge   - Consider OT consult to assist with ADL evaluation and planning for discharge  - Provide patient education as appropriate  Outcome: Progressing  Goal: Maintains/Returns to pre admission functional level  Description: INTERVENTIONS:  - Perform BMAT or MOVE assessment daily    - Set and communicate daily mobility goal to care team and patient/family/caregiver  - Collaborate with rehabilitation services on mobility goals if consulted  - Perform Range of Motion x times a day  - Reposition patient every x hours    - Dangle patient x times a day  - Stand patient x times a day  - Ambulate patient x times a day  - Out of bed to chair x times a day   - Out of bed for meals x times a day  - Out of bed for toileting  - Record patient progress and toleration of activity level   Outcome: Progressing     Problem: DISCHARGE PLANNING  Goal: Discharge to home or other facility with appropriate resources  Description: INTERVENTIONS:  - Identify barriers to discharge w/patient and caregiver  - Arrange for needed discharge resources and transportation as appropriate  - Identify discharge learning needs (meds, wound care, etc )  - Arrange for interpretive services to assist at discharge as needed  - Refer to Case Management Department for coordinating discharge planning if the patient needs post-hospital services based on physician/advanced practitioner order or complex needs related to functional status, cognitive ability, or social support system  Outcome: Progressing     Problem: Knowledge Deficit  Goal: Patient/family/caregiver demonstrates understanding of disease process, treatment plan, medications, and discharge instructions  Description: Complete learning assessment and assess knowledge base  Interventions:  - Provide teaching at level of understanding  - Provide teaching via preferred learning methods  Outcome: Progressing     Problem: Nutrition/Hydration-ADULT  Goal: Nutrient/Hydration intake appropriate for improving, restoring or maintaining nutritional needs  Description: Monitor and assess patient's nutrition/hydration status for malnutrition  Collaborate with interdisciplinary team and initiate plan and interventions as ordered  Monitor patient's weight and dietary intake as ordered or per policy  Utilize nutrition screening tool and intervene as necessary  Determine patient's food preferences and provide high-protein, high-caloric foods as appropriate       INTERVENTIONS:  - Monitor oral intake, urinary output, labs, and treatment plans  - Assess nutrition and hydration status and recommend course of action  - Evaluate amount of meals eaten  - Assist patient with eating if necessary   - Allow adequate time for meals  - Recommend/ encourage appropriate diets, oral nutritional supplements, and vitamin/mineral supplements  - Order, calculate, and assess calorie counts as needed  - Recommend, monitor, and adjust tube feedings and TPN/PPN based on assessed needs  - Assess need for intravenous fluids  - Provide specific nutrition/hydration education as appropriate  - Include patient/family/caregiver in decisions related to nutrition  Outcome: Progressing     Problem: Potential for Falls  Goal: Patient will remain free of falls  Description: INTERVENTIONS:  - Educate patient/family on patient safety including physical limitations  - Instruct patient to call for assistance with activity   - Consult OT/PT to assist with strengthening/mobility   - Keep Call bell within reach  - Keep bed low and locked with side rails adjusted as appropriate  - Keep care items and personal belongings within reach  - Initiate and maintain comfort rounds  - Make Fall Risk Sign visible to staff  - Offer Toileting every x Hours, in advance of need  - Initiate/Maintain xalarm  - Obtain necessary fall risk management equipment: x  - Apply yellow socks and bracelet for high fall risk patients  - Consider moving patient to room near nurses station  Outcome: Progressing     Problem: GASTROINTESTINAL - ADULT  Goal: Minimal or absence of nausea and/or vomiting  Description: INTERVENTIONS:  - Administer IV fluids if ordered to ensure adequate hydration  - Maintain NPO status until nausea and vomiting are resolved  - Nasogastric tube if ordered  - Administer ordered antiemetic medications as needed  - Provide nonpharmacologic comfort measures as appropriate  - Advance diet as tolerated, if ordered  - Consider nutrition services referral to assist patient with adequate nutrition and appropriate food choices  Outcome: Progressing  Goal: Maintains or returns to baseline bowel function  Description: INTERVENTIONS:  - Assess bowel function  - Encourage oral fluids to ensure adequate hydration  - Administer IV fluids if ordered to ensure adequate hydration  - Administer ordered medications as needed  - Encourage mobilization and activity  - Consider nutritional services referral to assist patient with adequate nutrition and appropriate food choices  Outcome: Progressing  Goal: Maintains adequate nutritional intake  Description: INTERVENTIONS:  - Monitor percentage of each meal consumed  - Identify factors contributing to decreased intake, treat as appropriate  - Assist with meals as needed  - Monitor I&O, weight, and lab values if indicated  - Obtain nutrition services referral as needed  Outcome: Progressing     Problem: METABOLIC, FLUID AND ELECTROLYTES - ADULT  Goal: Electrolytes maintained within normal limits  Description: INTERVENTIONS:  - Monitor labs and assess patient for signs and symptoms of electrolyte imbalances  - Administer electrolyte replacement as ordered  - Monitor response to electrolyte replacements, including repeat lab results as appropriate  - Instruct patient on fluid and nutrition as appropriate  Outcome: Progressing  Goal: Fluid balance maintained  Description: INTERVENTIONS:  - Monitor labs   - Monitor I/O and WT  - Instruct patient on fluid and nutrition as appropriate  - Assess for signs & symptoms of volume excess or deficit  Outcome: Progressing     Problem: SKIN/TISSUE INTEGRITY - ADULT  Goal: Skin Integrity remains intact(Skin Breakdown Prevention)  Description: Assess:  -Perform Dwight assessment every x  -Clean and moisturize skin every x  -Inspect skin when repositioning, toileting, and assisting with ADLS  -Assess under medical devices such as x every x  -Assess extremities for adequate circulation and sensation     Bed Management:  -Have minimal linens on bed & keep smooth, unwrinkled  -Change linens as needed when moist or perspiring  -Avoid sitting or lying in one position for more than x hours while in bed  -Keep HOB at [facility]     Toileting:  -Offer bedside commode  -Assess for incontinence every x  -Use incontinent care products after each incontinent episode such as x    Activity:  -Mobilize patient x times a day  -Encourage activity and walks on unit  -Encourage or provide ROM exercises   -Turn and reposition patient every x Hours  -Use appropriate equipment to lift or move patient in bed  -Instruct/ Assist with weight shifting every x when out of bed in chair  -Consider limitation of chair time x hour intervals    Skin Care:  -Avoid use of baby powder, tape, friction and shearing, hot water or constrictive clothing  -Relieve pressure over bony prominences using x  -Do not massage red bony areas    Next Steps:  -Teach patient strategies to minimize risks such as x   -Consider consults to  interdisciplinary teams such as x  Outcome: Progressing     Problem: HEMATOLOGIC - ADULT  Goal: Maintains hematologic stability  Description: INTERVENTIONS  - Assess for signs and symptoms of bleeding or hemorrhage  - Monitor labs  - Administer supportive blood products/factors as ordered and appropriate  Outcome: Progressing     Problem: MUSCULOSKELETAL - ADULT  Goal: Maintain or return mobility to safest level of function  Description: INTERVENTIONS:  - Assess patient's ability to carry out ADLs; assess patient's baseline for ADL function and identify physical deficits which impact ability to perform ADLs (bathing, care of mouth/teeth, toileting, grooming, dressing, etc )  - Assess/evaluate cause of self-care deficits   - Assess range of motion  - Assess patient's mobility  - Assess patient's need for assistive devices and provide as appropriate  - Encourage maximum independence but intervene and supervise when necessary  - Involve family in performance of ADLs  - Assess for home care needs following discharge   - Consider OT consult to assist with ADL evaluation and planning for discharge  - Provide patient education as appropriate  Outcome: Progressing     Problem: MOBILITY - ADULT  Goal: Maintain or return to baseline ADL function  Description: INTERVENTIONS:  -  Assess patient's ability to carry out ADLs; assess patient's baseline for ADL function and identify physical deficits which impact ability to perform ADLs (bathing, care of mouth/teeth, toileting, grooming, dressing, etc )  - Assess/evaluate cause of self-care deficits   - Assess range of motion  - Assess patient's mobility; develop plan if impaired  - Assess patient's need for assistive devices and provide as appropriate  - Encourage maximum independence but intervene and supervise when necessary  - Involve family in performance of ADLs  - Assess for home care needs following discharge   - Consider OT consult to assist with ADL evaluation and planning for discharge  - Provide patient education as appropriate  Outcome: Progressing  Goal: Maintains/Returns to pre admission functional level  Description: INTERVENTIONS:  - Perform BMAT or MOVE assessment daily    - Set and communicate daily mobility goal to care team and patient/family/caregiver  - Collaborate with rehabilitation services on mobility goals if consulted  - Perform Range of Motion x times a day  - Reposition patient every x hours    - Dangle patient x times a day  - Stand patient x times a day  - Ambulate patient x times a day  - Out of bed to chair x times a day   - Out of bed for meals x times a day  - Out of bed for toileting  - Record patient progress and toleration of activity level   Outcome: Progressing     Problem: Prexisting or High Potential for Compromised Skin Integrity  Goal: Skin integrity is maintained or improved  Description: INTERVENTIONS:  - Identify patients at risk for skin breakdown  - Assess and monitor skin integrity  - Assess and monitor nutrition and hydration status  - Monitor labs   - Assess for incontinence   - Turn and reposition patient  - Assist with mobility/ambulation  - Relieve pressure over bony prominences  - Avoid friction and shearing  - Provide appropriate hygiene as needed including keeping skin clean and dry  - Evaluate need for skin moisturizer/barrier cream  - Collaborate with interdisciplinary team   - Patient/family teaching  - Consider wound care consult   Outcome: Progressing

## 2023-03-18 NOTE — NURSING NOTE
Reviewed d/c instructions with pt  And pt 's spouse  Pt  Was d/c'd to home and transported by her spouse  Pt  [facility] RN

## 2023-03-18 NOTE — PLAN OF CARE
Problem: PAIN - ADULT  Goal: Verbalizes/displays adequate comfort level or baseline comfort level  Description: Interventions:  - Encourage patient to monitor pain and request assistance  - Assess pain using appropriate pain scale  - Administer analgesics based on type and severity of pain and evaluate response  - Implement non-pharmacological measures as appropriate and evaluate response  - Consider cultural and social influences on pain and pain management  - Notify physician/advanced practitioner if interventions unsuccessful or patient reports new pain  Outcome: Progressing     Problem: INFECTION - ADULT  Goal: Absence or prevention of progression during hospitalization  Description: INTERVENTIONS:  - Assess and monitor for signs and symptoms of infection  - Monitor lab/diagnostic results  - Monitor all insertion sites, i e  indwelling lines, tubes, and drains  - Monitor endotracheal if appropriate and nasal secretions for changes in amount and color  - [place] appropriate cooling/warming therapies per order  - Administer medications as ordered  - Instruct and encourage patient and family to use good hand hygiene technique  - Identify and instruct in appropriate isolation precautions for identified infection/condition  Outcome: Progressing  Goal: Absence of fever/infection during neutropenic period  Description: INTERVENTIONS:  - Monitor WBC    Outcome: Progressing     Problem: SAFETY ADULT  Goal: Patient will remain free of falls  Description: INTERVENTIONS:  - Educate patient/family on patient safety including physical limitations  - Instruct patient to call for assistance with activity   - Consult OT/PT to assist with strengthening/mobility   - Keep Call bell within reach  - Keep bed low and locked with side rails adjusted as appropriate  - Keep care items and personal belongings within reach  - Initiate and maintain comfort rounds  - Make Fall Risk Sign visible to staff  - Offer Toileting every x Hours, in advance of need  - Initiate/Maintain xalarm  - Obtain necessary fall risk management equipment: x  - Apply yellow socks and bracelet for high fall risk patients  - Consider moving patient to room near nurses station  Outcome: Progressing  Goal: Maintain or return to baseline ADL function  Description: INTERVENTIONS:  -  Assess patient's ability to carry out ADLs; assess patient's baseline for ADL function and identify physical deficits which impact ability to perform ADLs (bathing, care of mouth/teeth, toileting, grooming, dressing, etc )  - Assess/evaluate cause of self-care deficits   - Assess range of motion  - Assess patient's mobility; develop plan if impaired  - Assess patient's need for assistive devices and provide as appropriate  - Encourage maximum independence but intervene and supervise when necessary  - Involve family in performance of ADLs  - Assess for home care needs following discharge   - Consider OT consult to assist with ADL evaluation and planning for discharge  - Provide patient education as appropriate  Outcome: Progressing  Goal: Maintains/Returns to pre admission functional level  Description: INTERVENTIONS:  - Perform BMAT or MOVE assessment daily    - Set and communicate daily mobility goal to care team and patient/family/caregiver  - Collaborate with rehabilitation services on mobility goals if consulted  - Perform Range of Motion x times a day  - Reposition patient every x hours    - Dangle patient x times a day  - Stand patient x times a day  - Ambulate patient x times a day  - Out of bed to chair x times a day   - Out of bed for meals x times a day  - Out of bed for toileting  - Record patient progress and toleration of activity level   Outcome: Progressing     Problem: DISCHARGE PLANNING  Goal: Discharge to home or other facility with appropriate resources  Description: INTERVENTIONS:  - Identify barriers to discharge w/patient and caregiver  - Arrange for needed discharge resources and transportation as appropriate  - Identify discharge learning needs (meds, wound care, etc )  - Arrange for interpretive services to assist at discharge as needed  - Refer to Case Management Department for coordinating discharge planning if the patient needs post-hospital services based on physician/advanced practitioner order or complex needs related to functional status, cognitive ability, or social support system  Outcome: Progressing     Problem: Knowledge Deficit  Goal: Patient/family/caregiver demonstrates understanding of disease process, treatment plan, medications, and discharge instructions  Description: Complete learning assessment and assess knowledge base  Interventions:  - Provide teaching at level of understanding  - Provide teaching via preferred learning methods  Outcome: Progressing     Problem: Nutrition/Hydration-ADULT  Goal: Nutrient/Hydration intake appropriate for improving, restoring or maintaining nutritional needs  Description: Monitor and assess patient's nutrition/hydration status for malnutrition  Collaborate with interdisciplinary team and initiate plan and interventions as ordered  Monitor patient's weight and dietary intake as ordered or per policy  Utilize nutrition screening tool and intervene as necessary  Determine patient's food preferences and provide high-protein, high-caloric foods as appropriate       INTERVENTIONS:  - Monitor oral intake, urinary output, labs, and treatment plans  - Assess nutrition and hydration status and recommend course of action  - Evaluate amount of meals eaten  - Assist patient with eating if necessary   - Allow adequate time for meals  - Recommend/ encourage appropriate diets, oral nutritional supplements, and vitamin/mineral supplements  - Order, calculate, and assess calorie counts as needed  - Recommend, monitor, and adjust tube feedings and TPN/PPN based on assessed needs  - Assess need for intravenous fluids  - Provide specific nutrition/hydration education as appropriate  - Include patient/family/caregiver in decisions related to nutrition  Outcome: Progressing     Problem: Potential for Falls  Goal: Patient will remain free of falls  Description: INTERVENTIONS:  - Educate patient/family on patient safety including physical limitations  - Instruct patient to call for assistance with activity   - Consult OT/PT to assist with strengthening/mobility   - Keep Call bell within reach  - Keep bed low and locked with side rails adjusted as appropriate  - Keep care items and personal belongings within reach  - Initiate and maintain comfort rounds  - Make Fall Risk Sign visible to staff  - Offer Toileting every x Hours, in advance of need  - Initiate/Maintain xalarm  - Obtain necessary fall risk management equipment: x  - Apply yellow socks and bracelet for high fall risk patients  - Consider moving patient to room near nurses station  Outcome: Progressing     Problem: GASTROINTESTINAL - ADULT  Goal: Minimal or absence of nausea and/or vomiting  Description: INTERVENTIONS:  - Administer IV fluids if ordered to ensure adequate hydration  - Maintain NPO status until nausea and vomiting are resolved  - Nasogastric tube if ordered  - Administer ordered antiemetic medications as needed  - Provide nonpharmacologic comfort measures as appropriate  - Advance diet as tolerated, if ordered  - Consider nutrition services referral to assist patient with adequate nutrition and appropriate food choices  Outcome: Progressing  Goal: Maintains or returns to baseline bowel function  Description: INTERVENTIONS:  - Assess bowel function  - Encourage oral fluids to ensure adequate hydration  - Administer IV fluids if ordered to ensure adequate hydration  - Administer ordered medications as needed  - Encourage mobilization and activity  - Consider nutritional services referral to assist patient with adequate nutrition and appropriate food choices  Outcome: Progressing  Goal: Maintains adequate nutritional intake  Description: INTERVENTIONS:  - Monitor percentage of each meal consumed  - Identify factors contributing to decreased intake, treat as appropriate  - Assist with meals as needed  - Monitor I&O, weight, and lab values if indicated  - Obtain nutrition services referral as needed  Outcome: Progressing     Problem: METABOLIC, FLUID AND ELECTROLYTES - ADULT  Goal: Electrolytes maintained within normal limits  Description: INTERVENTIONS:  - Monitor labs and assess patient for signs and symptoms of electrolyte imbalances  - Administer electrolyte replacement as ordered  - Monitor response to electrolyte replacements, including repeat lab results as appropriate  - Instruct patient on fluid and nutrition as appropriate  Outcome: Progressing  Goal: Fluid balance maintained  Description: INTERVENTIONS:  - Monitor labs   - Monitor I/O and WT  - Instruct patient on fluid and nutrition as appropriate  - Assess for signs & symptoms of volume excess or deficit  Outcome: Progressing     Problem: SKIN/TISSUE INTEGRITY - ADULT  Goal: Skin Integrity remains intact(Skin Breakdown Prevention)  Description: Assess:  -Perform [place] assessment every x  -Clean and moisturize skin every x  -Inspect skin when repositioning, toileting, and assisting with ADLS  -Assess under medical devices such as x every x  -Assess extremities for adequate circulation and sensation     Bed Management:  -Have minimal linens on bed & keep smooth, unwrinkled  -Change linens as needed when moist or perspiring  -Avoid sitting or lying in one position for more than x hours while in bed  -Keep HOB at [facility]     Toileting:  -Offer bedside commode  -Assess for incontinence every x  -Use incontinent care products after each incontinent episode such as x    Activity:  -Mobilize patient x times a day  -Encourage activity and walks on unit  -Encourage or provide ROM exercises   -Turn and reposition patient every x Hours  -Use appropriate equipment to lift or move patient in bed  -Instruct/ Assist with weight shifting every x when out of bed in chair  -Consider limitation of chair time x hour intervals    Skin Care:  -Avoid use of baby powder, tape, friction and shearing, hot water or constrictive clothing  -Relieve pressure over bony prominences using x  -Do not massage red bony areas    Next Steps:  -Teach patient strategies to minimize risks such as x   -Consider consults to  interdisciplinary teams such as x  Outcome: Progressing     Problem: HEMATOLOGIC - ADULT  Goal: Maintains hematologic stability  Description: INTERVENTIONS  - Assess for signs and symptoms of bleeding or hemorrhage  - Monitor labs  - Administer supportive blood products/factors as ordered and appropriate  Outcome: Progressing     Problem: MUSCULOSKELETAL - ADULT  Goal: Maintain or return mobility to safest level of function  Description: INTERVENTIONS:  - Assess patient's ability to carry out ADLs; assess patient's baseline for ADL function and identify physical deficits which impact ability to perform ADLs (bathing, care of mouth/teeth, toileting, grooming, dressing, etc )  - Assess/evaluate cause of self-care deficits   - Assess range of motion  - Assess patient's mobility  - Assess patient's need for assistive devices and provide as appropriate  - Encourage maximum independence but intervene and supervise when necessary  - Involve family in performance of ADLs  - Assess for home care needs following discharge   - Consider OT consult to assist with ADL evaluation and planning for discharge  - Provide patient education as appropriate  Outcome: Progressing     Problem: MOBILITY - ADULT  Goal: Maintain or return to baseline ADL function  Description: INTERVENTIONS:  -  Assess patient's ability to carry out ADLs; assess patient's baseline for ADL function and identify physical deficits which impact ability to perform ADLs (bathing, care of mouth/teeth, toileting, grooming, dressing, etc )  - Assess/evaluate cause of self-care deficits   - Assess range of motion  - Assess patient's mobility; develop plan if impaired  - Assess patient's need for assistive devices and provide as appropriate  - Encourage maximum independence but intervene and supervise when necessary  - Involve family in performance of ADLs  - Assess for home care needs following discharge   - Consider OT consult to assist with ADL evaluation and planning for discharge  - Provide patient education as appropriate  Outcome: Progressing  Goal: Maintains/Returns to pre admission functional level  Description: INTERVENTIONS:  - Perform BMAT or MOVE assessment daily    - Set and communicate daily mobility goal to care team and patient/family/caregiver  - Collaborate with rehabilitation services on mobility goals if consulted  - Perform Range of Motion x times a day  - Reposition patient every x hours    - Dangle patient x times a day  - Stand patient x times a day  - Ambulate patient x times a day  - Out of bed to chair x times a day   - Out of bed for meals xxx times a day  - Out of bed for toileting  - Record patient progress and toleration of activity level   Outcome: Progressing     Problem: Prexisting or High Potential for Compromised Skin Integrity  Goal: Skin integrity is maintained or improved  Description: INTERVENTIONS:  - Identify patients at risk for skin breakdown  - Assess and monitor skin integrity  - Assess and monitor nutrition and hydration status  - Monitor labs   - Assess for incontinence   - Turn and reposition patient  - Assist with mobility/ambulation  - Relieve pressure over bony prominences  - Avoid friction and shearing  - Provide appropriate hygiene as needed including keeping skin clean and dry  - Evaluate need for skin moisturizer/barrier cream  - Collaborate with interdisciplinary team   - Patient/family teaching  - Consider wound care consult   Outcome: Progressing

## 2023-03-18 NOTE — DISCHARGE SUMMARY
[place]  Discharge- Maura Doather 1935, 82 y o  female MRN: [id number]  Unit/Bed#: -01 Encounter: [id number]  Primary Care Provider: Prudence Mohamud MD   Date and time admitted to hospital: 3/17/2023  5:29 AM    * Gastroenteritis  Assessment & Plan  Pt presented to the ED with complaints of n/v/d last evening   · CT A/P- Possible enteritis with fluid-filled small bowel loops proximally with prominent mucosa  · C/w supportive care  · S/p 1LNS bolus in the ER along with protonix IV and zofran with resolution of n/v/d  · C/w monitoring   · No further vomiting  Patient with chronic diarrhea  · Patient reported that she is feeling well and wants to go home    Serum total bilirubin elevated  Assessment & Plan  · In the setting of n/v/d/enteritis   · Appears to be resolved    QT prolongation  Assessment & Plan  · Noted in the ED   · Monitor, c/w telemetry   · Avoid QT prolongating medications   · Appears stable  Will DC today    Gastroesophageal reflux disease with esophagitis  Assessment & Plan  · C/w PPI     Osteoporosis  Assessment & Plan  · C/w vitamin D and prolia outpatient     Hyperlipidemia  Assessment & Plan  · C/w statin- zocor substituted for pravastatin    Hypothyroidism in adult  Assessment & Plan  · C/w synthroid   · Check TSH     Essential hypertension  Assessment & Plan  · BP stable   · Continue with outpatient BP meds at the time of discharge    History of pulmonary embolus (PE)  Assessment & Plan  · History of PE and DVT in the past  · C/w coumadin   · Pt reports taking coumadin 5mg daily for 5 days a week, no coumadin 2 days a week  · INR is>3  Will hold Coumadin today and tomorrow    Recheck INR on Monday and Coumadin dose per INR    Sjogren's syndrome (HCC)  Assessment & Plan  · C/w plaquenil and [place]  · C/w artificial tears   · Supportive care     Elevated lipase-resolved as of 3/18/2023  Assessment & Plan  · Resolved        Medical Problems Resolved Problems  Date Reviewed: 3/18/2023          Resolved    Elevated lipase 3/18/2023     Resolved by  Bj Jay MD        Discharging Physician / Practitioner: Bj Jay MD  PCP: Yessenia Abdullahi MD  Admission Date:   Admission Orders (From admission, onward)     Ordered        03/17/23 0750  Place in Observation  Once                      Discharge Date: 03/18/23    Consultations During Hospital Stay:  ·     Procedures Performed:   ·     Significant Findings / Test Results:   · CT abdomen-possible enteritis with fluid-filled small bowel loops proximally with prominent mucosa    Incidental Findings:   ·     Test Results Pending at Discharge (will require follow up):   ·      Outpatient Tests Requested:  Pro time on Monday  BMP in a week -    Complications:   none    Reason for Admission: Nausea vomiting diarrhea    Hospital Course: [facility] is a 82 y o  female patient who originally presented to the hospital on 3/17/2023 due to nausea vomiting and diarrhea  Patient CAT scan was consistent with enteritis  Patient was admitted to the hospital   Patient was given IV fluids with resolution of the nausea and vomiting  Patient tolerated diet  Patient was eager to go home  Patient had mild elevation in lipase which was resolved  Also hyperbilirubinemia was resolved  Patient had leukocytosis with bandemia which also resolved  With chronic diarrhea  Patient reported that her diarrhea is very similar to her baseline bowel movements  Patient noted to have low back up  Patient was given 2 doses of sodium bicarb  Patient remained hemodynamically stable and afebrile  Patient was eager to go home  Patient noted to have QT prolongation on previous EKG  Repeat EKG she showed a QTc at 488  Patient was monitored on telemetry  No significant arrhythmia  Patient was discharged home with outpatient follow-up  For details refer to the chart  Patient's INR was 3 4 on the day of discharge    Plan is to hold "Coumadin today and tomorrow and obtain INR on Monday and Coumadin dose per INR        Please see above list of diagnoses and related plan for additional information  Condition at Discharge: good    Discharge Day Visit / Exam:   Subjective: Seen and examined  Tolerated diet  No further nausea vomiting  Wants to go home  Vitals: Blood Pressure: 155/70 (03/18/23 1607)  Pulse: 70 (03/18/23 1607)  Temperature: (!) 97 3 °F (36 3 °C) (03/18/23 0836)  Temp Source: Temporal (03/17/23 2304)  Respirations: 18 (03/17/23 2304)  Weight - Scale: 59 kg (130 lb) (03/17/23 0531)  SpO2: 93 % (03/18/23 1607)  Exam:   Physical Exam  Constitutional:       General: She is not in acute distress  Appearance: She is not ill-appearing  HENT:      Head: Normocephalic  Nose: Nose normal    Eyes:      General: No scleral icterus  Cardiovascular:      Rate and Rhythm: Normal rate and regular rhythm  Heart sounds: Normal heart sounds  Pulmonary:      Effort: Pulmonary effort is normal       Breath sounds: Normal breath sounds  Abdominal:      General: There is no distension  Tenderness: There is no abdominal tenderness  Musculoskeletal:         General: Normal range of motion  Skin:     General: Skin is warm  Coloration: Skin is not jaundiced  Neurological:      General: No focal deficit present  Mental Status: She is alert  Cranial Nerves: No cranial nerve deficit  Discussion with Family: Updated  () at bedside  Discharge instructions/Information to patient and family:   See after visit summary for information provided to patient and family  Provisions for Follow-Up Care:  See after visit summary for information related to follow-up care and any pertinent home health orders  Disposition:   Home    Planned Readmission: none     Discharge Statement:  I spent 45 minutes discharging the patient  This time was spent on the day of discharge   I had direct contact with the patient on the day of discharge  Greater than 50% of the total time was spent examining patient, answering all patient questions, arranging and discussing plan of care with patient as well as directly providing post-discharge instructions  Additional time then spent on discharge activities  Discharge Medications:  See after visit summary for reconciled discharge medications provided to patient and/or family        **Please Note: This note may have been constructed using a voice recognition system**

## 2023-03-18 NOTE — ASSESSMENT & PLAN NOTE
Pt presented to the ED with complaints of n/v/d last evening   · CT A/P- Possible enteritis with fluid-filled small bowel loops proximally with prominent mucosa  · C/w supportive care  · S/p 1LNS bolus in the ER along with protonix IV and zofran with resolution of n/v/d  · C/w monitoring   · No further vomiting    Patient with chronic diarrhea  · Patient reported that she is feeling well and wants to go home

## 2023-03-18 NOTE — DISCHARGE INSTR - AVS FIRST PAGE
Pro time on Monday-Coumadin dose per pro time-contact your doctor regarding Coumadin dose  BMP in a week-arrange through your primary care physician

## 2023-03-18 NOTE — ASSESSMENT & PLAN NOTE
· Noted in the ED   · Monitor, c/w telemetry   · Avoid QT prolongating medications   · Appears stable    Will DC today

## 2023-03-18 NOTE — ASSESSMENT & PLAN NOTE
· History of PE and DVT in the past  · C/w coumadin   · Pt reports taking coumadin 5mg daily for 5 days a week, no coumadin 2 days a week  · INR is>3  Will hold Coumadin today and tomorrow    Recheck INR on Monday and Coumadin dose per INR

## 2023-03-18 NOTE — UTILIZATION REVIEW
Initial Clinical Review    Admission: Date/Time/Statement:   Admission Orders (From admission, onward)     Ordered        03/17/23 0750  Place in Observation  Once                      Orders Placed This Encounter   Procedures   • Place in Observation     Standing Status:   Standing     Number of Occurrences:   1     Order Specific Question:   Level of Care     Answer:   Med Surg [16]     ED Arrival Information     Expected   -    Arrival   3/17/2023 05:28    Acuity   Urgent            Means of arrival   Ambulance    Escorted by   [facility])    Service   Hospitalist    Admission type   Emergency            Arrival complaint   stomach bug           Chief Complaint   Patient presents with   • Vomiting     Pt to ED c/o N/V/D that started last night before she went to bed  Woke up this morning and was still vomiting  Pt denies chest pain, SOB  Initial Presentation: 82 y o  female to the ED from home via EMS with complaints of vomiting, nausea, diarrhea which started the night prior to arrival  Admitted under observation for gastroenteritis, QT prolongation  Arrives with diffusely tender abdomen  WBCs 13 55    Bands 17   CT a/p shows:  Possible enteritis with fluid-filled small bowel loops proximally with prominent mucosa  In the ED, given 1 liter iv NS, protonix, zofran  Hold further zofran given QT prolongation  Continue iv fluids  Repeat EKG  Monitor tele    Date:      Day 2:      ED Triage Vitals   Temperature Pulse Respirations Blood Pressure SpO2   03/17/23 0531 03/17/23 0531 03/17/23 0531 03/17/23 0539 03/17/23 0531   97 7 °F (36 5 °C) 88 18 110/70 95 %      Temp Source Heart Rate Source Patient Position - Orthostatic VS BP Location FiO2 (%)   03/17/23 0531 03/17/23 0531 03/17/23 0539 03/17/23 0715 --   Temporal Monitor Sitting Left arm       Pain Score       03/17/23 0531       No Pain          Wt Readings from Last 1 Encounters:   03/17/23 59 kg (130 lb)     Additional Vital Signs: Date/Time Temp Pulse Resp BP MAP (mmHg) SpO2 O2 Device Patient Position - Orthostatic VS   03/17/23 23:04:37 98 1 °F (36 7 °C) 84 18 123/49 Abnormal  74 91 % None (Room air) Lying   03/17/23 1630 -- 73 20 133/63 90 -- -- --   03/17/23 1600 -- 72 20 134/60 87 -- -- --   03/17/23 1530 -- 73 20 129/59 85 -- -- --   03/17/23 1500 -- 74 20 134/65 93 91 % None (Room air) Lying   03/17/23 1452 -- -- -- 124/56 -- -- -- --   03/17/23 1400 -- 71 16 141/65 -- 94 % None (Room air) Lying   03/17/23 1053 -- -- -- -- -- -- None (Room air) --   03/17/23 1045 -- 81 24 Abnormal  111/57 -- 95 % None (Room air) Lying   03/17/23 0800 -- 86 26 Abnormal  158/66 95 -- -- --   03/17/23 0715 -- 85 21 137/64 -- 94 % None (Room air) Lying   03/17/23 0539 -- -- -- 110/70 -- -- -- Sitting   03/17/23 0531 97 7 °F (36 5 °C) 88 18 -- -- 95 % None (Room air) --       Pertinent Labs/Diagnostic Test Results:   CT abdomen pelvis with contrast   Final Result by Thi Markham MD (03/17 6178)      Possible enteritis with fluid-filled small bowel loops proximally with prominent mucosa              Workstation performed: [id number]               Results from last 7 days   Lab Units 03/18/23  0441 03/17/23  0535   WBC Thousand/uL 8 75 13 55*   HEMOGLOBIN g/dL 11 2* 14 1   HEMATOCRIT % 33 8* 42 7   PLATELETS Thousands/uL 144* 169   NEUTROS ABS Thousands/µL 6 49  --    BANDS PCT %  --  17*         Results from last 7 days   Lab Units 03/18/23  0441 03/17/23  0535   SODIUM mmol/L 135 138   POTASSIUM mmol/L 3 7 3 7   CHLORIDE mmol/L 108 104   CO2 mmol/L 20* 23   ANION GAP mmol/L 7 11   BUN mg/dL 31* 29*   CREATININE mg/dL 0 94 0 89   EGFR ml/min/1 73sq m 55 59   CALCIUM mg/dL 7 3* 9 3     Results from last 7 days   Lab Units 03/18/23  0441 03/17/23  0535   AST U/L 28 26   ALT U/L 34 27   ALK PHOS U/L 37 63   TOTAL PROTEIN g/dL 6 0* 8 1   ALBUMIN g/dL 3 4* 4 7   TOTAL BILIRUBIN mg/dL 0 57 1 18*   BILIRUBIN DIRECT mg/dL 0 12  --          Results from last 7 days   Lab Units 03/18/23  0441 03/17/23  0535   GLUCOSE RANDOM mg/dL 92 223*         Results from last 7 days   Lab Units 03/17/23  0535   HEMOGLOBIN A1C % 5 2   EAG mg/dl 103             Results from last 7 days   Lab Units 03/17/23  0727 03/17/23  0535   HS TNI 0HR ng/L  --  8   HS TNI 2HR ng/L 14  --    HSTNI D2 ng/L 6  --          Results from last 7 days   Lab Units 03/18/23  0441 03/17/23  1437   PROTIME seconds 36 6* 25 4*   INR  3 48* 2 17*     Results from last 7 days   Lab Units 03/17/23  0535   TSH 3RD GENERATON uIU/mL 2 223     Results from last 7 days   Lab Units 03/17/23  0535   PROCALCITONIN ng/ml 0 11       Results from last 7 days   Lab Units 03/18/23  0441 03/17/23  0535   LIPASE u/L 57 132*       Results from last 7 days   Lab Units 03/17/23  0800   CLARITY UA  Clear   COLOR UA  Yellow   SPEC GRAV UA  1 015   PH UA  6 0   GLUCOSE UA mg/dl Negative   KETONES UA mg/dl Negative   BLOOD UA  Trace*   PROTEIN UA mg/dl 100 (2+)*   NITRITE UA  Negative   BILIRUBIN UA  Negative   UROBILINOGEN UA (BE) mg/dl <2 0   LEUKOCYTES UA  Negative   WBC UA /hpf 1-2   RBC UA /hpf None Seen   BACTERIA UA /hpf None Seen   EPITHELIAL CELLS WET PREP /hpf None Seen   MUCUS THREADS  Occasional*     Results from last 7 days   Lab Units 03/17/23  1649   BLOOD CULTURE  Received in Microbiology Lab  Culture in Progress  Received in Microbiology Lab  Culture in Progress         ED Treatment:   Medication Administration from 03/17/2023 0528 to 03/17/2023 1906       Date/Time Order Dose Route Action     03/17/2023 0535 EDT sodium chloride 0 9 % bolus 1,000 mL 1,000 mL Intravenous New Bag     03/17/2023 0535 EDT ondansetron (ZOFRAN) injection 4 mg 4 mg Intravenous Given     03/17/2023 0534 EDT pantoprazole (PROTONIX) injection 40 mg 40 mg Intravenous Given     03/17/2023 1451 EDT cholecalciferol (VITAMIN D3) tablet 3,000 Units 3,000 Units Oral Given     03/17/2023 1626 EDT glycerin-hypromellose- (ARTIFICIAL TEARS) ophthalmic solution 1 drop 1 drop Both Eyes Given     03/17/2023 1624 EDT hydroxychloroquine (PLAQUENIL) tablet 200 mg 200 mg Oral Given     03/17/2023 1453 EDT losartan (COZAAR) tablet 50 mg 50 mg Oral Given     03/17/2023 1452 EDT metoprolol tartrate (LOPRESSOR) tablet 25 mg 25 mg Oral Given     03/17/2023 1624 EDT pilocarpine (SALAGEN) tablet 5 mg 5 mg Oral Given     03/17/2023 1455 EDT sodium chloride 0 9 % infusion 60 mL/hr Intravenous New Bag        Past Medical History:   Diagnosis Date   • BRCA1 negative    • BRCA2 negative    • Cancer ([facility] ) 2018    squamous cell - face   • Disease of thyroid gland    • GERD (gastroesophageal reflux disease)    • Heart murmur    • Hypercoagulable state ([facility] )    • Hyperlipidemia    • Hypertension    • Microscopic colitis    • Osteoporosis    • PE (pulmonary thromboembolism) (HCC)    • Sjogren's syndrome (HCC)    • Urinary tract infection        Admitting Diagnosis: Diarrhea [R19 7]  Vomiting [R11 10]  Abdominal pain [R10 9]  Nausea and vomiting [R11 2]  Prolonged QT interval [R94 31]  Age/Sex: 82 y o  female  Admission Orders:  Scheduled Medications:  cholecalciferol, 3,000 Units, Oral, Daily  glycerin-hypromellose-, 1 drop, Both Eyes, Q12H [address]  hydroxychloroquine, 200 mg, Oral, Daily  levothyroxine, 112 mcg, Oral, Early Morning  losartan, 50 mg, Oral, Daily  metoprolol tartrate, 25 mg, Oral, Q12H MANUEL  pantoprazole, 40 mg, Oral, Early Morning  pilocarpine, 5 mg, Oral, 4x Daily  pravastatin, 40 mg, Oral, Daily With Dinner  warfarin, 5 mg, Oral, Daily (warfarin)      Continuous IV Infusions:  sodium chloride, 60 mL/hr, Intravenous, Continuous      PRN Meds:  acetaminophen, 650 mg, Oral, Q6H PRN        None    Network Utilization Review Department  ATTENTION: Please call with any questions or concerns to [phone] and carefully listen to the prompts so that you are directed to the right person   All voicemails are confidential   Tricia Salcido all requests for admission clinical reviews, approved or denied determinations and any other requests to dedicated fax number below belonging to the campus where the patient is receiving treatment   List of dedicated fax numbers for the Facilities:  [address] [place] [address] DENIALS (Administrative/Medical Necessity) [phone]   [address] (Maternity/NICU/Pediatrics) [phone]   [address] [phone]   [facility] [place] [address] [phone]   [address] [place] [address]60 [address] [phone]   1550 [facility] 134 815 [facility] [phone]

## 2023-03-18 NOTE — PLAN OF CARE
Problem: PAIN - ADULT  Goal: Verbalizes/displays adequate comfort level or baseline comfort level  Description: Interventions:  - Encourage patient to monitor pain and request assistance  - Assess pain using appropriate pain scale  - Administer analgesics based on type and severity of pain and evaluate response  - Implement non-pharmacological measures as appropriate and evaluate response  - Consider cultural and social influences on pain and pain management  - Notify physician/advanced practitioner if interventions unsuccessful or patient reports new pain  3/18/2023 1727 by Bro Bone RN  Outcome: Adequate for Discharge  3/18/2023 0924 by Bro Bone RN  Outcome: Progressing     Problem: INFECTION - ADULT  Goal: Absence or prevention of progression during hospitalization  Description: INTERVENTIONS:  - Assess and monitor for signs and symptoms of infection  - Monitor lab/diagnostic results  - Monitor all insertion sites, i e  indwelling lines, tubes, and drains  - Monitor endotracheal if appropriate and nasal secretions for changes in amount and color  - [place] appropriate cooling/warming therapies per order  - Administer medications as ordered  - Instruct and encourage patient and family to use good hand hygiene technique  - Identify and instruct in appropriate isolation precautions for identified infection/condition  3/18/2023 1727 by Bro Bone RN  Outcome: Adequate for Discharge  3/18/2023 0924 by Bro Bone RN  Outcome: Progressing  Goal: Absence of fever/infection during neutropenic period  Description: INTERVENTIONS:  - Monitor WBC    3/18/2023 1727 by Bro Bone RN  Outcome: Adequate for Discharge  3/18/2023 0924 by Bro Bone RN  Outcome: Progressing     Problem: SAFETY ADULT  Goal: Patient will remain free of falls  Description: INTERVENTIONS:  - Educate patient/family on patient safety including physical limitations  - Instruct patient to call for assistance with activity   - Consult OT/PT to assist with strengthening/mobility   - Keep Call bell within reach  - Keep bed low and locked with side rails adjusted as appropriate  - Keep care items and personal belongings within reach  - Initiate and maintain comfort rounds  - Make Fall Risk Sign visible to staff  - Offer Toileting every x Hours, in advance of need  - Initiate/Maintain xalarm  - Obtain necessary fall risk management equipment: x  - Apply yellow socks and bracelet for high fall risk patients  - Consider moving patient to room near nurses station  3/18/2023 1727 by Christiana Hurtado RN  Outcome: Adequate for Discharge  3/18/2023 0924 by Christiana Hurtado RN  Outcome: Progressing  Goal: Maintain or return to baseline ADL function  Description: INTERVENTIONS:  -  Assess patient's ability to carry out ADLs; assess patient's baseline for ADL function and identify physical deficits which impact ability to perform ADLs (bathing, care of mouth/teeth, toileting, grooming, dressing, etc )  - Assess/evaluate cause of self-care deficits   - Assess range of motion  - Assess patient's mobility; develop plan if impaired  - Assess patient's need for assistive devices and provide as appropriate  - Encourage maximum independence but intervene and supervise when necessary  - Involve family in performance of ADLs  - Assess for home care needs following discharge   - Consider OT consult to assist with ADL evaluation and planning for discharge  - Provide patient education as appropriate  3/18/2023 1727 by Christiana Hurtado RN  Outcome: Adequate for Discharge  3/18/2023 0924 by Christiana Hurtado RN  Outcome: Progressing  Goal: Maintains/Returns to pre admission functional level  Description: INTERVENTIONS:  - Perform BMAT or MOVE assessment daily    - Set and communicate daily mobility goal to care team and patient/family/caregiver     - Collaborate with rehabilitation services on mobility goals if consulted  - Perform Range of Motion x times a day  - Reposition patient every x hours  - Dangle patient x times a day  - Stand patient x times a day  - Ambulate patient x times a day  - Out of bed to chair x times a day   - Out of bed for meals x times a day  - Out of bed for toileting  - Record patient progress and toleration of activity level   3/18/2023 1727 by Mark Garrett RN  Outcome: Adequate for Discharge  3/18/2023 0924 by Mark Garrett RN  Outcome: Progressing     Problem: DISCHARGE PLANNING  Goal: Discharge to home or other facility with appropriate resources  Description: INTERVENTIONS:  - Identify barriers to discharge w/patient and caregiver  - Arrange for needed discharge resources and transportation as appropriate  - Identify discharge learning needs (meds, wound care, etc )  - Arrange for interpretive services to assist at discharge as needed  - Refer to Case Management Department for coordinating discharge planning if the patient needs post-hospital services based on physician/advanced practitioner order or complex needs related to functional status, cognitive ability, or social support system  3/18/2023 1727 by Mark Garrett RN  Outcome: Adequate for Discharge  3/18/2023 0924 by Mark Garrett RN  Outcome: Progressing     Problem: Knowledge Deficit  Goal: Patient/family/caregiver demonstrates understanding of disease process, treatment plan, medications, and discharge instructions  Description: Complete learning assessment and assess knowledge base    Interventions:  - Provide teaching at level of understanding  - Provide teaching via preferred learning methods  3/18/2023 1727 by Mark Garrett RN  Outcome: Adequate for Discharge  3/18/2023 0924 by Mark Garrett RN  Outcome: Progressing     Problem: Nutrition/Hydration-ADULT  Goal: Nutrient/Hydration intake appropriate for improving, restoring or maintaining nutritional needs  Description: Monitor and assess patient's nutrition/hydration status for malnutrition  Collaborate with interdisciplinary team and initiate plan and interventions as ordered  Monitor patient's weight and dietary intake as ordered or per policy  Utilize nutrition screening tool and intervene as necessary  Determine patient's food preferences and provide high-protein, high-caloric foods as appropriate       INTERVENTIONS:  - Monitor oral intake, urinary output, labs, and treatment plans  - Assess nutrition and hydration status and recommend course of action  - Evaluate amount of meals eaten  - Assist patient with eating if necessary   - Allow adequate time for meals  - Recommend/ encourage appropriate diets, oral nutritional supplements, and vitamin/mineral supplements  - Order, calculate, and assess calorie counts as needed  - Recommend, monitor, and adjust tube feedings and TPN/PPN based on assessed needs  - Assess need for intravenous fluids  - Provide specific nutrition/hydration education as appropriate  - Include patient/family/caregiver in decisions related to nutrition  3/18/2023 1727 by Shadia Jaimes RN  Outcome: Adequate for Discharge  3/18/2023 0924 by Shadia Jaimes RN  Outcome: Progressing     Problem: Potential for Falls  Goal: Patient will remain free of falls  Description: INTERVENTIONS:  - Educate patient/family on patient safety including physical limitations  - Instruct patient to call for assistance with activity   - Consult OT/PT to assist with strengthening/mobility   - Keep Call bell within reach  - Keep bed low and locked with side rails adjusted as appropriate  - Keep care items and personal belongings within reach  - Initiate and maintain comfort rounds  - Make Fall Risk Sign visible to staff  - Offer Toileting every x Hours, in advance of need  - Initiate/Maintain xalarm  - Obtain necessary fall risk management equipment: x  - Apply yellow socks and bracelet for high fall risk patients  - Consider moving patient to room near nurses station  3/18/2023 1727 by Modesta Corona RN  Outcome: Adequate for Discharge  3/18/2023 4220 by Modesta Corona RN  Outcome: Progressing     Problem: GASTROINTESTINAL - ADULT  Goal: Minimal or absence of nausea and/or vomiting  Description: INTERVENTIONS:  - Administer IV fluids if ordered to ensure adequate hydration  - Maintain NPO status until nausea and vomiting are resolved  - Nasogastric tube if ordered  - Administer ordered antiemetic medications as needed  - Provide nonpharmacologic comfort measures as appropriate  - Advance diet as tolerated, if ordered  - Consider nutrition services referral to assist patient with adequate nutrition and appropriate food choices  3/18/2023 1727 by Modesta Corona RN  Outcome: Adequate for Discharge  3/18/2023 0924 by Modesta Corona RN  Outcome: Progressing  Goal: Maintains or returns to baseline bowel function  Description: INTERVENTIONS:  - Assess bowel function  - Encourage oral fluids to ensure adequate hydration  - Administer IV fluids if ordered to ensure adequate hydration  - Administer ordered medications as needed  - Encourage mobilization and activity  - Consider nutritional services referral to assist patient with adequate nutrition and appropriate food choices  3/18/2023 1727 by Modesta Corona RN  Outcome: Adequate for Discharge  3/18/2023 0924 by Modesta Corona RN  Outcome: Progressing  Goal: Maintains adequate nutritional intake  Description: INTERVENTIONS:  - Monitor percentage of each meal consumed  - Identify factors contributing to decreased intake, treat as appropriate  - Assist with meals as needed  - Monitor I&O, weight, and lab values if indicated  - Obtain nutrition services referral as needed  3/18/2023 1727 by Modesta Corona RN  Outcome: Adequate for Discharge  3/18/2023 0924 by Modesta Corona RN  Outcome: Progressing     Problem: METABOLIC, FLUID AND ELECTROLYTES - ADULT  Goal: Electrolytes maintained within normal limits  Description: INTERVENTIONS:  - Monitor labs and assess patient for signs and symptoms of electrolyte imbalances  - Administer electrolyte replacement as ordered  - Monitor response to electrolyte replacements, including repeat lab results as appropriate  - Instruct patient on fluid and nutrition as appropriate  3/18/2023 1727 by Purvi Castañeda RN  Outcome: Adequate for Discharge  3/18/2023 0924 by Purvi Castañeda RN  Outcome: Progressing  Goal: Fluid balance maintained  Description: INTERVENTIONS:  - Monitor labs   - Monitor I/O and WT  - Instruct patient on fluid and nutrition as appropriate  - Assess for signs & symptoms of volume excess or deficit  3/18/2023 1727 by Purvi Castañeda RN  Outcome: Adequate for Discharge  3/18/2023 0924 by Purvi Castañeda RN  Outcome: Progressing     Problem: SKIN/TISSUE INTEGRITY - ADULT  Goal: Skin Integrity remains intact(Skin Breakdown Prevention)  Description: Assess:  -Perform Dwight assessment every x  -Clean and moisturize skin every x  -Inspect skin when repositioning, toileting, and assisting with ADLS  -Assess under medical devices such as x every x  -Assess extremities for adequate circulation and sensation     Bed Management:  -Have minimal linens on bed & keep smooth, unwrinkled  -Change linens as needed when moist or perspiring  -Avoid sitting or lying in one position for more than x hours while in bed  -Keep [facility]OB at [facility]     Toileting:  -Offer bedside commode  -Assess for incontinence every x  -Use incontinent care products after each incontinent episode such as x    Activity:  -Mobilize patient x times a day  -Encourage activity and walks on unit  -Encourage or provide ROM exercises   -Turn and reposition patient every x Hours  -Use appropriate equipment to lift or move patient in bed  -Instruct/ Assist with weight shifting every x when out of bed in chair  -Consider limitation of chair time x hour intervals    Skin Care:  -Avoid use of baby powder, tape, friction and shearing, hot water or constrictive clothing  -Relieve pressure over bony prominences using x  -Do not massage red bony areas    Next Steps:  -Teach patient strategies to minimize risks such as x   -Consider consults to  interdisciplinary teams such as x  3/18/2023 1727 by Jayashree Kraft RN  Outcome: Adequate for Discharge  3/18/2023 0924 by Jayashree Kraft RN  Outcome: Progressing     Problem: HEMATOLOGIC - ADULT  Goal: Maintains hematologic stability  Description: INTERVENTIONS  - Assess for signs and symptoms of bleeding or hemorrhage  - Monitor labs  - Administer supportive blood products/factors as ordered and appropriate  3/18/2023 1727 by Jayashree Kraft RN  Outcome: Adequate for Discharge  3/18/2023 0924 by Jayashree Kraft RN  Outcome: Progressing     Problem: MUSCULOSKELETAL - ADULT  Goal: Maintain or return mobility to safest level of function  Description: INTERVENTIONS:  - Assess patient's ability to carry out ADLs; assess patient's baseline for ADL function and identify physical deficits which impact ability to perform ADLs (bathing, care of mouth/teeth, toileting, grooming, dressing, etc )  - Assess/evaluate cause of self-care deficits   - Assess range of motion  - Assess patient's mobility  - Assess patient's need for assistive devices and provide as appropriate  - Encourage maximum independence but intervene and supervise when necessary  - Involve family in performance of ADLs  - Assess for home care needs following discharge   - Consider OT consult to assist with ADL evaluation and planning for discharge  - Provide patient education as appropriate  3/18/2023 1727 by Jayashree Kraft RN  Outcome: Adequate for Discharge  3/18/2023 0924 by Jayashree Kraft RN  Outcome: Progressing     Problem: MOBILITY - ADULT  Goal: Maintain or return to baseline ADL function  Description: INTERVENTIONS:  -  Assess patient's ability to carry out ADLs; assess patient's baseline for ADL function and identify physical deficits which impact ability to perform ADLs (bathing, care of mouth/teeth, toileting, grooming, dressing, etc )  - Assess/evaluate cause of self-care deficits   - Assess range of motion  - Assess patient's mobility; develop plan if impaired  - Assess patient's need for assistive devices and provide as appropriate  - Encourage maximum independence but intervene and supervise when necessary  - Involve family in performance of ADLs  - Assess for home care needs following discharge   - Consider OT consult to assist with ADL evaluation and planning for discharge  - Provide patient education as appropriate  3/18/2023 1727 by Sherita Cardenas RN  Outcome: Adequate for Discharge  3/18/2023 0924 by Sherita Cardenas RN  Outcome: Progressing  Goal: Maintains/Returns to pre admission functional level  Description: INTERVENTIONS:  - Perform BMAT or MOVE assessment daily    - Set and communicate daily mobility goal to care team and patient/family/caregiver  - Collaborate with rehabilitation services on mobility goals if consulted  - Perform Range of Motion x times a day  - Reposition patient every x hours    - Dangle patient x times a day  - Stand patient x times a day  - Ambulate patient x times a day  - Out of bed to chair xxxxx times a day   - Out of bed for meals x times a day  - Out of bed for toileting  - Record patient progress and toleration of activity level   3/18/2023 1727 by Sherita Cardenas RN  Outcome: Adequate for Discharge  3/18/2023 0924 by Sherita Cardenas RN  Outcome: Progressing     Problem: Prexisting or High Potential for Compromised Skin Integrity  Goal: Skin integrity is maintained or improved  Description: INTERVENTIONS:  - Identify patients at risk for skin breakdown  - Assess and monitor skin integrity  - Assess and monitor nutrition and hydration status  - Monitor labs   - Assess for incontinence   - Turn and reposition patient  - Assist with mobility/ambulation  - Relieve pressure over bony prominences  - Avoid friction and shearing  - Provide appropriate hygiene as needed including keeping skin clean and dry  - Evaluate need for skin moisturizer/barrier cream  - Collaborate with interdisciplinary team   - Patient/family teaching  - Consider wound care consult   3/18/2023 1727 by Lida Dubose RN  Outcome: Adequate for Discharge  3/18/2023 0924 by Lida Dubose RN  Outcome: Progressing

## 2023-03-20 LAB
ATRIAL RATE: 76 BPM
ATRIAL RATE: 92 BPM
P AXIS: 45 DEGREES
P AXIS: 62 DEGREES
PR INTERVAL: 156 MS
PR INTERVAL: 206 MS
QRS AXIS: 59 DEGREES
QRS AXIS: 70 DEGREES
QRSD INTERVAL: 88 MS
QRSD INTERVAL: 92 MS
QT INTERVAL: 402 MS
QT INTERVAL: 426 MS
QTC INTERVAL: 479 MS
QTC INTERVAL: 497 MS
T WAVE AXIS: 3 DEGREES
T WAVE AXIS: 4 DEGREES
VENTRICULAR RATE: 76 BPM
VENTRICULAR RATE: 92 BPM

## 2023-03-21 ENCOUNTER — HOSPITAL ENCOUNTER (OUTPATIENT)
Dept: RADIOLOGY | Facility: HOSPITAL | Age: 88
Discharge: HOME | End: 2023-03-21

## 2023-03-21 DIAGNOSIS — R04.2 COUGHING UP BLOOD: ICD-10-CM

## 2023-03-21 DIAGNOSIS — R05.9 COUGH, UNSPECIFIED TYPE: ICD-10-CM

## 2023-03-21 NOTE — UTILIZATION REVIEW
NOTIFICATION OF ADMISSION DISCHARGE   This is a Notification of Discharge from [address]  Please be advised that this patient has been discharge from our facility  Below you will find the admission and discharge date and time including the patient’s disposition  UTILIZATION REVIEW CONTACT:  Tricia Mejia  Utilization   Network Utilization Review Department  Phone: [phone] carefully listen to the prompts  All voicemails are confidential   Email: [email or web address]     ADMISSION INFORMATION  PRESENTATION DATE: 3/17/2023  5:29 AM  OBERVATION ADMISSION DATE:   INPATIENT ADMISSION DATE: N/A N/A   DISCHARGE DATE: 3/18/2023  6:24 PM   DISPOSITION:Home/Self Care    IMPORTANT INFORMATION:  Send all requests for admission clinical reviews, approved or denied determinations and any other requests to dedicated fax number below belonging to the campus where the patient is receiving treatment   List of dedicated fax numbers:  [address] DENIALS (Administrative/Medical Necessity) [phone]   [address] (Maternity/NICU/Pediatrics) [phone]   [facility] [phone]   [facility] [phone]   [address] [phone]   220 [facility] [phone]   90 [facility] [phone]   [address] [place] [address] 119 [phone]   [facility]  [phone]   4054 [facility] [phone]   412 [facility] 850 [facility] [phone]

## 2023-03-22 LAB
ATRIAL RATE: 65 BPM
ATRIAL RATE: 70 BPM
ATRIAL RATE: 74 BPM
P AXIS: 43 DEGREES
P AXIS: 46 DEGREES
P AXIS: 61 DEGREES
PR INTERVAL: 162 MS
PR INTERVAL: 168 MS
PR INTERVAL: 168 MS
QRS AXIS: 50 DEGREES
QRS AXIS: 51 DEGREES
QRS AXIS: 55 DEGREES
QRSD INTERVAL: 90 MS
QRSD INTERVAL: 96 MS
QRSD INTERVAL: 96 MS
QT INTERVAL: 440 MS
QT INTERVAL: 452 MS
QT INTERVAL: 456 MS
QTC INTERVAL: 474 MS
QTC INTERVAL: 488 MS
QTC INTERVAL: 488 MS
T WAVE AXIS: 16 DEGREES
T WAVE AXIS: 17 DEGREES
T WAVE AXIS: 40 DEGREES
VENTRICULAR RATE: 65 BPM
VENTRICULAR RATE: 70 BPM
VENTRICULAR RATE: 74 BPM

## 2023-03-23 LAB
BACTERIA BLD CULT: NORMAL
BACTERIA BLD CULT: NORMAL

## 2023-03-27 NOTE — UTILIZATION REVIEW
Initial Clinical Review    Admission: Date/Time/Statement:   Admission Orders (From admission, onward)     Ordered        03/17/23 0750  Place in Observation  Once                      Orders Placed This Encounter   Procedures   • Place in Observation     Standing Status:   Standing     Number of Occurrences:   1     Order Specific Question:   Level of Care     Answer:   Med Surg [16]     ED Arrival Information     Expected   -    Arrival   3/17/2023 05:28    Acuity   Urgent            Means of arrival   Ambulance    Escorted by   [facility])    Service   Hospitalist    Admission type   Emergency            Arrival complaint   stomach bug           Chief Complaint   Patient presents with   • Vomiting     Pt to ED c/o N/V/D that started last night before she went to bed  Woke up this morning and was still vomiting  Pt denies chest pain, SOB  Initial Presentation: 82 y o  female to the ED from home via EMS with complaints of vomiting, nausea, diarrhea which started the night prior to arrival  Admitted under observation for gastroenteritis, QT prolongation  Arrives with diffusely tender abdomen  WBCs 13 55    Bands 17   CT a/p shows:  Possible enteritis with fluid-filled small bowel loops proximally with prominent mucosa  In the ED, given 1 liter iv NS, protonix, zofran  Hold further zofran given QT prolongation  Continue iv fluids  Repeat EKG  Monitor tele    Date:      Day 2:      ED Triage Vitals   Temperature Pulse Respirations Blood Pressure SpO2   03/17/23 0531 03/17/23 0531 03/17/23 0531 03/17/23 0539 03/17/23 0531   97 7 °F (36 5 °C) 88 18 110/70 95 %      Temp Source Heart Rate Source Patient Position - Orthostatic VS BP Location FiO2 (%)   03/17/23 0531 03/17/23 0531 03/17/23 0539 03/17/23 0715 --   Temporal Monitor Sitting Left arm       Pain Score       03/17/23 0531       No Pain          Wt Readings from Last 1 Encounters:   03/17/23 59 kg (130 lb)     Additional Vital Signs: Date/Time Temp Pulse Resp BP MAP (mmHg) SpO2 O2 Device Patient Position - Orthostatic VS   03/17/23 23:04:37 98 1 °F (36 7 °C) 84 18 123/49 Abnormal  74 91 % None (Room air) Lying   03/17/23 1630 -- 73 20 133/63 90 -- -- --   03/17/23 1600 -- 72 20 134/60 87 -- -- --   03/17/23 1530 -- 73 20 129/59 85 -- -- --   03/17/23 1500 -- 74 20 134/65 93 91 % None (Room air) Lying   03/17/23 1452 -- -- -- 124/56 -- -- -- --   03/17/23 1400 -- 71 16 141/65 -- 94 % None (Room air) Lying   03/17/23 1053 -- -- -- -- -- -- None (Room air) --   03/17/23 1045 -- 81 24 Abnormal  111/57 -- 95 % None (Room air) Lying   03/17/23 0800 -- 86 26 Abnormal  158/66 95 -- -- --   03/17/23 0715 -- 85 21 137/64 -- 94 % None (Room air) Lying   03/17/23 0539 -- -- -- 110/70 -- -- -- Sitting   03/17/23 0531 97 7 °F (36 5 °C) 88 18 -- -- 95 % None (Room air) --       Pertinent Labs/Diagnostic Test Results:   CT abdomen pelvis with contrast   Final Result by Cooper Rayo MD (03/17 9503)      Possible enteritis with fluid-filled small bowel loops proximally with prominent mucosa              Workstation performed: [id number]                                                                                             ED Treatment:   Medication Administration from 03/17/2023 0528 to 03/17/2023 1906       Date/Time Order Dose Route Action     03/17/2023 0535 EDT sodium chloride 0 9 % bolus 1,000 mL 1,000 mL Intravenous New Bag     03/17/2023 0535 EDT ondansetron (ZOFRAN) injection 4 mg 4 mg Intravenous Given     03/17/2023 0534 EDT pantoprazole (PROTONIX) injection 40 mg 40 mg Intravenous Given     03/17/2023 1451 EDT cholecalciferol (VITAMIN D3) tablet 3,000 Units 3,000 Units Oral Given     03/17/2023 1626 EDT glycerin-hypromellose- (ARTIFICIAL TEARS) ophthalmic solution 1 drop 1 drop Both Eyes Given     03/17/2023 1624 EDT hydroxychloroquine (PLAQUENIL) tablet 200 mg 200 mg Oral Given     03/17/2023 1453 EDT losartan (COZAAR) tablet 50 mg 50 mg Oral Given     03/17/2023 1452 EDT metoprolol tartrate (LOPRESSOR) tablet 25 mg 25 mg Oral Given     03/17/2023 1624 EDT pilocarpine (SALAGEN) tablet 5 mg 5 mg Oral Given     03/17/2023 1455 EDT sodium chloride 0 9 % infusion 60 mL/hr Intravenous New Bag        Past Medical History:   Diagnosis Date   • BRCA1 negative    • BRCA2 negative    • Cancer ([facility] ) 2018    squamous cell - face   • Disease of thyroid gland    • GERD (gastroesophageal reflux disease)    • Heart murmur    • Hypercoagulable state ([facility] )    • Hyperlipidemia    • Hypertension    • Microscopic colitis    • Osteoporosis    • PE (pulmonary thromboembolism) ([facility])    • Sjogren's syndrome (HCC)    • Urinary tract infection        Admitting Diagnosis: Diarrhea [R19 7]  Vomiting [R11 10]  Abdominal pain [R10 9]  Nausea and vomiting [R11 2]  Prolonged QT interval [R94 31]  Age/Sex: 82 y o  female  Admission Orders:  Scheduled Medications:  No current facility-administered medications for this encounter  Continuous IV Infusions:  No current facility-administered medications for this encounter  PRN Meds:  No current facility-administered medications for this encounter  None    Network Utilization Review Department  ATTENTION: Please call with any questions or concerns to [phone] and carefully listen to the prompts so that you are directed to the right person  All voicemails are confidential   [facility] all requests for admission clinical reviews, approved or denied determinations and any other requests to dedicated fax number below belonging to the campus where the patient is receiving treatment   List of dedicated fax numbers for the Facilities:  [address] [place] [address] DENIALS (Administrative/Medical Necessity) [phone]   [address] [place] [address] (Maternity/NICU/Pediatrics) Elaine Zamora 172 951 [facility] 406 [facility] [address] [place] [address] Alexi Wilder [facility] 28 U [facility] 310 [facility] [place] [address] [phone]

## 2023-05-17 DIAGNOSIS — E03.9 HYPOTHYROIDISM IN ADULT: ICD-10-CM

## 2023-05-17 PROBLEM — K52.9 GASTROENTERITIS: Status: RESOLVED | Noted: 2023-03-17 | Resolved: 2023-05-17

## 2023-05-17 RX ORDER — LEVOTHYROXINE SODIUM 112 UG/1
TABLET ORAL
Qty: 90 TABLET | Refills: 1 | Status: SHIPPED | OUTPATIENT
Start: 2023-05-17

## 2023-05-23 ENCOUNTER — HOSPITAL ENCOUNTER (OUTPATIENT)
Dept: BONE DENSITY | Facility: IMAGING CENTER | Age: 88
Discharge: HOME | End: 2023-05-23

## 2023-05-23 ENCOUNTER — APPOINTMENT (OUTPATIENT)
Dept: LAB | Facility: HOSPITAL | Age: 88
End: 2023-05-23

## 2023-05-23 VITALS — HEIGHT: 61 IN | WEIGHT: 130.2 LBS | BODY MASS INDEX: 24.58 KG/M2

## 2023-05-23 DIAGNOSIS — M81.0 AGE-RELATED OSTEOPOROSIS WITHOUT CURRENT PATHOLOGICAL FRACTURE: ICD-10-CM

## 2023-05-23 DIAGNOSIS — E03.9 ACQUIRED HYPOTHYROIDISM: ICD-10-CM

## 2023-05-23 DIAGNOSIS — E55.9 VITAMIN D DEFICIENCY: ICD-10-CM

## 2023-05-23 LAB — TSH SERPL DL<=0.05 MIU/L-ACNC: 2.92 UIU/ML (ref 0.45–4.5)

## 2023-05-26 NOTE — RESULT ENCOUNTER NOTE
Please call the patient regarding abnormal result  Recent DEXA scan shows osteoporosis based on the left radius  Compared to previous study from May 2021 no significant change

## 2023-07-12 ENCOUNTER — LAB (OUTPATIENT)
Dept: LAB | Facility: HOSPITAL | Age: 88
End: 2023-07-12
Payer: COMMERCIAL

## 2023-07-12 DIAGNOSIS — M81.0 AGE-RELATED OSTEOPOROSIS WITHOUT CURRENT PATHOLOGICAL FRACTURE: ICD-10-CM

## 2023-07-12 DIAGNOSIS — E55.9 VITAMIN D DEFICIENCY: ICD-10-CM

## 2023-07-12 DIAGNOSIS — E03.9 HYPOTHYROIDISM IN ADULT: ICD-10-CM

## 2023-07-12 DIAGNOSIS — E78.5 HYPERLIPIDEMIA, UNSPECIFIED HYPERLIPIDEMIA TYPE: ICD-10-CM

## 2023-07-12 DIAGNOSIS — I10 ESSENTIAL HYPERTENSION: ICD-10-CM

## 2023-07-12 LAB
25(OH)D3 SERPL-MCNC: 54.9 NG/ML (ref 30–100)
ALBUMIN SERPL BCP-MCNC: 4.4 G/DL (ref 3.5–5)
ALP SERPL-CCNC: 69 U/L (ref 46–116)
ALT SERPL W P-5'-P-CCNC: 36 U/L (ref 12–78)
ANION GAP SERPL CALCULATED.3IONS-SCNC: 7 MMOL/L
AST SERPL W P-5'-P-CCNC: 30 U/L (ref 5–45)
BASOPHILS # BLD AUTO: 0.07 THOUSANDS/ÂΜL (ref 0–0.1)
BASOPHILS NFR BLD AUTO: 1 % (ref 0–1)
BILIRUB SERPL-MCNC: 0.56 MG/DL (ref 0.2–1)
BUN SERPL-MCNC: 27 MG/DL (ref 5–25)
CALCIUM SERPL-MCNC: 10.6 MG/DL (ref 8.3–10.1)
CHLORIDE SERPL-SCNC: 108 MMOL/L (ref 96–108)
CO2 SERPL-SCNC: 26 MMOL/L (ref 21–32)
CREAT SERPL-MCNC: 1.02 MG/DL (ref 0.6–1.3)
EOSINOPHIL # BLD AUTO: 0.35 THOUSAND/ÂΜL (ref 0–0.61)
EOSINOPHIL NFR BLD AUTO: 4 % (ref 0–6)
ERYTHROCYTE [DISTWIDTH] IN BLOOD BY AUTOMATED COUNT: 13.5 % (ref 11.6–15.1)
GFR SERPL CREATININE-BSD FRML MDRD: 50 ML/MIN/1.73SQ M
GLUCOSE P FAST SERPL-MCNC: 104 MG/DL (ref 65–99)
HCT VFR BLD AUTO: 41.6 % (ref 34.8–46.1)
HGB BLD-MCNC: 13.6 G/DL (ref 11.5–15.4)
IMM GRANULOCYTES # BLD AUTO: 0.02 THOUSAND/UL (ref 0–0.2)
IMM GRANULOCYTES NFR BLD AUTO: 0 % (ref 0–2)
LYMPHOCYTES # BLD AUTO: 2.06 THOUSANDS/ÂΜL (ref 0.6–4.47)
LYMPHOCYTES NFR BLD AUTO: 26 % (ref 14–44)
MCH RBC QN AUTO: 31.5 PG (ref 26.8–34.3)
MCHC RBC AUTO-ENTMCNC: 32.7 G/DL (ref 31.4–37.4)
MCV RBC AUTO: 96 FL (ref 82–98)
MONOCYTES # BLD AUTO: 1.24 THOUSAND/ÂΜL (ref 0.17–1.22)
MONOCYTES NFR BLD AUTO: 16 % (ref 4–12)
NEUTROPHILS # BLD AUTO: 4.17 THOUSANDS/ÂΜL (ref 1.85–7.62)
NEUTS SEG NFR BLD AUTO: 53 % (ref 43–75)
NRBC BLD AUTO-RTO: 0 /100 WBCS
PLATELET # BLD AUTO: 202 THOUSANDS/UL (ref 149–390)
PMV BLD AUTO: 11.5 FL (ref 8.9–12.7)
POTASSIUM SERPL-SCNC: 4.5 MMOL/L (ref 3.5–5.3)
PROT SERPL-MCNC: 8.4 G/DL (ref 6.4–8.4)
RBC # BLD AUTO: 4.32 MILLION/UL (ref 3.81–5.12)
SODIUM SERPL-SCNC: 141 MMOL/L (ref 135–147)
T4 FREE SERPL-MCNC: 1.75 NG/DL (ref 0.61–1.12)
TSH SERPL DL<=0.05 MIU/L-ACNC: 3.23 UIU/ML (ref 0.45–4.5)
WBC # BLD AUTO: 7.91 THOUSAND/UL (ref 4.31–10.16)

## 2023-07-12 PROCEDURE — 36415 COLL VENOUS BLD VENIPUNCTURE: CPT

## 2023-07-12 PROCEDURE — 82306 VITAMIN D 25 HYDROXY: CPT

## 2023-07-12 PROCEDURE — 84439 ASSAY OF FREE THYROXINE: CPT

## 2023-07-12 PROCEDURE — 80053 COMPREHEN METABOLIC PANEL: CPT

## 2023-07-12 PROCEDURE — 85025 COMPLETE CBC W/AUTO DIFF WBC: CPT

## 2023-07-12 PROCEDURE — 84443 ASSAY THYROID STIM HORMONE: CPT

## 2023-08-09 ENCOUNTER — OFFICE VISIT (OUTPATIENT)
Dept: ENDOCRINOLOGY | Facility: HOSPITAL | Age: 88
End: 2023-08-09
Payer: COMMERCIAL

## 2023-08-09 VITALS
OXYGEN SATURATION: 98 % | DIASTOLIC BLOOD PRESSURE: 90 MMHG | BODY MASS INDEX: 25.11 KG/M2 | SYSTOLIC BLOOD PRESSURE: 148 MMHG | HEART RATE: 78 BPM | WEIGHT: 133 LBS | HEIGHT: 61 IN

## 2023-08-09 DIAGNOSIS — I10 ESSENTIAL HYPERTENSION: ICD-10-CM

## 2023-08-09 DIAGNOSIS — E55.9 VITAMIN D DEFICIENCY: ICD-10-CM

## 2023-08-09 DIAGNOSIS — E03.9 HYPOTHYROIDISM IN ADULT: Primary | ICD-10-CM

## 2023-08-09 DIAGNOSIS — M81.0 AGE-RELATED OSTEOPOROSIS WITHOUT CURRENT PATHOLOGICAL FRACTURE: ICD-10-CM

## 2023-08-09 PROCEDURE — 96372 THER/PROPH/DIAG INJ SC/IM: CPT

## 2023-08-09 PROCEDURE — 99214 OFFICE O/P EST MOD 30 MIN: CPT | Performed by: NURSE PRACTITIONER

## 2023-08-09 RX ORDER — MV-MIN/FA/VIT K/LUTEIN/ZEAXANT 200MCG-5MG
CAPSULE ORAL
COMMUNITY
Start: 2023-01-12

## 2023-08-09 NOTE — PATIENT INSTRUCTIONS
Stay hydrated with water. TSH is normal.       Continue Levothyroxine 112 mcg daily - All 7 days of the week. Check thyroid function lab work prior to next office visit. Continue supplementation with vitamin D. Continue treatment with Prolia. Please obtain lab work prior to next office visit.

## 2023-08-09 NOTE — PROGRESS NOTES
Assessment/Plan:    Jazz Garcia came into the [facility] Endocrinology Office today 08/09/23 to receive Prolia injection. Verbal consent obtained. Consent given by: patient    Provider states patient has been medically healthy with no underlining concerns/complications. Jazz Garcia presents with no symptoms today. All insturctions were reviewed with the patient. If the patient should have any questions/concerns, advised patient to contacted [facility] Endocrinology Office. Subjective:     History provided by: patient    Patient ID: Jazz Garcia is a 82 y.o. female      Objective:    Vitals:    08/09/23 0920   BP: 148/90   Pulse: 78   SpO2: 98%   Weight: 60.3 kg (133 lb)   Height: 5' 0.5" (1.537 m)       Patient tolerated the injection well without any complications. Injection site/s right arm. Medication was provided by office supply.     Patient signed consent form yes  Patient signed Clark Joseline form yes   Patient waited 15 minutes after injection no      Last Visit: 5/17/2023  Next visit:Visit date not found

## 2023-08-09 NOTE — PROGRESS NOTES
Nikita Garcia 82 y.o. female MRN: [id number]    Encounter: [id number]      Assessment/Plan     Assessment: This is a 82y.o.-year-old female with hypothyroidism, osteoporosis, hypertension, hyperlipidemia and vitamin-D deficiency. Plan:  1. Becca Porter most recent  DEXA scan from May 23, 2023 shows a T-score of -2.7 in the left forearm.  She will continue supplementation of calcium and vitamin-D. Recheck calcium level prior to next Prolia injection. She is due for her next injection today. She is due for a repeat DEXA Scan in late May 2023. Discussed fall risk and safety at length during the time of the visit. Pierre Jessica also suggested that, for stability and safety, she continue to use a cane/walker for safety and stability.   Check comprehensive metabolic panel prior to next office visit.     2.  Hypothyroidism:  TSH and free T4 are normal.  She states that she is taking her levothyroxine consistently and in the proper manner.  I have asked her to continue her levothyroxine 112 mcg daily. Check TSH and free T4 prior to next office visit.     3.  Hypertension: Continue current regimen.  Check comprehensive metabolic panel prior to next visit.     4.  Hyperlipidemia:  Managed by PCP.  Continue simvastatin.     5.  Vitamin-D deficiency:  Stable CC 27. 9.  Continue supplementation with vitamin D3 daily.      CC: Hypothyroidism/Osteoporosis follow-up    History of Present Illness     HPI:  87 y. o. female with history of hypertension, hyperlipidemia, hypothyroidism, vitamin-D deficiency, osteoporosis, GERD, blood clots who presents for follow up.      For her hypothyroidism, she takes levothyroxine 112 mcg daily.  Her most recent TSH from July 12, 2023 is 3.239. Overall, she is feeling well but complains of some lingering fatigue.     She has a history of osteoporosis and is currently being treated with Prolia.  Her most recent  DEXA scan from May 23, 2023 shows a T-score of -2.7 in the left forearm indicative of osteoporosis. Rob Minaya will be due for her 18th injection of Prolia today. She is tolerating it well.  She did sustain a fall on January 6, 2019 but denies any falls since.  Her most recent calcium from July 12, 2023 is 10. 6.     For hypertension, she takes losartan 50 mg daily, metoprolol 25 mg twice daily and hydrochlorothiazide 12.5 mg daily.       For hyperlipidemia, she takes simvastatin 20 mg daily.       For vitamin-D deficiency, she is on 2000 units daily of vitamin-D.  Her most recent 25 hydroxy vitamin-D level from July 12, 2023 is 54.9. Review of Systems   Constitutional: Negative. Negative for chills, fatigue and fever. HENT: Positive for hearing loss (hearing aids). Negative for trouble swallowing and voice change. Eyes: Negative. Negative for photophobia, pain, discharge, redness, itching and visual disturbance. Respiratory: Negative. Negative for chest tightness and shortness of breath. Cardiovascular: Negative. Negative for chest pain. Gastrointestinal: Negative. Negative for abdominal pain, constipation, diarrhea and vomiting. Endocrine: Negative for cold intolerance, heat intolerance, polydipsia, polyphagia and polyuria. Genitourinary: Negative. Musculoskeletal: Positive for arthralgias, back pain and myalgias. Skin: Negative. Allergic/Immunologic: Negative. Neurological: Negative. Negative for dizziness, syncope, light-headedness and headaches. Hematological: Negative. Psychiatric/Behavioral: Negative. All other systems reviewed and are negative.       Historical Information   Past Medical History:   Diagnosis Date   • BRCA1 negative    • BRCA2 negative    • Cancer ([address]) 2018    squamous cell - face   • Disease of thyroid gland    • GERD (gastroesophageal reflux disease)    • Heart murmur    • Hypercoagulable state ([address])    • Hyperlipidemia    • Hypertension    • Microscopic colitis    • Osteoporosis    • PE (pulmonary thromboembolism) (HCC)    • Sjogren's syndrome ([address] )    • Urinary tract infection      Past Surgical History:   Procedure Laterality Date   • COLONOSCOPY  07/18/2016     grade 2 internal hemorrhoids but otherwise normal, pathology negative for microscopic colitis   • HYSTERECTOMY  1965   • JOINT REPLACEMENT     • KNEE SURGERY     • SHOULDER SURGERY      right   • SPINE SURGERY     • TOE SURGERY     • TONSILLECTOMY     • UPPER GASTROINTESTINAL ENDOSCOPY  01/09/2015     reflux esophagitis, gastritis, duodenitis in the bulb, pathology negative for H pylori, Puckett's, EOE     Social History   Social History     Substance and Sexual Activity   Alcohol Use No     Social History     Substance and Sexual Activity   Drug Use No     Social History     Tobacco Use   Smoking Status Never   Smokeless Tobacco Never   Tobacco Comments    Never smoked     Family History:   Family History   Problem Relation Age of Onset   • No Known Problems Mother    • Stroke Father    • Ovarian cancer Sister 78   • Ovarian cancer Daughter 40   • No Known Problems Daughter    • No Known Problems Daughter    • No Known Problems Maternal Grandmother    • No Known Problems Maternal Grandfather    • No Known Problems Paternal Grandmother    • No Known Problems Paternal Grandfather    • No Known Problems Son    • No Known Problems Son    • No Known Problems Maternal Aunt    • No Known Problems Maternal Aunt    • No Known Problems Paternal Aunt    • Colon cancer Neg Hx    • Colon polyps Neg Hx        Meds/Allergies   Current Outpatient Medications   Medication Sig Dispense Refill   • Cholecalciferol (VITAMIN D) 2000 UNITS tablet Take 3,000 Units by mouth daily       • cycloSPORINE (RESTASIS) 0.05 % ophthalmic emulsion Administer 1 drop to both eyes 2 (two) times a day. • denosumab (PROLIA) 60 mg/mL Inject 60 mg under the skin every 6 (six) months     • DHA-EPA-Flaxseed Oil-Vitamin E (THERA TEARS) CAPS Take 4 capsules by mouth daily.      • Diclofenac Sodium (VOLTAREN) 1 %      • diphenoxylate-atropine (LOMOTIL) 2.5-0.025 mg per tablet TAKE 1 TABLET BY MOUTH 4 (FOUR) TIMES A DAY AS NEEDED FOR DIARRHEA 60 tablet 3   • hydrochlorothiazide (HYDRODIURIL) 12.5 mg tablet TAKE 1 TABLET BY MOUTH EVERY DAY 90 tablet 0   • hydroxychloroquine (PLAQUENIL) 200 mg tablet Take 200 mg by mouth daily. • levothyroxine 112 mcg tablet TAKE ONE TABLET BY MOUTH EVERY DAY 90 tablet 1   • losartan (COZAAR) 50 mg tablet Take 1 tablet (50 mg total) by mouth daily 90 tablet 3   • metoprolol tartrate (LOPRESSOR) 25 mg tablet Take 1 tablet (25 mg total) by mouth every 12 (twelve) hours for 30 days 60 tablet 0   • Multiple Vitamins-Minerals (PreserVision AREDS 2+Multi Vit) CAPS      • omeprazole (PriLOSEC) 20 mg delayed release capsule Take 20 mg by mouth daily     • pilocarpine (SALAGEN) 5 mg tablet Take 5 mg by mouth 4 (four) times a day     • polyvinyl alcohol (LIQUIFILM TEARS) 1.4 % ophthalmic solution Administer 1 drop to both eyes as needed for dry eyes     • simvastatin (ZOCOR) 20 mg tablet Take 20 mg by mouth daily at bedtime. • warfarin (COUMADIN) 5 mg tablet Take 1 tablet (5 mg total) by mouth daily Do not start before March 20, 2023.  0     No current facility-administered medications for this visit. Allergies   Allergen Reactions   • Sulfa Antibiotics Hives       Objective   Vitals: Blood pressure 148/90, pulse 78, height 5' 0.5" (1.537 m), weight 60.3 kg (133 lb), SpO2 98 %, not currently breastfeeding. Physical Exam  Vitals reviewed. Constitutional:       Appearance: She is well-developed. HENT:      Head: Normocephalic and atraumatic. Eyes:      Conjunctiva/sclera: Conjunctivae normal.      Pupils: Pupils are equal, round, and reactive to light. Comments: Wears glasses   Cardiovascular:      Rate and Rhythm: Normal rate and regular rhythm. Heart sounds: Murmur heard. Pulmonary:      Effort: Pulmonary effort is normal.      Breath sounds: Normal breath sounds.    Abdominal: General: Bowel sounds are normal.      Palpations: Abdomen is soft. Musculoskeletal:         General: Normal range of motion. Cervical back: Normal range of motion and neck supple. Skin:     General: Skin is warm and dry. Neurological:      Mental Status: She is alert and oriented to person, place, and time. Psychiatric:         Behavior: Behavior normal.         Thought Content: Thought content normal.         Judgment: Judgment normal.       Lab Results:   Lab Results   Component Value Date/Time    [facility] 3RD GENERATON 3.234 07/12/2023 07:05 AM    TSH [address] [place], [place] [address]TON 2.918 05/23/2023 08:07 AM    TSH 3RD GENERATON 2.223 03/17/2023 05:35 AM    Free T4 1.75 (H) 07/12/2023 07:05 AM    Free T4 1.36 01/27/2023 07:10 AM     Portions of the record may have been created with voice recognition software. Occasional wrong word or "sound a like" substitutions may have occurred due to the inherent limitations of voice recognition software. Read the chart carefully and recognize, using context, where substitutions have occurred.

## 2023-08-31 ENCOUNTER — ESTABLISHED COMPREHENSIVE EXAM (OUTPATIENT)
Dept: URBAN - METROPOLITAN AREA CLINIC 6 | Facility: CLINIC | Age: 88
End: 2023-08-31

## 2023-08-31 DIAGNOSIS — H04.123: ICD-10-CM

## 2023-08-31 DIAGNOSIS — Z79.899: ICD-10-CM

## 2023-08-31 DIAGNOSIS — Z96.1: ICD-10-CM

## 2023-08-31 DIAGNOSIS — M35.00: ICD-10-CM

## 2023-08-31 DIAGNOSIS — H43.812: ICD-10-CM

## 2023-08-31 DIAGNOSIS — H26.491: ICD-10-CM

## 2023-08-31 PROCEDURE — 92134 CPTRZ OPH DX IMG PST SGM RTA: CPT

## 2023-08-31 PROCEDURE — 92014 COMPRE OPH EXAM EST PT 1/>: CPT

## 2023-08-31 ASSESSMENT — TONOMETRY
OS_IOP_MMHG: 10
OD_IOP_MMHG: 10

## 2023-08-31 ASSESSMENT — VISUAL ACUITY
OU_CC: J1+
OD_CC: 20/25
OS_CC: 20/30

## 2023-11-15 DIAGNOSIS — E03.9 HYPOTHYROIDISM IN ADULT: ICD-10-CM

## 2023-11-15 RX ORDER — LEVOTHYROXINE SODIUM 112 UG/1
TABLET ORAL
Qty: 90 TABLET | Refills: 1 | Status: SHIPPED | OUTPATIENT
Start: 2023-11-15

## 2023-11-21 DIAGNOSIS — I10 HYPERTENSION, UNSPECIFIED TYPE: ICD-10-CM

## 2023-11-22 RX ORDER — LOSARTAN POTASSIUM 50 MG/1
50 TABLET ORAL DAILY
Qty: 90 TABLET | Refills: 1 | Status: SHIPPED | OUTPATIENT
Start: 2023-11-22

## 2024-01-23 DIAGNOSIS — I10 HYPERTENSION, UNSPECIFIED TYPE: ICD-10-CM

## 2024-01-23 DIAGNOSIS — E03.9 HYPOTHYROIDISM IN ADULT: ICD-10-CM

## 2024-01-23 RX ORDER — LEVOTHYROXINE SODIUM 112 UG/1
TABLET ORAL
Qty: 90 TABLET | Refills: 1 | Status: SHIPPED | OUTPATIENT
Start: 2024-01-23

## 2024-01-23 RX ORDER — LOSARTAN POTASSIUM 50 MG/1
50 TABLET ORAL DAILY
Qty: 90 TABLET | Refills: 1 | Status: SHIPPED | OUTPATIENT
Start: 2024-01-23

## 2024-01-24 ENCOUNTER — LAB (OUTPATIENT)
Dept: LAB | Facility: HOSPITAL | Age: 89
End: 2024-01-24
Payer: COMMERCIAL

## 2024-01-24 DIAGNOSIS — M81.0 AGE-RELATED OSTEOPOROSIS WITHOUT CURRENT PATHOLOGICAL FRACTURE: ICD-10-CM

## 2024-01-24 DIAGNOSIS — E03.9 HYPOTHYROIDISM IN ADULT: ICD-10-CM

## 2024-01-24 DIAGNOSIS — E55.9 VITAMIN D DEFICIENCY: ICD-10-CM

## 2024-01-24 DIAGNOSIS — I10 ESSENTIAL HYPERTENSION: ICD-10-CM

## 2024-01-24 LAB
25(OH)D3 SERPL-MCNC: 45.1 NG/ML (ref 30–100)
ALBUMIN SERPL BCP-MCNC: 4.2 G/DL (ref 3.5–5)
ALP SERPL-CCNC: 48 U/L (ref 34–104)
ALT SERPL W P-5'-P-CCNC: 18 U/L (ref 7–52)
ANION GAP SERPL CALCULATED.3IONS-SCNC: 6 MMOL/L
AST SERPL W P-5'-P-CCNC: 21 U/L (ref 13–39)
BASOPHILS # BLD AUTO: 0.07 THOUSANDS/ÂΜL (ref 0–0.1)
BASOPHILS NFR BLD AUTO: 1 % (ref 0–1)
BILIRUB SERPL-MCNC: 0.7 MG/DL (ref 0.2–1)
BUN SERPL-MCNC: 27 MG/DL (ref 5–25)
CALCIUM SERPL-MCNC: 10.5 MG/DL (ref 8.4–10.2)
CHLORIDE SERPL-SCNC: 101 MMOL/L (ref 96–108)
CO2 SERPL-SCNC: 31 MMOL/L (ref 21–32)
CREAT SERPL-MCNC: 0.92 MG/DL (ref 0.6–1.3)
EOSINOPHIL # BLD AUTO: 0.13 THOUSAND/ÂΜL (ref 0–0.61)
EOSINOPHIL NFR BLD AUTO: 1 % (ref 0–6)
ERYTHROCYTE [DISTWIDTH] IN BLOOD BY AUTOMATED COUNT: 14.2 % (ref 11.6–15.1)
GFR SERPL CREATININE-BSD FRML MDRD: 56 ML/MIN/1.73SQ M
GLUCOSE P FAST SERPL-MCNC: 93 MG/DL (ref 65–99)
HCT VFR BLD AUTO: 40.8 % (ref 34.8–46.1)
HGB BLD-MCNC: 13.4 G/DL (ref 11.5–15.4)
IMM GRANULOCYTES # BLD AUTO: 0.06 THOUSAND/UL (ref 0–0.2)
IMM GRANULOCYTES NFR BLD AUTO: 1 % (ref 0–2)
LYMPHOCYTES # BLD AUTO: 2.77 THOUSANDS/ÂΜL (ref 0.6–4.47)
LYMPHOCYTES NFR BLD AUTO: 28 % (ref 14–44)
MCH RBC QN AUTO: 31.5 PG (ref 26.8–34.3)
MCHC RBC AUTO-ENTMCNC: 32.8 G/DL (ref 31.4–37.4)
MCV RBC AUTO: 96 FL (ref 82–98)
MONOCYTES # BLD AUTO: 1.25 THOUSAND/ÂΜL (ref 0.17–1.22)
MONOCYTES NFR BLD AUTO: 13 % (ref 4–12)
NEUTROPHILS # BLD AUTO: 5.59 THOUSANDS/ÂΜL (ref 1.85–7.62)
NEUTS SEG NFR BLD AUTO: 56 % (ref 43–75)
NRBC BLD AUTO-RTO: 0 /100 WBCS
PLATELET # BLD AUTO: 292 THOUSANDS/UL (ref 149–390)
PMV BLD AUTO: 11.1 FL (ref 8.9–12.7)
POTASSIUM SERPL-SCNC: 4.7 MMOL/L (ref 3.5–5.3)
PROT SERPL-MCNC: 7.9 G/DL (ref 6.4–8.4)
RBC # BLD AUTO: 4.26 MILLION/UL (ref 3.81–5.12)
SODIUM SERPL-SCNC: 138 MMOL/L (ref 135–147)
T4 FREE SERPL-MCNC: 2.18 NG/DL (ref 0.61–1.12)
TSH SERPL DL<=0.05 MIU/L-ACNC: 1.41 UIU/ML (ref 0.45–4.5)
WBC # BLD AUTO: 9.87 THOUSAND/UL (ref 4.31–10.16)

## 2024-01-24 PROCEDURE — 80053 COMPREHEN METABOLIC PANEL: CPT

## 2024-01-24 PROCEDURE — 84439 ASSAY OF FREE THYROXINE: CPT

## 2024-01-24 PROCEDURE — 36415 COLL VENOUS BLD VENIPUNCTURE: CPT

## 2024-01-24 PROCEDURE — 85025 COMPLETE CBC W/AUTO DIFF WBC: CPT

## 2024-01-24 PROCEDURE — 84443 ASSAY THYROID STIM HORMONE: CPT

## 2024-01-24 PROCEDURE — 82306 VITAMIN D 25 HYDROXY: CPT

## 2024-02-08 ENCOUNTER — TELEPHONE (OUTPATIENT)
Dept: ENDOCRINOLOGY | Facility: HOSPITAL | Age: 89
End: 2024-02-08

## 2024-02-08 NOTE — TELEPHONE ENCOUNTER
Received summary of benefits for Prolia. Prior auth is required with her new insurance. Prior auth is required. No deductible applies. She has a 20% copay.     Prior auth form completed and faxed with the last chart note and dexa scan to [phone].

## 2024-02-23 ENCOUNTER — OFFICE VISIT (OUTPATIENT)
Dept: ENDOCRINOLOGY | Facility: HOSPITAL | Age: 89
End: 2024-02-23
Payer: COMMERCIAL

## 2024-02-23 VITALS
HEART RATE: 72 BPM | SYSTOLIC BLOOD PRESSURE: 122 MMHG | OXYGEN SATURATION: 97 % | WEIGHT: 124.4 LBS | DIASTOLIC BLOOD PRESSURE: 80 MMHG | HEIGHT: 60 IN | BODY MASS INDEX: 24.42 KG/M2

## 2024-02-23 DIAGNOSIS — E03.9 HYPOTHYROIDISM IN ADULT: ICD-10-CM

## 2024-02-23 DIAGNOSIS — M81.0 AGE-RELATED OSTEOPOROSIS WITHOUT CURRENT PATHOLOGICAL FRACTURE: Primary | ICD-10-CM

## 2024-02-23 DIAGNOSIS — I10 ESSENTIAL HYPERTENSION: ICD-10-CM

## 2024-02-23 DIAGNOSIS — E55.9 VITAMIN D DEFICIENCY: ICD-10-CM

## 2024-02-23 PROCEDURE — 96372 THER/PROPH/DIAG INJ SC/IM: CPT | Performed by: NURSE PRACTITIONER

## 2024-02-23 PROCEDURE — 99214 OFFICE O/P EST MOD 30 MIN: CPT | Performed by: NURSE PRACTITIONER

## 2024-02-23 RX ORDER — ALBUTEROL SULFATE 90 UG/1
AEROSOL, METERED RESPIRATORY (INHALATION)
COMMUNITY
Start: 2024-01-08

## 2024-02-23 NOTE — PROGRESS NOTES
Assessment/Plan:    Anneliese Garcia came into the [facility] Endocrinology Office today 02/23/24 to receive Prolia injection.      Verbal consent obtained.  Consent given by: patient    patient states patient has been medically healthy with no underlining concerns/complications.      Anneliese Garcia presents with no symptoms today.       All instructions were reviewed with the patient.    If the patient should have any questions/concerns, advised patient to contacted [facility] Endocrinology Office.       Subjective:     History provided by: patient    Patient ID: Anneliese Garcia is a 88 y.o. female      Objective:    Vitals:    02/23/24 0921   BP: 122/80   Pulse: 72   SpO2: 97%   Weight: 56.4 kg (124 lb 6.4 oz)   Height: 5' (1.524 m)       Patient tolerated the injection well without any complications.  Injection site/s Left arm.  Medication was provided by the office (buy and bill).    Patient signed consent form yes   Patient signed ABN form yes (If no patient is not a medicare patient).   Patient waited 15 minutes after injection no (This only applies to patient's receiving first time injection).       Last Visit: 2/8/2024  Next visit:Visit date not found

## 2024-02-23 NOTE — PROGRESS NOTES
Anneliese Garcia 88 y.o. female MRN: [id number]    Encounter: [id number]      Assessment/Plan     Assessment:  This is a 88 y.o.-year-old female with hypothyroidism, osteoporosis, hypertension, hyperlipidemia and vitamin-D deficiency.     Plan:  1.  Osteoporosis:  Her most recent  DEXA scan from May 23, 2023 shows a T-score of -2.7 in the left forearm.  She will continue supplementation of calcium and vitamin-D. Recheck calcium level prior to next Prolia injection. She is due for her next injection today. Discussed fall risk and safety at length during the time of the visit.  I also suggested that, for stability and safety, she continue to use a cane/walker for safety and stability.   Check comprehensive metabolic panel prior to next office visit.     2.  Hypothyroidism:  TSH and free T4 are normal.  She states that she is taking her levothyroxine consistently and in the proper manner.  I have asked her to continue her levothyroxine 112 mcg daily. Check TSH and free T4 prior to next office visit.     3.  Hypertension: She is normotensive in the office today.  Continue current regimen.  Check comprehensive metabolic panel prior to next visit.     4.  Hyperlipidemia:  Managed by PCP.  Continue simvastatin.     5.  Vitamin-D deficiency:  Stable at 45.1.  Continue supplementation with vitamin D3 daily.         CC: Hypothyroidism/Osteoporosis follow-up     History of Present Illness     HPI:  88 y.o. female with history of hypertension, hyperlipidemia, hypothyroidism, vitamin-D deficiency, osteoporosis and GERD who presents for follow up.      For her hypothyroidism, she takes levothyroxine 112 mcg daily.  Her most recent TSH from January 24, 2024 is 1.408. Overall, she is feeling well but complains of some lingering fatigue.  She was recently treated for RSV.     She has a history of osteoporosis and is currently being treated with Prolia.  Her most recent  DEXA scan from May 23, 2023 shows a T-score of -2.7 in the  left forearm indicative of osteoporosis.  She will be due for her 19th injection of Prolia today. She is tolerating it well.  She did sustain a fall on January 6, 2019 but denies any falls since.  Her most recent calcium from January 24, 2024 is 10.5.     For hypertension, she takes losartan 50 mg daily, metoprolol 25 mg twice daily and hydrochlorothiazide 12.5 mg daily.       For hyperlipidemia, she takes simvastatin 20 mg daily.       For vitamin-D deficiency, she is on 2000 units daily of vitamin-D.  Her most recent 25 hydroxy vitamin-D level from January 24, 2024 is 45.1.    Review of Systems   Constitutional: Negative.  Negative for chills, fatigue and fever.   HENT:  Positive for hearing loss (hearing aids). Negative for trouble swallowing and voice change.    Eyes: Negative.  Negative for photophobia, pain, discharge, redness, itching and visual disturbance.   Respiratory: Negative.  Negative for chest tightness and shortness of breath.    Cardiovascular: Negative.  Negative for chest pain.   Gastrointestinal: Negative.  Negative for abdominal pain, constipation, diarrhea and vomiting.   Endocrine: Negative for cold intolerance, heat intolerance, polydipsia, polyphagia and polyuria.   Genitourinary: Negative.    Musculoskeletal:  Positive for arthralgias, back pain and myalgias.   Skin: Negative.    Allergic/Immunologic: Negative.    Neurological: Negative.  Negative for dizziness, syncope, light-headedness and headaches.   Hematological: Negative.    Psychiatric/Behavioral: Negative.     All other systems reviewed and are negative.      Historical Information   Past Medical History:   Diagnosis Date    BRCA1 negative     BRCA2 negative     Cancer (HCC) 2018    squamous cell - face    Disease of thyroid gland     GERD (gastroesophageal reflux disease)     Heart murmur     Hypercoagulable state (HCC)     Hyperlipidemia     Hypertension     Microscopic colitis     Osteoporosis     PE (pulmonary thromboembolism)  (HCC)     Sjogren's syndrome (HCC)     Urinary tract infection      Past Surgical History:   Procedure Laterality Date    COLONOSCOPY  07/18/2016     grade 2 internal hemorrhoids but otherwise normal, pathology negative for microscopic colitis    HYSTERECTOMY  1965    JOINT REPLACEMENT      KNEE SURGERY      SHOULDER SURGERY      right    SPINE SURGERY      TOE SURGERY      TONSILLECTOMY      UPPER GASTROINTESTINAL ENDOSCOPY  01/09/2015     reflux esophagitis, gastritis, duodenitis in the bulb, pathology negative for H pylori, Puckett's, EOE     Social History   Social History     Substance and Sexual Activity   Alcohol Use No     Social History     Substance and Sexual Activity   Drug Use No     Social History     Tobacco Use   Smoking Status Never   Smokeless Tobacco Never   Tobacco Comments    Never smoked     Family History:   Family History   Problem Relation Age of Onset    No Known Problems Mother     Stroke Father     Ovarian cancer Sister 79    Ovarian cancer Daughter 44    No Known Problems Daughter     No Known Problems Daughter     No Known Problems Maternal Grandmother     No Known Problems Maternal Grandfather     No Known Problems Paternal Grandmother     No Known Problems Paternal Grandfather     No Known Problems Son     No Known Problems Son     No Known Problems Maternal Aunt     No Known Problems Maternal Aunt     No Known Problems Paternal Aunt     Colon cancer Neg Hx     Colon polyps Neg Hx        Meds/Allergies   Current Outpatient Medications   Medication Sig Dispense Refill    albuterol (PROVENTIL HFA,VENTOLIN HFA) 90 mcg/act inhaler INHALE 2 PUFFS EVERY 4 TO 6 HOURS AS NEEDED FOR SHORTNESS OF BREATH OR FOR WHEEZE      Cholecalciferol (VITAMIN D) 2000 UNITS tablet Take 3,000 Units by mouth daily        cycloSPORINE (RESTASIS) 0.05 % ophthalmic emulsion Administer 1 drop to both eyes 2 (two) times a day.      denosumab (PROLIA) 60 mg/mL Inject 60 mg under the skin every 6 (six) months       DHA-EPA-Flaxseed Oil-Vitamin E (THERA TEARS) CAPS Take 4 capsules by mouth daily.      Diclofenac Sodium (VOLTAREN) 1 %       diphenoxylate-atropine (LOMOTIL) 2.5-0.025 mg per tablet TAKE 1 TABLET BY MOUTH 4 (FOUR) TIMES A DAY AS NEEDED FOR DIARRHEA 60 tablet 3    hydrochlorothiazide (HYDRODIURIL) 12.5 mg tablet TAKE 1 TABLET BY MOUTH EVERY DAY 90 tablet 0    hydroxychloroquine (PLAQUENIL) 200 mg tablet Take 200 mg by mouth daily.      levothyroxine 112 mcg tablet TAKE ONE TABLET BY MOUTH EVERY DAY 90 tablet 1    losartan (COZAAR) 50 mg tablet Take 1 tablet (50 mg total) by mouth daily 90 tablet 1    Multiple Vitamins-Minerals (PreserVision AREDS 2+Multi Vit) CAPS       omeprazole (PriLOSEC) 20 mg delayed release capsule Take 20 mg by mouth daily      pilocarpine (SALAGEN) 5 mg tablet Take 5 mg by mouth 4 (four) times a day      polyvinyl alcohol (LIQUIFILM TEARS) 1.4 % ophthalmic solution Administer 1 drop to both eyes as needed for dry eyes      simvastatin (ZOCOR) 20 mg tablet Take 20 mg by mouth daily at bedtime.      warfarin (COUMADIN) 5 mg tablet Take 1 tablet (5 mg total) by mouth daily Do not start before March 20, 2023.  0    metoprolol tartrate (LOPRESSOR) 25 mg tablet Take 1 tablet (25 mg total) by mouth every 12 (twelve) hours for 30 days 60 tablet 0     Current Facility-Administered Medications   Medication Dose Route Frequency Provider Last Rate Last Admin    denosumab (PROLIA) subcutaneous injection 60 mg  60 mg Subcutaneous Once CATHERINE Brady         Allergies   Allergen Reactions    Sulfa Antibiotics Hives       Objective   Vitals: Height 5' (1.524 m), weight 56.4 kg (124 lb 6.4 oz), not currently breastfeeding.    Physical Exam  Vitals reviewed.   Constitutional:       Appearance: She is well-developed.   HENT:      Head: Normocephalic and atraumatic.   Eyes:      Conjunctiva/sclera: Conjunctivae normal.      Pupils: Pupils are equal, round, and reactive to light.      Comments: Wears  "glasses   Cardiovascular:      Rate and Rhythm: Normal rate and regular rhythm.      Heart sounds: Murmur heard.   Pulmonary:      Effort: Pulmonary effort is normal.      Breath sounds: Normal breath sounds.   Abdominal:      General: Bowel sounds are normal.      Palpations: Abdomen is soft.   Musculoskeletal:         General: Normal range of motion.      Cervical back: Normal range of motion and neck supple.   Skin:     General: Skin is warm and dry.   Neurological:      Mental Status: She is alert and oriented to person, place, and time.   Psychiatric:         Behavior: Behavior normal.         Thought Content: Thought content normal.         Judgment: Judgment normal.         Lab Results:   Lab Results   Component Value Date/Time    TSH 3RD GENERATON 1.408 01/24/2024 07:10 AM    TSH 3RD GENERATON 3.234 07/12/2023 07:05 AM    TSH 3RD GENERATON 2.918 05/23/2023 08:07 AM    Free T4 2.18 (H) 01/24/2024 07:10 AM    Free T4 1.75 (H) 07/12/2023 07:05 AM     Portions of the record may have been created with voice recognition software. Occasional wrong word or \"sound a like\" substitutions may have occurred due to the inherent limitations of voice recognition software. Read the chart carefully and recognize, using context, where substitutions have occurred.    "

## 2024-04-30 ENCOUNTER — OFFICE VISIT (OUTPATIENT)
Dept: CARDIOLOGY CLINIC | Facility: CLINIC | Age: 89
End: 2024-04-30
Payer: COMMERCIAL

## 2024-04-30 VITALS
BODY MASS INDEX: 24.97 KG/M2 | HEIGHT: 60 IN | WEIGHT: 127.2 LBS | SYSTOLIC BLOOD PRESSURE: 118 MMHG | HEART RATE: 69 BPM | DIASTOLIC BLOOD PRESSURE: 86 MMHG

## 2024-04-30 DIAGNOSIS — R07.89 CHEST PRESSURE: ICD-10-CM

## 2024-04-30 DIAGNOSIS — I10 ESSENTIAL HYPERTENSION: ICD-10-CM

## 2024-04-30 DIAGNOSIS — Z79.899 LONG-TERM USE OF HYDROXYCHLOROQUINE: ICD-10-CM

## 2024-04-30 DIAGNOSIS — R06.02 SHORTNESS OF BREATH: ICD-10-CM

## 2024-04-30 DIAGNOSIS — E78.00 PURE HYPERCHOLESTEROLEMIA: ICD-10-CM

## 2024-04-30 DIAGNOSIS — I35.0 NONRHEUMATIC AORTIC VALVE STENOSIS: Primary | ICD-10-CM

## 2024-04-30 PROCEDURE — 93000 ELECTROCARDIOGRAM COMPLETE: CPT | Performed by: INTERNAL MEDICINE

## 2024-04-30 PROCEDURE — 99204 OFFICE O/P NEW MOD 45 MIN: CPT | Performed by: INTERNAL MEDICINE

## 2024-04-30 NOTE — PROGRESS NOTES
"                                           Cardiology Consultation     Anneliese Garcia  [id number]  1935  [facility] [place] [address] [address]  [address] [address] [phone]    1. Nonrheumatic aortic valve stenosis  Echo complete w/ contrast if indicated      2. Essential hypertension        3. Long-term use of hydroxychloroquine        4. Shortness of breath  POCT ECG      5. Chest pressure  POCT ECG      6. Pure hypercholesterolemia            Discussion/Summary:    Aortic stenosis - By symptoms and exam Anneliese appears to have significant aortic stenosis.  It was last documented as mild in 2018.  She now has a significant murmur radiating into her carotids and has exertional shortness of breath and chest tightness.  I ordered an updated echocardiogram but also spoke with her and her  about the possible results and what would happen next.  We will discuss this in more detail after her echocardiogram.    Hypertension - She is on the combination of HCTZ and losartan.  Given the probability of significant AS I will stop the HCTZ.  We will discuss beta-blocker therapy after her Echo    Exertional shortness of breath and chest tightness - Likely symptomatic aortic stenosis.  However her echocardiogram will evaluate all her valves and any changes in LV function/filling pressures.  She has been on hydroxychloroquine for many years which is an uncommon cause of a restrictive cardiomyopathy.      HPI:    Mrs. Eldridge comes in for a consultation given her history of a \"cardiac murmur\" along with symptoms of shortness of breath with exertion and occasional chest pressure.  Anneliese has no cardiac history but does have multiple family members with cardiac disease.  Her mother had what sounds like coronary artery disease and she has a son who has an aortic valve replacement.  She is treated for hypertension and hyperlipidemia.  She has been on " hydroxychloroquine for at least a decade given her history of Sjogren's syndrome and temporal arteritis.  Anneliese last had an echocardiogram in 2018 that showed normal LV systolic function with mild aortic stenosis.    Over the last several months Anneliese has noticed increasing shortness of breath and fatigue.  She notices it with going for walks or even her activities of daily living.  At times she will also have chest tightness/pressure.  Most of her symptoms are with exertion.  She does not have any shortness of breath at rest.  No orthopnea or PND.  She denies palpitations, lightheadedness or any syncope.  This led to a visit with her PCP who did notice a significant murmur and referral was placed to us.    Patient Active Problem List   Diagnosis    Sjogren's syndrome (HCC)    History of pulmonary embolus (PE)    Essential hypertension    Hypothyroidism in adult    Chronic kidney disease    Symptomatic anemia    Status post replacement of right shoulder joint    Pleural effusion on right    Hyperlipidemia    Vitamin D deficiency    Osteoporosis    Traumatic hematoma of left orbit    Failed total knee arthroplasty  (HCC)    Carpal tunnel syndrome    Closed inferior dislocation of left shoulder    Generalized osteoarthrosis, unspecified site    Long-term use of hydroxychloroquine    Obstructive sleep apnea    Osteoarthritis    Other and unspecified nonspecific immunological findings    Thromboembolism (HCC)    Phlebitis and thrombophlebitis of other deep vessels of lower extremities    Pure hypercholesterolemia    Rectocele    Restless legs syndrome (RLS)    Spinal stenosis of lumbar region without neurogenic claudication    Status post reverse arthroplasty of right shoulder    Supervision of normal first pregnancy    Synovitis and tenosynovitis, unspecified    Temporal arteritis (HCC)    Xeroderma of eyelid    Arthritis of right sacroiliac joint    Sacroiliitis (HCC)    Chronic left hip pain    Greater  trochanteric bursitis of left hip    Transient neurological symptoms    Diarrhea    Weight loss    Gastroesophageal reflux disease with esophagitis    Chronic anticoagulation    Hypercoagulable state (HCC)    Acute blood loss anemia    Gastrointestinal hemorrhage with melena    Functional diarrhea    History of DVT (deep vein thrombosis)    Status post vaginal hysterectomy    History of osteoporosis    QT prolongation    Serum total bilirubin elevated    Aortic stenosis     Past Medical History:   Diagnosis Date    BRCA1 negative     BRCA2 negative     Cancer (HCC) 2018    squamous cell - face    Disease of thyroid gland     GERD (gastroesophageal reflux disease)     Heart murmur     Hypercoagulable state (HCC)     Hyperlipidemia     Hypertension     Microscopic colitis     Osteoporosis     PE (pulmonary thromboembolism) (HCC)     Sjogren's syndrome (HCC)     Urinary tract infection      Social History     Socioeconomic History    Marital status: /Civil Union     Spouse name: Not on file    Number of children: 5    Years of education: Not on file    Highest education level: Not on file   Occupational History    Not on file   Tobacco Use    Smoking status: Never    Smokeless tobacco: Never    Tobacco comments:     Never smoked   Vaping Use    Vaping status: Never Used   Substance and Sexual Activity    Alcohol use: No    Drug use: No    Sexual activity: Not Currently     Partners: Male     Birth control/protection: Post-menopausal, Male Sterilization   Other Topics Concern    Not on file   Social History Narrative    Not on file     Social Determinants of Health     Financial Resource Strain: Not on file   Food Insecurity: No Food Insecurity (3/27/2023)    Received from [facility]    Hunger Vital Sign     Worried About Running Out of Food in the Last Year: Never true     Ran Out of Food in the Last Year: Never true   Transportation Needs: No Transportation Needs (3/17/2023)    PRAPARE - Transportation     Lack  of Transportation (Medical): No     Lack of Transportation (Non-Medical): No   Physical Activity: Not on file   Stress: Not on file   Social Connections: Not on file   Intimate Partner Violence: Not on file   Housing Stability: Low Risk  (3/17/2023)    Housing Stability Vital Sign     Unable to Pay for Housing in the Last Year: No     Number of Places Lived in the Last Year: 1     Unstable Housing in the Last Year: No      Family History   Problem Relation Age of Onset    No Known Problems Mother     Stroke Father     Ovarian cancer Sister 79    Ovarian cancer Daughter 44    No Known Problems Daughter     No Known Problems Daughter     No Known Problems Maternal Grandmother     No Known Problems Maternal Grandfather     No Known Problems Paternal Grandmother     No Known Problems Paternal Grandfather     No Known Problems Son     No Known Problems Son     No Known Problems Maternal Aunt     No Known Problems Maternal Aunt     No Known Problems Paternal Aunt     Colon cancer Neg Hx     Colon polyps Neg Hx      Past Surgical History:   Procedure Laterality Date    COLONOSCOPY  07/18/2016     grade 2 internal hemorrhoids but otherwise normal, pathology negative for microscopic colitis    HYSTERECTOMY  1965    JOINT REPLACEMENT      KNEE SURGERY      SHOULDER SURGERY      right    SPINE SURGERY      TOE SURGERY      TONSILLECTOMY      UPPER GASTROINTESTINAL ENDOSCOPY  01/09/2015     reflux esophagitis, gastritis, duodenitis in the bulb, pathology negative for H pylori, Puckett's, EOE       Current Outpatient Medications:     albuterol (PROVENTIL HFA,VENTOLIN HFA) 90 mcg/act inhaler, INHALE 2 PUFFS EVERY 4 TO 6 HOURS AS NEEDED FOR SHORTNESS OF BREATH OR FOR WHEEZE, Disp: , Rfl:     Cholecalciferol (VITAMIN D) 2000 UNITS tablet, Take 3,000 Units by mouth daily  , Disp: , Rfl:     cycloSPORINE (RESTASIS) 0.05 % ophthalmic emulsion, Administer 1 drop to both eyes 2 (two) times a day., Disp: , Rfl:     DHA-EPA-Flaxseed  Oil-Vitamin E (THERA TEARS) CAPS, Take 4 capsules by mouth daily., Disp: , Rfl:     Diclofenac Sodium (VOLTAREN) 1 %, , Disp: , Rfl:     diphenoxylate-atropine (LOMOTIL) 2.5-0.025 mg per tablet, TAKE 1 TABLET BY MOUTH 4 (FOUR) TIMES A DAY AS NEEDED FOR DIARRHEA, Disp: 60 tablet, Rfl: 3    hydroxychloroquine (PLAQUENIL) 200 mg tablet, Take 200 mg by mouth daily., Disp: , Rfl:     levothyroxine 112 mcg tablet, TAKE ONE TABLET BY MOUTH EVERY DAY, Disp: 90 tablet, Rfl: 1    losartan (COZAAR) 50 mg tablet, Take 1 tablet (50 mg total) by mouth daily, Disp: 90 tablet, Rfl: 1    metoprolol tartrate (LOPRESSOR) 25 mg tablet, Take 1 tablet (25 mg total) by mouth every 12 (twelve) hours for 30 days, Disp: 60 tablet, Rfl: 0    Multiple Vitamins-Minerals (PreserVision AREDS 2+Multi Vit) CAPS, , Disp: , Rfl:     omeprazole (PriLOSEC) 20 mg delayed release capsule, Take 20 mg by mouth daily, Disp: , Rfl:     pilocarpine (SALAGEN) 5 mg tablet, Take 5 mg by mouth 4 (four) times a day, Disp: , Rfl:     polyvinyl alcohol (LIQUIFILM TEARS) 1.4 % ophthalmic solution, Administer 1 drop to both eyes as needed for dry eyes, Disp: , Rfl:     simvastatin (ZOCOR) 20 mg tablet, Take 20 mg by mouth daily at bedtime., Disp: , Rfl:     warfarin (COUMADIN) 5 mg tablet, Take 1 tablet (5 mg total) by mouth daily Do not start before March 20, 2023., Disp: , Rfl: 0  Allergies   Allergen Reactions    Sulfa Antibiotics Hives     Vitals:    04/30/24 1443   BP: 118/86   BP Location: Left arm   Patient Position: Sitting   Cuff Size: Standard   Pulse: 69   Weight: 57.7 kg (127 lb 3.2 oz)   Height: 5' (1.524 m)       Labs:  Lab Results   Component Value Date     01/25/2015    K 4.7 01/24/2024    K 4.3 05/09/2023     01/24/2024     05/09/2023    CO2 31 01/24/2024    CO2 27 05/09/2023    BUN 27 (H) 01/24/2024    BUN 27 (H) 05/09/2023    CREATININE 0.92 01/24/2024    CREATININE 0.83 05/09/2023    GLUCOSE 150 (H) 01/06/2019    GLUCOSE 97  01/25/2015    CALCIUM 10.5 (H) 01/24/2024    CALCIUM 10.4 (H) 05/09/2023     Lab Results   Component Value Date    WBC 9.87 01/24/2024    WBC 7.11 01/25/2015    HGB 13.4 01/24/2024    HGB 10.3 (L) 01/25/2015    HCT 40.8 01/24/2024    HCT 31.7 (L) 01/25/2015    MCV 96 01/24/2024     (H) 01/25/2015     01/24/2024     (L) 01/25/2015     Lab Results   Component Value Date    TRIG 165 (H) 10/28/2022    HDL 46 (L) 10/28/2022     Imaging: Normal sinus rhythm with a left ventricular hypertrophy.    Review of Systems:  Review of Systems   Constitutional:  Positive for fatigue.   HENT: Negative.     Eyes: Negative.    Respiratory:  Positive for chest tightness and shortness of breath.    Cardiovascular: Negative.    Gastrointestinal: Negative.    Musculoskeletal: Negative.    Skin: Negative.    Allergic/Immunologic: Negative.    Neurological: Negative.    Hematological: Negative.    Psychiatric/Behavioral: Negative.     All other systems reviewed and are negative.    Vitals:    04/30/24 1443   BP: 118/86   BP Location: Left arm   Patient Position: Sitting   Cuff Size: Standard   Pulse: 69   Weight: 57.7 kg (127 lb 3.2 oz)   Height: 5' (1.524 m)      Physical Exam  Constitutional:       Appearance: She is well-developed.   HENT:      Head: Normocephalic and atraumatic.   Eyes:      General: No scleral icterus.        Right eye: No discharge.         Left eye: No discharge.      Pupils: Pupils are equal, round, and reactive to light.   Neck:      Thyroid: No thyromegaly.      Vascular: No JVD.   Cardiovascular:      Rate and Rhythm: Normal rate and regular rhythm. No extrasystoles are present.     Pulses: Normal pulses. No decreased pulses.      Heart sounds: S1 normal and S2 normal. Murmur heard.      Systolic murmur is present with a grade of 4/6.      No friction rub. No gallop.   Pulmonary:      Effort: Pulmonary effort is normal. No respiratory distress.      Breath sounds: Normal breath sounds. No  wheezing or rales.   Abdominal:      General: Bowel sounds are normal. There is no distension.      Palpations: Abdomen is soft.      Tenderness: There is no abdominal tenderness.   Musculoskeletal:         General: No tenderness or deformity. Normal range of motion.      Cervical back: Normal range of motion and neck supple.      Right lower leg: No edema.      Left lower leg: No edema.   Skin:     General: Skin is warm and dry.      Findings: No rash.   Neurological:      Mental Status: She is alert and oriented to person, place, and time.      Cranial Nerves: No cranial nerve deficit.   Psychiatric:         Thought Content: Thought content normal.         Judgment: Judgment normal.       Counseling / Coordination of Care  Total office time spent today 40 minutes.  Greater than 50% of total time was spent with the patient and / or family counseling and / or coordination of care.

## 2024-05-02 ENCOUNTER — HOSPITAL ENCOUNTER (OUTPATIENT)
Dept: NON INVASIVE DIAGNOSTICS | Facility: HOSPITAL | Age: 89
Discharge: HOME | End: 2024-05-02
Attending: INTERNAL MEDICINE
Payer: COMMERCIAL

## 2024-05-02 VITALS
DIASTOLIC BLOOD PRESSURE: 86 MMHG | WEIGHT: 127 LBS | HEIGHT: 60 IN | SYSTOLIC BLOOD PRESSURE: 118 MMHG | BODY MASS INDEX: 24.94 KG/M2

## 2024-05-02 DIAGNOSIS — I35.0 NONRHEUMATIC AORTIC VALVE STENOSIS: ICD-10-CM

## 2024-05-02 PROCEDURE — 93306 TTE W/DOPPLER COMPLETE: CPT | Performed by: INTERNAL MEDICINE

## 2024-05-02 PROCEDURE — 93306 TTE W/DOPPLER COMPLETE: CPT

## 2024-05-04 LAB
AORTIC ROOT: 3.1 CM
AORTIC VALVE MEAN VELOCITY: 32.3 M/S
APICAL FOUR CHAMBER EJECTION FRACTION: 59 %
AV AREA BY CONTINUOUS VTI: 0.5 CM2
AV LVOT MEAN GRADIENT: 2 MMHG
AV MEAN GRADIENT: 46 MMHG
AV REGURGITATION PRESSURE HALF TIME: 411 MS
AV VALVE AREA: 0.48 CM2
BSA FOR ECHO PROCEDURE: 1.54 M2
DOP CALC AO VTI: 109 CM
DOP CALC LVOT AREA: 2.54
DOP CALC LVOT DIAMETER: 1.8 CM
DOP CALC LVOT PEAK VEL VTI: 20.5 CM
DOP CALC LVOT STROKE INDEX: 33.8 ML/M2
DOP CALC LVOT STROKE VOLUME: 52.14
DOP CALC MV VTI: 42.8 CM
E WAVE DECELERATION TIME: 89 MS
E/A RATIO: 2.05
FRACTIONAL SHORTENING: 30 (ref 28–44)
INTERVENTRICULAR SEPTUM IN DIASTOLE (PARASTERNAL SHORT AXIS VIEW): 0.9 CM
INTERVENTRICULAR SEPTUM: 0.9 CM (ref 0.6–1.1)
LA/AORTA RATIO 2D: 1.42
LAAS-AP2: 35.1 CM2
LAAS-AP4: 26.1 CM2
LEFT ATRIUM SIZE: 4.4 CM
LEFT ATRIUM VOLUME (MOD BIPLANE): 112 ML
LEFT ATRIUM VOLUME INDEX (MOD BIPLANE): 72.7 ML/M2
LEFT INTERNAL DIMENSION IN SYSTOLE: 3.1 CM (ref 2.1–4)
LEFT VENTRICLE DIASTOLIC VOLUME (MOD BIPLANE): 100 ML
LEFT VENTRICLE DIASTOLIC VOLUME INDEX (MOD BIPLANE): 64.9 ML/M2
LEFT VENTRICLE SYSTOLIC VOLUME (MOD BIPLANE): 41 ML
LEFT VENTRICLE SYSTOLIC VOLUME INDEX (MOD BIPLANE): 26.6 ML/M2
LEFT VENTRICULAR INTERNAL DIMENSION IN DIASTOLE: 4.4 CM (ref 3.5–6)
LEFT VENTRICULAR POSTERIOR WALL IN END DIASTOLE: 1 CM
LEFT VENTRICULAR STROKE VOLUME: 49 ML
LV EF: 59 %
LVSV (TEICH): 49 ML
MITRAL REGURGITATION PEAK VELOCITY: 6.92 M/S
MITRAL VALVE MEAN INFLOW VELOCITY: 5.19 M/S
MITRAL VALVE REGURGITANT PEAK GRADIENT: 192 MMHG
MV E'TISSUE VEL-SEP: 8 CM/S
MV MEAN GRADIENT: 4 MMHG
MV PEAK A VEL: 0.91 M/S
MV PEAK E VEL: 187 CM/S
MV PEAK GRADIENT: 16 MMHG
MV STENOSIS PRESSURE HALF TIME: 26 MS
MV VALVE AREA BY CONTINUITY EQUATION: 1.22 CM2
MV VALVE AREA P 1/2 METHOD: 8.46
RA PRESSURE ESTIMATED: 8 MMHG
RIGHT VENTRICLE ID DIMENSION: 4 CM
RV PSP: 49 MMHG
SL CV AV PEAK GRADIENT RETROGRADE: 88 MMHG
SL CV DOP CALC MV VTI RETROGRADE: 231 CM
SL CV LV EF: 60
SL CV MV MEAN GRADIENT RETROGRADE: 121 MMHG
SL CV PED ECHO LEFT VENTRICLE DIASTOLIC VOLUME (MOD BIPLANE) 2D: 88 ML
SL CV PED ECHO LEFT VENTRICLE SYSTOLIC VOLUME (MOD BIPLANE) 2D: 38 ML
TR MAX PG: 41 MMHG
TR PEAK VELOCITY: 3.2 M/S
TRICUSPID ANNULAR PLANE SYSTOLIC EXCURSION: 2.2 CM
TRICUSPID VALVE PEAK REGURGITATION VELOCITY: 3.22 M/S

## 2024-05-08 ENCOUNTER — TELEPHONE (OUTPATIENT)
Age: 89
End: 2024-05-08

## 2024-05-08 NOTE — TELEPHONE ENCOUNTER
Caller: Anneliese Christiansen    Reason for call:  Patient called to speak with someone out her Echo test Results completed 5/2/24. No CTS was available for warm transfer.    Phone # [phone]

## 2024-05-17 ENCOUNTER — OFFICE VISIT (OUTPATIENT)
Dept: CARDIAC SURGERY | Facility: CLINIC | Age: 89
End: 2024-05-17
Payer: COMMERCIAL

## 2024-05-17 ENCOUNTER — TELEPHONE (OUTPATIENT)
Dept: CARDIAC SURGERY | Facility: CLINIC | Age: 89
End: 2024-05-17

## 2024-05-17 VITALS
BODY MASS INDEX: 24.36 KG/M2 | DIASTOLIC BLOOD PRESSURE: 70 MMHG | HEIGHT: 60 IN | SYSTOLIC BLOOD PRESSURE: 154 MMHG | HEART RATE: 64 BPM | WEIGHT: 124.1 LBS | OXYGEN SATURATION: 97 %

## 2024-05-17 DIAGNOSIS — I74.9 THROMBOEMBOLISM (HCC): ICD-10-CM

## 2024-05-17 DIAGNOSIS — I35.0 NONRHEUMATIC AORTIC VALVE STENOSIS: Primary | ICD-10-CM

## 2024-05-17 PROCEDURE — 99204 OFFICE O/P NEW MOD 45 MIN: CPT | Performed by: THORACIC SURGERY (CARDIOTHORACIC VASCULAR SURGERY)

## 2024-05-17 NOTE — H&P (VIEW-ONLY)
Consultation - Cardiothoracic Surgery   Anneliese Garcia 88 y.o. female MRN: [id number]    Physician Requesting Consult: PRAKASH Christiansen    Reason for Consult / Principal Problem: Aortic stenosis, Non-Rheumatic    History of Present Illness: Anneliese Garcia is a 88 y.o. year old female who presents for initial outpatient surgical consultation for severe symptomatic aortic stenosis.      Her clinical course began several weeks ago.  At that time her health  insurance company offered her a wellness examination in her home with a visiting nurse.  The visiting nurse completed physical examination and noted to significant systolic ejection murmur.  She was advised that she should seek cardiology evaluation.  She was subsequently referred to outpatient cardiologist for consultation.  Echocardiogram was completed on May 2.  Ventricular systolic function was well-preserved new critical aortic stenosis was identified with a valve area estimated 0.48 cm².  She was subsequently been referred to our office for transcatheter valve replacement consideration.    During interview today, she notes significant exertional dyspnea, fatigability, and orthopnea.  She also notes intermittent occasional angina.  She denies lightheadedness, dizziness, and/or any syncopal episodes.    The symptoms have persisted for many years.  She tells me she does have a diagnosis of a longstanding heart murmur.  However she has not been referred for cardiology evaluation prior to these most recent events as described above.    Past Medical History:  Past Medical History:   Diagnosis Date    BRCA1 negative     BRCA2 negative     Cancer (HCC) 2018    squamous cell - face    Disease of thyroid gland     GERD (gastroesophageal reflux disease)     Heart murmur     Hypercoagulable state (HCC)     Hyperlipidemia     Hypertension     Microscopic colitis     Osteoporosis     PE (pulmonary thromboembolism) (HCC)     Sjogren's syndrome (HCC)     Urinary tract  infection          Past Surgical History:   Past Surgical History:   Procedure Laterality Date    COLONOSCOPY  07/18/2016     grade 2 internal hemorrhoids but otherwise normal, pathology negative for microscopic colitis    HYSTERECTOMY  1965    JOINT REPLACEMENT      KNEE SURGERY      SHOULDER SURGERY      right    SPINE SURGERY      TOE SURGERY      TONSILLECTOMY      UPPER GASTROINTESTINAL ENDOSCOPY  01/09/2015     reflux esophagitis, gastritis, duodenitis in the bulb, pathology negative for H pylori, Puckett's, EOE         Family History:  Family History   Problem Relation Age of Onset    No Known Problems Mother     Stroke Father     Ovarian cancer Sister 79    Ovarian cancer Daughter 44    No Known Problems Daughter     No Known Problems Daughter     No Known Problems Maternal Grandmother     No Known Problems Maternal Grandfather     No Known Problems Paternal Grandmother     No Known Problems Paternal Grandfather     No Known Problems Son     No Known Problems Son     No Known Problems Maternal Aunt     No Known Problems Maternal Aunt     No Known Problems Paternal Aunt     Colon cancer Neg Hx     Colon polyps Neg Hx          Social History:    Social History     Substance and Sexual Activity   Alcohol Use No     Social History     Substance and Sexual Activity   Drug Use No     Social History     Tobacco Use   Smoking Status Never   Smokeless Tobacco Never   Tobacco Comments    Never smoked       Home Medications:   Prior to Admission medications    Medication Sig Start Date End Date Taking? Authorizing Provider   albuterol (PROVENTIL HFA,VENTOLIN HFA) 90 mcg/act inhaler INHALE 2 PUFFS EVERY 4 TO 6 HOURS AS NEEDED FOR SHORTNESS OF BREATH OR FOR WHEEZE 1/8/24   Historical Provider, MD   Cholecalciferol (VITAMIN D) 2000 UNITS tablet Take 3,000 Units by mouth daily      Historical Provider, MD   cycloSPORINE (RESTASIS) 0.05 % ophthalmic emulsion Administer 1 drop to both eyes 2 (two) times a day.    Historical  Provider, MD   DHA-EPA-Flaxseed Oil-Vitamin E (THERA TEARS) CAPS Take 4 capsules by mouth daily.    Historical Provider, MD   Diclofenac Sodium (VOLTAREN) 1 %  11/1/21   Historical Provider, MD   diphenoxylate-atropine (LOMOTIL) 2.5-0.025 mg per tablet TAKE 1 TABLET BY MOUTH 4 (FOUR) TIMES A DAY AS NEEDED FOR DIARRHEA 7/8/21   Lew Art DO   hydroxychloroquine (PLAQUENIL) 200 mg tablet Take 200 mg by mouth daily.    Historical Provider, MD   levothyroxine 112 mcg tablet TAKE ONE TABLET BY MOUTH EVERY DAY 1/23/24   CATHERINE Brady   losartan (COZAAR) 50 mg tablet Take 1 tablet (50 mg total) by mouth daily 1/23/24   CATHERINE Brady   metoprolol tartrate (LOPRESSOR) 25 mg tablet Take 1 tablet (25 mg total) by mouth every 12 (twelve) hours for 30 days 1/7/19 4/30/24  Danny Stahl MD   Multiple Vitamins-Minerals (PreserVision AREDS 2+Multi Vit) CAPS  1/12/23   Historical Provider, MD   omeprazole (PriLOSEC) 20 mg delayed release capsule Take 20 mg by mouth daily    Historical Provider, MD   pilocarpine (SALAGEN) 5 mg tablet Take 5 mg by mouth 4 (four) times a day 12/15/21   Historical Provider, MD   polyvinyl alcohol (LIQUIFILM TEARS) 1.4 % ophthalmic solution Administer 1 drop to both eyes as needed for dry eyes    Historical Provider, MD   simvastatin (ZOCOR) 20 mg tablet Take 20 mg by mouth daily at bedtime.    Historical Provider, MD   warfarin (COUMADIN) 5 mg tablet Take 1 tablet (5 mg total) by mouth daily Do not start before March 20, 2023. 3/20/23   Yuliana Mott MD       Allergies:  Allergies   Allergen Reactions    Sulfa Antibiotics Hives       Review of Systems:     Review of Systems   Constitutional:  Positive for activity change and fatigue.   HENT: Negative.     Eyes: Negative.    Respiratory:  Positive for chest tightness and shortness of breath.    Cardiovascular:  Positive for chest pain.   Endocrine: Negative.    Genitourinary: Negative.    Musculoskeletal:  Negative for arthralgias  and back pain.   Neurological: Negative.    Psychiatric/Behavioral: Negative.         Vital Signs:     Vitals:    05/17/24 1249 05/17/24 1252   BP: 140/70 154/70   BP Location: Left arm Right arm   Patient Position: Sitting Sitting   Cuff Size: Standard Standard   Pulse: 64    SpO2: 97%    Weight: 56.3 kg (124 lb 1.6 oz)    Height: 5' (1.524 m)        Physical Exam:     Physical Exam  Constitutional:       Appearance: Normal appearance. She is well-developed.   HENT:      Head: Normocephalic and atraumatic.   Eyes:      Conjunctiva/sclera: Conjunctivae normal.   Neck:      Thyroid: No thyromegaly.      Vascular: No carotid bruit or JVD.      Trachea: No tracheal deviation.   Cardiovascular:      Rate and Rhythm: Normal rate and regular rhythm.      Pulses:           Carotid pulses are 2+ on the right side and 2+ on the left side.       Dorsalis pedis pulses are 2+ on the right side and 2+ on the left side.        Posterior tibial pulses are 2+ on the right side and 2+ on the left side.      Heart sounds: S1 normal and S2 normal. Murmur heard.   Pulmonary:      Effort: No accessory muscle usage or respiratory distress.      Breath sounds: No wheezing or rales.   Chest:      Chest wall: No tenderness.   Abdominal:      General: Bowel sounds are normal.      Palpations: Abdomen is soft.      Tenderness: There is no abdominal tenderness.   Musculoskeletal:         General: Normal range of motion.      Cervical back: Full passive range of motion without pain and normal range of motion.   Skin:     General: Skin is warm and dry.      Comments: Significant bilateral lower extremity varicosities.    No significant lower extremity edema.      Neurological:      Mental Status: She is alert and oriented to person, place, and time.      Cranial Nerves: No cranial nerve deficit.      Sensory: No sensory deficit.   Psychiatric:         Speech: Speech normal.         Behavior: Behavior normal.         Lab Results:            "    Invalid input(s): \"LABGLOM\"      Lab Results   Component Value Date    HGBA1C 5.2 03/17/2023     Lab Results   Component Value Date    TROPONINI <0.02 01/06/2019       Imaging Studies:     Echocardiogram: EF 60%.  Aortic valve trileaflet.  Moderate aortic insufficiency.  Critical aortic stenosis with a mean gradient of 48 and a peak gradient of 88.  Aortic valve area estimated 0.48 cm².    I have personally reviewed pertinent reports.      TAVR evaluation Assessment:     [facility]: IV    STS Risk Score: 5.2 %, mortality risk    Aortic Stenosis Stage: D1    5 Meter Walk Test:      Attempt 1: 6   Attempt 2: 7   Attempt 3: 8    KCCQ-12 completed        Assessment:  Patient Active Problem List    Diagnosis Date Noted    Aortic stenosis 04/30/2024    QT prolongation 03/17/2023    Serum total bilirubin elevated 03/17/2023    History of DVT (deep vein thrombosis) 11/17/2022    Status post vaginal hysterectomy 11/17/2022    History of osteoporosis 11/17/2022    Acute blood loss anemia 12/28/2020    Gastrointestinal hemorrhage with melena 12/28/2020    Functional diarrhea 12/28/2020    Diarrhea 12/01/2020    Weight loss 12/01/2020    Gastroesophageal reflux disease with esophagitis 12/01/2020    Chronic anticoagulation 12/01/2020    Hypercoagulable state (HCC) 12/01/2020    Transient neurological symptoms 09/30/2020    Chronic left hip pain     Greater trochanteric bursitis of left hip     Arthritis of right sacroiliac joint     Sacroiliitis (HCC)     Closed inferior dislocation of left shoulder 07/08/2020    Osteoarthritis 07/08/2020    Supervision of normal first pregnancy 07/08/2020    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    Traumatic hematoma of left orbit 01/06/2019    Hyperlipidemia 08/24/2018    Vitamin D deficiency 08/24/2018    Osteoporosis 08/24/2018    Pleural effusion on right 07/18/2018    Chronic kidney disease 07/15/2018    Symptomatic anemia 07/15/2018    Status post replacement of right shoulder " joint 07/15/2018    Sjogren's syndrome (HCC)     History of pulmonary embolus (PE)     Essential hypertension     Hypothyroidism in adult     Status post reverse arthroplasty of right shoulder 07/12/2018    Long-term use of hydroxychloroquine 08/17/2016    Rectocele 12/23/2013    Carpal tunnel syndrome 05/22/2008    Generalized osteoarthrosis, unspecified site 05/22/2008    Obstructive sleep apnea 05/22/2008    Other and unspecified nonspecific immunological findings 05/22/2008    Thromboembolism (HCC) 05/22/2008    Phlebitis and thrombophlebitis of other deep vessels of lower extremities 05/22/2008    Pure hypercholesterolemia 05/22/2008    Restless legs syndrome (RLS) 05/22/2008    Spinal stenosis of lumbar region without neurogenic claudication 05/22/2008    Synovitis and tenosynovitis, unspecified 05/22/2008    Temporal arteritis (HCC) 05/22/2008    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 Based on their STS risk assessment, they will undergo the following testing for transcatheter aortic valve replacement: gated CTA of the chest, abdomen, and pelvis, cardiac catheterization, dental clearance, and carotid ultrasound.    Once these studies have been completed, Anneliese Garcia will follow up in our office to review the results and confirm the suitability of proceeding with transcatheter aortic valve replacment.    Shared decision-making encounter occurred during this visit. Additionally, heart team evaluation of suitability for surgical replacement has been completed.      Anneliese Garcia was comfortable with our recommendations, and their questions were answered to their satisfaction.  We will continue to evaluate the patient, with a final surgical recommendation pending the above work up.  Thank you for allowing us to participate in the care of this patient.     Routine referral to  gastroenterology for colonoscopy screening was not indicated, as the patient is over 77 years old    SIGNATURE: Jeet Cardenas PA-C  DATE: May 17, 2024  TIME: 1:25 PM    * This note was completed in part utilizing [email or web address] direct voice recognition software.   Grammatical errors, random word insertion, spelling mistakes, and incomplete sentences may be an occasional consequence of the system secondary to software limitations, ambient noise and hardware issues. At the time of dictation, efforts were made to edit, clarify and /or correct errors. Please read the chart carefully and recognize, using context, where substitutions have occurred.  If you have any questions or concerns about the context, text or information contained within the body of this dictation, please contact myself, the provider, for further clarification.

## 2024-05-17 NOTE — TELEPHONE ENCOUNTER
Left voicemail for patient's dental office to confirm she is a patient of theirs. If she is I need a fax number to fax a dental clearance form.

## 2024-05-17 NOTE — PROGRESS NOTES
Consultation - Cardiothoracic Surgery   Anneliese Garcia 88 y.o. female MRN: [id number]    Physician Requesting Consult: PRAKASH Christiansen    Reason for Consult / Principal Problem: Aortic stenosis, Non-Rheumatic    History of Present Illness: Anneliese Garcia is a 88 y.o. year old female who presents for initial outpatient surgical consultation for severe symptomatic aortic stenosis.      Her clinical course began several weeks ago.  At that time her health  insurance company offered her a wellness examination in her home with a visiting nurse.  The visiting nurse completed physical examination and noted to significant systolic ejection murmur.  She was advised that she should seek cardiology evaluation.  She was subsequently referred to outpatient cardiologist for consultation.  Echocardiogram was completed on May 2.  Ventricular systolic function was well-preserved new critical aortic stenosis was identified with a valve area estimated 0.48 cm².  She was subsequently been referred to our office for transcatheter valve replacement consideration.    During interview today, she notes significant exertional dyspnea, fatigability, and orthopnea.  She also notes intermittent occasional angina.  She denies lightheadedness, dizziness, and/or any syncopal episodes.    The symptoms have persisted for many years.  She tells me she does have a diagnosis of a longstanding heart murmur.  However she has not been referred for cardiology evaluation prior to these most recent events as described above.    Past Medical History:  Past Medical History:   Diagnosis Date    BRCA1 negative     BRCA2 negative     Cancer (HCC) 2018    squamous cell - face    Disease of thyroid gland     GERD (gastroesophageal reflux disease)     Heart murmur     Hypercoagulable state (HCC)     Hyperlipidemia     Hypertension     Microscopic colitis     Osteoporosis     PE (pulmonary thromboembolism) (HCC)     Sjogren's syndrome (HCC)     Urinary tract  infection          Past Surgical History:   Past Surgical History:   Procedure Laterality Date    COLONOSCOPY  07/18/2016     grade 2 internal hemorrhoids but otherwise normal, pathology negative for microscopic colitis    HYSTERECTOMY  1965    JOINT REPLACEMENT      KNEE SURGERY      SHOULDER SURGERY      right    SPINE SURGERY      TOE SURGERY      TONSILLECTOMY      UPPER GASTROINTESTINAL ENDOSCOPY  01/09/2015     reflux esophagitis, gastritis, duodenitis in the bulb, pathology negative for H pylori, Puckett's, EOE         Family History:  Family History   Problem Relation Age of Onset    No Known Problems Mother     Stroke Father     Ovarian cancer Sister 79    Ovarian cancer Daughter 44    No Known Problems Daughter     No Known Problems Daughter     No Known Problems Maternal Grandmother     No Known Problems Maternal Grandfather     No Known Problems Paternal Grandmother     No Known Problems Paternal Grandfather     No Known Problems Son     No Known Problems Son     No Known Problems Maternal Aunt     No Known Problems Maternal Aunt     No Known Problems Paternal Aunt     Colon cancer Neg Hx     Colon polyps Neg Hx          Social History:    Social History     Substance and Sexual Activity   Alcohol Use No     Social History     Substance and Sexual Activity   Drug Use No     Social History     Tobacco Use   Smoking Status Never   Smokeless Tobacco Never   Tobacco Comments    Never smoked       Home Medications:   Prior to Admission medications    Medication Sig Start Date End Date Taking? Authorizing Provider   albuterol (PROVENTIL HFA,VENTOLIN HFA) 90 mcg/act inhaler INHALE 2 PUFFS EVERY 4 TO 6 HOURS AS NEEDED FOR SHORTNESS OF BREATH OR FOR WHEEZE 1/8/24   Historical Provider, MD   Cholecalciferol (VITAMIN D) 2000 UNITS tablet Take 3,000 Units by mouth daily      Historical Provider, MD   cycloSPORINE (RESTASIS) 0.05 % ophthalmic emulsion Administer 1 drop to both eyes 2 (two) times a day.    Historical  Provider, MD   DHA-EPA-Flaxseed Oil-Vitamin E (THERA TEARS) CAPS Take 4 capsules by mouth daily.    Historical Provider, MD   Diclofenac Sodium (VOLTAREN) 1 %  11/1/21   Historical Provider, MD   diphenoxylate-atropine (LOMOTIL) 2.5-0.025 mg per tablet TAKE 1 TABLET BY MOUTH 4 (FOUR) TIMES A DAY AS NEEDED FOR DIARRHEA 7/8/21   Lew Art DO   hydroxychloroquine (PLAQUENIL) 200 mg tablet Take 200 mg by mouth daily.    Historical Provider, MD   levothyroxine 112 mcg tablet TAKE ONE TABLET BY MOUTH EVERY DAY 1/23/24   CATHERINE Brady   losartan (COZAAR) 50 mg tablet Take 1 tablet (50 mg total) by mouth daily 1/23/24   CATHERINE Brady   metoprolol tartrate (LOPRESSOR) 25 mg tablet Take 1 tablet (25 mg total) by mouth every 12 (twelve) hours for 30 days 1/7/19 4/30/24  Danny Stahl MD   Multiple Vitamins-Minerals (PreserVision AREDS 2+Multi Vit) CAPS  1/12/23   Historical Provider, MD   omeprazole (PriLOSEC) 20 mg delayed release capsule Take 20 mg by mouth daily    Historical Provider, MD   pilocarpine (SALAGEN) 5 mg tablet Take 5 mg by mouth 4 (four) times a day 12/15/21   Historical Provider, MD   polyvinyl alcohol (LIQUIFILM TEARS) 1.4 % ophthalmic solution Administer 1 drop to both eyes as needed for dry eyes    Historical Provider, MD   simvastatin (ZOCOR) 20 mg tablet Take 20 mg by mouth daily at bedtime.    Historical Provider, MD   warfarin (COUMADIN) 5 mg tablet Take 1 tablet (5 mg total) by mouth daily Do not start before March 20, 2023. 3/20/23   Yuliana Mott MD       Allergies:  Allergies   Allergen Reactions    Sulfa Antibiotics Hives       Review of Systems:     Review of Systems   Constitutional:  Positive for activity change and fatigue.   HENT: Negative.     Eyes: Negative.    Respiratory:  Positive for chest tightness and shortness of breath.    Cardiovascular:  Positive for chest pain.   Endocrine: Negative.    Genitourinary: Negative.    Musculoskeletal:  Negative for arthralgias  and back pain.   Neurological: Negative.    Psychiatric/Behavioral: Negative.         Vital Signs:     Vitals:    05/17/24 1249 05/17/24 1252   BP: 140/70 154/70   BP Location: Left arm Right arm   Patient Position: Sitting Sitting   Cuff Size: Standard Standard   Pulse: 64    SpO2: 97%    Weight: 56.3 kg (124 lb 1.6 oz)    Height: 5' (1.524 m)        Physical Exam:     Physical Exam  Constitutional:       Appearance: Normal appearance. She is well-developed.   HENT:      Head: Normocephalic and atraumatic.   Eyes:      Conjunctiva/sclera: Conjunctivae normal.   Neck:      Thyroid: No thyromegaly.      Vascular: No carotid bruit or JVD.      Trachea: No tracheal deviation.   Cardiovascular:      Rate and Rhythm: Normal rate and regular rhythm.      Pulses:           Carotid pulses are 2+ on the right side and 2+ on the left side.       Dorsalis pedis pulses are 2+ on the right side and 2+ on the left side.        Posterior tibial pulses are 2+ on the right side and 2+ on the left side.      Heart sounds: S1 normal and S2 normal. Murmur heard.   Pulmonary:      Effort: No accessory muscle usage or respiratory distress.      Breath sounds: No wheezing or rales.   Chest:      Chest wall: No tenderness.   Abdominal:      General: Bowel sounds are normal.      Palpations: Abdomen is soft.      Tenderness: There is no abdominal tenderness.   Musculoskeletal:         General: Normal range of motion.      Cervical back: Full passive range of motion without pain and normal range of motion.   Skin:     General: Skin is warm and dry.      Comments: Significant bilateral lower extremity varicosities.    No significant lower extremity edema.      Neurological:      Mental Status: She is alert and oriented to person, place, and time.      Cranial Nerves: No cranial nerve deficit.      Sensory: No sensory deficit.   Psychiatric:         Speech: Speech normal.         Behavior: Behavior normal.         Lab Results:            "    Invalid input(s): \"LABGLOM\"      Lab Results   Component Value Date    HGBA1C 5.2 03/17/2023     Lab Results   Component Value Date    TROPONINI <0.02 01/06/2019       Imaging Studies:     Echocardiogram: EF 60%.  Aortic valve trileaflet.  Moderate aortic insufficiency.  Critical aortic stenosis with a mean gradient of 48 and a peak gradient of 88.  Aortic valve area estimated 0.48 cm².    I have personally reviewed pertinent reports.      TAVR evaluation Assessment:     [facility]: IV    STS Risk Score: 5.2 %, mortality risk    Aortic Stenosis Stage: D1    5 Meter Walk Test:      Attempt 1: 6   Attempt 2: 7   Attempt 3: 8    KCCQ-12 completed        Assessment:  Patient Active Problem List    Diagnosis Date Noted    Aortic stenosis 04/30/2024    QT prolongation 03/17/2023    Serum total bilirubin elevated 03/17/2023    History of DVT (deep vein thrombosis) 11/17/2022    Status post vaginal hysterectomy 11/17/2022    History of osteoporosis 11/17/2022    Acute blood loss anemia 12/28/2020    Gastrointestinal hemorrhage with melena 12/28/2020    Functional diarrhea 12/28/2020    Diarrhea 12/01/2020    Weight loss 12/01/2020    Gastroesophageal reflux disease with esophagitis 12/01/2020    Chronic anticoagulation 12/01/2020    Hypercoagulable state (HCC) 12/01/2020    Transient neurological symptoms 09/30/2020    Chronic left hip pain     Greater trochanteric bursitis of left hip     Arthritis of right sacroiliac joint     Sacroiliitis (HCC)     Closed inferior dislocation of left shoulder 07/08/2020    Osteoarthritis 07/08/2020    Supervision of normal first pregnancy 07/08/2020    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    Traumatic hematoma of left orbit 01/06/2019    Hyperlipidemia 08/24/2018    Vitamin D deficiency 08/24/2018    Osteoporosis 08/24/2018    Pleural effusion on right 07/18/2018    Chronic kidney disease 07/15/2018    Symptomatic anemia 07/15/2018    Status post replacement of right shoulder " joint 07/15/2018    Sjogren's syndrome (HCC)     History of pulmonary embolus (PE)     Essential hypertension     Hypothyroidism in adult     Status post reverse arthroplasty of right shoulder 07/12/2018    Long-term use of hydroxychloroquine 08/17/2016    Rectocele 12/23/2013    Carpal tunnel syndrome 05/22/2008    Generalized osteoarthrosis, unspecified site 05/22/2008    Obstructive sleep apnea 05/22/2008    Other and unspecified nonspecific immunological findings 05/22/2008    Thromboembolism (HCC) 05/22/2008    Phlebitis and thrombophlebitis of other deep vessels of lower extremities 05/22/2008    Pure hypercholesterolemia 05/22/2008    Restless legs syndrome (RLS) 05/22/2008    Spinal stenosis of lumbar region without neurogenic claudication 05/22/2008    Synovitis and tenosynovitis, unspecified 05/22/2008    Temporal arteritis (HCC) 05/22/2008    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 Based on their STS risk assessment, they will undergo the following testing for transcatheter aortic valve replacement: gated CTA of the chest, abdomen, and pelvis, cardiac catheterization, dental clearance, and carotid ultrasound.    Once these studies have been completed, Anneliese Garcia will follow up in our office to review the results and confirm the suitability of proceeding with transcatheter aortic valve replacment.    Shared decision-making encounter occurred during this visit. Additionally, heart team evaluation of suitability for surgical replacement has been completed.      Anneliese Garcia was comfortable with our recommendations, and their questions were answered to their satisfaction.  We will continue to evaluate the patient, with a final surgical recommendation pending the above work up.  Thank you for allowing us to participate in the care of this patient.     Routine referral to  gastroenterology for colonoscopy screening was not indicated, as the patient is over 77 years old    SIGNATURE: Jeet Cardenas PA-C  DATE: May 17, 2024  TIME: 1:25 PM    * This note was completed in part utilizing pyco direct voice recognition software.   Grammatical errors, random word insertion, spelling mistakes, and incomplete sentences may be an occasional consequence of the system secondary to software limitations, ambient noise and hardware issues. At the time of dictation, efforts were made to edit, clarify and /or correct errors. Please read the chart carefully and recognize, using context, where substitutions have occurred.  If you have any questions or concerns about the context, text or information contained within the body of this dictation, please contact myself, the provider, for further clarification.

## 2024-05-20 ENCOUNTER — TELEPHONE (OUTPATIENT)
Dept: CARDIOLOGY CLINIC | Facility: CLINIC | Age: 89
End: 2024-05-20

## 2024-05-20 ENCOUNTER — APPOINTMENT (OUTPATIENT)
Dept: LAB | Facility: HOSPITAL | Age: 89
End: 2024-05-20
Payer: COMMERCIAL

## 2024-05-20 DIAGNOSIS — I35.0 NONRHEUMATIC AORTIC VALVE STENOSIS: ICD-10-CM

## 2024-05-20 DIAGNOSIS — I35.0 NONRHEUMATIC AORTIC VALVE STENOSIS: Primary | ICD-10-CM

## 2024-05-20 LAB
ANION GAP SERPL CALCULATED.3IONS-SCNC: 8 MMOL/L (ref 4–13)
BUN SERPL-MCNC: 26 MG/DL (ref 5–25)
CALCIUM SERPL-MCNC: 10.5 MG/DL (ref 8.4–10.2)
CHLORIDE SERPL-SCNC: 102 MMOL/L (ref 96–108)
CO2 SERPL-SCNC: 30 MMOL/L (ref 21–32)
CREAT SERPL-MCNC: 0.83 MG/DL (ref 0.6–1.3)
ERYTHROCYTE [DISTWIDTH] IN BLOOD BY AUTOMATED COUNT: 13.3 % (ref 11.6–15.1)
GFR SERPL CREATININE-BSD FRML MDRD: 64 ML/MIN/1.73SQ M
GLUCOSE P FAST SERPL-MCNC: 88 MG/DL (ref 65–99)
HCT VFR BLD AUTO: 44.1 % (ref 34.8–46.1)
HGB BLD-MCNC: 14.2 G/DL (ref 11.5–15.4)
MCH RBC QN AUTO: 31.4 PG (ref 26.8–34.3)
MCHC RBC AUTO-ENTMCNC: 32.2 G/DL (ref 31.4–37.4)
MCV RBC AUTO: 98 FL (ref 82–98)
PLATELET # BLD AUTO: 206 THOUSANDS/UL (ref 149–390)
PMV BLD AUTO: 12.1 FL (ref 8.9–12.7)
POTASSIUM SERPL-SCNC: 4.9 MMOL/L (ref 3.5–5.3)
RBC # BLD AUTO: 4.52 MILLION/UL (ref 3.81–5.12)
SODIUM SERPL-SCNC: 140 MMOL/L (ref 135–147)
WBC # BLD AUTO: 8.14 THOUSAND/UL (ref 4.31–10.16)

## 2024-05-20 PROCEDURE — 36415 COLL VENOUS BLD VENIPUNCTURE: CPT

## 2024-05-20 PROCEDURE — 80048 BASIC METABOLIC PNL TOTAL CA: CPT

## 2024-05-20 PROCEDURE — 85027 COMPLETE CBC AUTOMATED: CPT

## 2024-05-20 NOTE — TELEPHONE ENCOUNTER
Patient scheduled for RIGHT & LEFT  [facility].      Instructions sent to patient through [employer].        Patient aware of all general instructions.    Medication holds:   Losartan - Do not take day before and morning of procedure.      WARFARIN: DO NOT take this medication three day’s prior to the procedure.  LAST DOSE 06/02/2024        Labs to be done on: 05/20/2024  CMP / CBC (fasting 8 hours)     INR TO BE DONE ON 06/4/24         Dr. Christiansen can you please place prep for procedure.   Thank you,   Lauren

## 2024-05-20 NOTE — TELEPHONE ENCOUNTER
----- Message from Bre REYNOSO sent at 5/17/2024  2:05 PM EDT -----  Regarding: Cath  Please schedule patient for:     Pre TAVR Cardiac Cath: to be scheduled in the next few weeks. Patient has [employer] as primary insurance and is getting bloodwork done next week.    Please schedule with either Dr. Stafford, Dr. Christiansen, Dr. Koo, or Dr. Ewing     To be done at: [facility]     To be done by:     Please address any questions regarding this request to Bre Quinones or Nelia Ellsworth,     Thank you.

## 2024-05-20 NOTE — TELEPHONE ENCOUNTER
Patient Anneliese [phone] contacting office returning Lauren's telephone call regarding cardiac cath scheduling.

## 2024-05-20 NOTE — TELEPHONE ENCOUNTER
Left voicemail on machine informing patient to call and schedule a RIGHT & LEFT  Heart Cath procedure.     Thanks,  Lauren Ruffin

## 2024-05-21 ENCOUNTER — PREP FOR PROCEDURE (OUTPATIENT)
Dept: CARDIOLOGY CLINIC | Facility: CLINIC | Age: 89
End: 2024-05-21

## 2024-05-21 DIAGNOSIS — I35.0 SEVERE AORTIC STENOSIS: Primary | ICD-10-CM

## 2024-05-24 ENCOUNTER — TELEPHONE (OUTPATIENT)
Age: 89
End: 2024-05-24

## 2024-05-24 NOTE — TELEPHONE ENCOUNTER
Spoke with pt who stated she had a missed call. Pt stated it could be in regards to her Cardiac Cath planned for 6/6 with Dr. Stafford.Please call the pt back if there is anything that would need to be communicated to her, if unavailable please leave a message.

## 2024-05-28 ENCOUNTER — HOSPITAL ENCOUNTER (OUTPATIENT)
Dept: NON INVASIVE DIAGNOSTICS | Facility: HOSPITAL | Age: 89
Discharge: HOME | End: 2024-05-28
Payer: COMMERCIAL

## 2024-05-28 ENCOUNTER — HOSPITAL ENCOUNTER (OUTPATIENT)
Dept: CT IMAGING | Facility: HOSPITAL | Age: 89
Discharge: HOME | End: 2024-05-28
Payer: COMMERCIAL

## 2024-05-28 DIAGNOSIS — I35.0 NONRHEUMATIC AORTIC VALVE STENOSIS: ICD-10-CM

## 2024-05-28 PROCEDURE — 74174 CTA ABD&PLVS W/CONTRAST: CPT

## 2024-05-28 PROCEDURE — 93880 EXTRACRANIAL BILAT STUDY: CPT | Performed by: SURGERY

## 2024-05-28 PROCEDURE — 93880 EXTRACRANIAL BILAT STUDY: CPT

## 2024-05-28 PROCEDURE — 75572 CT HRT W/3D IMAGE: CPT

## 2024-05-28 RX ADMIN — IOHEXOL 85 ML: 350 INJECTION, SOLUTION INTRAVENOUS at 10:10

## 2024-06-04 ENCOUNTER — APPOINTMENT (OUTPATIENT)
Dept: LAB | Facility: HOSPITAL | Age: 89
End: 2024-06-04
Payer: COMMERCIAL

## 2024-06-04 DIAGNOSIS — I35.0 NONRHEUMATIC AORTIC VALVE STENOSIS: ICD-10-CM

## 2024-06-04 LAB
INR PPP: 1.43 (ref 0.84–1.19)
PROTHROMBIN TIME: 17.3 SECONDS (ref 11.6–14.5)

## 2024-06-04 PROCEDURE — 85610 PROTHROMBIN TIME: CPT

## 2024-06-04 PROCEDURE — 36415 COLL VENOUS BLD VENIPUNCTURE: CPT

## 2024-06-06 ENCOUNTER — HOSPITAL ENCOUNTER (OUTPATIENT)
Facility: HOSPITAL | Age: 89
Setting detail: OUTPATIENT SURGERY
Discharge: HOME | End: 2024-06-06
Attending: INTERNAL MEDICINE | Admitting: INTERNAL MEDICINE
Payer: COMMERCIAL

## 2024-06-06 VITALS
DIASTOLIC BLOOD PRESSURE: 61 MMHG | OXYGEN SATURATION: 93 % | RESPIRATION RATE: 16 BRPM | HEART RATE: 67 BPM | SYSTOLIC BLOOD PRESSURE: 137 MMHG | BODY MASS INDEX: 22.45 KG/M2 | WEIGHT: 122 LBS | TEMPERATURE: 97 F | HEIGHT: 62 IN

## 2024-06-06 DIAGNOSIS — I35.0 SEVERE AORTIC STENOSIS: ICD-10-CM

## 2024-06-06 PROBLEM — Z98.890 S/P CARDIAC CATH: Status: ACTIVE | Noted: 2024-06-06

## 2024-06-06 LAB
ATRIAL RATE: 59 BPM
INR PPP: 1.25 (ref 0.84–1.19)
P AXIS: 44 DEGREES
PR INTERVAL: 160 MS
PROTHROMBIN TIME: 15.6 SECONDS (ref 11.6–14.5)
QRS AXIS: 35 DEGREES
QRSD INTERVAL: 98 MS
QT INTERVAL: 456 MS
QTC INTERVAL: 451 MS
T WAVE AXIS: 40 DEGREES
VENTRICULAR RATE: 59 BPM

## 2024-06-06 PROCEDURE — C1894 INTRO/SHEATH, NON-LASER: HCPCS | Performed by: INTERNAL MEDICINE

## 2024-06-06 PROCEDURE — 93005 ELECTROCARDIOGRAM TRACING: CPT

## 2024-06-06 PROCEDURE — 99152 MOD SED SAME PHYS/QHP 5/>YRS: CPT | Performed by: INTERNAL MEDICINE

## 2024-06-06 PROCEDURE — 93010 ELECTROCARDIOGRAM REPORT: CPT | Performed by: INTERNAL MEDICINE

## 2024-06-06 PROCEDURE — C1769 GUIDE WIRE: HCPCS | Performed by: INTERNAL MEDICINE

## 2024-06-06 PROCEDURE — 93454 CORONARY ARTERY ANGIO S&I: CPT | Performed by: INTERNAL MEDICINE

## 2024-06-06 PROCEDURE — 99153 MOD SED SAME PHYS/QHP EA: CPT | Performed by: INTERNAL MEDICINE

## 2024-06-06 PROCEDURE — 85610 PROTHROMBIN TIME: CPT | Performed by: INTERNAL MEDICINE

## 2024-06-06 RX ORDER — ASPIRIN 81 MG/1
324 TABLET, CHEWABLE ORAL ONCE
Status: COMPLETED | OUTPATIENT
Start: 2024-06-06 | End: 2024-06-06

## 2024-06-06 RX ORDER — SODIUM CHLORIDE 9 MG/ML
75 INJECTION, SOLUTION INTRAVENOUS CONTINUOUS
Status: DISCONTINUED | OUTPATIENT
Start: 2024-06-06 | End: 2024-06-06 | Stop reason: HOSPADM

## 2024-06-06 RX ORDER — NITROGLYCERIN 20 MG/100ML
INJECTION INTRAVENOUS CODE/TRAUMA/SEDATION MEDICATION
Status: DISCONTINUED | OUTPATIENT
Start: 2024-06-06 | End: 2024-06-06 | Stop reason: HOSPADM

## 2024-06-06 RX ORDER — FENTANYL CITRATE 50 UG/ML
INJECTION, SOLUTION INTRAMUSCULAR; INTRAVENOUS CODE/TRAUMA/SEDATION MEDICATION
Status: DISCONTINUED | OUTPATIENT
Start: 2024-06-06 | End: 2024-06-06 | Stop reason: HOSPADM

## 2024-06-06 RX ORDER — HEPARIN SODIUM 1000 [USP'U]/ML
INJECTION, SOLUTION INTRAVENOUS; SUBCUTANEOUS CODE/TRAUMA/SEDATION MEDICATION
Status: DISCONTINUED | OUTPATIENT
Start: 2024-06-06 | End: 2024-06-06 | Stop reason: HOSPADM

## 2024-06-06 RX ORDER — LIDOCAINE HYDROCHLORIDE 10 MG/ML
INJECTION, SOLUTION EPIDURAL; INFILTRATION; INTRACAUDAL; PERINEURAL CODE/TRAUMA/SEDATION MEDICATION
Status: DISCONTINUED | OUTPATIENT
Start: 2024-06-06 | End: 2024-06-06 | Stop reason: HOSPADM

## 2024-06-06 RX ORDER — VERAPAMIL HYDROCHLORIDE 2.5 MG/ML
INJECTION, SOLUTION INTRAVENOUS CODE/TRAUMA/SEDATION MEDICATION
Status: DISCONTINUED | OUTPATIENT
Start: 2024-06-06 | End: 2024-06-06 | Stop reason: HOSPADM

## 2024-06-06 RX ORDER — SODIUM CHLORIDE 9 MG/ML
125 INJECTION, SOLUTION INTRAVENOUS CONTINUOUS
Status: DISCONTINUED | OUTPATIENT
Start: 2024-06-06 | End: 2024-06-06 | Stop reason: HOSPADM

## 2024-06-06 RX ORDER — MIDAZOLAM HYDROCHLORIDE 2 MG/2ML
INJECTION, SOLUTION INTRAMUSCULAR; INTRAVENOUS CODE/TRAUMA/SEDATION MEDICATION
Status: DISCONTINUED | OUTPATIENT
Start: 2024-06-06 | End: 2024-06-06 | Stop reason: HOSPADM

## 2024-06-06 RX ADMIN — ASPIRIN 81 MG CHEWABLE TABLET 324 MG: 81 TABLET CHEWABLE at 07:38

## 2024-06-06 RX ADMIN — SODIUM CHLORIDE 125 ML/HR: 0.9 INJECTION, SOLUTION INTRAVENOUS at 07:38

## 2024-06-06 NOTE — INTERVAL H&P NOTE
"H&P reviewed. After examining the patient, I find no changed to the H&P since it had been written.    BP (!) 188/81 (BP Location: Right arm) Comment: nurse notified  Pulse 60   Temp 98.3 °F (36.8 °C) (Oral)   Resp 16   Ht 5' 2\" (1.575 m)   Wt 55.3 kg (122 lb)   LMP  (LMP Unknown)   SpO2 96%   BMI 22.31 kg/m²      Patient re-evaluated. Accept as history and physical. 88F severe AS, HLD, HTN is referred for preTAVR C.     Balaji Teixeira, DO/June 6, 2024/9:06 AM    "

## 2024-06-06 NOTE — PROGRESS NOTES
Patient alert, oriented and in no distress. Skin warm and dry. Denies pain. Transradial band on right wrist with hemostasis.

## 2024-06-06 NOTE — DISCHARGE INSTR - AVS FIRST PAGE
1. Please see the post cardiac catheterization dishcarge instructions.   No heavy lifting, greater than 10 lbs. or strenuous  activity for 48 hrs.    2.Remove band aid tomorrow.  Shower and wash area- wrist gently with soap and water- beginning tomorrow. Rinse and pat dry.  Apply new water seal band aid.  Repeat this process for 5 days. No powders, creams lotions or antibiotic ointments  for 5 days.  No tub baths, hot tubs or swimming for 5 days.     3. Please call our office ([phone]) if you have any fever, redness, swelling, discharge from your wrist access site.    4.No driving for 2 days

## 2024-06-14 ENCOUNTER — OFFICE VISIT (OUTPATIENT)
Dept: CARDIAC SURGERY | Facility: CLINIC | Age: 89
End: 2024-06-14
Payer: COMMERCIAL

## 2024-06-14 VITALS
SYSTOLIC BLOOD PRESSURE: 178 MMHG | TEMPERATURE: 98.7 F | BODY MASS INDEX: 23 KG/M2 | HEART RATE: 68 BPM | HEIGHT: 62 IN | DIASTOLIC BLOOD PRESSURE: 76 MMHG | WEIGHT: 125 LBS | OXYGEN SATURATION: 96 %

## 2024-06-14 DIAGNOSIS — D68.59 HYPERCOAGULABLE STATE (HCC): ICD-10-CM

## 2024-06-14 DIAGNOSIS — I74.9 THROMBOEMBOLISM (HCC): ICD-10-CM

## 2024-06-14 DIAGNOSIS — Z86.711 HISTORY OF PULMONARY EMBOLUS (PE): ICD-10-CM

## 2024-06-14 DIAGNOSIS — Z01.818 PRE-OP TESTING: ICD-10-CM

## 2024-06-14 DIAGNOSIS — N18.2 STAGE 2 CHRONIC KIDNEY DISEASE: ICD-10-CM

## 2024-06-14 DIAGNOSIS — I35.0 NONRHEUMATIC AORTIC VALVE STENOSIS: Primary | ICD-10-CM

## 2024-06-14 DIAGNOSIS — Z01.818 PRE-OP EVALUATION: ICD-10-CM

## 2024-06-14 DIAGNOSIS — M35.00 SJOGREN'S SYNDROME, WITH UNSPECIFIED ORGAN INVOLVEMENT (HCC): ICD-10-CM

## 2024-06-14 DIAGNOSIS — E78.5 HYPERLIPIDEMIA, UNSPECIFIED HYPERLIPIDEMIA TYPE: ICD-10-CM

## 2024-06-14 PROCEDURE — 99214 OFFICE O/P EST MOD 30 MIN: CPT | Performed by: THORACIC SURGERY (CARDIOTHORACIC VASCULAR SURGERY)

## 2024-06-14 RX ORDER — CEFAZOLIN SODIUM 2 G/50ML
2000 SOLUTION INTRAVENOUS ONCE
OUTPATIENT
Start: 2024-06-14 | End: 2024-06-14

## 2024-06-14 RX ORDER — CHLORHEXIDINE GLUCONATE ORAL RINSE 1.2 MG/ML
15 SOLUTION DENTAL ONCE
OUTPATIENT
Start: 2024-06-14 | End: 2024-06-14

## 2024-06-14 NOTE — PROGRESS NOTES
Consultation - Cardiothoracic Surgery   Anneliese Garcia 88 y.o. female MRN: [id number]      Reason for Consult / Principal Problem: Aortic stenosis, Non-Rheumatic    History of Present Illness: Anneliese Garcia is a 88 y.o. year old female who was previously evaluated in our office by Manuel Pal M.D. for transcatheter aortic valve replacement.  During this initial consultation, arrangements were made for the following studies to be completed: gated CTA of the chest, abdomen, and pelvis, cardiac catheterization, dental clearance, and carotid ultrasound.     Anneliese Garcia now presents to review the results of these tests and confirm the suitability of proceeding with transcatheter aortic valve replacment.    In review, several weeks ago she had a wellness examination in her home with a visiting nurse.  The visiting nurse completed physical examination and noted to significant systolic ejection murmur.  She was advised that she should seek cardiology evaluation.  She was subsequently referred to outpatient cardiologist for consultation.  Echocardiogram was completed on May 2.  Ventricular systolic function was well-preserved new critical aortic stenosis was identified with a valve area estimated 0.48 cm².  She was subsequently been referred to our office for transcatheter valve replacement consideration. She notes significant CISNEROS, fatigue and orthopnea.     She had a PMH of AS, NSR, HTN, GERD, HLD, osteoporosis, Sjogren's (follows with Rheum at [facility]), H/o multiple PEs/DVTs/Hypercoagulable state on Coumadin, Hypothyroidism, CKDII, Arthritis, MIAH no CPAP, RLS    Patient is retired. , here today with her . Denies current or prior use of tobacco, ETOH, or drug use. Allergies include sulfa abx(hives) . Dental screening has been obtained.      Past Medical History:  Past Medical History:   Diagnosis Date    BRCA1 negative     BRCA2 negative     Cancer (HCC) 2018    squamous cell - face     Disease of thyroid gland     GERD (gastroesophageal reflux disease)     Heart murmur     Hypercoagulable state (HCC)     Hyperlipidemia     Hypertension     Microscopic colitis     Nonrheumatic aortic valve stenosis 04/30/2024    Osteoporosis     PE (pulmonary thromboembolism) (HCC)     Sjogren's syndrome (HCC)     Urinary tract infection          Past Surgical History:   Past Surgical History:   Procedure Laterality Date    CARDIAC CATHETERIZATION N/A 6/6/2024    Procedure: Cardiac catheterization;  Surgeon: Taj Stafford DO;  Location: [facility];  Service: Cardiology    COLONOSCOPY  07/18/2016     grade 2 internal hemorrhoids but otherwise normal, pathology negative for microscopic colitis    HYSTERECTOMY  1965    JOINT REPLACEMENT      KNEE SURGERY      SHOULDER SURGERY      right    SPINE SURGERY      TOE SURGERY      TONSILLECTOMY      UPPER GASTROINTESTINAL ENDOSCOPY  01/09/2015     reflux esophagitis, gastritis, duodenitis in the bulb, pathology negative for H pylori, Puckett's, EOE         Family History:  Family History   Problem Relation Age of Onset    No Known Problems Mother     Stroke Father     Ovarian cancer Sister 79    Ovarian cancer Daughter 44    No Known Problems Daughter     No Known Problems Daughter     No Known Problems Maternal Grandmother     No Known Problems Maternal Grandfather     No Known Problems Paternal Grandmother     No Known Problems Paternal Grandfather     No Known Problems Son     No Known Problems Son     No Known Problems Maternal Aunt     No Known Problems Maternal Aunt     No Known Problems Paternal Aunt     Colon cancer Neg Hx     Colon polyps Neg Hx          Social History:    Social History     Substance and Sexual Activity   Alcohol Use No     Social History     Substance and Sexual Activity   Drug Use No     Social History     Tobacco Use   Smoking Status Never   Smokeless Tobacco Never   Tobacco Comments    Never smoked       Home Medications:    Prior to Admission medications    Medication Sig Start Date End Date Taking? Authorizing Provider   Cholecalciferol (VITAMIN D) 2000 UNITS tablet Take 3,000 Units by mouth daily      Historical Provider, MD   cycloSPORINE (RESTASIS) 0.05 % ophthalmic emulsion Administer 1 drop to both eyes 2 (two) times a day.    Historical Provider, MD   DHA-EPA-Flaxseed Oil-Vitamin E (THERA TEARS) CAPS Take 4 capsules by mouth daily.    Historical Provider, MD   Diclofenac Sodium (VOLTAREN) 1 %  11/1/21   Historical Provider, MD   diphenoxylate-atropine (LOMOTIL) 2.5-0.025 mg per tablet TAKE 1 TABLET BY MOUTH 4 (FOUR) TIMES A DAY AS NEEDED FOR DIARRHEA 7/8/21   Lew Art DO   levothyroxine 112 mcg tablet TAKE ONE TABLET BY MOUTH EVERY DAY 1/23/24   CATHERINE Brady   losartan (COZAAR) 50 mg tablet Take 1 tablet (50 mg total) by mouth daily 1/23/24   CATHERINE Brady   metoprolol tartrate (LOPRESSOR) 25 mg tablet Take 1 tablet (25 mg total) by mouth every 12 (twelve) hours for 30 days 1/7/19 5/17/24  Danny Stahl MD   Multiple Vitamins-Minerals (PreserVision AREDS 2+Multi Vit) CAPS  1/12/23   Historical Provider, MD   omeprazole (PriLOSEC) 20 mg delayed release capsule Take 20 mg by mouth daily    Historical Provider, MD   pilocarpine (SALAGEN) 5 mg tablet Take 5 mg by mouth 4 (four) times a day 12/15/21   Historical Provider, MD   polyvinyl alcohol (LIQUIFILM TEARS) 1.4 % ophthalmic solution Administer 1 drop to both eyes as needed for dry eyes    Historical Provider, MD   simvastatin (ZOCOR) 20 mg tablet Take 20 mg by mouth daily at bedtime.    Historical Provider, MD   warfarin (COUMADIN) 5 mg tablet Take 1 tablet (5 mg total) by mouth daily Do not start before March 20, 2023. 3/20/23   Yuliana Mott MD       Allergies:  Allergies   Allergen Reactions    Sulfa Antibiotics Hives       Review of Systems:     Review of Systems   Constitutional:  Positive for fatigue. Negative for chills and fever.   HENT:   Negative for ear pain and sore throat.    Eyes:  Negative for pain and visual disturbance.   Respiratory:  Positive for shortness of breath. Negative for cough.    Cardiovascular:  Negative for chest pain, palpitations and leg swelling.   Gastrointestinal:  Negative for abdominal pain and vomiting.   Genitourinary:  Negative for dysuria and hematuria.   Musculoskeletal:  Negative for arthralgias and back pain.   Skin:  Negative for color change and rash.   Neurological:  Negative for seizures and syncope.   All other systems reviewed and are negative.      Vital Signs:     There were no vitals filed for this visit.    Physical Exam:     Physical Exam  Vitals reviewed.   Constitutional:       General: She is not in acute distress.     Appearance: Normal appearance. She is normal weight.   HENT:      Head: Normocephalic and atraumatic.      Nose: Nose normal.      Mouth/Throat:      Pharynx: Oropharynx is clear.   Eyes:      Extraocular Movements: Extraocular movements intact.      Conjunctiva/sclera: Conjunctivae normal.      Pupils: Pupils are equal, round, and reactive to light.   Neck:      Vascular: No carotid bruit.   Cardiovascular:      Rate and Rhythm: Normal rate and regular rhythm.      Pulses: Normal pulses.      Heart sounds: Murmur heard.   Pulmonary:      Effort: Pulmonary effort is normal.      Breath sounds: Normal breath sounds.   Abdominal:      General: Abdomen is flat. Bowel sounds are normal.      Palpations: Abdomen is soft.   Musculoskeletal:         General: Normal range of motion.      Cervical back: Normal range of motion and neck supple. No tenderness.      Right lower leg: No edema.      Left lower leg: No edema.   Skin:     General: Skin is warm and dry.   Neurological:      General: No focal deficit present.      Mental Status: She is alert and oriented to person, place, and time.   Psychiatric:         Mood and Affect: Mood normal.         Behavior: Behavior normal.         Lab Results:  "              Invalid input(s): \"LABGLOM\"      Lab Results   Component Value Date    HGBA1C 5.2 03/17/2023     Lab Results   Component Value Date    TROPONINI <0.02 01/06/2019       Imaging Studies:     Echocardiogram:     Left Ventricle: Left ventricular cavity size is normal. Wall thickness is normal. The left ventricular ejection fraction is 60%. Systolic function is normal. Wall motion is normal.    Left Atrium: The atrium is severely dilated.    Aortic Valve: The aortic valve is trileaflet. The leaflets are severely calcified. There is severely reduced mobility. There is moderate regurgitation. There is critical stenosis. The aortic valve mean gradient is 46 mmHg. The aortic valve area is 0.48 cm2.    Mitral Valve: There is moderate annular calcification. There is severe regurgitation. There is mild stenosis. The mitral valve mean gradient is 4mmHg.    Tricuspid Valve: There is mild regurgitation. The right ventricular systolic pressure is mildly to moderately elevated. The estimated right ventricular systolic pressure is 49.00 mmHg.    Pulmonic Valve: There is mild regurgitation.       Gated CTA of the chest/abdomen/pelvis:     VASCULAR FINDINGS:     Central pulmonary artery is not abnormally enlarged.  Right atrium and right ventricle are normal in size.  Left atrial cavity is not abnormally dilated.  Left ventricular myocardium is normal.  Extensive bulky mitral annular calcification.     Coronary artery origins are in typical position.  Moderate coronary artery calcification.     Annulus, LVOT and aorta analysis:     Typical trileaflet aortic valve morphology.        Optimal CT aortic valve plane angles:  3 cusp view: Persian 5, cranial 2  Cusp overlap view APODACA 12, cranial 18     Measurements:     Annulus diameter (max x min): 25 x 20 mm.  Annulus Area 388 mm2.  Annulus Perimeter 71 mm.     Annulus to left main coronary ostium: 17 mm.  Annulus to right main coronary ostium: 17 mm.  Sinus of Valsalva height: 22 " mm.     Aortic sinus of Valsalva diameter (max x min): 33 x 30 mm.  LVOT minimal diameter: 18 mm.     Sino-tubular junction minimal diameter: 27 mm.  Ascending thoracic aorta maximal diameter: 32 mm.     Valve Calcification:     Aortic valve calcium score is 2235.  Volume of 925 mm3.     Thoracic Aorta:     Porcelain aorta = no.  Aortic root and ascending thoracic aorta free of significant calcification beyond the sino-tubular junction.  Aortic arch is normal in course and caliber with mild calcification.  Severe atherosclerosis in the proximal left subclavian artery where there is greater than 80% atherosclerotic stenosis just beyond the left subclavian artery origin well demonstrated on axial image 20 of series 302. No other flow-limiting atherosclerotic   stenosis of the great vessels.  Descending thoracic aorta is normal in course, caliber, and contour.     Abdominal aorta and pelvic artery measurements:     Abdominal aorta:  Minimal diameter above aortic bifurcation: 12 mm  Calcification: Moderate to severe  Tortuosity: none     Right iliofemoral segment:  Minimal diameter: 7 mm  Calcification: mild  Tortuosity: none     Left iliofemoral segment:  Minimal diameter: 6 mm  Calcification: mild  Tortuosity: none     Severe atherosclerosis at origins of both right and left renal arteries with potentially flow-limiting estimated 50 to 80% atherosclerotic narrowing in each instance.        ADDITIONAL FINDINGS:     Chest:  Very mild groundglass changes and septal lobular thickening in the lungs suggesting possible mild interstitial edema related to pulmonary vascular congestion. Linear atelectasis/scar in the lingula. Tiny subcentimeter pulmonary nodules, similar from   January 2019. Small sliding-type hiatal hernia reidentified. Otherwise no acute significant findings in the lungs, pleura, mediastinum or chest wall.     Abdomen:  Few sigmoid diverticula with no acute diverticulitis. No calcified gallstones. No  pericholecystic inflammatory change.  No biliary dilatation. Small cyst in the dome of the right hepatic lobe, unchanged. Lobulation of renal contours consistent with   bilateral renal scarring. No acute findings involving liver, pancreas, spleen, adrenal glands, or kidneys.     Pelvis:  Hysterectomy. No significant findings involving urinary bladder, reproductive structures or pelvic cavity.     Abdominopelvic Cavity:  No ascites.  No pneumoperitoneum.  No lymphadenopathy.     Osseous Structures:  No acute fracture or destructive osseous lesion. Osteopenia. Severe chronic anterior wedge compression fracture of L2, unchanged from previous examination. Reidentified spinal degenerative changes. Reidentified dystrophic calcification in the   subcutaneous tissues of the medial left buttock probably related to prior trauma.        IMPRESSION:     Measurements and analysis to allow for planning of Transcatheter Aortic Valve Repair as above.     Suggestion of mild interstitial edema related to pulmonary vascular congestion in the lungs.     Also of note is severe atherosclerotic stenosis at the origin of the left subclavian.       Cardiac catheterization:  Mild calcific nonobstructive epicardial CAD.     Carotid artery ultrasound: FINDINGS:     Right        Impression  PSV  EDV (cm/s)  Direction of Flow  Ratio    Dist. ICA                123          14                      5.41    Mid. ICA                  64           8                      2.81    Prox. ICA    1 - 49%      56           9                      2.46    Dist CCA                  30           4                              Mid CCA                   23           0                      1.07    Prox CCA                  21           0                              Ext Carotid               75           0                      3.27    Prox Vert                106           7  Antegrade                   Subclavian                44           0                                  Left         Impression  PSV  EDV (cm/s)  Direction of Flow  Ratio    Dist. ICA                 92          10                      2.26    Mid. ICA                  62          10                      1.53    Prox. ICA    1 - 49%      41           8                      1.00    Dist CCA                  38           8                              Mid CCA                   41           8                      0.99    Prox CCA                  41           5                              Ext Carotid               35           0                      0.86    Prox Vert                 45           8  Antegrade                   Subclavian                64           0                                       CONCLUSION:  Impression  RIGHT:  There is <50% stenosis noted in the internal carotid artery. Plaque is  heterogenous and irregular.  Vertebral artery flow is antegrade. There is no significant subclavian artery  disease.  LEFT:  There is <50% stenosis noted in the internal carotid artery. Plaque is  calcified and irregular.  Vertebral artery flow is antegrade. There is no significant subclavian artery  disease.    I have personally reviewed pertinent reports.   and I have personally reviewed pertinent films in PACS    TAVR evaluation Assessment:     5 Meter Walk Test:      Attempt 1: 6   Attempt 2: 7   Attempt 3: 8    STS Risk Score: 5.2 %, mortality risk    Aortic Stenosis Stage: D1    [facility]: IV    KCQ-12 was completed    Assessment:  Patient Active Problem List    Diagnosis Date Noted    S/P cardiac cath 06/06/2024    Nonrheumatic aortic valve stenosis 04/30/2024    QT prolongation 03/17/2023    Serum total bilirubin elevated 03/17/2023    History of DVT (deep vein thrombosis) 11/17/2022    Status post vaginal hysterectomy 11/17/2022    History of osteoporosis 11/17/2022    Acute blood loss anemia 12/28/2020    Gastrointestinal hemorrhage with melena 12/28/2020    Functional diarrhea 12/28/2020    Diarrhea  12/01/2020    Weight loss 12/01/2020    Gastroesophageal reflux disease with esophagitis 12/01/2020    Chronic anticoagulation 12/01/2020    Hypercoagulable state (HCC) 12/01/2020    Transient neurological symptoms 09/30/2020    Chronic left hip pain     Greater trochanteric bursitis of left hip     Arthritis of right sacroiliac joint     Sacroiliitis (HCC)     Closed inferior dislocation of left shoulder 07/08/2020    Osteoarthritis 07/08/2020    Supervision of normal first pregnancy 07/08/2020    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    Traumatic hematoma of left orbit 01/06/2019    Hyperlipidemia 08/24/2018    Vitamin D deficiency 08/24/2018    Osteoporosis 08/24/2018    Pleural effusion on right 07/18/2018    Chronic kidney disease 07/15/2018    Symptomatic anemia 07/15/2018    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    Sjogren's syndrome (HCC)     History of pulmonary embolus (PE)     Essential hypertension     Hypothyroidism in adult     Status post reverse arthroplasty of right shoulder 07/12/2018    Long-term use of hydroxychloroquine 08/17/2016    Rectocele 12/23/2013    Carpal tunnel syndrome 05/22/2008    Generalized osteoarthrosis, unspecified site 05/22/2008    Obstructive sleep apnea 05/22/2008    Other and unspecified nonspecific immunological findings 05/22/2008    Thromboembolism (HCC) 05/22/2008    Phlebitis and thrombophlebitis of other deep vessels of lower extremities 05/22/2008    Pure hypercholesterolemia 05/22/2008    Restless legs syndrome (RLS) 05/22/2008    Spinal stenosis of lumbar region without neurogenic claudication 05/22/2008    Synovitis and tenosynovitis, unspecified 05/22/2008    Temporal arteritis (HCC) 05/22/2008    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 Based on their STS risk assessment, they have undergone testing for transcatheter  "aortic valve replacement.  The results of these studies have been interpreted by the multidisciplinary TAVR team who have determined the patient to be high risk for open aortic valve replacement based on other pre-existing comobidities not reflected in the STS risk score.      The risks, benefits, and alternatives to TAVR were discussed in detail with the patient today. They understand and wish to proceed with transfemoral transcatheter aortic valve replacement.  Informed consent was obtained and a date for the intervention has been set.    Anneliese Garcia was comfortable with our recommendations, and their questions were answered to their satisfaction.      Shared decision-making encounter occurred during this visit. Additionally, heart team evaluation of suitability for surgical replacement has been completed.     The following preoperative instructions were provided at the conclusion of their consultation:     1. You will receive a phone call from the hospital between 2:00 PM and 8:00 PM the day prior to surgery to confirm arrival time and location. For surgery on Mondays, you will receive a call on Friday  2. Do not drink or eat anything after midnight the night before surgery. That includes no water, candy, gum, lozenges, Lifesavers, etc. We recommend you not eat any \"junk\" food, consume alcohol or smoke the night before surgery.  3. Continue taking aspirin but only 81 mg daily.  4. If you take Coumadin discontinue it 5 days before surgery.  5. If you are diabetic, do not take any of your diabetic pills the morning of surgery. If you take Lantus insulin, you may take it at your regularly scheduled time the day before surgery. Do not take any other insulins the morning of surgery.  6. The 2 nights before surgery, take a shower each night using the special antiseptic soap or soap sponges you received from the office or hospital. Shampoo your hair with regular shampoo and rinse completely before using the " "antiseptic sponges. Use the sponge to wash from your neck down, with special attention to the armpits and groin area. Do not use any other soap or cleanser on your skin. Do not use lotions, powder, deodorant or perfume of any kind on your skin after you shower. Use clean bed linens and wear clean pajamas after your shower.  7. You will be prescribed Mupirocin nasal ointment. Apply to both nostrils twice a day for 5 days prior to surgery.  8. Do not take a shower the morning of her surgery; you'll be given a special\" bath\" at the hospital.  9. Notify the CT surgery office if you develop a cold, sore throat, cough, fever or other health issues before your surgery.  10. Other medication changes included the following: Hold Coumadin for 3 days, hold losartan for 3 days and hold MV and Thera Tears caps for 7 days     SIGNATURE: Roxana Pedraza PA-C  DATE: June 14, 2024  TIME: 2:04 PM    * This note was completed in part utilizing [employer] direct voice recognition software.   Grammatical errors, random word insertion, spelling mistakes, and incomplete sentences may be an occasional consequence of the system secondary to software limitations, ambient noise and hardware issues. At the time of dictation, efforts were made to edit, clarify and /or correct errors. Please read the chart carefully and recognize, using context, where substitutions have occurred.  If you have any questions or concerns about the context, text or information contained within the body of this dictation, please contact myself, the provider, for further clarification.   "

## 2024-06-14 NOTE — H&P
Consultation - Cardiothoracic Surgery   Anneliese Garcia 88 y.o. female MRN: [id number]      Reason for Consult / Principal Problem: Aortic stenosis, Non-Rheumatic    History of Present Illness: Anneliese Garcia is a 88 y.o. year old female who was previously evaluated in our office by Manuel Pal M.D. for transcatheter aortic valve replacement.  During this initial consultation, arrangements were made for the following studies to be completed: gated CTA of the chest, abdomen, and pelvis, cardiac catheterization, dental clearance, and carotid ultrasound.     Anneliese Garcia now presents to review the results of these tests and confirm the suitability of proceeding with transcatheter aortic valve replacment.    In review, several weeks ago she had a wellness examination in her home with a visiting nurse.  The visiting nurse completed physical examination and noted to significant systolic ejection murmur.  She was advised that she should seek cardiology evaluation.  She was subsequently referred to outpatient cardiologist for consultation.  Echocardiogram was completed on May 2.  Ventricular systolic function was well-preserved new critical aortic stenosis was identified with a valve area estimated 0.48 cm².  She was subsequently been referred to our office for transcatheter valve replacement consideration. She notes significant CISNEROS, fatigue and orthopnea.     She had a PMH of AS, NSR, HTN, GERD, HLD, osteoporosis, Sjogren's (follows with Rheum at [place]), H/o multiple PEs/DVTs/Hypercoagulable state on Coumadin, Hypothyroidism, CKDII, Arthritis, MIAH no CPAP, RLS    Patient is retired. , here today with her . Denies current or prior use of tobacco, ETOH, or drug use. Allergies include sulfa abx(hives) . Dental screening has been obtained.      Past Medical History:  Past Medical History:   Diagnosis Date    BRCA1 negative     BRCA2 negative     Cancer (HCC) 2018    squamous cell - face     Disease of thyroid gland     GERD (gastroesophageal reflux disease)     Heart murmur     Hypercoagulable state (HCC)     Hyperlipidemia     Hypertension     Microscopic colitis     Nonrheumatic aortic valve stenosis 04/30/2024    Osteoporosis     PE (pulmonary thromboembolism) (HCC)     Sjogren's syndrome (HCC)     Urinary tract infection          Past Surgical History:   Past Surgical History:   Procedure Laterality Date    CARDIAC CATHETERIZATION N/A 6/6/2024    Procedure: Cardiac catheterization;  Surgeon: Taj Stafford DO;  Location: [facility];  Service: Cardiology    COLONOSCOPY  07/18/2016     grade 2 internal hemorrhoids but otherwise normal, pathology negative for microscopic colitis    HYSTERECTOMY  1965    JOINT REPLACEMENT      KNEE SURGERY      SHOULDER SURGERY      right    SPINE SURGERY      TOE SURGERY      TONSILLECTOMY      UPPER GASTROINTESTINAL ENDOSCOPY  01/09/2015     reflux esophagitis, gastritis, duodenitis in the bulb, pathology negative for H pylori, Puckett's, EOE         Family History:  Family History   Problem Relation Age of Onset    No Known Problems Mother     Stroke Father     Ovarian cancer Sister 79    Ovarian cancer Daughter 44    No Known Problems Daughter     No Known Problems Daughter     No Known Problems Maternal Grandmother     No Known Problems Maternal Grandfather     No Known Problems Paternal Grandmother     No Known Problems Paternal Grandfather     No Known Problems Son     No Known Problems Son     No Known Problems Maternal Aunt     No Known Problems Maternal Aunt     No Known Problems Paternal Aunt     Colon cancer Neg Hx     Colon polyps Neg Hx          Social History:    Social History     Substance and Sexual Activity   Alcohol Use No     Social History     Substance and Sexual Activity   Drug Use No     Social History     Tobacco Use   Smoking Status Never   Smokeless Tobacco Never   Tobacco Comments    Never smoked       Home Medications:    Prior to Admission medications    Medication Sig Start Date End Date Taking? Authorizing Provider   Cholecalciferol (VITAMIN D) 2000 UNITS tablet Take 3,000 Units by mouth daily      Historical Provider, MD   cycloSPORINE (RESTASIS) 0.05 % ophthalmic emulsion Administer 1 drop to both eyes 2 (two) times a day.    Historical Provider, MD   DHA-EPA-Flaxseed Oil-Vitamin E (THERA TEARS) CAPS Take 4 capsules by mouth daily.    Historical Provider, MD   Diclofenac Sodium (VOLTAREN) 1 %  11/1/21   Historical Provider, MD   diphenoxylate-atropine (LOMOTIL) 2.5-0.025 mg per tablet TAKE 1 TABLET BY MOUTH 4 (FOUR) TIMES A DAY AS NEEDED FOR DIARRHEA 7/8/21   Lew Art DO   levothyroxine 112 mcg tablet TAKE ONE TABLET BY MOUTH EVERY DAY 1/23/24   CATHERINE Brady   losartan (COZAAR) 50 mg tablet Take 1 tablet (50 mg total) by mouth daily 1/23/24   CATHERINE Brady   metoprolol tartrate (LOPRESSOR) 25 mg tablet Take 1 tablet (25 mg total) by mouth every 12 (twelve) hours for 30 days 1/7/19 5/17/24  Danny Stahl MD   Multiple Vitamins-Minerals (PreserVision AREDS 2+Multi Vit) CAPS  1/12/23   Historical Provider, MD   omeprazole (PriLOSEC) 20 mg delayed release capsule Take 20 mg by mouth daily    Historical Provider, MD   pilocarpine (SALAGEN) 5 mg tablet Take 5 mg by mouth 4 (four) times a day 12/15/21   Historical Provider, MD   polyvinyl alcohol (LIQUIFILM TEARS) 1.4 % ophthalmic solution Administer 1 drop to both eyes as needed for dry eyes    Historical Provider, MD   simvastatin (ZOCOR) 20 mg tablet Take 20 mg by mouth daily at bedtime.    Historical Provider, MD   warfarin (COUMADIN) 5 mg tablet Take 1 tablet (5 mg total) by mouth daily Do not start before March 20, 2023. 3/20/23   Yuliana Mott MD       Allergies:  Allergies   Allergen Reactions    Sulfa Antibiotics Hives       Review of Systems:     Review of Systems   Constitutional:  Positive for fatigue. Negative for chills and fever.   HENT:   Negative for ear pain and sore throat.    Eyes:  Negative for pain and visual disturbance.   Respiratory:  Positive for shortness of breath. Negative for cough.    Cardiovascular:  Negative for chest pain, palpitations and leg swelling.   Gastrointestinal:  Negative for abdominal pain and vomiting.   Genitourinary:  Negative for dysuria and hematuria.   Musculoskeletal:  Negative for arthralgias and back pain.   Skin:  Negative for color change and rash.   Neurological:  Negative for seizures and syncope.   All other systems reviewed and are negative.      Vital Signs:     There were no vitals filed for this visit.    Physical Exam:     Physical Exam  Vitals reviewed.   Constitutional:       General: She is not in acute distress.     Appearance: Normal appearance. She is normal weight.   HENT:      Head: Normocephalic and atraumatic.      Nose: Nose normal.      Mouth/Throat:      Pharynx: Oropharynx is clear.   Eyes:      Extraocular Movements: Extraocular movements intact.      Conjunctiva/sclera: Conjunctivae normal.      Pupils: Pupils are equal, round, and reactive to light.   Neck:      Vascular: No carotid bruit.   Cardiovascular:      Rate and Rhythm: Normal rate and regular rhythm.      Pulses: Normal pulses.      Heart sounds: Murmur heard.   Pulmonary:      Effort: Pulmonary effort is normal.      Breath sounds: Normal breath sounds.   Abdominal:      General: Abdomen is flat. Bowel sounds are normal.      Palpations: Abdomen is soft.   Musculoskeletal:         General: Normal range of motion.      Cervical back: Normal range of motion and neck supple. No tenderness.      Right lower leg: No edema.      Left lower leg: No edema.   Skin:     General: Skin is warm and dry.   Neurological:      General: No focal deficit present.      Mental Status: She is alert and oriented to person, place, and time.   Psychiatric:         Mood and Affect: Mood normal.         Behavior: Behavior normal.         Lab Results:  "              Invalid input(s): \"LABGLOM\"      Lab Results   Component Value Date    HGBA1C 5.2 03/17/2023     Lab Results   Component Value Date    TROPONINI <0.02 01/06/2019       Imaging Studies:     Echocardiogram:     Left Ventricle: Left ventricular cavity size is normal. Wall thickness is normal. The left ventricular ejection fraction is 60%. Systolic function is normal. Wall motion is normal.    Left Atrium: The atrium is severely dilated.    Aortic Valve: The aortic valve is trileaflet. The leaflets are severely calcified. There is severely reduced mobility. There is moderate regurgitation. There is critical stenosis. The aortic valve mean gradient is 46 mmHg. The aortic valve area is 0.48 cm2.    Mitral Valve: There is moderate annular calcification. There is severe regurgitation. There is mild stenosis. The mitral valve mean gradient is 4mmHg.    Tricuspid Valve: There is mild regurgitation. The right ventricular systolic pressure is mildly to moderately elevated. The estimated right ventricular systolic pressure is 49.00 mmHg.    Pulmonic Valve: There is mild regurgitation.       Gated CTA of the chest/abdomen/pelvis:     VASCULAR FINDINGS:     Central pulmonary artery is not abnormally enlarged.  Right atrium and right ventricle are normal in size.  Left atrial cavity is not abnormally dilated.  Left ventricular myocardium is normal.  Extensive bulky mitral annular calcification.     Coronary artery origins are in typical position.  Moderate coronary artery calcification.     Annulus, LVOT and aorta analysis:     Typical trileaflet aortic valve morphology.        Optimal CT aortic valve plane angles:  3 cusp view: Latvian 5, cranial 2  Cusp overlap view APODACA 12, cranial 18     Measurements:     Annulus diameter (max x min): 25 x 20 mm.  Annulus Area 388 mm2.  Annulus Perimeter 71 mm.     Annulus to left main coronary ostium: 17 mm.  Annulus to right main coronary ostium: 17 mm.  Sinus of Valsalva height: 22 " mm.     Aortic sinus of Valsalva diameter (max x min): 33 x 30 mm.  LVOT minimal diameter: 18 mm.     Sino-tubular junction minimal diameter: 27 mm.  Ascending thoracic aorta maximal diameter: 32 mm.     Valve Calcification:     Aortic valve calcium score is 2235.  Volume of 925 mm3.     Thoracic Aorta:     Porcelain aorta = no.  Aortic root and ascending thoracic aorta free of significant calcification beyond the sino-tubular junction.  Aortic arch is normal in course and caliber with mild calcification.  Severe atherosclerosis in the proximal left subclavian artery where there is greater than 80% atherosclerotic stenosis just beyond the left subclavian artery origin well demonstrated on axial image 20 of series 302. No other flow-limiting atherosclerotic   stenosis of the great vessels.  Descending thoracic aorta is normal in course, caliber, and contour.     Abdominal aorta and pelvic artery measurements:     Abdominal aorta:  Minimal diameter above aortic bifurcation: 12 mm  Calcification: Moderate to severe  Tortuosity: none     Right iliofemoral segment:  Minimal diameter: 7 mm  Calcification: mild  Tortuosity: none     Left iliofemoral segment:  Minimal diameter: 6 mm  Calcification: mild  Tortuosity: none     Severe atherosclerosis at origins of both right and left renal arteries with potentially flow-limiting estimated 50 to 80% atherosclerotic narrowing in each instance.        ADDITIONAL FINDINGS:     Chest:  Very mild groundglass changes and septal lobular thickening in the lungs suggesting possible mild interstitial edema related to pulmonary vascular congestion. Linear atelectasis/scar in the lingula. Tiny subcentimeter pulmonary nodules, similar from   January 2019. Small sliding-type hiatal hernia reidentified. Otherwise no acute significant findings in the lungs, pleura, mediastinum or chest wall.     Abdomen:  Few sigmoid diverticula with no acute diverticulitis. No calcified gallstones. No  pericholecystic inflammatory change.  No biliary dilatation. Small cyst in the dome of the right hepatic lobe, unchanged. Lobulation of renal contours consistent with   bilateral renal scarring. No acute findings involving liver, pancreas, spleen, adrenal glands, or kidneys.     Pelvis:  Hysterectomy. No significant findings involving urinary bladder, reproductive structures or pelvic cavity.     Abdominopelvic Cavity:  No ascites.  No pneumoperitoneum.  No lymphadenopathy.     Osseous Structures:  No acute fracture or destructive osseous lesion. Osteopenia. Severe chronic anterior wedge compression fracture of L2, unchanged from previous examination. Reidentified spinal degenerative changes. Reidentified dystrophic calcification in the   subcutaneous tissues of the medial left buttock probably related to prior trauma.        IMPRESSION:     Measurements and analysis to allow for planning of Transcatheter Aortic Valve Repair as above.     Suggestion of mild interstitial edema related to pulmonary vascular congestion in the lungs.     Also of note is severe atherosclerotic stenosis at the origin of the left subclavian.       Cardiac catheterization:  Mild calcific nonobstructive epicardial CAD.     Carotid artery ultrasound: FINDINGS:     Right        Impression  PSV  EDV (cm/s)  Direction of Flow  Ratio    Dist. ICA                123          14                      5.41    Mid. ICA                  64           8                      2.81    Prox. ICA    1 - 49%      56           9                      2.46    Dist CCA                  30           4                              Mid CCA                   23           0                      1.07    Prox CCA                  21           0                              Ext Carotid               75           0                      3.27    Prox Vert                106           7  Antegrade                   Subclavian                44           0                                  Left         Impression  PSV  EDV (cm/s)  Direction of Flow  Ratio    Dist. ICA                 92          10                      2.26    Mid. ICA                  62          10                      1.53    Prox. ICA    1 - 49%      41           8                      1.00    Dist CCA                  38           8                              Mid CCA                   41           8                      0.99    Prox CCA                  41           5                              Ext Carotid               35           0                      0.86    Prox Vert                 45           8  Antegrade                   Subclavian                64           0                                       CONCLUSION:  Impression  RIGHT:  There is <50% stenosis noted in the internal carotid artery. Plaque is  heterogenous and irregular.  Vertebral artery flow is antegrade. There is no significant subclavian artery  disease.  LEFT:  There is <50% stenosis noted in the internal carotid artery. Plaque is  calcified and irregular.  Vertebral artery flow is antegrade. There is no significant subclavian artery  disease.    I have personally reviewed pertinent reports.   and I have personally reviewed pertinent films in PACS    TAVR evaluation Assessment:     5 Meter Walk Test:      Attempt 1: 6   Attempt 2: 7   Attempt 3: 8    STS Risk Score: 5.2 %, mortality risk    Aortic Stenosis Stage: D1    [facility]: IV    KCQ-12 was completed    Assessment:  Patient Active Problem List    Diagnosis Date Noted    S/P cardiac cath 06/06/2024    Nonrheumatic aortic valve stenosis 04/30/2024    QT prolongation 03/17/2023    Serum total bilirubin elevated 03/17/2023    History of DVT (deep vein thrombosis) 11/17/2022    Status post vaginal hysterectomy 11/17/2022    History of osteoporosis 11/17/2022    Acute blood loss anemia 12/28/2020    Gastrointestinal hemorrhage with melena 12/28/2020    Functional diarrhea 12/28/2020    Diarrhea  12/01/2020    Weight loss 12/01/2020    Gastroesophageal reflux disease with esophagitis 12/01/2020    Chronic anticoagulation 12/01/2020    Hypercoagulable state (HCC) 12/01/2020    Transient neurological symptoms 09/30/2020    Chronic left hip pain     Greater trochanteric bursitis of left hip     Arthritis of right sacroiliac joint     Sacroiliitis (HCC)     Closed inferior dislocation of left shoulder 07/08/2020    Osteoarthritis 07/08/2020    Supervision of normal first pregnancy 07/08/2020    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    Traumatic hematoma of left orbit 01/06/2019    Hyperlipidemia 08/24/2018    Vitamin D deficiency 08/24/2018    Osteoporosis 08/24/2018    Pleural effusion on right 07/18/2018    Chronic kidney disease 07/15/2018    Symptomatic anemia 07/15/2018    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    Sjogren's syndrome (HCC)     History of pulmonary embolus (PE)     Essential hypertension     Hypothyroidism in adult     Status post reverse arthroplasty of right shoulder 07/12/2018    Long-term use of hydroxychloroquine 08/17/2016    Rectocele 12/23/2013    Carpal tunnel syndrome 05/22/2008    Generalized osteoarthrosis, unspecified site 05/22/2008    Obstructive sleep apnea 05/22/2008    Other and unspecified nonspecific immunological findings 05/22/2008    Thromboembolism (HCC) 05/22/2008    Phlebitis and thrombophlebitis of other deep vessels of lower extremities 05/22/2008    Pure hypercholesterolemia 05/22/2008    Restless legs syndrome (RLS) 05/22/2008    Spinal stenosis of lumbar region without neurogenic claudication 05/22/2008    Synovitis and tenosynovitis, unspecified 05/22/2008    Temporal arteritis (HCC) 05/22/2008    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 Based on their STS risk assessment, they have undergone testing for transcatheter  "aortic valve replacement.  The results of these studies have been interpreted by the multidisciplinary TAVR team who have determined the patient to be high risk for open aortic valve replacement based on other pre-existing comobidities not reflected in the STS risk score.      The risks, benefits, and alternatives to TAVR were discussed in detail with the patient today. They understand and wish to proceed with transfemoral transcatheter aortic valve replacement.  Informed consent was obtained and a date for the intervention has been set.    Anneliese Garcia was comfortable with our recommendations, and their questions were answered to their satisfaction.      Shared decision-making encounter occurred during this visit. Additionally, heart team evaluation of suitability for surgical replacement has been completed.     The following preoperative instructions were provided at the conclusion of their consultation:     1. You will receive a phone call from the hospital between 2:00 PM and 8:00 PM the day prior to surgery to confirm arrival time and location. For surgery on Mondays, you will receive a call on Friday  2. Do not drink or eat anything after midnight the night before surgery. That includes no water, candy, gum, lozenges, Lifesavers, etc. We recommend you not eat any \"junk\" food, consume alcohol or smoke the night before surgery.  3. Continue taking aspirin but only 81 mg daily.  4. If you take Coumadin discontinue it 5 days before surgery.  5. If you are diabetic, do not take any of your diabetic pills the morning of surgery. If you take Lantus insulin, you may take it at your regularly scheduled time the day before surgery. Do not take any other insulins the morning of surgery.  6. The 2 nights before surgery, take a shower each night using the special antiseptic soap or soap sponges you received from the office or hospital. Shampoo your hair with regular shampoo and rinse completely before using the " "antiseptic sponges. Use the sponge to wash from your neck down, with special attention to the armpits and groin area. Do not use any other soap or cleanser on your skin. Do not use lotions, powder, deodorant or perfume of any kind on your skin after you shower. Use clean bed linens and wear clean pajamas after your shower.  7. You will be prescribed Mupirocin nasal ointment. Apply to both nostrils twice a day for 5 days prior to surgery.  8. Do not take a shower the morning of her surgery; you'll be given a special\" bath\" at the hospital.  9. Notify the CT surgery office if you develop a cold, sore throat, cough, fever or other health issues before your surgery.  10. Other medication changes included the following: Hold Coumadin for 3 days, hold losartan for 3 days and hold MV and Thera Tears caps for 7 days     SIGNATURE: Roxana Pedraza PA-C  DATE: June 14, 2024  TIME: 2:04 PM    * This note was completed in part utilizing [employer] direct voice recognition software.   Grammatical errors, random word insertion, spelling mistakes, and incomplete sentences may be an occasional consequence of the system secondary to software limitations, ambient noise and hardware issues. At the time of dictation, efforts were made to edit, clarify and /or correct errors. Please read the chart carefully and recognize, using context, where substitutions have occurred.  If you have any questions or concerns about the context, text or information contained within the body of this dictation, please contact myself, the provider, for further clarification.     "

## 2024-06-14 NOTE — H&P (VIEW-ONLY)
Consultation - Cardiothoracic Surgery   Anneliese Garcia 88 y.o. female MRN: [id number]      Reason for Consult / Principal Problem: Aortic stenosis, Non-Rheumatic    History of Present Illness: Anneliese Garcia is a 88 y.o. year old female who was previously evaluated in our office by Manuel Pal M.D. for transcatheter aortic valve replacement.  During this initial consultation, arrangements were made for the following studies to be completed: gated CTA of the chest, abdomen, and pelvis, cardiac catheterization, dental clearance, and carotid ultrasound.     Anneliese Garcia now presents to review the results of these tests and confirm the suitability of proceeding with transcatheter aortic valve replacment.    In review, several weeks ago she had a wellness examination in her home with a visiting nurse.  The visiting nurse completed physical examination and noted to significant systolic ejection murmur.  She was advised that she should seek cardiology evaluation.  She was subsequently referred to outpatient cardiologist for consultation.  Echocardiogram was completed on May 2.  Ventricular systolic function was well-preserved new critical aortic stenosis was identified with a valve area estimated 0.48 cm².  She was subsequently been referred to our office for transcatheter valve replacement consideration. She notes significant CISNEROS, fatigue and orthopnea.     She had a PMH of AS, NSR, HTN, GERD, HLD, osteoporosis, Sjogren's (follows with Rheum at [place]), H/o multiple PEs/DVTs/Hypercoagulable state on Coumadin, Hypothyroidism, CKDII, Arthritis, MIAH no CPAP, RLS    Patient is retired. , here today with her . Denies current or prior use of tobacco, ETOH, or drug use. Allergies include sulfa abx(hives) . Dental screening has been obtained.      Past Medical History:  Past Medical History:   Diagnosis Date    BRCA1 negative     BRCA2 negative     Cancer (HCC) 2018    squamous cell - face     Disease of thyroid gland     GERD (gastroesophageal reflux disease)     Heart murmur     Hypercoagulable state (HCC)     Hyperlipidemia     Hypertension     Microscopic colitis     Nonrheumatic aortic valve stenosis 04/30/2024    Osteoporosis     PE (pulmonary thromboembolism) (HCC)     Sjogren's syndrome (HCC)     Urinary tract infection          Past Surgical History:   Past Surgical History:   Procedure Laterality Date    CARDIAC CATHETERIZATION N/A 6/6/2024    Procedure: Cardiac catheterization;  Surgeon: Taj Stafford DO;  Location: [facility];  Service: Cardiology    COLONOSCOPY  07/18/2016     grade 2 internal hemorrhoids but otherwise normal, pathology negative for microscopic colitis    HYSTERECTOMY  1965    JOINT REPLACEMENT      KNEE SURGERY      SHOULDER SURGERY      right    SPINE SURGERY      TOE SURGERY      TONSILLECTOMY      UPPER GASTROINTESTINAL ENDOSCOPY  01/09/2015     reflux esophagitis, gastritis, duodenitis in the bulb, pathology negative for H pylori, Puckett's, EOE         Family History:  Family History   Problem Relation Age of Onset    No Known Problems Mother     Stroke Father     Ovarian cancer Sister 79    Ovarian cancer Daughter 44    No Known Problems Daughter     No Known Problems Daughter     No Known Problems Maternal Grandmother     No Known Problems Maternal Grandfather     No Known Problems Paternal Grandmother     No Known Problems Paternal Grandfather     No Known Problems Son     No Known Problems Son     No Known Problems Maternal Aunt     No Known Problems Maternal Aunt     No Known Problems Paternal Aunt     Colon cancer Neg Hx     Colon polyps Neg Hx          Social History:    Social History     Substance and Sexual Activity   Alcohol Use No     Social History     Substance and Sexual Activity   Drug Use No     Social History     Tobacco Use   Smoking Status Never   Smokeless Tobacco Never   Tobacco Comments    Never smoked       Home Medications:    Prior to Admission medications    Medication Sig Start Date End Date Taking? Authorizing Provider   Cholecalciferol (VITAMIN D) 2000 UNITS tablet Take 3,000 Units by mouth daily      Historical Provider, MD   cycloSPORINE (RESTASIS) 0.05 % ophthalmic emulsion Administer 1 drop to both eyes 2 (two) times a day.    Historical Provider, MD   DHA-EPA-Flaxseed Oil-Vitamin E (THERA TEARS) CAPS Take 4 capsules by mouth daily.    Historical Provider, MD   Diclofenac Sodium (VOLTAREN) 1 %  11/1/21   Historical Provider, MD   diphenoxylate-atropine (LOMOTIL) 2.5-0.025 mg per tablet TAKE 1 TABLET BY MOUTH 4 (FOUR) TIMES A DAY AS NEEDED FOR DIARRHEA 7/8/21   Lew Art DO   levothyroxine 112 mcg tablet TAKE ONE TABLET BY MOUTH EVERY DAY 1/23/24   CATHERINE Brady   losartan (COZAAR) 50 mg tablet Take 1 tablet (50 mg total) by mouth daily 1/23/24   CATHERINE Brady   metoprolol tartrate (LOPRESSOR) 25 mg tablet Take 1 tablet (25 mg total) by mouth every 12 (twelve) hours for 30 days 1/7/19 5/17/24  Danny Stahl MD   Multiple Vitamins-Minerals (PreserVision AREDS 2+Multi Vit) CAPS  1/12/23   Historical Provider, MD   omeprazole (PriLOSEC) 20 mg delayed release capsule Take 20 mg by mouth daily    Historical Provider, MD   pilocarpine (SALAGEN) 5 mg tablet Take 5 mg by mouth 4 (four) times a day 12/15/21   Historical Provider, MD   polyvinyl alcohol (LIQUIFILM TEARS) 1.4 % ophthalmic solution Administer 1 drop to both eyes as needed for dry eyes    Historical Provider, MD   simvastatin (ZOCOR) 20 mg tablet Take 20 mg by mouth daily at bedtime.    Historical Provider, MD   warfarin (COUMADIN) 5 mg tablet Take 1 tablet (5 mg total) by mouth daily Do not start before March 20, 2023. 3/20/23   Yuliana Mott MD       Allergies:  Allergies   Allergen Reactions    Sulfa Antibiotics Hives       Review of Systems:     Review of Systems   Constitutional:  Positive for fatigue. Negative for chills and fever.   HENT:   Negative for ear pain and sore throat.    Eyes:  Negative for pain and visual disturbance.   Respiratory:  Positive for shortness of breath. Negative for cough.    Cardiovascular:  Negative for chest pain, palpitations and leg swelling.   Gastrointestinal:  Negative for abdominal pain and vomiting.   Genitourinary:  Negative for dysuria and hematuria.   Musculoskeletal:  Negative for arthralgias and back pain.   Skin:  Negative for color change and rash.   Neurological:  Negative for seizures and syncope.   All other systems reviewed and are negative.      Vital Signs:     There were no vitals filed for this visit.    Physical Exam:     Physical Exam  Vitals reviewed.   Constitutional:       General: She is not in acute distress.     Appearance: Normal appearance. She is normal weight.   HENT:      Head: Normocephalic and atraumatic.      Nose: Nose normal.      Mouth/Throat:      Pharynx: Oropharynx is clear.   Eyes:      Extraocular Movements: Extraocular movements intact.      Conjunctiva/sclera: Conjunctivae normal.      Pupils: Pupils are equal, round, and reactive to light.   Neck:      Vascular: No carotid bruit.   Cardiovascular:      Rate and Rhythm: Normal rate and regular rhythm.      Pulses: Normal pulses.      Heart sounds: Murmur heard.   Pulmonary:      Effort: Pulmonary effort is normal.      Breath sounds: Normal breath sounds.   Abdominal:      General: Abdomen is flat. Bowel sounds are normal.      Palpations: Abdomen is soft.   Musculoskeletal:         General: Normal range of motion.      Cervical back: Normal range of motion and neck supple. No tenderness.      Right lower leg: No edema.      Left lower leg: No edema.   Skin:     General: Skin is warm and dry.   Neurological:      General: No focal deficit present.      Mental Status: She is alert and oriented to person, place, and time.   Psychiatric:         Mood and Affect: Mood normal.         Behavior: Behavior normal.         Lab Results:  "              Invalid input(s): \"LABGLOM\"      Lab Results   Component Value Date    HGBA1C 5.2 03/17/2023     Lab Results   Component Value Date    TROPONINI <0.02 01/06/2019       Imaging Studies:     Echocardiogram:     Left Ventricle: Left ventricular cavity size is normal. Wall thickness is normal. The left ventricular ejection fraction is 60%. Systolic function is normal. Wall motion is normal.    Left Atrium: The atrium is severely dilated.    Aortic Valve: The aortic valve is trileaflet. The leaflets are severely calcified. There is severely reduced mobility. There is moderate regurgitation. There is critical stenosis. The aortic valve mean gradient is 46 mmHg. The aortic valve area is 0.48 cm2.    Mitral Valve: There is moderate annular calcification. There is severe regurgitation. There is mild stenosis. The mitral valve mean gradient is 4mmHg.    Tricuspid Valve: There is mild regurgitation. The right ventricular systolic pressure is mildly to moderately elevated. The estimated right ventricular systolic pressure is 49.00 mmHg.    Pulmonic Valve: There is mild regurgitation.       Gated CTA of the chest/abdomen/pelvis:     VASCULAR FINDINGS:     Central pulmonary artery is not abnormally enlarged.  Right atrium and right ventricle are normal in size.  Left atrial cavity is not abnormally dilated.  Left ventricular myocardium is normal.  Extensive bulky mitral annular calcification.     Coronary artery origins are in typical position.  Moderate coronary artery calcification.     Annulus, LVOT and aorta analysis:     Typical trileaflet aortic valve morphology.        Optimal CT aortic valve plane angles:  3 cusp view: Latvian 5, cranial 2  Cusp overlap view APODACA 12, cranial 18     Measurements:     Annulus diameter (max x min): 25 x 20 mm.  Annulus Area 388 mm2.  Annulus Perimeter 71 mm.     Annulus to left main coronary ostium: 17 mm.  Annulus to right main coronary ostium: 17 mm.  Sinus of Valsalva height: 22 " mm.     Aortic sinus of Valsalva diameter (max x min): 33 x 30 mm.  LVOT minimal diameter: 18 mm.     Sino-tubular junction minimal diameter: 27 mm.  Ascending thoracic aorta maximal diameter: 32 mm.     Valve Calcification:     Aortic valve calcium score is 2235.  Volume of 925 mm3.     Thoracic Aorta:     Porcelain aorta = no.  Aortic root and ascending thoracic aorta free of significant calcification beyond the sino-tubular junction.  Aortic arch is normal in course and caliber with mild calcification.  Severe atherosclerosis in the proximal left subclavian artery where there is greater than 80% atherosclerotic stenosis just beyond the left subclavian artery origin well demonstrated on axial image 20 of series 302. No other flow-limiting atherosclerotic   stenosis of the great vessels.  Descending thoracic aorta is normal in course, caliber, and contour.     Abdominal aorta and pelvic artery measurements:     Abdominal aorta:  Minimal diameter above aortic bifurcation: 12 mm  Calcification: Moderate to severe  Tortuosity: none     Right iliofemoral segment:  Minimal diameter: 7 mm  Calcification: mild  Tortuosity: none     Left iliofemoral segment:  Minimal diameter: 6 mm  Calcification: mild  Tortuosity: none     Severe atherosclerosis at origins of both right and left renal arteries with potentially flow-limiting estimated 50 to 80% atherosclerotic narrowing in each instance.        ADDITIONAL FINDINGS:     Chest:  Very mild groundglass changes and septal lobular thickening in the lungs suggesting possible mild interstitial edema related to pulmonary vascular congestion. Linear atelectasis/scar in the lingula. Tiny subcentimeter pulmonary nodules, similar from   January 2019. Small sliding-type hiatal hernia reidentified. Otherwise no acute significant findings in the lungs, pleura, mediastinum or chest wall.     Abdomen:  Few sigmoid diverticula with no acute diverticulitis. No calcified gallstones. No  pericholecystic inflammatory change.  No biliary dilatation. Small cyst in the dome of the right hepatic lobe, unchanged. Lobulation of renal contours consistent with   bilateral renal scarring. No acute findings involving liver, pancreas, spleen, adrenal glands, or kidneys.     Pelvis:  Hysterectomy. No significant findings involving urinary bladder, reproductive structures or pelvic cavity.     Abdominopelvic Cavity:  No ascites.  No pneumoperitoneum.  No lymphadenopathy.     Osseous Structures:  No acute fracture or destructive osseous lesion. Osteopenia. Severe chronic anterior wedge compression fracture of L2, unchanged from previous examination. Reidentified spinal degenerative changes. Reidentified dystrophic calcification in the   subcutaneous tissues of the medial left buttock probably related to prior trauma.        IMPRESSION:     Measurements and analysis to allow for planning of Transcatheter Aortic Valve Repair as above.     Suggestion of mild interstitial edema related to pulmonary vascular congestion in the lungs.     Also of note is severe atherosclerotic stenosis at the origin of the left subclavian.       Cardiac catheterization:  Mild calcific nonobstructive epicardial CAD.     Carotid artery ultrasound: FINDINGS:     Right        Impression  PSV  EDV (cm/s)  Direction of Flow  Ratio    Dist. ICA                123          14                      5.41    Mid. ICA                  64           8                      2.81    Prox. ICA    1 - 49%      56           9                      2.46    Dist CCA                  30           4                              Mid CCA                   23           0                      1.07    Prox CCA                  21           0                              Ext Carotid               75           0                      3.27    Prox Vert                106           7  Antegrade                   Subclavian                44           0                                  Left         Impression  PSV  EDV (cm/s)  Direction of Flow  Ratio    Dist. ICA                 92          10                      2.26    Mid. ICA                  62          10                      1.53    Prox. ICA    1 - 49%      41           8                      1.00    Dist CCA                  38           8                              Mid CCA                   41           8                      0.99    Prox CCA                  41           5                              Ext Carotid               35           0                      0.86    Prox Vert                 45           8  Antegrade                   Subclavian                64           0                                       CONCLUSION:  Impression  RIGHT:  There is <50% stenosis noted in the internal carotid artery. Plaque is  heterogenous and irregular.  Vertebral artery flow is antegrade. There is no significant subclavian artery  disease.  LEFT:  There is <50% stenosis noted in the internal carotid artery. Plaque is  calcified and irregular.  Vertebral artery flow is antegrade. There is no significant subclavian artery  disease.    I have personally reviewed pertinent reports.   and I have personally reviewed pertinent films in PACS    TAVR evaluation Assessment:     5 Meter Walk Test:      Attempt 1: 6   Attempt 2: 7   Attempt 3: 8    STS Risk Score: 5.2 %, mortality risk    Aortic Stenosis Stage: D1    [facility]: IV    KCQ-12 was completed    Assessment:  Patient Active Problem List    Diagnosis Date Noted    S/P cardiac cath 06/06/2024    Nonrheumatic aortic valve stenosis 04/30/2024    QT prolongation 03/17/2023    Serum total bilirubin elevated 03/17/2023    History of DVT (deep vein thrombosis) 11/17/2022    Status post vaginal hysterectomy 11/17/2022    History of osteoporosis 11/17/2022    Acute blood loss anemia 12/28/2020    Gastrointestinal hemorrhage with melena 12/28/2020    Functional diarrhea 12/28/2020    Diarrhea  12/01/2020    Weight loss 12/01/2020    Gastroesophageal reflux disease with esophagitis 12/01/2020    Chronic anticoagulation 12/01/2020    Hypercoagulable state (HCC) 12/01/2020    Transient neurological symptoms 09/30/2020    Chronic left hip pain     Greater trochanteric bursitis of left hip     Arthritis of right sacroiliac joint     Sacroiliitis (HCC)     Closed inferior dislocation of left shoulder 07/08/2020    Osteoarthritis 07/08/2020    Supervision of normal first pregnancy 07/08/2020    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    Traumatic hematoma of left orbit 01/06/2019    Hyperlipidemia 08/24/2018    Vitamin D deficiency 08/24/2018    Osteoporosis 08/24/2018    Pleural effusion on right 07/18/2018    Chronic kidney disease 07/15/2018    Symptomatic anemia 07/15/2018    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    Sjogren's syndrome (HCC)     History of pulmonary embolus (PE)     Essential hypertension     Hypothyroidism in adult     Status post reverse arthroplasty of right shoulder 07/12/2018    Long-term use of hydroxychloroquine 08/17/2016    Rectocele 12/23/2013    Carpal tunnel syndrome 05/22/2008    Generalized osteoarthrosis, unspecified site 05/22/2008    Obstructive sleep apnea 05/22/2008    Other and unspecified nonspecific immunological findings 05/22/2008    Thromboembolism (HCC) 05/22/2008    Phlebitis and thrombophlebitis of other deep vessels of lower extremities 05/22/2008    Pure hypercholesterolemia 05/22/2008    Restless legs syndrome (RLS) 05/22/2008    Spinal stenosis of lumbar region without neurogenic claudication 05/22/2008    Synovitis and tenosynovitis, unspecified 05/22/2008    Temporal arteritis (HCC) 05/22/2008    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 Based on their STS risk assessment, they have undergone testing for transcatheter  "aortic valve replacement.  The results of these studies have been interpreted by the multidisciplinary TAVR team who have determined the patient to be high risk for open aortic valve replacement based on other pre-existing comobidities not reflected in the STS risk score.      The risks, benefits, and alternatives to TAVR were discussed in detail with the patient today. They understand and wish to proceed with transfemoral transcatheter aortic valve replacement.  Informed consent was obtained and a date for the intervention has been set.    Anneliese Garcia was comfortable with our recommendations, and their questions were answered to their satisfaction.      Shared decision-making encounter occurred during this visit. Additionally, heart team evaluation of suitability for surgical replacement has been completed.     The following preoperative instructions were provided at the conclusion of their consultation:     1. You will receive a phone call from the hospital between 2:00 PM and 8:00 PM the day prior to surgery to confirm arrival time and location. For surgery on Mondays, you will receive a call on Friday  2. Do not drink or eat anything after midnight the night before surgery. That includes no water, candy, gum, lozenges, Lifesavers, etc. We recommend you not eat any \"junk\" food, consume alcohol or smoke the night before surgery.  3. Continue taking aspirin but only 81 mg daily.  4. If you take Coumadin discontinue it 5 days before surgery.  5. If you are diabetic, do not take any of your diabetic pills the morning of surgery. If you take Lantus insulin, you may take it at your regularly scheduled time the day before surgery. Do not take any other insulins the morning of surgery.  6. The 2 nights before surgery, take a shower each night using the special antiseptic soap or soap sponges you received from the office or hospital. Shampoo your hair with regular shampoo and rinse completely before using the " "antiseptic sponges. Use the sponge to wash from your neck down, with special attention to the armpits and groin area. Do not use any other soap or cleanser on your skin. Do not use lotions, powder, deodorant or perfume of any kind on your skin after you shower. Use clean bed linens and wear clean pajamas after your shower.  7. You will be prescribed Mupirocin nasal ointment. Apply to both nostrils twice a day for 5 days prior to surgery.  8. Do not take a shower the morning of her surgery; you'll be given a special\" bath\" at the hospital.  9. Notify the CT surgery office if you develop a cold, sore throat, cough, fever or other health issues before your surgery.  10. Other medication changes included the following: Hold Coumadin for 3 days, hold losartan for 3 days and hold MV and Thera Tears caps for 7 days     SIGNATURE: Roxana Pedraza PA-C  DATE: June 14, 2024  TIME: 2:04 PM    * This note was completed in part utilizing [email or web address] direct voice recognition software.   Grammatical errors, random word insertion, spelling mistakes, and incomplete sentences may be an occasional consequence of the system secondary to software limitations, ambient noise and hardware issues. At the time of dictation, efforts were made to edit, clarify and /or correct errors. Please read the chart carefully and recognize, using context, where substitutions have occurred.  If you have any questions or concerns about the context, text or information contained within the body of this dictation, please contact myself, the provider, for further clarification.   "

## 2024-06-18 ENCOUNTER — APPOINTMENT (OUTPATIENT)
Dept: LAB | Facility: HOSPITAL | Age: 89
End: 2024-06-18
Payer: COMMERCIAL

## 2024-06-18 ENCOUNTER — LAB REQUISITION (OUTPATIENT)
Dept: LAB | Facility: HOSPITAL | Age: 89
End: 2024-06-18
Payer: COMMERCIAL

## 2024-06-18 DIAGNOSIS — Z01.818 PRE-OP EVALUATION: ICD-10-CM

## 2024-06-18 DIAGNOSIS — N18.2 STAGE 2 CHRONIC KIDNEY DISEASE: ICD-10-CM

## 2024-06-18 DIAGNOSIS — M35.00 SJOGREN'S SYNDROME, WITH UNSPECIFIED ORGAN INVOLVEMENT (HCC): ICD-10-CM

## 2024-06-18 DIAGNOSIS — Z86.711 HISTORY OF PULMONARY EMBOLUS (PE): ICD-10-CM

## 2024-06-18 DIAGNOSIS — Z01.818 OTHER SPECIFIED PRE-OPERATIVE EXAMINATION: ICD-10-CM

## 2024-06-18 DIAGNOSIS — D68.59 HYPERCOAGULABLE STATE (HCC): ICD-10-CM

## 2024-06-18 DIAGNOSIS — Z01.818 PRE-OP TESTING: ICD-10-CM

## 2024-06-18 DIAGNOSIS — E78.5 HYPERLIPIDEMIA, UNSPECIFIED HYPERLIPIDEMIA TYPE: ICD-10-CM

## 2024-06-18 DIAGNOSIS — Z01.818 ENCOUNTER FOR OTHER PREPROCEDURAL EXAMINATION: ICD-10-CM

## 2024-06-18 DIAGNOSIS — I35.0 NONRHEUMATIC AORTIC VALVE STENOSIS: ICD-10-CM

## 2024-06-18 DIAGNOSIS — I74.9 THROMBOEMBOLISM (HCC): ICD-10-CM

## 2024-06-18 LAB
ALBUMIN SERPL BCG-MCNC: 4.3 G/DL (ref 3.5–5)
ALP SERPL-CCNC: 50 U/L (ref 34–104)
ALT SERPL W P-5'-P-CCNC: 26 U/L (ref 7–52)
ANION GAP SERPL CALCULATED.3IONS-SCNC: 11 MMOL/L (ref 4–13)
AST SERPL W P-5'-P-CCNC: 30 U/L (ref 13–39)
BILIRUB SERPL-MCNC: 0.68 MG/DL (ref 0.2–1)
BUN SERPL-MCNC: 23 MG/DL (ref 5–25)
CALCIUM SERPL-MCNC: 10.4 MG/DL (ref 8.4–10.2)
CHLORIDE SERPL-SCNC: 103 MMOL/L (ref 96–108)
CO2 SERPL-SCNC: 25 MMOL/L (ref 21–32)
CREAT SERPL-MCNC: 0.87 MG/DL (ref 0.6–1.3)
ERYTHROCYTE [DISTWIDTH] IN BLOOD BY AUTOMATED COUNT: 13.7 % (ref 11.6–15.1)
GFR SERPL CREATININE-BSD FRML MDRD: 60 ML/MIN/1.73SQ M
GLUCOSE P FAST SERPL-MCNC: 92 MG/DL (ref 65–99)
HCT VFR BLD AUTO: 40.3 % (ref 34.8–46.1)
HGB BLD-MCNC: 13.4 G/DL (ref 11.5–15.4)
INR PPP: 1.55 (ref 0.84–1.19)
MCH RBC QN AUTO: 32.3 PG (ref 26.8–34.3)
MCHC RBC AUTO-ENTMCNC: 33.3 G/DL (ref 31.4–37.4)
MCV RBC AUTO: 97 FL (ref 82–98)
PLATELET # BLD AUTO: 179 THOUSANDS/UL (ref 149–390)
PMV BLD AUTO: 12.2 FL (ref 8.9–12.7)
POTASSIUM SERPL-SCNC: 4.6 MMOL/L (ref 3.5–5.3)
PROT SERPL-MCNC: 7.3 G/DL (ref 6.4–8.4)
PROTHROMBIN TIME: 18.3 SECONDS (ref 11.6–14.5)
RBC # BLD AUTO: 4.15 MILLION/UL (ref 3.81–5.12)
SODIUM SERPL-SCNC: 139 MMOL/L (ref 135–147)
WBC # BLD AUTO: 7.84 THOUSAND/UL (ref 4.31–10.16)

## 2024-06-18 PROCEDURE — 85610 PROTHROMBIN TIME: CPT

## 2024-06-18 PROCEDURE — 86850 RBC ANTIBODY SCREEN: CPT | Performed by: PHYSICIAN ASSISTANT

## 2024-06-18 PROCEDURE — 80053 COMPREHEN METABOLIC PANEL: CPT

## 2024-06-18 PROCEDURE — 36415 COLL VENOUS BLD VENIPUNCTURE: CPT

## 2024-06-18 PROCEDURE — 87081 CULTURE SCREEN ONLY: CPT

## 2024-06-18 PROCEDURE — 86901 BLOOD TYPING SEROLOGIC RH(D): CPT | Performed by: PHYSICIAN ASSISTANT

## 2024-06-18 PROCEDURE — 86900 BLOOD TYPING SEROLOGIC ABO: CPT | Performed by: PHYSICIAN ASSISTANT

## 2024-06-18 PROCEDURE — 85027 COMPLETE CBC AUTOMATED: CPT

## 2024-06-19 LAB
ABO GROUP BLD: NORMAL
BLD GP AB SCN SERPL QL: NEGATIVE
MRSA NOSE QL CULT: NORMAL
RH BLD: POSITIVE
SPECIMEN EXPIRATION DATE: NORMAL

## 2024-06-26 ENCOUNTER — ANESTHESIA EVENT (INPATIENT)
Dept: PERIOP | Facility: HOSPITAL | Age: 89
End: 2024-06-26
Payer: COMMERCIAL

## 2024-06-26 RX ORDER — PHENYLEPHRINE HCL IN 0.9% NACL 1 MG/10 ML
200-2000 SYRINGE (ML) INTRAVENOUS ONCE
Status: CANCELLED | OUTPATIENT
Start: 2024-06-27

## 2024-06-27 ENCOUNTER — ANESTHESIA (INPATIENT)
Dept: PERIOP | Facility: HOSPITAL | Age: 89
End: 2024-06-27
Payer: COMMERCIAL

## 2024-06-27 ENCOUNTER — APPOINTMENT (INPATIENT)
Dept: RADIOLOGY | Facility: HOSPITAL | Age: 89
DRG: 267 | End: 2024-06-27
Payer: MEDICARE

## 2024-06-27 ENCOUNTER — APPOINTMENT (INPATIENT)
Dept: NON INVASIVE DIAGNOSTICS | Facility: HOSPITAL | Age: 89
DRG: 267 | End: 2024-06-27
Attending: THORACIC SURGERY (CARDIOTHORACIC VASCULAR SURGERY)
Payer: MEDICARE

## 2024-06-27 ENCOUNTER — HOSPITAL ENCOUNTER (INPATIENT)
Facility: HOSPITAL | Age: 89
LOS: 2 days | Discharge: HOME | DRG: 267 | End: 2024-06-29
Attending: THORACIC SURGERY (CARDIOTHORACIC VASCULAR SURGERY) | Admitting: THORACIC SURGERY (CARDIOTHORACIC VASCULAR SURGERY)
Payer: MEDICARE

## 2024-06-27 DIAGNOSIS — I35.0 NONRHEUMATIC AORTIC VALVE STENOSIS: ICD-10-CM

## 2024-06-27 DIAGNOSIS — Z95.2 S/P TAVR (TRANSCATHETER AORTIC VALVE REPLACEMENT): ICD-10-CM

## 2024-06-27 DIAGNOSIS — I45.4 BBB (BUNDLE BRANCH BLOCK): ICD-10-CM

## 2024-06-27 DIAGNOSIS — I10 ESSENTIAL HYPERTENSION: ICD-10-CM

## 2024-06-27 DIAGNOSIS — I35.0 NONRHEUMATIC AORTIC VALVE STENOSIS: Primary | ICD-10-CM

## 2024-06-27 DIAGNOSIS — Z95.2 S/P TAVR (TRANSCATHETER AORTIC VALVE REPLACEMENT): Primary | ICD-10-CM

## 2024-06-27 DIAGNOSIS — I35.0 NONRHEUMATIC AORTIC (VALVE) STENOSIS: ICD-10-CM

## 2024-06-27 DIAGNOSIS — Z79.01 CHRONIC ANTICOAGULATION: ICD-10-CM

## 2024-06-27 PROBLEM — D62 ACUTE BLOOD LOSS ANEMIA: Status: RESOLVED | Noted: 2020-12-28 | Resolved: 2024-06-27

## 2024-06-27 PROBLEM — D64.9 SYMPTOMATIC ANEMIA: Status: RESOLVED | Noted: 2018-07-15 | Resolved: 2024-06-27

## 2024-06-27 PROBLEM — R19.7 DIARRHEA: Status: RESOLVED | Noted: 2020-12-01 | Resolved: 2024-06-27

## 2024-06-27 PROBLEM — K92.1 GASTROINTESTINAL HEMORRHAGE WITH MELENA: Status: RESOLVED | Noted: 2020-12-28 | Resolved: 2024-06-27

## 2024-06-27 PROBLEM — R17 SERUM TOTAL BILIRUBIN ELEVATED: Status: RESOLVED | Noted: 2023-03-17 | Resolved: 2024-06-27

## 2024-06-27 PROBLEM — S05.12XA: Status: RESOLVED | Noted: 2019-01-06 | Resolved: 2024-06-27

## 2024-06-27 PROBLEM — S43.035A: Status: RESOLVED | Noted: 2020-07-08 | Resolved: 2024-06-27

## 2024-06-27 PROBLEM — R29.818 TRANSIENT NEUROLOGICAL SYMPTOMS: Status: RESOLVED | Noted: 2020-09-30 | Resolved: 2024-06-27

## 2024-06-27 PROBLEM — N18.2 CKD (CHRONIC KIDNEY DISEASE), STAGE II: Status: ACTIVE | Noted: 2018-07-15

## 2024-06-27 PROBLEM — J90 PLEURAL EFFUSION ON RIGHT: Status: RESOLVED | Noted: 2018-07-18 | Resolved: 2024-06-27

## 2024-06-27 LAB
ABO GROUP BLD: NORMAL
ANION GAP SERPL CALCULATED.3IONS-SCNC: 7 MMOL/L (ref 4–13)
ATRIAL RATE: 72 BPM
ATRIAL RATE: 73 BPM
ATRIAL RATE: 77 BPM
AV REGURGITATION PRESSURE HALF TIME: 397 MS
BASE EXCESS BLDA CALC-SCNC: -1 MMOL/L (ref -2–3)
BASE EXCESS BLDA CALC-SCNC: -1 MMOL/L (ref -2–3)
BASE EXCESS BLDA CALC-SCNC: 1 MMOL/L (ref -2–3)
BLD GP AB SCN SERPL QL: NEGATIVE
BUN SERPL-MCNC: 16 MG/DL (ref 5–25)
CA-I BLD-SCNC: 1.26 MMOL/L (ref 1.12–1.32)
CA-I BLD-SCNC: 1.33 MMOL/L (ref 1.12–1.32)
CA-I BLD-SCNC: 1.46 MMOL/L (ref 1.12–1.32)
CALCIUM SERPL-MCNC: 10 MG/DL (ref 8.4–10.2)
CHLORIDE SERPL-SCNC: 107 MMOL/L (ref 96–108)
CO2 SERPL-SCNC: 25 MMOL/L (ref 21–32)
CREAT SERPL-MCNC: 0.73 MG/DL (ref 0.6–1.3)
GFR SERPL CREATININE-BSD FRML MDRD: 74 ML/MIN/1.73SQ M
GLUCOSE SERPL-MCNC: 102 MG/DL (ref 65–140)
GLUCOSE SERPL-MCNC: 112 MG/DL (ref 65–140)
GLUCOSE SERPL-MCNC: 117 MG/DL (ref 65–140)
GLUCOSE SERPL-MCNC: 134 MG/DL (ref 65–140)
GLUCOSE SERPL-MCNC: 149 MG/DL (ref 65–140)
GLUCOSE SERPL-MCNC: 92 MG/DL (ref 65–140)
GLUCOSE SERPL-MCNC: 93 MG/DL (ref 65–140)
HCO3 BLDA-SCNC: 23.2 MMOL/L (ref 22–28)
HCO3 BLDA-SCNC: 23.9 MMOL/L (ref 22–28)
HCO3 BLDA-SCNC: 27.5 MMOL/L (ref 24–30)
HCT VFR BLD AUTO: 37 % (ref 34.8–46.1)
HCT VFR BLD CALC: 30 % (ref 34.8–46.1)
HCT VFR BLD CALC: 31 % (ref 34.8–46.1)
HCT VFR BLD CALC: 35 % (ref 34.8–46.1)
HGB BLD-MCNC: 12.7 G/DL (ref 11.5–15.4)
HGB BLDA-MCNC: 10.2 G/DL (ref 11.5–15.4)
HGB BLDA-MCNC: 10.5 G/DL (ref 11.5–15.4)
HGB BLDA-MCNC: 11.9 G/DL (ref 11.5–15.4)
INR PPP: 1.22 (ref 0.84–1.19)
INR PPP: 1.39 (ref 0.84–1.19)
KCT BLD-ACNC: 123 SEC (ref 89–137)
KCT BLD-ACNC: 141 SEC (ref 89–137)
KCT BLD-ACNC: 268 SEC (ref 89–137)
P AXIS: 56 DEGREES
P AXIS: 64 DEGREES
P AXIS: 72 DEGREES
PCO2 BLD: 24 MMOL/L (ref 21–32)
PCO2 BLD: 25 MMOL/L (ref 21–32)
PCO2 BLD: 29 MMOL/L (ref 21–32)
PCO2 BLD: 36.4 MM HG (ref 36–44)
PCO2 BLD: 38.2 MM HG (ref 36–44)
PCO2 BLD: 52.3 MM HG (ref 42–50)
PH BLD: 7.33 [PH] (ref 7.3–7.4)
PH BLD: 7.4 [PH] (ref 7.35–7.45)
PH BLD: 7.41 [PH] (ref 7.35–7.45)
PLATELET # BLD AUTO: 155 THOUSANDS/UL (ref 149–390)
PMV BLD AUTO: 11.3 FL (ref 8.9–12.7)
PO2 BLD: 154 MM HG (ref 75–129)
PO2 BLD: 203 MM HG (ref 75–129)
PO2 BLD: 38 MM HG (ref 35–45)
POTASSIUM BLD-SCNC: 3.5 MMOL/L (ref 3.5–5.3)
POTASSIUM BLD-SCNC: 3.5 MMOL/L (ref 3.5–5.3)
POTASSIUM BLD-SCNC: 3.7 MMOL/L (ref 3.5–5.3)
POTASSIUM SERPL-SCNC: 3.7 MMOL/L (ref 3.5–5.3)
PR INTERVAL: 168 MS
PR INTERVAL: 178 MS
PR INTERVAL: 178 MS
PROTHROMBIN TIME: 15.2 SECONDS (ref 11.6–14.5)
PROTHROMBIN TIME: 16.9 SECONDS (ref 11.6–14.5)
QRS AXIS: 18 DEGREES
QRS AXIS: 96 DEGREES
QRS AXIS: 99 DEGREES
QRSD INTERVAL: 142 MS
QRSD INTERVAL: 152 MS
QRSD INTERVAL: 152 MS
QT INTERVAL: 458 MS
QT INTERVAL: 478 MS
QT INTERVAL: 484 MS
QTC INTERVAL: 518 MS
QTC INTERVAL: 526 MS
QTC INTERVAL: 529 MS
RH BLD: POSITIVE
SAO2 % BLD FROM PO2: 100 % (ref 60–85)
SAO2 % BLD FROM PO2: 68 % (ref 60–85)
SAO2 % BLD FROM PO2: 99 % (ref 60–85)
SL CV AV PEAK GRADIENT RETROGRADE: 61 MMHG
SODIUM BLD-SCNC: 139 MMOL/L (ref 136–145)
SODIUM BLD-SCNC: 139 MMOL/L (ref 136–145)
SODIUM BLD-SCNC: 141 MMOL/L (ref 136–145)
SODIUM SERPL-SCNC: 139 MMOL/L (ref 135–147)
SPECIMEN EXPIRATION DATE: NORMAL
SPECIMEN SOURCE: ABNORMAL
SPECIMEN SOURCE: NORMAL
T WAVE AXIS: 151 DEGREES
T WAVE AXIS: 21 DEGREES
T WAVE AXIS: 30 DEGREES
VENTRICULAR RATE: 72 BPM
VENTRICULAR RATE: 73 BPM
VENTRICULAR RATE: 77 BPM

## 2024-06-27 PROCEDURE — 80048 BASIC METABOLIC PNL TOTAL CA: CPT | Performed by: PHYSICIAN ASSISTANT

## 2024-06-27 PROCEDURE — 76377 3D RENDER W/INTRP POSTPROCES: CPT

## 2024-06-27 PROCEDURE — 82947 ASSAY GLUCOSE BLOOD QUANT: CPT

## 2024-06-27 PROCEDURE — 85014 HEMATOCRIT: CPT

## 2024-06-27 PROCEDURE — 82330 ASSAY OF CALCIUM: CPT

## 2024-06-27 PROCEDURE — 86850 RBC ANTIBODY SCREEN: CPT | Performed by: THORACIC SURGERY (CARDIOTHORACIC VASCULAR SURGERY)

## 2024-06-27 PROCEDURE — C1769 GUIDE WIRE: HCPCS | Performed by: THORACIC SURGERY (CARDIOTHORACIC VASCULAR SURGERY)

## 2024-06-27 PROCEDURE — C1751 CATH, INF, PER/CENT/MIDLINE: HCPCS | Performed by: THORACIC SURGERY (CARDIOTHORACIC VASCULAR SURGERY)

## 2024-06-27 PROCEDURE — 85014 HEMATOCRIT: CPT | Performed by: PHYSICIAN ASSISTANT

## 2024-06-27 PROCEDURE — 02HV33Z INSERTION OF INFUSION DEVICE INTO SUPERIOR VENA CAVA, PERCUTANEOUS APPROACH: ICD-10-PCS | Performed by: ANESTHESIOLOGY

## 2024-06-27 PROCEDURE — 85610 PROTHROMBIN TIME: CPT | Performed by: PHYSICIAN ASSISTANT

## 2024-06-27 PROCEDURE — 85018 HEMOGLOBIN: CPT | Performed by: PHYSICIAN ASSISTANT

## 2024-06-27 PROCEDURE — 84132 ASSAY OF SERUM POTASSIUM: CPT

## 2024-06-27 PROCEDURE — 82803 BLOOD GASES ANY COMBINATION: CPT

## 2024-06-27 PROCEDURE — 33361 REPLACE AORTIC VALVE PERQ: CPT | Performed by: INTERNAL MEDICINE

## 2024-06-27 PROCEDURE — 86921 COMPATIBILITY TEST INCUBATE: CPT

## 2024-06-27 PROCEDURE — 02RF38Z REPLACEMENT OF AORTIC VALVE WITH ZOOPLASTIC TISSUE, PERCUTANEOUS APPROACH: ICD-10-PCS | Performed by: THORACIC SURGERY (CARDIOTHORACIC VASCULAR SURGERY)

## 2024-06-27 PROCEDURE — 93355 ECHO TRANSESOPHAGEAL (TEE): CPT

## 2024-06-27 PROCEDURE — 84295 ASSAY OF SERUM SODIUM: CPT

## 2024-06-27 PROCEDURE — 33361 REPLACE AORTIC VALVE PERQ: CPT | Performed by: THORACIC SURGERY (CARDIOTHORACIC VASCULAR SURGERY)

## 2024-06-27 PROCEDURE — NC001 PR NO CHARGE: Performed by: PHYSICIAN ASSISTANT

## 2024-06-27 PROCEDURE — 93010 ELECTROCARDIOGRAM REPORT: CPT | Performed by: INTERNAL MEDICINE

## 2024-06-27 PROCEDURE — C1760 CLOSURE DEV, VASC: HCPCS | Performed by: THORACIC SURGERY (CARDIOTHORACIC VASCULAR SURGERY)

## 2024-06-27 PROCEDURE — 99222 1ST HOSP IP/OBS MODERATE 55: CPT

## 2024-06-27 PROCEDURE — 86922 COMPATIBILITY TEST ANTIGLOB: CPT

## 2024-06-27 PROCEDURE — B24BZZ4 ULTRASONOGRAPHY OF HEART WITH AORTA, TRANSESOPHAGEAL: ICD-10-PCS | Performed by: ANESTHESIOLOGY

## 2024-06-27 PROCEDURE — 85049 AUTOMATED PLATELET COUNT: CPT | Performed by: PHYSICIAN ASSISTANT

## 2024-06-27 PROCEDURE — 86900 BLOOD TYPING SEROLOGIC ABO: CPT | Performed by: THORACIC SURGERY (CARDIOTHORACIC VASCULAR SURGERY)

## 2024-06-27 PROCEDURE — 82948 REAGENT STRIP/BLOOD GLUCOSE: CPT

## 2024-06-27 PROCEDURE — 86901 BLOOD TYPING SEROLOGIC RH(D): CPT | Performed by: THORACIC SURGERY (CARDIOTHORACIC VASCULAR SURGERY)

## 2024-06-27 PROCEDURE — 71045 X-RAY EXAM CHEST 1 VIEW: CPT

## 2024-06-27 PROCEDURE — C1894 INTRO/SHEATH, NON-LASER: HCPCS | Performed by: THORACIC SURGERY (CARDIOTHORACIC VASCULAR SURGERY)

## 2024-06-27 PROCEDURE — 93005 ELECTROCARDIOGRAM TRACING: CPT

## 2024-06-27 PROCEDURE — 85347 COAGULATION TIME ACTIVATED: CPT

## 2024-06-27 DEVICE — SAPIEN 3 ULTRA RESILIA TRANSCATHETER HEART VALVE, 23MM
Type: IMPLANTABLE DEVICE | Site: ARTERIAL | Status: FUNCTIONAL
Brand: SAPIEN 3 ULTRA RESILIA

## 2024-06-27 DEVICE — PERCLOSE™ PROSTYLE™ SUTURE-MEDIATED CLOSURE AND REPAIR SYSTEM
Type: IMPLANTABLE DEVICE | Site: ARTERIAL | Status: FUNCTIONAL
Brand: PERCLOSE™ PROSTYLE™

## 2024-06-27 RX ORDER — ASPIRIN 81 MG/1
81 TABLET, CHEWABLE ORAL DAILY
Status: DISCONTINUED | OUTPATIENT
Start: 2024-06-27 | End: 2024-06-29 | Stop reason: HOSPADM

## 2024-06-27 RX ORDER — MAGNESIUM HYDROXIDE 1200 MG/15ML
LIQUID ORAL AS NEEDED
Status: DISCONTINUED | OUTPATIENT
Start: 2024-06-27 | End: 2024-06-27 | Stop reason: HOSPADM

## 2024-06-27 RX ORDER — POTASSIUM CHLORIDE 20 MEQ/1
20 TABLET, EXTENDED RELEASE ORAL DAILY
Status: DISCONTINUED | OUTPATIENT
Start: 2024-06-27 | End: 2024-06-28

## 2024-06-27 RX ORDER — ACETAMINOPHEN 650 MG/1
650 SUPPOSITORY RECTAL EVERY 4 HOURS PRN
Status: DISCONTINUED | OUTPATIENT
Start: 2024-06-27 | End: 2024-06-29 | Stop reason: HOSPADM

## 2024-06-27 RX ORDER — ONDANSETRON 2 MG/ML
INJECTION INTRAMUSCULAR; INTRAVENOUS AS NEEDED
Status: DISCONTINUED | OUTPATIENT
Start: 2024-06-27 | End: 2024-06-27

## 2024-06-27 RX ORDER — SODIUM CHLORIDE, SODIUM LACTATE, POTASSIUM CHLORIDE, CALCIUM CHLORIDE 600; 310; 30; 20 MG/100ML; MG/100ML; MG/100ML; MG/100ML
INJECTION, SOLUTION INTRAVENOUS CONTINUOUS PRN
Status: DISCONTINUED | OUTPATIENT
Start: 2024-06-27 | End: 2024-06-27

## 2024-06-27 RX ORDER — DIPHENOXYLATE HYDROCHLORIDE AND ATROPINE SULFATE 2.5; .025 MG/1; MG/1
1 TABLET ORAL 4 TIMES DAILY PRN
Status: DISCONTINUED | OUTPATIENT
Start: 2024-06-27 | End: 2024-06-29 | Stop reason: HOSPADM

## 2024-06-27 RX ORDER — PILOCARPINE HYDROCHLORIDE 5 MG/1
5 TABLET, FILM COATED ORAL 4 TIMES DAILY
Status: DISCONTINUED | OUTPATIENT
Start: 2024-06-27 | End: 2024-06-29 | Stop reason: HOSPADM

## 2024-06-27 RX ORDER — INSULIN LISPRO 100 [IU]/ML
1-5 INJECTION, SOLUTION INTRAVENOUS; SUBCUTANEOUS
Status: DISCONTINUED | OUTPATIENT
Start: 2024-06-27 | End: 2024-06-29 | Stop reason: HOSPADM

## 2024-06-27 RX ORDER — LIDOCAINE HYDROCHLORIDE 10 MG/ML
INJECTION, SOLUTION EPIDURAL; INFILTRATION; INTRACAUDAL; PERINEURAL AS NEEDED
Status: DISCONTINUED | OUTPATIENT
Start: 2024-06-27 | End: 2024-06-27

## 2024-06-27 RX ORDER — CALCIUM GLUCONATE 20 MG/ML
2 INJECTION, SOLUTION INTRAVENOUS ONCE AS NEEDED
Status: DISCONTINUED | OUTPATIENT
Start: 2024-06-27 | End: 2024-06-29 | Stop reason: HOSPADM

## 2024-06-27 RX ORDER — ONDANSETRON 2 MG/ML
4 INJECTION INTRAMUSCULAR; INTRAVENOUS ONCE AS NEEDED
Status: DISCONTINUED | OUTPATIENT
Start: 2024-06-27 | End: 2024-06-27 | Stop reason: HOSPADM

## 2024-06-27 RX ORDER — ONDANSETRON 2 MG/ML
4 INJECTION INTRAMUSCULAR; INTRAVENOUS EVERY 6 HOURS PRN
Status: DISCONTINUED | OUTPATIENT
Start: 2024-06-27 | End: 2024-06-29 | Stop reason: HOSPADM

## 2024-06-27 RX ORDER — HYDRALAZINE HYDROCHLORIDE 20 MG/ML
10 INJECTION INTRAMUSCULAR; INTRAVENOUS EVERY 6 HOURS PRN
Status: DISCONTINUED | OUTPATIENT
Start: 2024-06-27 | End: 2024-06-29 | Stop reason: HOSPADM

## 2024-06-27 RX ORDER — FENTANYL CITRATE 50 UG/ML
INJECTION, SOLUTION INTRAMUSCULAR; INTRAVENOUS AS NEEDED
Status: DISCONTINUED | OUTPATIENT
Start: 2024-06-27 | End: 2024-06-27

## 2024-06-27 RX ORDER — PRAVASTATIN SODIUM 40 MG
40 TABLET ORAL
Status: DISCONTINUED | OUTPATIENT
Start: 2024-06-27 | End: 2024-06-29 | Stop reason: HOSPADM

## 2024-06-27 RX ORDER — CEFAZOLIN SODIUM 2 G/50ML
2000 SOLUTION INTRAVENOUS ONCE
Status: DISCONTINUED | OUTPATIENT
Start: 2024-06-27 | End: 2024-06-27

## 2024-06-27 RX ORDER — FENTANYL CITRATE/PF 50 MCG/ML
25 SYRINGE (ML) INJECTION
Status: DISCONTINUED | OUTPATIENT
Start: 2024-06-27 | End: 2024-06-27 | Stop reason: HOSPADM

## 2024-06-27 RX ORDER — BISACODYL 10 MG
10 SUPPOSITORY, RECTAL RECTAL DAILY PRN
Status: DISCONTINUED | OUTPATIENT
Start: 2024-06-27 | End: 2024-06-29 | Stop reason: HOSPADM

## 2024-06-27 RX ORDER — PROTAMINE SULFATE 10 MG/ML
INJECTION, SOLUTION INTRAVENOUS AS NEEDED
Status: DISCONTINUED | OUTPATIENT
Start: 2024-06-27 | End: 2024-06-27

## 2024-06-27 RX ORDER — PANTOPRAZOLE SODIUM 40 MG/1
40 TABLET, DELAYED RELEASE ORAL
Status: DISCONTINUED | OUTPATIENT
Start: 2024-06-27 | End: 2024-06-29 | Stop reason: HOSPADM

## 2024-06-27 RX ORDER — HEPARIN SODIUM 1000 [USP'U]/ML
400 INJECTION, SOLUTION INTRAVENOUS; SUBCUTANEOUS ONCE
Status: COMPLETED | OUTPATIENT
Start: 2024-06-27 | End: 2024-06-27

## 2024-06-27 RX ORDER — CEFAZOLIN SODIUM 2 G/50ML
2000 SOLUTION INTRAVENOUS EVERY 8 HOURS
Status: COMPLETED | OUTPATIENT
Start: 2024-06-27 | End: 2024-06-28

## 2024-06-27 RX ORDER — CALCIUM CHLORIDE 100 MG/ML
INJECTION INTRAVENOUS; INTRAVENTRICULAR AS NEEDED
Status: DISCONTINUED | OUTPATIENT
Start: 2024-06-27 | End: 2024-06-27

## 2024-06-27 RX ORDER — LEVOTHYROXINE SODIUM 112 UG/1
112 TABLET ORAL
Status: DISCONTINUED | OUTPATIENT
Start: 2024-06-27 | End: 2024-06-29 | Stop reason: HOSPADM

## 2024-06-27 RX ORDER — PHENYLEPHRINE HCL IN 0.9% NACL 1 MG/10 ML
SYRINGE (ML) INTRAVENOUS AS NEEDED
Status: DISCONTINUED | OUTPATIENT
Start: 2024-06-27 | End: 2024-06-27

## 2024-06-27 RX ORDER — TORSEMIDE 20 MG/1
20 TABLET ORAL DAILY
Status: DISCONTINUED | OUTPATIENT
Start: 2024-06-27 | End: 2024-06-28

## 2024-06-27 RX ORDER — CHLORHEXIDINE GLUCONATE ORAL RINSE 1.2 MG/ML
15 SOLUTION DENTAL 2 TIMES DAILY
Status: DISCONTINUED | OUTPATIENT
Start: 2024-06-27 | End: 2024-06-29 | Stop reason: HOSPADM

## 2024-06-27 RX ORDER — CHLORHEXIDINE GLUCONATE ORAL RINSE 1.2 MG/ML
15 SOLUTION DENTAL ONCE
Status: COMPLETED | OUTPATIENT
Start: 2024-06-27 | End: 2024-06-27

## 2024-06-27 RX ORDER — LIDOCAINE HYDROCHLORIDE 10 MG/ML
INJECTION, SOLUTION EPIDURAL; INFILTRATION; INTRACAUDAL; PERINEURAL
Status: COMPLETED | OUTPATIENT
Start: 2024-06-27 | End: 2024-06-27

## 2024-06-27 RX ORDER — HEPARIN SODIUM 5000 [USP'U]/ML
5000 INJECTION, SOLUTION INTRAVENOUS; SUBCUTANEOUS EVERY 8 HOURS SCHEDULED
Status: DISCONTINUED | OUTPATIENT
Start: 2024-06-28 | End: 2024-06-29

## 2024-06-27 RX ORDER — WARFARIN SODIUM 5 MG/1
5 TABLET ORAL
Status: DISCONTINUED | OUTPATIENT
Start: 2024-06-27 | End: 2024-06-28

## 2024-06-27 RX ORDER — MIDAZOLAM HYDROCHLORIDE 2 MG/2ML
INJECTION, SOLUTION INTRAMUSCULAR; INTRAVENOUS AS NEEDED
Status: DISCONTINUED | OUTPATIENT
Start: 2024-06-27 | End: 2024-06-27

## 2024-06-27 RX ORDER — ACETAMINOPHEN 325 MG/1
650 TABLET ORAL EVERY 4 HOURS PRN
Status: DISCONTINUED | OUTPATIENT
Start: 2024-06-27 | End: 2024-06-29 | Stop reason: HOSPADM

## 2024-06-27 RX ORDER — LOSARTAN POTASSIUM 50 MG/1
50 TABLET ORAL DAILY
Status: DISCONTINUED | OUTPATIENT
Start: 2024-06-27 | End: 2024-06-28

## 2024-06-27 RX ORDER — CEFAZOLIN SODIUM 1 G/3ML
INJECTION, POWDER, FOR SOLUTION INTRAMUSCULAR; INTRAVENOUS AS NEEDED
Status: DISCONTINUED | OUTPATIENT
Start: 2024-06-27 | End: 2024-06-27

## 2024-06-27 RX ORDER — PROPOFOL 10 MG/ML
INJECTION, EMULSION INTRAVENOUS AS NEEDED
Status: DISCONTINUED | OUTPATIENT
Start: 2024-06-27 | End: 2024-06-27

## 2024-06-27 RX ORDER — ROCURONIUM BROMIDE 10 MG/ML
INJECTION, SOLUTION INTRAVENOUS AS NEEDED
Status: DISCONTINUED | OUTPATIENT
Start: 2024-06-27 | End: 2024-06-27

## 2024-06-27 RX ADMIN — PANTOPRAZOLE SODIUM 40 MG: 40 TABLET, DELAYED RELEASE ORAL at 17:58

## 2024-06-27 RX ADMIN — WARFARIN SODIUM 5 MG: 5 TABLET ORAL at 17:58

## 2024-06-27 RX ADMIN — ONDANSETRON 4 MG: 2 INJECTION INTRAMUSCULAR; INTRAVENOUS at 11:45

## 2024-06-27 RX ADMIN — GLYCERIN 1 DROP: .002; .002; .01 SOLUTION/ DROPS OPHTHALMIC at 21:10

## 2024-06-27 RX ADMIN — SUGAMMADEX 120 MG: 100 INJECTION, SOLUTION INTRAVENOUS at 12:55

## 2024-06-27 RX ADMIN — LIDOCAINE HYDROCHLORIDE 0.5 ML: 10 INJECTION, SOLUTION EPIDURAL; INFILTRATION; INTRACAUDAL; PERINEURAL at 11:42

## 2024-06-27 RX ADMIN — TORSEMIDE 20 MG: 20 TABLET ORAL at 16:54

## 2024-06-27 RX ADMIN — FENTANYL CITRATE 50 MCG: 50 INJECTION INTRAMUSCULAR; INTRAVENOUS at 12:07

## 2024-06-27 RX ADMIN — Medication 100 MCG: at 12:17

## 2024-06-27 RX ADMIN — NICARDIPINE HYDROCHLORIDE 300 MCG: 2.5 INJECTION, SOLUTION INTRAVENOUS at 12:41

## 2024-06-27 RX ADMIN — ROCURONIUM 30 MG: 50 INJECTION, SOLUTION INTRAVENOUS at 11:45

## 2024-06-27 RX ADMIN — SODIUM CHLORIDE, SODIUM LACTATE, POTASSIUM CHLORIDE, AND CALCIUM CHLORIDE: .6; .31; .03; .02 INJECTION, SOLUTION INTRAVENOUS at 11:32

## 2024-06-27 RX ADMIN — LIDOCAINE HYDROCHLORIDE 50 MG: 10 INJECTION, SOLUTION EPIDURAL; INFILTRATION; INTRACAUDAL; PERINEURAL at 11:45

## 2024-06-27 RX ADMIN — HEPARIN SODIUM 8000 UNITS: 1000 INJECTION INTRAVENOUS; SUBCUTANEOUS at 12:30

## 2024-06-27 RX ADMIN — ASPIRIN 81 MG CHEWABLE TABLET 81 MG: 81 TABLET CHEWABLE at 16:54

## 2024-06-27 RX ADMIN — NICARDIPINE HYDROCHLORIDE 200 MCG: 2.5 INJECTION, SOLUTION INTRAVENOUS at 12:48

## 2024-06-27 RX ADMIN — PROPOFOL 30 MG: 10 INJECTION, EMULSION INTRAVENOUS at 12:35

## 2024-06-27 RX ADMIN — NICARDIPINE HYDROCHLORIDE 7.5 MG/HR: 2.5 INJECTION, SOLUTION INTRAVENOUS at 15:39

## 2024-06-27 RX ADMIN — NICARDIPINE HYDROCHLORIDE 200 MCG: 2.5 INJECTION, SOLUTION INTRAVENOUS at 12:53

## 2024-06-27 RX ADMIN — NICARDIPINE HYDROCHLORIDE 100 MCG: 2.5 INJECTION, SOLUTION INTRAVENOUS at 13:01

## 2024-06-27 RX ADMIN — CALCIUM CHLORIDE 0.5 G: 100 INJECTION INTRAVENOUS; INTRAVENTRICULAR at 12:49

## 2024-06-27 RX ADMIN — PROTAMINE SULFATE 20 MG: 10 INJECTION, SOLUTION INTRAVENOUS at 12:48

## 2024-06-27 RX ADMIN — PILOCARPINE HYDROCHLORIDE 5 MG: 5 TABLET, FILM COATED ORAL at 21:10

## 2024-06-27 RX ADMIN — CEFAZOLIN SODIUM 2000 MG: 2 SOLUTION INTRAVENOUS at 21:07

## 2024-06-27 RX ADMIN — Medication 100 MCG: at 12:47

## 2024-06-27 RX ADMIN — PROTAMINE SULFATE 20 MG: 10 INJECTION, SOLUTION INTRAVENOUS at 12:45

## 2024-06-27 RX ADMIN — PRAVASTATIN SODIUM 40 MG: 40 TABLET ORAL at 17:58

## 2024-06-27 RX ADMIN — HYDRALAZINE HYDROCHLORIDE 10 MG: 20 INJECTION, SOLUTION INTRAMUSCULAR; INTRAVENOUS at 15:50

## 2024-06-27 RX ADMIN — MUPIROCIN 1 APPLICATION: 20 OINTMENT TOPICAL at 09:56

## 2024-06-27 RX ADMIN — MIDAZOLAM 1 MG: 1 INJECTION INTRAMUSCULAR; INTRAVENOUS at 11:38

## 2024-06-27 RX ADMIN — SODIUM CHLORIDE, SODIUM LACTATE, POTASSIUM CHLORIDE, AND CALCIUM CHLORIDE: .6; .31; .03; .02 INJECTION, SOLUTION INTRAVENOUS at 13:10

## 2024-06-27 RX ADMIN — MIDAZOLAM 1 MG: 1 INJECTION INTRAMUSCULAR; INTRAVENOUS at 11:34

## 2024-06-27 RX ADMIN — LOSARTAN POTASSIUM 50 MG: 50 TABLET, FILM COATED ORAL at 16:54

## 2024-06-27 RX ADMIN — PILOCARPINE HYDROCHLORIDE 5 MG: 5 TABLET, FILM COATED ORAL at 17:58

## 2024-06-27 RX ADMIN — CHLORHEXIDINE GLUCONATE 0.12% ORAL RINSE 15 ML: 1.2 LIQUID ORAL at 09:56

## 2024-06-27 RX ADMIN — PROPOFOL 70 MG: 10 INJECTION, EMULSION INTRAVENOUS at 11:45

## 2024-06-27 RX ADMIN — MUPIROCIN 1 APPLICATION: 20 OINTMENT TOPICAL at 21:10

## 2024-06-27 RX ADMIN — FENTANYL CITRATE 50 MCG: 50 INJECTION INTRAMUSCULAR; INTRAVENOUS at 11:45

## 2024-06-27 RX ADMIN — PROPOFOL 70 MG: 10 INJECTION, EMULSION INTRAVENOUS at 12:40

## 2024-06-27 RX ADMIN — CHLORHEXIDINE GLUCONATE 0.12% ORAL RINSE 15 ML: 1.2 LIQUID ORAL at 21:10

## 2024-06-27 RX ADMIN — CEFAZOLIN 2000 MG: 1 INJECTION, POWDER, FOR SOLUTION INTRAMUSCULAR; INTRAVENOUS at 11:57

## 2024-06-27 RX ADMIN — HYDRALAZINE HYDROCHLORIDE 10 MG: 20 INJECTION, SOLUTION INTRAMUSCULAR; INTRAVENOUS at 21:05

## 2024-06-27 RX ADMIN — POTASSIUM CHLORIDE 20 MEQ: 1500 TABLET, EXTENDED RELEASE ORAL at 17:59

## 2024-06-27 RX ADMIN — NICARDIPINE HYDROCHLORIDE 200 MCG: 2.5 INJECTION, SOLUTION INTRAVENOUS at 12:56

## 2024-06-27 RX ADMIN — Medication 12.5 MG: at 09:56

## 2024-06-27 NOTE — ANESTHESIA PROCEDURE NOTES
Arterial Line Insertion    Performed by: Piyush Wright MD  Authorized by: Piyush Wright MD  Consent: Verbal consent obtained. Written consent obtained.  Risks and benefits: risks, benefits and alternatives were discussed  Consent given by: patient  Patient understanding: patient states understanding of the procedure being performed  Patient consent: the patient's understanding of the procedure matches consent given  Procedure consent: procedure consent matches procedure scheduled  Relevant documents: relevant documents present and verified  Required items: required blood products, implants, devices, and special equipment available  Patient identity confirmed: arm band  Indications: multiple ABGs and hemodynamic monitoring  Orientation:  Right  Location: radial arterylidocaine (PF) (XYLOCAINE-MPF) 1 % - Infiltration   0.5 mL - 6/27/2024 11:42:00 AM  Sedation:  Patient sedated: yes  Sedatives: see MAR for details  Analgesia: see MAR for details  Vitals: Vital signs were monitored during sedation.    Procedure Details:  Needle gauge: 20  Seldinger technique: Seldinger technique used  Number of attempts: 1    Post-procedure:  Post-procedure: dressing applied  Waveform: good waveform and waveform confirmed  Post-procedure CNS: normal and unchanged  Patient tolerance: patient tolerated the procedure well with no immediate complications  Comments: Pre-induction

## 2024-06-27 NOTE — INTERVAL H&P NOTE
Vitals:    06/27/24 0932   BP: 165/87   Pulse: 78   Temp: 98.6 °F (37 °C)   SpO2: 98%         H&P reviewed. After examining the patient I find no changes in the patients condition since the H&P was completed.    Plan for TF TAVR.    Preoperative Beta Blocker: relative contraindication.    Anticipated Length of Stay: Patient will be admitted on an inpatient basis with an anticipated length of stay of grater than 2 midnights.  Justification for Hospital StaY: Post surgical recovery following open heart surgery.      JESSICA Pal MD  06/27/24  11:04 AM

## 2024-06-27 NOTE — ANESTHESIA PROCEDURE NOTES
Central Line Insertion    Performed by: Piyush Wright MD  Authorized by: Piyush Wright MD    Date/Time: 6/27/2024 11:56 AM  Catheter Type:  triple lumen  Consent: Verbal consent not obtained. Written consent obtained.  Risks and benefits: risks, benefits and alternatives were discussed  Consent given by: patient  Patient understanding: patient states understanding of the procedure being performed  Patient consent: the patient's understanding of the procedure matches consent given  Procedure consent: procedure consent matches procedure scheduled  Relevant documents: relevant documents present and verified  Required items: required blood products, implants, devices, and special equipment available  Patient identity confirmed: arm band  Indications: vascular access  Catheter size: 7 Fr  Patient position: Trendelenburg  Anesthesia method: ga.  Assessment: blood return through all ports and free fluid flow  Preparation: skin prepped with ChloraPrep  Skin prep agent dried: skin prep agent completely dried prior to procedure  Sterile barriers: all five maximum sterile barriers used - cap, mask, sterile gown, sterile gloves, and large sterile sheet  Hand hygiene: hand hygiene performed prior to central venous catheter insertion  sterile gel and probe cover used in ultrasound-guided central venous catheter insertionVessel of Catheter Tip End: central venous  Number of attempts: 1  Successful placement: yes  Post-procedure: chlorhexidine patch applied, line sutured and dressing applied  Patient tolerance: patient tolerated the procedure well with no immediate complications

## 2024-06-27 NOTE — ANESTHESIA PROCEDURE NOTES
Procedure Performed: LEDY Anesthesia  Start Time:  6/27/2024 12:01 PM        Preanesthesia Checklist    Patient identified, IV assessed, risks and benefits discussed, monitors and equipment assessed, procedure being performed at surgeon's request and anesthesia consent obtained.      Procedure    Diagnostic Indications for LEDY:  assessment of surgical repair, defect repair evaluation, hemodynamic monitoring and suspected pericardial effusion. Type of LEDY: interventional LEDY with real time guidance of intracardiac procedure. Images Saved: ultrasound permanent image saved. Physician Requesting Echo: JESSICA Pal MD.  Location performed: OR. Intubated. Bite block not placed.   Heart visualized. Insertion of LEDY Probe:  Atraumatic. Probe Type:  Multiplane. Modalities:  3D, color flow mapping, continuous wave Doppler and pulse wave Doppler.      Echocardiographic and Doppler Measurements    PREPROCEDURE    LEFT VENTRICLE:  Systolic Function: normal. Ejection Fraction: 60%.        RIGHT VENTRICLE:  Systolic Function: normal.  Cavity size normal.              AORTIC VALVE:  Leaflets: trileaflet. Leaflet motions restricted. Stenosis: severe. Mean Gradient: 36 mmHg. Peak Gradient: 50 mmHg. Maximum velocity (cm/s): 3.5 m/sec.  Regurgitation: mild-mod and mild. Pressure Half-time: 425 ms.     MITRAL VALVE:  Leaflets: calcified.  Regurgitation: severe.   Stenosis: appears at least mild, gradient (2 mmHg) not useful to quantify given degree of MR and AI.       TRICUSPID VALVE:  Leaflets: normal.   Regurgitation: mild.              ASCENDING AORTA:    Dissection not present.      AORTIC ARCH:    dissection not present. Grade 3: atheroma protruding < 0.5 cm into lumen.    DESCENDING AORTA:    Dissection not present. Grade 3: atheroma protruding < 0.5 cm into lumen.                    OTHER ATRIAL FINDINGS:  Low flow at TATY os, no clot.    ATRIAL SEPTUM:  Intra-atrial septal morphology: no PFO by CFD.                       POSTPROCEDURE    LEFT VENTRICLE: Unchanged .         RIGHT VENTRICLE: Unchanged .           AORTIC VALVE:   Leaflets: KELL valve in aortic position, well-seated, does not rock, good leaflet excursiona and closure, trace PVL. Stenosis: AV VTI 28 cm<, LVOT VTI 19 cm, MIRIAM 2.07 cm^2. Mean Gradient: 4 mmHg.       MITRAL VALVE: Unchanged .     Mean Gradient: 4.    TRICUSPID VALVE: Unchanged .                    AORTA: Unchanged .  Dissection: Dissection not present.      REMOVAL:  Probe Removal: atraumatic.

## 2024-06-27 NOTE — ANESTHESIA PREPROCEDURE EVALUATION
Procedure:  REPLACEMENT AORTIC VALVE TRANSCATHETER (TAVR) TRANSFEMORAL W/ 23MM  S3 UR VALVE(ACCESS ON LEFT) LEDY (Chest)  Cardiac tavr (Chest)    Relevant Problems   CARDIO   (+) CAD (coronary artery disease)   (+) CHF (congestive heart failure) (HCC)   (+) Hyperlipidemia   (+) Hypertension   (+) Pure hypercholesterolemia   (+) Severe aortic stenosis      ENDO   (+) Hypothyroidism      GI/HEPATIC   (+) GERD (gastroesophageal reflux disease)   (+) Gastroesophageal reflux disease with esophagitis      /RENAL   (+) CKD (chronic kidney disease), stage II      GYN   (+) Supervision of normal first pregnancy      HEMATOLOGY   (+) Hypercoagulable state (HCC)      MUSCULOSKELETAL   (+) Arthritis of right sacroiliac joint   (+) Osteoarthritis      PULMONARY   (+) MIAH (obstructive sleep apnea)             Anesthesia Plan  ASA Score- 4     Anesthesia Type- general with ASA Monitors.         Additional Monitors: central venous line and arterial line.    Airway Plan: ETT.    Comment: General anesthesia, endotracheal intubation, standard ASA monitors; large bore IV access (possible Cordis); pre-induction arterial line; TLC CVL; LEDY (no LEDY contraindications noted); plan for post-op PACU, possiblve ICU/vent.    Risks discussed - nausea, discomfort, sore throat, dental damage; rare anesthetic emergencies; patient understands, agrees to proceed.   .       Plan Factors-    Chart reviewed.   Existing labs reviewed.                   Induction- intravenous.    Postoperative Plan- Plan for postoperative opioid use. Planned trial extubation        Informed Consent- Anesthetic plan and risks discussed with patient.  I personally reviewed this patient with the CRNA. Discussed and agreed on the Anesthesia Plan with the CRNA..

## 2024-06-27 NOTE — ANESTHESIA POSTPROCEDURE EVALUATION
Post-Op Assessment Note    CV Status:  Stable    Pain management: adequate       Mental Status:  Alert and awake   Hydration Status:  Euvolemic   PONV Controlled:  Controlled   Airway Patency:  Patent     Post Op Vitals Reviewed: Yes    No anethesia notable event occurred.    Staff: CRNA               BP   135/42   Temp (!) 97.3 °F (36.3 °C) (06/27/24 1315)    Pulse 70 (06/27/24 1315)   Resp   16   SpO2   96

## 2024-06-27 NOTE — CONSULTS
Consultation - Geriatrics   Anneliese Garcia 88 y.o. female MRN: [id number]  Unit/Bed#: [facility] 424-01 Encounter: [id number]      Assessment/Plan    Symptomatic Severe Aortic Stenosis   Patient was evaluated by CT surgery on 5/17/2024 for severe asymptomatic aortic stenosis   Patient was noted to have significant systolic ejection murmur on physical exam recently  She was advised to see cardiology for evaluation  She was subsequently referred to outpatient cardiologist and an echo completed on 5/2 revealed ventricular systolic dysfunction was well-preserved with new critical aortic stenosis  She noted significant exertional dyspnea, fatigability, and orthopnea during her CT surgery evaluation  She also noted occasional intermittent angina  He is now POD 0 from a TAVR procedure  No intraoperative complications noted  Management per CT surgery    Cognitive Screening  Patient has no documented history of memory loss or cognitive impairment  She notes no issues or difficulties with her memory at baseline  Most recent TSH on 1/24/2024 noted to be 1.408  Most recent vitamin B12 level on 1/22/2015 noted to be 2745  Consider checking on routine labs  CT of the head on 5/9/2023 revealed small vessel ischemic changes  No [place] cognitive testing noted in epic  Patient is alert and orient x 4 on exam today  Maintain neuro protective lifestyle   Encourage early mobilization   Increase physical activity as able   Maintain good control of BP, cholesterol, and blood sugar  Recommend mediterranean/MIND diet   Maintain delirium precautions as discussed below   Keep physically, mentally, and socially active     Delirium Precautions   Baseline mentation: alert and oriented x 4   Patient is alert and oriented x 4 on exam today   Patient has no documented history of memory loss or cognitive impairment   Provide redirection, reorientation, and distraction techniques  Maintain fall and safety precautions   Avoid deliriogenic medications  such as tramadol, benzodiazepines, anticholinergics, and benadryl  Encourage oral hydration and nutrition   Implement sleep hygiene and limit nighttime interruptions     Frailty  Clinical Frail Scale: 4- Vulnerable  Not dependent for daily help, symptoms limit activity  Feeling slowed up, tired during the day  Last albumin on 6/18/2024 noted to be 4.3  Encourage protein supplementation     Fall Screening   Patient notes no falls in the past 3 months   Ambulates without assistive devices at baseline  She is at increased risk for falls secondary to age and medications   Keep physically active   PT/OT consult   Recommend fall prevention/ balance training such as Matter of Balance or Robe Chi or yoga  Fall prevention handout given from cdc.gov/steadi    Insomnia   Noted to have no difficulty sleeping at home  She does not take any medication to help with sleep at baseline   Implement sleep hygiene and limit nighttime interruptions  Can consider melatonin if patient experiences difficulty sleeping      Elimination   Patient is continent of bowel and bladder at baseline   Patient has chow catheter intact  Maintain good chow catheter care   No bowel movement documented since admission   She is at increased risk for constipation secondary to anesthesia, narcotics, and decreased mobility   She does not have difficulty with constipation at home and does not take any medication for constipation at baseline   Patient is not currently on a bowel regimen   Continue to monitor for urinary retention and constipation     Hearing Impairment   Patient admits to hearing impairment   She does wear hearing aids at baseline   Hearing impairment strongly correlated with depression, cognitive impairment, delirium and falls in the older adult  Use hearing aids or sound amplifier as needed  Speak face to face  Use clear dictation and enunciation of words     Vision Impairment   Patient denies vision impairment   Recommend use of corrective  lenses at all appropriate times  Encourage adequate lighting and encourage use of assistance with ambulation  Keep personal belongings close to avoid reaching  Encourage appropriate footwear at all times  Recommend large font for printed materials provided to patient     Home Safety  Patient resides at home with her    She is independent with ADLs and IADLs  She does still drive     Advanced Care Planning   Level 1 Full Code      Home Medication Review   [facility] ([phone])  Hydroxychloroquine 200 mg daily   Losartan 50 mg daily   Omeprazole 20 mg daily   Metoprolol 25 mg BID   Simvastatin 20 mg daily   Levothyroxine 112 mcg daily     I have personally reviewed this medication list with the patients pharmacy listed above.     History of Present Illness   Physician Requesting Consult: JESSICA Pal MD  Reason for Consult / Principal Problem: S/p TAVR   Hx and PE limited by: N/A    HPI: Anneliese Garcia is a 88 y.o. year old female who has CKD, CAD, CHF, GERD, hypertension, hypothyroidism, history of pulmonary embolism, and restless leg syndrome and presented to the hospital for an elective TAVR procedure.  She was initially evaluated by CT surgery on 5/17/2024 for severe asymptomatic aortic stenosis.  She was noted to have significant systolic ejection murmur.  She was advised to see cardiology for evaluation.  She was subsequently referred to outpatient cardiologist and an echo completed on 5/2 revealed ventricular systolic dysfunction was well-preserved with new critical aortic stenosis.  She noted significant exertional dyspnea, fatigability, and orthopnea during her CT evaluation.  She also noted occasional intermittent angina.  It was recommended that she undergo a TAVR procedure.  She is POD 0 from this procedure.  No intraoperative complications were noted.  Geriatrics has been consulted status post TAVR procedure.    The patient states that she resides at home with her  .  She states that she does the cooking and they both manage with cleaning.  She states that she manages the finances and her medication.  She states that she does still drive.  She notes no issues or difficulties with her memory at baseline.  She notes no recent falls.  She does not ambulate with any assistive devices at baseline.  She denies any vision impairment.  She states she does have hearing impairment and wears hearing aids.  She notes she does wear dentures.  She states she is fairly good appetite baseline.  She is continent of bowel and bladder at baseline.  She notes no issues with voiding or constipation.  She notes no difficulties or issues with sleep.  She notes that she does enjoy walking around the house.    Patient was seen evaluated today at bedside returns for consult.  She was noted to be lying flat in bed comfortably in no acute distress.  She is alert and orient x 4 on exam today.  She is currently denying pain.  She denies chest pain and shortness of breath.  She denies nausea and vomiting.  She offers no acute complaints today.  Education provided on fall prevention, balance exercises, and the mind diet.        Inpatient consult to Gerontology  Consult performed by: CATHERINE Rivers  Consult ordered by: Stacey Welch PA-C        Review of Systems   Constitutional:  Positive for activity change and fatigue. Negative for appetite change.   HENT:  Positive for dental problem (dentures) and hearing loss (hearing aids).    Eyes:  Negative for visual disturbance.   Respiratory:  Negative for cough and shortness of breath.         Currently on supplemental oxygen   Cardiovascular:  Negative for chest pain and leg swelling.   Gastrointestinal:  Negative for abdominal distention, abdominal pain, constipation, diarrhea, nausea and vomiting.   Genitourinary:         Gutierres catheter intact   Musculoskeletal:  Positive for gait problem. Negative for arthralgias.   Skin:  Negative for color  change and pallor.   Neurological:  Positive for weakness. Negative for speech difficulty.   Psychiatric/Behavioral:  Negative for agitation, confusion and sleep disturbance. The patient is not nervous/anxious.        Historical Information   Past Medical History:   Diagnosis Date    CAD (coronary artery disease)     CHF (congestive heart failure) (HCC)     Chronic pain     s/p spinal stimulator    CKD (chronic kidney disease), stage II     baseline Cr 0.8-1.0    GERD (gastroesophageal reflux disease)     History of DVT (deep vein thrombosis)     multiple, Coumadin    History of pulmonary embolism     multiple, Coumadin    History of squamous cell carcinoma excision     Hyperlipidemia     Hypertension     Hypothyroidism     IBS (irritable bowel syndrome)     diarrheal type    MIAH (obstructive sleep apnea)     no CPAP or BiPAP    Osteoarthritis     Osteoporosis     RLS (restless legs syndrome)     Severe aortic stenosis     Sjogren's syndrome (HCC)      Past Surgical History:   Procedure Laterality Date    CARDIAC CATHETERIZATION N/A 06/06/2024    Procedure: Cardiac catheterization;  Surgeon: Taj Stafford DO;  Location:  CARDIAC CATH LAB;  Service: Cardiology    COLONOSCOPY  07/18/2016     grade 2 internal hemorrhoids but otherwise normal, pathology negative for microscopic colitis    REPAIR RECTOCELE      SPINAL STIMULATOR PLACEMENT      SPINE SURGERY      fusion L1-L5    SQUAMOUS CELL CARCINOMA EXCISION      TOE SURGERY      TONSILLECTOMY AND ADENOIDECTOMY      TOTAL ABDOMINAL HYSTERECTOMY      TOTAL KNEE ARTHROPLASTY Bilateral     TOTAL SHOULDER REPLACEMENT Right     UPPER GASTROINTESTINAL ENDOSCOPY  01/09/2015     reflux esophagitis, gastritis, duodenitis in the bulb, pathology negative for H pylori, Puckett's, EOE     Social History   Social History     Substance and Sexual Activity   Alcohol Use No     Social History     Substance and Sexual Activity   Drug Use No     Social History     Tobacco Use    Smoking Status Never   Smokeless Tobacco Never   Tobacco Comments    Never smoked     Family History:   Family History   Problem Relation Age of Onset    No Known Problems Mother     Stroke Father     Ovarian cancer Sister 79    Ovarian cancer Daughter 44    No Known Problems Daughter     No Known Problems Daughter     No Known Problems Maternal Grandmother     No Known Problems Maternal Grandfather     No Known Problems Paternal Grandmother     No Known Problems Paternal Grandfather     No Known Problems Son     No Known Problems Son     No Known Problems Maternal Aunt     No Known Problems Maternal Aunt     No Known Problems Paternal Aunt     Colon cancer Neg Hx     Colon polyps Neg Hx        Meds/Allergies   Current meds:   Current Facility-Administered Medications   Medication Dose Route Frequency    acetaminophen (TYLENOL) rectal suppository 650 mg  650 mg Rectal Q4H PRN    acetaminophen (TYLENOL) tablet 650 mg  650 mg Oral Q4H PRN    Artificial Tears ophthalmic solution 1 drop  1 drop Both Eyes BID    aspirin chewable tablet 81 mg  81 mg Oral Daily    bisacodyl (DULCOLAX) rectal suppository 10 mg  10 mg Rectal Daily PRN    calcium gluconate 2 g in sodium chloride 0.9% 100 mL (premix)  2 g Intravenous Once PRN    ceFAZolin (ANCEF) IVPB (premix in dextrose) 2,000 mg 50 mL  2,000 mg Intravenous Q8H    chlorhexidine (PERIDEX) 0.12 % oral rinse 15 mL  15 mL Mouth/Throat BID    Diclofenac Sodium (VOLTAREN) 1 % topical gel 2 g  2 g Topical 4x Daily PRN    diphenoxylate-atropine (LOMOTIL) 2.5-0.025 mg per tablet 1 tablet  1 tablet Oral 4x Daily PRN    [START ON 6/28/2024] heparin (porcine) subcutaneous injection 5,000 Units  5,000 Units Subcutaneous Q8H [facility]    hydrALAZINE (APRESOLINE) injection 10 mg  10 mg Intravenous Q6H PRN    insulin lispro (HumALOG/ADMELOG) 100 units/mL subcutaneous injection 1-5 Units  1-5 Units Subcutaneous TID AC    insulin lispro (HumALOG/ADMELOG) 100 units/mL subcutaneous injection 1-5  "Units  1-5 Units Subcutaneous HS    levothyroxine tablet 112 mcg  112 mcg Oral Early Morning    losartan (COZAAR) tablet 50 mg  50 mg Oral Daily    mupirocin (BACTROBAN) 2 % nasal ointment 1 Application  1 Application Nasal Q12H MANUEL    niCARdipine (CARDENE) 25 mg (STANDARD CONCENTRATION) in sodium chloride 0.9% 250 mL  1-15 mg/hr Intravenous Titrated    ondansetron (ZOFRAN) injection 4 mg  4 mg Intravenous Q6H PRN    pantoprazole (PROTONIX) EC tablet 40 mg  40 mg Oral Daily Before Breakfast    pilocarpine (SALAGEN) tablet 5 mg  5 mg Oral 4x Daily    potassium chloride (Klor-Con M20) CR tablet 20 mEq  20 mEq Oral Daily    pravastatin (PRAVACHOL) tablet 40 mg  40 mg Oral Daily With Dinner    torsemide (DEMADEX) tablet 20 mg  20 mg Oral Daily    warfarin (COUMADIN) tablet 5 mg  5 mg Oral Daily (warfarin)        Allergies   Allergen Reactions    Sulfa Antibiotics Hives       Objective   Vitals: Blood pressure 130/60, pulse 69, temperature (!) 97.2 °F (36.2 °C), resp. rate 15, height 5' 2\" (1.575 m), weight 55 kg (121 lb 4.1 oz), SpO2 92%, not currently breastfeeding.,Body mass index is 22.18 kg/m².      Physical Exam  Vitals and nursing note reviewed.   Constitutional:       General: She is not in acute distress.     Appearance: She is not ill-appearing.   HENT:      Head: Normocephalic.      Mouth/Throat:      Mouth: Mucous membranes are dry.   Eyes:      General: No scleral icterus.     Conjunctiva/sclera: Conjunctivae normal.   Cardiovascular:      Rate and Rhythm: Normal rate and regular rhythm.   Pulmonary:      Effort: Pulmonary effort is normal. No respiratory distress.   Abdominal:      General: Bowel sounds are normal. There is no distension.      Palpations: Abdomen is soft.      Tenderness: There is no abdominal tenderness.   Genitourinary:     Comments: Gutierres cathter intact  Musculoskeletal:         General: No swelling or tenderness.   Skin:     General: Skin is warm and dry.      Coloration: Skin is pale. " "  Neurological:      General: No focal deficit present.      Mental Status: She is alert and oriented to person, place, and time. Mental status is at baseline.         Lab Results:   Results from last 7 days   Lab Units 06/27/24  1337   HEMOGLOBIN g/dL 12.7   HEMATOCRIT % 37.0   PLATELETS Thousands/uL 155        Results from last 7 days   Lab Units 06/27/24  1337   POTASSIUM mmol/L 3.7   CHLORIDE mmol/L 107   CO2 mmol/L 25   BUN mg/dL 16   CREATININE mg/dL 0.73   CALCIUM mg/dL 10.0       Imaging Studies: I have personally reviewed pertinent reports.    EKG, Pathology, and Other Studies: I have personally reviewed pertinent reports.    VTE Prophylaxis: Warfarin (Coumadin)    Code Status: Level 1 - Full Code      Please note:  Voice-recognition software may have been used in the preparation of this document.  Occasional wrong word or \"sound-alike\" substitutions may have occurred due to the inherent limitations of voice recognition software.  Interpretation should be guided by context.    "

## 2024-06-27 NOTE — QUICK NOTE
Post TAVR Treatment Plan - Electrophysiology  Anneliese Garcia 88 y.o. female MRN: [id number]  Unit/Bed#: [facility] 424-01 Encounter: [id number]    Electrophysiology was consulted concerning EKG changes after undergoing TAVR today (new LBBB). Patient's pre, immediately post, and three hour EKGs were reviewed.    Plan:  At this time, patient's QRS remains wide, with no resolution in the left bundle and EP team will not be present to review 3-hour post EKG. Therefore, would recommend to keep the patient NPO after midnight to have formal EP evaluation tomorrow. In terms of beta blocker therapy, would hold metoprolol tartrate.    Studies Reviewed:  EKG prior to TAVR:       EKG immediately post TAVR:       EKG three hours post TAVR: to be done after hours

## 2024-06-27 NOTE — RESPIRATORY THERAPY NOTE
RT Protocol Note  Anneliese Garcia 88 y.o. female MRN: [id number]  Unit/Bed#: [facility] 424-01 Encounter: [id number]    Assessment    Principal Problem:    Severe aortic stenosis  Active Problems:    History of pulmonary embolism    Hypertension    Hypothyroidism    CKD (chronic kidney disease), stage II    MIAH (obstructive sleep apnea)    CAD (coronary artery disease)    CHF (congestive heart failure) ([facility])    GERD (gastroesophageal reflux disease)    S/P TAVR (transcatheter aortic valve replacement)      Home Pulmonary Medications:  None listed       Past Medical History:   Diagnosis Date    CAD (coronary artery disease)     CHF (congestive heart failure) ([facility])     Chronic pain     s/p spinal stimulator    CKD (chronic kidney disease), stage II     baseline Cr 0.8-1.0    GERD (gastroesophageal reflux disease)     History of DVT (deep vein thrombosis)     multiple, Coumadin    History of pulmonary embolism     multiple, Coumadin    History of squamous cell carcinoma excision     Hyperlipidemia     Hypertension     Hypothyroidism     IBS (irritable bowel syndrome)     diarrheal type    MIAH (obstructive sleep apnea)     no CPAP or BiPAP    Osteoarthritis     Osteoporosis     RLS (restless legs syndrome)     Severe aortic stenosis     Sjogren's syndrome ([facility])      Social History     Socioeconomic History    Marital status: /Civil Union     Spouse name: None    Number of children: 5    Years of education: None    Highest education level: None   Occupational History    None   Tobacco Use    Smoking status: Never    Smokeless tobacco: Never    Tobacco comments:     Never smoked   Vaping Use    Vaping status: Never Used   Substance and Sexual Activity    Alcohol use: No    Drug use: No    Sexual activity: Not Currently     Partners: Male     Birth control/protection: Post-menopausal, Male Sterilization   Other Topics Concern    None   Social History Narrative    None     Social Determinants of Health     Financial  "Resource Strain: Not on file   Food Insecurity: No Food Insecurity (3/27/2023)    Received from [employer]    Hunger Vital Sign     Worried About Running Out of Food in the Last Year: Never true     Ran Out of Food in the Last Year: Never true   Transportation Needs: No Transportation Needs (3/17/2023)    PRAPARE - Transportation     Lack of Transportation (Medical): No     Lack of Transportation (Non-Medical): No   Physical Activity: Not on file   Stress: Not on file   Social Connections: Unknown (6/18/2024)    Received from [employer]    Social Connections     How often do you feel lonely or isolated from those around you? (Adult - for ages 18 years and over): Not on file   Intimate Partner Violence: Not on file   Housing Stability: Low Risk  (3/17/2023)    Housing Stability Vital Sign     Unable to Pay for Housing in the Last Year: No     Number of Places Lived in the Last Year: 1     Unstable Housing in the Last Year: No       Subjective         Objective    Physical Exam:   Assessment Type: Assess only  General Appearance: Drowsy, Awake  Respiratory Pattern: Normal  Chest Assessment: Chest expansion symmetrical  Bilateral Breath Sounds: Clear    Vitals:  Blood pressure 153/67, pulse 78, temperature (!) 97.2 °F (36.2 °C), resp. rate 15, height 5' 2\" (1.575 m), weight 55 kg (121 lb 4.1 oz), SpO2 92%, not currently breastfeeding.          Imaging and other studies: I have personally reviewed pertinent reports.            Plan    Respiratory Plan: No distress/Pulmonary history, Discontinue Protocol  Airway Clearance Plan: Incentive Spirometer     Resp Comments: pt assessed at this time for respiratory and airway clearance protocols. pt s/p tavr. pt has hx of MIAH per chart and does not take any pulmonary medications.pt instructed on use of IS at this time.  pt is able to properly demonstrate use and is aware to continue use 10x/hr while awake. will dc respiratory protocol at this time.   "

## 2024-06-27 NOTE — OP NOTE
OPERATIVE REPORT  PATIENT NAME: Anneliese Garcia    :  1935  MRN: [id number]  Pt Location: [facility] HYBRID OR ROOM 02    SURGERY DATE: 2024    SURGEON: JESSICA Pal MD     ASSISTANT: Stacey Welch PA-C    CO-SURGEON: Dr. Pascual Koo     PREOPERATIVE DIAGNOSIS Symptomatic severe aortic stenosis.     POSTOPERATIVE DIAGNOSIS Symptomatic severe aortic stenosis.     NYHA CLASS: 4    CCS CLASS: 4    PROCEDURE Transcatheter aortic valve replacement with a 23 mm Dunn KELL 3 Ultra Resilia bioprosthetic valve via a percutaneous left transfemoral approach.     ANESTHESIA Dr. Piyush Wright, general endotracheal anesthesia with transesophageal echocardiogram guidance.     CARDIOPULMONARY BYPASS TIME 0.    PACKS/TUBES/DRAINS None.     ESTIMATED BLOOD LOSS: 20 mL    OPERATIVE TECHNIQUE The patient was taken to the operating room and placed supine on the operating table. Following the satisfactory induction of general anesthesia and placement of monitoring lines, the patient was prepped and draped in the usual sterile fashion. A time-out procedure was performed.     The left common femoral artery was accessed percutaneously using Seldinger technique and fluoroscopy.  Two (2) Perclose sutures were deployed in the standard fashion. The left common femoral vein was accessed in a similar fashion and was cannulated with a 6 Israeli sheath. The femoral artery was cannulated with a 7 Israeli sheath. A pigtail catheter was inserted and advanced through the left common femoral artery sheath into the right coronary cusp. Using a series of injections, the angle of deployment was determined. Through the left common femoral vein sheath, a balloon tip temporary pacing catheter was inserted into the right ventricle and its capture was confirmed.     The patient was systemically heparinized. The left common femoral artery sheath was then removed over an extra-stiff wire and the delivery sheath was inserted through the left common  femoral artery and advanced into the aorta. The aortic valve was crossed with a wire.    A 23 mm KELL 3 Ultra Resilia valve was then advanced through the sheath into the aorta and positioned onto the deployment balloon in the aorta and then advanced around the aortic arch and through the annulus of the aortic valve to the appropriate level. At this point, the catheter was desheathed in the standard fashion. The valve was positioned appropriately using a combination of fluoroscopy and transesophageal echocardiogram guidance. During an episode of rapid pacing, balloon deployment of the 23 mm KELL 3 Ultra Resilia valve was performed. Following deployment, the position of the valve was confirmed by fluoroscopy and echocardiography and its position appeared appropriate with trace perivalvular leak.     The valve delivery system was subsequently removed and the sheath was removed from the left common femoral artery while the Perclose sutures were secured and direct pressure was held. Protamine was administered with normalization of the ACT. Pressure was released from the left groin and there was no active bleeding and no evidence of hematoma development. The common femoral vein sheath was removed from the left and pressure was held.     Sponge, needle, and instrument counts were reported as correct by the nursing staff. As the attending surgeon, I was present and scrubbed for all critical portions of this procedure. Final transesophageal echocardiogram demonstrated a well-positioned bioprosthetic transcatheter aortic valve with normal function and trace perivalvular leak. There was persistent significant mitral regurgitation as well as some mitral stenosis which were unchanged from preop.    The case required the expertise of a cardiac surgeon as well as an interventional cardiologist to act as co-surgeons per CMS requirements.      SIGNATURE: JESSICA Pal MD  DATE: June 27, 2024  TIME: 12:52 PM

## 2024-06-28 ENCOUNTER — APPOINTMENT (INPATIENT)
Dept: NON INVASIVE DIAGNOSTICS | Facility: HOSPITAL | Age: 89
DRG: 267 | End: 2024-06-28
Payer: MEDICARE

## 2024-06-28 ENCOUNTER — APPOINTMENT (INPATIENT)
Dept: RADIOLOGY | Facility: HOSPITAL | Age: 89
DRG: 267 | End: 2024-06-28
Payer: MEDICARE

## 2024-06-28 ENCOUNTER — TELEPHONE (OUTPATIENT)
Dept: CARDIOLOGY CLINIC | Facility: CLINIC | Age: 89
End: 2024-06-28

## 2024-06-28 LAB
ABO GROUP BLD BPU: NORMAL
ABO GROUP BLD BPU: NORMAL
ANION GAP SERPL CALCULATED.3IONS-SCNC: 12 MMOL/L (ref 4–13)
AORTIC ROOT: 3.1 CM
AORTIC VALVE MEAN VELOCITY: 14.4 M/S
APICAL FOUR CHAMBER EJECTION FRACTION: 56 %
ASCENDING AORTA: 2.7 CM
AV AREA BY CONTINUOUS VTI: 2.1 CM2
AV AREA PEAK VELOCITY: 1.7 CM2
AV LVOT MEAN GRADIENT: 3 MMHG
AV LVOT PEAK GRADIENT: 6 MMHG
AV MEAN GRADIENT: 9 MMHG
AV PEAK GRADIENT: 16 MMHG
AV VALVE AREA: 2.15 CM2
AV VELOCITY RATIO: 0.61
BPU ID: NORMAL
BPU ID: NORMAL
BSA FOR ECHO PROCEDURE: 1.55 M2
BUN SERPL-MCNC: 18 MG/DL (ref 5–25)
CALCIUM SERPL-MCNC: 9.7 MG/DL (ref 8.4–10.2)
CHLORIDE SERPL-SCNC: 101 MMOL/L (ref 96–108)
CO2 SERPL-SCNC: 26 MMOL/L (ref 21–32)
CREAT SERPL-MCNC: 0.92 MG/DL (ref 0.6–1.3)
CROSSMATCH: NORMAL
CROSSMATCH: NORMAL
DOP CALC AO PEAK VEL: 2 M/S
DOP CALC AO VTI: 37.92 CM
DOP CALC LVOT AREA: 2.83 CM2
DOP CALC LVOT CARDIAC INDEX: 4.2 L/MIN/M2
DOP CALC LVOT CARDIAC OUTPUT: 6.5 L/MIN
DOP CALC LVOT DIAMETER: 1.9 CM
DOP CALC LVOT PEAK VEL VTI: 28.73 CM
DOP CALC LVOT PEAK VEL: 1.21 M/S
DOP CALC LVOT STROKE INDEX: 52.9 ML/M2
DOP CALC LVOT STROKE VOLUME: 81.42
DOP CALC MV VTI: 31.45 CM
E WAVE DECELERATION TIME: 277 MS
E/A RATIO: 0.88
ERYTHROCYTE [DISTWIDTH] IN BLOOD BY AUTOMATED COUNT: 13.8 % (ref 11.6–15.1)
FRACTIONAL SHORTENING: 32 (ref 28–44)
GFR SERPL CREATININE-BSD FRML MDRD: 56 ML/MIN/1.73SQ M
GLUCOSE SERPL-MCNC: 113 MG/DL (ref 65–140)
GLUCOSE SERPL-MCNC: 129 MG/DL (ref 65–140)
GLUCOSE SERPL-MCNC: 130 MG/DL (ref 65–140)
GLUCOSE SERPL-MCNC: 133 MG/DL (ref 65–140)
GLUCOSE SERPL-MCNC: 133 MG/DL (ref 65–140)
HCT VFR BLD AUTO: 38.3 % (ref 34.8–46.1)
HGB BLD-MCNC: 12.9 G/DL (ref 11.5–15.4)
INR PPP: 1.3 (ref 0.84–1.19)
INTERVENTRICULAR SEPTUM IN DIASTOLE (PARASTERNAL SHORT AXIS VIEW): 1.5 CM
INTERVENTRICULAR SEPTUM: 1.5 CM (ref 0.6–1.1)
LAAS-AP2: 19.5 CM2
LAAS-AP4: 23 CM2
LEFT ATRIUM SIZE: 3.8 CM
LEFT ATRIUM VOLUME (MOD BIPLANE): 59 ML
LEFT ATRIUM VOLUME INDEX (MOD BIPLANE): 38.1 ML/M2
LEFT INTERNAL DIMENSION IN SYSTOLE: 2.6 CM (ref 2.1–4)
LEFT VENTRICULAR INTERNAL DIMENSION IN DIASTOLE: 3.8 CM (ref 3.5–6)
LEFT VENTRICULAR POSTERIOR WALL IN END DIASTOLE: 1.5 CM
LEFT VENTRICULAR STROKE VOLUME: 36 ML
LVSV (TEICH): 36 ML
MAGNESIUM SERPL-MCNC: 1.6 MG/DL (ref 1.9–2.7)
MCH RBC QN AUTO: 31.9 PG (ref 26.8–34.3)
MCHC RBC AUTO-ENTMCNC: 33.7 G/DL (ref 31.4–37.4)
MCV RBC AUTO: 95 FL (ref 82–98)
MITRAL REGURGITATION PEAK VELOCITY: 5.54 M/S
MITRAL VALVE MEAN INFLOW VELOCITY: 4.5 M/S
MITRAL VALVE REGURGITANT PEAK GRADIENT: 123 MMHG
MV A" WAVE DURATION": 467 MS
MV E'TISSUE VEL-LAT: 4 CM/S
MV E'TISSUE VEL-SEP: 4 CM/S
MV MEAN GRADIENT: 2 MMHG
MV PEAK A VEL: 1.04 M/S
MV PEAK E VEL: 91 CM/S
MV PEAK GRADIENT: 6 MMHG
MV STENOSIS PRESSURE HALF TIME: 80 MS
MV VALVE AREA BY CONTINUITY EQUATION: 2.59 CM2
MV VALVE AREA P 1/2 METHOD: 2.75
PLATELET # BLD AUTO: 173 THOUSANDS/UL (ref 149–390)
PMV BLD AUTO: 11.1 FL (ref 8.9–12.7)
POTASSIUM SERPL-SCNC: 3.3 MMOL/L (ref 3.5–5.3)
PROTHROMBIN TIME: 16.1 SECONDS (ref 11.6–14.5)
RBC # BLD AUTO: 4.05 MILLION/UL (ref 3.81–5.12)
RIGHT ATRIUM AREA SYSTOLE A4C: 15.1 CM2
RIGHT VENTRICLE ID DIMENSION: 2.6 CM
SL CV DOP CALC MV VTI RETROGRADE: 174.8 CM
SL CV LEFT ATRIUM LENGTH A2C: 6.6 CM
SL CV LV EF: 65
SL CV MV MEAN GRADIENT RETROGRADE: 86 MMHG
SL CV PED ECHO LEFT VENTRICLE DIASTOLIC VOLUME (MOD BIPLANE) 2D: 61 ML
SL CV PED ECHO LEFT VENTRICLE SYSTOLIC VOLUME (MOD BIPLANE) 2D: 25 ML
SODIUM SERPL-SCNC: 139 MMOL/L (ref 135–147)
TRICUSPID ANNULAR PLANE SYSTOLIC EXCURSION: 2 CM
UNIT DISPENSE STATUS: NORMAL
UNIT DISPENSE STATUS: NORMAL
UNIT PRODUCT CODE: NORMAL
UNIT PRODUCT CODE: NORMAL
UNIT PRODUCT VOLUME: 350 ML
UNIT PRODUCT VOLUME: 350 ML
UNIT RH: NORMAL
UNIT RH: NORMAL
WBC # BLD AUTO: 11.23 THOUSAND/UL (ref 4.31–10.16)

## 2024-06-28 PROCEDURE — 99233 SBSQ HOSP IP/OBS HIGH 50: CPT | Performed by: THORACIC SURGERY (CARDIOTHORACIC VASCULAR SURGERY)

## 2024-06-28 PROCEDURE — 93005 ELECTROCARDIOGRAM TRACING: CPT

## 2024-06-28 PROCEDURE — 99223 1ST HOSP IP/OBS HIGH 75: CPT | Performed by: INTERNAL MEDICINE

## 2024-06-28 PROCEDURE — 97163 PT EVAL HIGH COMPLEX 45 MIN: CPT

## 2024-06-28 PROCEDURE — 94760 N-INVAS EAR/PLS OXIMETRY 1: CPT

## 2024-06-28 PROCEDURE — 85610 PROTHROMBIN TIME: CPT | Performed by: PHYSICIAN ASSISTANT

## 2024-06-28 PROCEDURE — 93306 TTE W/DOPPLER COMPLETE: CPT | Performed by: INTERNAL MEDICINE

## 2024-06-28 PROCEDURE — 80048 BASIC METABOLIC PNL TOTAL CA: CPT | Performed by: PHYSICIAN ASSISTANT

## 2024-06-28 PROCEDURE — 85027 COMPLETE CBC AUTOMATED: CPT | Performed by: PHYSICIAN ASSISTANT

## 2024-06-28 PROCEDURE — 82948 REAGENT STRIP/BLOOD GLUCOSE: CPT

## 2024-06-28 PROCEDURE — 83735 ASSAY OF MAGNESIUM: CPT | Performed by: PHYSICIAN ASSISTANT

## 2024-06-28 PROCEDURE — 71045 X-RAY EXAM CHEST 1 VIEW: CPT

## 2024-06-28 PROCEDURE — 97166 OT EVAL MOD COMPLEX 45 MIN: CPT

## 2024-06-28 PROCEDURE — 93306 TTE W/DOPPLER COMPLETE: CPT

## 2024-06-28 RX ORDER — LOSARTAN POTASSIUM 50 MG/1
50 TABLET ORAL DAILY
Status: DISCONTINUED | OUTPATIENT
Start: 2024-06-29 | End: 2024-06-29 | Stop reason: HOSPADM

## 2024-06-28 RX ORDER — FUROSEMIDE 10 MG/ML
40 INJECTION INTRAMUSCULAR; INTRAVENOUS ONCE
Status: COMPLETED | OUTPATIENT
Start: 2024-06-28 | End: 2024-06-28

## 2024-06-28 RX ORDER — POTASSIUM CHLORIDE 29.8 MG/ML
40 INJECTION INTRAVENOUS ONCE
Status: COMPLETED | OUTPATIENT
Start: 2024-06-28 | End: 2024-06-28

## 2024-06-28 RX ORDER — POTASSIUM CHLORIDE 20 MEQ/1
20 TABLET, EXTENDED RELEASE ORAL ONCE
Status: COMPLETED | OUTPATIENT
Start: 2024-06-28 | End: 2024-06-28

## 2024-06-28 RX ORDER — WARFARIN SODIUM 7.5 MG/1
7.5 TABLET ORAL
Status: COMPLETED | OUTPATIENT
Start: 2024-06-28 | End: 2024-06-28

## 2024-06-28 RX ADMIN — PILOCARPINE HYDROCHLORIDE 5 MG: 5 TABLET, FILM COATED ORAL at 21:28

## 2024-06-28 RX ADMIN — HEPARIN SODIUM 5000 UNITS: 5000 INJECTION INTRAVENOUS; SUBCUTANEOUS at 21:28

## 2024-06-28 RX ADMIN — HEPARIN SODIUM 5000 UNITS: 5000 INJECTION INTRAVENOUS; SUBCUTANEOUS at 14:11

## 2024-06-28 RX ADMIN — GLYCERIN 1 DROP: .002; .002; .01 SOLUTION/ DROPS OPHTHALMIC at 08:35

## 2024-06-28 RX ADMIN — TORSEMIDE 20 MG: 20 TABLET ORAL at 08:35

## 2024-06-28 RX ADMIN — POTASSIUM CHLORIDE 40 MEQ: 29.8 INJECTION, SOLUTION INTRAVENOUS at 09:21

## 2024-06-28 RX ADMIN — MUPIROCIN 1 APPLICATION: 20 OINTMENT TOPICAL at 08:35

## 2024-06-28 RX ADMIN — PRAVASTATIN SODIUM 40 MG: 40 TABLET ORAL at 16:28

## 2024-06-28 RX ADMIN — WARFARIN SODIUM 7.5 MG: 7.5 TABLET ORAL at 11:29

## 2024-06-28 RX ADMIN — CEFAZOLIN SODIUM 2000 MG: 2 SOLUTION INTRAVENOUS at 11:30

## 2024-06-28 RX ADMIN — PILOCARPINE HYDROCHLORIDE 5 MG: 5 TABLET, FILM COATED ORAL at 11:30

## 2024-06-28 RX ADMIN — CHLORHEXIDINE GLUCONATE 0.12% ORAL RINSE 15 ML: 1.2 LIQUID ORAL at 08:35

## 2024-06-28 RX ADMIN — PANTOPRAZOLE SODIUM 40 MG: 40 TABLET, DELAYED RELEASE ORAL at 06:00

## 2024-06-28 RX ADMIN — HYDRALAZINE HYDROCHLORIDE 10 MG: 20 INJECTION, SOLUTION INTRAMUSCULAR; INTRAVENOUS at 03:40

## 2024-06-28 RX ADMIN — CHLORHEXIDINE GLUCONATE 0.12% ORAL RINSE 15 ML: 1.2 LIQUID ORAL at 17:24

## 2024-06-28 RX ADMIN — LEVOTHYROXINE SODIUM 112 MCG: 112 TABLET ORAL at 05:34

## 2024-06-28 RX ADMIN — ASPIRIN 81 MG CHEWABLE TABLET 81 MG: 81 TABLET CHEWABLE at 08:35

## 2024-06-28 RX ADMIN — POTASSIUM CHLORIDE 20 MEQ: 1500 TABLET, EXTENDED RELEASE ORAL at 08:35

## 2024-06-28 RX ADMIN — CEFAZOLIN SODIUM 2000 MG: 2 SOLUTION INTRAVENOUS at 03:41

## 2024-06-28 RX ADMIN — METOPROLOL TARTRATE 25 MG: 25 TABLET, FILM COATED ORAL at 21:28

## 2024-06-28 RX ADMIN — POTASSIUM CHLORIDE 20 MEQ: 1500 TABLET, EXTENDED RELEASE ORAL at 16:28

## 2024-06-28 RX ADMIN — MUPIROCIN 1 APPLICATION: 20 OINTMENT TOPICAL at 21:28

## 2024-06-28 RX ADMIN — LOSARTAN POTASSIUM 50 MG: 50 TABLET, FILM COATED ORAL at 08:35

## 2024-06-28 RX ADMIN — HEPARIN SODIUM 5000 UNITS: 5000 INJECTION INTRAVENOUS; SUBCUTANEOUS at 05:34

## 2024-06-28 RX ADMIN — POTASSIUM CHLORIDE 20 MEQ: 1500 TABLET, EXTENDED RELEASE ORAL at 09:21

## 2024-06-28 RX ADMIN — FUROSEMIDE 40 MG: 10 INJECTION, SOLUTION INTRAMUSCULAR; INTRAVENOUS at 16:28

## 2024-06-28 RX ADMIN — PILOCARPINE HYDROCHLORIDE 5 MG: 5 TABLET, FILM COATED ORAL at 08:35

## 2024-06-28 RX ADMIN — PILOCARPINE HYDROCHLORIDE 5 MG: 5 TABLET, FILM COATED ORAL at 17:24

## 2024-06-28 RX ADMIN — METOPROLOL TARTRATE 25 MG: 25 TABLET, FILM COATED ORAL at 11:30

## 2024-06-28 NOTE — PHYSICAL THERAPY NOTE
Physical Therapy Evaluation     Patient's Name: Anneliese Garcia    Admitting Diagnosis  Nonrheumatic aortic valve stenosis [I35.0]    Problem List  Patient Active Problem List   Diagnosis    Sjogren's syndrome (HCC)    History of pulmonary embolism    Hypertension    Hypothyroidism    CKD (chronic kidney disease), stage II    Hyperlipidemia    Vitamin D deficiency    Osteoporosis    Carpal tunnel syndrome    MIAH (obstructive sleep apnea)    Osteoarthritis    Pure hypercholesterolemia    Restless legs syndrome (RLS)    Spinal stenosis of lumbar region without neurogenic claudication    Supervision of normal first pregnancy    Arthritis of right sacroiliac joint    Gastroesophageal reflux disease with esophagitis    Hypercoagulable state ([facility])    Functional diarrhea    History of DVT (deep vein thrombosis)    QT prolongation    Severe aortic stenosis    CAD (coronary artery disease)    Chronic diastolic congestive heart failure (HCC)    GERD (gastroesophageal reflux disease)    S/P TAVR (transcatheter aortic valve replacement)       Past Medical History  Past Medical History:   Diagnosis Date    CAD (coronary artery disease)     CHF (congestive heart failure) ([facility])     Chronic pain     s/p spinal stimulator    CKD (chronic kidney disease), stage II     baseline Cr 0.8-1.0    GERD (gastroesophageal reflux disease)     History of DVT (deep vein thrombosis)     multiple, Coumadin    History of pulmonary embolism     multiple, Coumadin    History of squamous cell carcinoma excision     Hyperlipidemia     Hypertension     Hypothyroidism     IBS (irritable bowel syndrome)     diarrheal type    MIAH (obstructive sleep apnea)     no CPAP or BiPAP    Osteoarthritis     Osteoporosis     RLS (restless legs syndrome)     Severe aortic stenosis     Sjogren's syndrome (HCC)        Past Surgical History  Past Surgical History:   Procedure Laterality Date    CARDIAC CATHETERIZATION N/A 06/06/2024    Procedure: Cardiac  catheterization;  Surgeon: Taj Stafford DO;  Location: [facility] CARDIAC CATH LAB;  Service: Cardiology    CARDIAC CATHETERIZATION N/A 6/27/2024    Procedure: Cardiac tavr;  Surgeon: Pascual Koo MD;  Location: [facility] MAIN OR;  Service: Cardiology    COLONOSCOPY  07/18/2016     grade 2 internal hemorrhoids but otherwise normal, pathology negative for microscopic colitis    ID REPLACE AORTIC VALVE OPENFEMORAL ARTERY APPROACH N/A 6/27/2024    Procedure: REPLACEMENT AORTIC VALVE TRANSCATHETER (TAVR) TRANSFEMORAL W/ 23MM  S3 UR VALVE(ACCESS ON LEFT) LEDY;  Surgeon: JESSICA Pal MD;  Location: [facility] MAIN OR;  Service: Cardiac Surgery    REPAIR RECTOCELE      SPINAL STIMULATOR PLACEMENT      SPINE SURGERY      fusion L1-L5    SQUAMOUS CELL CARCINOMA EXCISION      TOE SURGERY      TONSILLECTOMY AND ADENOIDECTOMY      TOTAL ABDOMINAL HYSTERECTOMY      TOTAL KNEE ARTHROPLASTY Bilateral     TOTAL SHOULDER REPLACEMENT Right     UPPER GASTROINTESTINAL ENDOSCOPY  01/09/2015     reflux esophagitis, gastritis, duodenitis in the bulb, pathology negative for H pylori, Puckett's, EOE          06/28/24 0956   PT Last Visit   PT Visit Date 06/28/24   Note Type   Note type Evaluation   Pain Assessment   Pain Assessment Tool 0-10   Pain Score No Pain   Restrictions/Precautions   Weight Bearing Precautions Per Order No   Braces or Orthoses Other (Comment)  (Denies owning any braces or orthoses.)   Other Precautions Cardiac/sternal;Bed Alarm;Chair Alarm;Multiple lines;Telemetry;Fall Risk   Home Living   Type of Home House  (3 MCKENNA w/ handrail)   Home Layout Two level;Able to live on main level with bedroom/bathroom;Stairs to enter with rails  (Pt reports a FFSU)   Home Equipment Walker;Cane  (Pt reports she uses the walker when she is outside of the home; states she holds on to furniture and walls within the home occasionally.)   Prior Function   Level of [place] Independent with ADLs;Independent with functional mobility   Lives  With Spouse   Receives Help From Family   Falls in the last 6 months 0   Vocational Retired   General   Additional Pertinent History Cleared for PT eval by nsg.   Family/Caregiver Present No   Cognition   Overall Cognitive Status WFL   Arousal/Participation Alert   Attention Within functional limits   Orientation Level Oriented to person;Oriented to place;Oriented to situation   Memory Within functional limits   Following Commands Follows one step commands without difficulty   Subjective   Subjective Pt alert; seated in chair; agreeable to mobiize with PT and OT.   RUE Assessment   RUE Assessment WFL  (AROM)   LUE Assessment   LUE Assessment WFL  (AROM)   RLE Assessment   RLE Assessment WFL  (AROM)   Strength RLE   RLE Overall Strength   (Fair/Fair +)   LLE Assessment   LLE Assessment WFL  (AROM)   Strength LLE   LLE Overall Strength   (Fair/Fair+)   Bed Mobility   Sit to Supine 5  Supervision   Additional items HOB elevated;Increased time required;Verbal cues   Transfers   Sit to Stand 5  Supervision   Additional items Increased time required;Verbal cues   Stand to Sit 5  Supervision   Additional items Increased time required;Verbal cues   Ambulation/Elevation   Gait pattern Narrow JIMMIE;Forward Flexion;Decreased foot clearance;Shuffling;Inconsistent doc;Short stride;Excessively slow   Gait Assistance 5  Supervision   Additional items Verbal cues   Assistive Device Rolling walker   Distance 50ft + 50ft w/ seated rest break and stair naviagtion in between   Stair Management Assistance 5  Supervision   Additional items Verbal cues   Stair Management Technique One rail L;Foreward;Backward;Step to pattern;Nonreciprocal   Number of Stairs 3  (1 step x3 trials)   Balance   Static Sitting Fair +   Dynamic Sitting Fair   Static Standing Fair   Dynamic Standing Fair -   Ambulatory Fair -   Activity Tolerance   Activity Tolerance Patient tolerated treatment well   Medical Staff Made Aware Co-clemente conducted w/ OT d/t  medical compelxity   Nurse Made Aware Spoke w/ Barije   Assessment   Prognosis Good   Problem List Decreased strength;Decreased endurance;Impaired balance;Decreased mobility;Decreased coordination   Assessment Pt is an 88 year old female admitted on 6/27/2024 with a diagnosis of Symptomatic severe aortic stenosis. Pt underwent Transcatheter aortic valve replacement procedure on 6/27/2024. Pt's comorbidities affecting POC include: history of CAD (coronary artery disease), CHF (congestive heart failure), Chronic pain, CKD stage II, History of DVT (deep vein thrombosis), History of pulmonary embolism, Hypertension, IBS (irritable bowel syndrome), MIAH (obstructive sleep apnea), Osteoarthritis, Osteoporosis, RLS (restless legs syndrome), and Sjogren's syndrome. Her personal factors consist of: advanced age and MCKENNA. Pt's clinical presentation is currently unstable/unpredictable which is evident in ongoing telem monitoring, and pending medical work-up w/ pending EP consult. Pt presents w/ overall postop weakness, incl min decreased LE strength, decreased functional endurance and activity tolerance, impaired balance, and gait deviations. Will continue to follow pt in PT for progressive mobilization to address above functional deficits and to maximize her level of independence, endurance, and safety. D/C recommendations are outlined below. Will continue to follow until medically cleared for D/C.   Goals   Patient Goals To feel better   [facility] Expiration Date 07/05/24   Short Term Goal #1 5-7 days. Pt will amb 300 ft w/ RW PRN, in order to facilitate return to community amb status. Pt will achieve Mod(I) level w/ bed mob in order to facilitate safety with OOB and back to bed transitions in own living environment. Pt will perform transfers w/ Mod(I) to be able to perform household activities. Pt will be able to navigate 3 steps w/ handrail and Mod(I) to be able to enter her home safely. Pt will participate in LE therex and  balance activities to max progression w/ mobility skills.   PT Treatment Day 0   Plan   Treatment/Interventions Functional transfer training;LE strengthening/ROM;Elevations;Therapeutic exercise;Endurance training;Equipment eval/education;Bed mobility;Gait training;Spoke to nursing;OT  (PT spoke to case management; Jagjit RODAS w/ CT surgery.)   PT Frequency 3-5x/wk   Discharge Recommendation   Rehab Resource Intensity Level, PT III (Minimum Resource Intensity)   Equipment Recommended Walker  (`)   Walker Package Recommended Wheeled walker   Additional Comments Continue use of RW at home   AM-PAC Basic Mobility Inpatient   Turning in Flat Bed Without Bedrails 3   Lying on Back to Sitting on Edge of Flat Bed Without Bedrails 3   Moving Bed to Chair 3   Standing Up From Chair Using Arms 3   Walk in Room 3   Climb 3-5 Stairs With Railing 3   Basic Mobility Inpatient Raw Score 18   Basic Mobility Standardized Score 41.05   [facility] Highest Level Of Mobility   JH-HLM Goal 6: Walk 10 steps or more   JH-HLM Achieved 7: Walk 25 feet or more   Modified Webster Scale   Modified Webster Scale 3   End of Consult   Patient Position at End of Consult Bedside chair;Bed/Chair alarm activated;All needs within reach         Marianela Tamez

## 2024-06-28 NOTE — CASE MANAGEMENT
Case Management Assessment & Discharge Planning Note    Patient name Anneliese Garcia  Location [facility] 424/[facility] 424-01 MRN [id number]  : 1935 Date 2024       Current Admission Date: 2024  Current Admission Diagnosis:Severe aortic stenosis   Patient Active Problem List    Diagnosis Date Noted Date Diagnosed    S/P TAVR (transcatheter aortic valve replacement) 2024     CAD (coronary artery disease)      Chronic diastolic congestive heart failure (HCC)      GERD (gastroesophageal reflux disease)      Severe aortic stenosis 2024     QT prolongation 2023     History of DVT (deep vein thrombosis) 2022     Functional diarrhea 2020     Gastroesophageal reflux disease with esophagitis 2020     Hypercoagulable state (HCC) 2020     Arthritis of right sacroiliac joint      Osteoarthritis 2020     Supervision of normal first pregnancy 2020     Hyperlipidemia 2018     Vitamin D deficiency 2018     Osteoporosis 2018     CKD (chronic kidney disease), 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     Sjogren's syndrome (HCC)      History of pulmonary embolism      Hypertension      Hypothyroidism      Carpal tunnel syndrome 2008     MIAH (obstructive sleep apnea) 2008     Pure hypercholesterolemia 2008     Restless legs syndrome (RLS) 2008     Spinal stenosis of lumbar region without neurogenic claudication 2008       LOS (days): 1  Geometric Mean LOS (GMLOS) (days): 1.3  Days to GMLOS:0.3     OBJECTIVE:    Risk of Unplanned Readmission Score: 11.99         Current admission status: Inpatient       Preferred Pharmacy:   [id number] [place] - [address]  [address] [place] [address]  Phone: [phone] Fax: [phone]    Primary Care Provider: Monty Laboy MD    Primary Insurance: BLUE CROSS [employer] REP  Secondary Insurance:     ASSESSMENT:  Active Health Care Proxies       Radha  Andre Health Care Agent - Life Partner   Primary Phone: [phone] (Mobile)  Home Phone: [phone]                           Readmission Root Cause  30 Day Readmission: No    Patient Information  Admitted from:: Home  Mental Status: Alert  During Assessment patient was accompanied by: Not accompanied during assessment  Assessment information provided by:: Patient  Primary Caregiver: Self  Support Systems: Spouse/significant other  Home entry access options. Select all that apply.: Stairs  Number of steps to enter home.: 3  Do the steps have railings?: No  Type of Current Residence: 2 story home (stays on 1st level)  Upon entering residence, is there a bedroom on the main floor (no further steps)?: Yes  Upon entering residence, is there a bathroom on the main floor (no further steps)?: Yes  Living Arrangements: Lives w/ Spouse/significant other    Activities of Daily Living Prior to Admission  Functional Status: Independent  Completes ADLs independently?: Yes  Ambulates independently?: Yes  Does patient use assisted devices?: Yes  Assisted Devices (DME) used: Straight Cane, Shower Chair  Does patient currently own DME?: Yes  What DME does the patient currently own?: Shower Chair, Straight Cane, Walker, Wheelchair, Bedside Commode  Does patient have a history of Outpatient Therapy (PT/OT)?: Yes  Does the patient have a history of Short-Term Rehab?: No  Does patient have a history of HHC?: No  Does patient currently have HHC?: No         Patient Information Continued  Income Source: SSI/SSD  Does patient have prescription coverage?: Yes  Does patient have a history of substance abuse?: No  Does patient have a history of Mental Health Diagnosis?: No         Means of Transportation  Means of Transport to [facility]:: Family transport      Social Determinants of Health (SDOH)      Flowsheet Row Most Recent Value   Housing Stability    In the last 12 months, was there a time when you were not able to pay the mortgage or  rent on time? N   In the past 12 months, how many times have you moved where you were living? 1   At any time in the past 12 months, were you homeless or living in a shelter (including now)? N   Transportation Needs    In the past 12 months, has lack of transportation kept you from medical appointments or from getting medications? no   In the past 12 months, has lack of transportation kept you from meetings, work, or from getting things needed for daily living? No   Food Insecurity    Within the past 12 months, you worried that your food would run out before you got the money to buy more. Never true   Within the past 12 months, the food you bought just didn't last and you didn't have money to get more. Never true   Utilities    In the past 12 months has the electric, gas, oil, or water company threatened to shut off services in your home? No            DISCHARGE DETAILS:    Discharge planning discussed with:: patient  Freedom of Choice: Yes     CM contacted family/caregiver?: No- see comments  Were Treatment Team discharge recommendations reviewed with patient/caregiver?: Yes  Did patient/caregiver verbalize understanding of patient care needs?: Yes  Were patient/caregiver advised of the risks associated with not following Treatment Team discharge recommendations?: Yes    Contacts  Patient Contacts: Andre Monahan:    Relationship to Patient:: Family  Contact Method: Phone  Phone Number: [phone]  Reason/Outcome: Emergency Contact                              Transport at Discharge : Family

## 2024-06-28 NOTE — UTILIZATION REVIEW
Initial Clinical Review    Elective IP surgical procedure  Age/Sex: 88 y.o. female  Surgery Date: 6/27/2024  Procedure: Transcatheter aortic valve replacement with a 23 mm Dunn KELL 3 Ultra Resilia bioprosthetic valve via a percutaneous left transfemoral approach.   Anesthesia: general endotracheal anesthesia with transesophageal echocardiogram guidance.   Operative Findings:   CARDIOPULMONARY BYPASS TIME 0.   PACKS/TUBES/DRAINS None.    ESTIMATED BLOOD LOSS: 20 mL    POD#1 Progress Note: s/p successful TF TAVR yest. New LBBB post op. EP consult pending. LBBB remains this AM, no sincere or tele events. SR 70s-80s. Groin puncture sites c/d/i without hematoma.  Continue home BB d/t new LBBB for now. EP consulted. Likely need Zio patch for d/c. Continue asa, coumadin. Echo to be done today. Maintain central IV access today for lack of peripheral access. Sq Hep and SCDs for DVT ppx. Good RA O2 sat, continue incentive spirometry/Coughing/Deep breathing exercises. Cr stable. I/O net: -968 mL/24 hours. Stop further torsemide. K 3.3, replete. Npo currently for EP eval.   Blood sugars well controlled with insulin sliding scale coverage. Hgb 12.9, trend prn.       Admission Orders: Date/Time/Statement:   Admission Orders (From admission, onward)       Ordered        06/27/24 0925  Inpatient Admission  Once                          Orders Placed This Encounter   Procedures    Inpatient Admission     Standing Status:   Standing     Number of Occurrences:   1     Order Specific Question:   Level of Care     Answer:   Level 1 Stepdown [13]     Order Specific Question:   Estimated length of stay     Answer:   More than 2 Midnights     Order Specific Question:   Certification     Answer:   I certify that inpatient services are medically necessary for this patient for a duration of greater than two midnights. See H&P and MD Progress Notes for additional information about the patient's course of treatment.       Vital Signs (last  3 days)       Date/Time Temp Pulse Resp BP MAP (mmHg) Arterial Line BP MAP SpO2 Calculated FIO2 (%) - Nasal Cannula O2 Flow Rate (L/min) Nasal Cannula O2 Flow Rate (L/min) O2 Device Cardiac (WDL) Patient Position - Orthostatic VS [place] Coma Scale Score Pain    06/28/24 11:29:27 -- 96 -- 120/47 71 -- -- 92 % -- -- -- -- -- -- -- --    06/28/24 1058 -- 87 -- 136/61 -- -- -- -- -- -- -- -- -- -- -- --    06/28/24 0956 -- -- -- -- -- -- -- -- -- -- -- -- -- -- -- No Pain    06/28/24 0834 -- 87 -- 136/61 88 -- -- -- -- -- -- -- -- -- -- --    06/28/24 0800 -- -- -- -- -- -- -- -- -- -- -- -- -- -- 15 No Pain    06/28/24 0742 98 °F (36.7 °C) 89 20 118/56 81 -- -- 98 % -- -- -- Nasal cannula -- Sitting -- No Pain    06/28/24 0738 -- 90 -- -- -- -- -- 98 % 24 -- 1 L/min Nasal cannula -- -- -- --    06/28/24 0600 -- 90 16 118/54 78 -- -- 94 % 28 -- 2 L/min Nasal cannula -- Lying -- No Pain    06/28/24 0500 -- 86 16 127/59 85 122/30 58 mmHg 94 % 28 -- 2 L/min Nasal cannula -- Lying -- --    06/28/24 0400 -- 90 16 118/60 86 124/32 60 mmHg 96 % 28 -- 2 L/min Nasal cannula -- Lying -- --    06/28/24 0340 -- -- -- 147/64 -- -- -- -- -- -- -- -- -- -- -- --    06/28/24 0300 98.3 °F (36.8 °C) 76 16 140/64 92 150/38 70 mmHg 97 % 28 -- 2 L/min Nasal cannula -- Lying -- --    06/28/24 0200 -- 80 15 139/64 92 138/36 66 mmHg 98 % 28 -- 2 L/min Nasal cannula -- Lying -- --    06/28/24 0100 -- 81 16 130/60 86 136/36 66 mmHg 99 % 28 -- 2 L/min Nasal cannula -- Lying -- --    06/28/24 0000 -- 81 16 128/58 84 118/36 62 mmHg 98 % 28 -- 2 L/min Nasal cannula -- Lying -- --    06/27/24 2300 -- 80 16 127/60 86 128/36 64 mmHg 98 % 28 -- 2 L/min Nasal cannula -- Lying -- --    06/27/24 2200 -- 77 17 142/63 91 140/42 74 mmHg 95 % 28 -- 2 L/min Nasal cannula -- Lying -- --    06/27/24 2159 -- -- -- 142/63 -- -- -- -- -- -- -- -- -- -- -- --    06/27/24 2105 -- -- -- 142/63 -- -- -- -- -- -- -- -- -- -- -- --    06/27/24 2100 -- 84 16 125/60 87  124/42 68 mmHg 96 % 28 -- 2 L/min Nasal cannula -- Lying 15 No Pain    06/27/24 2000 -- 84 16 133/62 89 118/34 58 mmHg 98 % 28 -- 2 L/min Nasal cannula -- Lying 15 No Pain    06/27/24 1900 -- 82 -- 121/57 82 126/40 66 mmHg 95 % 32 -- 3 L/min -- -- -- -- No Pain    06/27/24 1800 -- 82 -- 128/58 92 144/44 74 mmHg 96 % 32 -- 3 L/min -- -- -- -- No Pain    06/27/24 1700 -- 78 -- 128/58 84 126/38 66 mmHg -- -- -- -- -- -- -- -- No Pain    06/27/24 1600 -- 72 -- 134/62 89 140/40 72 mmHg -- -- -- -- -- -- -- 15 --    06/27/24 1550 -- -- -- 153/67 -- -- -- -- -- -- -- -- -- -- -- --    06/27/24 1539 -- 78 -- 153/67 -- -- -- -- -- -- -- -- -- -- -- --    06/27/24 1500 -- 76 -- 139/62 89 -- -- -- -- -- -- -- -- -- 15 No Pain    06/27/24 1430 97.2 °F (36.2 °C) 69 15 130/60 86 132/37 67 mmHg 92 % 36 -- 4 L/min Nasal cannula X -- 15 No Pain    06/27/24 1415 -- 68 16 -- -- 134/37 67 mmHg 92 % -- -- -- -- -- -- 15 No Pain    06/27/24 1400 -- 70 18 136/65 93 130/37 66 mmHg 92 % -- -- -- -- -- -- 15 No Pain    06/27/24 1345 -- 72 20 -- -- 131/38 68 mmHg 97 % -- -- -- -- -- -- 15 No Pain    06/27/24 1330 -- 72 16 -- -- 138/43 74 mmHg 93 % 36 -- 4 L/min Nasal cannula -- -- 15 No Pain    06/27/24 1315 97.3 °F (36.3 °C) 70 16 -- -- 137/43 74 mmHg 100 % -- 6 L/min -- Simple mask X -- 15 No Pain    06/27/24 0932 98.6 °F (37 °C) 78 -- 165/87 -- -- -- 98 % -- -- -- None (Room air) -- -- -- --    06/27/24 0926 -- -- -- -- -- -- -- -- -- -- -- -- -- -- -- No Pain          Weight (last 2 days)       Date/Time Weight    06/28/24 1058 55.2 (121.69)    06/28/24 0600 55.2 (121.69)    06/27/24 0926 55 (121.25)            Pertinent Labs/Diagnostic Test Results:   Radiology:  XR chest portable ICU   Final Interpretation by Raegan Louis MD (06/27 5577)      Question mild pulmonary venous congestion.      TAVR.         XR chest portable    (Results Pending)     Cardiology:  Echo complete w/ contrast if indicated    by Ailyn Bernal (06/28  1121)      ECG 12 lead   Final Result by Herminia Zhu MD (06/27 2216)   Normal sinus rhythm   Left bundle branch block   Abnormal ECG   When compared with ECG of 27-JUN-2024 16:37, (unconfirmed)   No significant change was found   Confirmed by Herminia Zhu ([id number]) on 6/27/2024 10:16:09 PM      LEDY intraop interventional w/realtime guidance of cardiac procedures   Final Result by SYSTEMGENECHUCKYTED, AZIZA (06/27 5023)   This order contains the linked images for the LEDY that was performed by    the Anesthesiologist.  Please see the  CARDIAC LEDY ANESTHESIA procedure    for results.      ECG 12 lead   Final Result by Herminia Zhu MD (06/27 2216)   Sinus rhythm with occasional Premature ventricular complexes   Rightward axis   Non-specific intra-ventricular conduction block   Abnormal ECG   When compared with ECG of 27-JUN-2024 13:28, (unconfirmed)   Premature ventricular complexes are now Present   Confirmed by Herminia Zhu ([id number]) on 6/27/2024 10:15:57 PM      ECG 12 lead   Final Result by Herminia Zhu MD (06/27 2215)   Normal sinus rhythm   Rightward axis   Nonspecific ST-t wave changes   Non-specific intra-ventricular conduction block   Abnormal ECG   When compared with ECG of 06-JUN-2024 07:48,   Questionable change in QRS duration   Confirmed by Herminia Zhu ([id number]) on 6/27/2024 10:15:36 PM      Cardiac catheterization   Preliminary Result by Pascual Koo MD (06/27 1323)      Surgeon: CHRISTINE Pal MD   Co.-Surgeon: Pascual Koo MD   Preoperative Diagnosis: Symptomatic severe aortic stenosis.    Postoperative Diagnosis: Symptomatic severe aortic stenosis.      Procedure: Transcatheter aortic valve replacement with a 23 mm Dunn    KELL 3 Ultra Resilia bioprosthetic valve via left transfemoral approach.          Findings:    1. Intraoperative transesophageal echocardiogram revealed severe aortic    stenosis.   2. Final transesophageal echocardiogram demonstrated  "prosthetic valve with    normal function and trace paravalvular leak.   Operative Technique:   The patient was taken to the operating room and placed supine on the    operating table.  Following the satisfactory induction of general    anesthesia and placement of monitoring lines, the patient was prepped and    draped in the usual sterile fashion.  A time-out procedure was performed.   The left common femoral vein was accessed by the modified Seldinger    technique.  A 6Fr sheath was placed in the vein.  The left common femoral    artery was accessed percutaneously by micropuncture using the modified    Seldinger technique and fluoroscopy.  Appropriate position in the common    femoral artery was confirmed by injection of contrast. Two Perclose    sutures were placed in the artery, followed by placement of a 7Fr sheath.     A balloon-tipped temporary pacing wire was inserted into the venous sheath    and advanced into the right ventricle.  Pacing capture was confirmed.  A    pigtail catheter was inserted into the arterial sheath and advanced to the    right coronary cusp.  Contrast injection was performed to determine the    proper angle and orientation for valve deployment.  A Lunderquist    extra-stiff wire was positioned in the ascending aorta via the pigtail    catheter, the pigtail catheter and 7Fr sheath were removed, and the sheath    for the delivery system was inserted into the left common femoral artery    and advanced into the aorta.  The patient was systemically heparinized. A    5Fr JR4 diagnostic coronary catheter was advanced through the delivery    sheath to the aortic root.  The aortic valve was crossed with a 0.035\"    straight wire. The JR4 catheter was advanced across the aortic valve and    the straight wire was exchanged for a curved tip Amplatzer extra stiff    wire.      A 23 mm KELL 3 UR valve delivery system was advanced through the    delivery sheath into the aorta, the delivery system " was configured for    deployment. The valve was advanced into position across the stenotic    aortic valve. The valve was desheathed and positioned using a combination    of fluoroscopy and transesophageal echocardiographic guidance.  During an    episode of rapid pacing, balloon deployment of the valve was performed.     Following deployment, the position of the valve was confirmed by    fluoroscopy and echocardiography, and its position appeared appropriate    with trace paravalvular leak.   The valve delivery system was removed, followed by removal of the sheath    from the left common femoral artery.  The Perclose sutures were secured,    and direct pressure was applied.  Protamine was administered.  Pressure    was released, without evidence of active bleeding.  The left common    femoral vein sheath was removed and manual pressure was applied to achieve    hemostasis.   I was present and scrubbed for all critical portions of this procedure.     Sponge, needle and instrument counts were reported as correct by the    nursing staff.              Results from last 7 days   Lab Units 06/28/24 0429 06/27/24 1337 06/27/24  1256 06/27/24  1237 06/27/24  1159   WBC Thousand/uL 11.23*  --   --   --   --    HEMOGLOBIN g/dL 12.9 12.7  --   --   --    I STAT HEMOGLOBIN g/dl  --   --  10.5* 10.2* 11.9   HEMATOCRIT % 38.3 37.0  --   --   --    HEMATOCRIT, ISTAT %  --   --  31* 30* 35   PLATELETS Thousands/uL 173 155  --   --   --      Results from last 7 days   Lab Units 06/28/24 0429 06/27/24 1337 06/27/24  1256 06/27/24  1237 06/27/24  1159   SODIUM mmol/L 139 139  --   --   --    POTASSIUM mmol/L 3.3* 3.7  --   --   --    CHLORIDE mmol/L 101 107  --   --   --    CO2 mmol/L 26 25  --   --   --    CO2, I-STAT mmol/L  --   --  24 25 29   ANION GAP mmol/L 12 7  --   --   --    BUN mg/dL 18 16  --   --   --    CREATININE mg/dL 0.92 0.73  --   --   --    EGFR ml/min/1.73sq m 56 74  --   --   --    CALCIUM mg/dL 9.7 10.0  --    --   --    CALCIUM, IONIZED, ISTAT mmol/L  --   --  1.46* 1.26 1.33*   MAGNESIUM mg/dL 1.6*  --   --   --   --          Results from last 7 days   Lab Units 06/28/24  1105 06/28/24  0525 06/27/24  2053 06/27/24  1627 06/27/24  1334   POC GLUCOSE mg/dl 130 133 149* 134 112     Results from last 7 days   Lab Units 06/28/24  0429 06/27/24  1337   GLUCOSE RANDOM mg/dL 129 117     Results from last 7 days   Lab Units 06/27/24  1256 06/27/24  1237 06/27/24  1159   PH, MARY I-STAT   --   --  7.329   PCO2, MARY ISTAT mm HG  --   --  52.3*   PO2, MARY ISTAT mm HG  --   --  38.0   HCO3, MARY ISTAT mmol/L  --   --  27.5   I STAT BASE EXC mmol/L -1 -1 1   I STAT O2 SAT % 100* 99* 68   ISTAT PH ART  7.412 7.404  --    I STAT ART PCO2 mm HG 36.4 38.2  --    I STAT ART PO2 mm .0* 154.0*  --    I STAT ART HCO3 mmol/L 23.2 23.9  --      Results from last 7 days   Lab Units 06/28/24  0429 06/27/24  1337 06/27/24  0958   PROTIME seconds 16.1* 16.9* 15.2*   INR  1.30* 1.39* 1.22*     Results from last 7 days   Lab Units 06/28/24  0354   UNIT PRODUCT CODE  [id number]  [id number]   UNIT NUMBER  [id number]  [id number]   UNITABO  B  B   UNITRH  NEG  NEG   CROSSMATCH  Compatible  Compatible   UNIT DISPENSE STATUS  Return to Inv  Return to Inv   UNIT PRODUCT VOL mL 350  350     Scheduled Medications:  Artificial Tears, 1 drop, Both Eyes, BID  aspirin, 81 mg, Oral, Daily  chlorhexidine, 15 mL, Mouth/Throat, BID  heparin (porcine), 5,000 Units, Subcutaneous, Q8H MANUEL  insulin lispro, 1-5 Units, Subcutaneous, TID AC  insulin lispro, 1-5 Units, Subcutaneous, HS  levothyroxine, 112 mcg, Oral, Early Morning  [START ON 6/29/2024] losartan, 50 mg, Oral, Daily  metoprolol tartrate, 25 mg, Oral, Q12H MANUEL  mupirocin, 1 Application, Nasal, Q12H MANUEL  pantoprazole, 40 mg, Oral, Daily Before Breakfast  pilocarpine, 5 mg, Oral, 4x Daily  potassium chloride, 40 mEq, Intravenous, Once  pravastatin, 40 mg, Oral, Daily With Dinner    PRN  Meds:  acetaminophen, 650 mg, Rectal, Q4H PRN  acetaminophen, 650 mg, Oral, Q4H PRN  bisacodyl, 10 mg, Rectal, Daily PRN  calcium gluconate, 2 g, Intravenous, Once PRN  Diclofenac Sodium, 2 g, Topical, 4x Daily PRN  diphenoxylate-atropine, 1 tablet, Oral, 4x Daily PRN  hydrALAZINE, 10 mg, Intravenous, Q6H PRN  ondansetron, 4 mg, Intravenous, Q6H PRN        Network Utilization Review Department  ATTENTION: Please call with any questions or concerns to [phone] and carefully listen to the prompts so that you are directed to the right person. All voicemails are confidential.   For Discharge needs, contact Care Management DC Support Team at [phone] opt. [phone]  Send all requests for admission clinical reviews, approved or denied determinations and any other requests to dedicated fax number below belonging to the campus where the patient is receiving treatment. List of dedicated fax numbers for the Facilities:  FACILITY NAME UR FAX NUMBER   ADMISSION DENIALS (Administrative/Medical Necessity) [phone]   DISCHARGE SUPPORT TEAM (NETWORK) [phone]   PARENT CHILD HEALTH (Maternity/NICU/Pediatrics) [phone]   [facility] [phone]   [facility] [phone]   [facility] [phone]   [facility] [phone]   [facility] [phone]   [facility] [phone]   [facility] [phone]   [facility] [phone]   [facility] [phone]   [facility] [phone]   [facility] [phone]   [facility] [phone]

## 2024-06-28 NOTE — CONSULTS
Consultation - Electrophysiology - Cardiology  Anneliese Garcia 88 y.o. female MRN: [id number]  Unit/Bed#: [facility] 424-01 Encounter: [id number]      Inpatient consult to Electrophysiology  Consult performed by: Shanta Tee PA-C  Consult ordered by: Stacey Welch PA-C          History of Present Illness   Physician Requesting Consult: JESSICA Pal MD  Reason for Consult / Principal Problem: new LBBB    Assessment & Plan   New LBBB post TAVR,  ms  Severe AS s/p TF TAVR 6/27/2024 by Dr. Pal (Dunn Marilyn valve)  HTN  HLD  Sjogren's - follows with Rheum at [place]  H/o PE/DVTs/hypercoagulable state on warfarin  Hypothyroidism    Serial EKGs post TAVR are documented new left bundle branch block.  We recommended reinitiation of beta-blocker this morning and continued monitoring.  QRS has improved slightly from 150 ms and at 140 ms.  There is no evidence of high-grade AV block or significant bradycardia.  Echocardiogram today documents an EF of 65%.  At this time, we recommend continuing monitoring on telemetry.  We will repeat an EKG tomorrow morning.  If QRS remains stable, we will recommend 30-day monitoring with a Zio patch.  If there is evidence of high-grade AV block or significant bradycardia overnight, would then proceed with permanent pacemaker.  We discussed the above plan with the patient and she is in agreement.    Plan:  -- no evidence of high grade AV block at present  -- QRS remains stable, improved to 140 ms  -- metoprolol restarted this morning  -- monitor on tele overnight  -- repeat EKG tomorrow morning  -- if QRS remains stable, plan for 30 day monitor      HPI: Anneliese Garcia is a 88 y.o. female with a history of HTN, HLD, GERD, osteoporosis, Sjogren's (follows with Rheum at [place]), h/o multiple PE/DVTs with hypercoagulable state on warfarin, hypothyroidism, CKD, arthritis, MIAH not on CPAP, RLS severe AS, and mild nonobstructive CAD. She underwent TF TAVR (Dunn  Marilyn valve) on 6/27/2024 by Dr. Pal.     Postoperatively, she was noted to have a new left bundle branch block.  EP was consulted regarding her EKG changes.      EKG postprocedure showed new LBBB with QRS of approximately 150 ms. ID was approximately 170 ms.  QRS preprocedure was 100 ms. Her beta-blocker was held and she was monitored overnight oximetry.  She remained in sinus rhythm with no evidence of bradycardia or heart block.    Today, LBBB is persistent.  Repeat EKGs documented  ms. ID remains stable at 170 ms.     Currently, she is feeling well.  She denies lightheadedness, dizziness, palpitations, shortness of breath, chest pain, orthopnea, or PND.  She is currently NPO.    TELE: sinus rhythm with bundle branch block    Repeat EKG 6/28/2024:      EKG 6/28/2024:       Echo 6/28/2024:    Left Ventricle: Left ventricular cavity size is normal. Wall thickness is mildly increased. The left ventricular ejection fraction is 65%. Systolic function is normal. Wall motion is normal. Diastolic function is moderately abnormal, consistent with grade II (pseudonormal) relaxation.    Right Ventricle: Right ventricular cavity size is normal. Systolic function is normal.    Left Atrium: The atrium is dilated.    Aortic Valve: There is an Dunn MARILYN 3 Ultra 23 mm TAVR bioprosthetic valve. There is trace paravalvular regurgitation. The gradient recorded across the prosthetic aortic valve is within the expected range. The aortic valve mean gradient is 9 mmHg. The dimensionless velocity index is 0.61. The aortic valve area is 2.15 cm2.    Mitral Valve: There is moderate annular calcification. There is moderate regurgitation. There is no evidence of stenosis. The mitral valve mean gradient is 2mmHg.    Tricuspid Valve: There is moderate regurgitation.    Pulmonic Valve: There is mild regurgitation.    LHC 6/6/2024: mild calcific nonobstructive epicardial CAD    Echo 5/2/2024:    Left Ventricle: Left  ventricular cavity size is normal. Wall thickness is normal. The left ventricular ejection fraction is 60%. Systolic function is normal. Wall motion is normal.    Left Atrium: The atrium is severely dilated.    Aortic Valve: The aortic valve is trileaflet. The leaflets are severely calcified. There is severely reduced mobility. There is moderate regurgitation. There is critical stenosis. The aortic valve mean gradient is 46 mmHg. The aortic valve area is 0.48 cm2.    Mitral Valve: There is moderate annular calcification. There is severe regurgitation. There is mild stenosis. The mitral valve mean gradient is 4mmHg.    Tricuspid Valve: There is mild regurgitation. The right ventricular systolic pressure is mildly to moderately elevated. The estimated right ventricular systolic pressure is 49.00 mmHg.    Pulmonic Valve: There is mild regurgitation.    Review of Systems   Constitutional: Negative.    HENT: Negative.     Eyes: Negative.    Respiratory: Negative.     Cardiovascular: Negative.    Gastrointestinal: Negative.    Endocrine: Negative.    Genitourinary: Negative.    Musculoskeletal: Negative.    Skin: Negative.    Neurological: Negative.    Hematological: Negative.    Psychiatric/Behavioral: Negative.         Historical Information   Past Medical History:   Diagnosis Date    CAD (coronary artery disease)     CHF (congestive heart failure) (HCC)     Chronic pain     s/p spinal stimulator    CKD (chronic kidney disease), stage II     baseline Cr 0.8-1.0    GERD (gastroesophageal reflux disease)     History of DVT (deep vein thrombosis)     multiple, Coumadin    History of pulmonary embolism     multiple, Coumadin    History of squamous cell carcinoma excision     Hyperlipidemia     Hypertension     Hypothyroidism     IBS (irritable bowel syndrome)     diarrheal type    MIAH (obstructive sleep apnea)     no CPAP or BiPAP    Osteoarthritis     Osteoporosis     RLS (restless legs syndrome)     Severe aortic stenosis      Sjogren's syndrome (HCC)        Past Surgical History:   Procedure Laterality Date    CARDIAC CATHETERIZATION N/A 06/06/2024    Procedure: Cardiac catheterization;  Surgeon: Taj Stafford DO;  Location: [facility];  Service: Cardiology    CARDIAC CATHETERIZATION N/A 6/27/2024    Procedure: Cardiac tavr;  Surgeon: Pascual Koo MD;  Location: [facility];  Service: Cardiology    COLONOSCOPY  07/18/2016     grade 2 internal hemorrhoids but otherwise normal, pathology negative for microscopic colitis    OR REPLACE AORTIC VALVE OPENFEMORAL ARTERY APPROACH N/A 6/27/2024    Procedure: REPLACEMENT AORTIC VALVE TRANSCATHETER (TAVR) TRANSFEMORAL W/ 23MM  S3 UR VALVE(ACCESS ON LEFT) LEDY;  Surgeon: JESSICA Pal MD;  Location: [facility];  Service: Cardiac Surgery    REPAIR RECTOCELE      SPINAL STIMULATOR PLACEMENT      SPINE SURGERY      fusion L1-L5    SQUAMOUS CELL CARCINOMA EXCISION      TOE SURGERY      TONSILLECTOMY AND ADENOIDECTOMY      TOTAL ABDOMINAL HYSTERECTOMY      TOTAL KNEE ARTHROPLASTY Bilateral     TOTAL SHOULDER REPLACEMENT Right     UPPER GASTROINTESTINAL ENDOSCOPY  01/09/2015     reflux esophagitis, gastritis, duodenitis in the bulb, pathology negative for H pylori, Puckett's, EOE       Social History     Substance and Sexual Activity   Alcohol Use No     Social History     Substance and Sexual Activity   Drug Use No     Social History     Tobacco Use   Smoking Status Never   Smokeless Tobacco Never   Tobacco Comments    Never smoked       Family History   Problem Relation Age of Onset    No Known Problems Mother     Stroke Father     Ovarian cancer Sister 79    Ovarian cancer Daughter 44    No Known Problems Daughter     No Known Problems Daughter     No Known Problems Maternal Grandmother     No Known Problems Maternal Grandfather     No Known Problems Paternal Grandmother     No Known Problems Paternal Grandfather     No Known Problems Son     No Known Problems Son     No  Known Problems Maternal Aunt     No Known Problems Maternal Aunt     No Known Problems Paternal Aunt     Colon cancer Neg Hx     Colon polyps Neg Hx        Meds/Allergies   Hospital Medications:   Current Facility-Administered Medications   Medication Dose Route Frequency    acetaminophen (TYLENOL) rectal suppository 650 mg  650 mg Rectal Q4H PRN    acetaminophen (TYLENOL) tablet 650 mg  650 mg Oral Q4H PRN    Artificial Tears ophthalmic solution 1 drop  1 drop Both Eyes BID    aspirin chewable tablet 81 mg  81 mg Oral Daily    bisacodyl (DULCOLAX) rectal suppository 10 mg  10 mg Rectal Daily PRN    calcium gluconate 2 g in sodium chloride 0.9% 100 mL (premix)  2 g Intravenous Once PRN    chlorhexidine (PERIDEX) 0.12 % oral rinse 15 mL  15 mL Mouth/Throat BID    Diclofenac Sodium (VOLTAREN) 1 % topical gel 2 g  2 g Topical 4x Daily PRN    diphenoxylate-atropine (LOMOTIL) 2.5-0.025 mg per tablet 1 tablet  1 tablet Oral 4x Daily PRN    heparin (porcine) subcutaneous injection 5,000 Units  5,000 Units Subcutaneous Q8H MANUEL    hydrALAZINE (APRESOLINE) injection 10 mg  10 mg Intravenous Q6H PRN    insulin lispro (HumALOG/ADMELOG) 100 units/mL subcutaneous injection 1-5 Units  1-5 Units Subcutaneous TID AC    insulin lispro (HumALOG/ADMELOG) 100 units/mL subcutaneous injection 1-5 Units  1-5 Units Subcutaneous HS    levothyroxine tablet 112 mcg  112 mcg Oral Early Morning    [START ON 6/29/2024] losartan (COZAAR) tablet 50 mg  50 mg Oral Daily    metoprolol tartrate (LOPRESSOR) tablet 25 mg  25 mg Oral Q12H MANUEL    mupirocin (BACTROBAN) 2 % nasal ointment 1 Application  1 Application Nasal Q12H MANUEL    ondansetron (ZOFRAN) injection 4 mg  4 mg Intravenous Q6H PRN    pantoprazole (PROTONIX) EC tablet 40 mg  40 mg Oral Daily Before Breakfast    pilocarpine (SALAGEN) tablet 5 mg  5 mg Oral 4x Daily    pravastatin (PRAVACHOL) tablet 40 mg  40 mg Oral Daily With Dinner       Home Medications:   Medications Prior to Admission:      "levothyroxine 112 mcg tablet    metoprolol tartrate (LOPRESSOR) 25 mg tablet    omeprazole (PriLOSEC) 20 mg delayed release capsule    pilocarpine (SALAGEN) 5 mg tablet    simvastatin (ZOCOR) 20 mg tablet    Cholecalciferol (VITAMIN D) 2000 UNITS tablet    cycloSPORINE (RESTASIS) 0.05 % ophthalmic emulsion    DHA-EPA-Flaxseed Oil-Vitamin E (THERA TEARS) CAPS    Diclofenac Sodium (VOLTAREN) 1 %    diphenoxylate-atropine (LOMOTIL) 2.5-0.025 mg per tablet    losartan (COZAAR) 50 mg tablet    Multiple Vitamins-Minerals (PreserVision AREDS 2+Multi Vit) CAPS    polyvinyl alcohol (LIQUIFILM TEARS) 1.4 % ophthalmic solution    warfarin (COUMADIN) 5 mg tablet      Allergies   Allergen Reactions    Sulfa Antibiotics Hives       Objective   Vitals: Blood pressure 147/61, pulse 70, temperature 98 °F (36.7 °C), temperature source Oral, resp. rate 19, height 5' 2\" (1.575 m), weight 55.2 kg (121 lb 11.1 oz), SpO2 95%, not currently breastfeeding.  Orthostatic Blood Pressures      Flowsheet Row Most Recent Value   Blood Pressure 147/61 filed at 06/28/2024 1515   Patient Position - Orthostatic VS Sitting filed at 06/28/2024 0742              Intake/Output Summary (Last 24 hours) at 6/28/2024 1532  Last data filed at 6/28/2024 0921  Gross per 24 hour   Intake 706.25 ml   Output 2325 ml   Net -1618.75 ml       Invasive Devices       Central Venous Catheter Line  Duration             CVC Central Lines 06/27/24 1 day              Peripheral Intravenous Line  Duration             Peripheral IV 06/27/24 Left;Ventral (anterior) Forearm 1 day                  Physical Exam   GEN: NAD, alert and oriented x 3, well appearing  SKIN: warm, dry without significant lesions or rashes  HEENT: NCAT  NECK: supple, no JVD appreciated  CARDIOVASCULAR: RRR, normal S1, S2 without murmurs, rubs, or gallops   LUNGS: CTA bilaterally without wheezes, rhonchi, or rales  ABDOMEN: Soft, nontender, nondistended. EXTREMITIES/VASCULAR: perfused without clubbing, " cyanosis, or LE edema b/l  PSYCH: Normal mood and affect  NEURO: CN ll-Xll grossly intact      Lab Results: I have personally reviewed pertinent lab results.    Results from last 7 days   Lab Units 06/28/24 0429 06/27/24 1337 06/27/24  1256   WBC Thousand/uL 11.23*  --   --    HEMOGLOBIN g/dL 12.9 12.7  --    I STAT HEMOGLOBIN g/dl  --   --  10.5*   HEMATOCRIT % 38.3 37.0  --    HEMATOCRIT, ISTAT %  --   --  31*   PLATELETS Thousands/uL 173 155  --      Results from last 7 days   Lab Units 06/28/24 0429 06/27/24 1337 06/27/24  1256   POTASSIUM mmol/L 3.3* 3.7  --    CHLORIDE mmol/L 101 107  --    CO2 mmol/L 26 25  --    CO2, I-STAT mmol/L  --   --  24   BUN mg/dL 18 16  --    CREATININE mg/dL 0.92 0.73  --    GLUCOSE, ISTAT mg/dl  --   --  102   CALCIUM mg/dL 9.7 10.0  --      Results from last 7 days   Lab Units 06/28/24 0429 06/27/24 1337 06/27/24  0958   INR  1.30* 1.39* 1.22*     Results from last 7 days   Lab Units 06/28/24 0429   MAGNESIUM mg/dL 1.6*

## 2024-06-28 NOTE — RESPIRATORY THERAPY NOTE
RT Protocol Note  Anneliese Garcia 88 y.o. female MRN: [id number]  Unit/Bed#: [facility] 424-01 Encounter: [id number]    Assessment    Principal Problem:    Severe aortic stenosis  Active Problems:    History of pulmonary embolism    Hypertension    Hypothyroidism    CKD (chronic kidney disease), stage II    MIAH (obstructive sleep apnea)    CAD (coronary artery disease)    CHF (congestive heart failure) ([facility])    GERD (gastroesophageal reflux disease)    S/P TAVR (transcatheter aortic valve replacement)      Home Pulmonary Medications:  none       Past Medical History:   Diagnosis Date    CAD (coronary artery disease)     CHF (congestive heart failure) ([facility])     Chronic pain     s/p spinal stimulator    CKD (chronic kidney disease), stage II     baseline Cr 0.8-1.0    GERD (gastroesophageal reflux disease)     History of DVT (deep vein thrombosis)     multiple, Coumadin    History of pulmonary embolism     multiple, Coumadin    History of squamous cell carcinoma excision     Hyperlipidemia     Hypertension     Hypothyroidism     IBS (irritable bowel syndrome)     diarrheal type    MIAH (obstructive sleep apnea)     no CPAP or BiPAP    Osteoarthritis     Osteoporosis     RLS (restless legs syndrome)     Severe aortic stenosis     Sjogren's syndrome ([facility])      Social History     Socioeconomic History    Marital status: /Civil Union     Spouse name: None    Number of children: 5    Years of education: None    Highest education level: None   Occupational History    None   Tobacco Use    Smoking status: Never    Smokeless tobacco: Never    Tobacco comments:     Never smoked   Vaping Use    Vaping status: Never Used   Substance and Sexual Activity    Alcohol use: No    Drug use: No    Sexual activity: Not Currently     Partners: Male     Birth control/protection: Post-menopausal, Male Sterilization   Other Topics Concern    None   Social History Narrative    None     Social Determinants of Health     Financial Resource  "Strain: Not on file   Food Insecurity: No Food Insecurity (3/27/2023)    Received from [facility]    Hunger Vital Sign     Worried About Running Out of Food in the Last Year: Never true     Ran Out of Food in the Last Year: Never true   Transportation Needs: No Transportation Needs (3/17/2023)    PRAPARE - Transportation     Lack of Transportation (Medical): No     Lack of Transportation (Non-Medical): No   Physical Activity: Not on file   Stress: Not on file   Social Connections: Unknown (6/18/2024)    Received from [id number]    Social Connections     How often do you feel lonely or isolated from those around you? (Adult - for ages 18 years and over): Not on file   Intimate Partner Violence: Not on file   Housing Stability: Low Risk  (3/17/2023)    Housing Stability Vital Sign     Unable to Pay for Housing in the Last Year: No     Number of Places Lived in the Last Year: 1     Unstable Housing in the Last Year: No       Subjective         Objective    Physical Exam:   Assessment Type: Assess only  General Appearance: Alert, Awake  Respiratory Pattern: Normal  Chest Assessment: Chest expansion symmetrical  Bilateral Breath Sounds: Diminished, Clear    Vitals:  Blood pressure 118/54, pulse 90, temperature 98.3 °F (36.8 °C), temperature source Oral, resp. rate 16, height 5' 2\" (1.575 m), weight 55.2 kg (121 lb 11.1 oz), SpO2 98%, not currently breastfeeding.          Imaging and other studies: I have personally reviewed pertinent reports.            Plan    Respiratory Plan: No distress/Pulmonary history, Discontinue Protocol  Airway Clearance Plan: Incentive Spirometer, Discontinue Protocol     Resp Comments: Pt up in chair in no acute resp distress. Pt S/P TAVR from yesterday 6/27. Pt able to achieve IS goal and understands use and frequency. No resp distress noted. No pulmonary HX noted. ACP will be D/C at this time.   "

## 2024-06-28 NOTE — PROGRESS NOTES
Progress Note - Cardiothoracic Surgery   Anneliese Garcia 88 y.o. female MRN: [id number]  Unit/Bed#: [facility] 424-01 Encounter: [id number]    Severe AS S/P L TF TAVR 23mm; POD # 1    24 Hour Events: s/p successful TF TAVR yest. New LBBB post op. EP consult pending. LBBB remains this AM, no sincere or tele events. SR 70s-80s.     Medications:   Scheduled Meds:  Current Facility-Administered Medications   Medication Dose Route Frequency Provider Last Rate    acetaminophen  650 mg Rectal Q4H PRN Stacey Welch PA-C      acetaminophen  650 mg Oral Q4H PRN Stacey Welch PA-C      Artificial Tears  1 drop Both Eyes BID Stacey Welch PA-C      aspirin  81 mg Oral Daily Stacey Welch PA-C      bisacodyl  10 mg Rectal Daily PRN Stacey Welch PA-C      calcium gluconate  2 g Intravenous Once PRN Stacey Welch PA-C      cefazolin  2,000 mg Intravenous Q8H Stacey Welch PA-C 2,000 mg (06/28/24 0341)    chlorhexidine  15 mL Mouth/Throat BID Stacey Welch PA-C      Diclofenac Sodium  2 g Topical 4x Daily PRN Stacey Welch PA-C      diphenoxylate-atropine  1 tablet Oral 4x Daily PRN Stacey Welch PA-C      heparin (porcine)  5,000 Units Subcutaneous Q8H MANUEL Stacey Welch PA-C      hydrALAZINE  10 mg Intravenous Q6H PRN Stacey Welch PA-C      insulin lispro  1-5 Units Subcutaneous TID AC Stacey Welch PA-C      insulin lispro  1-5 Units Subcutaneous HS Stacey Welch PA-C      levothyroxine  112 mcg Oral Early Morning Stacey Welch PA-C      losartan  50 mg Oral Daily Stacey Welch PA-C      mupirocin  1 Application Nasal Q12H MANUEL Stacey Welch PA-C      niCARdipine  1-15 mg/hr Intravenous Titrated Stacey Welch PA-C Stopped (06/27/24 2001)    ondansetron  4 mg Intravenous Q6H PRN Stacey Welch PA-C      pantoprazole  40 mg Oral Daily Before Breakfast Stacey Welch PA-C      pilocarpine  5 mg Oral 4x Daily Stacey Welch PA-C      potassium chloride  20 mEq Oral Daily Roxana Pedraza PA-C       pravastatin  40 mg Oral Daily With Dinner Stacey Welch PA-C      torsemide  20 mg Oral Daily Roxana Pedraza PA-C      warfarin  5 mg Oral Daily (warfarin) Stacey Welch PA-C       Continuous Infusions:niCARdipine, 1-15 mg/hr, Last Rate: Stopped (06/27/24 2001)      PRN Meds:.  acetaminophen    acetaminophen    bisacodyl    calcium gluconate    Diclofenac Sodium    diphenoxylate-atropine    hydrALAZINE    ondansetron    Vitals:   Vitals:    06/28/24 0500 06/28/24 0600 06/28/24 0738 06/28/24 0742   BP: 127/59 118/54  118/56   BP Location: Right arm Right arm  Right arm   Pulse: 86 90 90 89   Resp: 16 16  20   Temp:    98 °F (36.7 °C)   TempSrc:    Oral   SpO2: 94% 94% 98% 98%   Weight:  55.2 kg (121 lb 11.1 oz)     Height:           Telemetry: NSR with BBB; Heart Rate: 70s-90s    Respiratory:   SpO2: SpO2: 98 %; RA    Intake/Output:     Intake/Output Summary (Last 24 hours) at 6/28/2024 0830  Last data filed at 6/28/2024 0535  Gross per 24 hour   Intake 1706.25 ml   Output 2675 ml   Net -968.75 ml        Weights:   Weight (last 2 days)       Date/Time Weight    06/28/24 0600 55.2 (121.69)    06/27/24 0926 55 (121.25)          Admit weight: 121    Results:   Results from last 7 days   Lab Units 06/28/24  0429 06/27/24  1337 06/27/24  1256   WBC Thousand/uL 11.23*  --   --    HEMOGLOBIN g/dL 12.9 12.7  --    I STAT HEMOGLOBIN g/dl  --   --  10.5*   HEMATOCRIT % 38.3 37.0  --    HEMATOCRIT, ISTAT %  --   --  31*   PLATELETS Thousands/uL 173 155  --      Results from last 7 days   Lab Units 06/28/24  0429 06/27/24  1337 06/27/24  1256   POTASSIUM mmol/L 3.3* 3.7  --    CHLORIDE mmol/L 101 107  --    CO2 mmol/L 26 25  --    CO2, I-STAT mmol/L  --   --  24   BUN mg/dL 18 16  --    CREATININE mg/dL 0.92 0.73  --    GLUCOSE, ISTAT mg/dl  --   --  102   CALCIUM mg/dL 9.7 10.0  --      Results from last 7 days   Lab Units 06/28/24  0429 06/27/24  1337 06/27/24  0958   INR  1.30* 1.39* 1.22*     Point of care  glucose: 133-149    Studies:    EC/28  NSR with LBBB at 91 BPM    CXR:   No PTX, no pl effusions, mild vasc congestion      Echocardiogram: pending    I have personally reviewed pertinent reports.   and I have personally reviewed pertinent films in PACS    Invasive Lines/Tubes:  Invasive Devices       Central Venous Catheter Line  Duration             CVC Central Lines 24 <1 day              Peripheral Intravenous Line  Duration             Peripheral IV 24 Left;Ventral (anterior) Forearm <1 day              Drain  Duration             Urethral Catheter Latex 16 Fr. <1 day                    Physical Exam:    General: No acute distress, Alert, and elderly, frail  HEENT/NECK:  Normocephalic. Atraumatic.  No jugular venous distention.    Cardiac: Regular rate and rhythm and No murmurs/rubs/gallops  Pulmonary:  Breath sounds clear bilaterally  Abdomen:  Non-tender, Non-distended, and Normal bowel sounds  Incisions: Groin puncture sites clean, dry, and intact without hematoma  Extremities: Extremities warm/dry and No edema B/L  Neuro: Alert and oriented X 3 and No focal deficits  Skin: Warm/Dry, without rashes or lesions.    Assessment:  Principal Problem:    Severe aortic stenosis  Active Problems:    History of pulmonary embolism    Hypertension    Hypothyroidism    CKD (chronic kidney disease), stage II    MIAH (obstructive sleep apnea)    CAD (coronary artery disease)    Chronic diastolic congestive heart failure (HCC)    GERD (gastroesophageal reflux disease)    S/P TAVR (transcatheter aortic valve replacement)       Severe AS S/P L TF TAVR 23mm; POD # 1    Plan:    Cardiac:     Normal ventricular systolic function, EF 60%    NSR with new post op LBBB  EP eval pending    HTN Regimen:  Continue home losartan 50mg daily  Hold home BB due to new LBBB for now  Discuss with EP for resuming given HR 90s, no HB seen, vs continuing to hold    Likely will need Zio patch for discharge    TAVR  anticoagulation regimen: ASA/Coumadin (new to ASA)  Systemic anticoagulation indicated for hx of DVT/PE  INR 1.30, Dose Coumadin, 7.5 mg today    Postoperative transthoracic echocardiogram:  Echo to be completed today    Continue Statin therapy    Maintain central IV access today for lack of peripheral access    Continue Subcutaneous Heparin for DVT prophylaxis    Pulmonary:     Good Room air oxygen saturation; Continue incentive spirometry/Coughing/Deep breathing exercises    Renal:     Post op Creatinine stable; Follow up labs prn     Intake/Output net: -968 mL/24 hours    Diuretic Regimen:  Stop further torsemide  Euvolemic    Hypokalemic, K 3.3, Replete K    Neuro:    Neurologically intact; No active issues     Incisional pain well-controlled; Continue prn Tylenol    GI:    NPO for EP eval    + Flatus    Continue stool softeners and prn suppository    Continue GI prophylaxis    Endo:     Glucose well-controlled with insulin sliding scale coverage    7.  Hematology:     Post-operative acute blood loss anemia; Hemoglobin 12.9; trend prn    8.  Disposition:    Gerontology consultation already completed for routine assessment of TAVR patient over 65 years old    Anticipated discharge date: 24-48hrs hours pending EP input/rhythm, likely need Zio patch for discharge      VTE Pharmacologic Prophylaxis: Heparin  VTE Mechanical Prophylaxis: sequential compression device    Collaborative rounds completed with supervising physician  Plan of care discussed with bedside nurse    SIGNATURE: Jagjit Magana PA-C  DATE: June 28, 2024  TIME: 8:30 AM

## 2024-06-28 NOTE — TELEPHONE ENCOUNTER
Called [employer] to see if auth is required for 28 day, Kiarra MONTERROSO Spoke to Maribel. She stated that [employer] does not compete auths for this CPT code which typically means no auth is required. She asked that I verify coverage through member services.    Call reference # Maribel ATKINSON, 06/28/2024    Called [employer] again and spoke to Davis  in members services. He confirmed no auth is required.     Up to 28 days - 2 units  Interpretation: 01695 - NO AUTH REQUIRED    Dx. I45.4    Call reference # [id number]

## 2024-06-28 NOTE — TELEPHONE ENCOUNTER
Call from Shanta Tee PA-C, patient is post TAVR and will require Zio AT X28 days, being discharged tomorrow and she will place the first of two monitors on patient before discharge tomorrow.  I will register serial number today so registration is complete for transmissions.  Shanta will let office know if we are to mail 2nd monitor or schedule her for a return visit to office to place.    I will ask Meliza Lozano to check if Zio AT requires auth.

## 2024-06-28 NOTE — CASE MANAGEMENT
Case Management Discharge Planning Note    Patient name Anneliese Garcia  Location [facility] 424/[facility] 424-01 MRN [id number]  : 1935 Date 2024       Current Admission Date: 2024  Current Admission Diagnosis:Severe aortic stenosis   Patient Active Problem List    Diagnosis Date Noted Date Diagnosed    S/P TAVR (transcatheter aortic valve replacement) 2024     CAD (coronary artery disease)      Chronic diastolic congestive heart failure (HCC)      GERD (gastroesophageal reflux disease)      Severe aortic stenosis 2024     QT prolongation 2023     History of DVT (deep vein thrombosis) 2022     Functional diarrhea 2020     Gastroesophageal reflux disease with esophagitis 2020     Hypercoagulable state (HCC) 2020     Arthritis of right sacroiliac joint      Osteoarthritis 2020     Supervision of normal first pregnancy 2020     Hyperlipidemia 2018     Vitamin D deficiency 2018     Osteoporosis 2018     CKD (chronic kidney disease), 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     Sjogren's syndrome (HCC)      History of pulmonary embolism      Hypertension      Hypothyroidism      Carpal tunnel syndrome 2008     MIAH (obstructive sleep apnea) 2008     Pure hypercholesterolemia 2008     Restless legs syndrome (RLS) 2008     Spinal stenosis of lumbar region without neurogenic claudication 2008       LOS (days): 1  Geometric Mean LOS (GMLOS) (days): 1.3  Days to GMLOS:0.3     OBJECTIVE:  Risk of Unplanned Readmission Score: 11.99         Current admission status: Inpatient   Preferred Pharmacy:   [id number] [place] - [address]  [address] [place] [address]  Phone: [phone] Fax: [phone]    Primary Care Provider: Monty Laboy MD    Primary Insurance: [employer]  Secondary Insurance:     DISCHARGE DETAILS:    Discharge planning discussed with:: patient  Freedom of  Choice: Yes     CM contacted family/caregiver?: No- see comments  Were Treatment Team discharge recommendations reviewed with patient/caregiver?: Yes  Did patient/caregiver verbalize understanding of patient care needs?: Yes  Were patient/caregiver advised of the risks associated with not following Treatment Team discharge recommendations?: Yes                   Other Referral/Resources/Interventions Provided:  Referral Comments: S/w therapy & informed pt will need home therapy. Disucssed iwth pt & she is declining.

## 2024-06-28 NOTE — PLAN OF CARE
Problem: PHYSICAL THERAPY ADULT  Goal: Performs mobility at highest level of function for planned discharge setting.  See evaluation for individualized goals.  Description: Treatment/Interventions: Functional transfer training, LE strengthening/ROM, Elevations, Therapeutic exercise, Endurance training, Equipment eval/education, Bed mobility, Gait training, Spoke to nursing, OT (PT spoke to case management; Jagjit RODAS w/ CT surgery.)  Equipment Recommended: Walker (`)       See flowsheet documentation for full assessment, interventions and recommendations.  Note: Prognosis: Good  Problem List: Decreased strength, Decreased endurance, Impaired balance, Decreased mobility, Decreased coordination  Assessment: Pt is an 88 year old female admitted on 6/27/2024 with a diagnosis of Symptomatic severe aortic stenosis. Pt underwent Transcatheter aortic valve replacement procedure on 6/27/2024. Pt's comorbidities affecting POC include: history of CAD (coronary artery disease), CHF (congestive heart failure), Chronic pain, CKD stage II, History of DVT (deep vein thrombosis), History of pulmonary embolism, Hypertension, IBS (irritable bowel syndrome), MIAH (obstructive sleep apnea), Osteoarthritis, Osteoporosis, RLS (restless legs syndrome), and Sjogren's syndrome. Her personal factors consist of: advanced age and MCKENNA. Pt's clinical presentation is currently unstable/unpredictable which is evident in ongoing telem monitoring, and pending medical work-up w/ pending EP consult. Pt presents w/ overall postop weakness, incl min decreased LE strength, decreased functional endurance and activity tolerance, impaired balance, and gait deviations. Will continue to follow pt in PT for progressive mobilization to address above functional deficits and to maximize her level of independence, endurance, and safety. D/C recommendations are outlined below. Will continue to follow until medically cleared for D/C.        Rehab Resource Intensity  Level, PT: III (Minimum Resource Intensity)    See flowsheet documentation for full assessment.

## 2024-06-28 NOTE — OCCUPATIONAL THERAPY NOTE
Occupational Therapy Evaluation     Patient Name: Anneliese Garcia  Today's Date: 6/28/2024  Problem List  Principal Problem:    Severe aortic stenosis  Active Problems:    History of pulmonary embolism    Hypertension    Hypothyroidism    CKD (chronic kidney disease), stage II    MIAH (obstructive sleep apnea)    CAD (coronary artery disease)    Chronic diastolic congestive heart failure (HCC)    GERD (gastroesophageal reflux disease)    S/P TAVR (transcatheter aortic valve replacement)    Past Medical History  Past Medical History:   Diagnosis Date    CAD (coronary artery disease)     CHF (congestive heart failure) (HCC)     Chronic pain     s/p spinal stimulator    CKD (chronic kidney disease), stage II     baseline Cr 0.8-1.0    GERD (gastroesophageal reflux disease)     History of DVT (deep vein thrombosis)     multiple, Coumadin    History of pulmonary embolism     multiple, Coumadin    History of squamous cell carcinoma excision     Hyperlipidemia     Hypertension     Hypothyroidism     IBS (irritable bowel syndrome)     diarrheal type    MIAH (obstructive sleep apnea)     no CPAP or BiPAP    Osteoarthritis     Osteoporosis     RLS (restless legs syndrome)     Severe aortic stenosis     Sjogren's syndrome (HCC)      Past Surgical History  Past Surgical History:   Procedure Laterality Date    CARDIAC CATHETERIZATION N/A 06/06/2024    Procedure: Cardiac catheterization;  Surgeon: Taj Stafford DO;  [facility]: [facility];  Service: Cardiology    CARDIAC CATHETERIZATION N/A 6/27/2024    Procedure: Cardiac tavr;  Surgeon: Pascual Koo MD;  Location: [facility];  Service: Cardiology    COLONOSCOPY  07/18/2016     grade 2 internal hemorrhoids but otherwise normal, pathology negative for microscopic colitis    PA REPLACE AORTIC VALVE OPENFEMORAL ARTERY APPROACH N/A 6/27/2024    Procedure: REPLACEMENT AORTIC VALVE TRANSCATHETER (TAVR) TRANSFEMORAL W/ 23MM  S3 UR VALVE(ACCESS ON LEFT) LEDY;   "Surgeon: JESSICA Pal MD;  Location: [facility];  Service: Cardiac Surgery    REPAIR RECTOCELE      SPINAL STIMULATOR PLACEMENT      SPINE SURGERY      fusion L1-L5    SQUAMOUS CELL CARCINOMA EXCISION      TOE SURGERY      TONSILLECTOMY AND ADENOIDECTOMY      TOTAL ABDOMINAL HYSTERECTOMY      TOTAL KNEE ARTHROPLASTY Bilateral     TOTAL SHOULDER REPLACEMENT Right     UPPER GASTROINTESTINAL ENDOSCOPY  01/09/2015     reflux esophagitis, gastritis, duodenitis in the bulb, pathology negative for H pylori, Puckett's, EOE        06/28/24 0957   OT Last Visit   OT Visit Date 06/28/24   Note Type   Note type Evaluation   Pain Assessment   Pain Assessment Tool 0-10   Pain Score No Pain   Restrictions/Precautions   Weight Bearing Precautions Per Order No   Other Precautions Cardiac/sternal;Chair Alarm;Bed Alarm;Multiple lines;Telemetry;Hard of hearing   Home Living   Type of Home House   Home Layout Two level;Able to live on main level with bedroom/bathroom;Performs ADLs on one level;Stairs to enter with rails  (3 MCKENNA)   Bathroom Shower/Tub Tub/shower unit   Bathroom Toilet Raised   Bathroom Equipment Grab bars in shower;Shower chair   Bathroom Accessibility Accessible   Home Equipment Walker;Cane  (uses at times in the community)   Additional Comments Pt lives in a 2 SH with 3 MCKENNA, uses a FFSU with tub shower with GB and shower chair, raised toilet   Prior Function   Level of [place] Independent with ADLs;Independent with functional mobility;Independent with IADLS   Lives With Spouse   Receives Help From Family   IADLs Independent with driving;Independent with meal prep;Independent with medication management   Falls in the last 6 months 0   Vocational Retired   Lifestyle   Autonomy Pta pt I with ADL, IADL and functional mobility, (+)    Reciprocal Relationships supportive spouse   Service to Others retired   Intrinsic Gratification enjoys walking   Subjective   Subjective \"I often walk around using my " "furniture at home\"   ADL   Where Assessed Chair   Eating Assistance 6  Modified independent   Grooming Assistance 6  Modified Independent   UB Bathing Assistance 5  Supervision/Setup   LB Bathing Assistance 5  Supervision/Setup   UB Dressing Assistance 6  Modified independent   LB Dressing Assistance 5  Supervision/Setup   Toileting Assistance  5  Supervision/Setup   Functional Assistance 5  Supervision/Setup   Bed Mobility   Sit to Supine 5  Supervision   Additional items HOB elevated;Increased time required;Verbal cues   Additional Comments Pt greeted in chair, left in bed with alarm on and all needs within reach   Transfers   Sit to Stand 5  Supervision   Additional items Verbal cues   Stand to Sit 5  Supervision   Additional items Verbal cues   Additional Comments with RW   Functional Mobility   Functional Mobility 5  Supervision   Additional Comments Pt performs short household distance mobility with supervision with RW   Additional items Rolling walker   Balance   Static Sitting Good   Dynamic Sitting Fair +   Static Standing Fair   Dynamic Standing Fair -   Ambulatory Fair -   Activity Tolerance   Activity Tolerance Patient tolerated treatment well   Medical Staff Made Aware Co-eval with student PT and DPT due to medical complexity   Nurse Made Aware RN cleared for therapy   RUE Assessment   RUE Assessment WFL   LUE Assessment   LUE Assessment WFL   Hand Function   Gross Motor Coordination Functional   Fine Motor Coordination Functional   Sensation   Light Touch No apparent deficits   Cognition   Overall Cognitive Status WFL   Arousal/Participation Alert;Cooperative   Attention Attends with cues to redirect   Orientation Level Oriented to person;Oriented to place;Oriented to situation   Memory Decreased recall of precautions   Following Commands Follows one step commands without difficulty   Comments Pt cooperative to therapy although requiring vc for safety at times.   Assessment   Prognosis Good "   Assessment Pt is a 88 y.o. female who was admitted to [facility] on 6/27/2024 with Severe aortic stenosis, s/p TAVR. Pt seen for an OT evaluation per active OT orders.  Pt  has a past medical history of CAD (coronary artery disease), CHF (congestive heart failure) ([facility]), Chronic pain, CKD (chronic kidney disease), stage II, GERD (gastroesophageal reflux disease), History of DVT (deep vein thrombosis), History of pulmonary embolism, History of squamous cell carcinoma excision, Hyperlipidemia, Hypertension, Hypothyroidism, IBS (irritable bowel syndrome), MIAH (obstructive sleep apnea), Osteoarthritis, Osteoporosis, RLS (restless legs syndrome), Severe aortic stenosis, and Sjogren's syndrome (HCC). Pt lives in a [facility] with 3 [facility], uses a tub shower with shower chair and GB, raised toilet. Pta, pt was independent w/ ADL/IADL and functional mobility, was (+) driving and was not using any DME at baseline. Currently, pt is Mod I for UB ADL, Supervision for LB ADL, and completed transfers/FM w Supervision.   The patient's raw score on the AM-PAC Daily Activity Inpatient Short Form is 21. A raw score of greater than or equal to 19 suggests the patient may benefit from discharge to home. Please refer to the recommendation of the Occupational Therapist for safe discharge planning. Pt is likely close to functional baseline and has adequate assist at home, indicating no further acute OT needs at this time, d/c acute OT. From OT standpoint, recommend home with increased social support, level III services upon D/C. Pt was left supine in bed with alarm on and all needs within reach.   Goals   Patient Goals to get better   Plan   OT Frequency Eval only   Discharge Recommendation   Rehab Resource Intensity Level, OT III (Minimum Resource Intensity)   AM-PAC Daily Activity Inpatient   Lower Body Dressing 3   Bathing 3   Toileting 3   Upper Body Dressing 4   Grooming 4   Eating 4   Daily Activity Raw Score 21   Daily Activity  Standardized Score (Calc for Raw Score >=11) 44.27   AM-PAC Applied Cognition Inpatient   Following a Speech/Presentation 3   Understanding Ordinary Conversation 4   Taking Medications 4   Remembering Where Things Are Placed or Put Away 3   Remembering List of 4-5 Errands 3   Taking Care of Complicated Tasks 3   Applied Cognition Raw Score 20   Applied Cognition Standardized Score 41.76   End of Consult   Education Provided Yes  (Pt received education packet, Recovering from Minimally Invasive Cardiac Surgery, verbalizes understanding.)   Patient Position at End of Consult Supine;Bed/Chair alarm activated;All needs within reach   Nurse Communication Nurse aware of consult       KAI White, OTR/L

## 2024-06-28 NOTE — RESTORATIVE TECHNICIAN NOTE
Restorative Technician Note      Patient Name: Anneliese Garcia     Restorative Tech Visit Date: 06/28/24  Note Type: Mobility  Patient Position Upon Consult: Bedside chair  Activity Performed: Ambulated  Assistive Device: Other (Comment) (held into DASH)  Patient Position at End of Consult: All needs within reach; Bedside chair; Bed/Chair alarm activated

## 2024-06-28 NOTE — PLAN OF CARE
Problem: Prexisting or High Potential for Compromised Skin Integrity  Goal: Skin integrity is maintained or improved  Description: INTERVENTIONS:  - Identify patients at risk for skin breakdown  - Assess and monitor skin integrity  - Assess and monitor nutrition and hydration status  - Monitor labs   - Assess for incontinence   - Turn and reposition patient  - Assist with mobility/ambulation  - Relieve pressure over bony prominences  - Avoid friction and shearing  - Provide appropriate hygiene as needed including keeping skin clean and dry  - Evaluate need for skin moisturizer/barrier cream  - Collaborate with interdisciplinary team   - Patient/family teaching  - Consider wound care consult   Outcome: Progressing     Problem: PAIN - ADULT  Goal: Verbalizes/displays adequate comfort level or baseline comfort level  Description: Interventions:  - Encourage patient to monitor pain and request assistance  - Assess pain using appropriate pain scale  - Administer analgesics based on type and severity of pain and evaluate response  - Implement non-pharmacological measures as appropriate and evaluate response  - Consider cultural and social influences on pain and pain management  - Notify physician/advanced practitioner if interventions unsuccessful or patient reports new pain  Outcome: Progressing     Problem: INFECTION - ADULT  Goal: Absence or prevention of progression during hospitalization  Description: INTERVENTIONS:  - Assess and monitor for signs and symptoms of infection  - Monitor lab/diagnostic results  - Monitor all insertion sites, i.e. indwelling lines, tubes, and drains  - Monitor endotracheal if appropriate and nasal secretions for changes in amount and color  - [place] appropriate cooling/warming therapies per order  - Administer medications as ordered  - Instruct and encourage patient and family to use good hand hygiene technique  - Identify and instruct in appropriate isolation precautions for  identified infection/condition  Outcome: Progressing  Goal: Absence of fever/infection during neutropenic period  Description: INTERVENTIONS:  - Monitor WBC    Outcome: Progressing     Problem: SAFETY ADULT  Goal: Patient will remain free of falls  Description: INTERVENTIONS:  - Educate patient/family on patient safety including physical limitations  - Instruct patient to call for assistance with activity   - Consult OT/PT to assist with strengthening/mobility   - Keep Call bell within reach  - Keep bed low and locked with side rails adjusted as appropriate  - Keep care items and personal belongings within reach  - Initiate and maintain comfort rounds  - Make Fall Risk Sign visible to staff  - Offer Toileting every 2 Hours, in advance of need  - Initiate/Maintain alarm  - Obtain necessary fall risk management equipment:   - Apply yellow socks and bracelet for high fall risk patients  - Consider moving patient to room near nurses station  Outcome: Progressing  Goal: Maintain or return to baseline ADL function  Description: INTERVENTIONS:  -  Assess patient's ability to carry out ADLs; assess patient's baseline for ADL function and identify physical deficits which impact ability to perform ADLs (bathing, care of mouth/teeth, toileting, grooming, dressing, etc.)  - Assess/evaluate cause of self-care deficits   - Assess range of motion  - Assess patient's mobility; develop plan if impaired  - Assess patient's need for assistive devices and provide as appropriate  - Encourage maximum independence but intervene and supervise when necessary  - Involve family in performance of ADLs  - Assess for home care needs following discharge   - Consider OT consult to assist with ADL evaluation and planning for discharge  - Provide patient education as appropriate  Outcome: Progressing  Goal: Maintains/Returns to pre admission functional level  Description: INTERVENTIONS:  - Perform AM-PAC 6 Click Basic Mobility/ Daily Activity  assessment daily.  - Set and communicate daily mobility goal to care team and patient/family/caregiver.   - Collaborate with rehabilitation services on mobility goals if consulted  - Perform Range of Motion 4 times a day.  - Reposition patient every 2 hours.  - Dangle patient 4 times a day  - Stand patient 4 times a day  - Ambulate patient 4 times a day  - Out of bed to chair 3 times a day   - Out of bed for meals 3 times a day  - Out of bed for toileting  - Record patient progress and toleration of activity level   Outcome: Progressing     Problem: DISCHARGE PLANNING  Goal: Discharge to home or other facility with appropriate resources  Description: INTERVENTIONS:  - Identify barriers to discharge w/patient and caregiver  - Arrange for needed discharge resources and transportation as appropriate  - Identify discharge learning needs (meds, wound care, etc.)  - Arrange for interpretive services to assist at discharge as needed  - Refer to Case Management Department for coordinating discharge planning if the patient needs post-hospital services based on physician/advanced practitioner order or complex needs related to functional status, cognitive ability, or social support system  Outcome: Progressing     Problem: Knowledge Deficit  Goal: Patient/family/caregiver demonstrates understanding of disease process, treatment plan, medications, and discharge instructions  Description: Complete learning assessment and assess knowledge base.  Interventions:  - Provide teaching at level of understanding  - Provide teaching via preferred learning methods  Outcome: Progressing     Problem: NEUROSENSORY - ADULT  Goal: Achieves stable or improved neurological status  Description: INTERVENTIONS  - Monitor and report changes in neurological status  - Monitor vital signs such as temperature, blood pressure, glucose, and any other labs ordered   - Initiate measures to prevent increased intracranial pressure  - Monitor for seizure  activity and implement precautions if appropriate      Outcome: Progressing  Goal: Remains free of injury related to seizures activity  Description: INTERVENTIONS  - Maintain airway, patient safety  and administer oxygen as ordered  - Monitor patient for seizure activity, document and report duration and description of seizure to physician/advanced practitioner  - If seizure occurs,  ensure patient safety during seizure  - Reorient patient post seizure  - Seizure pads on all 4 side rails  - Instruct patient/family to notify RN of any seizure activity including if an aura is experienced  - Instruct patient/family to call for assistance with activity based on nursing assessment  - Administer anti-seizure medications if ordered    Outcome: Progressing  Goal: Achieves maximal functionality and self care  Description: INTERVENTIONS  - Monitor swallowing and airway patency with patient fatigue and changes in neurological status  - Encourage and assist patient to increase activity and self care.   - Encourage visually impaired, hearing impaired and aphasic patients to use assistive/communication devices  Outcome: Progressing     Problem: CARDIOVASCULAR - ADULT  Goal: Maintains optimal cardiac output and hemodynamic stability  Description: INTERVENTIONS:  - Monitor I/O, vital signs and rhythm  - Monitor for S/S and trends of decreased cardiac output  - Administer and titrate ordered vasoactive medications to optimize hemodynamic stability  - Assess quality of pulses, skin color and temperature  - Assess for signs of decreased coronary artery perfusion  - Instruct patient to report change in severity of symptoms  Outcome: Progressing  Goal: Absence of cardiac dysrhythmias or at baseline rhythm  Description: INTERVENTIONS:  - Continuous cardiac monitoring, vital signs, obtain 12 lead EKG if ordered  - Administer antiarrhythmic and heart rate control medications as ordered  - Monitor electrolytes and administer replacement  therapy as ordered  Outcome: Progressing     Problem: RESPIRATORY - ADULT  Goal: Achieves optimal ventilation and oxygenation  Description: INTERVENTIONS:  - Assess for changes in respiratory status  - Assess for changes in mentation and behavior  - Position to facilitate oxygenation and minimize respiratory effort  - Oxygen administered by appropriate delivery if ordered  - Initiate smoking cessation education as indicated  - Encourage broncho-pulmonary hygiene including cough, deep breathe, Incentive Spirometry  - Assess the need for suctioning and aspirate as needed  - Assess and instruct to report SOB or any respiratory difficulty  - Respiratory Therapy support as indicated  Outcome: Progressing     Problem: GASTROINTESTINAL - ADULT  Goal: Minimal or absence of nausea and/or vomiting  Description: INTERVENTIONS:  - Administer IV fluids if ordered to ensure adequate hydration  - Maintain NPO status until nausea and vomiting are resolved  - Nasogastric tube if ordered  - Administer ordered antiemetic medications as needed  - Provide nonpharmacologic comfort measures as appropriate  - Advance diet as tolerated, if ordered  - Consider nutrition services referral to assist patient with adequate nutrition and appropriate food choices  Outcome: Progressing  Goal: Maintains or returns to baseline bowel function  Description: INTERVENTIONS:  - Assess bowel function  - Encourage oral fluids to ensure adequate hydration  - Administer IV fluids if ordered to ensure adequate hydration  - Administer ordered medications as needed  - Encourage mobilization and activity  - Consider nutritional services referral to assist patient with adequate nutrition and appropriate food choices  Outcome: Progressing  Goal: Maintains adequate nutritional intake  Description: INTERVENTIONS:  - Monitor percentage of each meal consumed  - Identify factors contributing to decreased intake, treat as appropriate  - Assist with meals as needed  -  Monitor I&O, weight, and lab values if indicated  - Obtain nutrition services referral as needed  Outcome: Progressing  Goal: Establish and maintain optimal ostomy function  Description: INTERVENTIONS:  - Assess bowel function  - Encourage oral fluids to ensure adequate hydration  - Administer IV fluids if ordered to ensure adequate hydration   - Administer ordered medications as needed  - Encourage mobilization and activity  - Nutrition services referral to assist patient with appropriate food choices  - Assess stoma site  - Consider wound care consult   Outcome: Progressing  Goal: Oral mucous membranes remain intact  Description: INTERVENTIONS  - Assess oral mucosa and hygiene practices  - Implement preventative oral hygiene regimen  - Implement oral medicated treatments as ordered  - Initiate Nutrition services referral as needed  Outcome: Progressing     Problem: GENITOURINARY - ADULT  Goal: Maintains or returns to baseline urinary function  Description: INTERVENTIONS:  - Assess urinary function  - Encourage oral fluids to ensure adequate hydration if ordered  - Administer IV fluids as ordered to ensure adequate hydration  - Administer ordered medications as needed  - Offer frequent toileting  - Follow urinary retention protocol if ordered  Outcome: Progressing  Goal: Absence of urinary retention  Description: INTERVENTIONS:  - Assess patient’s ability to void and empty bladder  - Monitor I/O  - Bladder scan as needed  - Discuss with physician/AP medications to alleviate retention as needed  - Discuss catheterization for long term situations as appropriate  Outcome: Progressing  Goal: Urinary catheter remains patent  Description: INTERVENTIONS:  - Assess patency of urinary catheter  - If patient has a chronic chwo, consider changing catheter if non-functioning  - Follow guidelines for intermittent irrigation of non-functioning urinary catheter  Outcome: Progressing     Problem: METABOLIC, FLUID AND ELECTROLYTES  - ADULT  Goal: Electrolytes maintained within normal limits  Description: INTERVENTIONS:  - Monitor labs and assess patient for signs and symptoms of electrolyte imbalances  - Administer electrolyte replacement as ordered  - Monitor response to electrolyte replacements, including repeat lab results as appropriate  - Instruct patient on fluid and nutrition as appropriate  Outcome: Progressing  Goal: Fluid balance maintained  Description: INTERVENTIONS:  - Monitor labs   - Monitor I/O and WT  - Instruct patient on fluid and nutrition as appropriate  - Assess for signs & symptoms of volume excess or deficit  Outcome: Progressing  Goal: Glucose maintained within target range  Description: INTERVENTIONS:  - Monitor Blood Glucose as ordered  - Assess for signs and symptoms of hyperglycemia and hypoglycemia  - Administer ordered medications to maintain glucose within target range  - Assess nutritional intake and initiate nutrition service referral as needed  Outcome: Progressing     Problem: SKIN/TISSUE INTEGRITY - ADULT  Goal: Skin Integrity remains intact(Skin Breakdown Prevention)  Description: Assess:  -Perform [place] assessment every   -Clean and moisturize skin every   -Inspect skin when repositioning, toileting, and assisting with ADLS  -Assess under medical devices such as  every   -Assess extremities for adequate circulation and sensation     Bed Management:  -Have minimal linens on bed & keep smooth, unwrinkled  -Change linens as needed when moist or perspiring  -Avoid sitting or lying in one position for more than  hours while in bed  -Keep HOB at degrees     Toileting:  -Offer bedside commode  -Assess for incontinence every   -Use incontinent care products after each incontinent episode such as     Activity:  -Mobilize patient  times a day  -Encourage activity and walks on unit  -Encourage or provide ROM exercises   -Turn and reposition patient every  Hours  -Use appropriate equipment to lift or move patient in  bed  -Instruct/ Assist with weight shifting every  when out of bed in chair  -Consider limitation of chair time  hour intervals    Skin Care:  -Avoid use of baby powder, tape, friction and shearing, hot water or constrictive clothing  -Relieve pressure over bony prominences using   -Do not massage red bony areas    Next Steps:  -Teach patient strategies to minimize risks such as    -Consider consults to  interdisciplinary teams such as   Outcome: Progressing  Goal: Incision(s), wounds(s) or drain site(s) healing without S/S of infection  Description: INTERVENTIONS  - Assess and document dressing, incision, wound bed, drain sites and surrounding tissue  - Provide patient and family education  - Perform skin care/dressing changes every   Outcome: Progressing  Goal: Pressure injury heals and does not worsen  Description: Interventions:  - Implement low air loss mattress or specialty surface (Criteria met)  - Apply silicone foam dressing  - Instruct/assist with weight shifting every  minutes when in chair   - Limit chair time to  hour intervals  - Use special pressure reducing interventions such as  when in chair   - Apply fecal or urinary incontinence containment device   - Perform passive or active ROM every   - Turn and reposition patient & offload bony prominences every  hours   - Utilize friction reducing device or surface for transfers   - Consider consults to  interdisciplinary teams such as   - Use incontinent care products after each incontinent episode such as  - Consider nutrition services referral as needed  Outcome: Progressing     Problem: HEMATOLOGIC - ADULT  Goal: Maintains hematologic stability  Description: INTERVENTIONS  - Assess for signs and symptoms of bleeding or hemorrhage  - Monitor labs  - Administer supportive blood products/factors as ordered and appropriate  Outcome: Progressing     Problem: MUSCULOSKELETAL - ADULT  Goal: Maintain or return mobility to safest level of function  Description:  INTERVENTIONS:  - Assess patient's ability to carry out ADLs; assess patient's baseline for ADL function and identify physical deficits which impact ability to perform ADLs (bathing, care of mouth/teeth, toileting, grooming, dressing, etc.)  - Assess/evaluate cause of self-care deficits   - Assess range of motion  - Assess patient's mobility  - Assess patient's need for assistive devices and provide as appropriate  - Encourage maximum independence but intervene and supervise when necessary  - Involve family in performance of ADLs  - Assess for home care needs following discharge   - Consider OT consult to assist with ADL evaluation and planning for discharge  - Provide patient education as appropriate  Outcome: Progressing  Goal: Maintain proper alignment of affected body part  Description: INTERVENTIONS:  - Support, maintain and protect limb and body alignment  - Provide patient/ family with appropriate education  Outcome: Progressing     Problem: Nutrition/Hydration-ADULT  Goal: Nutrient/Hydration intake appropriate for improving, restoring or maintaining nutritional needs  Description: Monitor and assess patient's nutrition/hydration status for malnutrition. Collaborate with interdisciplinary team and initiate plan and interventions as ordered.  Monitor patient's weight and dietary intake as ordered or per policy. Utilize nutrition screening tool and intervene as necessary. Determine patient's food preferences and provide high-protein, high-caloric foods as appropriate.     INTERVENTIONS:  - Monitor oral intake, urinary output, labs, and treatment plans  - Assess nutrition and hydration status and recommend course of action  - Evaluate amount of meals eaten  - Assist patient with eating if necessary   - Allow adequate time for meals  - Recommend/ encourage appropriate diets, oral nutritional supplements, and vitamin/mineral supplements  - Order, calculate, and assess calorie counts as needed  - Recommend,  monitor, and adjust tube feedings and TPN/PPN based on assessed needs  - Assess need for intravenous fluids  - Provide specific nutrition/hydration education as appropriate  - Include patient/family/caregiver in decisions related to nutrition  Outcome: Progressing

## 2024-06-29 VITALS
DIASTOLIC BLOOD PRESSURE: 56 MMHG | TEMPERATURE: 98 F | WEIGHT: 120.81 LBS | SYSTOLIC BLOOD PRESSURE: 126 MMHG | BODY MASS INDEX: 22.23 KG/M2 | OXYGEN SATURATION: 96 % | HEIGHT: 62 IN | HEART RATE: 67 BPM | RESPIRATION RATE: 16 BRPM

## 2024-06-29 PROBLEM — I44.7 LBBB (LEFT BUNDLE BRANCH BLOCK): Status: ACTIVE | Noted: 2024-06-29

## 2024-06-29 LAB
ANION GAP SERPL CALCULATED.3IONS-SCNC: 11 MMOL/L (ref 4–13)
BUN SERPL-MCNC: 28 MG/DL (ref 5–25)
CALCIUM SERPL-MCNC: 9.5 MG/DL (ref 8.4–10.2)
CHLORIDE SERPL-SCNC: 100 MMOL/L (ref 96–108)
CO2 SERPL-SCNC: 27 MMOL/L (ref 21–32)
CREAT SERPL-MCNC: 1.17 MG/DL (ref 0.6–1.3)
ERYTHROCYTE [DISTWIDTH] IN BLOOD BY AUTOMATED COUNT: 14 % (ref 11.6–15.1)
GFR SERPL CREATININE-BSD FRML MDRD: 42 ML/MIN/1.73SQ M
GLUCOSE SERPL-MCNC: 101 MG/DL (ref 65–140)
GLUCOSE SERPL-MCNC: 111 MG/DL (ref 65–140)
GLUCOSE SERPL-MCNC: 114 MG/DL (ref 65–140)
GLUCOSE SERPL-MCNC: 115 MG/DL (ref 65–140)
HCT VFR BLD AUTO: 38.2 % (ref 34.8–46.1)
HGB BLD-MCNC: 12.7 G/DL (ref 11.5–15.4)
INR PPP: 2.15 (ref 0.84–1.19)
MCH RBC QN AUTO: 31.8 PG (ref 26.8–34.3)
MCHC RBC AUTO-ENTMCNC: 33.2 G/DL (ref 31.4–37.4)
MCV RBC AUTO: 96 FL (ref 82–98)
PLATELET # BLD AUTO: 165 THOUSANDS/UL (ref 149–390)
PMV BLD AUTO: 11.7 FL (ref 8.9–12.7)
POTASSIUM SERPL-SCNC: 4.2 MMOL/L (ref 3.5–5.3)
PROTHROMBIN TIME: 23.6 SECONDS (ref 11.6–14.5)
RBC # BLD AUTO: 3.99 MILLION/UL (ref 3.81–5.12)
SODIUM SERPL-SCNC: 138 MMOL/L (ref 135–147)
WBC # BLD AUTO: 10.4 THOUSAND/UL (ref 4.31–10.16)

## 2024-06-29 PROCEDURE — 80048 BASIC METABOLIC PNL TOTAL CA: CPT | Performed by: PHYSICIAN ASSISTANT

## 2024-06-29 PROCEDURE — 99238 HOSP IP/OBS DSCHRG MGMT 30/<: CPT | Performed by: PHYSICIAN ASSISTANT

## 2024-06-29 PROCEDURE — 82948 REAGENT STRIP/BLOOD GLUCOSE: CPT

## 2024-06-29 PROCEDURE — 93005 ELECTROCARDIOGRAM TRACING: CPT

## 2024-06-29 PROCEDURE — 85610 PROTHROMBIN TIME: CPT | Performed by: PHYSICIAN ASSISTANT

## 2024-06-29 PROCEDURE — 85027 COMPLETE CBC AUTOMATED: CPT | Performed by: PHYSICIAN ASSISTANT

## 2024-06-29 PROCEDURE — NC001 PR NO CHARGE: Performed by: THORACIC SURGERY (CARDIOTHORACIC VASCULAR SURGERY)

## 2024-06-29 PROCEDURE — 97116 GAIT TRAINING THERAPY: CPT

## 2024-06-29 RX ORDER — DOCUSATE SODIUM 100 MG/1
100 CAPSULE, LIQUID FILLED ORAL 2 TIMES DAILY
Qty: 60 CAPSULE | Refills: 0 | Status: SHIPPED | OUTPATIENT
Start: 2024-06-29 | End: 2024-07-29

## 2024-06-29 RX ORDER — ACETAMINOPHEN 325 MG/1
650 TABLET ORAL EVERY 4 HOURS PRN
Qty: 50 TABLET | Refills: 0 | Status: SHIPPED | OUTPATIENT
Start: 2024-06-29

## 2024-06-29 RX ORDER — ASPIRIN 81 MG/1
81 TABLET, CHEWABLE ORAL DAILY
Qty: 30 TABLET | Refills: 2 | Status: SHIPPED | OUTPATIENT
Start: 2024-06-30 | End: 2024-09-28

## 2024-06-29 RX ADMIN — CHLORHEXIDINE GLUCONATE 0.12% ORAL RINSE 15 ML: 1.2 LIQUID ORAL at 08:38

## 2024-06-29 RX ADMIN — MUPIROCIN 1 APPLICATION: 20 OINTMENT TOPICAL at 08:38

## 2024-06-29 RX ADMIN — ASPIRIN 81 MG CHEWABLE TABLET 81 MG: 81 TABLET CHEWABLE at 08:38

## 2024-06-29 RX ADMIN — PILOCARPINE HYDROCHLORIDE 5 MG: 5 TABLET, FILM COATED ORAL at 11:30

## 2024-06-29 RX ADMIN — PILOCARPINE HYDROCHLORIDE 5 MG: 5 TABLET, FILM COATED ORAL at 08:38

## 2024-06-29 RX ADMIN — METOPROLOL TARTRATE 25 MG: 25 TABLET, FILM COATED ORAL at 08:38

## 2024-06-29 RX ADMIN — PANTOPRAZOLE SODIUM 40 MG: 40 TABLET, DELAYED RELEASE ORAL at 06:01

## 2024-06-29 RX ADMIN — GLYCERIN 1 DROP: .002; .002; .01 SOLUTION/ DROPS OPHTHALMIC at 05:59

## 2024-06-29 RX ADMIN — LOSARTAN POTASSIUM 50 MG: 50 TABLET, FILM COATED ORAL at 08:38

## 2024-06-29 RX ADMIN — HEPARIN SODIUM 5000 UNITS: 5000 INJECTION INTRAVENOUS; SUBCUTANEOUS at 05:58

## 2024-06-29 RX ADMIN — LEVOTHYROXINE SODIUM 112 MCG: 112 TABLET ORAL at 05:58

## 2024-06-29 NOTE — DISCHARGE INSTRUCTIONS
Transcatheter Aortic Valve Replacement (TAVR)    What you need to know about a TAVR:     A TAVR is a procedure to replace your aortic valve. It is done without removing your old valve. The aortic valve opens and closes to let blood flow from your heart to the rest of your body.   Your valve has been replaced with a tissue valve. The tissue has been made from the lining that surrounds the heart of a cow.    Recovering from surgery:     There may be bruising or pain in your groin, where the valve was inserted. You may take over the counter acetaminophen (Tylenol) as needed for discomfort.  Your incision is sealed with surgical glue. This will fall off after a few weeks. Do not pick this off.   Do not drive for two weeks  Do not lift over 25 pounds for two weeks.   Shower every day. Keep your incision dry. Do not apply lotions, powders, or ointments to your incision.    Blood thinners after surgery:     You have been prescribed blood thinners to help prevent blood clots. Blood clots can cause strokes, heart attacks, and death.   While taking any blood thinner, watch for bleeding and bruising. Watch for blood in your urine and bowel movements. Use a soft washcloth on your skin, and a soft toothbrush to brush your teeth. If you shave, use an electric shaver. Do not play contact sports.    Do not start or stop any other medicines unless your healthcare provider tells you to do so. (Many medicines cannot be used with blood thinners)    Take your blood thinner exactly as prescribed by your healthcare provider. Do not skip a dose or take less than prescribed. Tell your provider right away if you forget to take your blood thinner, or if you took too much.    Call your doctor if:     You develop any bleeding from the incisions in your groin.   Your leg feels numb, cool, or looks pale.   You feel dizzy or lightheaded  Your groin puncture is red, swollen, or draining pus.  Your groin puncture looks more bruised/swollen or  you have new bruising on the side of your leg.   You have nausea or are vomiting.    Antibiotic Prophylaxis:     After TAVR, you are at an increased risk for developing a valve infection with many common dental procedures. Bacteria that normally lives in your mouth can cross into your bloodstream with any dental work (even cleanings). The immune system normally kills these bacteria, but antibiotic prophylaxis provides extra protection.   Inform your dentist that you have had a TAVR  Do not schedule routine dental cleaning for six months following surgery.   Antibiotics will be prescribed by your dentist, prior to your procedure              Blood Thinners    What you need to know:     You have been prescribed a blood thinner(s) to help prevent blood clots. Blood clots can cause strokes, heart attacks, and death. Examples of blood thinners include Aspirin, Plavix (clopidorel), Brilinta (ticagrelor), Xarelto (rivaroxaban), Pradaxa., (dabigatran), Eliquis (apixaban), and Coumadin (warfarin)    While taking any blood thinner, watch for bleeding and bruising. Watch for blood in your urine and bowel movements. Use a soft washcloth on your skin, and a soft toothbrush to brush your teeth. If you shave, use an electric shaver. Do not play contact sports.    Do not start or stop any other medicines unless your healthcare provider tells you to do so. (Many medicines cannot be used with blood thinners)    Take your blood thinner exactly as prescribed by your healthcare provider. Do not skip a dose or take less than prescribed. Tell your provider right away if you forget to take your blood thinner, or if you took too much.    Warfarin (Coumadin):     You will need a regular blood test called a PT/INR. This test checks how thin your blood is. Your doctor may change your Coumadin dose, based on blood test (INR) results.    Following discharge from the hospital, you will be contacted by the [facility] to order these  blood tests. If you do not hear from them within two days, contact the office at [phone]  It is not safe to take this medicine during pregnancy. It could harm an unborn baby. Tell your doctor right away if you become pregnant. Use an effective form of birth control to keep from getting pregnant during treatment and for at least 1 month after your last dose.    If you miss a dose do not take extra medicine to make up for a missed dose. Inform your healthcare provider.     Foods and medicines can affect your body's response to Coumadin. Warfarin works best when you eat about the same amount of vitamin K every day. Vitamin K is found in green leafy vegetables and certain other foods. Do not make major changes to your diet while you take Coumadin. Do not eat grapefruit or drink grapefruit juice while you are using this medicine.

## 2024-06-29 NOTE — PROGRESS NOTES
Progress Note - Cardiothoracic Surgery   Anneliese Garcia 88 y.o. female MRN: [id number]  Unit/Bed#: [facility] 424-01 Encounter: [id number]    Severe AS S/P L TF TAVR 23mm; POD # 2    24 Hour Events: New LBBB post op remains, restarted on lopressor 25 mg BID without worsening, EKG this AM remains with LBBB (and no change in QRS of 140, ).     No complaints, tolerating diet, (+) flatus, ambulating well.       Medications:   Scheduled Meds:  Current Facility-Administered Medications   Medication Dose Route Frequency Provider Last Rate    acetaminophen  650 mg Rectal Q4H PRN Stacey Welch PA-C      acetaminophen  650 mg Oral Q4H PRN Stacey Welch PA-C      Artificial Tears  1 drop Both Eyes BID Stacey Welch PA-C      aspirin  81 mg Oral Daily Stacey Welch PA-C      bisacodyl  10 mg Rectal Daily PRN Stacey Welch PA-C      calcium gluconate  2 g Intravenous Once PRN Stacey Welch PA-C      chlorhexidine  15 mL Mouth/Throat BID Stacey Welch PA-C      Diclofenac Sodium  2 g Topical 4x Daily PRN Stacey Welch PA-C      diphenoxylate-atropine  1 tablet Oral 4x Daily PRN Stacey Welch PA-C      heparin (porcine)  5,000 Units Subcutaneous Q8H [facility] Stacey Welch PA-C      hydrALAZINE  10 mg Intravenous Q6H PRN Stacey Welch PA-C      insulin lispro  1-5 Units Subcutaneous TID AC Stacey Welch PA-C      insulin lispro  1-5 Units Subcutaneous HS Stacey Welch PA-C      levothyroxine  112 mcg Oral Early Morning Stacey Welch PA-C      losartan  50 mg Oral Daily Jagjit Magana PA-C      metoprolol tartrate  25 mg Oral Q12H MANUEL Jagjit Magana PA-C      mupirocin  1 Application Nasal Q12H [facility] Stacey Welch PA-C      ondansetron  4 mg Intravenous Q6H PRN Stacey Welch PA-C      pantoprazole  40 mg Oral Daily Before Breakfast Stacey Welch PA-C      pilocarpine  5 mg Oral 4x Daily Stacey Welch PA-C      pravastatin  40 mg Oral Daily With Dinner Stacey Welch PA-C       Continuous  Infusions:     PRN Meds:.  acetaminophen    acetaminophen    bisacodyl    calcium gluconate    Diclofenac Sodium    diphenoxylate-atropine    hydrALAZINE    ondansetron    Vitals:   Vitals:    24 0307 24 0600 24 0735 24 0838   BP: 149/66  120/61 124/55   BP Location:       Pulse: 72  90 80   Resp: 16      Temp: 98.7 °F (37.1 °C)  97.8 °F (36.6 °C)    TempSrc:   Oral    SpO2: 93%  94% 95%   Weight:  54.8 kg (120 lb 13 oz)     Height:       Afebrile, 80s, 120s-140s    Telemetry: NSR with LBBB; Heart Rate: 76    Respiratory:   SpO2: SpO2: 95 %; RA    Intake/Output:     Intake/Output Summary (Last 24 hours) at 2024 0841  Last data filed at 2024 0735  Gross per 24 hour   Intake 750 ml   Output 1750 ml   Net -1000 ml    Net neg 1.24L    Weights:   Weight (last 2 days)       Date/Time Weight    24 0600 54.8 (120.81)    24 1058 55.2 (121.69)    24 0600 55.2 (121.69)    24 0926 55 (121.25)          Admit weight: 121    Results:   Results from last 7 days   Lab Units 24  0435 24  0429 24  1337   WBC Thousand/uL 10.40* 11.23*  --    HEMOGLOBIN g/dL 12.7 12.9 12.7   HEMATOCRIT % 38.2 38.3 37.0   PLATELETS Thousands/uL 165 173 155     Results from last 7 days   Lab Units 24  0435 24  0429 24  1337 24  1256   POTASSIUM mmol/L 4.2 3.3* 3.7  --    CHLORIDE mmol/L 100 101 107  --    CO2 mmol/L 27 26 25  --    CO2, I-STAT mmol/L  --   --   --  24   BUN mg/dL 28* 18 16  --    CREATININE mg/dL 1.17 0.92 0.73  --    GLUCOSE, ISTAT mg/dl  --   --   --  102   CALCIUM mg/dL 9.5 9.7 10.0  --      Results from last 7 days   Lab Units 24  0435 24  0429 24  1337   INR  2.15* 1.30* 1.39*       Date:   INR:  Coumadin Dose:                  2.15                  5                  1.30                 7.5    1.39  5    Point of care glucose: 115 - 133 (no hx of DM)    Studies:    EC/28  NSR with LBBB at 91  BPM    CXR: 6/28  No PTX, no pl effusions, mild vasc congestion      Echocardiogram:     Left Ventricle: Left ventricular cavity size is normal. Wall thickness is mildly increased. The left ventricular ejection fraction is 65%. Systolic function is normal. Wall motion is normal. Diastolic function is moderately abnormal, consistent with grade II (pseudonormal) relaxation.    Right Ventricle: Right ventricular cavity size is normal. Systolic function is normal.    Left Atrium: The atrium is dilated.    Aortic Valve: There is an Dunn KELL 3 Ultra 23 mm TAVR bioprosthetic valve. There is trace paravalvular regurgitation. The gradient recorded across the prosthetic aortic valve is within the expected range. The aortic valve mean gradient is 9 mmHg. The dimensionless velocity index is 0.61. The aortic valve area is 2.15 cm2.    Mitral Valve: There is moderate annular calcification. There is moderate regurgitation. There is no evidence of stenosis. The mitral valve mean gradient is 2mmHg.    Tricuspid Valve: There is moderate regurgitation.    Pulmonic Valve: There is mild regurgitation.    There is no pericardial effusion.       I have personally reviewed pertinent reports.   and I have personally reviewed pertinent films in PACS    Invasive Lines/Tubes:  Invasive Devices       Central Venous Catheter Line  Duration             CVC Central Lines 06/27/24 1 day              Peripheral Intravenous Line  Duration             Peripheral IV 06/27/24 Left;Ventral (anterior) Forearm 1 day                  Physical Exam:    General: No acute distress and Normal appearance  HEENT/NECK:  Normocephalic. Atraumatic.  No jugular venous distention.    Cardiac: Regular rate and rhythm and No murmurs/rubs/gallops  Pulmonary:  Breath sounds clear bilaterally and No rales/rhonchi/wheezes  Abdomen:  Non-tender, Non-distended, and Normal bowel sounds  Incisions: Groin puncture sites clean, dry, and intact without hematoma  Extremities:  Extremities warm/dry and No edema B/L  Neuro: Alert and oriented X 3 and No focal deficits  Skin: Warm/Dry, without rashes or lesions.     Assessment:  Principal Problem:    Severe aortic stenosis  Active Problems:    History of pulmonary embolism    Hypertension    Hypothyroidism    CKD (chronic kidney disease), stage II    MIAH (obstructive sleep apnea)    CAD (coronary artery disease)    Chronic diastolic congestive heart failure (HCC)    GERD (gastroesophageal reflux disease)    S/P TAVR (transcatheter aortic valve replacement)       Severe AS S/P L TF TAVR 23mm; POD # 2    Plan:    Cardiac:     Normal ventricular systolic function, EF 60%    NSR with new post op LBBB  EP cleared for discharge with tommyo    HTN Regimen:  Continue home losartan 50mg daily  Continue home lopressor 25 mg BID    TAVR anticoagulation regimen: ASA/Coumadin (new to ASA)  Systemic anticoagulation indicated for hx of DVT/PE  INR 2.15, Dose Coumadin, 5 mg today  Fri 6 mg  Wednesday/Saturday/Sunday 5 mg   Monday and Tuesday does not take it.     Postoperative transthoracic echocardiogram:  Echo completed; Well seated AV, without PVL    Continue Statin therapy    Maintain central IV access today for lack of peripheral access    D/C Subcutaneous Heparin for DVT prophylaxis    Pulmonary:     Good Room air oxygen saturation; Continue incentive spirometry/Coughing/Deep breathing exercises    Renal:     Post op Creatinine stable; Follow up labs prn     Intake/Output net: -1.24L/24 hours    Diuretic Regimen:  Stop further torsemide  Euvolemic    Hypokalemic- resolved    Neuro:    Neurologically intact; No active issues     Incisional pain well-controlled; Continue prn Tylenol    GI:    Cardiac diet/fluid restriction  + Flatus    Continue stool softeners and prn suppository    Continue GI prophylaxis    Endo:     Glucose well-controlled with insulin sliding scale coverage    7.  Hematology:     Post-operative blood count acceptable; Trend  prn  Leukocytosis; Afebrile with no signs of infection; Trend CBC prn    8.  Disposition:    Gerontology consultation already completed for routine assessment of TAVR patient over 65 years old    Anticipated discharge date: today w/ Zio patch for discharge      VTE Pharmacologic Prophylaxis: Heparin  VTE Mechanical Prophylaxis: sequential compression device    Collaborative rounds completed with supervising physician  Plan of care discussed with bedside nurse    SIGNATURE: Tamiko Porras PA-C  DATE: June 29, 2024  TIME: 8:41 AM

## 2024-06-29 NOTE — QUICK NOTE
EP Service    89 y/o female s/p TAVR with new LBBB  ms. Metoprolol restarted yesterday. Repeat EKG reviewed this morning with Dr. Odom. Stable -190 ms and stable LBBB with  ms. Telemetry reviewed documenting sinus rhythm with bundle branch block, no high grade AV block or bradycardia.         Recommend Ziopatch x 30 days for continued monitoring. Discussed with patient and she agreed.    Plan:  ZioPatch placed  Instructions reviewed with patient  F/U in 2 weeks in office to placed 2nd patch for total of 30 days of monitoring  Conitnue beta blocker  Stable for D/C from EP standpoint  Discussed with primary team

## 2024-06-29 NOTE — PLAN OF CARE
Problem: Prexisting or High Potential for Compromised Skin Integrity  Goal: Skin integrity is maintained or improved  Description: INTERVENTIONS:  - Identify patients at risk for skin breakdown  - Assess and monitor skin integrity  - Assess and monitor nutrition and hydration status  - Monitor labs   - Assess for incontinence   - Turn and reposition patient  - Assist with mobility/ambulation  - Relieve pressure over bony prominences  - Avoid friction and shearing  - Provide appropriate hygiene as needed including keeping skin clean and dry  - Evaluate need for skin moisturizer/barrier cream  - Collaborate with interdisciplinary team   - Patient/family teaching  - Consider wound care consult   Outcome: Progressing     Problem: PAIN - ADULT  Goal: Verbalizes/displays adequate comfort level or baseline comfort level  Description: Interventions:  - Encourage patient to monitor pain and request assistance  - Assess pain using appropriate pain scale  - Administer analgesics based on type and severity of pain and evaluate response  - Implement non-pharmacological measures as appropriate and evaluate response  - Consider cultural and social influences on pain and pain management  - Notify physician/advanced practitioner if interventions unsuccessful or patient reports new pain  Outcome: Progressing     Problem: INFECTION - ADULT  Goal: Absence or prevention of progression during hospitalization  Description: INTERVENTIONS:  - Assess and monitor for signs and symptoms of infection  - Monitor lab/diagnostic results  - Monitor all insertion sites, i.e. indwelling lines, tubes, and drains  - Monitor endotracheal if appropriate and nasal secretions for changes in amount and color  - [place] appropriate cooling/warming therapies per order  - Administer medications as ordered  - Instruct and encourage patient and family to use good hand hygiene technique  - Identify and instruct in appropriate isolation precautions for  identified infection/condition  Outcome: Progressing  Goal: Absence of fever/infection during neutropenic period  Description: INTERVENTIONS:  - Monitor WBC    Outcome: Progressing     Problem: SAFETY ADULT  Goal: Patient will remain free of falls  Description: INTERVENTIONS:  - Educate patient/family on patient safety including physical limitations  - Instruct patient to call for assistance with activity   - Consult OT/PT to assist with strengthening/mobility   - Keep Call bell within reach  - Keep bed low and locked with side rails adjusted as appropriate  - Keep care items and personal belongings within reach  - Initiate and maintain comfort rounds  - Make Fall Risk Sign visible to staff  - Offer Toileting every 2 Hours, in advance of need  - Initiate/Maintain alarm  - Obtain necessary fall risk management equipment:   - Apply yellow socks and bracelet for high fall risk patients  - Consider moving patient to room near nurses station  Outcome: Progressing  Goal: Maintain or return to baseline ADL function  Description: INTERVENTIONS:  -  Assess patient's ability to carry out ADLs; assess patient's baseline for ADL function and identify physical deficits which impact ability to perform ADLs (bathing, care of mouth/teeth, toileting, grooming, dressing, etc.)  - Assess/evaluate cause of self-care deficits   - Assess range of motion  - Assess patient's mobility; develop plan if impaired  - Assess patient's need for assistive devices and provide as appropriate  - Encourage maximum independence but intervene and supervise when necessary  - Involve family in performance of ADLs  - Assess for home care needs following discharge   - Consider OT consult to assist with ADL evaluation and planning for discharge  - Provide patient education as appropriate  Outcome: Progressing  Goal: Maintains/Returns to pre admission functional level  Description: INTERVENTIONS:  - Perform AM-PAC 6 Click Basic Mobility/ Daily Activity  assessment daily.  - Set and communicate daily mobility goal to care team and patient/family/caregiver.   - Collaborate with rehabilitation services on mobility goals if consulted  - Perform Range of Motion 4 times a day.  - Reposition patient every 2 hours.  - Dangle patient 4 times a day  - Stand patient 4 times a day  - Ambulate patient 4 times a day  - Out of bed to chair 3 times a day   - Out of bed for meals 3 times a day  - Out of bed for toileting  - Record patient progress and toleration of activity level   Outcome: Progressing     Problem: DISCHARGE PLANNING  Goal: Discharge to home or other facility with appropriate resources  Description: INTERVENTIONS:  - Identify barriers to discharge w/patient and caregiver  - Arrange for needed discharge resources and transportation as appropriate  - Identify discharge learning needs (meds, wound care, etc.)  - Arrange for interpretive services to assist at discharge as needed  - Refer to Case Management Department for coordinating discharge planning if the patient needs post-hospital services based on physician/advanced practitioner order or complex needs related to functional status, cognitive ability, or social support system  Outcome: Progressing     Problem: Knowledge Deficit  Goal: Patient/family/caregiver demonstrates understanding of disease process, treatment plan, medications, and discharge instructions  Description: Complete learning assessment and assess knowledge base.  Interventions:  - Provide teaching at level of understanding  - Provide teaching via preferred learning methods  Outcome: Progressing     Problem: NEUROSENSORY - ADULT  Goal: Achieves stable or improved neurological status  Description: INTERVENTIONS  - Monitor and report changes in neurological status  - Monitor vital signs such as temperature, blood pressure, glucose, and any other labs ordered   - Initiate measures to prevent increased intracranial pressure  - Monitor for seizure  activity and implement precautions if appropriate      Outcome: Progressing  Goal: Remains free of injury related to seizures activity  Description: INTERVENTIONS  - Maintain airway, patient safety  and administer oxygen as ordered  - Monitor patient for seizure activity, document and report duration and description of seizure to physician/advanced practitioner  - If seizure occurs,  ensure patient safety during seizure  - Reorient patient post seizure  - Seizure pads on all 4 side rails  - Instruct patient/family to notify RN of any seizure activity including if an aura is experienced  - Instruct patient/family to call for assistance with activity based on nursing assessment  - Administer anti-seizure medications if ordered    Outcome: Progressing  Goal: Achieves maximal functionality and self care  Description: INTERVENTIONS  - Monitor swallowing and airway patency with patient fatigue and changes in neurological status  - Encourage and assist patient to increase activity and self care.   - Encourage visually impaired, hearing impaired and aphasic patients to use assistive/communication devices  Outcome: Progressing     Problem: CARDIOVASCULAR - ADULT  Goal: Maintains optimal cardiac output and hemodynamic stability  Description: INTERVENTIONS:  - Monitor I/O, vital signs and rhythm  - Monitor for S/S and trends of decreased cardiac output  - Administer and titrate ordered vasoactive medications to optimize hemodynamic stability  - Assess quality of pulses, skin color and temperature  - Assess for signs of decreased coronary artery perfusion  - Instruct patient to report change in severity of symptoms  Outcome: Progressing  Goal: Absence of cardiac dysrhythmias or at baseline rhythm  Description: INTERVENTIONS:  - Continuous cardiac monitoring, vital signs, obtain 12 lead EKG if ordered  - Administer antiarrhythmic and heart rate control medications as ordered  - Monitor electrolytes and administer replacement  therapy as ordered  Outcome: Progressing     Problem: RESPIRATORY - ADULT  Goal: Achieves optimal ventilation and oxygenation  Description: INTERVENTIONS:  - Assess for changes in respiratory status  - Assess for changes in mentation and behavior  - Position to facilitate oxygenation and minimize respiratory effort  - Oxygen administered by appropriate delivery if ordered  - Initiate smoking cessation education as indicated  - Encourage broncho-pulmonary hygiene including cough, deep breathe, Incentive Spirometry  - Assess the need for suctioning and aspirate as needed  - Assess and instruct to report SOB or any respiratory difficulty  - Respiratory Therapy support as indicated  Outcome: Progressing     Problem: GASTROINTESTINAL - ADULT  Goal: Minimal or absence of nausea and/or vomiting  Description: INTERVENTIONS:  - Administer IV fluids if ordered to ensure adequate hydration  - Maintain NPO status until nausea and vomiting are resolved  - Nasogastric tube if ordered  - Administer ordered antiemetic medications as needed  - Provide nonpharmacologic comfort measures as appropriate  - Advance diet as tolerated, if ordered  - Consider nutrition services referral to assist patient with adequate nutrition and appropriate food choices  Outcome: Progressing  Goal: Maintains or returns to baseline bowel function  Description: INTERVENTIONS:  - Assess bowel function  - Encourage oral fluids to ensure adequate hydration  - Administer IV fluids if ordered to ensure adequate hydration  - Administer ordered medications as needed  - Encourage mobilization and activity  - Consider nutritional services referral to assist patient with adequate nutrition and appropriate food choices  Outcome: Progressing  Goal: Maintains adequate nutritional intake  Description: INTERVENTIONS:  - Monitor percentage of each meal consumed  - Identify factors contributing to decreased intake, treat as appropriate  - Assist with meals as needed  -  Monitor I&O, weight, and lab values if indicated  - Obtain nutrition services referral as needed  Outcome: Progressing  Goal: Establish and maintain optimal ostomy function  Description: INTERVENTIONS:  - Assess bowel function  - Encourage oral fluids to ensure adequate hydration  - Administer IV fluids if ordered to ensure adequate hydration   - Administer ordered medications as needed  - Encourage mobilization and activity  - Nutrition services referral to assist patient with appropriate food choices  - Assess stoma site  - Consider wound care consult   Outcome: Progressing  Goal: Oral mucous membranes remain intact  Description: INTERVENTIONS  - Assess oral mucosa and hygiene practices  - Implement preventative oral hygiene regimen  - Implement oral medicated treatments as ordered  - Initiate Nutrition services referral as needed  Outcome: Progressing     Problem: GENITOURINARY - ADULT  Goal: Maintains or returns to baseline urinary function  Description: INTERVENTIONS:  - Assess urinary function  - Encourage oral fluids to ensure adequate hydration if ordered  - Administer IV fluids as ordered to ensure adequate hydration  - Administer ordered medications as needed  - Offer frequent toileting  - Follow urinary retention protocol if ordered  Outcome: Progressing  Goal: Absence of urinary retention  Description: INTERVENTIONS:  - Assess patient’s ability to void and empty bladder  - Monitor I/O  - Bladder scan as needed  - Discuss with physician/AP medications to alleviate retention as needed  - Discuss catheterization for long term situations as appropriate  Outcome: Progressing  Goal: Urinary catheter remains patent  Description: INTERVENTIONS:  - Assess patency of urinary catheter  - If patient has a chronic chow, consider changing catheter if non-functioning  - Follow guidelines for intermittent irrigation of non-functioning urinary catheter  Outcome: Progressing     Problem: METABOLIC, FLUID AND ELECTROLYTES  - ADULT  Goal: Electrolytes maintained within normal limits  Description: INTERVENTIONS:  - Monitor labs and assess patient for signs and symptoms of electrolyte imbalances  - Administer electrolyte replacement as ordered  - Monitor response to electrolyte replacements, including repeat lab results as appropriate  - Instruct patient on fluid and nutrition as appropriate  Outcome: Progressing  Goal: Fluid balance maintained  Description: INTERVENTIONS:  - Monitor labs   - Monitor I/O and WT  - Instruct patient on fluid and nutrition as appropriate  - Assess for signs & symptoms of volume excess or deficit  Outcome: Progressing  Goal: Glucose maintained within target range  Description: INTERVENTIONS:  - Monitor Blood Glucose as ordered  - Assess for signs and symptoms of hyperglycemia and hypoglycemia  - Administer ordered medications to maintain glucose within target range  - Assess nutritional intake and initiate nutrition service referral as needed  Outcome: Progressing     Problem: SKIN/TISSUE INTEGRITY - ADULT  Goal: Skin Integrity remains intact(Skin Breakdown Prevention)  Description: Assess:  -Perform [place] assessment every   -Clean and moisturize skin every   -Inspect skin when repositioning, toileting, and assisting with ADLS  -Assess under medical devices such as every   -Assess extremities for adequate circulation and sensation     Bed Management:  -Have minimal linens on bed & keep smooth, unwrinkled  -Change linens as needed when moist or perspiring  -Avoid sitting or lying in one position for more than  hours while in bed  -Keep HOB at degrees     Toileting:  -Offer bedside commode  -Assess for incontinence every   -Use incontinent care products after each incontinent episode such as     Activity:  -Mobilize patient  times a day  -Encourage activity and walks on unit  -Encourage or provide ROM exercises   -Turn and reposition patient every  Hours  -Use appropriate equipment to lift or move patient in  bed  -Instruct/ Assist with weight shifting every  when out of bed in chair  -Consider limitation of chair time  hour intervals    Skin Care:  -Avoid use of baby powder, tape, friction and shearing, hot water or constrictive clothing  -Relieve pressure over bony prominences using   -Do not massage red bony areas    Next Steps:  -Teach patient strategies to minimize risks such as    -Consider consults to  interdisciplinary teams such as   Outcome: Progressing  Goal: Incision(s), wounds(s) or drain site(s) healing without S/S of infection  Description: INTERVENTIONS  - Assess and document dressing, incision, wound bed, drain sites and surrounding tissue  - Provide patient and family education  - Perform skin care/dressing changes every   Outcome: Progressing  Goal: Pressure injury heals and does not worsen  Description: Interventions:  - Implement low air loss mattress or specialty surface (Criteria met)  - Apply silicone foam dressing  - Instruct/assist with weight shifting every  minutes when in chair   - Limit chair time to  hour intervals  - Use special pressure reducing interventions such as  when in chair   - Apply fecal or urinary incontinence containment device   - Perform passive or active ROM every   - Turn and reposition patient & offload bony prominences every  hours   - Utilize friction reducing device or surface for transfers   - Consider consults to  interdisciplinary teams such as   - Use incontinent care products after each incontinent episode such as   - Consider nutrition services referral as needed  Outcome: Progressing     Problem: HEMATOLOGIC - ADULT  Goal: Maintains hematologic stability  Description: INTERVENTIONS  - Assess for signs and symptoms of bleeding or hemorrhage  - Monitor labs  - Administer supportive blood products/factors as ordered and appropriate  Outcome: Progressing     Problem: MUSCULOSKELETAL - ADULT  Goal: Maintain or return mobility to safest level of function  Description:  INTERVENTIONS:  - Assess patient's ability to carry out ADLs; assess patient's baseline for ADL function and identify physical deficits which impact ability to perform ADLs (bathing, care of mouth/teeth, toileting, grooming, dressing, etc.)  - Assess/evaluate cause of self-care deficits   - Assess range of motion  - Assess patient's mobility  - Assess patient's need for assistive devices and provide as appropriate  - Encourage maximum independence but intervene and supervise when necessary  - Involve family in performance of ADLs  - Assess for home care needs following discharge   - Consider OT consult to assist with ADL evaluation and planning for discharge  - Provide patient education as appropriate  Outcome: Progressing  Goal: Maintain proper alignment of affected body part  Description: INTERVENTIONS:  - Support, maintain and protect limb and body alignment  - Provide patient/ family with appropriate education  Outcome: Progressing     Problem: Nutrition/Hydration-ADULT  Goal: Nutrient/Hydration intake appropriate for improving, restoring or maintaining nutritional needs  Description: Monitor and assess patient's nutrition/hydration status for malnutrition. Collaborate with interdisciplinary team and initiate plan and interventions as ordered.  Monitor patient's weight and dietary intake as ordered or per policy. Utilize nutrition screening tool and intervene as necessary. Determine patient's food preferences and provide high-protein, high-caloric foods as appropriate.     INTERVENTIONS:  - Monitor oral intake, urinary output, labs, and treatment plans  - Assess nutrition and hydration status and recommend course of action  - Evaluate amount of meals eaten  - Assist patient with eating if necessary   - Allow adequate time for meals  - Recommend/ encourage appropriate diets, oral nutritional supplements, and vitamin/mineral supplements  - Order, calculate, and assess calorie counts as needed  - Recommend,  monitor, and adjust tube feedings and TPN/PPN based on assessed needs  - Assess need for intravenous fluids  - Provide specific nutrition/hydration education as appropriate  - Include patient/family/caregiver in decisions related to nutrition  Outcome: Progressing

## 2024-06-29 NOTE — PLAN OF CARE
Problem: PHYSICAL THERAPY ADULT  Goal: Performs mobility at highest level of function for planned discharge setting.  See evaluation for individualized goals.  Description: Treatment/Interventions: Functional transfer training, LE strengthening/ROM, Elevations, Therapeutic exercise, Endurance training, Equipment eval/education, Bed mobility, Gait training, Spoke to nursing, OT (PT spoke to case management; Jagjit RODAS w/ CT surgery.)  Equipment Recommended: Walker (`)       See flowsheet documentation for full assessment, interventions and recommendations.  Outcome: Progressing  Note: Prognosis: Good  Problem List: Decreased strength, Decreased endurance, Impaired balance, Decreased mobility, Decreased cognition, Decreased safety awareness  Assessment: Pt with good safety awareness, vitals stable with activity.  Cues for posture and RW management.  Pt able to perform stair climb with supervision, good control.  Pt with no concerns over potential discharge home later today.  Recommend home with family care and HPT upon hospital D/C.        Rehab Resource Intensity Level, PT: III (Minimum Resource Intensity)    See flowsheet documentation for full assessment.

## 2024-06-29 NOTE — PHYSICAL THERAPY NOTE
Physical Therapy Treatment Note    Patient's Name: Anneliese Garcia  : 24 0925   PT Last Visit   PT Visit Date 24   Note Type   Note Type Treatment   Pain Assessment   Pain Assessment Tool 0-10   Pain Score No Pain   Restrictions/Precautions   Weight Bearing Precautions Per Order No   Other Precautions Cardiac/sternal;Chair Alarm;Bed Alarm;Cognitive;Fall Risk;Pain   General   Chart Reviewed Yes   Family/Caregiver Present No   Cognition   Overall Cognitive Status WFL   Arousal/Participation Alert;Cooperative   Attention Attends with cues to redirect   Orientation Level Oriented X4   Following Commands Follows one step commands with increased time or repetition   Comments Pt pleasant and cooperative throughout session, [place] simple cues to attend to task   Transfers   Sit to Stand 5  Supervision   Stand to Sit 5  Supervision   Additional Comments with RW, cues for hand placement   Ambulation/Elevation   Gait pattern Decreased foot clearance;Forward Flexion;Excessively slow   Gait Assistance 5  Supervision   Assistive Device Rolling walker   Distance 60 ft x2, stair training between trials, VC's for proximity to RW, safety with rotational turns   Stair Management Assistance 5  Supervision   Stair Management Technique One rail L;Step to pattern   Number of Stairs 7   Balance   Static Sitting Good   Dynamic Sitting Fair   Static Standing Fair -   Dynamic Standing Fair -   Ambulatory Fair -   Activity Tolerance   Activity Tolerance Patient tolerated treatment well   Nurse Made Aware RN updated. Chair alarm engaged at end of session   Assessment   Prognosis Good   Problem List Decreased strength;Decreased endurance;Impaired balance;Decreased mobility;Decreased cognition;Decreased safety awareness   Assessment Pt with good safety awareness, vitals stable with activity.  Cues for posture and RW management.  Pt able to perform stair  climb with supervision, good control.  Pt with no concerns over potential discharge home later today.  Recommend home with family care and HPT upon hospital D/C.   Goals   Patient Goals to go home   STG Expiration Date 07/05/24   PT Treatment Day 1   Plan   Treatment/Interventions Functional transfer training;LE strengthening/ROM;Elevations;Therapeutic exercise;Endurance training;Cognitive reorientation;Patient/family training;Equipment eval/education;Bed mobility;Gait training   Progress Progressing toward goals   PT Frequency 3-5x/wk   Discharge Recommendation   Rehab Resource Intensity Level, PT III (Minimum Resource Intensity)   Equipment Recommended Walker   Walker Package Recommended Wheeled walker   AM-PAC Basic Mobility Inpatient   Turning in Flat Bed Without Bedrails 3   Lying on Back to Sitting on Edge of Flat Bed Without Bedrails 3   Moving Bed to Chair 3   Standing Up From Chair Using Arms 3   Walk in Room 3   Climb 3-5 Stairs With Railing 3   Basic Mobility Inpatient Raw Score 18   Basic Mobility Standardized Score 41.05   [facility] Highest Level Of Mobility   JH-HLM Goal 6: Walk 10 steps or more   JH-HLM Achieved 7: Walk 25 feet or more         Tessa Sheikh, PT, DPT, GCS

## 2024-06-29 NOTE — DISCHARGE SUMMARY
Discharge Summary - Cardiothoracic Surgery   Anneliese Garcia 88 y.o. female MRN: [id number]  Unit/Bed#: [facility] 424-01 Encounter: [id number]    Admission Date: 6/27/2024     Discharge Date: 06/29/24    Admitting Diagnosis: Nonrheumatic aortic valve stenosis [I35.0]    Primary Discharge Diagnosis:   Aortic stenosis, Non-Rheumatic. S/P transfemoral transcatheter aortic valve replacement;    Secondary Discharge Diagnosis:   mild nonobstructive CAD, chronic diastolic CHF, HTN, HLD, CKD 2 (baseline Cr 0.8-1.0), OA, h/o multiple PEs/DVTs (Coumadin), GERD, Sjogren’s syndrome, hypothyroidism, MIAH (no CPAP or BiPAP), RLS, h/o SCC (s/p excision), osteoporosis, chronic pain (s/p spinal stimulator), IBS-D     Attending: Manuel Pal M.D.    Consulting Physician(s):   Electrophysiology  Gerontology    Procedures Performed:   Procedure(s):  REPLACEMENT AORTIC VALVE TRANSCATHETER (TAVR) TRANSFEMORAL W/ 23MM  S3 UR VALVE(ACCESS ON LEFT) LEDY  Cardiac tavr     Hospital Course:   The patient was seen in consultation prior to this admission for evaluation of Aortic stenosis, Non-Rheumatic.  Risks and benefits of transfemoral transcatheter aortic valve replacement were discussed in detail, and patient was agreeable.  Routine preoperative evaluation was completed and informed consent was obtained prior to admission.    6/27: Elective admission for TF TAVR #23mm via L approach. Extubated and transferred to PACU supported with no gtts. DP pulses 2+ b/l. Restarted on home Losartan 50mg. ECG shows new interventricular block, hold home beta blocker, repeat EKG at 1630, EP notified. Gerontology and cardiology consulted. INR pending, dose Coumadin tonight, per Dr. Pal no need for heparin bridge or INR therapeutic for discharge. PM: repeat EKG shows new LBBB remains, per EP will not see today, NPO at midnight for reeval tomorrow. Start torsemide 20 QD.      6/28: SR, remains with LBBB, no sincere or HB on tele, QRS and MI stable  compared to yest. HR 70s-90s. On RA. Neuro/vasc intact, left groin intact. No complaints. NPO for EP eval. Echo pending. Discussed with EP, restart home lopressor 25mg BID and keep NPO and repeat EKG later today, if remains stable can eat. If rhythm remains stable for 24 additional hours, can discharge with Zio patch tomorrow, if worsens to HB or prolonged QRS or VT, then PPM. Check EKG in AM. PM: repeat EKG stable, diet resumed. Check EKG in AM. If rhythm stable, discharge with Zio patch. Decreased urine output; Lasix, 40 mg X 1 given.      6/29: No events or complaints. LBBB post op remains, restarted on lopressor 25 mg BID without worsening, EKG this AM remains with LBBB (and no change in QRS of 140, ), cleared by EP for discharge with zio patch. Labs stable, INR 2.15, dose 5 mg of coumadin and follow preop regimen. NSR doing well on home hypertensive regimen. Net neg 1.2L, euvolemic, no further diuretics needed. Patient in good condition for discharge. Patient in agreement with plan and follow-up appointments.      Condition at Discharge:   good     Discharge Physical Exam:    Please see the documented physical exam from this morning's progress note for details.    Discharge Data:  Results from last 7 days   Lab Units 06/29/24  0435 06/28/24  0429 06/27/24  1337   WBC Thousand/uL 10.40* 11.23*  --    HEMOGLOBIN g/dL 12.7 12.9 12.7   HEMATOCRIT % 38.2 38.3 37.0   PLATELETS Thousands/uL 165 173 155     Results from last 7 days   Lab Units 06/29/24  0435 06/28/24  0429 06/27/24  1337 06/27/24  1256   POTASSIUM mmol/L 4.2 3.3* 3.7  --    CHLORIDE mmol/L 100 101 107  --    CO2 mmol/L 27 26 25  --    CO2, I-STAT mmol/L  --   --   --  24   BUN mg/dL 28* 18 16  --    CREATININE mg/dL 1.17 0.92 0.73  --    GLUCOSE, ISTAT mg/dl  --   --   --  102   CALCIUM mg/dL 9.5 9.7 10.0  --      Results from last 7 days   Lab Units 06/29/24  0435 06/28/24  0429 06/27/24  1337   INR  2.15* 1.30* 1.39*       Discharge  instructions/Information to patient and family:   See after visit summary for information provided to patient and family.      Anneliese Garcia was educated on restrictions regarding driving and lifting, and techniques of proper incisional care.  They were specifically counselled on signs and symptoms of an incisional infection, and advised to contact our service immediately should they develop fevers, sweats, chill, redness or drainage at the site of any incisions.    Provisions for Follow-Up Care:  See after visit summary for information related to follow-up care and any pertinent home health orders.      Disposition:  See After Visit Summary for discharge disposition information.    Planned Readmission:   No    Discharge Medications:  See after visit summary for reconciled discharge medications provided to patient and family.      Anneliese Garcia was provided contact information and scheduled a follow up appointment with Manuel Pal M.D.  Additionally, follow up appointments have been scheduled for their primary care physician and primary cardiologist.  Contact information was provided.    Anneliese Garcia was counseled on the importance of avoiding tobacco products.  As with all patients whom have undergone open heart surgery, tobacco cessation medication was contraindicated at the time of discharge.     ACE/ARB was Prescribed at discharge    Beta Blocker was Prescribed at discharge    Aspirin was Prescribed at discharge    Statin was Prescribed at discharge      The patient was not discharged on ongoing diuretic therapy as patient was euvolemic.    The patient was informed that following their postoperative surgical evaluation, they will be referred to outpatient cardiac rehabilitation.  They were counseled that this program is run by specialists who will help them safely strengthen their heart and prevent more heart disease.  Cardiac rehabilitation will include exercise, relaxation, stress  management, and heart-healthy nutrition.  Caregivers will also check to make sure their medication regimen is working.    During this admission, the patient was questioned on their use of tobacco, alcohol, and illicit/non-prescription drug use in the  previous 24 months. Anneliese [facility] [place] states that they have not used any of these substances in this time frame.    I spent 30 minutes discharging the patient. This time was spent on the day of discharge. I had direct contact with the patient on the day of discharge. Additional documentation is required if more than 30 minutes were spent on discharge.     SIGNATURE: Tamiko Porras PA-C  DATE: June 29, 2024  TIME: 11:28 AM

## 2024-06-30 LAB
ATRIAL RATE: 65 BPM
ATRIAL RATE: 78 BPM
ATRIAL RATE: 91 BPM
P AXIS: 40 DEGREES
P AXIS: 52 DEGREES
P AXIS: 68 DEGREES
PR INTERVAL: 174 MS
PR INTERVAL: 192 MS
PR INTERVAL: 198 MS
QRS AXIS: 11 DEGREES
QRS AXIS: 29 DEGREES
QRS AXIS: 37 DEGREES
QRSD INTERVAL: 138 MS
QRSD INTERVAL: 140 MS
QRSD INTERVAL: 148 MS
QT INTERVAL: 418 MS
QT INTERVAL: 442 MS
QT INTERVAL: 494 MS
QTC INTERVAL: 503 MS
QTC INTERVAL: 513 MS
QTC INTERVAL: 514 MS
T WAVE AXIS: 104 DEGREES
T WAVE AXIS: 153 DEGREES
T WAVE AXIS: 73 DEGREES
VENTRICULAR RATE: 65 BPM
VENTRICULAR RATE: 78 BPM
VENTRICULAR RATE: 91 BPM

## 2024-06-30 PROCEDURE — 93010 ELECTROCARDIOGRAM REPORT: CPT | Performed by: INTERNAL MEDICINE

## 2024-07-01 NOTE — UTILIZATION REVIEW
NOTIFICATION OF ADMISSION DISCHARGE   This is a Notification of Discharge from [facility]. Please be advised that this patient has been discharge from our facility. Below you will find the admission and discharge date and time including the patient’s disposition.   UTILIZATION REVIEW CONTACT:  Lázaro Mora  Utilization   Network Utilization Review Department  Phone: [phone] x carefully listen to the prompts. All voicemails are confidential.  Email: [email or web address]     ADMISSION INFORMATION  PRESENTATION DATE: 6/27/2024  8:35 AM  OBERVATION ADMISSION DATE: N/A  INPATIENT ADMISSION DATE: 6/27/24  9:25 AM   DISCHARGE DATE: 6/29/2024  2:08 PM   DISPOSITION:Home/Self Care    Network Utilization Review Department  ATTENTION: Please call with any questions or concerns to [phone] and carefully listen to the prompts so that you are directed to the right person. All voicemails are confidential.   For Discharge needs, contact Care Management DC Support Team at [phone] opt. 2  Send all requests for admission clinical reviews, approved or denied determinations and any other requests to dedicated fax number below belonging to the campus where the patient is receiving treatment. List of dedicated fax numbers for the Facilities:  FACILITY NAME UR FAX NUMBER   ADMISSION DENIALS (Administrative/Medical Necessity) [phone]   DISCHARGE SUPPORT TEAM ([facility]) [phone]   PARENT CHILD HEALTH (Maternity/NICU/Pediatrics) [phone]   [facility] [phone]   [facility] [phone]   [facility] [phone]   [facility] [phone]   [facility] [phone]   [facility] [phone]   [facility] [phone]   [facility] [phone]   [facility]  [facility] [phone]   [facility] [phone]   [facility] [phone]   [facility] [phone]

## 2024-07-03 ENCOUNTER — TELEPHONE (OUTPATIENT)
Dept: CARDIOLOGY CLINIC | Facility: CLINIC | Age: 89
End: 2024-07-03

## 2024-07-05 ENCOUNTER — TELEPHONE (OUTPATIENT)
Dept: CARDIAC SURGERY | Facility: CLINIC | Age: 89
End: 2024-07-05

## 2024-07-05 NOTE — TELEPHONE ENCOUNTER
Called patient to perform routine follow-up after hospital discharge s/p TAVR. No answer, left voicemail to call with questions or concerns.

## 2024-07-11 ENCOUNTER — OFFICE VISIT (OUTPATIENT)
Dept: OBGYN CLINIC | Facility: CLINIC | Age: 89
End: 2024-07-11
Payer: COMMERCIAL

## 2024-07-11 ENCOUNTER — APPOINTMENT (OUTPATIENT)
Dept: RADIOLOGY | Facility: CLINIC | Age: 89
End: 2024-07-11
Payer: COMMERCIAL

## 2024-07-11 VITALS
HEIGHT: 62 IN | DIASTOLIC BLOOD PRESSURE: 75 MMHG | WEIGHT: 120.13 LBS | BODY MASS INDEX: 22.11 KG/M2 | SYSTOLIC BLOOD PRESSURE: 158 MMHG | HEART RATE: 62 BPM | RESPIRATION RATE: 16 BRPM

## 2024-07-11 DIAGNOSIS — M25.562 LEFT KNEE PAIN, UNSPECIFIED CHRONICITY: ICD-10-CM

## 2024-07-11 DIAGNOSIS — T84.84XA PAIN DUE TO TOTAL LEFT KNEE REPLACEMENT, INITIAL ENCOUNTER (HCC): Primary | ICD-10-CM

## 2024-07-11 DIAGNOSIS — Z96.652 PAIN DUE TO TOTAL LEFT KNEE REPLACEMENT, INITIAL ENCOUNTER (HCC): Primary | ICD-10-CM

## 2024-07-11 PROCEDURE — 99214 OFFICE O/P EST MOD 30 MIN: CPT | Performed by: STUDENT IN AN ORGANIZED HEALTH CARE EDUCATION/TRAINING PROGRAM

## 2024-07-11 PROCEDURE — 73562 X-RAY EXAM OF KNEE 3: CPT

## 2024-07-11 NOTE — PROGRESS NOTES
Ortho Sports Medicine Knee New Patient Visit     Assesment:   88 y.o. female loosening prior LTKA    Plan:  The patient's diagnosis and treatment were discussed at length today. We discussed no treatment, non-operative treatment, and operative treatment.    Silvia presents today for painful left total knee.  Explained radiographs demonstrate signs of loosening of tibial component with loss of cement fixation distally and lift off of the tibial component into valgus.  Will obtain an ESR and CRP for initial infection workup and refer the patient to Dr. Dueñas for additional recommendations including possibility of revision knee arthroplasty.  Of note, the patient did just have an aortic valve replacement 2 weeks ago so I explained we would likely plan for delayed intervention if needed.  She is in agreement with this plan.    Conservative treatment:    Ice to knee for 20 minutes at least 1-2 times daily.  OTC NSAIDS prn for pain.  Referral to Dr. Dueñas for evaluation and treatment loosening LTKA  ESR and CRP ordered to rule out infection    Imaging:    All imaging from today was reviewed by myself and explained to the patient.       Injection:    No Injection planned at this time.      Surgery:     No surgery is recommended at this point, continue with conservative treatment plan as noted.      Follow up:    Continue care with Dr. Dueñas        Chief Complaint   Patient presents with    Left Knee - Pain       History of Present Illness:    The patient is a 88 y.o. female whose occupation is retired, referred to me by themself, seen in clinic for evaluation of left knee pain.  She reports she had her left knee replaced in 2008 by Dr. Fox at [facility].  She was initially satisfied with her outcome however over the last several years began to experience pain and instability in the knee.  She went back to see Dr. Fox proximately 1 year ago and he reported to her that her knee was functioning normally and said no  additional recommendations will be made at that time.  She had previously seen Dr. Rolo Wilson at [facility] who is treating the patient with corticosteroid injection for possible lateral genicular nerve pain.  He did obtain ESR and CRP at that time which were negative however no additional workup was completed.  Patient reports ongoing pain discomfort in the knee that is bothering her significantly.  Has difficulty with ambulation.  Of note, she did just have an aortic valve replacement approximately 2 weeks ago at [facility].  She is overall satisfied with her recovery from the surgery and feels she is progressing well.      Knee Surgical History:  Prior LTKA by Dr. Odom on 10/20/08    Past Medical, Social and Family History:  Past Medical History:   Diagnosis Date    CAD (coronary artery disease)     CHF (congestive heart failure) ([facility])     Chronic pain     s/p spinal stimulator    CKD (chronic kidney disease), stage II     baseline Cr 0.8-1.0    GERD (gastroesophageal reflux disease)     History of DVT (deep vein thrombosis)     multiple, Coumadin    History of pulmonary embolism     multiple, Coumadin    History of squamous cell carcinoma excision     Hyperlipidemia     Hypertension     Hypothyroidism     IBS (irritable bowel syndrome)     diarrheal type    MIAH (obstructive sleep apnea)     no CPAP or BiPAP    Osteoarthritis     Osteoporosis     RLS (restless legs syndrome)     Severe aortic stenosis     Sjogren's syndrome ([facility])      Past Surgical History:   Procedure Laterality Date    CARDIAC CATHETERIZATION N/A 06/06/2024    Procedure: Cardiac catheterization;  Surgeon: Taj Stafford DO;  Location: [facility] CARDIAC [facility];  Service: Cardiology    CARDIAC CATHETERIZATION N/A 6/27/2024    Procedure: Cardiac tavr;  Surgeon: Pascual Koo MD;  Location: [facility] MAIN OR;  Service: Cardiology    COLONOSCOPY  07/18/2016     grade 2 internal hemorrhoids but otherwise normal, pathology negative for  microscopic colitis    UT REPLACE AORTIC VALVE OPENFEMORAL ARTERY APPROACH N/A 6/27/2024    Procedure: REPLACEMENT AORTIC VALVE TRANSCATHETER (TAVR) TRANSFEMORAL W/ 23MM  S3 UR VALVE(ACCESS ON LEFT) LEDY;  Surgeon: JESSICA Pal MD;  Location: [facility] MAIN OR;  Service: Cardiac Surgery    REPAIR RECTOCELE      SPINAL STIMULATOR PLACEMENT      SPINE SURGERY      fusion L1-L5    SQUAMOUS CELL CARCINOMA EXCISION      TOE SURGERY      TONSILLECTOMY AND ADENOIDECTOMY      TOTAL ABDOMINAL HYSTERECTOMY      TOTAL KNEE ARTHROPLASTY Bilateral     TOTAL SHOULDER REPLACEMENT Right     UPPER GASTROINTESTINAL ENDOSCOPY  01/09/2015     reflux esophagitis, gastritis, duodenitis in the bulb, pathology negative for H pylori, Puckett's, EOE     Allergies   Allergen Reactions    Sulfa Antibiotics Hives     Current Outpatient Medications on File Prior to Visit   Medication Sig Dispense Refill    acetaminophen (TYLENOL) 325 mg tablet Take 2 tablets (650 mg total) by mouth every 4 (four) hours as needed for fever, mild pain, moderate pain or headaches (temperature greater than 101 F) 50 tablet 0    aspirin 81 mg chewable tablet Chew 1 tablet (81 mg total) daily 30 tablet 2    Cholecalciferol (VITAMIN D) 2000 UNITS tablet Take 3,000 Units by mouth daily        cycloSPORINE (RESTASIS) 0.05 % ophthalmic emulsion Administer 1 drop to both eyes 2 (two) times a day.      Diclofenac Sodium (VOLTAREN) 1 %       diphenoxylate-atropine (LOMOTIL) 2.5-0.025 mg per tablet TAKE 1 TABLET BY MOUTH 4 (FOUR) TIMES A DAY AS NEEDED FOR DIARRHEA 60 tablet 3    docusate sodium (COLACE) 100 mg capsule Take 1 capsule (100 mg total) by mouth 2 (two) times a day 60 capsule 0    levothyroxine 112 mcg tablet TAKE ONE TABLET BY MOUTH EVERY DAY 90 tablet 1    losartan (COZAAR) 50 mg tablet Take 1 tablet (50 mg total) by mouth daily 90 tablet 1    metoprolol tartrate (LOPRESSOR) 25 mg tablet Take 1 tablet (25 mg total) by mouth every 12 (twelve) hours 60 tablet 2     omeprazole (PriLOSEC) 20 mg delayed release capsule Take 20 mg by mouth daily      pilocarpine (SALAGEN) 5 mg tablet Take 5 mg by mouth 4 (four) times a day      polyvinyl alcohol (LIQUIFILM TEARS) 1.4 % ophthalmic solution Administer 1 drop to both eyes as needed for dry eyes      simvastatin (ZOCOR) 20 mg tablet Take 20 mg by mouth daily at bedtime.      warfarin (COUMADIN) 5 mg tablet Take 1 tablet (5 mg total) by mouth daily Do not start before March 20, 2023. (Patient taking differently: Take 5 mg by mouth daily Friday takes 6 mg   Wednesday/Saturday/Sunday 5 mg  Monday and Tuesday does not take it.)  0     No current facility-administered medications on file prior to visit.     Social History     Socioeconomic History    Marital status: /Civil Union     Spouse name: Not on file    Number of children: 5    Years of education: Not on file    Highest education level: Not on file   Occupational History    Not on file   Tobacco Use    Smoking status: Never    Smokeless tobacco: Never    Tobacco comments:     Never smoked   Vaping Use    Vaping status: Never Used   Substance and Sexual Activity    Alcohol use: No    Drug use: No    Sexual activity: Not Currently     Partners: Male     Birth control/protection: Post-menopausal, Male Sterilization   Other Topics Concern    Not on file   Social History Narrative    Not on file     Social Determinants of Health     Financial Resource Strain: Not on file   Food Insecurity: No Food Insecurity (6/28/2024)    Hunger Vital Sign     Worried About Running Out of Food in the Last Year: Never true     Ran Out of Food in the Last Year: Never true   Transportation Needs: No Transportation Needs (6/28/2024)    PRAPARE - Transportation     Lack of Transportation (Medical): No     Lack of Transportation (Non-Medical): No   Physical Activity: Not on file   Stress: Not on file   Social Connections: Unknown (6/18/2024)    Received from [facility]    Social Infolinks     How  "often do you feel lonely or isolated from those around you? (Adult - for ages 18 years and over): Not on file   Intimate Partner Violence: Not on file   Housing Stability: Low Risk  (6/28/2024)    Housing Stability Vital Sign     Unable to Pay for Housing in the Last Year: No     Number of Times Moved in the Last Year: 1     Homeless in the Last Year: No         I have reviewed the past medical, surgical, social and family history, medications and allergies as documented in the EMR.    Review of systems: ROS is negative other than that noted in the HPI.  Constitutional: Negative for fatigue and fever.   HENT: Negative for sore throat.    Respiratory: Negative for shortness of breath.    Cardiovascular: Negative for chest pain.   Gastrointestinal: Negative for abdominal pain.   Endocrine: Negative for cold intolerance and heat intolerance.   Genitourinary: Negative for flank pain.   Musculoskeletal: Negative for back pain.   Skin: Negative for rash.   Allergic/Immunologic: Negative for immunocompromised state.   Neurological: Negative for dizziness.   Psychiatric/Behavioral: Negative for agitation.      Physical Exam:    Blood pressure 158/75, pulse 62, resp. rate 16, height 5' 2\" (1.575 m), weight 54.5 kg (120 lb 2.1 oz), not currently breastfeeding.    General/Constitutional: NAD, well developed, well nourished  HENT: Normocephalic, atraumatic  CV: Intact distal pulses, regular rate  Resp: No respiratory distress or labored breathing  Lymphatic: No lymphadenopathy palpated  Neuro: Alert and Oriented x 3, no focal deficits  Psych: Normal mood, normal affect, normal judgement, normal behavior  Skin: Warm, dry, no rashes, no erythema      Knee Exam (focused):  Visual inspection of the left knee demonstrates healed midline incision with no evidence of erythema no sinus tract  There is no significant joint effusion  Range of motion is 3 degrees shy of full extension to approximately 110 degrees of flexion  There is a " palpable clunk with valgus stress testing, 1+ valgus, grade 1 varus  Patella tracking is within normal limits  Tenderness palpation over the medial joint line and proximal medial tibia    Examination of the patient's ipsilateral hip demonstrates full painless range of motion.  No crepitus.      LE NV Exam: +2 DP/PT pulses bilaterally  Sensation intact to light touch L2-S1 bilaterally     Bilateral hip ROM demonstrates no pain actively or passively    No calf tenderness to palpation bilaterally    Knee Imaging    X-rays of the left knee were reviewed, which demonstrate prior left total knee arthroplasty with evidence of loosening of tibial compent.  Appears to be disruption of the cement mantle distally and lift off of the tibial component with valgus alignment.  I have reviewed the radiology report and do not currently have a radiology reading from [facility], but will check the result once the reading is performed.        Scribe Attestation      I,:  Ash Mccarthy am acting as a scribe while in the presence of the attending physician.:       I,:  Wood Barnett, DO personally performed the services described in this documentation    as scribed in my presence.:

## 2024-07-11 NOTE — LETTER
July 11, 2024     Isaac Dueñas DO  [address] [facility]  [address] [place] [id number]    Patient: Anneliese Garcia   YOB: 1935   Date of Visit: 7/11/2024       Dear Dr. Dueñas:    Thank you for referring Anneliese Garcia to me for evaluation. Below are my notes for this consultation.    If you have questions, please do not hesitate to call me. I look forward to following your patient along with you.         Sincerely,        Wood Barnett DO        CC: No Recipients    Wood Barnett DO  7/11/2024 10:58 AM  Sign when Signing Visit  Ortho Sports Medicine Knee New Patient Visit     Assesment:   88 y.o. female loosening prior LTKA    Plan:  The patient's diagnosis and treatment were discussed at length today. We discussed no treatment, non-operative treatment, and operative treatment.    Silvia presents today for painful left total knee.  Explained radiographs demonstrate signs of loosening of tibial component with loss of cement fixation distally and lift off of the tibial component into valgus.  Will obtain an ESR and CRP for initial infection workup and refer the patient to Dr. Dueñas for additional recommendations including possibility of revision knee arthroplasty.  Of note, the patient did just have an aortic valve replacement 2 weeks ago so I explained we would likely plan for delayed intervention if needed.  She is in agreement with this plan.    Conservative treatment:    Ice to knee for 20 minutes at least 1-2 times daily.  OTC NSAIDS prn for pain.  Referral to Dr. Dueñas for evaluation and treatment loosening LTKA  ESR and CRP ordered to rule out infection    Imaging:    All imaging from today was reviewed by myself and explained to the patient.       Injection:    No Injection planned at this time.      Surgery:     No surgery is recommended at this point, continue with conservative treatment plan as noted.      Follow up:    Continue care with Dr. Dueñas        Chief  Complaint   Patient presents with   • Left Knee - Pain       History of Present Illness:    The patient is a 88 y.o. female whose occupation is retired, referred to me by themself, seen in clinic for evaluation of left knee pain.  She reports she had her left knee replaced in 2008 by Dr. Fox at [facility].  She was initially satisfied with her outcome however over the last several years began to experience pain and instability in the knee.  She went back to see Dr. Fox proximately 1 year ago and he reported to her that her knee was functioning normally and said no additional recommendations will be made at that time.  She had previously seen Dr. Rolo Wilson at [facility] who is treating the patient with corticosteroid injection for possible lateral genicular nerve pain.  He did obtain ESR and CRP at that time which were negative however no additional workup was completed.  Patient reports ongoing pain discomfort in the knee that is bothering her significantly.  Has difficulty with ambulation.  Of note, she did just have an aortic valve replacement approximately 2 weeks ago at [facility].  She is overall satisfied with her recovery from the surgery and feels she is progressing well.      Knee Surgical History:  Prior LTKA by Dr. Odom on 10/20/08    Past Medical, Social and Family History:  Past Medical History:   Diagnosis Date   • CAD (coronary artery disease)    • CHF (congestive heart failure) ([facility])    • Chronic pain     s/p spinal stimulator   • CKD (chronic kidney disease), stage II     baseline Cr 0.8-1.0   • GERD (gastroesophageal reflux disease)    • History of DVT (deep vein thrombosis)     multiple, Coumadin   • History of pulmonary embolism     multiple, Coumadin   • History of squamous cell carcinoma excision    • Hyperlipidemia    • Hypertension    • Hypothyroidism    • IBS (irritable bowel syndrome)     diarrheal type   • MIAH (obstructive sleep apnea)     no CPAP or BiPAP   •  Osteoarthritis    • Osteoporosis    • RLS (restless legs syndrome)    • Severe aortic stenosis    • Sjogren's syndrome (HCC)      Past Surgical History:   Procedure Laterality Date   • CARDIAC CATHETERIZATION N/A 06/06/2024    Procedure: Cardiac catheterization;  Surgeon: Taj Stafford DO;  Location: [facility];  Service: Cardiology   • CARDIAC CATHETERIZATION N/A 6/27/2024    Procedure: Cardiac tavr;  Surgeon: Pascual Koo MD;  Location: [facility];  Service: Cardiology   • COLONOSCOPY  07/18/2016     grade 2 internal hemorrhoids but otherwise normal, pathology negative for microscopic colitis   • [place] REPLACE AORTIC VALVE OPENFEMORAL ARTERY APPROACH N/A 6/27/2024    Procedure: REPLACEMENT AORTIC VALVE TRANSCATHETER (TAVR) TRANSFEMORAL W/ 23MM  S3 UR VALVE(ACCESS ON LEFT) LEDY;  Surgeon: JESSICA Pal MD;  Location: [facility];  Service: Cardiac Surgery   • REPAIR RECTOCELE     • SPINAL STIMULATOR PLACEMENT     • SPINE SURGERY      fusion L1-L5   • SQUAMOUS CELL CARCINOMA EXCISION     • TOE SURGERY     • TONSILLECTOMY AND ADENOIDECTOMY     • TOTAL ABDOMINAL HYSTERECTOMY     • TOTAL KNEE ARTHROPLASTY Bilateral    • TOTAL SHOULDER REPLACEMENT Right    • UPPER GASTROINTESTINAL ENDOSCOPY  01/09/2015     reflux esophagitis, gastritis, duodenitis in the bulb, pathology negative for H pylori, Puckett's, EOE     Allergies   Allergen Reactions   • Sulfa Antibiotics Hives     Current Outpatient Medications on File Prior to Visit   Medication Sig Dispense Refill   • acetaminophen (TYLENOL) 325 mg tablet Take 2 tablets (650 mg total) by mouth every 4 (four) hours as needed for fever, mild pain, moderate pain or headaches (temperature greater than 101 F) 50 tablet 0   • aspirin 81 mg chewable tablet Chew 1 tablet (81 mg total) daily 30 tablet 2   • Cholecalciferol (VITAMIN D) 2000 UNITS tablet Take 3,000 Units by mouth daily       • cycloSPORINE (RESTASIS) 0.05 % ophthalmic emulsion Administer 1 drop to  both eyes 2 (two) times a day.     • Diclofenac Sodium (VOLTAREN) 1 %      • diphenoxylate-atropine (LOMOTIL) 2.5-0.025 mg per tablet TAKE 1 TABLET BY MOUTH 4 (FOUR) TIMES A DAY AS NEEDED FOR DIARRHEA 60 tablet 3   • docusate sodium (COLACE) 100 mg capsule Take 1 capsule (100 mg total) by mouth 2 (two) times a day 60 capsule 0   • levothyroxine 112 mcg tablet TAKE ONE TABLET BY MOUTH EVERY DAY 90 tablet 1   • losartan (COZAAR) 50 mg tablet Take 1 tablet (50 mg total) by mouth daily 90 tablet 1   • metoprolol tartrate (LOPRESSOR) 25 mg tablet Take 1 tablet (25 mg total) by mouth every 12 (twelve) hours 60 tablet 2   • omeprazole (PriLOSEC) 20 mg delayed release capsule Take 20 mg by mouth daily     • pilocarpine (SALAGEN) 5 mg tablet Take 5 mg by mouth 4 (four) times a day     • polyvinyl alcohol (LIQUIFILM TEARS) 1.4 % ophthalmic solution Administer 1 drop to both eyes as needed for dry eyes     • simvastatin (ZOCOR) 20 mg tablet Take 20 mg by mouth daily at bedtime.     • warfarin (COUMADIN) 5 mg tablet Take 1 tablet (5 mg total) by mouth daily Do not start before March 20, 2023. (Patient taking differently: Take 5 mg by mouth daily Friday takes 6 mg   Wednesday/Saturday/Sunday 5 mg  Monday and Tuesday does not take it.)  0     No current facility-administered medications on file prior to visit.     Social History     Socioeconomic History   • Marital status: /Civil Union     Spouse name: Not on file   • Number of children: 5   • Years of education: Not on file   • Highest education level: Not on file   Occupational History   • Not on file   Tobacco Use   • Smoking status: Never   • Smokeless tobacco: Never   • Tobacco comments:     Never smoked   Vaping Use   • Vaping status: Never Used   Substance and Sexual Activity   • Alcohol use: No   • Drug use: No   • Sexual activity: Not Currently     Partners: Male     Birth control/protection: Post-menopausal, Male Sterilization   Other Topics Concern   • Not on  "file   Social History Narrative   • Not on file     Social Determinants of Health     Financial Resource Strain: Not on file   Food Insecurity: No Food Insecurity (6/28/2024)    Hunger Vital Sign    • Worried About Running Out of Food in the Last Year: Never true    • Ran Out of Food in the Last Year: Never true   Transportation Needs: No Transportation Needs (6/28/2024)    PRAPARE - Transportation    • Lack of Transportation (Medical): No    • Lack of Transportation (Non-Medical): No   Physical Activity: Not on file   Stress: Not on file   Social Connections: Unknown (6/18/2024)    Received from [facility]    Social Connections    • How often do you feel lonely or isolated from those around you? (Adult - for ages 18 years and over): Not on file   Intimate Partner Violence: Not on file   Housing Stability: Low Risk  (6/28/2024)    Housing Stability Vital Sign    • Unable to Pay for Housing in the Last Year: No    • Number of Times Moved in the Last Year: 1    • Homeless in the Last Year: No         I have reviewed the past medical, surgical, social and family history, medications and allergies as documented in the EMR.    Review of systems: ROS is negative other than that noted in the HPI.  Constitutional: Negative for fatigue and fever.   HENT: Negative for sore throat.    Respiratory: Negative for shortness of breath.    Cardiovascular: Negative for chest pain.   Gastrointestinal: Negative for abdominal pain.   Endocrine: Negative for cold intolerance and heat intolerance.   Genitourinary: Negative for flank pain.   Musculoskeletal: Negative for back pain.   Skin: Negative for rash.   Allergic/Immunologic: Negative for immunocompromised state.   Neurological: Negative for dizziness.   Psychiatric/Behavioral: Negative for agitation.      Physical Exam:    Blood pressure 158/75, pulse 62, resp. rate 16, height 5' 2\" (1.575 m), weight 54.5 kg (120 lb 2.1 oz), not currently breastfeeding.    General/Constitutional: " NAD, well developed, well nourished  HENT: Normocephalic, atraumatic  CV: Intact distal pulses, regular rate  Resp: No respiratory distress or labored breathing  Lymphatic: No lymphadenopathy palpated  Neuro: Alert and Oriented x 3, no focal deficits  Psych: Normal mood, normal affect, normal judgement, normal behavior  Skin: Warm, dry, no rashes, no erythema      Knee Exam (focused):  Visual inspection of the left knee demonstrates healed midline incision with no evidence of erythema no sinus tract  There is no significant joint effusion  Range of motion is 3 degrees shy of full extension to approximately 110 degrees of flexion  There is a palpable clunk with valgus stress testing, 1+ valgus, grade 1 varus  Patella tracking is within normal limits  Tenderness palpation over the medial joint line and proximal medial tibia    Examination of the patient's ipsilateral hip demonstrates full painless range of motion.  No crepitus.      LE NV Exam: +2 DP/PT pulses bilaterally  Sensation intact to light touch L2-S1 bilaterally     Bilateral hip ROM demonstrates no pain actively or passively    No calf tenderness to palpation bilaterally    Knee Imaging    X-rays of the left knee were reviewed, which demonstrate prior left total knee arthroplasty with evidence of loosening of tibial compent.  Appears to be disruption of the cement mantle distally and lift off of the tibial component with valgus alignment.  I have reviewed the radiology report and do not currently have a radiology reading from [facility], but will check the result once the reading is performed.        Scribe Attestation      I,:  Ash Mccarthy am acting as a scribe while in the presence of the attending physician.:       I,:  Wood Barnett, DO personally performed the services described in this documentation    as scribed in my presence.:

## 2024-07-18 ENCOUNTER — APPOINTMENT (OUTPATIENT)
Dept: LAB | Facility: HOSPITAL | Age: 89
End: 2024-07-18
Payer: COMMERCIAL

## 2024-07-18 DIAGNOSIS — I10 ESSENTIAL HYPERTENSION: ICD-10-CM

## 2024-07-18 DIAGNOSIS — Z01.818 PRE-OP TESTING: ICD-10-CM

## 2024-07-18 DIAGNOSIS — Z95.2 S/P TAVR (TRANSCATHETER AORTIC VALVE REPLACEMENT): ICD-10-CM

## 2024-07-18 DIAGNOSIS — M81.0 AGE-RELATED OSTEOPOROSIS WITHOUT CURRENT PATHOLOGICAL FRACTURE: ICD-10-CM

## 2024-07-18 DIAGNOSIS — T84.84XA PAIN DUE TO TOTAL LEFT KNEE REPLACEMENT, INITIAL ENCOUNTER (HCC): ICD-10-CM

## 2024-07-18 DIAGNOSIS — N18.2 STAGE 2 CHRONIC KIDNEY DISEASE: ICD-10-CM

## 2024-07-18 DIAGNOSIS — E03.9 HYPOTHYROIDISM IN ADULT: ICD-10-CM

## 2024-07-18 DIAGNOSIS — Z96.652 PAIN DUE TO TOTAL LEFT KNEE REPLACEMENT, INITIAL ENCOUNTER (HCC): ICD-10-CM

## 2024-07-18 DIAGNOSIS — E78.5 HYPERLIPIDEMIA, UNSPECIFIED HYPERLIPIDEMIA TYPE: ICD-10-CM

## 2024-07-18 DIAGNOSIS — D68.59 HYPERCOAGULABLE STATE (HCC): ICD-10-CM

## 2024-07-18 DIAGNOSIS — I74.9 THROMBOEMBOLISM (HCC): ICD-10-CM

## 2024-07-18 DIAGNOSIS — Z86.711 HISTORY OF PULMONARY EMBOLUS (PE): ICD-10-CM

## 2024-07-18 DIAGNOSIS — E55.9 VITAMIN D DEFICIENCY: ICD-10-CM

## 2024-07-18 DIAGNOSIS — Z01.818 PRE-OP EVALUATION: ICD-10-CM

## 2024-07-18 DIAGNOSIS — I35.0 NONRHEUMATIC AORTIC VALVE STENOSIS: ICD-10-CM

## 2024-07-18 DIAGNOSIS — M35.00 SJOGREN'S SYNDROME, WITH UNSPECIFIED ORGAN INVOLVEMENT (HCC): ICD-10-CM

## 2024-07-18 LAB
25(OH)D3 SERPL-MCNC: 64.9 NG/ML (ref 30–100)
ABO GROUP BLD: NORMAL
ALBUMIN SERPL BCG-MCNC: 4.3 G/DL (ref 3.5–5)
ALP SERPL-CCNC: 62 U/L (ref 34–104)
ALT SERPL W P-5'-P-CCNC: 16 U/L (ref 7–52)
ANION GAP SERPL CALCULATED.3IONS-SCNC: 6 MMOL/L (ref 4–13)
ANTIBODY ID. #3: NORMAL
AST SERPL W P-5'-P-CCNC: 28 U/L (ref 13–39)
BASOPHILS # BLD AUTO: 0.09 THOUSANDS/ÂΜL (ref 0–0.1)
BASOPHILS NFR BLD AUTO: 1 % (ref 0–1)
BILIRUB SERPL-MCNC: 0.77 MG/DL (ref 0.2–1)
BLD GP AB SCN SERPL QL: POSITIVE
BLOOD GROUP ANTIBODIES SERPL: NORMAL
BLOOD GROUP ANTIBODIES SERPL: NORMAL
BUN SERPL-MCNC: 29 MG/DL (ref 5–25)
CALCIUM SERPL-MCNC: 10.8 MG/DL (ref 8.4–10.2)
CHLORIDE SERPL-SCNC: 101 MMOL/L (ref 96–108)
CO2 SERPL-SCNC: 29 MMOL/L (ref 21–32)
CREAT SERPL-MCNC: 0.87 MG/DL (ref 0.6–1.3)
CRP SERPL QL: 2 MG/L
EOSINOPHIL # BLD AUTO: 0.56 THOUSAND/ÂΜL (ref 0–0.61)
EOSINOPHIL NFR BLD AUTO: 7 % (ref 0–6)
ERYTHROCYTE [DISTWIDTH] IN BLOOD BY AUTOMATED COUNT: 13.9 % (ref 11.6–15.1)
ERYTHROCYTE [SEDIMENTATION RATE] IN BLOOD: 23 MM/HOUR (ref 0–29)
GFR SERPL CREATININE-BSD FRML MDRD: 60 ML/MIN/1.73SQ M
GLUCOSE P FAST SERPL-MCNC: 91 MG/DL (ref 65–99)
HCT VFR BLD AUTO: 37.1 % (ref 34.8–46.1)
HGB BLD-MCNC: 12.2 G/DL (ref 11.5–15.4)
IMM GRANULOCYTES # BLD AUTO: 0.03 THOUSAND/UL (ref 0–0.2)
IMM GRANULOCYTES NFR BLD AUTO: 0 % (ref 0–2)
LYMPHOCYTES # BLD AUTO: 2.03 THOUSANDS/ÂΜL (ref 0.6–4.47)
LYMPHOCYTES NFR BLD AUTO: 27 % (ref 14–44)
MCH RBC QN AUTO: 31.5 PG (ref 26.8–34.3)
MCHC RBC AUTO-ENTMCNC: 32.9 G/DL (ref 31.4–37.4)
MCV RBC AUTO: 96 FL (ref 82–98)
MONOCYTES # BLD AUTO: 1.17 THOUSAND/ÂΜL (ref 0.17–1.22)
MONOCYTES NFR BLD AUTO: 15 % (ref 4–12)
NEUTROPHILS # BLD AUTO: 3.73 THOUSANDS/ÂΜL (ref 1.85–7.62)
NEUTS SEG NFR BLD AUTO: 50 % (ref 43–75)
NRBC BLD AUTO-RTO: 0 /100 WBCS
PLATELET # BLD AUTO: 225 THOUSANDS/UL (ref 149–390)
PMV BLD AUTO: 11.6 FL (ref 8.9–12.7)
POTASSIUM SERPL-SCNC: 4.8 MMOL/L (ref 3.5–5.3)
PROT SERPL-MCNC: 7.7 G/DL (ref 6.4–8.4)
RBC # BLD AUTO: 3.87 MILLION/UL (ref 3.81–5.12)
RH BLD: POSITIVE
SODIUM SERPL-SCNC: 136 MMOL/L (ref 135–147)
SPECIMEN EXPIRATION DATE: NORMAL
T4 FREE SERPL-MCNC: 0.94 NG/DL (ref 0.61–1.12)
TSH SERPL DL<=0.05 MIU/L-ACNC: 2.55 UIU/ML (ref 0.45–4.5)
WBC # BLD AUTO: 7.61 THOUSAND/UL (ref 4.31–10.16)

## 2024-07-18 PROCEDURE — 84443 ASSAY THYROID STIM HORMONE: CPT

## 2024-07-18 PROCEDURE — 86870 RBC ANTIBODY IDENTIFICATION: CPT

## 2024-07-18 PROCEDURE — 84439 ASSAY OF FREE THYROXINE: CPT

## 2024-07-18 PROCEDURE — 86140 C-REACTIVE PROTEIN: CPT

## 2024-07-18 PROCEDURE — 85652 RBC SED RATE AUTOMATED: CPT

## 2024-07-18 PROCEDURE — 86901 BLOOD TYPING SEROLOGIC RH(D): CPT

## 2024-07-18 PROCEDURE — 82306 VITAMIN D 25 HYDROXY: CPT

## 2024-07-18 PROCEDURE — 80053 COMPREHEN METABOLIC PANEL: CPT

## 2024-07-18 PROCEDURE — 36415 COLL VENOUS BLD VENIPUNCTURE: CPT

## 2024-07-18 PROCEDURE — 86900 BLOOD TYPING SEROLOGIC ABO: CPT

## 2024-07-18 PROCEDURE — 85025 COMPLETE CBC W/AUTO DIFF WBC: CPT

## 2024-07-18 PROCEDURE — 86850 RBC ANTIBODY SCREEN: CPT

## 2024-07-19 ENCOUNTER — OFFICE VISIT (OUTPATIENT)
Dept: CARDIOLOGY CLINIC | Facility: CLINIC | Age: 89
End: 2024-07-19
Payer: COMMERCIAL

## 2024-07-19 VITALS
HEART RATE: 66 BPM | WEIGHT: 122 LBS | HEIGHT: 62 IN | BODY MASS INDEX: 22.45 KG/M2 | DIASTOLIC BLOOD PRESSURE: 94 MMHG | SYSTOLIC BLOOD PRESSURE: 112 MMHG

## 2024-07-19 DIAGNOSIS — E03.9 HYPOTHYROIDISM IN ADULT: ICD-10-CM

## 2024-07-19 DIAGNOSIS — I10 PRIMARY HYPERTENSION: ICD-10-CM

## 2024-07-19 DIAGNOSIS — I35.0 SEVERE AORTIC STENOSIS: Primary | ICD-10-CM

## 2024-07-19 DIAGNOSIS — Z95.2 S/P TAVR (TRANSCATHETER AORTIC VALVE REPLACEMENT): ICD-10-CM

## 2024-07-19 DIAGNOSIS — I10 HYPERTENSION, UNSPECIFIED TYPE: ICD-10-CM

## 2024-07-19 DIAGNOSIS — I44.7 LBBB (LEFT BUNDLE BRANCH BLOCK): ICD-10-CM

## 2024-07-19 PROCEDURE — 99214 OFFICE O/P EST MOD 30 MIN: CPT | Performed by: INTERNAL MEDICINE

## 2024-07-19 RX ORDER — LEVOTHYROXINE SODIUM 112 UG/1
TABLET ORAL
Qty: 90 TABLET | Refills: 1 | Status: SHIPPED | OUTPATIENT
Start: 2024-07-19

## 2024-07-19 RX ORDER — LOSARTAN POTASSIUM 50 MG/1
50 TABLET ORAL DAILY
Qty: 90 TABLET | Refills: 1 | Status: SHIPPED | OUTPATIENT
Start: 2024-07-19

## 2024-07-19 NOTE — PROGRESS NOTES
"                                           Cardiology Consultation     Anneliese Garcia  [id number]  1935  [facility] [place] [address] EMILY ASH  [address] [address] [phone]    1. Severe aortic stenosis        2. S/P TAVR (transcatheter aortic valve replacement)        3. Primary hypertension        4. LBBB (left bundle branch block)            Discussion/Summary:    Aortic stenosis - After seeing me for a significant murmur and exertional SOB/Chest tightness Anneliese was found to have severe AS.  She is now S/P TAVR with a 23 mm Dunn KELL 3 Ultra Resilia bioprosthetic valve.  She tolerated this well and is now asymptomatic.  She will be starting cardiac rehab soon.  She knows to take antibiotics for dental procedures.  We will see her back in 6 months.    Hypertension - She was on the combination of HCTZ and losartan when I met her.  We have since stopped the HCTZ and added Metoprolol.  No changes were made to this today.    LBBB - This was new after her TAVR. SHe is currently wearing a Zio patch.  Once completed we will review this to see if there is a need for pAcer or to adjust Metoprolol.       HPI:    Mrs. Eldridge comes in for for follow-up given her Severe AS, now S/P TAVR. I met her in consultation a few months back given her history of a \"cardiac murmur\" along with symptoms of shortness of breath with exertion and occasional chest pressure.  Anneliese had no cardiac history but does have multiple family members with cardiac disease.  Her mother had what sounds like coronary artery disease and she has a son who has an aortic valve replacement.  She is treated for hypertension and hyperlipidemia.  She has been on hydroxychloroquine for at least a decade given her history of Sjogren's syndrome and temporal arteritis.  Anneliese last had an echocardiogram in 2018 that showed normal LV systolic function with mild aortic stenosis.    On Exam " she had a loud systolic ejection murmur at the level of the aortic valve.  Echo then performed confirmed Severe AS.  At that point I referred her to CT surgery.  Her preoperative work-up went well, cardiac cath not showing any significant obstructive CAD.  The on 6/27/2024 Anneliese had a transcatheter aortic valve replacement with a 23 mm Dunn KELL 3 Ultra Resilia bioprosthetic valve via a percutaneous left transfemoral approach. She did have a !st degree E block and LBBB post-op and was seen by EP.  No pacer was needed but she is currently wearing a Zio Patch.  Her first post-op Echo looked good with a normally functioning TAVR.      Several months prior to our consultation Anneliese had noticed increasing shortness of breath and fatigue.  She notices it with going for walks or even her activities of daily living.  At times she also had chest tightness/pressure.  These symptoms are now no longer present.  She overall feels well and is asymptomatic from a cardiac standpoint.     Patient Active Problem List   Diagnosis    Sjogren's syndrome (HCC)    History of pulmonary embolism    Hypertension    Hypothyroidism    CKD (chronic kidney disease), stage II    Hyperlipidemia    Vitamin D deficiency    Osteoporosis    Carpal tunnel syndrome    MIAH (obstructive sleep apnea)    Osteoarthritis    Pure hypercholesterolemia    Restless legs syndrome (RLS)    Spinal stenosis of lumbar region without neurogenic claudication    Supervision of normal first pregnancy    Arthritis of right sacroiliac joint    Gastroesophageal reflux disease with esophagitis    Hypercoagulable state (HCC)    Functional diarrhea    History of DVT (deep vein thrombosis)    QT prolongation    Severe aortic stenosis    GERD (gastroesophageal reflux disease)    S/P TAVR (transcatheter aortic valve replacement)    LBBB (left bundle branch block)     Past Medical History:   Diagnosis Date    CAD (coronary artery disease)     CHF (congestive heart failure)  (HCC)     Chronic pain     s/p spinal stimulator    CKD (chronic kidney disease), stage II     baseline Cr 0.8-1.0    GERD (gastroesophageal reflux disease)     History of DVT (deep vein thrombosis)     multiple, Coumadin    History of pulmonary embolism     multiple, Coumadin    History of squamous cell carcinoma excision     Hyperlipidemia     Hypertension     Hypothyroidism     IBS (irritable bowel syndrome)     diarrheal type    MIAH (obstructive sleep apnea)     no CPAP or BiPAP    Osteoarthritis     Osteoporosis     RLS (restless legs syndrome)     Severe aortic stenosis     Sjogren's syndrome (HCC)      Social History     Socioeconomic History    Marital status: /Civil Union     Spouse name: Not on file    Number of children: 5    Years of education: Not on file    Highest education level: Not on file   Occupational History    Not on file   Tobacco Use    Smoking status: Never    Smokeless tobacco: Never    Tobacco comments:     Never smoked   Vaping Use    Vaping status: Never Used   Substance and Sexual Activity    Alcohol use: No    Drug use: No    Sexual activity: Not Currently     Partners: Male     Birth control/protection: Post-menopausal, Male Sterilization   Other Topics Concern    Not on file   Social History Narrative    Not on file     Social Determinants of Health     Financial Resource Strain: Not on file   Food Insecurity: No Food Insecurity (6/28/2024)    Hunger Vital Sign     Worried About Running Out of Food in the Last Year: Never true     Ran Out of Food in the Last Year: Never true   Transportation Needs: No Transportation Needs (6/28/2024)    PRAPARE - Transportation     Lack of Transportation (Medical): No     Lack of Transportation (Non-Medical): No   Physical Activity: Not on file   Stress: Not on file   Social Connections: Unknown (6/18/2024)    Received from [employer]    Social Connections     How often do you feel lonely or isolated from those around you? (Adult - for ages 18  years and over): Not on file   Intimate Partner Violence: Not on file   Housing Stability: Low Risk  (6/28/2024)    Housing Stability Vital Sign     Unable to Pay for Housing in the Last Year: No     Number of Times Moved in the Last Year: 1     Homeless in the Last Year: No      Family History   Problem Relation Age of Onset    No Known Problems Mother     Stroke Father     Ovarian cancer Sister 79    Ovarian cancer Daughter 44    No Known Problems Daughter     No Known Problems Daughter     No Known Problems Maternal Grandmother     No Known Problems Maternal Grandfather     No Known Problems Paternal Grandmother     No Known Problems Paternal Grandfather     No Known Problems Son     No Known Problems Son     No Known Problems Maternal Aunt     No Known Problems Maternal Aunt     No Known Problems Paternal Aunt     Colon cancer Neg Hx     Colon polyps Neg Hx      Past Surgical History:   Procedure Laterality Date    CARDIAC CATHETERIZATION N/A 06/06/2024    Procedure: Cardiac catheterization;  Surgeon: Taj Stafford DO;  Location: [facility];  Service: Cardiology    CARDIAC CATHETERIZATION N/A 6/27/2024    Procedure: Cardiac tavr;  Surgeon: Pascual Koo MD;  Location: [facility];  Service: Cardiology    COLONOSCOPY  07/18/2016     grade 2 internal hemorrhoids but otherwise normal, pathology negative for microscopic colitis    TN REPLACE AORTIC VALVE OPENFEMORAL ARTERY APPROACH N/A 6/27/2024    Procedure: REPLACEMENT AORTIC VALVE TRANSCATHETER (TAVR) TRANSFEMORAL W/ 23MM  S3 UR VALVE(ACCESS ON LEFT) LEDY;  Surgeon: JESSICA Pal MD;  Location: [facility];  Service: Cardiac Surgery    REPAIR RECTOCELE      SPINAL STIMULATOR PLACEMENT      SPINE SURGERY      fusion L1-L5    SQUAMOUS CELL CARCINOMA EXCISION      TOE SURGERY      TONSILLECTOMY AND ADENOIDECTOMY      TOTAL ABDOMINAL HYSTERECTOMY      TOTAL KNEE ARTHROPLASTY Bilateral     TOTAL SHOULDER REPLACEMENT Right     UPPER  GASTROINTESTINAL ENDOSCOPY  01/09/2015     reflux esophagitis, gastritis, duodenitis in the bulb, pathology negative for H pylori, Puckett's, EOE       Current Outpatient Medications:     acetaminophen (TYLENOL) 325 mg tablet, Take 2 tablets (650 mg total) by mouth every 4 (four) hours as needed for fever, mild pain, moderate pain or headaches (temperature greater than 101 F), Disp: 50 tablet, Rfl: 0    aspirin 81 mg chewable tablet, Chew 1 tablet (81 mg total) daily, Disp: 30 tablet, Rfl: 2    Cholecalciferol (VITAMIN D) 2000 UNITS tablet, Take 3,000 Units by mouth daily  , Disp: , Rfl:     cycloSPORINE (RESTASIS) 0.05 % ophthalmic emulsion, Administer 1 drop to both eyes 2 (two) times a day., Disp: , Rfl:     Diclofenac Sodium (VOLTAREN) 1 %, , Disp: , Rfl:     diphenoxylate-atropine (LOMOTIL) 2.5-0.025 mg per tablet, TAKE 1 TABLET BY MOUTH 4 (FOUR) TIMES A DAY AS NEEDED FOR DIARRHEA, Disp: 60 tablet, Rfl: 3    docusate sodium (COLACE) 100 mg capsule, Take 1 capsule (100 mg total) by mouth 2 (two) times a day, Disp: 60 capsule, Rfl: 0    levothyroxine 112 mcg tablet, TAKE 1 TABLET BY MOUTH EVERY DAY, Disp: 90 tablet, Rfl: 1    losartan (COZAAR) 50 mg tablet, TAKE 1 TABLET BY MOUTH EVERY DAY, Disp: 90 tablet, Rfl: 1    metoprolol tartrate (LOPRESSOR) 25 mg tablet, Take 1 tablet (25 mg total) by mouth every 12 (twelve) hours, Disp: 60 tablet, Rfl: 2    omeprazole (PriLOSEC) 20 mg delayed release capsule, Take 20 mg by mouth daily, Disp: , Rfl:     pilocarpine (SALAGEN) 5 mg tablet, Take 5 mg by mouth 4 (four) times a day, Disp: , Rfl:     polyvinyl alcohol (LIQUIFILM TEARS) 1.4 % ophthalmic solution, Administer 1 drop to both eyes as needed for dry eyes, Disp: , Rfl:     simvastatin (ZOCOR) 20 mg tablet, Take 20 mg by mouth daily at bedtime., Disp: , Rfl:     warfarin (COUMADIN) 5 mg tablet, Take 1 tablet (5 mg total) by mouth daily Do not start before March 20, 2023. (Patient taking differently: Take 5 mg by mouth  "daily Friday takes 6 mg  Wednesday/Saturday/Sunday 5 mg Monday and Tuesday does not take it.), Disp: , Rfl: 0  Allergies   Allergen Reactions    Sulfa Antibiotics Hives     Vitals:    07/19/24 1314   BP: 112/94   BP Location: Left arm   Patient Position: Sitting   Cuff Size: Standard   Pulse: 66   Weight: 55.3 kg (122 lb)   Height: 5' 2\" (1.575 m)       Labs:  Lab Results   Component Value Date     01/25/2015    K 4.8 07/18/2024    K 4.3 05/09/2023     07/18/2024     05/09/2023    CO2 29 07/18/2024    CO2 24 06/27/2024    CO2 27 05/09/2023    BUN 29 (H) 07/18/2024    BUN 27 (H) 05/09/2023    CREATININE 0.87 07/18/2024    CREATININE 0.83 05/09/2023    GLUCOSE 102 06/27/2024    GLUCOSE 97 01/25/2015    CALCIUM 10.8 (H) 07/18/2024    CALCIUM 10.4 (H) 05/09/2023     Lab Results   Component Value Date    WBC 7.61 07/18/2024    WBC 7.11 01/25/2015    HGB 12.2 07/18/2024    HGB 10.3 (L) 01/25/2015    HCT 37.1 07/18/2024    HCT 31.7 (L) 01/25/2015    MCV 96 07/18/2024     (H) 01/25/2015     07/18/2024     (L) 01/25/2015     Lab Results   Component Value Date    TRIG 165 (H) 10/28/2022    HDL 46 (L) 10/28/2022     Imaging: Normal sinus rhythm with a left ventricular hypertrophy.    Echo (6/28/2024):    Left Ventricle: Left ventricular cavity size is normal. Wall thickness is mildly increased. The left ventricular ejection fraction is 65%. Systolic function is normal. Wall motion is normal. Diastolic function is moderately abnormal, consistent with grade II (pseudonormal) relaxation.    Right Ventricle: Right ventricular cavity size is normal. Systolic function is normal.    Left Atrium: The atrium is dilated.    Aortic Valve: There is an Dunn KELL 3 Ultra 23 mm TAVR bioprosthetic valve. There is trace paravalvular regurgitation. The gradient recorded across the prosthetic aortic valve is within the expected range. The aortic valve mean gradient is 9 mmHg. The dimensionless velocity " "index is 0.61. The aortic valve area is 2.15 cm2.    Mitral Valve: There is moderate annular calcification. There is moderate regurgitation. There is no evidence of stenosis. The mitral valve mean gradient is 2mmHg.    Tricuspid Valve: There is moderate regurgitation.    Pulmonic Valve: There is mild regurgitation.    Review of Systems:  Review of Systems   Constitutional:  Positive for fatigue.   HENT: Negative.     Eyes: Negative.    Respiratory:  Positive for chest tightness and shortness of breath.    Cardiovascular: Negative.    Gastrointestinal: Negative.    Musculoskeletal: Negative.    Skin: Negative.    Allergic/Immunologic: Negative.    Neurological: Negative.    Hematological: Negative.    Psychiatric/Behavioral: Negative.     All other systems reviewed and are negative.    Vitals:    07/19/24 1314   BP: 112/94   BP Location: Left arm   Patient Position: Sitting   Cuff Size: Standard   Pulse: 66   Weight: 55.3 kg (122 lb)   Height: 5' 2\" (1.575 m)      Physical Exam  Constitutional:       Appearance: She is well-developed.   HENT:      Head: Normocephalic and atraumatic.   Eyes:      General: No scleral icterus.        Right eye: No discharge.         Left eye: No discharge.      Pupils: Pupils are equal, round, and reactive to light.   Neck:      Thyroid: No thyromegaly.      Vascular: No JVD.   Cardiovascular:      Rate and Rhythm: Normal rate and regular rhythm. No extrasystoles are present.     Pulses: Normal pulses. No decreased pulses.      Heart sounds: S1 normal and S2 normal. Murmur heard.      Systolic murmur is present with a grade of 4/6.      No friction rub. No gallop.   Pulmonary:      Effort: Pulmonary effort is normal. No respiratory distress.      Breath sounds: Normal breath sounds. No wheezing or rales.   Abdominal:      General: Bowel sounds are normal. There is no distension.      Palpations: Abdomen is soft.      Tenderness: There is no abdominal tenderness.   Musculoskeletal:       "   General: No tenderness or deformity. Normal range of motion.      Cervical back: Normal range of motion and neck supple.      Right lower leg: No edema.      Left lower leg: No edema.   Skin:     General: Skin is warm and dry.      Findings: No rash.   Neurological:      Mental Status: She is alert and oriented to person, place, and time.      Cranial Nerves: No cranial nerve deficit.   Psychiatric:         Thought Content: Thought content normal.         Judgment: Judgment normal.       Counseling / Coordination of Care  Total office time spent today 40 minutes.  Greater than 50% of total time was spent with the patient and / or family counseling and / or coordination of care.

## 2024-07-25 ENCOUNTER — CLINICAL SUPPORT (OUTPATIENT)
Dept: CARDIOLOGY CLINIC | Facility: CLINIC | Age: 89
End: 2024-07-25
Payer: COMMERCIAL

## 2024-07-25 DIAGNOSIS — I45.4 BBB (BUNDLE BRANCH BLOCK): ICD-10-CM

## 2024-07-25 PROCEDURE — 93228 REMOTE 30 DAY ECG REV/REPORT: CPT | Performed by: INTERNAL MEDICINE

## 2024-07-26 ENCOUNTER — HOSPITAL ENCOUNTER (OUTPATIENT)
Dept: NON INVASIVE DIAGNOSTICS | Facility: HOSPITAL | Age: 89
Discharge: HOME | End: 2024-07-26
Payer: COMMERCIAL

## 2024-07-26 ENCOUNTER — OFFICE VISIT (OUTPATIENT)
Dept: CARDIAC SURGERY | Facility: CLINIC | Age: 89
End: 2024-07-26
Payer: COMMERCIAL

## 2024-07-26 VITALS
SYSTOLIC BLOOD PRESSURE: 112 MMHG | HEART RATE: 66 BPM | BODY MASS INDEX: 22.45 KG/M2 | DIASTOLIC BLOOD PRESSURE: 94 MMHG | WEIGHT: 122 LBS | HEIGHT: 62 IN

## 2024-07-26 VITALS
WEIGHT: 124.6 LBS | BODY MASS INDEX: 22.93 KG/M2 | DIASTOLIC BLOOD PRESSURE: 78 MMHG | TEMPERATURE: 98.9 F | SYSTOLIC BLOOD PRESSURE: 188 MMHG | HEART RATE: 70 BPM | HEIGHT: 62 IN | OXYGEN SATURATION: 96 %

## 2024-07-26 DIAGNOSIS — Z95.2 S/P TAVR (TRANSCATHETER AORTIC VALVE REPLACEMENT): ICD-10-CM

## 2024-07-26 DIAGNOSIS — I35.0 NONRHEUMATIC AORTIC VALVE STENOSIS: ICD-10-CM

## 2024-07-26 DIAGNOSIS — I35.0 NONRHEUMATIC AORTIC VALVE STENOSIS: Primary | ICD-10-CM

## 2024-07-26 DIAGNOSIS — Z48.89 ENCOUNTER FOR POST SURGICAL WOUND CHECK: ICD-10-CM

## 2024-07-26 LAB
AORTIC VALVE MEAN VELOCITY: 13.5 M/S
APICAL FOUR CHAMBER EJECTION FRACTION: 58 %
ASCENDING AORTA: 3.3 CM
AV AREA BY CONTINUOUS VTI: 1.5 CM2
AV AREA PEAK VELOCITY: 1.4 CM2
AV LVOT MEAN GRADIENT: 2 MMHG
AV LVOT PEAK GRADIENT: 4 MMHG
AV MEAN GRADIENT: 8 MMHG
AV PEAK GRADIENT: 16 MMHG
AV REGURGITATION PRESSURE HALF TIME: 369 MS
AV VALVE AREA: 1.84 CM2
AV VELOCITY RATIO: 0.49
BSA FOR ECHO PROCEDURE: 1.55 M2
DOP CALC AO PEAK VEL: 2 M/S
DOP CALC AO VTI: 47.41 CM
DOP CALC LVOT AREA: 3.46 CM2
DOP CALC LVOT CARDIAC INDEX: 2.72 L/MIN/M2
DOP CALC LVOT CARDIAC OUTPUT: 4.22 L/MIN
DOP CALC LVOT DIAMETER: 2.1 CM
DOP CALC LVOT PEAK VEL VTI: 25.25 CM
DOP CALC LVOT PEAK VEL: 0.97 M/S
DOP CALC LVOT STROKE INDEX: 47.1 ML/M2
DOP CALC LVOT STROKE VOLUME: 87.41 CM3
DOP CALC MV VTI: 47.62 CM
E WAVE DECELERATION TIME: 225 MS
E/A RATIO: 1.06
FRACTIONAL SHORTENING: 32 (ref 28–44)
INTERVENTRICULAR SEPTUM IN DIASTOLE (PARASTERNAL SHORT AXIS VIEW): 1.4 CM
INTERVENTRICULAR SEPTUM: 1.4 CM (ref 0.6–1.1)
LAAS-AP2: 31.3 CM2
LAAS-AP4: 29.3 CM2
LEFT ATRIUM SIZE: 4.7 CM
LEFT ATRIUM VOLUME (MOD BIPLANE): 116 ML
LEFT ATRIUM VOLUME INDEX (MOD BIPLANE): 74.8 ML/M2
LEFT INTERNAL DIMENSION IN SYSTOLE: 3 CM (ref 2.1–4)
LEFT VENTRICULAR INTERNAL DIMENSION IN DIASTOLE: 4.4 CM (ref 3.5–6)
LEFT VENTRICULAR POSTERIOR WALL IN END DIASTOLE: 1.3 CM
LEFT VENTRICULAR STROKE VOLUME: 51 ML
LVSV (TEICH): 51 ML
MV E'TISSUE VEL-LAT: 9 CM/S
MV E'TISSUE VEL-SEP: 5 CM/S
MV MEAN GRADIENT: 3 MMHG
MV PEAK A VEL: 1.19 M/S
MV PEAK E VEL: 126 CM/S
MV PEAK GRADIENT: 10 MMHG
MV STENOSIS PRESSURE HALF TIME: 65 MS
MV VALVE AREA BY CONTINUITY EQUATION: 1.84 CM2
MV VALVE AREA P 1/2 METHOD: 3.38
RA PRESSURE ESTIMATED: 3 MMHG
RIGHT ATRIUM AREA SYSTOLE A4C: 13.4 CM2
RIGHT VENTRICLE ID DIMENSION: 3.3 CM
RV PSP: 32 MMHG
SL CV AV DECELERATION TIME RETROGRADE: 1271 MS
SL CV AV PEAK GRADIENT RETROGRADE: 26 MMHG
SL CV LEFT ATRIUM LENGTH A2C: 6.7 CM
SL CV LV EF: 60
SL CV PED ECHO LEFT VENTRICLE DIASTOLIC VOLUME (MOD BIPLANE) 2D: 86 ML
SL CV PED ECHO LEFT VENTRICLE SYSTOLIC VOLUME (MOD BIPLANE) 2D: 35 ML
TR MAX PG: 29 MMHG
TR PEAK VELOCITY: 2.7 M/S
TRICUSPID ANNULAR PLANE SYSTOLIC EXCURSION: 1.9 CM
TRICUSPID VALVE PEAK REGURGITATION VELOCITY: 2.69 M/S

## 2024-07-26 PROCEDURE — 93306 TTE W/DOPPLER COMPLETE: CPT

## 2024-07-26 PROCEDURE — 93005 ELECTROCARDIOGRAM TRACING: CPT

## 2024-07-26 PROCEDURE — 93306 TTE W/DOPPLER COMPLETE: CPT | Performed by: STUDENT IN AN ORGANIZED HEALTH CARE EDUCATION/TRAINING PROGRAM

## 2024-07-26 PROCEDURE — 99214 OFFICE O/P EST MOD 30 MIN: CPT | Performed by: THORACIC SURGERY (CARDIOTHORACIC VASCULAR SURGERY)

## 2024-07-26 RX ORDER — HYDROXYCHLOROQUINE SULFATE 200 MG/1
200 TABLET, FILM COATED ORAL
COMMUNITY
Start: 2024-07-20

## 2024-07-26 NOTE — PROGRESS NOTES
"Procedure: S/P transfemoral transcatheter aortic valve replacement, performed on 6/27/24    History: Anneliese Garcia is a 88 y.o. year old female who presents to our office today for routine follow up care following transcatheter aortic valve replacement. Postoperatively, she developed a new LBBB. EP was consulted and she was cleared to resume her BB, which she tolerated, and was discharged home on POD #2 with a Zio patch.    Since she has been home, Mrs. Garcia reports she is doing well. She reports having more energy and \"pep.\"  She denies chest pain, SOB, PND, orthopnea, LE edema, syncope or palpitations.     She has followed with her PCP and cardiologist for her routine postop appointments, notes reviewed. She continues to wear the Zio patch and is scheduled for removal tomorrow.     Mrs. Garcia reports she is unable to participate in cardiac rehab following her TAVR. Her  is scheduled to begin radiation therapy next week for the treatment of prostate cancer and will require 5 treatments weekly, so she will be transporting him.    Vital Signs:   Vitals:    07/26/24 1415 07/26/24 1437   BP: 146/69 (!) 188/78   BP Location: Left arm Right arm   Patient Position: Sitting Sitting   Cuff Size: Standard    Pulse: 70    Temp: 98.9 °F (37.2 °C)    TempSrc: Tympanic    SpO2: 96%    Weight: 56.5 kg (124 lb 9.6 oz)    Height: 5' 2\" (1.575 m)        Home Medications:   Prior to Admission medications    Medication Sig Start Date End Date Taking? Authorizing Provider   acetaminophen (TYLENOL) 325 mg tablet Take 2 tablets (650 mg total) by mouth every 4 (four) hours as needed for fever, mild pain, moderate pain or headaches (temperature greater than 101 F) 6/29/24   Tamiko Porras PA-C   aspirin 81 mg chewable tablet Chew 1 tablet (81 mg total) daily 6/30/24 9/28/24  Tamiko Porras PA-C   Cholecalciferol (VITAMIN D) 2000 UNITS tablet Take 3,000 Units by mouth daily      Historical Provider, MD [place] " (RESTASIS) 0.05 % ophthalmic emulsion Administer 1 drop to both eyes 2 (two) times a day.    Historical Provider, MD   Diclofenac Sodium (VOLTAREN) 1 %  11/1/21   Historical Provider, MD   diphenoxylate-atropine (LOMOTIL) 2.5-0.025 mg per tablet TAKE 1 TABLET BY MOUTH 4 (FOUR) TIMES A DAY AS NEEDED FOR DIARRHEA 7/8/21   Lew Art DO   docusate sodium (COLACE) 100 mg capsule Take 1 capsule (100 mg total) by mouth 2 (two) times a day 6/29/24 7/29/24  Tamiko Porras PA-C   levothyroxine 112 mcg tablet TAKE 1 TABLET BY MOUTH EVERY DAY 7/19/24   CATHERINE Brady   losartan (COZAAR) 50 mg tablet TAKE 1 TABLET BY MOUTH EVERY DAY 7/19/24   CATHERINE Brady   metoprolol tartrate (LOPRESSOR) 25 mg tablet Take 1 tablet (25 mg total) by mouth every 12 (twelve) hours 6/29/24 9/27/24  Tamiko Porras PA-C   omeprazole (PriLOSEC) 20 mg delayed release capsule Take 20 mg by mouth daily    Historical Provider, MD   pilocarpine (SALAGEN) 5 mg tablet Take 5 mg by mouth 4 (four) times a day 12/15/21   Historical Provider, MD   polyvinyl alcohol (LIQUIFILM TEARS) 1.4 % ophthalmic solution Administer 1 drop to both eyes as needed for dry eyes    Historical Provider, MD   simvastatin (ZOCOR) 20 mg tablet Take 20 mg by mouth daily at bedtime.    Historical Provider, MD   warfarin (COUMADIN) 5 mg tablet Take 1 tablet (5 mg total) by mouth daily Do not start before March 20, 2023.  Patient taking differently: Take 5 mg by mouth daily Friday takes 6 mg   Wednesday/Saturday/Sunday 5 mg  Monday and Tuesday does not take it. 3/20/23   Yuliana Mott MD       Physical Exam:    HEENT/NECK:  Normocephalic. Atraumatic.  No jugular venous distention.    Cardiac: Regular rate and rhythm and No murmurs/rubs/gallops  Pulmonary:  Breath sounds clear bilaterally and No rales/rhonchi/wheezes  Abdomen:  Non-tender, Non-distended, and Normal bowel sounds  Incisions: Groin puncture sites clean, dry, and intact without hematoma  Extremities:  "Extremities warm/dry and No edema B/L  Neuro: Alert and oriented X 3, Sensation is grossly intact, and No focal deficits  Skin: Warm/Dry, without rashes or lesions.    Lab Results:               Invalid input(s): \"LABGLOM\"    Imaging Studies:     Transthoracic Echocardiogram 7/26/24: report pending. Images reviewed by myself and Dr. Pal. Bioprosthetic AV appears well seated with no PVL.    I have personally reviewed pertinent films in PACS    TAVR evaluation Assessment:     [facility]    Assessment: Aortic stenosis, Non-Rheumatic. S/P transfemoral transcatheter aortic valve replacement;    Plan:     Anneliese Garcia continues to recover well following transcatheter aortic valve replacement.  To date they have made progressive improvements with physical rehabilitation. At this point I have cleared them to begin outpatient cardiac rehabilitation and have encouraged them to to do so. Unfortunatly, Mrs. Garcia is unable to do so, as she will be transporting her  to his cancer treatments 5 times per week. I have encouraged her to ambulate as much as possible at home and remain active.  Anneliese Garcia has also been cleared to resume driving at this point. I have asked that they do so in small, progressive increments.    Her BP is elevated at today's visit. She reports she has a BP cuff at home and her BP is well controlled when she checks it at home. It was also well controlled at her last visit with Dr. Christiansen. I have asked nAneliese to continue to monitor her BP and contact her cardiologist if it continues to remain elevated.    Anneliese Garcia has already been evaluated by their primary care physician and their cardiologist for ongoing medical care. Arrangements will be made for one year surveillance follow up with repeat ECG and echocardiogram.  I have advised them to notify their PCP with any new concerns that may arise in the interim. Anneliese Garcia was comfortable with our " recommendations and their questions were answered to their satisfaction.    Finally, Anneliese Garcia was educated on their increased risk for developing a prosthetic valve infection with many common dental procedures. Subsequently, we advised them to inform their dentist that they have an implanted TAVR valve. They are not to schedule routine dental cleaning for six months following surgery and antibiotics will need to be prescribed by their dentist, prior to any dental procedures.     Mary Molina PA-C  07/26/24  2:37 PM    * This note was completed in part utilizing [employer] direct voice recognition software.   Grammatical errors, random word insertion, spelling mistakes, and incomplete sentences may be an occasional consequence of the system secondary to software limitations, ambient noise and hardware issues. At the time of dictation, efforts were made to edit, clarify and /or correct errors. Please read the chart carefully and recognize, using context, where substitutions have occurred.  If you have any questions or concerns about the context, text or information contained within the body of this dictation, please contact myself, the provider, for further clarification.

## 2024-07-27 DIAGNOSIS — N89.8 VAGINAL IRRITATION: Primary | ICD-10-CM

## 2024-07-28 LAB
ATRIAL RATE: 69 BPM
P AXIS: 61 DEGREES
PR INTERVAL: 184 MS
QRS AXIS: 40 DEGREES
QRSD INTERVAL: 146 MS
QT INTERVAL: 460 MS
QTC INTERVAL: 492 MS
T WAVE AXIS: 70 DEGREES
VENTRICULAR RATE: 69 BPM

## 2024-07-28 PROCEDURE — 93010 ELECTROCARDIOGRAM REPORT: CPT | Performed by: INTERNAL MEDICINE

## 2024-07-29 RX ORDER — CLOTRIMAZOLE AND BETAMETHASONE DIPROPIONATE 10; .64 MG/G; MG/G
CREAM TOPICAL
Qty: 30 G | Refills: 1 | Status: SHIPPED | OUTPATIENT
Start: 2024-07-29

## 2024-08-07 ENCOUNTER — TELEPHONE (OUTPATIENT)
Dept: ENDOCRINOLOGY | Facility: HOSPITAL | Age: 89
End: 2024-08-07

## 2024-08-07 NOTE — TELEPHONE ENCOUNTER
[employer] benefits were received to the office via fax today.  Will be sending to prior auth pod as well to make them aware.    Possible prior auth required    Forms will be scanned into chart and attached to telephone note

## 2024-08-08 ENCOUNTER — CLINICAL SUPPORT (OUTPATIENT)
Dept: CARDIOLOGY CLINIC | Facility: CLINIC | Age: 89
End: 2024-08-08

## 2024-08-08 DIAGNOSIS — I45.4 BBB (BUNDLE BRANCH BLOCK): ICD-10-CM

## 2024-08-08 PROCEDURE — RECHECK: Performed by: INTERNAL MEDICINE

## 2024-08-16 ENCOUNTER — OFFICE VISIT (OUTPATIENT)
Dept: OBGYN CLINIC | Facility: CLINIC | Age: 89
End: 2024-08-16
Payer: COMMERCIAL

## 2024-08-16 ENCOUNTER — PREP FOR PROCEDURE (OUTPATIENT)
Dept: OBGYN CLINIC | Facility: CLINIC | Age: 89
End: 2024-08-16

## 2024-08-16 VITALS
DIASTOLIC BLOOD PRESSURE: 64 MMHG | HEART RATE: 65 BPM | SYSTOLIC BLOOD PRESSURE: 140 MMHG | HEIGHT: 62 IN | BODY MASS INDEX: 22.63 KG/M2 | WEIGHT: 123 LBS

## 2024-08-16 DIAGNOSIS — T84.038A MECHANICAL LOOSENING OF PROSTHETIC KNEE, INITIAL ENCOUNTER  (HCC): Primary | ICD-10-CM

## 2024-08-16 DIAGNOSIS — Z96.652 PAIN DUE TO TOTAL LEFT KNEE REPLACEMENT, INITIAL ENCOUNTER (HCC): ICD-10-CM

## 2024-08-16 DIAGNOSIS — T84.84XA PAIN DUE TO TOTAL LEFT KNEE REPLACEMENT, INITIAL ENCOUNTER (HCC): ICD-10-CM

## 2024-08-16 DIAGNOSIS — M25.59 PAIN IN OTHER JOINT: ICD-10-CM

## 2024-08-16 DIAGNOSIS — Z96.659 MECHANICAL LOOSENING OF PROSTHETIC KNEE, INITIAL ENCOUNTER  (HCC): Primary | ICD-10-CM

## 2024-08-16 PROCEDURE — 99215 OFFICE O/P EST HI 40 MIN: CPT | Performed by: STUDENT IN AN ORGANIZED HEALTH CARE EDUCATION/TRAINING PROGRAM

## 2024-08-16 RX ORDER — CHLORHEXIDINE GLUCONATE 40 MG/ML
SOLUTION TOPICAL DAILY PRN
OUTPATIENT
Start: 2024-08-16

## 2024-08-16 RX ORDER — FOLIC ACID 1 MG/1
1 TABLET ORAL DAILY
Qty: 30 TABLET | Refills: 1 | Status: SHIPPED | OUTPATIENT
Start: 2024-08-16

## 2024-08-16 RX ORDER — ACETAMINOPHEN 325 MG/1
975 TABLET ORAL ONCE
OUTPATIENT
Start: 2024-08-16 | End: 2024-08-16

## 2024-08-16 RX ORDER — CHLORHEXIDINE GLUCONATE ORAL RINSE 1.2 MG/ML
15 SOLUTION DENTAL ONCE
OUTPATIENT
Start: 2024-08-16 | End: 2024-08-16

## 2024-08-16 RX ORDER — ASCORBIC ACID 500 MG
500 TABLET ORAL 2 TIMES DAILY
Qty: 60 TABLET | Refills: 1 | Status: SHIPPED | OUTPATIENT
Start: 2024-08-16

## 2024-08-16 RX ORDER — SODIUM CHLORIDE, SODIUM LACTATE, POTASSIUM CHLORIDE, CALCIUM CHLORIDE 600; 310; 30; 20 MG/100ML; MG/100ML; MG/100ML; MG/100ML
125 INJECTION, SOLUTION INTRAVENOUS CONTINUOUS
OUTPATIENT
Start: 2024-08-16

## 2024-08-16 RX ORDER — MULTIVIT-MIN/IRON FUM/FOLIC AC 7.5 MG-4
1 TABLET ORAL DAILY
Qty: 30 TABLET | Refills: 1 | Status: SHIPPED | OUTPATIENT
Start: 2024-08-16

## 2024-08-16 NOTE — PROGRESS NOTES
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  The patient's pain is significantly affecting activities of daily living.  She  is having difficulty walking due to her limitations from the left TKA loosening. She has normal inflammatory makers. Patient would like to discuss surgical options.  We discussed a revision total knee arthroplasty. She has hx of DVT/PE on warfarin. We discussed that she will discuss any bridge with PCP.     The patient has elected to proceed with left TKA revision. Risks and benefits of surgery to include but not limited to bleeding, infection, damage to surrounding structures, hardware failure, instability, fracture, dislocation, leg length inequality, need for further surgery, continued pain, stiffness, blood clots, stroke, and heart attack was discussed with the patient. Informed consent was signed today in the office. The patient has met with our surgical schedulers and our preoperative joint replacement pathway has been initiated. All questions were answered. Patient will follow-up 2 weeks post operatively.  Patient is a nonsmoker and appropriate BMI.      1. Mechanical loosening of prosthetic knee, initial encounter  ([facility])    2. Pain due to total left knee replacement, initial encounter ([facility])        Plan:   Diagnoses and all orders for this visit:    Mechanical loosening of prosthetic knee, initial encounter  ([facility])    Pain due to total left knee replacement, initial encounter ([facility])  -     Ambulatory referral to Orthopedic Surgery       Subjective:     Patient ID: Anneliese Garcia is a 88 y.o. female.  Chief Complaint:  HPI:  88 y.o. female ambulates with a cane referred from Dr Barnett for a loose L TKA. She has hx of left TKA in 2008 by Dr Fox at [place]. For the past few years she's been having pain and instability. She c/o start up pain. She has seen her surgeon who did not  recommend any treatment, Dr Rolo Wilson who did a CSI and referral to pain management, and Dr Barnett who referred her to us for surgical consult due to gross loosening on images and exam. She has hx of DVT/PE on warfarin. She last had one in 1991, and none after her b/l TKAs.       Allergy:  Allergies   Allergen Reactions    Sulfa Antibiotics Hives     Medications:  all current active meds have been reviewed  Past Medical History:  Past Medical History:   Diagnosis Date    CAD (coronary artery disease)     CHF (congestive heart failure) (HCC)     Chronic pain     s/p spinal stimulator    CKD (chronic kidney disease), stage II     baseline Cr 0.8-1.0    GERD (gastroesophageal reflux disease)     History of DVT (deep vein thrombosis)     multiple, Coumadin    History of pulmonary embolism     multiple, Coumadin    History of squamous cell carcinoma excision     Hyperlipidemia     Hypertension     Hypothyroidism     IBS (irritable bowel syndrome)     diarrheal type    MIAH (obstructive sleep apnea)     no CPAP or BiPAP    Osteoarthritis     Osteoporosis     RLS (restless legs syndrome)     Severe aortic stenosis     Sjogren's syndrome (HCC)      Past Surgical History:  Past Surgical History:   Procedure Laterality Date    CARDIAC CATHETERIZATION N/A 06/06/2024    Procedure: Cardiac catheterization;  Surgeon: Taj Stafford DO;  [facility];  Service: Cardiology    CARDIAC CATHETERIZATION N/A 6/27/2024    Procedure: Cardiac tavr;  Surgeon: Pascual Koo MD;  [facility];  Service: Cardiology    COLONOSCOPY  07/18/2016     grade 2 internal hemorrhoids but otherwise normal, pathology negative for microscopic colitis    OH REPLACE AORTIC VALVE OPENFEMORAL ARTERY APPROACH N/A 6/27/2024    Procedure: REPLACEMENT AORTIC VALVE TRANSCATHETER (TAVR) TRANSFEMORAL W/ 23MM  S3 UR VALVE(ACCESS ON LEFT) LEDY;  Surgeon: JESSICA Pal MD;  Location[facility];  Service: Cardiac Surgery    REPAIR  RECTOCELE      SPINAL STIMULATOR PLACEMENT      SPINE SURGERY      fusion L1-L5    SQUAMOUS CELL CARCINOMA EXCISION      TOE SURGERY      TONSILLECTOMY AND ADENOIDECTOMY      TOTAL ABDOMINAL HYSTERECTOMY      TOTAL KNEE ARTHROPLASTY Bilateral     TOTAL SHOULDER REPLACEMENT Right     UPPER GASTROINTESTINAL ENDOSCOPY  01/09/2015     reflux esophagitis, gastritis, duodenitis in the bulb, pathology negative for H pylori, Puckett's, EOE     Family History:  Family History   Problem Relation Age of Onset    No Known Problems Mother     Stroke Father     Ovarian cancer Sister 79    Ovarian cancer Daughter 44    No Known Problems Daughter     No Known Problems Daughter     No Known Problems Maternal Grandmother     No Known Problems Maternal Grandfather     No Known Problems Paternal Grandmother     No Known Problems Paternal Grandfather     No Known Problems Son     No Known Problems Son     No Known Problems Maternal Aunt     No Known Problems Maternal Aunt     No Known Problems Paternal Aunt     Colon cancer Neg Hx     Colon polyps Neg Hx      Social History:  Social History     Substance and Sexual Activity   Alcohol Use No     Social History     Substance and Sexual Activity   Drug Use No     Social History     Tobacco Use   Smoking Status Never   Smokeless Tobacco Never   Tobacco Comments    Never smoked           ROS:  General: Per HPI  Skin: Negative, except if noted below  HEENT: Negative  Respiratory: Negative  Cardiovascular: Negative  Gastrointestinal: Negative  Urinary: Negative  Vascular: Negative  Musculoskeletal: Positive per HPI   Neurologic: Positive per HPI  Endocrine: Negative    Objective:  BP Readings from Last 1 Encounters:   08/16/24 140/64      Wt Readings from Last 1 Encounters:   08/16/24 55.8 kg (123 lb)        Respiratory:   non-labored respirations    Lymphatics:  no palpable lymph nodes    Gait:   Antalgic     Neurologic:   Alert and oriented x 3  Patient with normal sensation except as noted  "below  Deep tendon reflexes 2+ except as noted in MSK exam    Bilateral Lower Extremity:  Left Knee:      Inspection:  well healed incision, no erythema or ecchymosis    Overall limb alignment valgus    Effusion: moderate    ROM 5-120 with pain    Extensor Lag: none    Palpation: medial and lateral tibial plateau tenderness to palpation    AP Stability at 90 deg instability    M/L stability in full extension instability    M/L stability in midflexion instability    Motor: 5/5 Q/HS/TA/GS/P    Pulses: 2+ DP / 2+ PT    SILT DP/SP/S/S/TN    Imaging:  My interpretation XR L knee shows cemented nexgen left tka with gross loosening and valgus alignment of the tibial component.     Labs: ESR and CRP wnl    BMI:   Estimated body mass index is 22.5 kg/m² as calculated from the following:    Height as of this encounter: 5' 2\" (1.575 m).    Weight as of this encounter: 55.8 kg (123 lb).  BSA:   Estimated body surface area is 1.55 meters squared as calculated from the following:    Height as of this encounter: 5' 2\" (1.575 m).    Weight as of this encounter: 55.8 kg (123 lb).           Scribe Attestation      I,:   am acting as a scribe while in the presence of the attending physician.:       I,:   personally performed the services described in this documentation    as scribed in my presence.:               "

## 2024-08-19 ENCOUNTER — OFFICE VISIT (OUTPATIENT)
Dept: SURGERY | Facility: HOSPITAL | Age: 89
End: 2024-08-19
Payer: COMMERCIAL

## 2024-08-19 VITALS
WEIGHT: 124.2 LBS | HEART RATE: 75 BPM | TEMPERATURE: 96 F | DIASTOLIC BLOOD PRESSURE: 83 MMHG | SYSTOLIC BLOOD PRESSURE: 132 MMHG | BODY MASS INDEX: 22.86 KG/M2 | HEIGHT: 62 IN

## 2024-08-19 DIAGNOSIS — R10.9 LEFT SIDED ABDOMINAL PAIN: Primary | ICD-10-CM

## 2024-08-19 DIAGNOSIS — R19.00 ABDOMINAL WALL BULGE: ICD-10-CM

## 2024-08-19 DIAGNOSIS — K62.9 RECTAL ABNORMALITY: ICD-10-CM

## 2024-08-19 DIAGNOSIS — K80.20 CALCULUS OF GALLBLADDER WITHOUT CHOLECYSTITIS WITHOUT OBSTRUCTION: ICD-10-CM

## 2024-08-19 PROCEDURE — 99203 OFFICE O/P NEW LOW 30 MIN: CPT | Performed by: SURGERY

## 2024-08-21 ENCOUNTER — APPOINTMENT (OUTPATIENT)
Dept: LAB | Facility: HOSPITAL | Age: 89
End: 2024-08-21
Payer: COMMERCIAL

## 2024-08-21 ENCOUNTER — LAB REQUISITION (OUTPATIENT)
Dept: LAB | Facility: HOSPITAL | Age: 89
End: 2024-08-21
Payer: COMMERCIAL

## 2024-08-21 DIAGNOSIS — M25.59 PAIN IN OTHER JOINT: ICD-10-CM

## 2024-08-21 DIAGNOSIS — T84.038A: ICD-10-CM

## 2024-08-21 DIAGNOSIS — T84.038A MECHANICAL LOOSENING OF OTHER INTERNAL PROSTHETIC JOINT, INITIAL ENCOUNTER (HCC): ICD-10-CM

## 2024-08-21 DIAGNOSIS — Z96.649: ICD-10-CM

## 2024-08-21 DIAGNOSIS — T84.038A MECHANICAL LOOSENING OF PROSTHETIC KNEE, INITIAL ENCOUNTER  (HCC): ICD-10-CM

## 2024-08-21 DIAGNOSIS — Z96.649 PRESENCE OF UNSPECIFIED ARTIFICIAL HIP JOINT: ICD-10-CM

## 2024-08-21 DIAGNOSIS — Z96.659 MECHANICAL LOOSENING OF PROSTHETIC KNEE, INITIAL ENCOUNTER  (HCC): ICD-10-CM

## 2024-08-21 LAB
ABO GROUP BLD: NORMAL
ALBUMIN SERPL BCG-MCNC: 4.3 G/DL (ref 3.5–5)
ALP SERPL-CCNC: 60 U/L (ref 34–104)
ALT SERPL W P-5'-P-CCNC: 16 U/L (ref 7–52)
ANION GAP SERPL CALCULATED.3IONS-SCNC: 5 MMOL/L (ref 4–13)
APTT PPP: 36 SECONDS (ref 23–34)
AST SERPL W P-5'-P-CCNC: 27 U/L (ref 13–39)
BASOPHILS # BLD AUTO: 0.08 THOUSANDS/ÂΜL (ref 0–0.1)
BASOPHILS NFR BLD AUTO: 1 % (ref 0–1)
BILIRUB SERPL-MCNC: 0.64 MG/DL (ref 0.2–1)
BLD GP AB SCN SERPL QL: NEGATIVE
BUN SERPL-MCNC: 27 MG/DL (ref 5–25)
CALCIUM SERPL-MCNC: 10.6 MG/DL (ref 8.4–10.2)
CHLORIDE SERPL-SCNC: 105 MMOL/L (ref 96–108)
CO2 SERPL-SCNC: 31 MMOL/L (ref 21–32)
CREAT SERPL-MCNC: 0.85 MG/DL (ref 0.6–1.3)
EOSINOPHIL # BLD AUTO: 0.33 THOUSAND/ÂΜL (ref 0–0.61)
EOSINOPHIL NFR BLD AUTO: 5 % (ref 0–6)
ERYTHROCYTE [DISTWIDTH] IN BLOOD BY AUTOMATED COUNT: 13.3 % (ref 11.6–15.1)
EST. AVERAGE GLUCOSE BLD GHB EST-MCNC: 97 MG/DL
FERRITIN SERPL-MCNC: 78 NG/ML (ref 11–307)
GFR SERPL CREATININE-BSD FRML MDRD: 62 ML/MIN/1.73SQ M
GLUCOSE P FAST SERPL-MCNC: 89 MG/DL (ref 65–99)
HBA1C MFR BLD: 5 %
HCT VFR BLD AUTO: 37.6 % (ref 34.8–46.1)
HGB BLD-MCNC: 12.6 G/DL (ref 11.5–15.4)
IMM GRANULOCYTES # BLD AUTO: 0.04 THOUSAND/UL (ref 0–0.2)
IMM GRANULOCYTES NFR BLD AUTO: 1 % (ref 0–2)
INR PPP: 1.82 (ref 0.85–1.19)
IRON SATN MFR SERPL: 22 % (ref 15–50)
IRON SERPL-MCNC: 74 UG/DL (ref 50–212)
LYMPHOCYTES # BLD AUTO: 1.68 THOUSANDS/ÂΜL (ref 0.6–4.47)
LYMPHOCYTES NFR BLD AUTO: 27 % (ref 14–44)
MCH RBC QN AUTO: 32.9 PG (ref 26.8–34.3)
MCHC RBC AUTO-ENTMCNC: 33.5 G/DL (ref 31.4–37.4)
MCV RBC AUTO: 98 FL (ref 82–98)
MONOCYTES # BLD AUTO: 0.99 THOUSAND/ÂΜL (ref 0.17–1.22)
MONOCYTES NFR BLD AUTO: 16 % (ref 4–12)
NEUTROPHILS # BLD AUTO: 3.06 THOUSANDS/ÂΜL (ref 1.85–7.62)
NEUTS SEG NFR BLD AUTO: 50 % (ref 43–75)
NRBC BLD AUTO-RTO: 0 /100 WBCS
PLATELET # BLD AUTO: 187 THOUSANDS/UL (ref 149–390)
PMV BLD AUTO: 12 FL (ref 8.9–12.7)
POTASSIUM SERPL-SCNC: 4.5 MMOL/L (ref 3.5–5.3)
PROT SERPL-MCNC: 7.6 G/DL (ref 6.4–8.4)
PROTHROMBIN TIME: 21.5 SECONDS (ref 12.3–15)
RBC # BLD AUTO: 3.83 MILLION/UL (ref 3.81–5.12)
RETICS # AUTO: ABNORMAL 10*3/UL (ref 14097–95744)
RETICS # CALC: 2.73 % (ref 0.37–1.87)
RH BLD: POSITIVE
SODIUM SERPL-SCNC: 141 MMOL/L (ref 135–147)
SPECIMEN EXPIRATION DATE: NORMAL
TIBC SERPL-MCNC: 343 UG/DL (ref 250–450)
UIBC SERPL-MCNC: 269 UG/DL (ref 155–355)
WBC # BLD AUTO: 6.18 THOUSAND/UL (ref 4.31–10.16)

## 2024-08-21 PROCEDURE — 86900 BLOOD TYPING SEROLOGIC ABO: CPT | Performed by: ANESTHESIOLOGY

## 2024-08-21 PROCEDURE — 83540 ASSAY OF IRON: CPT

## 2024-08-21 PROCEDURE — 82728 ASSAY OF FERRITIN: CPT

## 2024-08-21 PROCEDURE — 85045 AUTOMATED RETICULOCYTE COUNT: CPT

## 2024-08-21 PROCEDURE — 85025 COMPLETE CBC W/AUTO DIFF WBC: CPT

## 2024-08-21 PROCEDURE — 83036 HEMOGLOBIN GLYCOSYLATED A1C: CPT

## 2024-08-21 PROCEDURE — 86901 BLOOD TYPING SEROLOGIC RH(D): CPT | Performed by: ANESTHESIOLOGY

## 2024-08-21 PROCEDURE — 80053 COMPREHEN METABOLIC PANEL: CPT

## 2024-08-21 PROCEDURE — 85730 THROMBOPLASTIN TIME PARTIAL: CPT

## 2024-08-21 PROCEDURE — 85610 PROTHROMBIN TIME: CPT

## 2024-08-21 PROCEDURE — 93005 ELECTROCARDIOGRAM TRACING: CPT

## 2024-08-21 PROCEDURE — 86850 RBC ANTIBODY SCREEN: CPT | Performed by: ANESTHESIOLOGY

## 2024-08-21 PROCEDURE — 83550 IRON BINDING TEST: CPT

## 2024-08-21 PROCEDURE — 36415 COLL VENOUS BLD VENIPUNCTURE: CPT

## 2024-08-22 LAB
ATRIAL RATE: 71 BPM
P AXIS: 50 DEGREES
PR INTERVAL: 130 MS
QRS AXIS: 33 DEGREES
QRSD INTERVAL: 114 MS
QT INTERVAL: 428 MS
QTC INTERVAL: 465 MS
T WAVE AXIS: 37 DEGREES
VENTRICULAR RATE: 71 BPM

## 2024-08-22 PROCEDURE — 93010 ELECTROCARDIOGRAM REPORT: CPT | Performed by: INTERNAL MEDICINE

## 2024-08-24 ENCOUNTER — HOSPITAL ENCOUNTER (OUTPATIENT)
Dept: CT IMAGING | Facility: HOSPITAL | Age: 89
Discharge: HOME | End: 2024-08-24
Attending: SURGERY
Payer: COMMERCIAL

## 2024-08-24 DIAGNOSIS — R10.9 LEFT SIDED ABDOMINAL PAIN: ICD-10-CM

## 2024-08-24 PROCEDURE — 74177 CT ABD & PELVIS W/CONTRAST: CPT

## 2024-08-24 RX ADMIN — IOHEXOL 50 ML: 350 INJECTION, SOLUTION INTRAVENOUS at 15:15

## 2024-08-28 ENCOUNTER — TELEPHONE (OUTPATIENT)
Age: 89
End: 2024-08-28

## 2024-08-28 NOTE — TELEPHONE ENCOUNTER
Tomas called from radiology with significant findings on CT of abdomen pelvis, warm transferred to [place] in office per new protocol

## 2024-08-29 DIAGNOSIS — K82.8 THICKENING OF WALL OF GALLBLADDER: ICD-10-CM

## 2024-08-29 DIAGNOSIS — R93.5 ABNORMAL CT OF THE ABDOMEN: Primary | ICD-10-CM

## 2024-09-08 DIAGNOSIS — Z96.659 MECHANICAL LOOSENING OF PROSTHETIC KNEE, INITIAL ENCOUNTER  (HCC): ICD-10-CM

## 2024-09-08 DIAGNOSIS — T84.038A MECHANICAL LOOSENING OF PROSTHETIC KNEE, INITIAL ENCOUNTER  (HCC): ICD-10-CM

## 2024-09-09 ENCOUNTER — TELEPHONE (OUTPATIENT)
Dept: OBGYN CLINIC | Facility: HOSPITAL | Age: 89
End: 2024-09-09

## 2024-09-09 RX ORDER — FOLIC ACID 1 MG/1
1000 TABLET ORAL DAILY
Qty: 90 TABLET | Refills: 1 | Status: SHIPPED | OUTPATIENT
Start: 2024-09-09

## 2024-09-09 NOTE — TELEPHONE ENCOUNTER
"Preoperative Elective Admission Assessment- spoke to patient.   *previously had both knees replaced.     Living Situation:    Who does pt live with: spouse  What kind of home: multi-level, [place].   How do they enter the home: front  How many levels in home: 2 level home, master on first floor, and doesn't use the other levels currently.   # of steps to enter home: \"Step Saver\" to enter the home   # of steps to second floor: N/A   Sleeping arrangement: first/entry floor  Where is Bathroom: First floor bath, step in tub with seat and grab bars      First Floor Setup:   Is there a bathroom: Yes  Where would pt sleep: bed     DME: Has two RW (instructed to bring DOS).   We discussed clearing pathways in the home and making sure there is accessibly to use the walker, for example, removing throw rugs.      Patient's Current Level of Function: Ambulates: Independently and ADLs: Independent    Post-op Caregiver: spouse, states she also has children close by- I recommended she speak with them to bulk up support at home postop.   Currently receive any HHC/aides/community supports: No     Post-op Transport: spouse  To/from hospital: spouse  To/from PT 2-3x/week: spouse  Uses community transport now: No     Outpatient Physical Therapy Site:  Site: [facility]   pre and post-op appts scheduled? Yes     Medication Management: self  Preferred Pharmacy for Post-op Medications: [id number] - [place], [place] - [address] [[id number]]   Blood Management Vitamin Regimen:  Has at home, to start 30 days before surgery   Post-op anticoagulant: to be determined by surgical team postoperatively     DC Plan: Pt plans to be discharged home    Barriers to DC identified preoperatively: none identified    BMI: 22.72    Patient Education:  Pt educated on post-op pain, early mobilization (POD0), LOS goals, OP PT goals, and preoperative bathing. Patient educated that our goal is to appropriately discharge patient based off their post-op " function while striving to maintain maximal independence. The goal is to discharge patient to home and for them to attend outpatient physical therapy.    Assigned to care team? Yes

## 2024-09-10 ENCOUNTER — OFFICE VISIT (OUTPATIENT)
Dept: GASTROENTEROLOGY | Facility: CLINIC | Age: 89
End: 2024-09-10
Payer: COMMERCIAL

## 2024-09-10 VITALS
WEIGHT: 126 LBS | HEIGHT: 62 IN | BODY MASS INDEX: 23.19 KG/M2 | DIASTOLIC BLOOD PRESSURE: 82 MMHG | SYSTOLIC BLOOD PRESSURE: 124 MMHG

## 2024-09-10 DIAGNOSIS — R10.32 LLQ PAIN: Primary | ICD-10-CM

## 2024-09-10 PROCEDURE — 99214 OFFICE O/P EST MOD 30 MIN: CPT | Performed by: INTERNAL MEDICINE

## 2024-09-10 PROCEDURE — G2211 COMPLEX E/M VISIT ADD ON: HCPCS | Performed by: INTERNAL MEDICINE

## 2024-09-10 NOTE — PROGRESS NOTES
[facility] - Outpatient Follow-up Note  Anneliese Garcia 88 y.o. female MRN: [id number]  Encounter: [id number]    ASSESSMENT AND PLAN:      1. LLQ pain  Episodic left lower quadrant pain began about 5 weeks ago, not always present, not definitively related to the bowel cycle.  Symptoms are less pronounced over the past week or 2.  No obvious abnormality on CT    We discussed workup options.  Since symptoms are improving we will hold off on immediate endoscopic evaluation.  I prescribed hyoscyamine as needed.  If the symptom severity increases she will contact me immediately and we will likely proceed with flex sig    2.  Rectal thickening  Identified on CT in late August, stable since prior exam in May no abnormality on colonoscopy December 2020.  Psych does not correlate with her area of pain.  We discussed flex sig but will hold off now as we monitor for recurrence of abdominal pain.  Will plan a short interval office follow-up to discuss endoscopic evaluation    Followup Appointment: 3 months  ______________________________________________________________________    Chief Complaint   Patient presents with    Sharp pain around navel, surgery said to come here     HPI: The patient presents for evaluation of abdominal pain.  She was last seen by our practice in 2021 for diarrhea which has improved.  She had a TAVR in June  About 5 weeks ago she began to experience episodic left-sided abdominal pain in the central to left lower quadrant.  The pain comes and goes.  It does not occur every day.  At its worst it was a 9 out of 10 and with double her over.  It would last about 10 minutes then resolved.  It was not related to meals or positions.  It did not change with bowel movements.  Initially it was a few times per day.  Over the past week or 2 she has not had severe flares at all, just has mild discomfort in that area.  She denies any change in bowel habits or rectal  bleeding.  Historical Information   Past Medical History:   Diagnosis Date    CAD (coronary artery disease)     CHF (congestive heart failure) (HCC)     Chronic pain     s/p spinal stimulator    CKD (chronic kidney disease), stage II     baseline Cr 0.8-1.0    GERD (gastroesophageal reflux disease)     History of DVT (deep vein thrombosis)     multiple, Coumadin    History of pulmonary embolism     multiple, Coumadin    History of squamous cell carcinoma excision     Hyperlipidemia     Hypertension     Hypothyroidism     IBS (irritable bowel syndrome)     diarrheal type    MIAH (obstructive sleep apnea)     no CPAP or BiPAP    Osteoarthritis     Osteoporosis     RLS (restless legs syndrome)     Severe aortic stenosis     Sjogren's syndrome (HCC)      Past Surgical History:   Procedure Laterality Date    CARDIAC CATHETERIZATION N/A 06/06/2024    Procedure: Cardiac catheterization;  Surgeon: Taj Stafford DO;  [facility];  Service: Cardiology    CARDIAC CATHETERIZATION N/A 6/27/2024    Procedure: Cardiac tavr;  Surgeon: Pascual Koo MD;  [facility];  Service: Cardiology    COLONOSCOPY  07/18/2016     grade 2 internal hemorrhoids but otherwise normal, pathology negative for microscopic colitis    AR REPLACE AORTIC VALVE OPENFEMORAL ARTERY APPROACH N/A 6/27/2024    Procedure: REPLACEMENT AORTIC VALVE TRANSCATHETER (TAVR) TRANSFEMORAL W/ 23MM  S3 UR VALVE(ACCESS ON LEFT) LEDY;  Surgeon: JESSICA Pal MD;  [facility];  Service: Cardiac Surgery    REPAIR RECTOCELE      SPINAL STIMULATOR PLACEMENT      SPINE SURGERY      fusion L1-L5    SQUAMOUS CELL CARCINOMA EXCISION      TOE SURGERY      TONSILLECTOMY AND ADENOIDECTOMY      TOTAL ABDOMINAL HYSTERECTOMY      TOTAL KNEE ARTHROPLASTY Bilateral     TOTAL SHOULDER REPLACEMENT Right     UPPER GASTROINTESTINAL ENDOSCOPY  01/09/2015     reflux esophagitis, gastritis, duodenitis in the bulb, pathology negative for H pylori,  Puckett's, EOE     Social History     Substance and Sexual Activity   Alcohol Use No     Social History     Substance and Sexual Activity   Drug Use No     Social History     Tobacco Use   Smoking Status Never   Smokeless Tobacco Never   Tobacco Comments    Never smoked     Family History   Problem Relation Age of Onset    No Known Problems Mother     Stroke Father     Ovarian cancer Sister 79    Ovarian cancer Daughter 44    No Known Problems Daughter     No Known Problems Daughter     No Known Problems Maternal Grandmother     No Known Problems Maternal Grandfather     No Known Problems Paternal Grandmother     No Known Problems Paternal Grandfather     No Known Problems Son     No Known Problems Son     No Known Problems Maternal Aunt     No Known Problems Maternal Aunt     No Known Problems Paternal Aunt     Colon cancer Neg Hx     Colon polyps Neg Hx          Current Outpatient Medications:     acetaminophen (TYLENOL) 325 mg tablet    ascorbic acid (VITAMIN C) 500 MG tablet    aspirin 81 mg chewable tablet    Cholecalciferol (VITAMIN D) 2000 UNITS tablet    clotrimazole-betamethasone (LOTRISONE) 1-0.05 % cream    cycloSPORINE (RESTASIS) 0.05 % ophthalmic emulsion    Diclofenac Sodium (VOLTAREN) 1 %    diphenoxylate-atropine (LOMOTIL) 2.5-0.025 mg per tablet    folic acid (FOLVITE) 1 mg tablet    hydroxychloroquine (PLAQUENIL) 200 mg tablet    hyoscyamine (LEVSIN/SL) 0.125 mg SL tablet    levothyroxine 112 mcg tablet    losartan (COZAAR) 50 mg tablet    metoprolol tartrate (LOPRESSOR) 25 mg tablet    Multiple Vitamins-Minerals (multivitamin with minerals) tablet    omeprazole (PriLOSEC) 20 mg delayed release capsule    pilocarpine (SALAGEN) 5 mg tablet    polyvinyl alcohol (LIQUIFILM TEARS) 1.4 % ophthalmic solution    simvastatin (ZOCOR) 20 mg tablet    warfarin (COUMADIN) 5 mg tablet    docusate sodium (COLACE) 100 mg capsule  Allergies   Allergen Reactions    Sulfa Antibiotics Hives     Reviewed medications  "and allergies and updated as indicated    PHYSICAL EXAM:    Blood pressure 124/82, height 5' 2\" (1.575 m), weight 57.2 kg (126 lb), not currently breastfeeding. Body mass index is 23.05 kg/m².  General Appearance: NAD, cooperative, alert  Eyes: Anicteric, conjunctiva pink  ENT:  Normocephalic, atraumatic, normal mucosa.    Neck:  Supple, symmetrical, trachea midline  Resp:  Clear to auscultation bilaterally; no rales, rhonchi or wheezing; respirations unlabored   CV:  S1 S2, Regular rate and rhythm; no murmur, rub, or gallop.  GI:  Soft, non-tender, non-distended; normal bowel sounds; no masses, no organomegaly   Rectal: Deferred  Musculoskeletal: No cyanosis, clubbing or edema. Normal ROM.  Skin:  No jaundice, rashes, or lesions   Heme/Lymph: No palpable cervical lymphadenopathy  Psych: Normal affect, good eye contact  Neuro: No gross deficits, AAOx3    Lab Results:   Lab Results   Component Value Date    WBC 6.18 08/21/2024    HGB 12.6 08/21/2024    HCT 37.6 08/21/2024    MCV 98 08/21/2024     08/21/2024     Lab Results   Component Value Date     01/25/2015    K 4.5 08/21/2024     08/21/2024    CO2 31 08/21/2024    ANIONGAP 10 01/25/2015    BUN 27 (H) 08/21/2024    CREATININE 0.85 08/21/2024    GLUCOSE 102 06/27/2024    GLUF 89 08/21/2024    CALCIUM 10.6 (H) 08/21/2024    CORRECTEDCA 10.6 (H) 07/02/2020    AST 27 08/21/2024    ALT 16 08/21/2024    ALKPHOS 60 08/21/2024    PROT 6.5 01/21/2015    BILITOT 0.6 01/21/2015    EGFR 62 08/21/2024       Radiology Results:   CT abdomen pelvis w contrast    Result Date: 8/28/2024  Narrative: CT ABDOMEN AND PELVIS WITH IV CONTRAST INDICATION: R10.9: Unspecified abdominal pain. Symptoms are described as being located in the left lower quadrant. COMPARISON: CT abdomen and pelvis with contrast 3/17/2023; correlation with CTA chest, abdomen and pelvis with and without contrast 5/28/2024 TECHNIQUE: CT examination of the abdomen and pelvis was performed. Dual " energy CT scan technique (DECT) was employed. Multiplanar 2D reformatted images were created from the source data. This examination, like all CT scans performed in the [facility], was performed utilizing techniques to minimize radiation dose exposure, including the use of iterative reconstruction and automated exposure control. Radiation dose length product (DLP) for this visit: 448.93 mGy-cm IV Contrast: 50 mL of iohexol (OMNIPAQUE) Enteric Contrast: Administered. FINDINGS: ABDOMEN LOWER CHEST: Coronary artery calcifications. Calcification of the mitral valve annulus. Similar cardiomegaly with left atrial dilation. Unchanged right fat-containing Bochdalek hernia with associated mild right lower lobe atelectasis. Unchanged scarring versus atelectasis in the left lower lobe and lingula. Oral contrast within the esophagus may be due to gastroesophageal reflux or hypomotility. LIVER/BILIARY TREE: Subcentimeter hypoattenuating lesion(s), too small to characterize but statistically likely benign, which do not require follow-up (ACR White Paper 2017). No suspicious mass. Normal hepatic contours. No biliary dilation. GALLBLADDER: A 4 mm hyperdensity in the gallbladder neck (series 301 image 50) may be due to a polyp or gallstone. No pericholecystic inflammatory change. SPLEEN: Unremarkable. PANCREAS: Unremarkable. ADRENAL GLANDS: Unremarkable. KIDNEYS/URETERS: Unchanged left renal cortical simple cyst. No hydronephrosis. STOMACH AND BOWEL: There appears to be an area of soft tissue thickening of the low rectal wall (series 301, image 142) grossly similar to 5/28/2024 measuring approximately 3.5 cm. Colonic diverticulosis without findings of acute diverticulitis. APPENDIX: Normal. ABDOMINOPELVIC CAVITY: No ascites. No pneumoperitoneum. No lymphadenopathy. VESSELS: Atherosclerosis without abdominal aortic aneurysm. PELVIS REPRODUCTIVE ORGANS: Post hysterectomy. URINARY BLADDER: Unremarkable. ABDOMINAL  WALL/INGUINAL REGIONS: Sacral neurostimulator generator pack in the subcutaneous tissues of the left gluteal region with unchanged lead placement. BONES: Severe compression deformity of the T12 vertebral body is unchanged. Multilevel degenerative changes of the visualized spine. Chronic fracture deformity of the anterolateral right 10th rib (series 301, image 48).     Impression: 1. No acute findings in the abdomen or pelvis. 2. There appears to be an area of soft tissue thickening of the low rectal wall. Unclear if this is artifactual or if there is an underlying mass. Recommend direct visualization to evaluate for neoplasm. 3. A 4 mm hyperdensity in the gallbladder neck may be due to a polyp or gallstone. Nonemergent gallbladder ultrasound may be considered if clinically warranted. The study was marked in EPIC for significant notification. Resident: REID ASIF I, the attending radiologist, have reviewed the images and agree with the final report above. Workstation performed: [id number]     Answers submitted by the patient for this visit:  Abdominal Pain Questionnaire (Submitted on 9/6/2024)  Chief Complaint: Abdominal pain  Chronicity: new  Onset: 1 to 4 weeks ago  Onset quality: sudden  Frequency: rarely  Progression since onset: gradually improving  Pain location: LLQ  Pain - numeric: 9/10  Pain quality: sharp  Radiates to: periumbilical region  anorexia: No  arthralgias: No  belching: Yes  constipation: No  diarrhea: No  dysuria: No  fever: No  flatus: Yes  frequency: Yes  headaches: No  hematochezia: No  hematuria: No  melena: No  myalgias: No  nausea: No  weight loss: No  vomiting: No  Aggravated by: certain positions, movement  Relieved by: being still, palpation  Diagnostic workup: CT scan, ultrasound

## 2024-09-12 ENCOUNTER — OFFICE VISIT (OUTPATIENT)
Dept: ENDOCRINOLOGY | Facility: HOSPITAL | Age: 89
End: 2024-09-12
Payer: COMMERCIAL

## 2024-09-12 VITALS
WEIGHT: 123.8 LBS | DIASTOLIC BLOOD PRESSURE: 84 MMHG | BODY MASS INDEX: 24.3 KG/M2 | HEIGHT: 60 IN | SYSTOLIC BLOOD PRESSURE: 124 MMHG | HEART RATE: 70 BPM

## 2024-09-12 DIAGNOSIS — I10 ESSENTIAL HYPERTENSION: ICD-10-CM

## 2024-09-12 DIAGNOSIS — E55.9 VITAMIN D DEFICIENCY: ICD-10-CM

## 2024-09-12 DIAGNOSIS — M81.0 AGE-RELATED OSTEOPOROSIS WITHOUT CURRENT PATHOLOGICAL FRACTURE: ICD-10-CM

## 2024-09-12 DIAGNOSIS — M81.0 OSTEOPOROSIS, UNSPECIFIED OSTEOPOROSIS TYPE, UNSPECIFIED PATHOLOGICAL FRACTURE PRESENCE: Primary | ICD-10-CM

## 2024-09-12 DIAGNOSIS — E03.9 HYPOTHYROIDISM IN ADULT: ICD-10-CM

## 2024-09-12 DIAGNOSIS — I10 HYPERTENSION, UNSPECIFIED TYPE: ICD-10-CM

## 2024-09-12 DIAGNOSIS — E78.5 HYPERLIPIDEMIA, UNSPECIFIED HYPERLIPIDEMIA TYPE: ICD-10-CM

## 2024-09-12 PROCEDURE — 96372 THER/PROPH/DIAG INJ SC/IM: CPT | Performed by: NURSE PRACTITIONER

## 2024-09-12 PROCEDURE — 99214 OFFICE O/P EST MOD 30 MIN: CPT | Performed by: NURSE PRACTITIONER

## 2024-09-12 NOTE — PROGRESS NOTES
Anneliese Garcia 88 y.o. female MRN: [id number]    Encounter: [id number]      Assessment & Plan     Assessment:  This is a 88 y.o.-year-old female with hypothyroidism, osteoporosis, hypertension, hyperlipidemia and vitamin-D deficiency     Plan:  1.  Osteoporosis:  Her most recent  DEXA scan from May 23, 2023 shows a T-score of -2.7 in the left forearm.  She will continue supplementation of calcium and vitamin-D. Recheck calcium level prior to next Prolia injection. She is due for her next injection today. Discussed fall risk and safety at length during the time of the visit.  I also suggested that, for stability and safety, she continue to use a cane/walker for safety and stability.   Check comprehensive metabolic panel prior to next office visit.     2.  Hypothyroidism:  TSH and free T4 are normal.  She states that she is taking her levothyroxine consistently and in the proper manner.  I have asked her to continue her levothyroxine 112 mcg daily. Check TSH and free T4 prior to next office visit.     3.  Hypertension: She is normotensive in the office today.  Continue current regimen.  Check comprehensive metabolic panel prior to next visit.     4.  Hyperlipidemia:  Managed by PCP.  Continue simvastatin.     5.  Vitamin-D deficiency:  Stable at 64.8.  Continue supplementation with vitamin D3 daily.      CC: Hypothyroidism/Osteoporosis follow-up     History of Present Illness     HPI:  88 y.o. female with history of hypertension, hyperlipidemia, hypothyroidism, vitamin-D deficiency, osteoporosis and GERD who presents for follow up.      For her hypothyroidism, she takes levothyroxine 112 mcg daily.  Her most recent TSH from July 28, 2024 is 2.54 with a Free T4 of 0.94. Overall, she is feeling well but complains of some lingering fatigue.  She was recently treated for RSV.     She has a history of osteoporosis and is currently being treated with Prolia.  Her most recent  DEXA scan from May 23, 2023 shows a  T-score of -2.7 in the left forearm indicative of osteoporosis.  She will be due for her injection of Prolia today. She is tolerating it well.  She did sustain a fall on January 6, 2019 but denies any falls since.       For hypertension, she takes losartan 50 mg daily, metoprolol 25 mg twice daily and hydrochlorothiazide 12.5 mg daily.       For hyperlipidemia, she takes simvastatin 20 mg daily.       For vitamin-D deficiency, she is on 2000 units daily of vitamin-D.  Her most recent 25 hydroxy vitamin-D level from July 18, 2024 is 64.9.       Review of Systems   Constitutional: Negative.  Negative for chills, fatigue and fever.   HENT:  Positive for hearing loss (hearing aids). Negative for trouble swallowing and voice change.    Eyes: Negative.  Negative for photophobia, pain, discharge, redness, itching and visual disturbance.   Respiratory: Negative.  Negative for chest tightness and shortness of breath.    Cardiovascular: Negative.  Negative for chest pain.   Gastrointestinal: Negative.  Negative for abdominal pain, constipation, diarrhea and vomiting.   Endocrine: Negative for cold intolerance, heat intolerance, polydipsia, polyphagia and polyuria.   Genitourinary: Negative.    Musculoskeletal:  Positive for arthralgias, back pain, gait problem (utilizes cane) and myalgias.   Skin: Negative.    Allergic/Immunologic: Negative.    Neurological:  Negative for dizziness, syncope, light-headedness and headaches.   Hematological: Negative.    Psychiatric/Behavioral: Negative.     All other systems reviewed and are negative.      Historical Information   Past Medical History:   Diagnosis Date    CAD (coronary artery disease)     CHF (congestive heart failure) (HCC)     Chronic pain     s/p spinal stimulator    CKD (chronic kidney disease), stage II     baseline Cr 0.8-1.0    GERD (gastroesophageal reflux disease)     History of DVT (deep vein thrombosis)     multiple, Coumadin    History of pulmonary embolism      multiple, Coumadin    History of squamous cell carcinoma excision     Hyperlipidemia     Hypertension     Hypothyroidism     IBS (irritable bowel syndrome)     diarrheal type    MIAH (obstructive sleep apnea)     no CPAP or BiPAP    Osteoarthritis     Osteoporosis     RLS (restless legs syndrome)     Severe aortic stenosis     Sjogren's syndrome (HCC)      Past Surgical History:   Procedure Laterality Date    CARDIAC CATHETERIZATION N/A 06/06/2024    Procedure: Cardiac catheterization;  Surgeon: Taj Stafford DO;  Location: [facility];  Service: Cardiology    CARDIAC CATHETERIZATION N/A 6/27/2024    Procedure: Cardiac tavr;  Surgeon: Pascual Koo MD;  Location: [facility];  Service: Cardiology    COLONOSCOPY  07/18/2016     grade 2 internal hemorrhoids but otherwise normal, pathology negative for microscopic colitis    CO REPLACE AORTIC VALVE OPENFEMORAL ARTERY APPROACH N/A 6/27/2024    Procedure: REPLACEMENT AORTIC VALVE TRANSCATHETER (TAVR) TRANSFEMORAL W/ 23MM  S3 UR VALVE(ACCESS ON LEFT) LEDY;  Surgeon: JESSICA Pal MD;  Location: [facility];  Service: Cardiac Surgery    REPAIR RECTOCELE      SPINAL STIMULATOR PLACEMENT      SPINE SURGERY      fusion L1-L5    SQUAMOUS CELL CARCINOMA EXCISION      TOE SURGERY      TONSILLECTOMY AND ADENOIDECTOMY      TOTAL ABDOMINAL HYSTERECTOMY      TOTAL KNEE ARTHROPLASTY Bilateral     TOTAL SHOULDER REPLACEMENT Right     UPPER GASTROINTESTINAL ENDOSCOPY  01/09/2015     reflux esophagitis, gastritis, duodenitis in the bulb, pathology negative for H pylori, Puckett's, EOE     Social History   Social History     Substance and Sexual Activity   Alcohol Use No     Social History     Substance and Sexual Activity   Drug Use No     Social History     Tobacco Use   Smoking Status Never   Smokeless Tobacco Never   Tobacco Comments    Never smoked     Family History:   Family History   Problem Relation Age of Onset    No Known Problems Mother     Stroke Father      Ovarian cancer Sister 79    Ovarian cancer Daughter 44    No Known Problems Daughter     No Known Problems Daughter     No Known Problems Maternal Grandmother     No Known Problems Maternal Grandfather     No Known Problems Paternal Grandmother     No Known Problems Paternal Grandfather     No Known Problems Son     No Known Problems Son     No Known Problems Maternal Aunt     No Known Problems Maternal Aunt     No Known Problems Paternal Aunt     Colon cancer Neg Hx     Colon polyps Neg Hx        Meds/Allergies   Current Outpatient Medications   Medication Sig Dispense Refill    acetaminophen (TYLENOL) 325 mg tablet Take 2 tablets (650 mg total) by mouth every 4 (four) hours as needed for fever, mild pain, moderate pain or headaches (temperature greater than 101 F) 50 tablet 0    aspirin 81 mg chewable tablet Chew 1 tablet (81 mg total) daily 30 tablet 2    Cholecalciferol (VITAMIN D) 2000 UNITS tablet Take 3,000 Units by mouth daily        clotrimazole-betamethasone (LOTRISONE) 1-0.05 % cream APPLY TOPICALLY 2 TIMES PER DAY 30 g 1    cycloSPORINE (RESTASIS) 0.05 % ophthalmic emulsion Administer 1 drop to both eyes 2 (two) times a day.      Diclofenac Sodium (VOLTAREN) 1 %       diphenoxylate-atropine (LOMOTIL) 2.5-0.025 mg per tablet TAKE 1 TABLET BY MOUTH 4 (FOUR) TIMES A DAY AS NEEDED FOR DIARRHEA 60 tablet 3    folic acid (FOLVITE) 1 mg tablet TAKE 1 TABLET BY MOUTH EVERY DAY 90 tablet 1    hydroxychloroquine (PLAQUENIL) 200 mg tablet Take 200 mg by mouth      hyoscyamine (LEVSIN/SL) 0.125 mg SL tablet Take 1 tablet (0.125 mg total) by mouth every 4 (four) hours as needed for cramping 20 tablet 0    levothyroxine 112 mcg tablet TAKE 1 TABLET BY MOUTH EVERY DAY 90 tablet 1    losartan (COZAAR) 50 mg tablet TAKE 1 TABLET BY MOUTH EVERY DAY 90 tablet 1    metoprolol tartrate (LOPRESSOR) 25 mg tablet Take 1 tablet (25 mg total) by mouth every 12 (twelve) hours 60 tablet 2    Multiple Vitamins-Minerals  "(multivitamin with minerals) tablet Take 1 tablet by mouth daily 30 tablet 1    omeprazole (PriLOSEC) 20 mg delayed release capsule Take 20 mg by mouth daily      pilocarpine (SALAGEN) 5 mg tablet Take 5 mg by mouth 4 (four) times a day      polyvinyl alcohol (LIQUIFILM TEARS) 1.4 % ophthalmic solution Administer 1 drop to both eyes as needed for dry eyes      simvastatin (ZOCOR) 20 mg tablet Take 20 mg by mouth daily at bedtime.      warfarin (COUMADIN) 5 mg tablet Take 1 tablet (5 mg total) by mouth daily Do not start before March 20, 2023. (Patient taking differently: Take 5 mg by mouth daily Friday takes 6 mg   Wednesday/Saturday/Sunday 5 mg  Monday and Tuesday does not take it.)  0    ascorbic acid (VITAMIN C) 500 MG tablet Take 1 tablet (500 mg total) by mouth 2 (two) times a day (Patient not taking: Reported on 9/12/2024) 60 tablet 1     Current Facility-Administered Medications   Medication Dose Route Frequency Provider Last Rate Last Admin    denosumab (PROLIA) subcutaneous injection 60 mg  60 mg Subcutaneous Q6 Months          Allergies   Allergen Reactions    Sulfa Antibiotics Hives       Objective   Vitals: Blood pressure 124/84, pulse 70, height 5' 0.24\" (1.53 m), weight 56.2 kg (123 lb 12.8 oz), not currently breastfeeding.    Physical Exam  Vitals reviewed.   Constitutional:       Appearance: She is well-developed.   HENT:      Head: Normocephalic and atraumatic.   Eyes:      Conjunctiva/sclera: Conjunctivae normal.      Pupils: Pupils are equal, round, and reactive to light.      Comments: Wears glasses   Cardiovascular:      Rate and Rhythm: Normal rate and regular rhythm.      Heart sounds: Normal heart sounds.   Pulmonary:      Effort: Pulmonary effort is normal.      Breath sounds: Normal breath sounds.   Abdominal:      General: Bowel sounds are normal.      Palpations: Abdomen is soft.   Musculoskeletal:         General: Normal range of motion.      Cervical back: Normal range of motion and " "neck supple.   Skin:     General: Skin is warm and dry.   Neurological:      Mental Status: She is alert and oriented to person, place, and time.      Coordination: Coordination abnormal (utilizes cane).   Psychiatric:         Behavior: Behavior normal.         Thought Content: Thought content normal.         Judgment: Judgment normal.         Lab Results:   Lab Results   Component Value Date/Time    TSH 3RD GENERATON 2.546 07/18/2024 07:07 AM    TSH 3RD GENERATON 1.408 01/24/2024 07:10 AM    Free T4 0.94 07/18/2024 07:07 AM    Free T4 2.18 (H) 01/24/2024 07:10 AM       Portions of the record may have been created with voice recognition software. Occasional wrong word or \"sound a like\" substitutions may have occurred due to the inherent limitations of voice recognition software. Read the chart carefully and recognize, using context, where substitutions have occurred.    "

## 2024-09-12 NOTE — PROGRESS NOTES
"Assessment/Plan:    Anneliese Garcia came into the [facility] today 09/12/24 to receive her Prolia injection.      Verbal consent obtained.  Consent given by: patient    patient states patient has been medically healthy with no underlining concerns/complications.      Anneliese Garcia presents with no symptoms today.       All insturctions were reviewed with the patient.    If the patient should have any questions/concerns, advised patient to contacted [facility].       Subjective:     History provided by: patient    Patient ID: Anneliese Garcia is a 88 y.o. female      Objective:    Vitals:    09/12/24 0741   BP: 124/84   Pulse: 70   Weight: 56.2 kg (123 lb 12.8 oz)   Height: 5' 0.24\" (1.53 m)       Patient tolerated the injection well without any complications.  Injection site/s Left Arm.  Medication was provided by the office.    Patient signed consent form yes   Patient signed ABN form yes (If no patient is not a medicare patient).   Patient waited 15 minutes after injection no (This only applies to patient's receiving first time injection).       Last Visit: 8/7/2024  Next visit:Visit date not found     "

## 2024-09-16 ENCOUNTER — TELEPHONE (OUTPATIENT)
Dept: ANESTHESIOLOGY | Facility: CLINIC | Age: 89
End: 2024-09-16

## 2024-09-17 ENCOUNTER — TELEPHONE (OUTPATIENT)
Dept: ANESTHESIOLOGY | Facility: CLINIC | Age: 89
End: 2024-09-17

## 2024-09-17 ENCOUNTER — HOSPITAL ENCOUNTER (OUTPATIENT)
Dept: ULTRASOUND IMAGING | Facility: HOSPITAL | Age: 89
Discharge: HOME | End: 2024-09-17
Attending: SURGERY
Payer: COMMERCIAL

## 2024-09-17 DIAGNOSIS — K82.8 THICKENING OF WALL OF GALLBLADDER: ICD-10-CM

## 2024-09-17 DIAGNOSIS — Z01.89 ENCOUNTER FOR GERIATRIC ASSESSMENT: Primary | ICD-10-CM

## 2024-09-17 PROCEDURE — 76705 ECHO EXAM OF ABDOMEN: CPT

## 2024-09-23 ENCOUNTER — OFFICE VISIT (OUTPATIENT)
Dept: CARDIOLOGY CLINIC | Facility: CLINIC | Age: 89
End: 2024-09-23
Payer: COMMERCIAL

## 2024-09-23 VITALS
HEART RATE: 65 BPM | SYSTOLIC BLOOD PRESSURE: 89 MMHG | HEIGHT: 60 IN | DIASTOLIC BLOOD PRESSURE: 61 MMHG | WEIGHT: 124 LBS | BODY MASS INDEX: 24.35 KG/M2

## 2024-09-23 DIAGNOSIS — I44.7 LBBB (LEFT BUNDLE BRANCH BLOCK): ICD-10-CM

## 2024-09-23 DIAGNOSIS — I10 PRIMARY HYPERTENSION: ICD-10-CM

## 2024-09-23 DIAGNOSIS — Z95.2 S/P TAVR (TRANSCATHETER AORTIC VALVE REPLACEMENT): ICD-10-CM

## 2024-09-23 DIAGNOSIS — I35.0 SEVERE AORTIC STENOSIS: Primary | ICD-10-CM

## 2024-09-23 DIAGNOSIS — Z01.810 PREOPERATIVE CARDIOVASCULAR EXAMINATION: ICD-10-CM

## 2024-09-23 PROCEDURE — 99214 OFFICE O/P EST MOD 30 MIN: CPT | Performed by: INTERNAL MEDICINE

## 2024-09-23 NOTE — PROGRESS NOTES
Cardiology Consultation     Anneliese Garcia  [id number]  1935  [facility] [place] [address] EMILY ASH  [address] [address] [phone]    1. Severe aortic stenosis        2. Preoperative cardiovascular examination  Ambulatory referral to Cardiology      3. S/P TAVR (transcatheter aortic valve replacement)        4. Primary hypertension        5. LBBB (left bundle branch block)            Discussion/Summary:    Aortic stenosis - After seeing me for a significant murmur and exertional SOB/Chest tightness Anneliese was found to have severe AS.  She is now S/P TAVR with a 23 mm Dunn KELL 3 Ultra Resilia bioprosthetic valve.  She tolerated this well and is now asymptomatic.  Cardiac rehabilitation is recommended but now this is on hold given her orthopedic issues and need for redo left knee replacement.  She should use antibiotics for any dental procedures.  We will see her back in 6 months.    Hypertension - She was on the combination of HCTZ and losartan when I met her.  We have since stopped the HCTZ and added Metoprolol.  No changes were made to this today.    LBBB - This was new after her TAVR.  She wore almost a months worth of an extended ambulatory Holter which did show some short runs of nonsustained atrial tachycardia that improved on the second monitor.  Her average heart rate was in the 60s and there was no significant pauses or AV block.  No changes were made to her medical therapy and there are no indications at this point for permanent pacemaker.    Preoperative cardiovascular risk assessment - Anneliese overall is low risk for a redo total knee replacement.  She can proceed without further testing.  Continue beta-blocker perioperatively.      HPI:    Mrs. Eldridge comes in for a preoperative cardiovascular risk assessment.  She has had bilateral knee replacements and now needs a redo replacement  "of her left knee.      I follow her in the office given her Severe AS, now S/P TAVR. I met her in consultation in April 2024 her the finding of a \"cardiac murmur\" along with symptoms of shortness of breath with exertion and occasional chest pressure.  Anneliese had no cardiac history prior to this but does have multiple family members with cardiac disease.  Her mother had what sounds like coronary artery disease and she has a son who has an aortic valve replacement.  She is treated for hypertension and hyperlipidemia.  She has been on hydroxychloroquine for at least a decade given her history of Sjogren's syndrome and temporal arteritis.     On Exam she had a loud systolic ejection murmur at the level of the aortic valve.  Echo then performed confirmed Severe AS.  At that point I referred her to CT surgery.  Her preoperative work-up went well, cardiac cath not showing any significant obstructive CAD.  The on 6/27/2024 Anneliese had a transcatheter aortic valve replacement with a 23 mm Dunn KELL 3 Ultra Resilia bioprosthetic valve via a percutaneous left transfemoral approach. She did have a !st degree E block and LBBB post-op and was seen by EP.  She wore an extended ambulatory monitor that spanned most of July of this year.  She wore 2 of them.  The first 1 showed sinus rhythm with some short runs of nonsustained atrial tachycardia.  The second 1 showed sinus rhythm throughout with some occasional PACs.  Average heart rate was in the 60s.  She has had 2 echocardiograms since her procedure which show normal LV systolic function and a normally functioning TAVR.    All of Anneliese's symptoms prior to her TAVR are are no longer present.  She denies any shortness of breath.  No signs or symptoms of CHF.  No chest pain or any symptoms of angina.  She denies palpitations, lightheadedness or any syncope.  Her blood pressure is running a bit low today but she tells me it runs normal at home.  She is also asymptomatic " today.    Patient Active Problem List   Diagnosis    Sjogren's syndrome (HCC)    History of pulmonary embolism    Hypertension    Hypothyroidism    CKD (chronic kidney disease), stage II    Hyperlipidemia    Vitamin D deficiency    Osteoporosis    Carpal tunnel syndrome    MIAH (obstructive sleep apnea)    Osteoarthritis    Pure hypercholesterolemia    Restless legs syndrome (RLS)    Spinal stenosis of lumbar region without neurogenic claudication    Supervision of normal first pregnancy    Arthritis of right sacroiliac joint    Gastroesophageal reflux disease with esophagitis    Hypercoagulable state ([facility])    Functional diarrhea    History of DVT (deep vein thrombosis)    QT prolongation    Severe aortic stenosis    GERD (gastroesophageal reflux disease)    S/P TAVR (transcatheter aortic valve replacement)    LBBB (left bundle branch block)     Past Medical History:   Diagnosis Date    CAD (coronary artery disease)     CHF (congestive heart failure) ([facility])     Chronic pain     s/p spinal stimulator    CKD (chronic kidney disease), stage II     baseline Cr 0.8-1.0    GERD (gastroesophageal reflux disease)     History of DVT (deep vein thrombosis)     multiple, Coumadin    History of pulmonary embolism     multiple, Coumadin    History of squamous cell carcinoma excision     Hyperlipidemia     Hypertension     Hypothyroidism     IBS (irritable bowel syndrome)     diarrheal type    MIAH (obstructive sleep apnea)     no CPAP or BiPAP    Osteoarthritis     Osteoporosis     RLS (restless legs syndrome)     Severe aortic stenosis     Sjogren's syndrome ([facility])      Social History     Socioeconomic History    Marital status: /Civil Union     Spouse name: Not on file    Number of children: 5    Years of education: Not on file    Highest education level: Not on file   Occupational History    Not on file   Tobacco Use    Smoking status: Never    Smokeless tobacco: Never    Tobacco comments:     Never smoked   Vaping Use     Vaping status: Never Used   Substance and Sexual Activity    Alcohol use: No    Drug use: No    Sexual activity: Not Currently     Partners: Male     Birth control/protection: Post-menopausal, Male Sterilization   Other Topics Concern    Not on file   Social History Narrative    Not on file     Social Determinants of Health     Financial Resource Strain: Not on file   Food Insecurity: No Food Insecurity (6/28/2024)    Hunger Vital Sign     Worried About Running Out of Food in the Last Year: Never true     Ran Out of Food in the Last Year: Never true   Transportation Needs: No Transportation Needs (6/28/2024)    PRAPARE - Transportation     Lack of Transportation (Medical): No     Lack of Transportation (Non-Medical): No   Physical Activity: Not on file   Stress: Not on file   Social Connections: Unknown (6/18/2024)    Received from [facility]    Social [employer]     How often do you feel lonely or isolated from those around you? (Adult - for ages 18 years and over): Not on file   Intimate Partner Violence: Not on file   Housing Stability: Low Risk  (6/28/2024)    Housing Stability Vital Sign     Unable to Pay for Housing in the Last Year: No     Number of Times Moved in the Last Year: 1     Homeless in the Last Year: No      Family History   Problem Relation Age of Onset    No Known Problems Mother     Stroke Father     Ovarian cancer Sister 79    Ovarian cancer Daughter 44    No Known Problems Daughter     No Known Problems Daughter     No Known Problems Maternal Grandmother     No Known Problems Maternal Grandfather     No Known Problems Paternal Grandmother     No Known Problems Paternal Grandfather     No Known Problems Son     No Known Problems Son     No Known Problems Maternal Aunt     No Known Problems Maternal Aunt     No Known Problems Paternal Aunt     Colon cancer Neg Hx     Colon polyps Neg Hx      Past Surgical History:   Procedure Laterality Date    CARDIAC CATHETERIZATION N/A 06/06/2024     Procedure: Cardiac catheterization;  Surgeon: Taj Stafford DO;  Location: [facility];  Service: Cardiology    CARDIAC CATHETERIZATION N/A 6/27/2024    Procedure: Cardiac tavr;  Surgeon: Pascual Koo MD;  Location: [facility];  Service: Cardiology    COLONOSCOPY  07/18/2016     grade 2 internal hemorrhoids but otherwise normal, pathology negative for microscopic colitis    KS REPLACE AORTIC VALVE OPENFEMORAL ARTERY APPROACH N/A 6/27/2024    Procedure: REPLACEMENT AORTIC VALVE TRANSCATHETER (TAVR) TRANSFEMORAL W/ 23MM  S3 UR VALVE(ACCESS ON LEFT) LEDY;  Surgeon: JESSICA Pal MD;  Location: [facility];  Service: Cardiac Surgery    REPAIR RECTOCELE      SPINAL STIMULATOR PLACEMENT      SPINE SURGERY      fusion L1-L5    SQUAMOUS CELL CARCINOMA EXCISION      TOE SURGERY      TONSILLECTOMY AND ADENOIDECTOMY      TOTAL ABDOMINAL HYSTERECTOMY      TOTAL KNEE ARTHROPLASTY Bilateral     TOTAL SHOULDER REPLACEMENT Right     UPPER GASTROINTESTINAL ENDOSCOPY  01/09/2015     reflux esophagitis, gastritis, duodenitis in the bulb, pathology negative for H pylori, Puckett's, EOE       Current Outpatient Medications:     acetaminophen (TYLENOL) 325 mg tablet, Take 2 tablets (650 mg total) by mouth every 4 (four) hours as needed for fever, mild pain, moderate pain or headaches (temperature greater than 101 F), Disp: 50 tablet, Rfl: 0    ascorbic acid (VITAMIN C) 500 MG tablet, Take 1 tablet (500 mg total) by mouth 2 (two) times a day, Disp: 60 tablet, Rfl: 1    aspirin 81 mg chewable tablet, Chew 1 tablet (81 mg total) daily, Disp: 30 tablet, Rfl: 2    Cholecalciferol (VITAMIN D) 2000 UNITS tablet, Take 3,000 Units by mouth daily  , Disp: , Rfl:     clotrimazole-betamethasone (LOTRISONE) 1-0.05 % cream, APPLY TOPICALLY 2 TIMES PER DAY, Disp: 30 g, Rfl: 1    cycloSPORINE (RESTASIS) 0.05 % ophthalmic emulsion, Administer 1 drop to both eyes 2 (two) times a day., Disp: , Rfl:     Diclofenac Sodium (VOLTAREN) 1  %, , Disp: , Rfl:     diphenoxylate-atropine (LOMOTIL) 2.5-0.025 mg per tablet, TAKE 1 TABLET BY MOUTH 4 (FOUR) TIMES A DAY AS NEEDED FOR DIARRHEA, Disp: 60 tablet, Rfl: 3    folic acid (FOLVITE) 1 mg tablet, TAKE 1 TABLET BY MOUTH EVERY DAY, Disp: 90 tablet, Rfl: 1    hydroxychloroquine (PLAQUENIL) 200 mg tablet, Take 200 mg by mouth, Disp: , Rfl:     hyoscyamine (LEVSIN/SL) 0.125 mg SL tablet, Take 1 tablet (0.125 mg total) by mouth every 4 (four) hours as needed for cramping, Disp: 20 tablet, Rfl: 0    levothyroxine 112 mcg tablet, TAKE 1 TABLET BY MOUTH EVERY DAY, Disp: 90 tablet, Rfl: 1    losartan (COZAAR) 50 mg tablet, TAKE 1 TABLET BY MOUTH EVERY DAY, Disp: 90 tablet, Rfl: 1    metoprolol tartrate (LOPRESSOR) 25 mg tablet, Take 1 tablet (25 mg total) by mouth every 12 (twelve) hours, Disp: 60 tablet, Rfl: 2    Multiple Vitamins-Minerals (multivitamin with minerals) tablet, Take 1 tablet by mouth daily, Disp: 30 tablet, Rfl: 1    omeprazole (PriLOSEC) 20 mg delayed release capsule, Take 20 mg by mouth daily, Disp: , Rfl:     pilocarpine (SALAGEN) 5 mg tablet, Take 5 mg by mouth 4 (four) times a day, Disp: , Rfl:     polyvinyl alcohol (LIQUIFILM TEARS) 1.4 % ophthalmic solution, Administer 1 drop to both eyes as needed for dry eyes, Disp: , Rfl:     simvastatin (ZOCOR) 20 mg tablet, Take 20 mg by mouth daily at bedtime., Disp: , Rfl:     warfarin (COUMADIN) 5 mg tablet, Take 1 tablet (5 mg total) by mouth daily Do not start before March 20, 2023. (Patient taking differently: Take 5 mg by mouth daily Friday takes 6 mg  Wednesday/Saturday/Sunday 5 mg Monday and Tuesday does not take it.), Disp: , Rfl: 0    Current Facility-Administered Medications:     denosumab (PROLIA) subcutaneous injection 60 mg, 60 mg, Subcutaneous, Q6 Months, , 60 mg at 09/12/24 0856  Allergies   Allergen Reactions    Sulfa Antibiotics Hives     Vitals:    09/23/24 1014   BP: (!) 89/61   BP Location: Left arm   Patient Position: Sitting    Cuff Size: Standard   Pulse: 65   Weight: 56.2 kg (124 lb)   Height: 5' (1.524 m)       Labs:  Lab Results   Component Value Date     01/25/2015    K 4.5 08/21/2024    K 4.3 05/09/2023     08/21/2024     05/09/2023    CO2 31 08/21/2024    CO2 24 06/27/2024    CO2 27 05/09/2023    BUN 27 (H) 08/21/2024    BUN 27 (H) 05/09/2023    CREATININE 0.85 08/21/2024    CREATININE 0.83 05/09/2023    GLUCOSE 102 06/27/2024    GLUCOSE 97 01/25/2015    CALCIUM 10.6 (H) 08/21/2024    CALCIUM 10.4 (H) 05/09/2023     Lab Results   Component Value Date    WBC 6.18 08/21/2024    WBC 7.11 01/25/2015    HGB 12.6 08/21/2024    HGB 10.3 (L) 01/25/2015    HCT 37.6 08/21/2024    HCT 31.7 (L) 01/25/2015    MCV 98 08/21/2024     (H) 01/25/2015     08/21/2024     (L) 01/25/2015     Lab Results   Component Value Date    TRIG 165 (H) 10/28/2022    HDL 46 (L) 10/28/2022     Imaging: ECGs in the recent past have shown Normal sinus rhythm with a left ventricular hypertrophy.    Echo (7/26/2024):    Left Ventricle: Left ventricular cavity size is normal. Wall thickness is mild to moderately increased. There is moderate concentric hypertrophy. The left ventricular ejection fraction is 60% by visual estimation. Systolic function is normal. Wall motion is normal. Diastolic function is moderately abnormal, consistent with grade II (pseudonormal) relaxation.    Right Ventricle: Right ventricular cavity size is normal. Systolic function is normal.    Left Atrium: The atrium is severely dilated (>48 mL/m2).    Aortic Valve: There is an Dunn KELL 3 Ultra Resilia 23 mm TAVR bioprosthetic valve. There is mild paravalvular regurgitation.  There is no evidence of transvalvular regurgitation. The gradient recorded across the prosthetic aortic valve is within the expected range. The aortic valve peak velocity is 2 m/s. The aortic valve mean gradient is 8 mmHg. The dimensionless velocity index is 0.49. The aortic valve  area is 1.84 cm2.    Mitral Valve: There is moderate diffuse thickening. There is mild diffuse calcification. There is moderate annular calcification. There is mild subvalvular thickening and calcification. There is moderate to severe regurgitation with a centrally directed jet.    Tricuspid Valve: There is mild regurgitation. The tricuspid valve regurgitation jet is central.    Review of Systems:  Review of Systems   Constitutional:  Positive for fatigue.   HENT: Negative.     Eyes: Negative.    Respiratory:  Positive for chest tightness and shortness of breath.    Cardiovascular: Negative.    Gastrointestinal: Negative.    Musculoskeletal: Negative.    Skin: Negative.    Allergic/Immunologic: Negative.    Neurological: Negative.    Hematological: Negative.    Psychiatric/Behavioral: Negative.     All other systems reviewed and are negative.    Vitals:    09/23/24 1014   BP: (!) 89/61   BP Location: Left arm   Patient Position: Sitting   Cuff Size: Standard   Pulse: 65   Weight: 56.2 kg (124 lb)   Height: 5' (1.524 m)      Physical Exam  Constitutional:       Appearance: She is well-developed.   HENT:      Head: Normocephalic and atraumatic.   Eyes:      General: No scleral icterus.        Right eye: No discharge.         Left eye: No discharge.      Pupils: Pupils are equal, round, and reactive to light.   Neck:      Thyroid: No thyromegaly.      Vascular: No JVD.   Cardiovascular:      Rate and Rhythm: Normal rate and regular rhythm. No extrasystoles are present.     Pulses: Normal pulses. No decreased pulses.      Heart sounds: S1 normal and S2 normal. Murmur heard.      Systolic murmur is present with a grade of 4/6.      No friction rub. No gallop.   Pulmonary:      Effort: Pulmonary effort is normal. No respiratory distress.      Breath sounds: Normal breath sounds. No wheezing or rales.   Abdominal:      General: Bowel sounds are normal. There is no distension.      Palpations: Abdomen is soft.       Tenderness: There is no abdominal tenderness.   Musculoskeletal:         General: No tenderness or deformity. Normal range of motion.      Cervical back: Normal range of motion and neck supple.      Right lower leg: No edema.      Left lower leg: No edema.   Skin:     General: Skin is warm and dry.      Findings: No rash.   Neurological:      Mental Status: She is alert and oriented to person, place, and time.      Cranial Nerves: No cranial nerve deficit.   Psychiatric:         Thought Content: Thought content normal.         Judgment: Judgment normal.     Counseling / Coordination of Care  Total office time spent today 40 minutes.  Greater than 50% of total time was spent with the patient and / or family counseling and / or coordination of care.

## 2024-09-25 ENCOUNTER — TELEPHONE (OUTPATIENT)
Dept: ANESTHESIOLOGY | Facility: CLINIC | Age: 89
End: 2024-09-25

## 2024-09-26 DIAGNOSIS — N17.9 AKI (ACUTE KIDNEY INJURY) (HCC): Primary | ICD-10-CM

## 2024-09-26 NOTE — PROGRESS NOTES
Assessment/Plan:   Anneliese Garcia is a 89 y.o.female who is here for Consult (UMBILICAL HERNIA//PT has had the hernia for about 3 weeks, no bulge is visible,  it does cause her sharp intermittent pain when doing physical activities. All habits remained normal. No imaging done. )  .    Plan:  Abdominal pain/bulge/rectal thickening/gallstone - discussed there is no significant hernia causing her pain the majority of her bulging is diastasis, ? Etiology of pain GI consult to evaluate this as well as rectal wall thickening, last scope 2020, lesion in GB likely a gallstone will check ultrasound to confirm    Preoperative Clearance: None    HPI:  Anneliese Garcia is a 89 y.o.female who was referred for evaluation of Consult (UMBILICAL HERNIA//PT has had the hernia for about 3 weeks, no bulge is visible,  it does cause her sharp intermittent pain when doing physical activities. All habits remained normal. No imaging done. )  .    Currently abd pain.     ROS:  General ROS: negative  negative for - chills, fatigue, fever or night sweats, weight loss  Respiratory ROS: no cough, shortness of breath, or wheezing  Cardiovascular ROS: no chest pain or dyspnea on exertion  Genito-Urinary ROS: no dysuria, trouble voiding, or hematuria  Musculoskeletal ROS: negative for - gait disturbance, joint pain or muscle pain  Neurological ROS: no TIA or stroke symptoms  change in bowel habits and abdominal pain    [unfilled]  Sulfa antibiotics    Current Outpatient Medications:     acetaminophen (TYLENOL) 325 mg tablet, Take 2 tablets (650 mg total) by mouth every 4 (four) hours as needed for fever, mild pain, moderate pain or headaches (temperature greater than 101 F), Disp: 50 tablet, Rfl: 0    ascorbic acid (VITAMIN C) 500 MG tablet, Take 1 tablet (500 mg total) by mouth 2 (two) times a day, Disp: 60 tablet, Rfl: 1    aspirin 81 mg chewable tablet, Chew 1 tablet (81 mg total) daily, Disp: 30 tablet, Rfl: 2    Cholecalciferol  (VITAMIN D) 2000 UNITS tablet, Take 3,000 Units by mouth daily  , Disp: , Rfl:     clotrimazole-betamethasone (LOTRISONE) 1-0.05 % cream, APPLY TOPICALLY 2 TIMES PER DAY, Disp: 30 g, Rfl: 1    cycloSPORINE (RESTASIS) 0.05 % ophthalmic emulsion, Administer 1 drop to both eyes 2 (two) times a day., Disp: , Rfl:     Diclofenac Sodium (VOLTAREN) 1 %, , Disp: , Rfl:     diphenoxylate-atropine (LOMOTIL) 2.5-0.025 mg per tablet, TAKE 1 TABLET BY MOUTH 4 (FOUR) TIMES A DAY AS NEEDED FOR DIARRHEA, Disp: 60 tablet, Rfl: 3    hydroxychloroquine (PLAQUENIL) 200 mg tablet, Take 200 mg by mouth, Disp: , Rfl:     levothyroxine 112 mcg tablet, TAKE 1 TABLET BY MOUTH EVERY DAY, Disp: 90 tablet, Rfl: 1    losartan (COZAAR) 50 mg tablet, TAKE 1 TABLET BY MOUTH EVERY DAY, Disp: 90 tablet, Rfl: 1    metoprolol tartrate (LOPRESSOR) 25 mg tablet, Take 1 tablet (25 mg total) by mouth every 12 (twelve) hours, Disp: 60 tablet, Rfl: 2    Multiple Vitamins-Minerals (multivitamin with minerals) tablet, Take 1 tablet by mouth daily, Disp: 30 tablet, Rfl: 1    omeprazole (PriLOSEC) 20 mg delayed release capsule, Take 20 mg by mouth daily, Disp: , Rfl:     pilocarpine (SALAGEN) 5 mg tablet, Take 5 mg by mouth 4 (four) times a day, Disp: , Rfl:     polyvinyl alcohol (LIQUIFILM TEARS) 1.4 % ophthalmic solution, Administer 1 drop to both eyes as needed for dry eyes, Disp: , Rfl:     simvastatin (ZOCOR) 20 mg tablet, Take 20 mg by mouth daily at bedtime., Disp: , Rfl:     warfarin (COUMADIN) 5 mg tablet, Take 1 tablet (5 mg total) by mouth daily Do not start before March 20, 2023. (Patient taking differently: Take 5 mg by mouth daily Friday takes 6 mg  Wednesday/Saturday/Sunday 5 mg Monday and Tuesday does not take it.), Disp: , Rfl: 0    folic acid (FOLVITE) 1 mg tablet, TAKE 1 TABLET BY MOUTH EVERY DAY, Disp: 90 tablet, Rfl: 1    hyoscyamine (LEVSIN/SL) 0.125 mg SL tablet, Take 1 tablet (0.125 mg total) by mouth every 4 (four) hours as needed for  cramping, Disp: 20 tablet, Rfl: 0    Current Facility-Administered Medications:     denosumab (PROLIA) subcutaneous injection 60 mg, 60 mg, Subcutaneous, Q6 Months, , 60 mg at 09/12/24 0856  Past Medical History:   Diagnosis Date    CAD (coronary artery disease)     CHF (congestive heart failure) (HCC)     Chronic pain     s/p spinal stimulator    CKD (chronic kidney disease), stage II     baseline Cr 0.8-1.0    GERD (gastroesophageal reflux disease)     History of DVT (deep vein thrombosis)     multiple, Coumadin    History of pulmonary embolism     multiple, Coumadin    History of squamous cell carcinoma excision     Hyperlipidemia     Hypertension     Hypothyroidism     IBS (irritable bowel syndrome)     diarrheal type    MIAH (obstructive sleep apnea)     no CPAP or BiPAP    Osteoarthritis     Osteoporosis     RLS (restless legs syndrome)     Severe aortic stenosis     Sjogren's syndrome (HCC)      Past Surgical History:   Procedure Laterality Date    CARDIAC CATHETERIZATION N/A 06/06/2024    Procedure: Cardiac catheterization;  Surgeon: Taj Stafford DO;  Location: [facility];  Service: Cardiology    CARDIAC CATHETERIZATION N/A 6/27/2024    Procedure: Cardiac tavr;  Surgeon: Pascual Koo MD;  Location: [facility];  Service: Cardiology    COLONOSCOPY  07/18/2016     grade 2 internal hemorrhoids but otherwise normal, pathology negative for microscopic colitis    TN REPLACE AORTIC VALVE OPENFEMORAL ARTERY APPROACH N/A 6/27/2024    Procedure: REPLACEMENT AORTIC VALVE TRANSCATHETER (TAVR) TRANSFEMORAL W/ 23MM  S3 UR VALVE(ACCESS ON LEFT) LEDY;  Surgeon: JESSICA Pal MD;  Location: [facility];  Service: Cardiac Surgery    REPAIR RECTOCELE      SPINAL STIMULATOR PLACEMENT      SPINE SURGERY      fusion L1-L5    SQUAMOUS CELL CARCINOMA EXCISION      TOE SURGERY      TONSILLECTOMY AND ADENOIDECTOMY      TOTAL ABDOMINAL HYSTERECTOMY      TOTAL KNEE ARTHROPLASTY Bilateral     TOTAL SHOULDER  REPLACEMENT Right     UPPER GASTROINTESTINAL ENDOSCOPY  01/09/2015     reflux esophagitis, gastritis, duodenitis in the bulb, pathology negative for H pylori, Puckett's, EOE     Family History   Problem Relation Age of Onset    No Known Problems Mother     Stroke Father     Ovarian cancer Sister 79    Ovarian cancer Daughter 44    No Known Problems Daughter     No Known Problems Daughter     No Known Problems Maternal Grandmother     No Known Problems Maternal Grandfather     No Known Problems Paternal Grandmother     No Known Problems Paternal Grandfather     No Known Problems Son     No Known Problems Son     No Known Problems Maternal Aunt     No Known Problems Maternal Aunt     No Known Problems Paternal Aunt     Colon cancer Neg Hx     Colon polyps Neg Hx       reports that she has never smoked. She has never used smokeless tobacco. She reports that she does not drink alcohol and does not use drugs.    Labs:   Lab Results   Component Value Date    WBC 6.18 08/21/2024    WBC 7.61 07/18/2024    WBC 10.40 (H) 06/29/2024    WBC 7.11 01/25/2015    WBC 9.41 01/23/2015    WBC 9.46 01/22/2015    HGB 12.6 08/21/2024    HGB 12.2 07/18/2024    HGB 12.7 06/29/2024    HGB 10.3 (L) 01/25/2015    HGB 10.1 (L) 01/23/2015    HGB 11.2 (L) 01/22/2015     08/21/2024     07/18/2024     06/29/2024     (L) 01/25/2015     01/23/2015     01/22/2015     Lab Results   Component Value Date    ALT 16 08/21/2024    ALT 16 07/18/2024    ALT 26 06/18/2024    ALT 24 05/09/2023    ALT 13 01/21/2015    AST 27 08/21/2024    AST 28 07/18/2024    AST 30 06/18/2024    AST 26 05/09/2023    AST 7 01/21/2015     This SmartLink has not been configured with any valid records.       PHYSICAL EXAM  General Appearance:    Alert, cooperative, no distress,    Head:    Normocephalic without obvious abnormality   Eyes:    PERRL, conjunctiva/corneas clear, EOM's intact        Neck:   Supple, no adenopathy, no JVD   Back:  "    Symmetric, no spinal or CVA tenderness   Lungs:     Clear to auscultation bilaterally, no wheezing or rhonchi   Heart:    Regular rate and rhythm, S1 and S2 normal, no murmur   Abdomen:     Soft +BS ND NT diastasis   Extremities:   Extremities normal. No clubbing, cyanosis or edema   Psych:   Normal Affect, AOx3.    Neurologic:  Skin:   CNII-XII intact. Strength symmetric, speech intact    Warm, dry, intact, no visible rashes or lesions         Physical Exam              Some portions of this record may have been generated with voice recognition software. There may be translation, syntax,  or grammatical errors. Occasional wrong word or \"sound-a-like\" substitutions may have occurred due to the inherent limitations of the voice recognition software. Read the chart carefully and recognize, using context, where substitutions may have occurred. If you have any questions, please contact the dictating provider for clarification or correction, as needed. This encounter has been coded by a non-certified coder.   "

## 2024-09-30 ENCOUNTER — TELEPHONE (OUTPATIENT)
Dept: OBGYN CLINIC | Facility: HOSPITAL | Age: 89
End: 2024-09-30

## 2024-09-30 ENCOUNTER — ANESTHESIA EVENT (INPATIENT)
Dept: PERIOP | Facility: HOSPITAL | Age: 89
DRG: 467 | End: 2024-09-30
Payer: COMMERCIAL

## 2024-09-30 NOTE — PRE-PROCEDURE INSTRUCTIONS
Pre-Surgery Instructions:   Medication Instructions    aspirin 81 mg chewable tablet Instructed pt that ASA instructions should come from prescribing MD - seeing md 10/2 - f/up if pt did get instructions    Cholecalciferol (VITAMIN D) 2000 UNITS tablet Hold 7 days prior to surgery     Coenzyme Q10 (CO Q 10 PO) Stop taking 7 days prior to surgery.    folic acid (FOLVITE) 1 mg tablet Surgeon prescribed -instructed pt to take up to and including 10/15/24     hydroxychloroquine (PLAQUENIL) 200 mg tablet Take day of surgery.    levothyroxine 112 mcg tablet Take day of surgery.    losartan (COZAAR) 50 mg tablet Hold day of surgery.    MAGNESIUM CITRATE PO Stop taking 7 days prior to surgery.    metoprolol tartrate (LOPRESSOR) 25 mg tablet Take day of surgery.    Multiple Vitamins-Minerals (PRESERVISION AREDS PO) Stop taking 7 days prior to surgery.    NON FORMULARY Stop taking 7 days prior to surgery.    omeprazole (PriLOSEC) 20 mg delayed release capsule Take day of surgery.    pilocarpine (SALAGEN) 5 mg tablet Take day of surgery.    polyvinyl alcohol (LIQUIFILM TEARS) 1.4 % ophthalmic solution Take day of surgery.    simvastatin (ZOCOR) 20 mg tablet Take night before surgery    warfarin (COUMADIN) 5 mg tablet Instructed pt that instructions of use for this med needs to be from prescribing MD - sees MD 10/2 - will confirm /need to confirm if pt got instructions.      Pt reports is not taking any further prescription medications or use of any OTC vitamins /supplements or herbals at time of the PAT call.  Pt needed multiple repetition of meds and instructions.    Pt instructed on use of cleanser for DOS and instructions for showering both night before and DOS reviewed with pt - pt verbalized understanding of instructions given  Pt also instructed on no hair or body products on skin for DOS.  No shaving with a razor blade for 7 days prior to surgery to decrease any incident of infection - electric razor is ok to use up  till 24 hrs prior to DOS.  Pt instructed that if with any changes in health status between now and DOS - notify surgeon office.  Tylenol is ok to use as needed up to and including DOS with sips of water - if needed - did instruct NO NSAIDS to be used at least 3 days prior to surgery - or if given a longer hold time of NSAIDS from MD please follow MD hold instructions.  Instructed to bring photo ID and medical insurance card for DOS as forms of identification for DOS.  Also instructed pt on no jewelry or body piercings, no valuables on body for DOS. Contact lenses, if worn - need to be removed for DOS.  Pt instructed does need transport home after surgery- pt is not allowed to drive.    Pt has RW - did speak with Estela SALAS) on 9/9/24 - instructed to bring DOS - also given Estela's direct phone# [phone] if with any questions moving forward after today's call.     Medication instructions for day surgery reviewed. Please use only a sip of water to take your instructed medications. Avoid all over the counter vitamins, supplements and NSAIDS for one week prior to surgery per anesthesia guidelines. Tylenol is ok to take as needed.     You will receive a call one business day prior to surgery with an arrival time and hospital directions. If your surgery is scheduled on a Monday, the hospital will be calling you on the Friday prior to your surgery. If you have not heard from anyone by 8pm, please call the hospital supervisor through the hospital  at [phone]. ([place] [phone] or [place] [phone]).    Do not eat or drink anything after midnight the night before your surgery, including candy, mints, lifesavers, or chewing gum. Do not drink alcohol 24hrs before your surgery. Try not to smoke at least 24hrs before your surgery.       Follow the pre surgery showering instructions as listed in the “My Surgical Experience Booklet” or otherwise provided by your surgeon's office. Do not use a blade  to shave the surgical area 1 week before surgery. It is okay to use a clean electric clippers up to 24 hours before surgery. Do not apply any lotions, creams, including makeup, cologne, deodorant, or perfumes after showering on the day of your surgery. Do not use dry shampoo, hair spray, hair gel, or any type of hair products.     No contact lenses, eye make-up, or artificial eyelashes. Remove nail polish, including gel polish, and any artificial, gel, or acrylic nails if possible. Remove all jewelry including rings and body piercing jewelry.     Wear causal clothing that is easy to take on and off. Consider your type of surgery.    Keep any valuables, jewelry, piercings at home. Please bring any specially ordered equipment (sling, braces) if indicated.    Arrange for a responsible person to drive you to and from the hospital on the day of your surgery. Please confirm the visitor policy for the day of your procedure when you receive your phone call with an arrival time.     Call the surgeon's office with any new illnesses, exposures, or additional questions prior to surgery.    Please reference your “My Surgical Experience Booklet” for additional information to prepare for your upcoming surgery.

## 2024-09-30 NOTE — TELEPHONE ENCOUNTER
Caller: Anneliese     Doctor: Spenser / CYNDY     TKA L scheduled for 10/16     Reason for call:  Patient is scheduled for TKA L on 10/16.      Patient was diagnosed with Covid on 09/23 and will be following up with PCP on 10/2 for release.    Patient would like to know when she is to stop taking her Aspirin ?   She is confirming the stop of her folic acid is 10/15.    She would like to know what other vitamins she is suppose to be taking prior to surgery?     Please call patient is discuss     Call back#: [phone]

## 2024-10-01 ENCOUNTER — APPOINTMENT (OUTPATIENT)
Dept: PREADMISSION TESTING | Facility: HOSPITAL | Age: 89
DRG: 467 | End: 2024-10-01
Payer: COMMERCIAL

## 2024-10-03 ENCOUNTER — TELEPHONE (OUTPATIENT)
Dept: ANESTHESIOLOGY | Facility: CLINIC | Age: 89
End: 2024-10-03

## 2024-10-07 ENCOUNTER — EVALUATION (OUTPATIENT)
Dept: PHYSICAL THERAPY | Facility: CLINIC | Age: 89
End: 2024-10-07
Payer: COMMERCIAL

## 2024-10-07 ENCOUNTER — TELEPHONE (OUTPATIENT)
Age: 89
End: 2024-10-07

## 2024-10-07 DIAGNOSIS — T84.038A MECHANICAL LOOSENING OF PROSTHETIC KNEE, INITIAL ENCOUNTER  (HCC): ICD-10-CM

## 2024-10-07 DIAGNOSIS — Z96.659 MECHANICAL LOOSENING OF PROSTHETIC KNEE, INITIAL ENCOUNTER  (HCC): ICD-10-CM

## 2024-10-07 PROCEDURE — 97162 PT EVAL MOD COMPLEX 30 MIN: CPT | Performed by: PHYSICAL THERAPIST

## 2024-10-07 NOTE — TELEPHONE ENCOUNTER
Caller: Patient     Doctor: Spenser     Reason for call: Patient asked if she will need lab work for the upcoming surgery     Call back#: [phone]

## 2024-10-07 NOTE — PROGRESS NOTES
PT Evaluation     Today's date: 10/7/2024  Patient name: Anneliese Garcia  : 1935  MRN: [id number]  Referring provider: Roxana Dale PA-C  Dx:   Encounter Diagnosis     ICD-10-CM    1. Mechanical loosening of prosthetic knee, initial encounter  ([facility])  T84.038A Ambulatory referral to Physical Therapy    Z96.659                      Assessment  Impairments: abnormal coordination, abnormal muscle firing, abnormal muscle tone, abnormal or restricted ROM, abnormal movement, activity intolerance, impaired balance, impaired physical strength, pain with function and poor posture     Assessment details: Problem List:  1) Limited L knee stability due to medial joint stress from valgus moment  2) Limited L hamstring weakness     Anneliese Garcia is a pleasant 89 y.o. female who presents with increased L knee weakness and instability secondary to prosthetic failure and loosening.  This leads to increased instability and risk for falls with limited SLS balance.  This suggests that no further referral appears necessary at this time based upon examination results.  I expect she will benefit from 8 weeks of PT to stabilize her knee, maximize function and return to pain-free ADLs.        Comparable signs:  1) Unable to walk her dogs  2) Unable to stand to do house hold chores   Understanding of Dx/Px/POC: good     Prognosis: good    Goals  Short Term Goals (4 weeks)  1.) Establish independence with HEP  2.) Decrease subjective pain levels from NPRS at least to 2-5/10 at rest and with activity  3.) Improve L knee ROM at least 5-10 degrees into all planes to allow for improved ease of movement with less guarding    Long Term Goals (8 weeks)  1.) Improve L knee ROM to WNL in all planes to restore normal movement with ADLs and function  2.) Improve L quad strength to 5/5 in all planes in order to return to pain-free ADLs and function  3.) Improve FOTO score at least to 75 points showing improved self reported  disability        Plan  Patient would benefit from: PT eval and skilled physical therapy  Planned modality interventions: biofeedback, cryotherapy, electrical stimulation/Russian stimulation and TENS    Planned therapy interventions: abdominal trunk stabilization, activity modification, ADL retraining, ADL training, balance, balance/weight bearing training, behavior modification, body mechanics training, coordination, compression, flexibility, functional ROM exercises, gait training, graded activity, graded exercise, graded motor, home exercise program, IADL retraining, transfer training, therapeutic training, therapeutic exercise, therapeutic activities, stretching, strengthening, sensory integrative techniques, self care, postural training, patient/caregiver education, neuromuscular re-education, nerve gliding, muscle pump exercises, motor coordination training, massage, manual therapy, IASTM and joint mobilization  Speech planned therapy intervention: NMES    Frequency: 2x week  Duration in weeks: 8  Plan of Care beginning date: 10/7/2024  Plan of Care expiration date: 12/9/2024  Treatment plan discussed with: patient  Plan details: Continue POC for stability; monitor sxs and progress as able         Subjective Evaluation    History of Present Illness  Date of onset: 9/11/2018  Mechanism of injury: Anneliese Garcia is a 89 y.o. female who presents with increased L knee pain S/P TKA from 15 years ago.  Notes that her L knee was never put in correctly from original surgeon with Dr. Fox - has had 4 different Mds suggest that the prosthetic was not placed correctly.  Decided that she wanted to get her L knee revised by Dr. Dueñas because she cannot stand or walk and is in pain all the time.   Decided to seek out MD consult where X-rays were (-) for fx and script for OPPT provided.  Reports increased L knee pain night pain but does not manage with any medication or ice/heat.  Denies any changes in bowel or  bladder function.  Denies any NT signs or sxs.  F/U with MD in 1 month.  Wishes to return to PLOF pain-free wishes to be able to stand and walk around her house pain-free. Has had prior surgeries at L/[facility].  Wishes to continue to perform caregiving duties for her dogs.                  Recurrent probem    Quality of life: good    Patient Goals  Patient goals for therapy: decreased edema, improved balance, decreased pain, increased motion, increased strength, independence with ADLs/IADLs and return to sport/leisure activities  Patient goal: Walk and be able to do everything around her house  Pain  Current pain ratin  At best pain ratin  At worst pain ratin  Location: L knee  Quality: dull ache, sharp and radiating  Relieving factors: rest and support  Aggravating factors: walking, standing and stair climbing  Progression: worsening    Social Support  Steps to enter house: yes  3  Stairs in house: no   Lives in: one-story house  Lives with: spouse    Employment status: not working  Life stress: Moderate      Diagnostic Tests  X-ray: abnormal  Treatments  Previous treatment: physical therapy  Current treatment: physical therapy        Objective     Active Range of Motion     Lumbar   Flexion:  WFL  Extension:  WFL  Left lateral flexion:  Restriction level: moderate  Right lateral flexion:  Restriction level: moderate  Left rotation:  WFL  Right rotation:  WFL  Left Knee   Flexion: 115 degrees   Extension: 5 degrees     Right Knee   Flexion: 120 degrees   Extension: 5 degrees     Strength/Myotome Testing     Left Hip   Planes of Motion   Flexion: 4  Adduction: 5  External rotation: 4-  Internal rotation: 4+    Right Hip   Planes of Motion   Flexion: 5  Adduction: 5  External rotation: 5  Internal rotation: 5    Left Knee   Flexion: 4  Extension: 5    Right Knee   Flexion: 5  Extension: 5    Tests     Lumbar     Left   Negative slump test.     Right   Negative slump test.     Swelling     Left Knee Girth  Measurement (cm)   Joint line: 36 cm    Right Knee Girth Measurement (cm)   Joint line: 35 cm    Functional Assessment      Squat    Left within functional limits and right within functional limits.     Single Leg Stance   Left: 3 seconds  Right: 4 seconds  Neuro Exam:     Functional outcomes   5x sit to stand: 16.9 (seconds)             Precautions: HTN, Aortic Stenosis, Osteoporosis, CKD 2; sjogrens, history of PE, aortic valve replacement, blood thinners  EPOC: 12/9/24  HEP: Access Code: [email or web address]  URL: [email or web address]  Date: 10/07/2024  Prepared by: Lester Mccain    Exercises  - Seated Hamstring Stretch  - 1 x daily - 7 x weekly - 3 sets - 10 reps  - Seated Quad Set  - 1 x daily - 7 x weekly - 3 sets - 10 reps  - Seated Knee Extension AAROM  - 1 x daily - 7 x weekly - 3 sets - 10 reps  - Seated Knee Flexion Extension AAROM with Overpressure  - 1 x daily - 7 x weekly - 3 sets - 10 reps  - Sitting Heel Slide with Towel  - 1 x daily - 7 x weekly - 3 sets - 10 reps  - Standing March with Counter Support  - 1 x daily - 7 x weekly - 3 sets - 10 reps  - Heel Raises with Counter Support  - 1 x daily - 7 x weekly - 3 sets - 10 reps      Manuals 10/7                                                                 Neuro Re-Ed                                                                                                        Ther Ex                                                                                                                     Ther Activity                                       Gait Training                                       Modalities

## 2024-10-09 DIAGNOSIS — Z79.01 CHRONIC ANTICOAGULATION: Primary | ICD-10-CM

## 2024-10-09 RX ORDER — ENOXAPARIN SODIUM 100 MG/ML
1 INJECTION SUBCUTANEOUS EVERY 12 HOURS
Qty: 8 ML | Refills: 0 | Status: ON HOLD | OUTPATIENT
Start: 2024-10-09 | End: 2024-10-17

## 2024-10-10 ENCOUNTER — CONSULT (OUTPATIENT)
Dept: ANESTHESIOLOGY | Facility: CLINIC | Age: 89
End: 2024-10-10
Payer: COMMERCIAL

## 2024-10-10 VITALS
DIASTOLIC BLOOD PRESSURE: 80 MMHG | BODY MASS INDEX: 24.85 KG/M2 | SYSTOLIC BLOOD PRESSURE: 112 MMHG | HEART RATE: 70 BPM | OXYGEN SATURATION: 97 % | HEIGHT: 60 IN | WEIGHT: 126.6 LBS

## 2024-10-10 DIAGNOSIS — Z96.659 MECHANICAL LOOSENING OF PROSTHETIC KNEE, INITIAL ENCOUNTER  (HCC): ICD-10-CM

## 2024-10-10 DIAGNOSIS — T84.038A MECHANICAL LOOSENING OF PROSTHETIC KNEE, INITIAL ENCOUNTER  (HCC): ICD-10-CM

## 2024-10-10 DIAGNOSIS — G47.33 OSA (OBSTRUCTIVE SLEEP APNEA): ICD-10-CM

## 2024-10-10 DIAGNOSIS — Z86.711 HISTORY OF PULMONARY EMBOLISM: ICD-10-CM

## 2024-10-10 DIAGNOSIS — Z86.718 HISTORY OF DVT (DEEP VEIN THROMBOSIS): ICD-10-CM

## 2024-10-10 DIAGNOSIS — Z95.2 S/P TAVR (TRANSCATHETER AORTIC VALVE REPLACEMENT): ICD-10-CM

## 2024-10-10 DIAGNOSIS — I10 PRIMARY HYPERTENSION: ICD-10-CM

## 2024-10-10 DIAGNOSIS — Z01.89 ENCOUNTER FOR GERIATRIC ASSESSMENT: ICD-10-CM

## 2024-10-10 DIAGNOSIS — N18.2 CKD (CHRONIC KIDNEY DISEASE), STAGE II: Primary | ICD-10-CM

## 2024-10-10 PROCEDURE — 99212 OFFICE O/P EST SF 10 MIN: CPT | Performed by: NURSE PRACTITIONER

## 2024-10-10 NOTE — PROGRESS NOTES
[facility]  CONSULT         Allergies reviewed today   PMH reviewed today   Medications reviewed today     See Geriatric Assessment below...    Falls (last 6 months): no  Hand  score:16.67  -Attempt 1:16  -Attempt 2:18  -Attempt 3:16  Dwight Total Score: 18  PHQ- 9 Depression Scale:0  Nutrition Assessment Score:14  METS:6.36  DX SLEEP APNEA; YES  Incentive Spirometry Level: patient has one at home stated she reaches 1200  Health goals:  -What are your overall health goals?     -What brings you strength?     -What activities are important to you? Quilt, takes care of orchids      As always we discussed having your BEST surgery, and BEST recovery.  Surgery goals reviewed today.      Breathing exercises   Patient was encouraged to begin lung exercises today.  This could be accomplished through deep breathing and cough exercises.    Eating/nutrition   Encouraged patient to increase oral protein intake prior to surgery.  This can be accomplished by consuming chicken, fish, tuna fish, cottage cheese, cheese, eggs, Greek yogurt, and protein shakes as needed.  I encouraged use of protein shakes such ENLIVE.  I also recommended making your own protein shakes with protein powder.     Sleep/Stress management  Patient was encouraged to rest their body prior to surgery.  Encouraged attempting to get 8 hours of sleep at night.  Avoid stress.  Avoid sick contacts.  Encouraged to find a relaxing hobby such as reading, meditation, listening to music.  Training exercises  Patient was encouraged to remain active as possible.  Today bilateral lower extremity generic exercises were taught for muscle strengthening and balance.  All exercises to be done sitting down.

## 2024-10-10 NOTE — PROGRESS NOTES
Patient is calling after received the automated test stating he is due for a colonoscopy. Patient is calling today to inform the office that he completed a colonoscopy two years ago with a physician in [facility], patient cannot remember the physician's name.  #[phone], no call back needed. [facility]  CONSULT: GERIATRIC SURGERY    Ambulatory Visit      Name: Anneliese Garcia      : 1935      MRN: [id number]  Encounter Provider: CATHERINE Rollins  Encounter Date: 10/10/2024   Encounter department: [facility]      ASSESSMENT   89  year old female referred [place] [facility] for pre-surgery geriatric screening 2.2 age   Other consult concerns include: surgical optimization & BEST program. ADVANCED AGE   She is scheduled on   Case: [id number] Date/Time: 10/16/24 1140   Procedure: ARTHROPLASTY KNEE TOTAL REVISION (Left: Knee)   Anesthesia type: Choice   Diagnosis:      Pain due to total left knee replacement, initial encounter ([facility]) [T84.84XA, Z96.652]      Mechanical loosening of prosthetic knee, initial encounter  ([facility]) [T84.038A, Z96.659]   Pre-op diagnosis:      Pain due to total left knee replacement, initial encounter ([facility]) [T84.84XA, Z96.652]      Mechanical loosening of prosthetic knee, initial encounter  ([facility]) [T84.038A, Z96.659]   Location:  OR ROOM 02 / [facility]   Surgeons: Isaac Dueñas, DO       LAST ANESTHESIA   NO CONCERNS       High risk geriatric surgery patient      I discussed the risk and benefits of general anesthesia including; but not limit too, the risk of postoperative respiratory distress, postop pneumonia, postop cognitive impairment (both temporarily and/or permanent), postop urinary retention (GLO), postop deconditioning, and death.  The patient verbalized they are fully aware of these risks and wishes to proceed.      In preparation for general anesthesia we started our BEST protocol placing emphasis on     Breathing  Encouraged exercise lungs prior to surgery. Already using IS at home     Eating  Encouraged to increase protein prior to surgery    Self  Encouraged to prepare their home for recovery  Encouraged to sleep 8- 10 hours nightly   Encouraged to limit napping during the  day   Encouraged to limit stress   Encouraged to exercise the mind daily-this can be done through completing crossword puzzles, sudoku puzzles, reading, self reflecting through journaling, adult number coloring, flash cards, board games, game shows on TV, and staying socially active    Train   Encouraged to stay active prior to surgery  Encouraged to increase exercise prior to surgery   Assessment & Plan  Mechanical loosening of prosthetic knee, initial encounter  ([facility])    Orders:    Ambulatory referral to Family Practice  SEEN FOR SO   SEEN FOR GERIATRIC   RECEIVED [facility]   Received CC   METS 6  ACTIVE   STARTED BEST   At risk for post-op GLO - yes 2.2 RI   At risk for post-op SSI- no   BEST   Breathing- instructed to exercise lungs prior to surgery via deep breathing   Eat- discussed increasing protein intake prior to surgery   Sleep/stress- encouraged 8-10 sleep @ night, stress reduction, avoid sick contacts and handwashing   Train- encouraged to remain active    Encounter for geriatric assessment    Orders:    Ambulatory Referral to Surgical Optimization    CKD (chronic kidney disease), stage II  Lab Results   Component Value Date    EGFR 62 08/21/2024    EGFR 60 07/18/2024    EGFR 42 06/29/2024    CREATININE 0.85 08/21/2024    CREATININE 0.87 07/18/2024    CREATININE 1.17 06/29/2024       History of DVT (deep vein thrombosis)    History of pulmonary embolism  Many clots IN HER PAST   Last one 1991  On coumadin   Has the okay to stop coumadin  Received CC     MIAH (obstructive sleep apnea)  Years ago   No longer uses CPAP   NO LONGER NEEDS IT     Primary hypertension  received CC     10/10/2024 1:29 PM    /80   BP Location Left arm   Patient Position Sitting   Pulse 70   SpO2 97 %   Weight 57.4 kg (126 lb 9.6 oz)   Height 5' (1.524 m)     S/P TAVR (transcatheter aortic valve replacement)  JUNE 2024  Received CC   METS 6  DENIES CP/DENIES SOB     History of Present Illness     Anneliese Garcia is a 89 y.o.  female who presents to the [facility] for presurgery optimization and her geriatric assessment.  She has been managing ongoing left knee pain and is electing to have a left knee replacement.    I met patient in the SOC.  She arrived with her .  She walked independently.  No walker & no cane use.  Gait was steady.  Lives at home with her .  She is independent with all ADLs.  No cognitive concerns on my verbal exam.  She scored a level 2 on the mini cognitive assessment indicating high risk for postop delirium.  Recommend spinal anesthesia.  Recommend geriatric pain protocol.    She received cardiac clearance.  She received medical clearance.  She received the okay to stop her Coumadin      As always we discussed having your BEST surgery, and BEST recovery.  Surgery goals reviewed today.      Breathing exercises   Patient was encouraged to begin lung exercises today.  This could be accomplished through deep breathing and cough exercises.      Eating/nutrition   Encouraged patient to increase oral protein intake prior to surgery.   This can be accomplished by consuming chicken, fish, tuna fish, cottage cheese, cheese, eggs, Greek yogurt, and protein shakes as needed.  I encouraged use of protein shakes such ENLIVE.  I also recommended making your own protein shakes with protein powder.   Sleep/Stress management  Patient was encouraged to rest their body prior to surgery.  Encouraged attempting to get 8 hours of sleep at night.  Avoid stress.  Avoid sick contacts.  Encouraged to find a relaxing hobby such as reading, meditation, listening to music.  Training exercises  Patient was encouraged to remain active as possible.  Today bilateral lower extremity generic exercises were taught for muscle strengthening and balance.  All exercises to be done sitting down.     History obtained from : patient  Review of Systems   Constitutional:  Negative for chills and fever.   HENT:  Negative for sore throat.     Cardiovascular:  Negative for chest pain, palpitations and leg swelling.   Gastrointestinal:  Negative for nausea, rectal pain and vomiting.   Genitourinary:  Negative for difficulty urinating.   Skin:  Negative for color change, pallor, rash and wound.   Neurological:  Negative for dizziness, facial asymmetry, light-headedness, numbness and headaches.   Psychiatric/Behavioral:  Negative for agitation, behavioral problems, confusion and decreased concentration.    All other systems reviewed and are negative.          Objective     /80 (BP Location: Left arm, Patient Position: Sitting)   Pulse 70   Ht 5' (1.524 m)   Wt 57.4 kg (126 lb 9.6 oz)   LMP  (LMP Unknown) Comment: partial hysterectomy per pt  SpO2 97%   BMI 24.72 kg/m²     Physical Exam  HENT:      Head: Normocephalic.      Nose: Nose normal.   Cardiovascular:      Rate and Rhythm: Normal rate and regular rhythm.   Pulmonary:      Effort: Pulmonary effort is normal.      Breath sounds: Normal breath sounds.   Musculoskeletal:         General: No swelling. Normal range of motion.      Cervical back: Normal range of motion.   Skin:     General: Skin is warm and dry.   Neurological:      General: No focal deficit present.      Mental Status: She is alert and oriented to person, place, and time. Mental status is at baseline.   Psychiatric:         Mood and Affect: Mood normal.         Behavior: Behavior normal.         Thought Content: Thought content normal.         Judgment: Judgment normal.

## 2024-10-10 NOTE — ASSESSMENT & PLAN NOTE
Many clots IN HER PAST   Last one 1991  On coumadin   Has the okay to stop coumadin  Received CC

## 2024-10-10 NOTE — ASSESSMENT & PLAN NOTE
received CC     10/10/2024 1:29 PM    /80   BP Location Left arm   Patient Position Sitting   Pulse 70   SpO2 97 %   Weight 57.4 kg (126 lb 9.6 oz)   Height 5' (1.524 m)

## 2024-10-10 NOTE — H&P (VIEW-ONLY)
[facility]  CONSULT: GERIATRIC SURGERY    Ambulatory Visit      Name: Anneliese Garcia      : 1935      MRN: [id number]  Encounter Provider: CATHERINE Rollins  Encounter Date: 10/10/2024   Encounter department: [facility]      ASSESSMENT   89  year old female referred to [facility] for pre-surgery geriatric screening 2.2 age   Other consult concerns include: surgical optimization & BEST program. ADVANCED AGE   She is scheduled on   Case: [id number] Date/Time: 10/16/24 1140   Procedure: ARTHROPLASTY KNEE TOTAL REVISION (Left: Knee)   Anesthesia type: Choice   Diagnosis:      Pain due to total left knee replacement, initial encounter ([facility]) [T84.84XA, Z96.652]      Mechanical loosening of prosthetic knee, initial encounter  ([facility]) [T84.038A, Z96.659]   Pre-op diagnosis:      Pain due to total left knee replacement, initial encounter ([facility]) [T84.84XA, Z96.652]      Mechanical loosening of prosthetic knee, initial encounter  ([facility]) [T84.038A, Z96.659]   Location:  OR ROOM 02 / [facility]   Surgeons: Isaac Dueñas, DO       LAST ANESTHESIA   NO CONCERNS       High risk geriatric surgery patient      I discussed the risk and benefits of general anesthesia including; but not limit too, the risk of postoperative respiratory distress, postop pneumonia, postop cognitive impairment (both temporarily and/or permanent), postop urinary retention (GLO), postop deconditioning, and death.  The patient verbalized they are fully aware of these risks and wishes to proceed.      In preparation for general anesthesia we started our BEST protocol placing emphasis on     Breathing  Encouraged exercise lungs prior to surgery. Already using IS at home     Eating  Encouraged to increase protein prior to surgery    Self  Encouraged to prepare their home for recovery  Encouraged to sleep 8- 10 hours nightly   Encouraged to limit napping during the  day   Encouraged to limit stress   Encouraged to exercise the mind daily-this can be done through completing crossword puzzles, sudoku puzzles, reading, self reflecting through journaling, adult number coloring, flash cards, board games, game shows on TV, and staying socially active    Train   Encouraged to stay active prior to surgery  Encouraged to increase exercise prior to surgery   Assessment & Plan  Mechanical loosening of prosthetic knee, initial encounter  ([facility])    Orders:    Ambulatory referral to Family Practice  SEEN FOR SO   SEEN FOR GERIATRIC   RECEIVED [facility]   Received CC   METS 6  ACTIVE   STARTED BEST   At risk for post-op GLO - yes 2.2 RI   At risk for post-op SSI- no   BEST   Breathing- instructed to exercise lungs prior to surgery via deep breathing   Eat- discussed increasing protein intake prior to surgery   Sleep/stress- encouraged 8-10 sleep @ night, stress reduction, avoid sick contacts and handwashing   Train- encouraged to remain active    Encounter for geriatric assessment    Orders:    Ambulatory Referral to Surgical Optimization    CKD (chronic kidney disease), stage II  Lab Results   Component Value Date    EGFR 62 08/21/2024    EGFR 60 07/18/2024    EGFR 42 06/29/2024    CREATININE 0.85 08/21/2024    CREATININE 0.87 07/18/2024    CREATININE 1.17 06/29/2024       History of DVT (deep vein thrombosis)    History of pulmonary embolism  Many clots IN HER PAST   Last one 1991  On coumadin   Has the okay to stop coumadin  Received CC     MIAH (obstructive sleep apnea)  Years ago   No longer uses CPAP   NO LONGER NEEDS IT     Primary hypertension  received CC     10/10/2024 1:29 PM    /80   BP Location Left arm   Patient Position Sitting   Pulse 70   SpO2 97 %   Weight 57.4 kg (126 lb 9.6 oz)   Height 5' (1.524 m)     S/P TAVR (transcatheter aortic valve replacement)  JUNE 2024  Received CC   METS 6  DENIES CP/DENIES SOB     History of Present Illness     Anneliese Garcia is a 89 y.o.  female who presents to the [facility] for presurgery optimization and her geriatric assessment.  She has been managing ongoing left knee pain and is electing to have a left knee replacement.    I met patient in the SOC.  She arrived with her .  She walked independently.  No walker & no cane use.  Gait was steady.  Lives at home with her .  She is independent with all ADLs.  No cognitive concerns on my verbal exam.  She scored a level 2 on the mini cognitive assessment indicating high risk for postop delirium.  Recommend spinal anesthesia.  Recommend geriatric pain protocol.    She received cardiac clearance.  She received medical clearance.  She received the okay to stop her Coumadin      As always we discussed having your BEST surgery, and BEST recovery.  Surgery goals reviewed today.      Breathing exercises   Patient was encouraged to begin lung exercises today.  This could be accomplished through deep breathing and cough exercises.      Eating/nutrition   Encouraged patient to increase oral protein intake prior to surgery.   This can be accomplished by consuming chicken, fish, tuna fish, cottage cheese, cheese, eggs, Greek yogurt, and protein shakes as needed.  I encouraged use of protein shakes such ENLIVE.  I also recommended making your own protein shakes with protein powder.   Sleep/Stress management  Patient was encouraged to rest their body prior to surgery.  Encouraged attempting to get 8 hours of sleep at night.  Avoid stress.  Avoid sick contacts.  Encouraged to find a relaxing hobby such as reading, meditation, listening to music.  Training exercises  Patient was encouraged to remain active as possible.  Today bilateral lower extremity generic exercises were taught for muscle strengthening and balance.  All exercises to be done sitting down.     History obtained from : patient  Review of Systems   Constitutional:  Negative for chills and fever.   HENT:  Negative for sore throat.     Cardiovascular:  Negative for chest pain, palpitations and leg swelling.   Gastrointestinal:  Negative for nausea, rectal pain and vomiting.   Genitourinary:  Negative for difficulty urinating.   Skin:  Negative for color change, pallor, rash and wound.   Neurological:  Negative for dizziness, facial asymmetry, light-headedness, numbness and headaches.   Psychiatric/Behavioral:  Negative for agitation, behavioral problems, confusion and decreased concentration.    All other systems reviewed and are negative.          Objective     /80 (BP Location: Left arm, Patient Position: Sitting)   Pulse 70   Ht 5' (1.524 m)   Wt 57.4 kg (126 lb 9.6 oz)   LMP  (LMP Unknown) Comment: partial hysterectomy per pt  SpO2 97%   BMI 24.72 kg/m²     Physical Exam  HENT:      Head: Normocephalic.      Nose: Nose normal.   Cardiovascular:      Rate and Rhythm: Normal rate and regular rhythm.   Pulmonary:      Effort: Pulmonary effort is normal.      Breath sounds: Normal breath sounds.   Musculoskeletal:         General: No swelling. Normal range of motion.      Cervical back: Normal range of motion.   Skin:     General: Skin is warm and dry.   Neurological:      General: No focal deficit present.      Mental Status: She is alert and oriented to person, place, and time. Mental status is at baseline.   Psychiatric:         Mood and Affect: Mood normal.         Behavior: Behavior normal.         Thought Content: Thought content normal.         Judgment: Judgment normal.

## 2024-10-10 NOTE — ASSESSMENT & PLAN NOTE
Lab Results   Component Value Date    EGFR 62 08/21/2024    EGFR 60 07/18/2024    EGFR 42 06/29/2024    CREATININE 0.85 08/21/2024    CREATININE 0.87 07/18/2024    CREATININE 1.17 06/29/2024

## 2024-10-13 PROBLEM — Z96.652 PAIN DUE TO TOTAL LEFT KNEE REPLACEMENT (HCC): Status: ACTIVE | Noted: 2024-10-13

## 2024-10-13 PROBLEM — T84.84XA PAIN DUE TO TOTAL LEFT KNEE REPLACEMENT (HCC): Status: ACTIVE | Noted: 2024-10-13

## 2024-10-13 NOTE — DISCHARGE INSTR - AVS FIRST PAGE
Dr. Dueñas Knee Replacement    What to Expect/Activity  It is normal to have some discomfort in your knee for several days to weeks.  You are weight bearing as tolerated to your operative leg with assist devices.  Please use crutches/walker when ambulating until your follow-up  Swelling and discomfort in the knee is normal for several days after surgery. For the first 2-3 days, use ice around the knee to help. Use for 20-30 minutes every 1-2 hours for 48 hours, while awake. You may continue beyond 48 hours as needed.  Place one or two pillows underneath your calf, not your knee, to reduce swelling.  Physical therapy on your own at home should start as soon as possible (see below). Please perform heel slides and extension exercises on your own as well (see diagram).  Please use incentive spirometer 10 times per hour while awake (see diagram).    Dressing/Wound Care/Bathing  You may remove your toe-to-groin dressing 24 hours after surgery. There will be a surgical dressing over your incision that stays in place until follow-up unless water gets under the bandage and then it should be removed.   You may start showering 24 hours after surgery, the surgical dressing will remain in place. Please pat the dressing dry. If you notice the dressing appears saturated or is starting to come off, please replace with dry dressing.  You can keep the dressing in place until follow-up in the office.   Do not place any creams, ointments or gels on or around the incision.  No baths, swimming or submerging until cleared by Dr. Dueñas    Pain Management/Medications  You may resume your usual medications.  Please take the following medications:  Anti-coagulation (blood clot prevention) - Resume Coumadin  Pain medication:  Narcotic: Take as directed  NSAID/Anti-inflammatory: Take as directed  Tylenol 1000mg every 8 hours  Zofran (ondasetron) - 4mg every 8 hours as needed for nausea  Stool softeners (senna/colace) - take daily to  prevent constipation as narcotic pain medication causes constipation  Antibiotic - take as directed if prescribed   If you have questions or pain concerns, please contact the office. Pain medication cannot remove all post-operative pain.    Follow up/Call if:  The findings of your surgery will be explained to you and your family immediately after surgery. However, in the post-operative period, during recovery from anesthesia you may not fully remember or fully understand what was said. This will be again gone over when you return for your post-op appointment.  Please contact Dr. Dueñas's office if you experience the following:  Excessive bleeding (bleeding through your dressing)  Fever greater than 101 degrees F after 48 hours (low grade fevers the day or two after surgery are normal)  Persistent nausea or vomiting  Decreased sensation or discoloration of the operative limb  Pain or swelling that is getting worse and not better with medication    Dr. Dueñas's Office Contact: [phone]

## 2024-10-16 ENCOUNTER — APPOINTMENT (INPATIENT)
Dept: RADIOLOGY | Facility: HOSPITAL | Age: 89
DRG: 467 | End: 2024-10-16
Payer: COMMERCIAL

## 2024-10-16 ENCOUNTER — HOSPITAL ENCOUNTER (INPATIENT)
Facility: HOSPITAL | Age: 89
LOS: 1 days | Discharge: HOME HEALTH | DRG: 467 | End: 2024-10-17
Attending: STUDENT IN AN ORGANIZED HEALTH CARE EDUCATION/TRAINING PROGRAM | Admitting: STUDENT IN AN ORGANIZED HEALTH CARE EDUCATION/TRAINING PROGRAM
Payer: COMMERCIAL

## 2024-10-16 ENCOUNTER — ANESTHESIA (INPATIENT)
Dept: PERIOP | Facility: HOSPITAL | Age: 89
DRG: 467 | End: 2024-10-16
Payer: COMMERCIAL

## 2024-10-16 DIAGNOSIS — T84.84XA PAIN DUE TO TOTAL LEFT KNEE REPLACEMENT, INITIAL ENCOUNTER (HCC): ICD-10-CM

## 2024-10-16 DIAGNOSIS — T84.038A MECHANICAL LOOSENING OF PROSTHETIC KNEE, INITIAL ENCOUNTER  (HCC): ICD-10-CM

## 2024-10-16 DIAGNOSIS — T84.84XD PAIN DUE TO TOTAL LEFT KNEE REPLACEMENT, SUBSEQUENT ENCOUNTER: ICD-10-CM

## 2024-10-16 DIAGNOSIS — Z96.652 PAIN DUE TO TOTAL LEFT KNEE REPLACEMENT, SUBSEQUENT ENCOUNTER: ICD-10-CM

## 2024-10-16 DIAGNOSIS — Z79.01 CHRONIC ANTICOAGULATION: ICD-10-CM

## 2024-10-16 DIAGNOSIS — Z96.659 MECHANICAL LOOSENING OF PROSTHETIC KNEE, INITIAL ENCOUNTER  (HCC): ICD-10-CM

## 2024-10-16 DIAGNOSIS — N18.31 STAGE 3A CHRONIC KIDNEY DISEASE (HCC): Primary | ICD-10-CM

## 2024-10-16 DIAGNOSIS — Z96.652 PAIN DUE TO TOTAL LEFT KNEE REPLACEMENT, INITIAL ENCOUNTER (HCC): ICD-10-CM

## 2024-10-16 PROBLEM — I95.1 ORTHOSTATIC HYPOTENSION: Status: ACTIVE | Noted: 2024-10-16

## 2024-10-16 LAB
ABO GROUP BLD: NORMAL
APTT PPP: 36 SECONDS (ref 23–34)
BLD GP AB SCN SERPL QL: NEGATIVE
GLUCOSE SERPL-MCNC: 200 MG/DL (ref 65–140)
INR PPP: 1.17 (ref 0.85–1.19)
PROTHROMBIN TIME: 15.4 SECONDS (ref 12.3–15)
RH BLD: POSITIVE
SPECIMEN EXPIRATION DATE: NORMAL

## 2024-10-16 PROCEDURE — 86900 BLOOD TYPING SEROLOGIC ABO: CPT | Performed by: STUDENT IN AN ORGANIZED HEALTH CARE EDUCATION/TRAINING PROGRAM

## 2024-10-16 PROCEDURE — 86850 RBC ANTIBODY SCREEN: CPT | Performed by: STUDENT IN AN ORGANIZED HEALTH CARE EDUCATION/TRAINING PROGRAM

## 2024-10-16 PROCEDURE — 27487 REVISE/REPLACE KNEE JOINT: CPT | Performed by: STUDENT IN AN ORGANIZED HEALTH CARE EDUCATION/TRAINING PROGRAM

## 2024-10-16 PROCEDURE — 87075 CULTR BACTERIA EXCEPT BLOOD: CPT | Performed by: STUDENT IN AN ORGANIZED HEALTH CARE EDUCATION/TRAINING PROGRAM

## 2024-10-16 PROCEDURE — 73560 X-RAY EXAM OF KNEE 1 OR 2: CPT

## 2024-10-16 PROCEDURE — 82948 REAGENT STRIP/BLOOD GLUCOSE: CPT

## 2024-10-16 PROCEDURE — 87176 TISSUE HOMOGENIZATION CULTR: CPT | Performed by: STUDENT IN AN ORGANIZED HEALTH CARE EDUCATION/TRAINING PROGRAM

## 2024-10-16 PROCEDURE — 87102 FUNGUS ISOLATION CULTURE: CPT | Performed by: STUDENT IN AN ORGANIZED HEALTH CARE EDUCATION/TRAINING PROGRAM

## 2024-10-16 PROCEDURE — 87070 CULTURE OTHR SPECIMN AEROBIC: CPT | Performed by: STUDENT IN AN ORGANIZED HEALTH CARE EDUCATION/TRAINING PROGRAM

## 2024-10-16 PROCEDURE — 97167 OT EVAL HIGH COMPLEX 60 MIN: CPT

## 2024-10-16 PROCEDURE — 85730 THROMBOPLASTIN TIME PARTIAL: CPT | Performed by: STUDENT IN AN ORGANIZED HEALTH CARE EDUCATION/TRAINING PROGRAM

## 2024-10-16 PROCEDURE — 0SPD0JZ REMOVAL OF SYNTHETIC SUBSTITUTE FROM LEFT KNEE JOINT, OPEN APPROACH: ICD-10-PCS | Performed by: STUDENT IN AN ORGANIZED HEALTH CARE EDUCATION/TRAINING PROGRAM

## 2024-10-16 PROCEDURE — 86901 BLOOD TYPING SEROLOGIC RH(D): CPT | Performed by: STUDENT IN AN ORGANIZED HEALTH CARE EDUCATION/TRAINING PROGRAM

## 2024-10-16 PROCEDURE — C1776 JOINT DEVICE (IMPLANTABLE): HCPCS | Performed by: STUDENT IN AN ORGANIZED HEALTH CARE EDUCATION/TRAINING PROGRAM

## 2024-10-16 PROCEDURE — C9290 INJ, BUPIVACAINE LIPOSOME: HCPCS | Performed by: ANESTHESIOLOGY

## 2024-10-16 PROCEDURE — 0SRD0J9 REPLACEMENT OF LEFT KNEE JOINT WITH SYNTHETIC SUBSTITUTE, CEMENTED, OPEN APPROACH: ICD-10-PCS | Performed by: STUDENT IN AN ORGANIZED HEALTH CARE EDUCATION/TRAINING PROGRAM

## 2024-10-16 PROCEDURE — 97163 PT EVAL HIGH COMPLEX 45 MIN: CPT

## 2024-10-16 PROCEDURE — 99221 1ST HOSP IP/OBS SF/LOW 40: CPT | Performed by: STUDENT IN AN ORGANIZED HEALTH CARE EDUCATION/TRAINING PROGRAM

## 2024-10-16 PROCEDURE — 87205 SMEAR GRAM STAIN: CPT | Performed by: STUDENT IN AN ORGANIZED HEALTH CARE EDUCATION/TRAINING PROGRAM

## 2024-10-16 PROCEDURE — 85610 PROTHROMBIN TIME: CPT | Performed by: STUDENT IN AN ORGANIZED HEALTH CARE EDUCATION/TRAINING PROGRAM

## 2024-10-16 PROCEDURE — C1713 ANCHOR/SCREW BN/BN,TIS/BN: HCPCS | Performed by: STUDENT IN AN ORGANIZED HEALTH CARE EDUCATION/TRAINING PROGRAM

## 2024-10-16 DEVICE — SMARTSET HIGH PERFORMANCE MV MEDIUM VISCOSITY BONE CEMENT 40G
Type: IMPLANTABLE DEVICE | Site: KNEE | Status: FUNCTIONAL
Brand: SMARTSET

## 2024-10-16 DEVICE — ATTUNE KNEE SYSTEM REVISION PRESSFIT STEM 20X110MM
Type: IMPLANTABLE DEVICE | Site: KNEE | Status: FUNCTIONAL
Brand: ATTUNE

## 2024-10-16 DEVICE — ATTUNE KNEE SYSTEM REVISION ROTATING PLATFORM TIBIAL BASE CEMENTED SIZE 3
Type: IMPLANTABLE DEVICE | Site: KNEE | Status: FUNCTIONAL
Brand: ATTUNE

## 2024-10-16 DEVICE — ATTUNE KNEE SYSTEM REVISION DISTAL FEMORAL AUGMENT 4MM CEMENTED SIZE 4
Type: IMPLANTABLE DEVICE | Site: KNEE | Status: FUNCTIONAL
Brand: ATTUNE

## 2024-10-16 DEVICE — ATTUNE KNEE SYSTEM REVISION TIBIAL SLEEVE POROCOAT PARTIALLY COATED 37MM
Type: IMPLANTABLE DEVICE | Site: KNEE | Status: FUNCTIONAL
Brand: ATTUNE

## 2024-10-16 DEVICE — ATTUNE KNEE SYSTEM REVISION DISTAL FEMORAL AUGMENT 8MM CEMENTED SIZE 4
Type: IMPLANTABLE DEVICE | Site: KNEE | Status: FUNCTIONAL
Brand: ATTUNE

## 2024-10-16 DEVICE — ATTUNE KNEE SYSTEM REVISION CRS ROTATING PLATFORM INSERT AOX 6MM SIZE 4
Type: IMPLANTABLE DEVICE | Site: KNEE | Status: FUNCTIONAL
Brand: ATTUNE

## 2024-10-16 DEVICE — ATTUNE KNEE SYSTEM REVISION PRESSFIT STEM 16X60MM
Type: IMPLANTABLE DEVICE | Site: KNEE | Status: FUNCTIONAL
Brand: ATTUNE

## 2024-10-16 RX ORDER — FENTANYL CITRATE 50 UG/ML
INJECTION, SOLUTION INTRAMUSCULAR; INTRAVENOUS AS NEEDED
Status: DISCONTINUED | OUTPATIENT
Start: 2024-10-16 | End: 2024-10-16

## 2024-10-16 RX ORDER — AMOXICILLIN 250 MG
1 CAPSULE ORAL DAILY
Qty: 30 TABLET | Refills: 0 | Status: SHIPPED | OUTPATIENT
Start: 2024-10-16

## 2024-10-16 RX ORDER — ASCORBIC ACID 500 MG
500 TABLET ORAL 2 TIMES DAILY
Status: DISCONTINUED | OUTPATIENT
Start: 2024-10-16 | End: 2024-10-17 | Stop reason: HOSPADM

## 2024-10-16 RX ORDER — SODIUM CHLORIDE 9 MG/ML
100 INJECTION, SOLUTION INTRAVENOUS CONTINUOUS
Status: DISCONTINUED | OUTPATIENT
Start: 2024-10-16 | End: 2024-10-16

## 2024-10-16 RX ORDER — SODIUM CHLORIDE, SODIUM LACTATE, POTASSIUM CHLORIDE, CALCIUM CHLORIDE 600; 310; 30; 20 MG/100ML; MG/100ML; MG/100ML; MG/100ML
100 INJECTION, SOLUTION INTRAVENOUS CONTINUOUS
Status: DISCONTINUED | OUTPATIENT
Start: 2024-10-16 | End: 2024-10-16

## 2024-10-16 RX ORDER — ONDANSETRON 4 MG/1
4 TABLET, ORALLY DISINTEGRATING ORAL EVERY 6 HOURS PRN
Qty: 20 TABLET | Refills: 0 | Status: SHIPPED | OUTPATIENT
Start: 2024-10-16

## 2024-10-16 RX ORDER — PILOCARPINE HYDROCHLORIDE 5 MG/1
5 TABLET, FILM COATED ORAL 4 TIMES DAILY
Status: CANCELLED | OUTPATIENT
Start: 2024-10-16

## 2024-10-16 RX ORDER — ONDANSETRON 2 MG/ML
4 INJECTION INTRAMUSCULAR; INTRAVENOUS EVERY 6 HOURS PRN
Status: DISCONTINUED | OUTPATIENT
Start: 2024-10-16 | End: 2024-10-17 | Stop reason: HOSPADM

## 2024-10-16 RX ORDER — CEFAZOLIN SODIUM 2 G/50ML
2000 SOLUTION INTRAVENOUS EVERY 8 HOURS
Status: COMPLETED | OUTPATIENT
Start: 2024-10-16 | End: 2024-10-17

## 2024-10-16 RX ORDER — OXYCODONE HYDROCHLORIDE 5 MG/1
5 TABLET ORAL EVERY 4 HOURS PRN
Qty: 42 TABLET | Refills: 0 | Status: SHIPPED | OUTPATIENT
Start: 2024-10-16 | End: 2024-10-26

## 2024-10-16 RX ORDER — LIDOCAINE HYDROCHLORIDE 10 MG/ML
INJECTION, SOLUTION EPIDURAL; INFILTRATION; INTRACAUDAL; PERINEURAL AS NEEDED
Status: DISCONTINUED | OUTPATIENT
Start: 2024-10-16 | End: 2024-10-16

## 2024-10-16 RX ORDER — SODIUM CHLORIDE, SODIUM LACTATE, POTASSIUM CHLORIDE, CALCIUM CHLORIDE 600; 310; 30; 20 MG/100ML; MG/100ML; MG/100ML; MG/100ML
100 INJECTION, SOLUTION INTRAVENOUS CONTINUOUS
Status: DISPENSED | OUTPATIENT
Start: 2024-10-16 | End: 2024-10-17

## 2024-10-16 RX ORDER — PROPOFOL 10 MG/ML
INJECTION, EMULSION INTRAVENOUS AS NEEDED
Status: DISCONTINUED | OUTPATIENT
Start: 2024-10-16 | End: 2024-10-16

## 2024-10-16 RX ORDER — CHLORHEXIDINE GLUCONATE 40 MG/ML
SOLUTION TOPICAL DAILY PRN
Status: DISCONTINUED | OUTPATIENT
Start: 2024-10-16 | End: 2024-10-17 | Stop reason: HOSPADM

## 2024-10-16 RX ORDER — BUPIVACAINE HYDROCHLORIDE AND EPINEPHRINE 2.5; 5 MG/ML; UG/ML
INJECTION, SOLUTION EPIDURAL; INFILTRATION; INTRACAUDAL; PERINEURAL AS NEEDED
Status: DISCONTINUED | OUTPATIENT
Start: 2024-10-16 | End: 2024-10-16 | Stop reason: HOSPADM

## 2024-10-16 RX ORDER — PRAVASTATIN SODIUM 40 MG
40 TABLET ORAL
Status: CANCELLED | OUTPATIENT
Start: 2024-10-16

## 2024-10-16 RX ORDER — SIMETHICONE 80 MG
80 TABLET,CHEWABLE ORAL 4 TIMES DAILY PRN
Status: DISCONTINUED | OUTPATIENT
Start: 2024-10-16 | End: 2024-10-17 | Stop reason: HOSPADM

## 2024-10-16 RX ORDER — PANTOPRAZOLE SODIUM 40 MG/1
40 TABLET, DELAYED RELEASE ORAL
Status: CANCELLED | OUTPATIENT
Start: 2024-10-17

## 2024-10-16 RX ORDER — WARFARIN SODIUM 5 MG/1
10 TABLET ORAL
Status: COMPLETED | OUTPATIENT
Start: 2024-10-16 | End: 2024-10-16

## 2024-10-16 RX ORDER — HYDROXYCHLOROQUINE SULFATE 200 MG/1
200 TABLET, FILM COATED ORAL DAILY
Status: CANCELLED | OUTPATIENT
Start: 2024-10-16

## 2024-10-16 RX ORDER — CHLORHEXIDINE GLUCONATE ORAL RINSE 1.2 MG/ML
15 SOLUTION DENTAL ONCE
Status: COMPLETED | OUTPATIENT
Start: 2024-10-16 | End: 2024-10-16

## 2024-10-16 RX ORDER — OXYCODONE HYDROCHLORIDE 5 MG/1
5 TABLET ORAL EVERY 4 HOURS PRN
Status: DISCONTINUED | OUTPATIENT
Start: 2024-10-16 | End: 2024-10-17 | Stop reason: HOSPADM

## 2024-10-16 RX ORDER — DEXAMETHASONE SODIUM PHOSPHATE 10 MG/ML
INJECTION, SOLUTION INTRAMUSCULAR; INTRAVENOUS AS NEEDED
Status: DISCONTINUED | OUTPATIENT
Start: 2024-10-16 | End: 2024-10-16

## 2024-10-16 RX ORDER — WARFARIN SODIUM 5 MG/1
5 TABLET ORAL
Status: CANCELLED | OUTPATIENT
Start: 2024-10-17

## 2024-10-16 RX ORDER — ACETAMINOPHEN 325 MG/1
975 TABLET ORAL EVERY 8 HOURS
Status: DISCONTINUED | OUTPATIENT
Start: 2024-10-16 | End: 2024-10-17 | Stop reason: HOSPADM

## 2024-10-16 RX ORDER — LIDOCAINE HYDROCHLORIDE 20 MG/ML
INJECTION, SOLUTION EPIDURAL; INFILTRATION; INTRACAUDAL; PERINEURAL AS NEEDED
Status: DISCONTINUED | OUTPATIENT
Start: 2024-10-16 | End: 2024-10-16

## 2024-10-16 RX ORDER — ENOXAPARIN SODIUM 100 MG/ML
1 INJECTION SUBCUTANEOUS EVERY 12 HOURS
Status: CANCELLED | OUTPATIENT
Start: 2024-10-17

## 2024-10-16 RX ORDER — ACETAMINOPHEN 325 MG/1
975 TABLET ORAL ONCE
Status: COMPLETED | OUTPATIENT
Start: 2024-10-16 | End: 2024-10-16

## 2024-10-16 RX ORDER — ACETAMINOPHEN 325 MG/1
650 TABLET ORAL EVERY 4 HOURS PRN
Status: DISCONTINUED | OUTPATIENT
Start: 2024-10-16 | End: 2024-10-17 | Stop reason: HOSPADM

## 2024-10-16 RX ORDER — GLYCOPYRROLATE 0.2 MG/ML
INJECTION INTRAMUSCULAR; INTRAVENOUS AS NEEDED
Status: DISCONTINUED | OUTPATIENT
Start: 2024-10-16 | End: 2024-10-16

## 2024-10-16 RX ORDER — DOCUSATE SODIUM 100 MG/1
100 CAPSULE, LIQUID FILLED ORAL 2 TIMES DAILY
Status: DISCONTINUED | OUTPATIENT
Start: 2024-10-16 | End: 2024-10-17 | Stop reason: HOSPADM

## 2024-10-16 RX ORDER — HYDROMORPHONE HYDROCHLORIDE 1 MG/ML
INJECTION, SOLUTION INTRAMUSCULAR; INTRAVENOUS; SUBCUTANEOUS AS NEEDED
Status: DISCONTINUED | OUTPATIENT
Start: 2024-10-16 | End: 2024-10-16

## 2024-10-16 RX ORDER — CALCIUM CARBONATE 500 MG/1
1000 TABLET, CHEWABLE ORAL DAILY PRN
Status: DISCONTINUED | OUTPATIENT
Start: 2024-10-16 | End: 2024-10-17 | Stop reason: HOSPADM

## 2024-10-16 RX ORDER — SENNOSIDES 8.6 MG
1 TABLET ORAL DAILY
Status: DISCONTINUED | OUTPATIENT
Start: 2024-10-16 | End: 2024-10-17 | Stop reason: HOSPADM

## 2024-10-16 RX ORDER — DOXYCYCLINE 100 MG/1
100 CAPSULE ORAL 2 TIMES DAILY
Qty: 28 CAPSULE | Refills: 0 | Status: SHIPPED | OUTPATIENT
Start: 2024-10-16 | End: 2024-10-30

## 2024-10-16 RX ORDER — FOLIC ACID 1 MG/1
1 TABLET ORAL DAILY
Status: DISCONTINUED | OUTPATIENT
Start: 2024-10-16 | End: 2024-10-17 | Stop reason: HOSPADM

## 2024-10-16 RX ORDER — PANTOPRAZOLE SODIUM 40 MG/1
40 TABLET, DELAYED RELEASE ORAL DAILY
Status: DISCONTINUED | OUTPATIENT
Start: 2024-10-16 | End: 2024-10-17 | Stop reason: HOSPADM

## 2024-10-16 RX ORDER — ONDANSETRON 2 MG/ML
INJECTION INTRAMUSCULAR; INTRAVENOUS AS NEEDED
Status: DISCONTINUED | OUTPATIENT
Start: 2024-10-16 | End: 2024-10-16

## 2024-10-16 RX ORDER — EPHEDRINE SULFATE 50 MG/ML
INJECTION INTRAVENOUS AS NEEDED
Status: DISCONTINUED | OUTPATIENT
Start: 2024-10-16 | End: 2024-10-16

## 2024-10-16 RX ORDER — ROCURONIUM BROMIDE 10 MG/ML
INJECTION, SOLUTION INTRAVENOUS AS NEEDED
Status: DISCONTINUED | OUTPATIENT
Start: 2024-10-16 | End: 2024-10-16

## 2024-10-16 RX ORDER — KETOROLAC TROMETHAMINE 30 MG/ML
INJECTION, SOLUTION INTRAMUSCULAR; INTRAVENOUS AS NEEDED
Status: DISCONTINUED | OUTPATIENT
Start: 2024-10-16 | End: 2024-10-16 | Stop reason: HOSPADM

## 2024-10-16 RX ORDER — CEFAZOLIN SODIUM 2 G/50ML
2000 SOLUTION INTRAVENOUS ONCE
Status: COMPLETED | OUTPATIENT
Start: 2024-10-16 | End: 2024-10-16

## 2024-10-16 RX ORDER — GABAPENTIN 300 MG/1
300 CAPSULE ORAL
Status: DISCONTINUED | OUTPATIENT
Start: 2024-10-16 | End: 2024-10-17 | Stop reason: HOSPADM

## 2024-10-16 RX ORDER — TRANEXAMIC ACID 10 MG/ML
1000 INJECTION, SOLUTION INTRAVENOUS ONCE
Status: COMPLETED | OUTPATIENT
Start: 2024-10-16 | End: 2024-10-16

## 2024-10-16 RX ORDER — METOPROLOL TARTRATE 25 MG/1
25 TABLET, FILM COATED ORAL EVERY 12 HOURS SCHEDULED
Status: CANCELLED | OUTPATIENT
Start: 2024-10-16

## 2024-10-16 RX ORDER — SODIUM CHLORIDE, SODIUM LACTATE, POTASSIUM CHLORIDE, CALCIUM CHLORIDE 600; 310; 30; 20 MG/100ML; MG/100ML; MG/100ML; MG/100ML
125 INJECTION, SOLUTION INTRAVENOUS CONTINUOUS
Status: DISCONTINUED | OUTPATIENT
Start: 2024-10-16 | End: 2024-10-16

## 2024-10-16 RX ORDER — LEVOTHYROXINE SODIUM 112 UG/1
112 TABLET ORAL
Status: CANCELLED | OUTPATIENT
Start: 2024-10-17

## 2024-10-16 RX ORDER — PHENYLEPHRINE HCL IN 0.9% NACL 1 MG/10 ML
SYRINGE (ML) INTRAVENOUS AS NEEDED
Status: DISCONTINUED | OUTPATIENT
Start: 2024-10-16 | End: 2024-10-16

## 2024-10-16 RX ORDER — BUPIVACAINE HYDROCHLORIDE 5 MG/ML
INJECTION, SOLUTION EPIDURAL; INTRACAUDAL AS NEEDED
Status: DISCONTINUED | OUTPATIENT
Start: 2024-10-16 | End: 2024-10-16

## 2024-10-16 RX ORDER — ACETAMINOPHEN 500 MG
1000 TABLET ORAL EVERY 8 HOURS
Qty: 60 TABLET | Refills: 0 | Status: SHIPPED | OUTPATIENT
Start: 2024-10-16

## 2024-10-16 RX ADMIN — ROCURONIUM BROMIDE 40 MG: 10 INJECTION, SOLUTION INTRAVENOUS at 10:11

## 2024-10-16 RX ADMIN — LIDOCAINE HYDROCHLORIDE 50 MG: 10 INJECTION, SOLUTION EPIDURAL; INFILTRATION; INTRACAUDAL; PERINEURAL at 10:10

## 2024-10-16 RX ADMIN — ACETAMINOPHEN 975 MG: 325 TABLET ORAL at 07:45

## 2024-10-16 RX ADMIN — BUPIVACAINE HYDROCHLORIDE 5 ML: 5 INJECTION, SOLUTION EPIDURAL; INTRACAUDAL at 09:41

## 2024-10-16 RX ADMIN — EPHEDRINE SULFATE 10 MG: 50 INJECTION INTRAVENOUS at 10:25

## 2024-10-16 RX ADMIN — ACETAMINOPHEN 975 MG: 325 TABLET ORAL at 17:05

## 2024-10-16 RX ADMIN — CEFAZOLIN SODIUM 2000 MG: 2 SOLUTION INTRAVENOUS at 10:22

## 2024-10-16 RX ADMIN — Medication 100 MCG: at 11:27

## 2024-10-16 RX ADMIN — Medication 100 MCG: at 11:04

## 2024-10-16 RX ADMIN — HYDROMORPHONE HYDROCHLORIDE 0.4 MG: 1 INJECTION, SOLUTION INTRAMUSCULAR; INTRAVENOUS; SUBCUTANEOUS at 10:37

## 2024-10-16 RX ADMIN — ONDANSETRON 4 MG: 2 INJECTION INTRAMUSCULAR; INTRAVENOUS at 11:23

## 2024-10-16 RX ADMIN — Medication 100 MCG: at 11:33

## 2024-10-16 RX ADMIN — FENTANYL CITRATE 25 MCG: 50 INJECTION, SOLUTION INTRAMUSCULAR; INTRAVENOUS at 10:42

## 2024-10-16 RX ADMIN — HYDROMORPHONE HYDROCHLORIDE 0.2 MG: 1 INJECTION, SOLUTION INTRAMUSCULAR; INTRAVENOUS; SUBCUTANEOUS at 10:42

## 2024-10-16 RX ADMIN — SODIUM CHLORIDE, SODIUM LACTATE, POTASSIUM CHLORIDE, AND CALCIUM CHLORIDE 100 ML/HR: .6; .31; .03; .02 INJECTION, SOLUTION INTRAVENOUS at 17:06

## 2024-10-16 RX ADMIN — OXYCODONE HYDROCHLORIDE AND ACETAMINOPHEN 500 MG: 500 TABLET ORAL at 17:05

## 2024-10-16 RX ADMIN — EPHEDRINE SULFATE 10 MG: 50 INJECTION INTRAVENOUS at 11:41

## 2024-10-16 RX ADMIN — BUPIVACAINE 20 ML: 13.3 INJECTION, SUSPENSION, LIPOSOMAL INFILTRATION at 09:41

## 2024-10-16 RX ADMIN — SODIUM CHLORIDE, SODIUM LACTATE, POTASSIUM CHLORIDE, AND CALCIUM CHLORIDE 100 ML/HR: .6; .31; .03; .02 INJECTION, SOLUTION INTRAVENOUS at 22:43

## 2024-10-16 RX ADMIN — LIDOCAINE HYDROCHLORIDE 1 ML: 20 INJECTION, SOLUTION EPIDURAL; INFILTRATION; INTRACAUDAL; PERINEURAL at 09:40

## 2024-10-16 RX ADMIN — TRANEXAMIC ACID 1000 MG: 10 INJECTION, SOLUTION INTRAVENOUS at 09:49

## 2024-10-16 RX ADMIN — WARFARIN SODIUM 10 MG: 5 TABLET ORAL at 20:33

## 2024-10-16 RX ADMIN — DEXAMETHASONE SODIUM PHOSPHATE 10 MG: 10 INJECTION, SOLUTION INTRAMUSCULAR; INTRAVENOUS at 10:11

## 2024-10-16 RX ADMIN — SODIUM CHLORIDE, SODIUM LACTATE, POTASSIUM CHLORIDE, AND CALCIUM CHLORIDE 100 ML/HR: .6; .31; .03; .02 INJECTION, SOLUTION INTRAVENOUS at 14:42

## 2024-10-16 RX ADMIN — GABAPENTIN 300 MG: 300 CAPSULE ORAL at 20:33

## 2024-10-16 RX ADMIN — SODIUM CHLORIDE, SODIUM LACTATE, POTASSIUM CHLORIDE, AND CALCIUM CHLORIDE 125 ML/HR: .6; .31; .03; .02 INJECTION, SOLUTION INTRAVENOUS at 09:49

## 2024-10-16 RX ADMIN — FOLIC ACID 1 MG: 1 TABLET ORAL at 14:45

## 2024-10-16 RX ADMIN — MULTIPLE VITAMINS W/ MINERALS TAB 1 TABLET: TAB ORAL at 14:44

## 2024-10-16 RX ADMIN — PANTOPRAZOLE SODIUM 40 MG: 40 TABLET, DELAYED RELEASE ORAL at 14:45

## 2024-10-16 RX ADMIN — Medication 100 MCG: at 11:10

## 2024-10-16 RX ADMIN — FENTANYL CITRATE 50 MCG: 50 INJECTION, SOLUTION INTRAMUSCULAR; INTRAVENOUS at 10:10

## 2024-10-16 RX ADMIN — GLYCOPYRROLATE 0.2 MG: 0.2 INJECTION INTRAMUSCULAR; INTRAVENOUS at 10:22

## 2024-10-16 RX ADMIN — CHLORHEXIDINE GLUCONATE 15 ML: 1.2 RINSE ORAL at 08:25

## 2024-10-16 RX ADMIN — CEFAZOLIN SODIUM 2000 MG: 2 SOLUTION INTRAVENOUS at 17:05

## 2024-10-16 RX ADMIN — FENTANYL CITRATE 25 MCG: 50 INJECTION, SOLUTION INTRAMUSCULAR; INTRAVENOUS at 10:45

## 2024-10-16 RX ADMIN — HYDROMORPHONE HYDROCHLORIDE 0.2 MG: 1 INJECTION, SOLUTION INTRAMUSCULAR; INTRAVENOUS; SUBCUTANEOUS at 10:57

## 2024-10-16 RX ADMIN — Medication 100 MCG: at 10:53

## 2024-10-16 RX ADMIN — PROPOFOL 100 MG: 10 INJECTION, EMULSION INTRAVENOUS at 10:10

## 2024-10-16 RX ADMIN — PROPOFOL 20 MG: 10 INJECTION, EMULSION INTRAVENOUS at 10:11

## 2024-10-16 NOTE — ASSESSMENT & PLAN NOTE
Patient became orthostatic forward with physical therapy postoperatively  Hold losartan and metoprolol  Give gentle IV fluids overnight  Repeat orthostatic vital signs tomorrow

## 2024-10-16 NOTE — PHYSICAL THERAPY NOTE
PHYSICAL THERAPY EVAL  Physical Therapy Evaluation    Performed at least 2 patient identifiers during session:  Patient Active Problem List   Diagnosis    Sjogren's syndrome (HCC)    History of pulmonary embolism    Hypertension    Hypothyroidism    CKD (chronic kidney disease), stage II    Hyperlipidemia    Vitamin D deficiency    Osteoporosis    Carpal tunnel syndrome    MIAH (obstructive sleep apnea)    Osteoarthritis    Pure hypercholesterolemia    Restless legs syndrome (RLS)    Spinal stenosis of lumbar region without neurogenic claudication    Supervision of normal first pregnancy    Arthritis of right sacroiliac joint ([facility])    Gastroesophageal reflux disease with esophagitis    Hypercoagulable state ([facility])    Functional diarrhea    History of DVT (deep vein thrombosis)    QT prolongation    Severe aortic stenosis    GERD (gastroesophageal reflux disease)    S/P TAVR (transcatheter aortic valve replacement)    LBBB (left bundle branch block)    Encounter for geriatric assessment    Pain due to total left knee replacement ([facility])       Past Medical History:   Diagnosis Date    Arthritis     CAD (coronary artery disease)     CHF (congestive heart failure) ([facility])     Chronic kidney disease     Chronic pain     s/p spinal stimulator    CKD (chronic kidney disease), stage II     baseline Cr 0.8-1.0    COVID     dx with COVID 9/23 - tested on 9/23/24 -- no s/s at this time and seeing PCP 10/2    Depression     Disease of thyroid gland     GERD (gastroesophageal reflux disease)     Heart murmur 2018    History of DVT (deep vein thrombosis)     multiple, Coumadin    History of pulmonary embolism     multiple, Coumadin    History of squamous cell carcinoma excision     HL (hearing loss) 2014    Hyperlipidemia     Hypertension     Hypothyroidism     IBS (irritable bowel syndrome)     diarrheal type    Knee pain, left     Memory loss 2020     MIAH (obstructive sleep apnea)     no CPAP or BiPAP    Osteoarthritis     Osteoporosis     Pneumonia     RLS (restless legs syndrome)     Severe aortic stenosis     Sjogren's syndrome (HCC)        Past Surgical History:   Procedure Laterality Date    CARDIAC CATHETERIZATION N/A 06/06/2024    Procedure: Cardiac catheterization;  Surgeon: Taj Stafford DO;  Location: [facility];  Service: Cardiology    CARDIAC CATHETERIZATION N/A 06/27/2024    Procedure: Cardiac tavr;  Surgeon: Pascual Koo MD;  Location: [facility];  Service: Cardiology    CARDIAC VALVE REPLACEMENT  6/27/2024    Tavr    COLONOSCOPY  07/18/2016     grade 2 internal hemorrhoids but otherwise normal, pathology negative for microscopic colitis    JOINT REPLACEMENT      TN REPLACE AORTIC VALVE OPENFEMORAL ARTERY APPROACH N/A 06/27/2024    Procedure: REPLACEMENT AORTIC VALVE TRANSCATHETER (TAVR) TRANSFEMORAL W/ 23MM  S3 UR VALVE(ACCESS ON LEFT) LEDY;  Surgeon: JESSICA Pal MD;  Location: [facility];  Service: Cardiac Surgery    REPAIR RECTOCELE      SPINAL STIMULATOR PLACEMENT      9/30/24 Per pt doesnt think she has spinal stimulator - pt reports having bladder stimulator intact    SPINE SURGERY      fusion L1-L5    SQUAMOUS CELL CARCINOMA EXCISION      TOE SURGERY      TONSILLECTOMY AND ADENOIDECTOMY      TOTAL ABDOMINAL HYSTERECTOMY      TOTAL KNEE ARTHROPLASTY Bilateral     TOTAL SHOULDER REPLACEMENT Right     UPPER GASTROINTESTINAL ENDOSCOPY  01/09/2015     reflux esophagitis, gastritis, duodenitis in the bulb, pathology negative for H pylori, Puckett's, EOE    VENTRICULOPERITONEAL SHUNT          10/16/24 1405   PT Last Visit   PT Visit Date 10/16/24   Note Type   Note type Evaluation   Pain Assessment   Pain Assessment Tool 0-10   Pain Score No Pain   Restrictions/Precautions   Weight Bearing Precautions Per Order Yes   LLE Weight Bearing Per Order WBAT   Other Precautions Hard of hearing;Fall Risk;Telemetry   Home Living  "  Type of Home House  ([facility])   Home Layout One level   Bathroom Shower/Tub Tub/shower unit   Bathroom Toilet Standard   Bathroom Equipment Grab bars in shower;Shower chair   Home Equipment Walker   Additional Comments Does not use an AD at baseline   Prior Function   Level of [place] Independent with ADLs;Independent with functional mobility   Lives With Spouse   IADLs Independent with driving;Independent with meal prep;Independent with medication management   General   Family/Caregiver Present Yes   Cognition   Overall Cognitive Status Unable to assess   Arousal/Participation Lethargic   Following Commands Follows one step commands with increased time or repetition   Comments Extremely lethargic, slow response time   Subjective   Subjective \"I'm ok.\"   RLE Assessment   RLE Assessment WFL   Bed Mobility   Sit to Supine 5  Supervision   Additional items HOB elevated   Additional Comments (S)  Received sitting EOB. Seated BP: 95/59 Attempted standing BP-unable to obtain. Returned to seated position 86/50 standing 88/52 walking in place and after 2 steps- unable to obtain standing BP. seated 82/51 supine 88/53   Transfers   Sit to Stand 4  Minimal assistance   Additional items Assist x 1;Armrests;Increased time required;Verbal cues   Stand to Sit 4  Minimal assistance   Additional items Assist x 1;Armrests;Verbal cues   Additional Comments multiple transfers completed during session.   Ambulation/Elevation   Gait pattern Shuffling;Step to;Short stride   Gait Assistance 4  Minimal assist   Additional items Assist x 1;Verbal cues;Tactile cues   Assistive Device Rolling walker   Distance 2ft   Ambulation/Elevation Additional Comments Slow paced gait with very short step length. Unable to obtain BP after amb- ?orthostatic hypotension.   Balance   Static Sitting Fair +   Dynamic Sitting Fair +   Static Standing Fair   Dynamic Standing Fair   Ambulatory Fair   Endurance Deficit   Endurance Deficit Yes   Endurance " Deficit Description Limited by medical status   Activity Tolerance   Activity Tolerance Treatment limited secondary to medical complications (Comment)  (Hypotensive)   Medical Staff Made Aware Wilda AWAD   Nurse Made Aware RNLaurel   Assessment   Prognosis Good   Problem List Decreased strength;Decreased range of motion;Decreased endurance;Impaired balance;Decreased mobility;Decreased cognition;Impaired hearing;Orthopedic restrictions   Assessment Patient is an 88y/o F POD 0 L total knee revision. Patient [facility] with spouse [facility] with a first floor setup. Stairglide to enter. She is independent at baseline without an AD. Current medical status includes telemetry, multiple lines, hypotension, ?orthostatic hypotension, slow response time, fall risk, decreased ROM, strength, balance, endurance and mobility. Refer to note for details on BP. Patient also very slow to respond with slow processing. Required min A for transfers and a short amb distance. Mobility unable to be progressed due to low BP. At this time, recommending level 2 resources pending progress. The patient's AM-PAC Basic Mobility Inpatient Short Form Raw Score is 14. A Raw score of less than or equal to 17 suggests the patient may benefit from discharge to post-acute rehabilitation services. Please also refer to the recommendation of the Physical Therapist for safe discharge planning.   Barriers to Discharge Decreased caregiver support   Goals   Patient Goals None stated   STG Expiration Date 10/19/24   Short Term Goal #1 1. Perform supine<>sit with HOB flat without use of bedrails ind 2. perform sit<>stand transfers mod I 3. Ambulate 150ft with a RW mod I level   PT Treatment Day 0   Plan   Treatment/Interventions Functional transfer training;LE strengthening/ROM;Therapeutic exercise;Endurance training;Patient/family training;Equipment eval/education;Bed mobility;Gait training;Spoke to nursing;OT   PT Frequency Twice a day   Discharge  Recommendation   Rehab Resource Intensity Level, PT II (Moderate Resource Intensity)   Additional Comments Pending progress. Session limited by medical status   AM-PAC Basic Mobility Inpatient   Turning in Flat Bed Without Bedrails 3   Lying on Back to Sitting on Edge of Flat Bed Without Bedrails 3   Moving Bed to Chair 3   Standing Up From Chair Using Arms 3   Walk in Room 1   Climb 3-5 Stairs With Railing 1   Basic Mobility Inpatient Raw Score 14   Basic Mobility Standardized Score 35.55   [facility] Highest Level Of Mobility   -[facility] Goal 4: Move to chair/commode   -[facility] Achieved 5: Stand (1 or more minutes)   End of Consult   Patient Position at End of Consult Supine;All needs within reach     Lauren Marquez, PT             Patient Name: Anneliese Garcia  Today's Date: 10/16/2024

## 2024-10-16 NOTE — ASSESSMENT & PLAN NOTE
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

## 2024-10-16 NOTE — ANESTHESIA PROCEDURE NOTES
Peripheral Block    Patient location during procedure: holding area  Start time: 10/16/2024 9:41 AM  Reason for block: at surgeon's request and post-op pain management  Staffing  Performed by: Sonia Escamilla MD  Authorized by: Sonia Escamilla MD    Preanesthetic Checklist  Completed: patient identified, IV checked, site marked, risks and benefits discussed, surgical consent, monitors and equipment checked, pre-op evaluation and timeout performed  Peripheral Block  Patient position: supine  Prep: ChloraPrep  Patient monitoring: heart rate and continuous pulse oximetry  Block type: Adductor Canal  Laterality: left  Injection technique: single-shot  Procedures: ultrasound guided, Ultrasound guidance required for the procedure to increase accuracy and safety of medication placement and decrease risk of complications.  Ultrasound permanent image saved    Needle  Needle type: Stimuplex   Needle gauge: 20 G  Needle length: 6 in  Needle localization: ultrasound guidance  Needle insertion depth: 3 cm  Assessment  Injection assessment: incremental injection, frequent aspiration, negative aspiration, negative for heart rate change, injected with ease, needle tip visualized at all times, no paresthesia on injection and no symptoms of intraneural/intravenous injection  Paresthesia pain: none  Post-procedure:  site cleaned  patient tolerated the procedure well with no immediate complications

## 2024-10-16 NOTE — PLAN OF CARE
Problem: OCCUPATIONAL THERAPY ADULT  Goal: Performs self-care activities at highest level of function for planned discharge setting.  See evaluation for individualized goals.  Description: Treatment Interventions: ADL retraining, Functional transfer training, Endurance training, Patient/family training, Equipment evaluation/education, Compensatory technique education, Continued evaluation          See flowsheet documentation for full assessment, interventions and recommendations.   Note: Limitation: Decreased ADL status, Decreased Safe judgement during ADL, Decreased endurance, Decreased cognition, Decreased self-care trans, Decreased high-level ADLs  Prognosis: Fair  Assessment: Pt is a 89 y.o. female seen for OT evaluation at [facility], admitted 10/16/2024 w/ Pain due to total left knee replacement (HCC). Pt is POD#0 L TKR revision. OT completed extensive review of pt's medical and social history. Comorbidities affecting pt's functional performance at time of assessment include: h/o PE, HTN, osteoporosis, OA, spinal stenosis, h/o DVT, s/p TAVR. Personal factors affecting pt at time of IE include: limited home support, difficulty performing ADLS, and difficulty performing IADLS . Prior to admission, pt was living with her spouse in a [facility] with a stair glide to enter & a first floor s/u.  Pt was I w/  ADLS and w/ IADLS, (+) drove, & required use of no DME/AD PTA. Upon evaluation: Pt requires S for bed mobility, Min Ax1 for functional transfers, Min Ax1 for functional mobility, S for UB ADLs and Min A for LB ADLS 2* the following deficits impacting occupational performance: weakness, decreased strength, decreased balance, decreased tolerance, increased pain, and orthopedic restrictions. Full objective findings from OT assessment regarding body systems outlined above. Pt to benefit from continued skilled OT tx while in the hospital to address deficits as defined above and maximize level of functional  independence w/ ADL's and functional mobility. Occupational Performance areas to address include: bathing/shower, toilet hygiene, dressing, functional mobility, community mobility, and clothing management. Based on findings, pt is of high complexity. The patient's raw score on the -PAC Daily Activity inpatient short form is 19, standardized score is 40.22, greater than 39.4. Patients at this level are likely to benefit from DC to home. However, please refer to therapist recommendation for discharge planning given other factors that may influence destination. At this time, OT recommendations at time of discharge are DC with Level II - Moderate Rehab Resource Intensity pending progress as evaluation was limited by medical complications (hypotension).     Rehab Resource Intensity Level, OT: II (Moderate Resource Intensity) (Pending progress - recommendation limited at this time due to medical complications (hypotension))

## 2024-10-16 NOTE — PLAN OF CARE
Problem: PHYSICAL THERAPY ADULT  Goal: Performs mobility at highest level of function for planned discharge setting.  See evaluation for individualized goals.  Description: Treatment/Interventions: Functional transfer training, LE strengthening/ROM, Therapeutic exercise, Endurance training, Patient/family training, Equipment eval/education, Bed mobility, Gait training, Spoke to nursing, OT          See flowsheet documentation for full assessment, interventions and recommendations.  Note: Prognosis: Good  Problem List: Decreased strength, Decreased range of motion, Decreased endurance, Impaired balance, Decreased mobility, Decreased cognition, Impaired hearing, Orthopedic restrictions  Assessment: Patient is an 88y/o F POD 0 L total knee revision. Patient resides with spouse in a [facility] with a first floor setup. Stairglide to enter. She is independent at baseline without an AD. Current medical status includes telemetry, multiple lines, hypotension, ?orthostatic hypotension, slow response time, fall risk, decreased ROM, strength, balance, endurance and mobility. Refer to note for details on BP. Patient also very slow to respond with slow processing. Required min A for transfers and a short amb distance. Mobility unable to be progressed due to low BP. At this time, recommending level 2 resources pending progress. The patient's AM-[facility] Basic Mobility Inpatient Short Form Raw Score is 14. A Raw score of less than or equal to 17 suggests the patient may benefit from discharge to post-acute rehabilitation services. Please also refer to the recommendation of the Physical Therapist for safe discharge planning.  Barriers to Discharge: Decreased caregiver support     Rehab Resource Intensity Level, PT: II (Moderate Resource Intensity)    See flowsheet documentation for full assessment.

## 2024-10-16 NOTE — OP NOTE
OPERATIVE REPORT  PATIENT NAME: Anneliese Garcia  : 1935  MRN: [id number]  Pt Location:   OR ROOM 02    Surgery Date: 10/16/2024    Surgeons and Role:     * Isaac Dueñas DO - Primary     * LEXI Hagan-VALERIE - Assisting     * Steven Barnett MD - Assisting      * Lew Connor OT-VALERIE - Assisting     Preop Diagnosis:  Pain due to total left knee replacement, initial encounter ([facility]) [T84.84XA, Z96.652]  Mechanical loosening of prosthetic knee, initial encounter  ([facility]) [T84.038A, Z96.659]    Post-Op Diagnosis Codes:     * Pain due to total left knee replacement, initial encounter ([facility]) [T84.84XA, Z96.652]     * Mechanical loosening of prosthetic knee, initial encounter  ([facility]) [T84.038A, Z96.659]    Procedure(s):  Left - ARTHROPLASTY KNEE TOTAL REVISION BOTH COMPONENTS    Specimens:  ID Type Source Tests Collected by Time Destination   A : #1 Tissue Joint, Left Knee ANAEROBIC CULTURE AND GRAM STAIN, FUNGAL CULTURE, CULTURE, TISSUE AND GRAM STAIN Isaac Dueñas,  10/16/2024 1044    B : #2 Tissue Joint, Left Knee ANAEROBIC CULTURE AND GRAM STAIN, FUNGAL CULTURE, CULTURE, TISSUE AND GRAM STAIN Isaac Dueñas,  10/16/2024 1044    C : #3 Tissue Joint, Left Knee ANAEROBIC CULTURE AND GRAM STAIN, FUNGAL CULTURE, CULTURE, TISSUE AND GRAM STAIN Isaac Dueñas,  10/16/2024 1044        Estimated Blood Loss:   50 cc    Drains:  * No LDAs found *    Anesthesia Type:   GETA     Operative Indications:  Pain due to total left knee replacement, initial encounter ([facility]) [T84.84XA, Z96.652]  Mechanical loosening of prosthetic knee, initial encounter  ([facility]) [T84.038A, Z96.659]    89 -year-old female with 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 The patient's pain is significantly affecting activities of daily living. She is having difficulty walking due to her limitations from the left TKA loosening. She has normal inflammatory makers. She has been bridged with  lovenox due to history of DVT/PE. The patient has elected to proceed with left TKA revision. Risks and benefits of surgery to include but not limited to bleeding, infection, damage to surrounding structures, hardware failure, instability, fracture, dislocation, leg length inequality, need for further surgery, continued pain, stiffness, blood clots, stroke, and heart attack was discussed with the patient.     Operative Findings:  Gross loosening of tibial component with subsidence  Pre-op ROM -3-120 with V/V instability   Post-op ROM 0-130+ calf to thigh gravity-assisted flexion    Implant Name Type Inv. Item Serial No.  Lot No. LRB No. Used Action   SLEEVE TIB REVISION 37MM ATTUNE HALF COAT - [id number]  SLEEVE TIB REVISION 37MM ATTUNE HALF COAT  DEPUY [id number] Left 1 Implanted   KNEE SYSTEM REV PRESSFIT STEM 16 X 60MM ATTUNE - [id number]  KNEE SYSTEM REV PRESSFIT STEM 16 X 60MM ATTUNE  DEPUY [id number] Left 1 Implanted   BASEPLATE TIB REVISION SZ 3 ROTPLT ATTUNE - [id number]  BASEPLATE TIB REVISION SZ 3 ROTPLT ATTUNE  DEPUY [id number] Left 1 Implanted   CEMENT BONE SMART SET GRAY MED VISC - [id number]  CEMENT BONE SMART SET GRAY MED VISC  DEPUY [id number] Left 1 Implanted   CEMENT BONE SMART SET GRAY MED VISC - [id number]  CEMENT BONE SMART SET GRAY MED VISC  DEPUY [id number] Left 1 Implanted   AUG DSTL FEM SZ 4 8MM ATTUNE - [id number]  AUG DSTL FEM SZ 4 8MM ATTUNE  DEPUY [id number] Left 1 Implanted   KNEE SYSTEM REV PRESSFIT STEM 20 X 110MM ATTUNE - [id number]  KNEE SYSTEM REV PRESSFIT STEM 20 X 110MM ATTUNE  DEPUY [id number] Left 1 Implanted   AUG DSTL FEM SZ 4 4MM ATTUNE - [id number]  AUG DSTL FEM SZ 4 4MM ATTUNE  DEPUY [id number] Left 1 Implanted   KNEE SYSTEM REV CRS RP INSRT SZ 4 6MM ATTUNE - [id number]  KNEE SYSTEM REV CRS RP INSRT SZ 4 6MM ATTUNE  DEPUY [id number] Left 1 Implanted     Complications:   None    Knee Technique: Suture (direct) Repair  Knee Approach: Medial Parapatellar    Chronic Narcotic Use:   No      Procedure and Technique:  Patient was seen in the preoperative holding area.  Informed consent was confirmed and all questions were answered. Operative site was confirmed and marked. Patient was taken to the operating room and transferred to the operating room table. General anesthesia was performed. The patient was then placed supine and all bony prominences were well-padded. Left lower extremity was prepped and draped in usual sterile fashion with chlorhexidine scrub.  Patient was given perioperative antibiotics prior to incision and SCDs were placed on the non-operative leg.  A formal time-out was performed identifying the patient and confirmed operative site.  The knee was exsanguinated and a pneumatic tourniquet was inflated at 250 mmHg.  The patient's previous anterior knee incision was resected in its entirety with scar down to the level of the extensor mechanism.  A medial flap was created along with a small lateral flap.  A medial parapatellar approach was utilized to enter the knee.  Upon performing the arthrotomy there was an effusion of straw-colored fluid.  We performed a medial peel and extensive incision and drainage of the knee down to and including bone.  We resected scar from the medial and lateral extensor mechanism to establish the gutters.  We then inspected the implants and found the tibia to be grossly loose with osteolysis and subsidence. The PS polyethylene was removed without complication.  We then proceeded to evaluate the femoral component. The femoral component was fixed.  A combination of Leatha osteotomes were used to loosen the bone cement interface. The femur was removed without complication.     At this time we turned our attention back to the tibial component.  Retractors were carefully placed around the knee and the tibial component was removed easily as it was grossly loose. Three soft tissue specimens were obtained for culture from the synovium, behind the femoral  component and to from beneath the tibial component.  At this time we started to remove retained cement at the tibia and femoral bone surfaces.  This was removed with a combination of cement splitters, osteotomes, back scratcher's and pituitary rongeur.  The tibia had medial tibial bone loss due to the subsidence of the tibial component.  Once all cement was removed we reestablished the tibial canal.  We reamed up to a size 16 reamer with excellent chatter.  At this time the tibia was broached sequentially up to a size 37 sleeve with excellent rotational stability.  We then trialed the tibia and found a size 3 attune revision tibia to be appropriate size.  This was locked into place rotationally and the keel was punched.     At this time our attention was drawn back to the femur.  The canal was reestablished and femur was reamed up to a size 20 with excellent chatter.  At this time a cut through trial was assembled with a size 4 attune revision femur. The femoral box was then cut. This was impacted into place and rotation was confirmed with a combination of the transepicondylar axis and Whitesides line. The femur required an 8 mm distal lateral and 4 mm distal medial augment. At this time the knee was trialed with semiconstrained poly's and found to have excellent stability and full motion with a 6mm attune revision poly to restore the joint line using the landmarks of patella, meniscal scar and fibular head.  At this time we evaluated the patellar component and found to be stable with no signs of wear. The knee was then taken through motion and found to have excellent stability.     The trials were then removed from the knee.  At this time a thorough debridement of all devitalized tissue and scar was performed around the knee.  The knee was irrigated with Irrisept solution and this was allowed to sit for 3 minutes. The knee was then irrigated with normal saline solution while the final components were assembled on  the back table.  The femoral and tibial components were impacted and tightened to specification.  The bone surfaces were dried and the tibial component was press-fit and cemented at the epiphysis.  Peripheral cement was removed.  Next the femur was impacted into place also cementing at the epiphysis.  Excess cement was removed.  At this time the real 6 mm revision poly was placed.  Knee was held in extension. The tourniquet was released at 38 minutes and all bleeders were coagulated.  The knee was irrigated with the remainder of the 3 L of normal saline solution.  The joint local was injected in the posterior capsule and around the tissues of the knee.  The knee was taken through a final range of motion and found to have excellent stability and patellar tracking.  All instrument and sponge counts were correct x2. The arthrotomy was closed with #1 Stratafix suture.  Subcutaneous tissues were closed with 2-0 Vicryl.  The skin was closed with 3-0 Stratafix followed by Dermabond.  A sterile Mepilex was placed along with a thigh foot Ace wrap.  Patient was awoken from anesthesia and taken to recovery room in stable condition.        89F s/p L TKA revision for aseptic loosening 10/16  - multi-modal pain control  - ancef x 24 hrs, doxycycline x 2 weeks pending culture results   - DVT ppx: restart coumadin with lovenox bridge until therapeutic   - PT/OT  - WBAT  - ROM as tolerated, pillow/blankets under achilles not behind knee while in bed  - f/u I/O cultures  - f/u 10-14 days       I was present for all critical portions of the procedure. and A physician assistant was required during the procedure for retraction, tissue handling, dissection and suturing.    Patient Disposition:  PACU       SIGNATURE: Isaac Dueñas DO  DATE: October 16, 2024  TIME: 11:31 AM

## 2024-10-16 NOTE — CONSULTS
Consultation - Hospitalist   Name: Anneliese Garcia 89 y.o. female I MRN: [id number]  Unit/Bed#: -01 I Date of Admission: 10/16/2024   Date of Service: 10/16/2024 I Hospital Day: 0   Inpatient consult to Internal Medicine  Consult performed by: Davis Gannon PA-C  Consult ordered by: Roxana Dale PA-C        Physician Requesting Evaluation: Isaac Dueñas DO   Reason for Evaluation / Principal Problem: Medical management    Assessment & Plan  Pain due to total left knee replacement (HCC)  Presents with aseptic loosening of left tibial component of prosthetic knee with failure  She is status post left knee arthroplasty, total knee revision  Management per primary team, orthopedic surgeon  Pain control  PT and OT recommending level 2 services, will need rehab placement per case management  Incentive spirometry, DVT prophylaxis ordered  History of pulmonary embolism  History of PE maintained on Coumadin  This is on hold prior to surgery, okay to resume tonight  Trend INR for goal 2.0-3.0  Orthostatic hypotension  Patient became orthostatic forward with physical therapy postoperatively  Hold losartan and metoprolol  Give gentle IV fluids overnight  Repeat orthostatic vital signs tomorrow  Hypertension  Home losartan and metoprolol on hold due to hypotension  Monitor BP trend  CKD (chronic kidney disease), stage II  Lab Results   Component Value Date    EGFR 62 08/21/2024    EGFR 60 07/18/2024    EGFR 42 06/29/2024    CREATININE 0.85 08/21/2024    CREATININE 0.87 07/18/2024    CREATININE 1.17 06/29/2024   Baseline creatinine 0.8-1.0  Repeat BMP tomorrow  S/P TAVR (transcatheter aortic valve replacement)  Noted history status post TAVR  Outpatient follow-up with cardiology        VTE Pharmacologic Prophylaxis: VTE Score: 5 High Risk (Score >/= 5) - Pharmacological DVT Prophylaxis Ordered: warfarin (Coumadin). Sequential Compression Devices Ordered.  Code Status: Level 1 - Full Code   Discussion with  family: Patient declined call to .     Anticipated Length of Stay: Patient will be admitted on an inpatient basis with an anticipated length of stay of greater than 2 midnights secondary to pain due to left knee arthroplasty, needing PT and OT as well as rehab placement.    History of Present Illness   Chief Complaint: Left knee pain    Anneliese Garcia is a 89 y.o. female with a PMH of history of pulmonary emboli on Coumadin, hypertension, CKD, TAVR status who presents with elective left knee arthroplasty with orthopedic surgery.  Patient was orthostatic postoperatively, blood pressure is now improved..    Review of Systems   Constitutional:  Negative for activity change, appetite change, chills, fatigue and fever.   HENT:  Negative for congestion, rhinorrhea, sinus pressure and sore throat.    Eyes:  Negative for photophobia, pain and visual disturbance.   Respiratory:  Negative for cough, shortness of breath and wheezing.    Cardiovascular:  Negative for chest pain, palpitations and leg swelling.   Gastrointestinal:  Negative for abdominal distention, abdominal pain, constipation, diarrhea, nausea and vomiting.   Endocrine: Negative for cold intolerance, heat intolerance, polydipsia and polyuria.   Genitourinary:  Negative for difficulty urinating, dysuria, flank pain, frequency and hematuria.   Musculoskeletal:  Positive for arthralgias. Negative for back pain and joint swelling.   Skin:  Negative for color change, pallor and rash.   Allergic/Immunologic: Negative.    Neurological:  Negative for dizziness, syncope, weakness, light-headedness and headaches.   Hematological: Negative.    Psychiatric/Behavioral: Negative.         Historical Information   Past Medical History:   Diagnosis Date    Arthritis     CAD (coronary artery disease)     CHF (congestive heart failure) (HCC)     Chronic kidney disease     Chronic pain     s/p spinal stimulator    CKD (chronic kidney disease), stage II      baseline Cr 0.8-1.0    COVID     dx with COVID 9/23 - tested on 9/23/24 -- no s/s at this time and seeing PCP 10/2    Depression     Disease of thyroid gland     GERD (gastroesophageal reflux disease)     Heart murmur 2018    History of DVT (deep vein thrombosis)     multiple, Coumadin    History of pulmonary embolism     multiple, Coumadin    History of squamous cell carcinoma excision     HL (hearing loss) 2014    Hyperlipidemia     Hypertension     Hypothyroidism     IBS (irritable bowel syndrome)     diarrheal type    Knee pain, left     Memory loss 2020    MIAH (obstructive sleep apnea)     no CPAP or BiPAP    Osteoarthritis     Osteoporosis     Pneumonia     RLS (restless legs syndrome)     Severe aortic stenosis     Sjogren's syndrome (HCC)      Past Surgical History:   Procedure Laterality Date    CARDIAC CATHETERIZATION N/A 06/06/2024    Procedure: Cardiac catheterization;  Surgeon: Taj Stafford DO;  Location: [facility];  Service: Cardiology    CARDIAC CATHETERIZATION N/A 06/27/2024    Procedure: Cardiac tavr;  Surgeon: Pascual Koo MD;  Location: [facility];  Service: Cardiology    CARDIAC VALVE REPLACEMENT  6/27/2024    Tavr    COLONOSCOPY  07/18/2016     grade 2 internal hemorrhoids but otherwise normal, pathology negative for microscopic colitis    JOINT REPLACEMENT      [place] REPLACE AORTIC VALVE OPENFEMORAL ARTERY APPROACH N/A 06/27/2024    Procedure: REPLACEMENT AORTIC VALVE TRANSCATHETER (TAVR) TRANSFEMORAL W/ 23MM  S3 UR VALVE(ACCESS ON LEFT) LEDY;  Surgeon: JESSICA Pal MD;  Location: [facility];  Service: Cardiac Surgery    REPAIR RECTOCELE      SPINAL STIMULATOR PLACEMENT      9/30/24 Per pt doesnt think she has spinal stimulator - pt reports having bladder stimulator intact    SPINE SURGERY      fusion L1-L5    SQUAMOUS CELL CARCINOMA EXCISION      TOE SURGERY      TONSILLECTOMY AND ADENOIDECTOMY      TOTAL ABDOMINAL HYSTERECTOMY      TOTAL KNEE ARTHROPLASTY Bilateral      TOTAL SHOULDER REPLACEMENT Right     UPPER GASTROINTESTINAL ENDOSCOPY  01/09/2015     reflux esophagitis, gastritis, duodenitis in the bulb, pathology negative for H pylori, Puckett's, EOE    VENTRICULOPERITONEAL SHUNT       Social History     Tobacco Use    Smoking status: Never    Smokeless tobacco: Never    Tobacco comments:     Never smoked   Vaping Use    Vaping status: Never Used   Substance and Sexual Activity    Alcohol use: No     Comment: Denies anyuse of alcohol    Drug use: No     Comment: Denies any drug use per pt    Sexual activity: Not Currently     Partners: Male     Birth control/protection: Post-menopausal, Male Sterilization     Comment: Not active at this time     E-Cigarette/Vaping    E-Cigarette Use Never User     Comments Denies any use per pt      E-Cigarette/Vaping Substances     Family history non-contributory  Social History:  Marital Status: /Civil Union   Occupation: retired  Patient Pre-hospital Living Situation: Home  Patient Pre-hospital Level of Mobility: unable to be assessed at time of evaluation  Patient Pre-hospital Diet Restrictions: none    Meds/Allergies   I have reviewed home medications with patient personally.  Prior to Admission medications    Medication Sig Start Date End Date Taking? Authorizing Provider   acetaminophen (TYLENOL) 500 mg tablet Take 2 tablets (1,000 mg total) by mouth every 8 (eight) hours 10/16/24  Yes Roxana Dale PA-C   aspirin 81 mg chewable tablet Chew 1 tablet (81 mg total) daily  Patient taking differently: Chew 81 mg daily 9/30/24 per pt managed by PCP Dr Laboy - instructed to verify use of medication with 10/2 appt for use prior to surgery 6/30/24 9/30/24 Yes Tamiko Porras PA-C   Coenzyme Q10 (CO Q 10 PO) Take by mouth in the morning   Yes Historical Provider, MD   cycloSPORINE (RESTASIS) 0.05 % ophthalmic emulsion Administer 1 drop to both eyes 2 (two) times a day.   Yes Historical Provider, MD   doxycycline monohydrate (MONODOX)  100 mg capsule Take 1 capsule (100 mg total) by mouth 2 (two) times a day for 14 days 10/16/24 10/30/24 Yes Roxana Dale PA-C   enoxaparin (Lovenox) 100 mg/mL Inject 0.5 mL (50 mg total) under the skin every 12 (twelve) hours for 4 days 10/9/24 10/16/24 Yes Isaac Dueñas DO   folic acid (FOLVITE) 1 mg tablet TAKE 1 TABLET BY MOUTH EVERY DAY  Patient taking differently: Take 1,000 mcg by mouth daily Surgeon office prescribed 9/9/24  Yes Roxana Dale PA-C   hydroxychloroquine (PLAQUENIL) 200 mg tablet Take 200 mg by mouth in the morning 7/20/24  Yes Historical Provider, MD   levothyroxine 112 mcg tablet TAKE 1 TABLET BY MOUTH EVERY DAY 7/19/24  Yes CATHERINE Brady   losartan (COZAAR) 50 mg tablet TAKE 1 TABLET BY MOUTH EVERY DAY 7/19/24  Yes CATHERINE Brady   metoprolol tartrate (LOPRESSOR) 25 mg tablet Take 1 tablet (25 mg total) by mouth every 12 (twelve) hours 6/29/24 10/16/24 Yes Tamiko Porras PA-C   Multiple Vitamins-Minerals (multivitamin with minerals) tablet Take 1 tablet by mouth daily 8/16/24  Yes Roxana Dale PA-C   Multiple Vitamins-Minerals (PRESERVISION AREDS PO) Take by mouth   Yes Historical Provider, MD   NON FORMULARY 99 mg in the morning Potassium - takes two tabs daily ( OTC supplement)   Yes Historical Provider, MD   omeprazole (PriLOSEC) 20 mg delayed release capsule Take 20 mg by mouth daily   Yes Historical Provider, MD   ondansetron (ZOFRAN-ODT) 4 mg disintegrating tablet Take 1 tablet (4 mg total) by mouth every 6 (six) hours as needed for nausea or vomiting 10/16/24  Yes Roxana Dale PA-C   oxyCODONE (Roxicodone) 5 immediate release tablet Take 1 tablet (5 mg total) by mouth every 4 (four) hours as needed for moderate pain or severe pain for up to 10 days Max Daily Amount: 30 mg 10/16/24 10/26/24 Yes Roxana Dale PA-C   pilocarpine (SALAGEN) 5 mg tablet Take 5 mg by mouth 4 (four) times a day 12/15/21  Yes Historical Provider, MD   polyvinyl alcohol  (LIQUIFILM TEARS) 1.4 % ophthalmic solution Administer 1 drop to both eyes as needed for dry eyes   Yes Historical Provider, MD   senna-docusate sodium (SENOKOT S) 8.6-50 mg per tablet Take 1 tablet by mouth daily 10/16/24  Yes Roxana Dale PA-C   simvastatin (ZOCOR) 20 mg tablet Take 20 mg by mouth daily at bedtime.   Yes Historical Provider, MD   warfarin (COUMADIN) 5 mg tablet Take 1 tablet (5 mg total) by mouth daily Do not start before March 20, 2023.  Patient taking differently: Take 5 mg by mouth daily Friday takes 6 mg   Wednesday/Saturday/Sunday 5 mg  Monday and Tuesday does not take it.   9/30/24 Per pt - this medication is managed by PCP 3/20/23  Yes Yuliana Mott MD   acetaminophen (TYLENOL) 325 mg tablet Take 2 tablets (650 mg total) by mouth every 4 (four) hours as needed for fever, mild pain, moderate pain or headaches (temperature greater than 101 F)  Patient not taking: Reported on 10/16/2024 6/29/24   Tamiko Porras PA-C   ascorbic acid (VITAMIN C) 500 MG tablet Take 1 tablet (500 mg total) by mouth 2 (two) times a day 8/16/24   Roxana Dale PA-C   Cholecalciferol (VITAMIN D) 2000 UNITS tablet Take 3,000 Units by mouth daily 9/30/24 prescribed by Dr Pal  Patient not taking: Reported on 10/10/2024    Historical Provider, MD   clotrimazole-betamethasone (LOTRISONE) 1-0.05 % cream APPLY TOPICALLY 2 TIMES PER DAY  Patient not taking: Reported on 10/10/2024 7/29/24   C William Riedel, DO   Diclofenac Sodium (VOLTAREN) 1 %  11/1/21   Historical Provider, MD   diphenoxylate-atropine (LOMOTIL) 2.5-0.025 mg per tablet TAKE 1 TABLET BY MOUTH 4 (FOUR) TIMES A DAY AS NEEDED FOR DIARRHEA  Patient not taking: Reported on 10/10/2024 7/8/21   Lew Art,    hyoscyamine (LEVSIN/SL) 0.125 mg SL tablet Take 1 tablet (0.125 mg total) by mouth every 4 (four) hours as needed for cramping  Patient not taking: Reported on 10/16/2024 9/10/24   Lew Art, DO   MAGNESIUM CITRATE PO Take by mouth  in the morning  Patient not taking: Reported on 10/10/2024    Historical Provider, MD     Allergies   Allergen Reactions    Sulfa Antibiotics Hives       Objective :  Temp:  [97.4 °F (36.3 °C)-97.5 °F (36.4 °C)] 97.5 °F (36.4 °C)  HR:  [65-84] 66  BP: ()/(50-71) 119/71  Resp:  [13-16] 16  SpO2:  [90 %-100 %] 93 %  O2 Device: None (Room air)  Nasal Cannula O2 Flow Rate (L/min):  [2 L/min] 2 L/min    Physical Exam  Vitals and nursing note reviewed.   Constitutional:       General: She is not in acute distress.     Appearance: Normal appearance. She is not ill-appearing.   HENT:      Head: Normocephalic and atraumatic.      Mouth/Throat:      Mouth: Mucous membranes are moist.   Eyes:      General: No scleral icterus.        Right eye: No discharge.         Left eye: No discharge.      Pupils: Pupils are equal, round, and reactive to light.   Cardiovascular:      Rate and Rhythm: Normal rate and regular rhythm.      Heart sounds: Murmur heard.      No friction rub. No gallop.   Pulmonary:      Effort: No respiratory distress.      Breath sounds: No wheezing, rhonchi or rales.   Abdominal:      General: Bowel sounds are normal. There is no distension.      Palpations: Abdomen is soft.      Tenderness: There is no abdominal tenderness. There is no guarding or rebound.   Musculoskeletal:         General: No swelling or deformity.      Cervical back: Neck supple.      Right lower leg: No edema.      Left lower leg: No edema.   Skin:     General: Skin is warm and dry.      Capillary Refill: Capillary refill takes less than 2 seconds.      Coloration: Skin is not jaundiced or pale.      Findings: No erythema or rash.   Neurological:      General: No focal deficit present.      Mental Status: She is alert and oriented to person, place, and time. Mental status is at baseline.      Cranial Nerves: No cranial nerve deficit.      Sensory: No sensory deficit.   Psychiatric:         Mood and Affect: Mood normal.          Behavior: Behavior normal.          Lines/Drains:            Lab Results: I have reviewed the following results:          Results from last 7 days   Lab Units 10/16/24  0821   INR  1.17         Lab Results   Component Value Date    HGBA1C 5.0 08/21/2024    HGBA1C 5.2 03/17/2023           Imaging Results Review: No pertinent imaging studies reviewed.  Other Study Results Review: No additional pertinent studies reviewed.    Administrative Statements       ** Please Note: This note has been constructed using a voice recognition system. **

## 2024-10-16 NOTE — ANESTHESIA POSTPROCEDURE EVALUATION
Post-Op Assessment Note    CV Status:  Stable  Pain Score: 0    Pain management: adequate       Mental Status:  Alert and awake   Hydration Status:  Euvolemic   PONV Controlled:  Controlled   Airway Patency:  Patent  Airway: intubated     Post Op Vitals Reviewed: Yes    No anethesia notable event occurred.    Staff: CRNA           Last Filed PACU Vitals:  Vitals Value Taken Time   Temp 97.5    Pulse 80 10/16/24 1155   /57 10/16/24 1153   Resp 13 10/16/24 1155   SpO2 100 % 10/16/24 1155   Vitals shown include unfiled device data.    Modified Rere:  No data recorded

## 2024-10-16 NOTE — ASSESSMENT & PLAN NOTE
History of PE maintained on Coumadin  This is on hold prior to surgery, okay to resume tonight  Trend INR for goal 2.0-3.0

## 2024-10-16 NOTE — OCCUPATIONAL THERAPY NOTE
Occupational Therapy Evaluation     Patient Name: Anneliese Garcia  Today's Date: 10/16/2024  Problem List  Principal Problem:    Pain due to total left knee replacement (HCC)    Past Medical History  Past Medical History:   Diagnosis Date    Arthritis     CAD (coronary artery disease)     CHF (congestive heart failure) (HCC)     Chronic kidney disease     Chronic pain     s/p spinal stimulator    CKD (chronic kidney disease), stage II     baseline Cr 0.8-1.0    COVID     dx with COVID 9/23 - tested on 9/23/24 -- no s/s at this time and seeing PCP 10/2    Depression     Disease of thyroid gland     GERD (gastroesophageal reflux disease)     Heart murmur 2018    History of DVT (deep vein thrombosis)     multiple, Coumadin    History of pulmonary embolism     multiple, Coumadin    History of squamous cell carcinoma excision     HL (hearing loss) 2014    Hyperlipidemia     Hypertension     Hypothyroidism     IBS (irritable bowel syndrome)     diarrheal type    Knee pain, left     Memory loss 2020    MIAH (obstructive sleep apnea)     no CPAP or BiPAP    Osteoarthritis     Osteoporosis     Pneumonia     RLS (restless legs syndrome)     Severe aortic stenosis     Sjogren's syndrome (HCC)      Past Surgical History  Past Surgical History:   Procedure Laterality Date    CARDIAC CATHETERIZATION N/A 06/06/2024    Procedure: Cardiac catheterization;  Surgeon: Taj Stafford DO;  Location: [facility];  Service: Cardiology    CARDIAC CATHETERIZATION N/A 06/27/2024    Procedure: Cardiac tavr;  Surgeon: Pascual Koo MD;  Location: [facility];  Service: Cardiology    CARDIAC VALVE REPLACEMENT  6/27/2024    Tavr    COLONOSCOPY  07/18/2016     grade 2 internal hemorrhoids but otherwise normal, pathology negative for microscopic colitis    JOINT REPLACEMENT      OH REPLACE AORTIC VALVE OPENFEMORAL ARTERY APPROACH N/A 06/27/2024    Procedure: REPLACEMENT AORTIC VALVE TRANSCATHETER (TAVR) TRANSFEMORAL W/ 23MM  S3  UR VALVE(ACCESS ON LEFT) LEDY;  Surgeon: JESSICA Pal MD;  Location: [facility];  Service: Cardiac Surgery    REPAIR RECTOCELE      SPINAL STIMULATOR PLACEMENT      9/30/24 Per pt doesnt think she has spinal stimulator - pt reports having bladder stimulator intact    SPINE SURGERY      fusion L1-L5    SQUAMOUS CELL CARCINOMA EXCISION      TOE SURGERY      TONSILLECTOMY AND ADENOIDECTOMY      TOTAL ABDOMINAL HYSTERECTOMY      TOTAL KNEE ARTHROPLASTY Bilateral     TOTAL SHOULDER REPLACEMENT Right     UPPER GASTROINTESTINAL ENDOSCOPY  01/09/2015     reflux esophagitis, gastritis, duodenitis in the bulb, pathology negative for H pylori, Puckett's, EOE    VENTRICULOPERITONEAL SHUNT             10/16/24 1404   OT Last Visit   OT Visit Date 10/16/24   Note Type   Note type Evaluation   Pain Assessment   Pain Assessment Tool 0-10   Pain Score No Pain   Restrictions/Precautions   Weight Bearing Precautions Per Order Yes   LLE Weight Bearing Per Order WBAT   Other Precautions Cognitive;Chair Alarm;Bed Alarm;WBS;Fall Risk;Hard of hearing   Home Living   Type of Home House   Home Layout Two level;Able to live on main level with bedroom/bathroom;Performs ADLs on one level;Stairs to enter with rails  ([place] - master on first floor, stair glide to enter)   Bathroom Shower/Tub Tub/shower unit   Bathroom Toilet Standard   Bathroom Equipment Grab bars in shower;Shower chair   Home Equipment Walker;Cane   Additional Comments Does not use DME   Prior Function   Level of [place] Independent with ADLs;Independent with functional mobility;Independent with IADLS   Lives With Spouse   Receives Help From Family   IADLs Independent with driving;Independent with meal prep;Independent with medication management   Falls in the last 6 months 0   Vocational Retired   General   Additional Pertinent History POD#0 L TKR revision   Family/Caregiver Present Yes   Additional General Comments Spouse present   Subjective   Subjective Pt  received sitting EOB, reporting no pain. Pt appears lethargic, slow to respond during entirety of session   ADL   Eating Assistance 7  Independent   Grooming Assistance 5  Supervision/Setup   UB Bathing Assistance 5  Supervision/Setup   LB Bathing Assistance 4  Minimal Assistance   UB Dressing Assistance 5  Supervision/Setup   LB Dressing Assistance 4  Minimal Assistance   Toileting Assistance  4  Minimal Assistance   Bed Mobility   Supine to Sit Unable to assess  (Sitting EOB)   Sit to Supine 5  Supervision   Transfers   Sit to Stand 4  Minimal assistance   Additional items Assist x 1;Increased time required;Verbal cues;Armrests   Stand to Sit 4  Minimal assistance   Additional items Assist x 1;Increased time required;Verbal cues;Armrests   Additional Comments Min A for static standing balance - pt hypotensive during session. BP 80's/50's entirety of session - BP's unable to be determined in standing, likely too hypotensive. Pt lethargic & requiring increased time to respond/follow commands. Pt noted to begin to slump when standing but denying symptoms   Functional Mobility   Functional Mobility 4  Minimal assistance   Additional Comments x1 - 2ft forward & backward   Additional items Rolling walker   Balance   Static Sitting Good   Dynamic Sitting Fair   Static Standing Fair   Dynamic Standing Fair   Ambulatory Fair   Activity Tolerance   Activity Tolerance Treatment limited secondary to medical complications (Comment)  (Hypotension)   Medical Staff Made Aware PT Portia   Nurse Made Aware SANDIP MUNGUIA Assessment   RUE Assessment WFL   LUE Assessment   LUE Assessment WFL   Cognition   Overall Cognitive Status Unable to assess   Arousal/Participation Cooperative;Lethargic   Attention Attends with cues to redirect   Orientation Level Oriented to person;Oriented to place   Memory Decreased recall of recent events;Decreased recall of precautions   Following Commands Follows one step commands with increased time or  repetition   Comments Lethargic, increased time to respond   Assessment   Limitation Decreased ADL status;Decreased Safe judgement during ADL;Decreased endurance;Decreased cognition;Decreased self-care trans;Decreased high-level ADLs   Prognosis Fair   Assessment Pt is a 89 y.o. female seen for OT evaluation at [facility], admitted 10/16/2024 w/ Pain due to total left knee replacement (HCC). Pt is POD#0 L TKR revision. OT completed extensive review of pt's medical and social history. Comorbidities affecting pt's functional performance at time of assessment include: h/o PE, HTN, osteoporosis, OA, spinal stenosis, h/o DVT, s/p TAVR. Personal factors affecting pt at time of IE include: limited home support, difficulty performing ADLS, and difficulty performing IADLS . Prior to admission, pt was living with her spouse in a [facility] with a stair glide to enter & a first floor s/u.  Pt was I w/  ADLS and w/ IADLS, (+) drove, & required use of no DME/AD PTA. Upon evaluation: Pt requires S for bed mobility, Min Ax1 for functional transfers, Min Ax1 for functional mobility, S for UB ADLs and Min A for LB ADLS 2* the following deficits impacting occupational performance: weakness, decreased strength, decreased balance, decreased tolerance, increased pain, and orthopedic restrictions. Full objective findings from OT assessment regarding body systems outlined above. Pt to benefit from continued skilled OT tx while in the hospital to address deficits as defined above and maximize level of functional independence w/ ADL's and functional mobility. Occupational Performance areas to address include: bathing/shower, toilet hygiene, dressing, functional mobility, community mobility, and clothing management. Based on findings, pt is of high complexity. The patient's raw score on the AM-PAC Daily Activity inpatient short form is 19, standardized score is 40.22, greater than 39.4. Patients at this level are likely to benefit  from DC to home. However, please refer to therapist recommendation for discharge planning given other factors that may influence destination. At this time, OT recommendations at time of discharge are DC with Level II - Moderate Rehab Resource Intensity pending progress as evaluation was limited by medical complications (hypotension).   Goals   Patient Goals Pt wants to go home today   Plan   Treatment Interventions ADL retraining;Functional transfer training;Endurance training;Patient/family training;Equipment evaluation/education;Compensatory technique education;Continued evaluation   Goal Expiration Date 10/26/24   OT Treatment Day 0   OT Frequency 3-5x/wk   Discharge Recommendation   Rehab Resource Intensity Level, OT II (Moderate Resource Intensity)  (Pending progress - recommendation limited at this time due to medical complications (hypotension))   AM-PAC Daily Activity Inpatient   Lower Body Dressing 3   Bathing 3   Toileting 3   Upper Body Dressing 3   Grooming 3   Eating 4   Daily Activity Raw Score 19   Daily Activity Standardized Score (Calc for Raw Score >=11) 40.22   AM-PAC Applied Cognition Inpatient   Following a Speech/Presentation 3   Understanding Ordinary Conversation 4   Taking Medications 3   Remembering Where Things Are Placed or Put Away 3   Remembering List of 4-5 Errands 3   Taking Care of Complicated Tasks 3   Applied Cognition Raw Score 19   Applied Cognition Standardized Score 39.77   End of Consult   Education Provided Yes;Family or social support of family present for education by provider   Patient Position at End of Consult Supine;All needs within reach   Nurse Communication Nurse aware of consult     Pt will achieve the following goals within 10 days.    *Pt will complete LB bathing and dressing with Mod I & DME PRN.    *Pt will complete toileting w/ Mod I w/ G hygiene/thoroughness using DME PRN    *Pt will perform functional transfers with on/off all surfaces with Mod I using DME as  needed w/ G balance/safety.    *Pt will improve functional mobility during ADL/IADL/leisure tasks to Mod I using DME as needed w/ G balance/safety.      *Pt will demonstrate G carryover of pt/caregiver education and training as appropriate w/ Mod I w/o cues w/ good tolerance      *Pt will improve functional activity tolerance to 20 minutes of sustained functional tasks to increase participation in basic self-care and decrease assistance level.     *Assess DME needs    Wilda Isaac OTR/L

## 2024-10-16 NOTE — INTERVAL H&P NOTE
H&P reviewed. After examining the patient I find no changes in the patients condition since the H&P had been written. Plan for left TKA revision for aseptic loosening.

## 2024-10-16 NOTE — ANESTHESIA PREPROCEDURE EVALUATION
Procedure:  ARTHROPLASTY KNEE TOTAL REVISION (Left: Knee)    Relevant Problems   CARDIO   (+) Hyperlipidemia   (+) Hypertension   (+) LBBB (left bundle branch block)   (+) Pure hypercholesterolemia   (+) Severe aortic stenosis      ENDO   (+) Hypothyroidism      GI/HEPATIC   (+) GERD (gastroesophageal reflux disease)   (+) Gastroesophageal reflux disease with esophagitis      /RENAL   (+) CKD (chronic kidney disease), stage II      HEMATOLOGY   (+) Hypercoagulable state (HCC)      PULMONARY   (+) MIAH (obstructive sleep apnea)      Neurology/Sleep   (+) Restless legs syndrome (RLS)      Rheumatology   (+) Sjogren's syndrome (HCC)      Surgery/Wound/Pain   (+) S/P TAVR (transcatheter aortic valve replacement)      6/2024: TAVR    Cardiology risk stratification: episodes of nonsustained atrial tachycardia but otherwise stable on current regimen. Patient is considered low risk and no additional testing is recommended        Latest Reference Range & Units 08/21/24 07:18   Sodium 135 - 147 mmol/L 141   Potassium 3.5 - 5.3 mmol/L 4.5   Chloride 96 - 108 mmol/L 105   Carbon Dioxide 21 - 32 mmol/L 31   ANION GAP 4 - 13 mmol/L 5   BUN 5 - 25 mg/dL 27 (H)   Creatinine 0.60 - 1.30 mg/dL 0.85   GLUCOSE, FASTING 65 - 99 mg/dL 89   Calcium 8.4 - 10.2 mg/dL 10.6 (H)   AST 13 - 39 U/L 27   ALT 7 - 52 U/L 16   ALK PHOS 34 - 104 U/L 60   Total Protein 6.4 - 8.4 g/dL 7.6   Albumin 3.5 - 5.0 g/dL 4.3   Total Bilirubin 0.20 - 1.00 mg/dL 0.64   GFR, Calculated ml/min/1.73sq m 62   (H): Data is abnormally high         Latest Reference Range & Units 08/21/24 07:18   WBC 4.31 - 10.16 Thousand/uL 6.18   RBC 3.81 - 5.12 Million/uL 3.83   Hemoglobin 11.5 - 15.4 g/dL 12.6   Hematocrit 34.8 - 46.1 % 37.6   MCV 82 - 98 fL 98   MCH 26.8 - 34.3 pg 32.9   MCHC 31.4 - 37.4 g/dL 33.5   RDW 11.6 - 15.1 % 13.3   Platelet Count 149 - 390 Thousands/uL 187        Latest Reference Range & Units 08/21/24 07:18   PROTIME 12.3 - 15.0 seconds 21.5 (H)   POCT  INR 0.85 - 1.19  1.82 (H)   PTT 23 - 34 seconds 36 (H)   (H): Data is abnormally high    EKG (8/2024)  Normal sinus rhythm  Left ventricular hypertrophy with repolarization abnormality  Abnormal ECG      ECHO (2024)    Left Ventricle: Left ventricular cavity size is normal. Wall thickness is mild to moderately increased. There is moderate concentric hypertrophy. The left ventricular ejection fraction is 60% by visual estimation. Systolic function is normal. Wall motion is normal. Diastolic function is moderately abnormal, consistent with grade II (pseudonormal) relaxation.    Right Ventricle: Right ventricular cavity size is normal. Systolic function is normal.    Left Atrium: The atrium is severely dilated (>48 mL/m2).    Aortic Valve: There is an Dunn KELL 3 Ultra Resilia 23 mm TAVR bioprosthetic valve. There is mild paravalvular regurgitation.  There is no evidence of transvalvular regurgitation. The gradient recorded across the prosthetic aortic valve is within the expected range. The aortic valve peak velocity is 2 m/s. The aortic valve mean gradient is 8 mmHg. The dimensionless velocity index is 0.49. The aortic valve area is 1.84 cm2.    Mitral Valve: There is moderate diffuse thickening. There is mild diffuse calcification. There is moderate annular calcification. There is mild subvalvular thickening and calcification. There is moderate to severe regurgitation with a centrally directed jet.    Tricuspid Valve: There is mild regurgitation. The tricuspid valve regurgitation jet is central.  Anesthesia Plan  ASA Score- 3     Anesthesia Type- general with ASA Monitors.         Additional Monitors:     Airway Plan: ETT.    Comment: Npo after MN (sip of water with meds at 07:15)    Preop adductor canal block with exparel. Patient has a large scar in her lumbar region and has a spinal cord stimulator for bladder function. No imaging available. Not a candidate for spinal anesthesia at this time.       Plan  Factors-Exercise tolerance (METS): <4 METS.    Chart reviewed. EKG reviewed.  Existing labs reviewed. Patient summary reviewed.    Patient is not a current smoker.              Induction- intravenous.    Postoperative Plan- . Planned trial extubation        Informed Consent- Anesthetic plan and risks discussed with patient.  I personally reviewed this patient with the CRNA. Discussed and agreed on the Anesthesia Plan with the CRNA..

## 2024-10-16 NOTE — PLAN OF CARE
Problem: Prexisting or High Potential for Compromised Skin Integrity  Goal: Skin integrity is maintained or improved  Description: INTERVENTIONS:  - Identify patients at risk for skin breakdown  - Assess and monitor skin integrity  - Assess and monitor nutrition and hydration status  - Monitor labs   - Assess for incontinence   - Turn and reposition patient  - Assist with mobility/ambulation  - Relieve pressure over bony prominences  - Avoid friction and shearing  - Provide appropriate hygiene as needed including keeping skin clean and dry  - Evaluate need for skin moisturizer/barrier cream  - Collaborate with interdisciplinary team   - Patient/family teaching  - Consider wound care consult   10/16/2024 1636 by Chio Cunningham RN  Outcome: Progressing  10/16/2024 1636 by Chio Cunningham RN  Outcome: Progressing     Problem: PAIN - ADULT  Goal: Verbalizes/displays adequate comfort level or baseline comfort level  Description: Interventions:  - Encourage patient to monitor pain and request assistance  - Assess pain using appropriate pain scale  - Administer analgesics based on type and severity of pain and evaluate response  - Implement non-pharmacological measures as appropriate and evaluate response  - Consider cultural and social influences on pain and pain management  - Notify physician/advanced practitioner if interventions unsuccessful or patient reports new pain  Outcome: Progressing     Problem: INFECTION - ADULT  Goal: Absence or prevention of progression during hospitalization  Description: INTERVENTIONS:  - Assess and monitor for signs and symptoms of infection  - Monitor lab/diagnostic results  - Monitor all insertion sites, i.e. indwelling lines, tubes, and drains  - Monitor endotracheal if appropriate and nasal secretions for changes in amount and color  - [place] appropriate cooling/warming therapies per order  - Administer medications as ordered  - Instruct and encourage patient and family to use  good hand hygiene technique  - Identify and instruct in appropriate isolation precautions for identified infection/condition  Outcome: Progressing     Problem: SAFETY ADULT  Goal: Maintain or return to baseline ADL function  Description: INTERVENTIONS:  -  Assess patient's ability to carry out ADLs; assess patient's baseline for ADL function and identify physical deficits which impact ability to perform ADLs (bathing, care of mouth/teeth, toileting, grooming, dressing, etc.)  - Assess/evaluate cause of self-care deficits   - Assess range of motion  - Assess patient's mobility; develop plan if impaired  - Assess patient's need for assistive devices and provide as appropriate  - Encourage maximum independence but intervene and supervise when necessary  - Involve family in performance of ADLs  - Assess for home care needs following discharge   - Consider OT consult to assist with ADL evaluation and planning for discharge  - Provide patient education as appropriate  Outcome: Progressing     Problem: DISCHARGE PLANNING  Goal: Discharge to home or other facility with appropriate resources  Description: INTERVENTIONS:  - Identify barriers to discharge w/patient and caregiver  - Arrange for needed discharge resources and transportation as appropriate  - Identify discharge learning needs (meds, wound care, etc.)  - Arrange for interpretive services to assist at discharge as needed  - Refer to Case Management Department for coordinating discharge planning if the patient needs post-hospital services based on physician/advanced practitioner order or complex needs related to functional status, cognitive ability, or social support system  Outcome: Progressing     Problem: Knowledge Deficit  Goal: Patient/family/caregiver demonstrates understanding of disease process, treatment plan, medications, and discharge instructions  Description: Complete learning assessment and assess knowledge base.  Interventions:  - Provide teaching at  level of understanding  - Provide teaching via preferred learning methods  Outcome: Progressing     Problem: SAFETY ADULT  Goal: Patient will remain free of falls  Description: INTERVENTIONS:  - Educate patient/family on patient safety including physical limitations  - Instruct patient to call for assistance with activity   - Consult OT/PT to assist with strengthening/mobility   - Keep Call bell within reach  - Keep bed low and locked with side rails adjusted as appropriate  - Keep care items and personal belongings within reach  - Initiate and maintain comfort rounds  - Make Fall Risk Sign visible to staff  - Offer Toileting every 2 Hours, in advance of need  - Initiate/Maintain bed alarm  - Obtain necessary fall risk management equipment: yellow socks  - Apply yellow socks and bracelet for high fall risk patients  - Consider moving patient to room near nurses station  Outcome: Progressing

## 2024-10-16 NOTE — ASSESSMENT & PLAN NOTE
Lab Results   Component Value Date    EGFR 62 08/21/2024    EGFR 60 07/18/2024    EGFR 42 06/29/2024    CREATININE 0.85 08/21/2024    CREATININE 0.87 07/18/2024    CREATININE 1.17 06/29/2024   Baseline creatinine 0.8-1.0  Repeat BMP tomorrow

## 2024-10-17 VITALS
TEMPERATURE: 97.8 F | HEIGHT: 60 IN | BODY MASS INDEX: 24.35 KG/M2 | RESPIRATION RATE: 18 BRPM | DIASTOLIC BLOOD PRESSURE: 83 MMHG | HEART RATE: 83 BPM | WEIGHT: 124 LBS | SYSTOLIC BLOOD PRESSURE: 148 MMHG | OXYGEN SATURATION: 97 %

## 2024-10-17 LAB
ANION GAP SERPL CALCULATED.3IONS-SCNC: 6 MMOL/L (ref 4–13)
BUN SERPL-MCNC: 20 MG/DL (ref 5–25)
CALCIUM SERPL-MCNC: 7.9 MG/DL (ref 8.4–10.2)
CHLORIDE SERPL-SCNC: 100 MMOL/L (ref 96–108)
CO2 SERPL-SCNC: 25 MMOL/L (ref 21–32)
CREAT SERPL-MCNC: 0.85 MG/DL (ref 0.6–1.3)
ERYTHROCYTE [DISTWIDTH] IN BLOOD BY AUTOMATED COUNT: 12.8 % (ref 11.6–15.1)
GFR SERPL CREATININE-BSD FRML MDRD: 61 ML/MIN/1.73SQ M
GLUCOSE SERPL-MCNC: 102 MG/DL (ref 65–140)
GLUCOSE SERPL-MCNC: 131 MG/DL (ref 65–140)
HCT VFR BLD AUTO: 27.6 % (ref 34.8–46.1)
HGB BLD-MCNC: 9.1 G/DL (ref 11.5–15.4)
INR PPP: 1.19 (ref 0.85–1.19)
MCH RBC QN AUTO: 31.9 PG (ref 26.8–34.3)
MCHC RBC AUTO-ENTMCNC: 33 G/DL (ref 31.4–37.4)
MCV RBC AUTO: 97 FL (ref 82–98)
PLATELET # BLD AUTO: 141 THOUSANDS/UL (ref 149–390)
PMV BLD AUTO: 10.8 FL (ref 8.9–12.7)
POTASSIUM SERPL-SCNC: 4.4 MMOL/L (ref 3.5–5.3)
PROTHROMBIN TIME: 15.6 SECONDS (ref 12.3–15)
RBC # BLD AUTO: 2.85 MILLION/UL (ref 3.81–5.12)
SODIUM SERPL-SCNC: 131 MMOL/L (ref 135–147)
WBC # BLD AUTO: 10.27 THOUSAND/UL (ref 4.31–10.16)

## 2024-10-17 PROCEDURE — 97530 THERAPEUTIC ACTIVITIES: CPT

## 2024-10-17 PROCEDURE — 85027 COMPLETE CBC AUTOMATED: CPT | Performed by: STUDENT IN AN ORGANIZED HEALTH CARE EDUCATION/TRAINING PROGRAM

## 2024-10-17 PROCEDURE — 82948 REAGENT STRIP/BLOOD GLUCOSE: CPT

## 2024-10-17 PROCEDURE — 99024 POSTOP FOLLOW-UP VISIT: CPT

## 2024-10-17 PROCEDURE — 85610 PROTHROMBIN TIME: CPT | Performed by: STUDENT IN AN ORGANIZED HEALTH CARE EDUCATION/TRAINING PROGRAM

## 2024-10-17 PROCEDURE — 97116 GAIT TRAINING THERAPY: CPT

## 2024-10-17 PROCEDURE — 97535 SELF CARE MNGMENT TRAINING: CPT

## 2024-10-17 PROCEDURE — 80048 BASIC METABOLIC PNL TOTAL CA: CPT | Performed by: STUDENT IN AN ORGANIZED HEALTH CARE EDUCATION/TRAINING PROGRAM

## 2024-10-17 RX ORDER — SODIUM CHLORIDE, SODIUM LACTATE, POTASSIUM CHLORIDE, CALCIUM CHLORIDE 600; 310; 30; 20 MG/100ML; MG/100ML; MG/100ML; MG/100ML
100 INJECTION, SOLUTION INTRAVENOUS CONTINUOUS
Status: DISCONTINUED | OUTPATIENT
Start: 2024-10-17 | End: 2024-10-17 | Stop reason: HOSPADM

## 2024-10-17 RX ORDER — ENOXAPARIN SODIUM 100 MG/ML
1 INJECTION SUBCUTANEOUS EVERY 12 HOURS
Qty: 8 ML | Refills: 0 | Status: SHIPPED | OUTPATIENT
Start: 2024-10-17 | End: 2024-10-21

## 2024-10-17 RX ADMIN — MULTIPLE VITAMINS W/ MINERALS TAB 1 TABLET: TAB ORAL at 08:27

## 2024-10-17 RX ADMIN — OXYCODONE HYDROCHLORIDE AND ACETAMINOPHEN 500 MG: 500 TABLET ORAL at 08:27

## 2024-10-17 RX ADMIN — FOLIC ACID 1 MG: 1 TABLET ORAL at 08:27

## 2024-10-17 RX ADMIN — CEFAZOLIN SODIUM 2000 MG: 2 SOLUTION INTRAVENOUS at 02:39

## 2024-10-17 RX ADMIN — PANTOPRAZOLE SODIUM 40 MG: 40 TABLET, DELAYED RELEASE ORAL at 08:27

## 2024-10-17 RX ADMIN — ACETAMINOPHEN 975 MG: 325 TABLET ORAL at 08:26

## 2024-10-17 RX ADMIN — SENNOSIDES 8.6 MG: 8.6 TABLET, FILM COATED ORAL at 08:27

## 2024-10-17 RX ADMIN — DOCUSATE SODIUM 100 MG: 100 CAPSULE, LIQUID FILLED ORAL at 08:27

## 2024-10-17 RX ADMIN — SODIUM CHLORIDE, SODIUM LACTATE, POTASSIUM CHLORIDE, AND CALCIUM CHLORIDE 100 ML/HR: .6; .31; .03; .02 INJECTION, SOLUTION INTRAVENOUS at 05:11

## 2024-10-17 NOTE — ASSESSMENT & PLAN NOTE
POD1 s/p Left total knee arthroplasty revision with Dr. Dueñas   -multi-modal pain control  - ancef x 24 hrs, doxycycline x 2 weeks pending culture results   - DVT ppx: restart coumadin, lovenox sent to pharmacy  - PT/OT  - WBAT  - ROM as tolerated, pillow/blankets under achilles not behind knee while in bed  - Culture shows no bacteria or poly's  - f/u 10-14 days as outpatient with Dr. Dueñas

## 2024-10-17 NOTE — PLAN OF CARE
Problem: Prexisting or High Potential for Compromised Skin Integrity  Goal: Skin integrity is maintained or improved  Description: INTERVENTIONS:  - Identify patients at risk for skin breakdown  - Assess and monitor skin integrity  - Assess and monitor nutrition and hydration status  - Monitor labs   - Assess for incontinence   - Turn and reposition patient  - Assist with mobility/ambulation  - Relieve pressure over bony prominences  - Avoid friction and shearing  - Provide appropriate hygiene as needed including keeping skin clean and dry  - Evaluate need for skin moisturizer/barrier cream  - Collaborate with interdisciplinary team   - Patient/family teaching  - Consider wound care consult   Outcome: Progressing     Problem: PAIN - ADULT  Goal: Verbalizes/displays adequate comfort level or baseline comfort level  Description: Interventions:  - Encourage patient to monitor pain and request assistance  - Assess pain using appropriate pain scale  - Administer analgesics based on type and severity of pain and evaluate response  - Implement non-pharmacological measures as appropriate and evaluate response  - Consider cultural and social influences on pain and pain management  - Notify physician/advanced practitioner if interventions unsuccessful or patient reports new pain  Outcome: Progressing     Problem: INFECTION - ADULT  Goal: Absence or prevention of progression during hospitalization  Description: INTERVENTIONS:  - Assess and monitor for signs and symptoms of infection  - Monitor lab/diagnostic results  - Monitor all insertion sites, i.e. indwelling lines, tubes, and drains  - Monitor endotracheal if appropriate and nasal secretions for changes in amount and color  - [place] appropriate cooling/warming therapies per order  - Administer medications as ordered  - Instruct and encourage patient and family to use good hand hygiene technique  - Identify and instruct in appropriate isolation precautions for  identified infection/condition  Outcome: Progressing  Goal: Absence of fever/infection during neutropenic period  Description: INTERVENTIONS:  - Monitor WBC    Outcome: Progressing     Problem: SAFETY ADULT  Goal: Patient will remain free of falls  Description: INTERVENTIONS:  - Educate patient/family on patient safety including physical limitations  - Instruct patient to call for assistance with activity   - Consult OT/PT to assist with strengthening/mobility   - Keep Call bell within reach  - Keep bed low and locked with side rails adjusted as appropriate  - Keep care items and personal belongings within reach  - Initiate and maintain comfort rounds  - Make Fall Risk Sign visible to staff  - Offer Toileting every 2 Hours, in advance of need  - Initiate/Maintain alarm  - Obtain necessary fall risk management equipment  - Apply yellow socks and bracelet for high fall risk patients  - Consider moving patient to room near nurses station  Outcome: Progressing  Goal: Maintain or return to baseline ADL function  Description: INTERVENTIONS:  -  Assess patient's ability to carry out ADLs; assess patient's baseline for ADL function and identify physical deficits which impact ability to perform ADLs (bathing, care of mouth/teeth, toileting, grooming, dressing, etc.)  - Assess/evaluate cause of self-care deficits   - Assess range of motion  - Assess patient's mobility; develop plan if impaired  - Assess patient's need for assistive devices and provide as appropriate  - Encourage maximum independence but intervene and supervise when necessary  - Involve family in performance of ADLs  - Assess for home care needs following discharge   - Consider OT consult to assist with ADL evaluation and planning for discharge  - Provide patient education as appropriate  Outcome: Progressing  Goal: Maintains/Returns to pre admission functional level  Description: INTERVENTIONS:  - Perform AM-PAC 6 Click Basic Mobility/ Daily Activity  assessment daily.  - Set and communicate daily mobility goal to care team and patient/family/caregiver.   - Collaborate with rehabilitation services on mobility goals if consulted  - Perform Range of Motion 2 times a day.  - Reposition patient every 2 hours.  - Dangle patient 2 times a day  - Stand patient 2 times a day  - Ambulate patient 2 times a day  - Out of bed to chair 2 times a day   - Out of bed for meals 2 times a day  - Out of bed for toileting  - Record patient progress and toleration of activity level   Outcome: Progressing     Problem: DISCHARGE PLANNING  Goal: Discharge to home or other facility with appropriate resources  Description: INTERVENTIONS:  - Identify barriers to discharge w/patient and caregiver  - Arrange for needed discharge resources and transportation as appropriate  - Identify discharge learning needs (meds, wound care, etc.)  - Arrange for interpretive services to assist at discharge as needed  - Refer to Case Management Department for coordinating discharge planning if the patient needs post-hospital services based on physician/advanced practitioner order or complex needs related to functional status, cognitive ability, or social support system  Outcome: Progressing     Problem: Knowledge Deficit  Goal: Patient/family/caregiver demonstrates understanding of disease process, treatment plan, medications, and discharge instructions  Description: Complete learning assessment and assess knowledge base.  Interventions:  - Provide teaching at level of understanding  - Provide teaching via preferred learning methods  Outcome: Progressing

## 2024-10-17 NOTE — QUICK NOTE
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

## 2024-10-17 NOTE — PLAN OF CARE
Problem: OCCUPATIONAL THERAPY ADULT  Goal: Performs self-care activities at highest level of function for planned discharge setting.  See evaluation for individualized goals.  Description: Treatment Interventions: ADL retraining, Functional transfer training, Endurance training, Equipment evaluation/education, Patient/family training, Compensatory technique education, Continued evaluation          See flowsheet documentation for full assessment, interventions and recommendations.   Outcome: Progressing  Note: Limitation: Decreased ADL status, Decreased Safe judgement during ADL, Decreased endurance, Decreased cognition, Decreased self-care trans, Decreased high-level ADLs  Prognosis: Fair  Assessment: Pt seen for OT tx session with focus on functional balance, functional mobility, ADL status, and transfer safety. Patient agreeable to OT treatment session. Pt received seated OOB to Recliner. Pt improved all functional txfers/mobility with S & RW. Pt's activity tolerance improved, able to tolerate a household distance. Pt able to demo LB dressing with S s/p education of compensatory strategies. Patient continues to be functioning below baseline level, occupational performance remains limited secondary to factors listed above, and pt at increased risk for falls and injury.  The patient's raw score on the AM-PAC Daily Activity inpatient short form is 21, standardized score is 44.27, greater than 39.4. Patients at this level are likely to benefit from DC to home. Please refer to the recommendation of the Occupational Therapist for safe DC planning.  From OT standpoint, recommendation at time of D/C would be DC with Level III - Low Rehab Resource Intensity resources. Patient to benefit from continued Occupational Therapy treatment while in the hospital to address deficits as defined above and maximize level of functional independence with ADLs and functional mobility. Pt left seated OOB to Recliner with call bell in  reach, tray table in reach, needs met, chair alarm activated, RN informed.     Rehab Resource Intensity Level, OT: No post-acute rehabilitation needs

## 2024-10-17 NOTE — PLAN OF CARE
Problem: PHYSICAL THERAPY ADULT  Goal: Performs mobility at highest level of function for planned discharge setting.  See evaluation for individualized goals.  Description: Treatment/Interventions: Functional transfer training, LE strengthening/ROM, Therapeutic exercise, Endurance training, Patient/family training, Equipment eval/education, Bed mobility, Gait training, Spoke to nursing, OT          See flowsheet documentation for full assessment, interventions and recommendations.  Outcome: Progressing  Note: Prognosis: Good  Problem List: Decreased strength, Decreased range of motion, Decreased endurance, Impaired balance, Decreased mobility, Impaired hearing, Pain  Assessment: Patient was received OOB in chair. Orthostatic vitals taken on R arm- stable. Patient progressed significantly since initial evaluation. Able to complete transfers at a supervision level and ambulated a household distance with a RW with supervision. Patient is limited by pain and needs increased time to complete mobility. She was thoroughly educated on positioning of LE at rest, use of ice and elevation and a walking program. Patient verbalized understanding. Recommending level 3 resources. Low intensity. Also recommending use of a RW at all times. The patient's AM-PAC Basic Mobility Inpatient Short Form Raw Score is 19. A Raw score of greater than 17 suggests the patient may benefit from discharge to home. Please also refer to the recommendation of the Physical Therapist for safe discharge planning.  Barriers to Discharge: None     Rehab Resource Intensity Level, PT: III (Minimum Resource Intensity)    See flowsheet documentation for full assessment.

## 2024-10-17 NOTE — CASE MANAGEMENT
Case Management Assessment & Discharge Planning Note    Patient name Anneliese Garcia  Location /-01 MRN [id number]  : 1935 Date 10/17/2024       Current Admission Date: 10/16/2024  Current Admission Diagnosis:Pain due to total left knee replacement (HCC)   Patient Active Problem List    Diagnosis Date Noted Date Diagnosed    Orthostatic hypotension 10/16/2024     Pain due to total left knee replacement (HCC) 10/13/2024     Encounter for geriatric assessment 10/10/2024     LBBB (left bundle branch block) 2024     S/P TAVR (transcatheter aortic valve replacement) 2024     GERD (gastroesophageal reflux disease)      Severe aortic stenosis 2024     QT prolongation 2023     History of DVT (deep vein thrombosis) 2022     Functional diarrhea 2020     Gastroesophageal reflux disease with esophagitis 2020     Hypercoagulable state (HCC) 2020     Arthritis of right sacroiliac joint (HCC)      Osteoarthritis 2020     Supervision of normal first pregnancy 2020     Hyperlipidemia 2018     Vitamin D deficiency 2018     Osteoporosis 2018     CKD (chronic kidney disease), 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     Sjogren's syndrome (HCC)      History of pulmonary embolism      Hypertension      Hypothyroidism      Carpal tunnel syndrome 2008     MIAH (obstructive sleep apnea) 2008     Pure hypercholesterolemia 2008     Restless legs syndrome (RLS) 2008     Spinal stenosis of lumbar region without neurogenic claudication 2008       LOS (days): 1  Geometric Mean LOS (GMLOS) (days): 2.8  Days to GMLOS:1.8     OBJECTIVE:    Risk of Unplanned Readmission Score: 16.41         Current admission status: Inpatient       Preferred Pharmacy:   [id number] [place] - [address]  [address] [place] [address]  Phone: [phone] Fax: [phone]    Primary Care Provider: Monty  MD Narda    Primary Insurance: BLUE CROSS [employer] REP  Secondary Insurance:     ASSESSMENT:  Active Health Care Proxies       Andre Garcia Health Care Agent - Life Partner   Primary Phone: [phone] (Mobile)  Home Phone: [phone]                 Advance Directives  Does patient have a Health Care POA?: Yes  Does patient have Advance Directives?: Yes  Advance Directives: Living will, Power of  for health care  Primary Contact: Stan Garcia         Readmission Root Cause  30 Day Readmission: No    Patient Information  Admitted from:: Home  Mental Status: Alert  During Assessment patient was accompanied by: Not accompanied during assessment  Assessment information provided by:: Patient  Primary Caregiver: Self  Support Systems: Spouse/significant other, Family members  County of Residence: [place]  What city do you live in?: [place]  Home entry access options. Select all that apply.: Other access (Comment) (stair lift to enter)  Type of Current Residence: [address] [place] home  Upon entering residence, is there a bedroom on the main floor (no further steps)?: Yes  Upon entering residence, is there a bathroom on the main floor (no further steps)?: Yes  Living Arrangements: Lives w/ Spouse/significant other  Is patient a ?: No    Activities of Daily Living Prior to Admission  Functional Status: Independent  Completes ADLs independently?: Yes  Ambulates independently?: Yes  Does patient use assisted devices?: Yes  Assisted Devices (DME) used: Bedside Commode, Straight Cane, Walker  Does patient currently own DME?: Yes  What DME does the patient currently own?: Bedside Commode, Straight Cane, Walker  Does patient have a history of Outpatient Therapy (PT/OT)?: Yes  Does the patient have a history of Short-Term Rehab?: No  Does patient have a history of HHC?: Yes  Does patient currently have HHC?: No         Patient Information Continued  Income Source: Pension/senior care  Does patient have prescription  coverage?: Yes  Does patient receive dialysis treatments?: No  Does patient have a history of Mental Health Diagnosis?: No         Means of Transportation  Means of Transport to Appts:: Drives Self      Social Determinants of Health (SDOH)      Flowsheet Row Most Recent Value   Housing Stability    In the last 12 months, was there a time when you were not able to pay the mortgage or rent on time? N   In the past 12 months, how many times have you moved where you were living? 0   At any time in the past 12 months, were you homeless or living in a shelter (including now)? N   Transportation Needs    In the past 12 months, has lack of transportation kept you from medical appointments or from getting medications? no   In the past 12 months, has lack of transportation kept you from meetings, work, or from getting things needed for daily living? No   Food Insecurity    Within the past 12 months, you worried that your food would run out before you got the money to buy more. Never true   Within the past 12 months, the food you bought just didn't last and you didn't have money to get more. Never true   Utilities    In the past 12 months has the electric, gas, oil, or water company threatened to shut off services in your home? No            DISCHARGE DETAILS:    Discharge planning discussed with:: patient        CM contacted family/caregiver?: No- see comments (patient alert and reports being in contact with spouse)  Were Treatment Team discharge recommendations reviewed with patient/caregiver?: Yes     Were patient/caregiver advised of the risks associated with not following Treatment Team discharge recommendations?: Yes         Requested Home Health Care         Is the patient interested in HHC at discharge?: Yes  Home Health Discipline requested:: Nursing, Occupational Therapy, Physical Therapy  HHA External Referral Reason (only applicable if external HHA name selected): Patient has established relationship with  provider  Home Health Follow-Up Provider:: PCP  Home Health Services Needed:: Gait/ADL Training, Evaluate Functional Status and Safety  Homebound Criteria Met:: Uses an Assist Device (i.e. cane, walker, etc)  Supporting Clincal Findings:: Limited Endurance    DME Referral Provided  Referral made for DME?: No              Treatment Team Recommendation: Short Term Rehab  Discharge Destination Plan:: Home with Home Health Care            Met with patient to review the role of CM and discuss needs may have prior to discharge.  Patient resides in a [facility] home with a 1st floor set up and a stair lift to enter. She has a RW and BSC. She has been followed by [facility] and wnt to OP PT in the past. She denies SNF/BHU/D&A admissions. Discussed PT/OT's recommendation for SNF rehab and she declined. She is agreeable to [facility] for PT/OT/SN, referral made via [facility]. Wait availability and patient preference. Her spouse will transport her home.

## 2024-10-17 NOTE — PROGRESS NOTES
RN took pt's q4 vitals around 0300. After automatic readings showed low readings in the L arm, manual readings were obtained. Manual R arm showed 140/60; Manual L arm showed 104/60. Ortho was notified and told RN to notify SLIM provider. Per order, orthostatic blood pressures were obtained around 0430 on 10/17. Results are shown in vitals flowsheet. SLIM provider was notified of results. RN was told to hold off on giving any boluses at the moment. Pt denies complaints of dizziness, fatigue, lightheadedness, nausea, or weakness.

## 2024-10-17 NOTE — PHYSICAL THERAPY NOTE
"                                                                                  PHYSICAL THERAPY NOTE       10/17/24 0957   PT Last Visit   PT Visit Date 10/17/24   Note Type   Note Type Treatment   Pain Assessment   Pain Score 1   Pain Location/Orientation Orientation: Left;Location: Knee   Hospital Pain Intervention(s) Medication (See MAR);Repositioned;Ambulation/increased activity   Restrictions/Precautions   Weight Bearing Precautions Per Order Yes   LLE Weight Bearing Per Order WBAT   Other Precautions Chair Alarm;Bed Alarm;WBS;Multiple lines;Fall Risk;Pain;Hard of hearing   General   Chart Reviewed Yes   Response to Previous Treatment Patient with no complaints from previous session.   Family/Caregiver Present No   Cognition   Overall Cognitive Status WFL   Arousal/Participation Alert   Attention Within functional limits   Orientation Level Oriented X4   Memory Within functional limits   Following Commands Follows one step commands without difficulty   Subjective   Subjective \"I'm feeling better today.\"   Bed Mobility   Additional Comments Received OOB in chair. Orthostatic vitals taken: Sitting in recliner with LE's elevated: 161/62 seated with LE's down 125/70 standing 143/62   Transfers   Sit to Stand 5  Supervision   Additional items Armrests   Stand to Sit 5  Supervision   Additional items Armrests   Additional Comments Verbal cues for hand placement.   Ambulation/Elevation   Gait pattern Step to;Excessively slow;Decreased L stance;Antalgic   Gait Assistance 5  Supervision   Additional items Verbal cues  (Verbal cues for proper sequencing with RW)   Assistive Device Rolling walker   Distance 50ft   Balance   Static Sitting Normal   Dynamic Sitting Good   Static Standing Fair +   Dynamic Standing Fair +   Ambulatory Fair +   Endurance Deficit   Endurance Deficit Yes   Endurance Deficit Description Limited by pain and fatigue   Activity Tolerance   Activity Tolerance Patient limited by fatigue;Patient " limited by pain   Medical Staff Made Aware Wilda AWAD. Dr. Dueñas PA's Roxana Dale and Arelis Lyle   Nurse Made Aware RNTeena   Assessment   Prognosis Good   Problem List Decreased strength;Decreased range of motion;Decreased endurance;Impaired balance;Decreased mobility;Impaired hearing;Pain   Assessment Patient was received OOB in chair. Orthostatic vitals taken on R arm- stable. Patient progressed significantly since initial evaluation. Able to complete transfers at a supervision level and ambulated a household distance with a RW with supervision. Patient is limited by pain and needs increased time to complete mobility. She was thoroughly educated on positioning of LE at rest, use of ice and elevation and a walking program. Patient verbalized understanding. Recommending level 3 resources. Low intensity. Also recommending use of a RW at all times. The patient's AM-PAC Basic Mobility Inpatient Short Form Raw Score is 19. A Raw score of greater than 17 suggests the patient may benefit from discharge to home. Please also refer to the recommendation of the Physical Therapist for safe discharge planning.   Barriers to Discharge None   Goals   Patient Goals To go home today   STG Expiration Date 10/19/24   PT Treatment Day 1   Plan   Treatment/Interventions Functional transfer training;LE strengthening/ROM;Therapeutic exercise;Endurance training;Patient/family training;Equipment eval/education;Bed mobility;Gait training;Spoke to nursing;OT;Spoke to MD;Spoke to advanced practitioner   Progress Progressing toward goals   PT Frequency Twice a day   Discharge Recommendation   Rehab Resource Intensity Level, PT III (Minimum Resource Intensity)   Equipment Recommended Walker   Walker Package Recommended Wheeled walker   AM-PAC Basic Mobility Inpatient   Turning in Flat Bed Without Bedrails 4   Lying on Back to Sitting on Edge of Flat Bed Without Bedrails 3   Moving Bed to Chair 3   Standing Up From Chair Using Arms 3   Walk  in Room 3   Climb 3-5 Stairs With Railing 3   Basic Mobility Inpatient Raw Score 19   Basic Mobility Standardized Score 42.48   [facility] Highest Level Of Mobility   -HLM Goal 6: Walk 10 steps or more   -HLM Achieved 7: Walk 25 feet or more   Education   Education Provided Mobility training;Assistive device   Patient Demonstrates acceptance/verbal understanding   End of Consult   Patient Position at End of Consult Bedside chair;Bed/Chair alarm activated;All needs within reach   Lauren Marquez            Patient Name: Anneliese Garcia  Today's Date: 10/17/2024

## 2024-10-17 NOTE — UTILIZATION REVIEW
Initial Clinical Review    Elective inpatient  surgical procedure  Age/Sex: 89 y.o. female  Surgery Date: 10/16/24  Procedure: Left - ARTHROPLASTY KNEE TOTAL REVISION BOTH COMPONENTS   Anesthesia: GETA  Operative Findings: Gross loosening of tibial component with subsidence  Pre-op ROM -3-120 with V/V instability   Post-op ROM 0-130+ calf to thigh gravity-assisted flexion    Consult 10/16/24 per Medicine - POD 0 left TKA, total knee revision/hypotensive with history of hypertension/CKD.   PMH of history of pulmonary emboli on Coumadin, hypertension, CKD, TAVR status.  Baseline creatinine 0.8 - 1. With PT became Orthostatic.   Anticipate will need rehab.    Monitor BP, check orthostatics tomorrow.   Hold home losartan and metoprolol.   BMP tomorrow.   Continue IVF.   Resume Coumadin tonight.  Incentive spirometry.       POD#1 Progress Note 10/17/24: POD1 s/p Left total knee arthroplasty revision.  Patient denies pain this morning.   Wbc 10.27.  H&H 9.1/27.6. Culture shows no bacteria or poly's   on exam LLE - Skin intaxt . No erythema or ecchymosis.    Dressing c/d/I without strikethrough.    NTTP .   Sensation intact to saphenous, sural, tibial, superficial peroneal nerve, and deep peroneal.   2+ DP pulse.   No calf swelling or tenderness to palpation.   Musculature soft and compressible.  To continue multimodal pain control. ancef x 24 hrs, doxycycline x 2 weeks pending culture results. Coumadin, price check Lovenox.    PT/OT, WBAT.    Orthostatic BP every shift.   Neurovascular checks every 4 hours.  Continue IVF     Admission Orders: Date/Time/Statement:   Admission Orders (From admission, onward)       Ordered        10/16/24 0935  Inpatient Admission  Once                          Orders Placed This Encounter   Procedures    Inpatient Admission     Standing Status:   Standing     Number of Occurrences:   1     Order Specific Question:   Level of Care     Answer:   Med Surg [16]     Order Specific Question:    Estimated length of stay     Answer:   Inpatient Only Surgery     Diet: Regular  Mobility:  up with assistance.  PT/OT  DVT Prophylaxis:  therapeutic Lovenox.  Coumadin.     Medications/Pain Control:   Scheduled Medications:  acetaminophen, 975 mg, Oral, Q8H  ascorbic acid, 500 mg, Oral, BID  docusate sodium, 100 mg, Oral, BID  folic acid, 1 mg, Oral, Daily  gabapentin, 300 mg, Oral, HS  multivitamin-minerals, 1 tablet, Oral, Daily  pantoprazole, 40 mg, Oral, Daily  senna, 1 tablet, Oral, Daily    bupivacaine liposomal (EXPAREL) 1.3 % injection 20 mL  Dose: 20 mL  Freq: Once Route: Perineural  Indications of Use: LOCAL ANESTHESIA  Start: 10/16/24 0745 End: 10/16/24 0941  ceFAZolin (ANCEF) IVPB (premix in dextrose) 2,000 mg 50 mL  Dose: 2,000 mg  Freq: Every 8 hours Route: IV  Last Dose: 2,000 mg (10/17/24 0239)  Start: 10/16/24 1800 End: 10/17/24 0309  tranexamic acid (CYKLOKAPRON) 1000-0.7 MG/100ML-% injection 1,000 mg  Dose: 1,000 mg  Freq: Once Route: IV  Start: 10/16/24 0745 End: 10/16/24 0959  warfarin (COUMADIN) tablet 10 mg  Dose: 10 mg  Freq: Once (warfarin) Route: PO  Start: 10/16/24 2000 End: 10/16/24 2033    Continuous IV Infusions:  lactated ringers, 100 mL/hr, Intravenous, Continuous    lactated ringers infusion  Rate: 125 mL/hr Dose: 125 mL/hr  Freq: Continuous Route: IV  Last Dose: 125 mL/hr (10/16/24 1005)  Start: 10/16/24 0745 End: 10/16/24 1437     PRN Meds:  acetaminophen, 650 mg, Oral, Q4H PRN  calcium carbonate, 1,000 mg, Oral, Daily PRN  chlorhexidine gluconate, , Topical, Daily PRN  lactated ringers, 1,000 mL, Intravenous, Once PRN   And  lactated ringers, 1,000 mL, Intravenous, Once PRN  morphine injection, 2 mg, Intravenous, Q2H PRN  ondansetron, 4 mg, Intravenous, Q6H PRN  oxyCODONE, 5 mg, Oral, Q4H PRN  oxyCODONE, 2.5 mg, Oral, Q4H PRN  simethicone, 80 mg, Oral, 4x Daily PRN  sodium chloride, 1,000 mL, Intravenous, Once PRN   And  sodium chloride, 1,000 mL, Intravenous, Once PRN      Vital  Signs (last 3 days)       Date/Time Temp Pulse Resp BP MAP (mmHg) SpO2 Calculated FIO2 (%) - Nasal Cannula O2 Flow Rate (L/min) Nasal Cannula O2 Flow Rate (L/min) O2 Device Cardiac (WDL) Patient Position - Orthostatic VS [place] Coma Scale Score Pain    10/17/24 1123 -- -- -- 148/83 105 -- -- -- -- -- -- -- -- --    10/17/24 0957 -- -- -- -- -- -- -- -- -- -- -- -- -- 1    10/17/24 09:40:21 -- -- -- 143/62 89 -- -- -- -- -- -- -- -- --    10/17/24 09:37:52 -- 83 -- 125/70 88 97 % -- -- -- None (Room air) -- -- -- No Pain    10/17/24 09:36:52 -- 90 -- 161/62 95 97 % -- -- -- -- -- -- -- --    10/17/24 0826 -- -- -- -- -- -- -- -- -- -- -- -- -- Med Not Given for Pain - for MAR use only    10/17/24 07:22:16 97.8 °F (36.6 °C) 83 -- 93/55 68 99 % -- -- -- -- -- Lying -- --    10/17/24 04:39:14 -- -- -- 146/58 87 -- -- -- -- -- -- Standing - Orthostatic VS -- --    10/17/24 04:36:45 -- 82 -- 158/55 89 98 % -- -- -- -- -- Sitting - Orthostatic VS -- --    10/17/24 04:35:16 -- 86 -- 148/64 92 97 % -- -- -- -- -- Lying - Orthostatic VS -- --    10/17/24 04:33:30 -- 74 -- 67/47 54 67 % -- -- -- -- -- Standing - Orthostatic VS -- --    10/17/24 04:31:24 -- -- -- 79/52 61 -- -- -- -- -- -- Sitting - Orthostatic VS -- --    10/17/24 04:28:56 -- 80 -- 76/53 61 99 % -- -- -- -- -- Lying - Orthostatic VS -- --    10/17/24 02:44:09 -- 71 -- 144/56 85 97 % -- -- -- -- -- -- -- --    10/17/24 02:43:45 -- 71 -- 144/56 85 96 % -- -- -- -- -- -- -- --    10/16/24 2241 97 °F (36.1 °C) 77 18 106/63 77 98 % -- -- -- -- -- -- -- --    10/16/24 22:38:51 -- -- -- -- -- 97 % -- -- -- -- -- -- -- --    10/16/24 22:35:31 -- -- -- -- -- 97 % -- -- -- -- -- -- -- --    10/16/24 2226 -- -- -- -- -- -- 24 -- 1 L/min Nasal cannula -- -- 15 No Pain    10/16/24 1705 -- -- -- -- -- -- -- -- -- -- -- -- -- 2    10/16/24 1653 -- -- -- -- -- -- 24 -- 1 L/min Nasal cannula -- -- 15 No Pain    10/16/24 14:37:14 97.5 °F (36.4 °C) 66 16 119/71 87 93 % -- -- --  None (Room air) -- Lying -- --    10/16/24 1405 -- -- -- -- -- -- -- -- -- -- -- -- -- No Pain    10/16/24 1404 -- -- -- -- -- -- -- -- -- -- -- -- -- No Pain    10/16/24 1402 -- -- -- 88/53 64 -- -- -- -- -- -- Lying - Orthostatic VS -- --    10/16/24 1359 -- -- -- 82/51 62 -- -- -- -- -- -- Sitting - Orthostatic VS -- --    10/16/24 1357 -- -- -- 88/52 65 -- -- -- -- -- -- Standing - Orthostatic VS -- --    10/16/24 1355 -- -- -- 86/50 66 -- -- -- -- -- -- Sitting - Orthostatic VS -- --    10/16/24 1352 -- -- -- 95/59 70 -- -- -- -- -- -- Sitting - Orthostatic VS -- --    10/16/24 1320 -- 74 -- 124/61 86 97 % -- -- -- None (Room air) -- -- -- No Pain    10/16/24 1315 -- 74 -- 102/57 -- 97 % 28 -- 2 L/min Nasal cannula -- -- 15 No Pain    10/16/24 1300 -- -- -- 98/57 -- -- -- -- -- -- -- -- -- --    10/16/24 1245 -- 82 -- 96/62 -- -- -- -- -- -- -- -- -- --    10/16/24 1230 -- 76 -- 103/59 -- -- -- -- -- -- -- -- -- --    10/16/24 1215 -- 80 13 98/55 72 95 % -- -- -- -- -- -- -- --    10/16/24 1210 -- 82 13 93/52 70 90 % -- 2 L/min -- Nasal cannula -- -- 15 --    10/16/24 1205 -- 84 14 99/50 71 100 % -- -- -- None (Room air) -- -- 15 --    10/16/24 1200 -- 82 13 104/52 74 100 % -- 4 L/min -- Simple mask -- -- 15 --    10/16/24 1155 97.5 °F (36.4 °C) 80 13 112/57 78 100 % -- 4 L/min -- Simple mask WDL -- 15 --    10/16/24 0812 97.4 °F (36.3 °C) 65 16 165/70 -- 96 % -- -- -- None (Room air) -- -- -- No Pain    10/16/24 0745 -- -- -- -- -- -- -- -- -- -- -- -- -- Med Not Given for Pain - for MAR use only          Weight (last 2 days)       Date/Time Weight    10/16/24 14:37:14 56.2 (124)    10/16/24 0812 57.1 (125.8)            Pertinent Labs/Diagnostic Test Results:   Radiology:  XR knee left 1 or 2 views   Final Interpretation by Taj Richard MD (10/16 1685)      Satisfactory appearance of revised left knee prosthesis.         Computerized Assisted Algorithm (CAA) may have been used to analyze all  applicable images.         Workstation performed: [id number]             Results from last 7 days   Lab Units 10/17/24  0238   WBC Thousand/uL 10.27*   HEMOGLOBIN g/dL 9.1*   HEMATOCRIT % 27.6*   PLATELETS Thousands/uL 141*     Results from last 7 days   Lab Units 10/17/24  0238   SODIUM mmol/L 131*   POTASSIUM mmol/L 4.4   CHLORIDE mmol/L 100   CO2 mmol/L 25   ANION GAP mmol/L 6   BUN mg/dL 20   CREATININE mg/dL 0.85   EGFR ml/min/1.73sq m 61   CALCIUM mg/dL 7.9*     Results from last 7 days   Lab Units 10/17/24  0720 10/16/24  1659   POC GLUCOSE mg/dl 102 200*     Results from last 7 days   Lab Units 10/17/24  0238   GLUCOSE RANDOM mg/dL 131     Results from last 7 days   Lab Units 10/17/24  0238 10/16/24  0821   PROTIME seconds 15.6* 15.4*   INR  1.19 1.17   PTT seconds  --  36*     Results from last 7 days   Lab Units 10/16/24  1044   GRAM STAIN RESULT  1+ Disintegrating polys  No bacteria seen  No Polys or Bacteria seen       Network Utilization Review Department  ATTENTION: Please call with any questions or concerns to [phone] and carefully listen to the prompts so that you are directed to the right person. All voicemails are confidential.   For Discharge needs, contact Care Management DC Support Team at [phone] opt. 2  Send all requests for admission clinical reviews, approved or denied determinations and any other requests to dedicated fax number below belonging to the campus where the patient is receiving treatment. List of dedicated fax numbers for the Facilities:  FACILITY NAME UR FAX NUMBER   ADMISSION DENIALS (Administrative/Medical Necessity) [phone]   DISCHARGE SUPPORT TEAM (NETWORK) [phone]   PARENT CHILD HEALTH (Maternity/NICU/Pediatrics) [phone]   [facility] [phone]   [facility] [phone]   [facility] [phone]   [facility] [phone]   [facility]  [facility] [phone]   [facility] [phone]   [facility] [phone]   [facility] [phone]   [facility] [phone]   Critical access hospital [phone]   [facility] [phone]   [facility] [phone]

## 2024-10-17 NOTE — PLAN OF CARE
Problem: Prexisting or High Potential for Compromised Skin Integrity  Goal: Skin integrity is maintained or improved  Description: INTERVENTIONS:  - Identify patients at risk for skin breakdown  - Assess and monitor skin integrity  - Assess and monitor nutrition and hydration status  - Monitor labs   - Assess for incontinence   - Turn and reposition patient  - Assist with mobility/ambulation  - Relieve pressure over bony prominences  - Avoid friction and shearing  - Provide appropriate hygiene as needed including keeping skin clean and dry  - Evaluate need for skin moisturizer/barrier cream  - Collaborate with interdisciplinary team   - Patient/family teaching  - Consider wound care consult   Outcome: Progressing     Problem: PAIN - ADULT  Goal: Verbalizes/displays adequate comfort level or baseline comfort level  Description: Interventions:  - Encourage patient to monitor pain and request assistance  - Assess pain using appropriate pain scale  - Administer analgesics based on type and severity of pain and evaluate response  - Implement non-pharmacological measures as appropriate and evaluate response  - Consider cultural and social influences on pain and pain management  - Notify physician/advanced practitioner if interventions unsuccessful or patient reports new pain  Outcome: Progressing     Problem: INFECTION - ADULT  Goal: Absence or prevention of progression during hospitalization  Description: INTERVENTIONS:  - Assess and monitor for signs and symptoms of infection  - Monitor lab/diagnostic results  - Monitor all insertion sites, i.e. indwelling lines, tubes, and drains  - Monitor endotracheal if appropriate and nasal secretions for changes in amount and color  - [place] appropriate cooling/warming therapies per order  - Administer medications as ordered  - Instruct and encourage patient and family to use good hand hygiene technique  - Identify and instruct in appropriate isolation precautions for  identified infection/condition  Outcome: Progressing  Goal: Absence of fever/infection during neutropenic period  Description: INTERVENTIONS:  - Monitor WBC    Outcome: Progressing     Problem: SAFETY ADULT  Goal: Patient will remain free of falls  Description: INTERVENTIONS:  - Educate patient/family on patient safety including physical limitations  - Instruct patient to call for assistance with activity   - Consult OT/PT to assist with strengthening/mobility   - Keep Call bell within reach  - Keep bed low and locked with side rails adjusted as appropriate  - Keep care items and personal belongings within reach  - Initiate and maintain comfort rounds  - Make Fall Risk Sign visible to staff  - Offer Toileting every x Hours, in advance of need  - Initiate/Maintain xxalarm  - Obtain necessary fall risk management equipment: x  - Apply yellow socks and bracelet for high fall risk patients  - Consider moving patient to room near nurses station  Outcome: Progressing  Goal: Maintain or return to baseline ADL function  Description: INTERVENTIONS:  -  Assess patient's ability to carry out ADLs; assess patient's baseline for ADL function and identify physical deficits which impact ability to perform ADLs (bathing, care of mouth/teeth, toileting, grooming, dressing, etc.)  - Assess/evaluate cause of self-care deficits   - Assess range of motion  - Assess patient's mobility; develop plan if impaired  - Assess patient's need for assistive devices and provide as appropriate  - Encourage maximum independence but intervene and supervise when necessary  - Involve family in performance of ADLs  - Assess for home care needs following discharge   - Consider OT consult to assist with ADL evaluation and planning for discharge  - Provide patient education as appropriate  Outcome: Progressing  Goal: Maintains/Returns to pre admission functional level  Description: INTERVENTIONS:  - Perform AM-PAC 6 Click Basic Mobility/ Daily Activity  assessment daily.  - Set and communicate daily mobility goal to care team and patient/family/caregiver.   - Collaborate with rehabilitation services on mobility goals if consulted  - Perform Range of Motion xx times a day.  - Reposition patient every x hours.  - Dangle patient xx times a day  - Stand patient x times a day  - Ambulate patient xxxxxx times a day  - Out of bed to chair x times a day   - Out of bed for meals xx times a day  - Out of bed for toileting  - Record patient progress and toleration of activity level   Outcome: Progressing     Problem: DISCHARGE PLANNING  Goal: Discharge to home or other facility with appropriate resources  Description: INTERVENTIONS:  - Identify barriers to discharge w/patient and caregiver  - Arrange for needed discharge resources and transportation as appropriate  - Identify discharge learning needs (meds, wound care, etc.)  - Arrange for interpretive services to assist at discharge as needed  - Refer to Case Management Department for coordinating discharge planning if the patient needs post-hospital services based on physician/advanced practitioner order or complex needs related to functional status, cognitive ability, or social support system  Outcome: Progressing     Problem: Knowledge Deficit  Goal: Patient/family/caregiver demonstrates understanding of disease process, treatment plan, medications, and discharge instructions  Description: Complete learning assessment and assess knowledge base.  Interventions:  - Provide teaching at level of understanding  - Provide teaching via preferred learning methods  Outcome: Progressing     Problem: Potential for Falls  Goal: Patient will remain free of falls  Description: INTERVENTIONS:  - Educate patient/family on patient safety including physical limitations  - Instruct patient to call for assistance with activity   - Consult OT/PT to assist with strengthening/mobility   - Keep Call bell within reach  - Keep bed low and locked  with side rails adjusted as appropriate  - Keep care items and personal belongings within reach  - Initiate and maintain comfort rounds  - Make Fall Risk Sign visible to staff  - Offer Toileting every x Hours, in advance of need  - Initiate/Maintain xalarm  - Obtain necessary fall risk management equipment: x  - Apply yellow socks and bracelet for high fall risk patients  - Consider moving patient to room near nurses station  Outcome: Progressing

## 2024-10-17 NOTE — CASE MANAGEMENT
Case Management Discharge Planning Note    Patient name Anneliese Garcia  Location /-01 MRN [id number]  : 1935 Date 10/17/2024       Current Admission Date: 10/16/2024  Current Admission Diagnosis:Pain due to total left knee replacement (HCC)   Patient Active Problem List    Diagnosis Date Noted Date Diagnosed    Orthostatic hypotension 10/16/2024     Pain due to total left knee replacement (HCC) 10/13/2024     Encounter for geriatric assessment 10/10/2024     LBBB (left bundle branch block) 2024     S/P TAVR (transcatheter aortic valve replacement) 2024     GERD (gastroesophageal reflux disease)      Severe aortic stenosis 2024     QT prolongation 2023     History of DVT (deep vein thrombosis) 2022     Functional diarrhea 2020     Gastroesophageal reflux disease with esophagitis 2020     Hypercoagulable state (HCC) 2020     Arthritis of right sacroiliac joint (HCC)      Osteoarthritis 2020     Supervision of normal first pregnancy 2020     Hyperlipidemia 2018     Vitamin D deficiency 2018     Osteoporosis 2018     CKD (chronic kidney disease), 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     Sjogren's syndrome (HCC)      History of pulmonary embolism      Hypertension      Hypothyroidism      Carpal tunnel syndrome 2008     MIAH (obstructive sleep apnea) 2008     Pure hypercholesterolemia 2008     Restless legs syndrome (RLS) 2008     Spinal stenosis of lumbar region without neurogenic claudication 2008       LOS (days): 1  Geometric Mean LOS (GMLOS) (days): 2.8  Days to GMLOS:1.7     OBJECTIVE:  Risk of Unplanned Readmission Score: 15.78         Current admission status: Inpatient   Preferred Pharmacy:   [id number] [place] - [address] [place]  [address] [place] [address]  Phone: [phone] Fax: [phone]    Primary Care Provider: Monty Laboy MD    Primary  Insurance: [facility] REP  Secondary Insurance:     DISCHARGE DETAILS:           Met with patient and  to give list of accepting HHC.  Patient now states she plans to go home and go to outpatient PT/OT as it is scheduled and she plans to go.  Her  is in agreeable.Patient plans to return home and go to OP PT/OT.

## 2024-10-17 NOTE — PROGRESS NOTES
Progress Note - Orthopedics   Name: Anneliese Garcia 89 y.o. female I MRN: [id number]  Unit/Bed#: -E[facility] I Date of Admission: 10/16/2024   Date of Service: 10/17/2024 I Hospital Day: 1    Assessment & Plan  Pain due to total left knee replacement (HCC)  POD1 s/p Left total knee arthroplasty revision with Dr. Dueñas   -multi-modal pain control  - ancef x 24 hrs, doxycycline x 2 weeks pending culture results   - DVT ppx: restart coumadin, lovenox sent to pharmacy  - PT/OT  - WBAT  - ROM as tolerated, pillow/blankets under achilles not behind knee while in bed  - Culture shows no bacteria or poly's  - f/u 10-14 days as outpatient with Dr. Dueñas     Orthostatic hypotension  Spoke with RN- she reached out to SLIM which recommended bolus  SLIM reports patient stable and cleared from medical standpoint      Subjective   89 y.o.female who was seen and examined at bedside, states that she has no pain at this time and is ready to go home.  No acute events, no new complaints. Pain well controlled. Denies fevers, chills, CP, SOB, N/V, numbness or tingling.   Objective :  Temp:  [97 °F (36.1 °C)-97.8 °F (36.6 °C)] 97.8 °F (36.6 °C)  HR:  [65-86] 83  BP: ()/(47-71) 93/55  Resp:  [13-18] 18  SpO2:  [67 %-100 %] 99 %  O2 Device: Nasal cannula  Nasal Cannula O2 Flow Rate (L/min):  [1 L/min-2 L/min] 1 L/min    Physical Exam  Constitutional:       Appearance: Normal appearance.   Neurological:      General: No focal deficit present.      Mental Status: She is alert and oriented to person, place, and time.   Psychiatric:         Mood and Affect: Mood normal.         Behavior: Behavior normal.     Musculoskeletal: leftlower  Skin intaxt . No erythema or ecchymosis.  Dressing c/d/I without strikethrough  NTTP   Sensation intact to saphenous, sural, tibial, superficial peroneal nerve, and deep peroneal  2+ DP pulse  No calf swelling or tenderness to palpation  Musculature soft and compressible       Lab Results: I have  "reviewed the following results:  Recent Labs     10/16/24  0821 10/17/24  0238   WBC  --  10.27*   HGB  --  9.1*   HCT  --  27.6*   PLT  --  141*   BUN  --  20   CREATININE  --  0.85   PTT 36*  --    INR 1.17 1.19     Blood Culture:    Lab Results   Component Value Date    BLOODCX No Growth After 5 Days. 03/17/2023    BLOODCX No Growth After 5 Days. 03/17/2023     Wound Culture: No results found for: \"WOUNDCULT\"  "

## 2024-10-17 NOTE — OCCUPATIONAL THERAPY NOTE
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)  Active Problems:    History of pulmonary embolism    Hypertension    CKD (chronic kidney disease), stage II    S/P TAVR (transcatheter aortic valve replacement)    Orthostatic hypotension            10/17/24 0954   OT Last Visit   OT Visit Date 10/17/24   Note Type   Note Type Treatment   Pain Assessment   Pain Assessment Tool 0-10   Pain Score 1   Pain Location/Orientation Orientation: Left;Location: Knee   Hospital Pain Intervention(s) Repositioned;Cold applied;Ambulation/increased activity   Restrictions/Precautions   Weight Bearing Precautions Per Order Yes   LLE Weight Bearing Per Order WBAT   Other Precautions Chair Alarm;Bed Alarm;WBS;Fall Risk;Pain;Hard of hearing   ADL   LB Dressing Assistance 5  Supervision/Setup   LB Dressing Deficit Thread RLE into underwear;Thread LLE into underwear;Pull up over hips   LB Dressing Comments Educated on LB dressing compensatory strategies   Bed Mobility   Additional Comments Received OOB to recliner   Transfers   Sit to Stand 5  Supervision   Additional items Armrests   Stand to Sit 5  Supervision   Additional items Armrests   Additional Comments Vcs for hand placement   Functional Mobility   Functional Mobility 5  Supervision   Additional Comments Functional household distance, increased time required   Additional items Rolling walker   Subjective   Subjective \"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 Patient agreeable to OT treatment session. Pt received seated OOB to Recliner. Pt improved all functional txfers/mobility with S & RW. Pt's activity tolerance improved, able to tolerate a household distance. Pt able to demo LB dressing with S s/p education of compensatory strategies. Patient continues to be functioning below baseline level, occupational performance remains limited secondary to factors listed above, and pt at increased risk for falls and injury.  The patient's raw score on the AM-PAC Daily Activity inpatient short form is 21, standardized score is 44.27, greater than 39.4. Patients at this level are likely to benefit from DC to home. Please refer to the recommendation of the Occupational Therapist for safe DC planning.  From OT standpoint, recommendation at time of D/C would be DC with Level III - Low Rehab Resource Intensity resources. Patient to benefit from continued Occupational Therapy treatment while in the hospital to address deficits as defined above and maximize level of functional independence with ADLs and functional mobility. Pt left seated OOB to Recliner with call bell in reach, tray table in reach, needs met, chair alarm activated, RN informed.   Plan   Treatment Interventions ADL retraining;Functional transfer training;Endurance training;Equipment evaluation/education;Patient/family training;Compensatory technique education;Continued evaluation   Goal Expiration Date 10/26/24   OT Treatment Day 1   OT Frequency 1-2x/wk   Discharge Recommendation   Rehab Resource Intensity Level, OT No post-acute rehabilitation needs   AM-PAC Daily Activity Inpatient   Lower Body Dressing 3   Bathing 3   Toileting 3   Upper Body Dressing 4   Grooming 4   Eating 4   Daily Activity Raw Score 21   Daily Activity Standardized Score (Calc for Raw Score >=11) 44.27   AM-PAC Applied Cognition  Inpatient   Following a Speech/Presentation 4   Understanding Ordinary Conversation 4   Taking Medications 4   Remembering Where Things Are Placed or Put Away 4   Remembering List of 4-5 Errands 3   Taking Care of Complicated Tasks 3   Applied Cognition Raw Score 22   Applied Cognition Standardized Score 47.83   End of Consult   Education Provided Yes   Patient Position at End of Consult Bedside chair;Bed/Chair alarm activated;All needs within reach   Nurse Communication Nurse aware of consult       Wilda Isaac OTR/DIMPLE

## 2024-10-18 ENCOUNTER — TELEPHONE (OUTPATIENT)
Dept: OBGYN CLINIC | Facility: HOSPITAL | Age: 89
End: 2024-10-18

## 2024-10-18 NOTE — UTILIZATION REVIEW
NOTIFICATION OF ADMISSION DISCHARGE   This is a Notification of Discharge from [facility]. Please be advised that this patient has been discharge from our facility. Below you will find the admission and discharge date and time including the patient’s disposition.   UTILIZATION REVIEW CONTACT:  Kayleen Up  Utilization   Network Utilization Review Department  Phone: [phone] x carefully listen to the prompts. All voicemails are confidential.  Email: [email or web address]     ADMISSION INFORMATION  PRESENTATION DATE: 10/16/2024  7:25 AM  OBERVATION ADMISSION DATE: N/A  INPATIENT ADMISSION DATE: 10/16/24  9:35 AM   DISCHARGE DATE: 10/17/2024  2:35 PM   DISPOSITION:Home with Home Health Care    Network Utilization Review Department  ATTENTION: Please call with any questions or concerns to [phone] and carefully listen to the prompts so that you are directed to the right person. All voicemails are confidential.   For Discharge needs, contact Care Management DC Support Team at [phone] opt. 2  Send all requests for admission clinical reviews, approved or denied determinations and any other requests to dedicated fax number below belonging to the campus where the patient is receiving treatment. List of dedicated fax numbers for the Facilities:  FACILITY NAME UR FAX NUMBER   ADMISSION DENIALS (Administrative/Medical Necessity) [phone]   DISCHARGE SUPPORT TEAM ([facility]) [phone]   PARENT CHILD HEALTH (Maternity/NICU/Pediatrics) [phone]   [facility] [phone]   [facility] [phone]   [facility] [phone]   [facility] [phone]   [facility] [phone]   [facility] [phone]   [facility] [phone]   [facility]  [place] [phone]   [facility] [phone]   [facility] [phone]   [facility] [phone]   [facility] [phone]

## 2024-10-18 NOTE — TELEPHONE ENCOUNTER
Patient contacted for a postoperative follow up assessment. Patient states current pain level of a 5/10 when sitting and walking with RW.  Patient reports increase in swelling and dressing is Dressing C/D/I Patient is icing the site regularly. NN educated patient on post-op swelling, icing, and constipation.    Confirmed upcoming appointments w/ patient:   PT 10/21  Postop -- routing to office staff to schedule PO appt     We reviewed patients AVS medication list. Patient is taking Tylenol 1000mg every 8 hours, Oxycodone 5mg every 4 hours, Senokot once daily, and doxycycline 100mg BID x14 days, Zofran 4mg PRN, Lovenox every 12 hours x4 days and resuming coumadin pending bloodwork. Pt states she is following up w/ PCP to confirm instructions on bloodwork/coumadin. Patient has not had a BM but is taking prescribed Senokot. Discussed postop constipation, increasing fluids/fiber, prunes/prune juice.       Patient denies nausea, vomiting, abdominal pain, chest pain, shortness of breath, fever, dizziness, and calf pain .Patient does not have any other questions or concerns at this time. Pt was encouraged to call with any questions, concerns or issues.

## 2024-10-19 LAB
BACTERIA SPEC ANAEROBE CULT: NO GROWTH
BACTERIA TISS AEROBE CULT: NO GROWTH
GRAM STN SPEC: NORMAL

## 2024-10-21 ENCOUNTER — OFFICE VISIT (OUTPATIENT)
Dept: PHYSICAL THERAPY | Facility: CLINIC | Age: 89
End: 2024-10-21
Payer: COMMERCIAL

## 2024-10-21 VITALS
SYSTOLIC BLOOD PRESSURE: 150 MMHG | TEMPERATURE: 97.1 F | OXYGEN SATURATION: 93 % | DIASTOLIC BLOOD PRESSURE: 90 MMHG | HEART RATE: 78 BPM

## 2024-10-21 DIAGNOSIS — Z96.659 MECHANICAL LOOSENING OF PROSTHETIC KNEE, INITIAL ENCOUNTER  (HCC): Primary | ICD-10-CM

## 2024-10-21 DIAGNOSIS — T84.038A MECHANICAL LOOSENING OF PROSTHETIC KNEE, INITIAL ENCOUNTER  (HCC): Primary | ICD-10-CM

## 2024-10-21 LAB
FUNGUS SPEC CULT: NORMAL

## 2024-10-21 PROCEDURE — 97140 MANUAL THERAPY 1/> REGIONS: CPT | Performed by: PHYSICAL THERAPIST

## 2024-10-21 PROCEDURE — 97110 THERAPEUTIC EXERCISES: CPT | Performed by: PHYSICAL THERAPIST

## 2024-10-21 NOTE — PROGRESS NOTES
PT Evaluation     Today's date: 10/21/2024  Patient name: Anneliese Garcia  : 1935  MRN: [id number]  Referring provider: Roxana Dale PA-C  Dx:   Encounter Diagnosis     ICD-10-CM    1. Mechanical loosening of prosthetic knee, initial encounter  ([facility])  T84.038A     Z96.659                      Assessment  Impairments: abnormal coordination, abnormal muscle firing, abnormal muscle tone, abnormal or restricted ROM, abnormal movement, activity intolerance, impaired balance, impaired physical strength, pain with function and poor posture     Assessment details: Problem List:  1) Limited L knee stability due to medial joint stress from valgus moment  2) Limited L hamstring weakness     Anneliese Garcia is a pleasant 89 y.o. female who presents with increased L knee weakness and instability secondary to prosthetic failure and loosening.  This leads to increased instability and risk for falls with limited SLS balance.  This suggests that no further referral appears necessary at this time based upon examination results.  I expect she will benefit from 8 weeks of PT to stabilize her knee, maximize function and return to pain-free ADLs.        Comparable signs:  1) Unable to walk her dogs  2) Unable to stand to do house hold chores     Re-assessment 10/21/24:  Anneliese has returned to OPPT S/P L TKA (DOS : 10/16/24) that is consistent with referring diagnosis and moderately complex due to complex PMH and TKA revision.  Clinically she demonstrates limited L knee flexion and extension as well as increased effusion limiting her ability to walk and perform household chores.  This suggests the need for continued skilled PT to address the above listed impairments.    Understanding of Dx/Px/POC: good     Prognosis: good    Goals  Short Term Goals (4 weeks)  1.) Establish independence with HEP  2.) Decrease subjective pain levels from NPRS at least to 2-5/10 at rest and with activity  3.) Improve L knee ROM at least  5-10 degrees into all planes to allow for improved ease of movement with less guarding    Long Term Goals (8 weeks)  1.) Improve L knee ROM to WNL in all planes to restore normal movement with ADLs and function  2.) Improve L quad strength to 5/5 in all planes in order to return to pain-free ADLs and function  3.) Improve FOTO score at least to 75 points showing improved self reported disability        Plan  Patient would benefit from: PT eval and skilled physical therapy  Planned modality interventions: biofeedback, cryotherapy, electrical stimulation/Russian stimulation and TENS    Planned therapy interventions: abdominal trunk stabilization, activity modification, ADL retraining, ADL training, balance, balance/weight bearing training, behavior modification, body mechanics training, coordination, compression, flexibility, functional ROM exercises, gait training, graded activity, graded exercise, graded motor, home exercise program, IADL retraining, transfer training, therapeutic training, therapeutic exercise, therapeutic activities, stretching, strengthening, sensory integrative techniques, self care, postural training, patient/caregiver education, neuromuscular re-education, nerve gliding, muscle pump exercises, motor coordination training, massage, manual therapy, IASTM and joint mobilization  Speech planned therapy intervention: NMES    Frequency: 2x week  Duration in weeks: 8  Plan of Care beginning date: 10/7/2024  Plan of Care expiration date: 12/9/2024  Treatment plan discussed with: patient  Plan details: Continue POC for stability; monitor sxs and progress as able         Subjective Evaluation    History of Present Illness  Date of onset: 9/11/2018  Date of surgery: 10/16/2024  Mechanism of injury: Anneliese Garcia is a 89 y.o. female who presents with increased L knee pain S/P TKA from 15 years ago.  Notes that her L knee was never put in correctly from original surgeon with Dr. Fox - has had 4  different Mds suggest that the prosthetic was not placed correctly.  Decided that she wanted to get her L knee revised by Dr. Dueñas because she cannot stand or walk and is in pain all the time.   Decided to seek out MD consult where X-rays were (-) for fx and script for OPPT provided.  Reports increased L knee pain night pain but does not manage with any medication or ice/heat.  Denies any changes in bowel or bladder function.  Denies any NT signs or sxs.  F/U with MD in 1 month.  Wishes to return to PLOF pain-free wishes to be able to stand and walk around her house pain-free. Has had prior surgeries at /S.  Wishes to continue to perform caregiving duties for her dogs.         RE-assessment 10/21/24:  Anneliese returns to OPPT after TKA on on 10/16/24 at [facility].  Notes that she stayed over night the first day and then sent home the same day.  Denies any home PT.  Using a RW at home and having her  help out with bathing and going to the bathroom.  Has been able to sleep in bed without major difficulty but more from money stress that is leading to her discomfort.  F/U with Dr. Dueñas on 24.  Notes stiffness in the AM.  Denies any NT sxs.  Still unable to perform self care duties such as squatting to the toilet.  Using a stair master for stairs.  Wishes to return to normal around the house and gardening.               Recurrent probem    Quality of life: good    Patient Goals  Patient goals for therapy: decreased edema, improved balance, decreased pain, increased motion, increased strength, independence with ADLs/IADLs and return to sport/leisure activities  Patient goal: Walk and be able to do everything around her house  Pain  Current pain ratin  At best pain ratin  At worst pain ratin  Location: L knee  Quality: dull ache, sharp and radiating  Relieving factors: rest and support  Aggravating factors: walking, standing and stair climbing  Progression: worsening    Social  Support  Steps to enter house: yes  3  Stairs in house: no   Lives in: one-story house  Lives with: spouse    Employment status: not working  Life stress: Moderate      Diagnostic Tests  X-ray: abnormal  Treatments  Previous treatment: physical therapy  Current treatment: physical therapy        Objective     Active Range of Motion     Lumbar   Flexion:  WFL  Extension:  WFL  Left lateral flexion:  Restriction level: moderate  Right lateral flexion:  Restriction level: moderate  Left rotation:  WFL  Right rotation:  WFL  Left Knee   Flexion: 72 degrees   Extension: 8 degrees     Right Knee   Flexion: 120 degrees   Extension: 5 degrees     Strength/Myotome Testing     Left Hip   Planes of Motion   Flexion: 4  Adduction: 5  External rotation: 4-  Internal rotation: 4+    Right Hip   Planes of Motion   Flexion: 5  Adduction: 5  External rotation: 5  Internal rotation: 5    Left Knee   Flexion: 4+  Extension: 4-    Right Knee   Flexion: 5  Extension: 5    Tests     Lumbar     Left   Negative slump test.     Right   Negative slump test.     Swelling     Left Knee Girth Measurement (cm)   Joint line: 40 cm  10 cm above joint line: 42 cm  10 cm below joint line: 35 cm    Right Knee Girth Measurement (cm)   Joint line: 35 cm  10 cm above joint line: 35.5 cm  10 cm below joint line: 33 cm    Functional Assessment      Squat    Left within functional limits and right within functional limits.     Single Leg Stance   Left: 0 seconds  Right: 0 seconds  Neuro Exam:     Functional outcomes   5x sit to stand: 19.05 (seconds)  TU.22 with rollator (seconds)             Precautions: HTN, Aortic Stenosis, Osteoporosis, CKD 2; sjogrens, history of PE, aortic valve replacement, blood thinners  EPOC: 24  HEP: Access Code: [email or web address]  URL: [email or web address]  Date: 10/07/2024  Prepared by: Lester Mccain    Exercises  - Seated Hamstring Stretch  - 1 x daily - 7 x weekly - 3 sets - 10 reps  - Seated Quad Set  - 1 x  "daily - 7 x weekly - 3 sets - 10 reps  - Seated Knee Extension AAROM  - 1 x daily - 7 x weekly - 3 sets - 10 reps  - Seated Knee Flexion Extension AAROM with Overpressure  - 1 x daily - 7 x weekly - 3 sets - 10 reps  - Sitting Heel Slide with Towel  - 1 x daily - 7 x weekly - 3 sets - 10 reps  - Standing March with Counter Support  - 1 x daily - 7 x weekly - 3 sets - 10 reps  - Heel Raises with Counter Support  - 1 x daily - 7 x weekly - 3 sets - 10 reps      Auth Status: Approved 10/7 10/21           Visit # 1 2           Visits Remaining 11 10           Manuals             L knee PROM             L patellar ROM             HS stretch                          Neuro Re-Ed             Tandem             SLS                                                                              Ther Ex             SLR 3 way STD             Heel slides  20x           Quad sets  10x2\"           Seated HS stretch             SAQ             LAQ                                       Ther Activity             Bike for ROM                          Gait Training                                       Modalities                                            "

## 2024-10-25 ENCOUNTER — OFFICE VISIT (OUTPATIENT)
Dept: PHYSICAL THERAPY | Facility: CLINIC | Age: 89
End: 2024-10-25
Payer: COMMERCIAL

## 2024-10-25 DIAGNOSIS — T84.038A MECHANICAL LOOSENING OF PROSTHETIC KNEE, INITIAL ENCOUNTER  (HCC): Primary | ICD-10-CM

## 2024-10-25 DIAGNOSIS — Z96.659 MECHANICAL LOOSENING OF PROSTHETIC KNEE, INITIAL ENCOUNTER  (HCC): Primary | ICD-10-CM

## 2024-10-25 PROCEDURE — 97110 THERAPEUTIC EXERCISES: CPT

## 2024-10-25 PROCEDURE — 97140 MANUAL THERAPY 1/> REGIONS: CPT

## 2024-10-25 NOTE — PROGRESS NOTES
Daily Note     Today's date: 10/25/2024  Patient name: Anneliese Garcia  : 1935  MRN: [id number]  Referring provider: Roxana Dale PA-C  Dx:   Encounter Diagnosis     ICD-10-CM    1. Mechanical loosening of prosthetic knee, initial encounter  ([facility])  T84.038A     Z96.659                      Subjective: L knee feels ok, she feels that she needs to get it moving a little more.       Objective: See treatment diary below      Incision: Clean, dry, intact. No erythema.  No drainage. Diffuse ecchymosis.     Assessment: Tolerated treatment well. Initiated gentle PROM, flex and ext well tolerated. Fair quad engagement noted with sets/SAQ/LAQ. Edu on safe sit/stand transfers while using the rollator- tendency to leave her AD behind with reaching for furniture. Patient demonstrated good doc, with symmetrical step and stride length. Continued PT would be beneficial to improve function.          Plan: Continue per plan of care.       Precautions: HTN, Aortic Stenosis, Osteoporosis, CKD 2; sjogrens, history of PE, aortic valve replacement, blood thinners  EPOC: 24  HEP: Access Code: [email or web address]  URL: [email or web address]  Date: 10/07/2024  Prepared by: Lester Mccain    Exercises  - Seated Hamstring Stretch  - 1 x daily - 7 x weekly - 3 sets - 10 reps  - Seated Quad Set  - 1 x daily - 7 x weekly - 3 sets - 10 reps  - Seated Knee Extension AAROM  - 1 x daily - 7 x weekly - 3 sets - 10 reps  - Seated Knee Flexion Extension AAROM with Overpressure  - 1 x daily - 7 x weekly - 3 sets - 10 reps  - Sitting Heel Slide with Towel  - 1 x daily - 7 x weekly - 3 sets - 10 reps  - Standing March with Counter Support  - 1 x daily - 7 x weekly - 3 sets - 10 reps  - Heel Raises with Counter Support  - 1 x daily - 7 x weekly - 3 sets - 10 reps      Auth Status: Approved 10/7 10/21 10/25          Visit # 1 2 3          Visits Remaining 11 10 9          Manuals             L knee PROM   10'          L patellar ROM    "          HS stretch                          Neuro Re-Ed             Tandem             SLS                                                                              Ther Ex             SLR 3 way STD   10x ea bilat          Heel slides  20x 20x          Quad sets  10x2\" 10x3\"          Seated HS stretch   3x20\"          SAQ   20x          LAQ   20x                                    Ther Activity             Bike for ROM   Nv?                       Gait Training                Rollator 3 large laps                       Modalities                                            "

## 2024-10-28 LAB
FUNGUS SPEC CULT: NORMAL

## 2024-10-28 NOTE — ANESTHESIA POSTPROCEDURE EVALUATION
Post-Op Assessment Note    CV Status:  Stable  Pain Score: 0    Pain management: adequate       Mental Status:  Alert and awake   Hydration Status:  Stable   PONV Controlled:  None   Airway Patency:  Patent     Post Op Vitals Reviewed: Yes    No anethesia notable event occurred.    Staff: Anesthesiologist           Last Filed PACU Vitals:  Vitals Value Taken Time   Temp 97.5 °F (36.4 °C) 10/16/24 1155   Pulse 74 10/16/24 1320   BP 88/53 10/16/24 1402   Resp 13 10/16/24 1215   SpO2 97 % 10/16/24 1320       Modified Rere:  No data recorded

## 2024-10-29 ENCOUNTER — OFFICE VISIT (OUTPATIENT)
Dept: PHYSICAL THERAPY | Facility: CLINIC | Age: 89
End: 2024-10-29
Payer: COMMERCIAL

## 2024-10-29 DIAGNOSIS — T84.038A MECHANICAL LOOSENING OF PROSTHETIC KNEE, INITIAL ENCOUNTER  (HCC): Primary | ICD-10-CM

## 2024-10-29 DIAGNOSIS — Z96.659 MECHANICAL LOOSENING OF PROSTHETIC KNEE, INITIAL ENCOUNTER  (HCC): Primary | ICD-10-CM

## 2024-10-29 PROCEDURE — 97112 NEUROMUSCULAR REEDUCATION: CPT

## 2024-10-29 PROCEDURE — 97110 THERAPEUTIC EXERCISES: CPT

## 2024-10-29 NOTE — PROGRESS NOTES
Daily Note     Today's date: 10/29/2024  Patient name: Anneliese Garcia  : 1935  MRN: [id number]  Referring provider: Roxana Dale PA-C  Dx:   Encounter Diagnosis     ICD-10-CM    1. Mechanical loosening of prosthetic knee, initial encounter  ([facility])  T84.038A     Z96.659                      Subjective: Reports feeling pretty good after LV.       Objective: See treatment diary below      Assessment: Tolerated treatment well. Initiated bike, supine SLR, and bridges today. Continued education on safe sit/stand transfers while using the rollator- tendency to leave her AD behind with reaching for furniture. Continued PT would be beneficial to improve function.          Plan: Continue per plan of care.       Precautions: HTN, Aortic Stenosis, Osteoporosis, CKD 2; sjogrens, history of PE, aortic valve replacement, blood thinners  EPOC: 24  HEP: Access Code: [email or web address]  URL: [email or web address]  Date: 10/07/2024  Prepared by: Lester Mccain    Exercises  - Seated Hamstring Stretch  - 1 x daily - 7 x weekly - 3 sets - 10 reps  - Seated Quad Set  - 1 x daily - 7 x weekly - 3 sets - 10 reps  - Seated Knee Extension AAROM  - 1 x daily - 7 x weekly - 3 sets - 10 reps  - Seated Knee Flexion Extension AAROM with Overpressure  - 1 x daily - 7 x weekly - 3 sets - 10 reps  - Sitting Heel Slide with Towel  - 1 x daily - 7 x weekly - 3 sets - 10 reps  - Standing March with Counter Support  - 1 x daily - 7 x weekly - 3 sets - 10 reps  - Heel Raises with Counter Support  - 1 x daily - 7 x weekly - 3 sets - 10 reps      Auth Status: Approved 10/7 10/21 10/25 10/29         Visit # 1 2 3 4         Visits Remaining 11 10 9 8         Manuals             L knee PROM   10'          L patellar ROM             HS stretch                          Neuro Re-Ed             Tandem             SLS                                                                              Ther Ex             SLR 3 way STD   10x ea bilat  "10x ea b/l         Heel slides  20x 20x x20         Quad sets  10x2\" 10x3\"          Seated HS stretch   3x20\" 3x30\" b/l         SLR    2x10 b/l         Bridges    3\" x20         SAQ   20x 5\" 2x10 b/l         LAQ   20x 3x10 b/l         HR/TR    X10 ea                      Ther Activity             Bike for ROM   Nv? 6min                      Gait Training                Rollator 3 large laps                       Modalities                                            "

## 2024-11-01 ENCOUNTER — OFFICE VISIT (OUTPATIENT)
Dept: PHYSICAL THERAPY | Facility: CLINIC | Age: 89
End: 2024-11-01
Payer: COMMERCIAL

## 2024-11-01 ENCOUNTER — OFFICE VISIT (OUTPATIENT)
Dept: OBGYN CLINIC | Facility: CLINIC | Age: 89
End: 2024-11-01

## 2024-11-01 ENCOUNTER — APPOINTMENT (OUTPATIENT)
Dept: RADIOLOGY | Facility: CLINIC | Age: 89
End: 2024-11-01
Payer: COMMERCIAL

## 2024-11-01 VITALS — HEIGHT: 60 IN | BODY MASS INDEX: 24.35 KG/M2 | WEIGHT: 124 LBS

## 2024-11-01 DIAGNOSIS — T84.038A MECHANICAL LOOSENING OF PROSTHETIC KNEE, INITIAL ENCOUNTER  (HCC): Primary | ICD-10-CM

## 2024-11-01 DIAGNOSIS — Z47.1 AFTERCARE FOLLOWING LEFT KNEE JOINT REPLACEMENT SURGERY: Primary | ICD-10-CM

## 2024-11-01 DIAGNOSIS — Z96.652 AFTERCARE FOLLOWING LEFT KNEE JOINT REPLACEMENT SURGERY: Primary | ICD-10-CM

## 2024-11-01 DIAGNOSIS — Z96.659 MECHANICAL LOOSENING OF PROSTHETIC KNEE, INITIAL ENCOUNTER  (HCC): Primary | ICD-10-CM

## 2024-11-01 DIAGNOSIS — Z96.652 AFTERCARE FOLLOWING LEFT KNEE JOINT REPLACEMENT SURGERY: ICD-10-CM

## 2024-11-01 DIAGNOSIS — Z47.1 AFTERCARE FOLLOWING LEFT KNEE JOINT REPLACEMENT SURGERY: ICD-10-CM

## 2024-11-01 PROCEDURE — 97140 MANUAL THERAPY 1/> REGIONS: CPT | Performed by: PHYSICAL THERAPIST

## 2024-11-01 PROCEDURE — 73562 X-RAY EXAM OF KNEE 3: CPT

## 2024-11-01 PROCEDURE — 97110 THERAPEUTIC EXERCISES: CPT | Performed by: PHYSICAL THERAPIST

## 2024-11-01 PROCEDURE — 99024 POSTOP FOLLOW-UP VISIT: CPT | Performed by: STUDENT IN AN ORGANIZED HEALTH CARE EDUCATION/TRAINING PROGRAM

## 2024-11-01 NOTE — PROGRESS NOTES
Subjective:Patient seen and examined. Pain well-controlled. Progressing very well. Incision without drainage. Denies fevers or chills. Very happy with her progress thus far, pain better than prior to revision.     Physical Exam:  Incision: well healing without drainage or dehiscence.   ROM: 5-105  5/5 IP/Q/HS/TA/GS, 2+ DP/PT, SILT DP/SP/S/S/TN    XR Left knee: attune revision stemmed valgus/varus constrained total knee without signs of fracture or loosening     Assessment/Plan:  89 year old 2 weeks s/p revision left total knee arthroplasty doing very well     - continue multi-modal pain control   - Weight bearing status: WBAT  - DVT ppx: Coumadin   - Incision care: no submerging  - PT/OT  - F/U 4 weeks     Scribe Attestation      I,:   am acting as a scribe while in the presence of the attending physician.:       I,:   personally performed the services described in this documentation    as scribed in my presence.:

## 2024-11-01 NOTE — PROGRESS NOTES
"Daily Note     Today's date: 2024  Patient name: Anneliese Garcia  : 1935  MRN: [id number]  Referring provider: Roxana Dale PA-C  Dx:   Encounter Diagnosis     ICD-10-CM    1. Mechanical loosening of prosthetic knee, initial encounter  ([facility])  T84.038A     Z96.659                      Subjective: Feeling good overall, denies major discomfort other than the front of her shin.       Objective: See treatment diary below      Assessment: 3 to 113 degrees noted today.  With ambulation in // bars had increased trendelenburg R with R stance.  With stance on R LE also had increased anterior shin pain.  With repeated knee extension manual OP Gr 3-4 had less discomfort with stance with ambulation post.  Reviewed self stretching techniques with HEP.       Plan: Continue per plan of care.      Precautions: HTN, Aortic Stenosis, Osteoporosis, CKD 2; sjogrens, history of PE, aortic valve replacement, blood thinners  EPOC: 24  HEP: Access Code: [email or web address]  URL: [email or web address]  Date: 10/07/2024  Prepared by: Lester Mccain    Exercises  - Seated Hamstring Stretch  - 1 x daily - 7 x weekly - 3 sets - 10 reps  - Seated Quad Set  - 1 x daily - 7 x weekly - 3 sets - 10 reps  - Seated Knee Extension AAROM  - 1 x daily - 7 x weekly - 3 sets - 10 reps  - Seated Knee Flexion Extension AAROM with Overpressure  - 1 x daily - 7 x weekly - 3 sets - 10 reps  - Sitting Heel Slide with Towel  - 1 x daily - 7 x weekly - 3 sets - 10 reps  - Standing March with Counter Support  - 1 x daily - 7 x weekly - 3 sets - 10 reps  - Heel Raises with Counter Support  - 1 x daily - 7 x weekly - 3 sets - 10 reps      Auth Status: Approved 10/7 10/21 10/25 10/29 11/1        Visit # 1 2 3 4 5        Visits Remaining 11 10 9 8 7        Manuals             L knee PROM   10'  15 min         L patellar ROM             HS stretch                          Neuro Re-Ed             Tandem     30\"x3        SLS                        " "                                                      Ther Ex             SLR 3 way STD   10x ea bilat 10x ea b/l 2x10        Heel slides  20x 20x x20 20x        Quad sets  10x2\" 10x3\"          Seated HS stretch   3x20\" 3x30\" b/l 3x30\"        SLR    2x10 b/l 2x10        Bridges    3\" x20 3\"x20        SAQ   20x 5\" 2x10 b/l 5\" 2x10        LAQ   20x 3x10 b/l 3x10        HR/TR    X10 ea 20x                      Ther Activity             Bike for ROM   Nv? 6min 6 min                      Gait Training                Rollator 3 large laps                       Modalities                                              "

## 2024-11-04 LAB
FUNGUS SPEC CULT: NORMAL

## 2024-11-06 ENCOUNTER — OFFICE VISIT (OUTPATIENT)
Dept: PHYSICAL THERAPY | Facility: CLINIC | Age: 89
End: 2024-11-06
Payer: COMMERCIAL

## 2024-11-06 DIAGNOSIS — Z96.659 MECHANICAL LOOSENING OF PROSTHETIC KNEE, INITIAL ENCOUNTER  (HCC): Primary | ICD-10-CM

## 2024-11-06 DIAGNOSIS — T84.038A MECHANICAL LOOSENING OF PROSTHETIC KNEE, INITIAL ENCOUNTER  (HCC): Primary | ICD-10-CM

## 2024-11-06 PROCEDURE — 97140 MANUAL THERAPY 1/> REGIONS: CPT | Performed by: PHYSICAL THERAPIST

## 2024-11-06 PROCEDURE — 97110 THERAPEUTIC EXERCISES: CPT | Performed by: PHYSICAL THERAPIST

## 2024-11-06 NOTE — PROGRESS NOTES
"Daily Note     Today's date: 2024  Patient name: Anneliese Garcia  : 1935  MRN: [id number]  Referring provider: Roxana Dale PA-C  Dx:   Encounter Diagnosis     ICD-10-CM    1. Mechanical loosening of prosthetic knee, initial encounter  ([facility])  T84.038A     Z96.659                      Subjective: Feeling okay, still pain at the top of her knee.       Objective: See treatment diary below      Assessment: No limitation with knee ROM into flexion or extension grossly 113 to 2 degrees.  Most limiting factor regarding ambulation is L lateral lurch with stance on L LE.  Weakness with glute medius with MMT and unable to perform ambulation without LOB.  Cues for supine hip ABD and side stepping in standing will be beneficial.     Plan: Continue per plan of care.      Precautions: HTN, Aortic Stenosis, Osteoporosis, CKD 2; sjogrens, history of PE, aortic valve replacement, blood thinners  EPOC: 24  HEP: Access Code: [email or web address]  URL: [email or web address]  Date: 10/07/2024  Prepared by: Lester Mccain    Exercises  - Seated Hamstring Stretch  - 1 x daily - 7 x weekly - 3 sets - 10 reps  - Seated Quad Set  - 1 x daily - 7 x weekly - 3 sets - 10 reps  - Seated Knee Extension AAROM  - 1 x daily - 7 x weekly - 3 sets - 10 reps  - Seated Knee Flexion Extension AAROM with Overpressure  - 1 x daily - 7 x weekly - 3 sets - 10 reps  - Sitting Heel Slide with Towel  - 1 x daily - 7 x weekly - 3 sets - 10 reps  - Standing March with Counter Support  - 1 x daily - 7 x weekly - 3 sets - 10 reps  - Heel Raises with Counter Support  - 1 x daily - 7 x weekly - 3 sets - 10 reps      Auth Status: Approved 10/7 10/21 10/25 10/29 11/1 11/6       Visit # 1 2 3 4 5 6       Visits Remaining 11 10 9 8 7 6       Manuals             L knee PROM   10'  15 min  15 min        L patellar ROM             HS stretch                          Neuro Re-Ed             Tandem     30\"x3 3x30\"       SLS      3x30\"       Star taps   " "   20x                                                           Ther Ex             SLR 3 way STD   10x ea bilat 10x ea b/l 2x10 2x10 H ABD and ext       Heel slides  20x 20x x20 20x 20x       Quad sets  10x2\" 10x3\"          Seated HS stretch   3x20\" 3x30\" b/l 3x30\"        SLR    2x10 b/l 2x10 2x10 supine       Bridges    3\" x20 3\"x20        SAQ   20x 5\" 2x10 b/l 5\" 2x10 20x       LAQ   20x 3x10 b/l 3x10 20x       HR/TR    X10 ea 20x         Side stepping      5 laps 10 ft       Ther Activity             Bike for ROM   Nv? 6min 6 min  8 min                    Gait Training                Rollator 3 large laps                       Modalities                                                "

## 2024-11-08 ENCOUNTER — OFFICE VISIT (OUTPATIENT)
Dept: PHYSICAL THERAPY | Facility: CLINIC | Age: 89
End: 2024-11-08
Payer: COMMERCIAL

## 2024-11-08 DIAGNOSIS — Z96.659 MECHANICAL LOOSENING OF PROSTHETIC KNEE, INITIAL ENCOUNTER  (HCC): Primary | ICD-10-CM

## 2024-11-08 DIAGNOSIS — T84.038A MECHANICAL LOOSENING OF PROSTHETIC KNEE, INITIAL ENCOUNTER  (HCC): Primary | ICD-10-CM

## 2024-11-08 PROCEDURE — 97140 MANUAL THERAPY 1/> REGIONS: CPT | Performed by: PHYSICAL THERAPIST

## 2024-11-08 PROCEDURE — 97110 THERAPEUTIC EXERCISES: CPT | Performed by: PHYSICAL THERAPIST

## 2024-11-08 NOTE — PROGRESS NOTES
"Daily Note     Today's date: 2024  Patient name: Anneliese Garcia  : 1935  MRN: [id number]  Referring provider: Roxana Dale PA-C  Dx:   Encounter Diagnosis     ICD-10-CM    1. Mechanical loosening of prosthetic knee, initial encounter  ([facility])  T84.038A     Z96.659                      Subjective: Feeling pretty good overall, denies any major pain at her knee.        Objective: See treatment diary below      Assessment: Able to perform step up and step down with fair ability.  Able to initiate leg press without sharp pain and able to perform SL 75 lbs.  Continues have + trendelenburg ambulation improved with SPC ambulation.  NO difficulty with performance of sit to stand from elevated chair height.       Plan: Continue per plan of care.      Precautions: HTN, Aortic Stenosis, Osteoporosis, CKD 2; sjogrens, history of PE, aortic valve replacement, blood thinners  EPOC: 24  HEP: Access Code: [email or web address]  URL: [email or web address]  Date: 10/07/2024  Prepared by: Lester Mccain    Exercises  - Seated Hamstring Stretch  - 1 x daily - 7 x weekly - 3 sets - 10 reps  - Seated Quad Set  - 1 x daily - 7 x weekly - 3 sets - 10 reps  - Seated Knee Extension AAROM  - 1 x daily - 7 x weekly - 3 sets - 10 reps  - Seated Knee Flexion Extension AAROM with Overpressure  - 1 x daily - 7 x weekly - 3 sets - 10 reps  - Sitting Heel Slide with Towel  - 1 x daily - 7 x weekly - 3 sets - 10 reps  - Standing March with Counter Support  - 1 x daily - 7 x weekly - 3 sets - 10 reps  - Heel Raises with Counter Support  - 1 x daily - 7 x weekly - 3 sets - 10 reps      Auth Status: Approved 10/7 10/21 10/25 10/29 11/1 11/6 11/8      Visit # 1 2 3 4 5 6 7      Visits Remaining 11 10 9 8 7 6 5      Manuals             L knee PROM   10'  15 min  15 min  15 min       L patellar ROM             HS stretch                          Neuro Re-Ed             Tandem     30\"x3 3x30\" 3x30\"      SLS      3x30\" 3x30\"      Star " "taps      20x NV                                                          Ther Ex             SLR 3 way STD   10x ea bilat 10x ea b/l 2x10 2x10 H ABD and ext 2x10 OTB H ABD and ext      Heel slides  20x 20x x20 20x 20x 20x      Quad sets  10x2\" 10x3\"          Seated HS stretch   3x20\" 3x30\" b/l 3x30\"        SLR    2x10 b/l 2x10 2x10 supine 2x10      Bridges    3\" x20 3\"x20  SL 10x 2 ea       SAQ   20x 5\" 2x10 b/l 5\" 2x10 20x 20x      LAQ   20x 3x10 b/l 3x10 20x 20x      HR/TR    X10 ea 20x   20x      Step up and down       8\" SPC 20x ea       Side stepping      5 laps 10 ft 5 laps 10 ft       Ther Activity             Bike for ROM   Nv? 6min 6 min  8 min NV                   Gait Training                Rollator 3 large laps                       Modalities                                                  "

## 2024-11-09 PROBLEM — Z01.89 ENCOUNTER FOR GERIATRIC ASSESSMENT: Status: RESOLVED | Noted: 2024-10-10 | Resolved: 2024-11-09

## 2024-11-11 LAB
FUNGUS SPEC CULT: NORMAL

## 2024-11-13 ENCOUNTER — OFFICE VISIT (OUTPATIENT)
Dept: PHYSICAL THERAPY | Facility: CLINIC | Age: 89
End: 2024-11-13
Payer: COMMERCIAL

## 2024-11-13 DIAGNOSIS — Z96.659 MECHANICAL LOOSENING OF PROSTHETIC KNEE, INITIAL ENCOUNTER  (HCC): Primary | ICD-10-CM

## 2024-11-13 DIAGNOSIS — T84.038A MECHANICAL LOOSENING OF PROSTHETIC KNEE, INITIAL ENCOUNTER  (HCC): Primary | ICD-10-CM

## 2024-11-13 PROCEDURE — 97140 MANUAL THERAPY 1/> REGIONS: CPT | Performed by: PHYSICAL THERAPIST

## 2024-11-13 PROCEDURE — 97530 THERAPEUTIC ACTIVITIES: CPT | Performed by: PHYSICAL THERAPIST

## 2024-11-13 PROCEDURE — 97110 THERAPEUTIC EXERCISES: CPT | Performed by: PHYSICAL THERAPIST

## 2024-11-13 NOTE — PROGRESS NOTES
Daily Note     Today's date: 2024  Patient name: Anneliese Garcia  : 1935  MRN: [id number]  Referring provider: Roxana Dale PA-C  Dx:   Encounter Diagnosis     ICD-10-CM    1. Mechanical loosening of prosthetic knee, initial encounter  ([facility])  T84.038A     Z96.659                      Subjective: Feeling good overall, denies any difficulty with performance of HEP.  Still with medial shin pain with doing the bike.        Objective: See treatment diary below      Assessment: No medial shank pain with end range knee flexion or with performance of prone knee flexion.  Gross hypomobility noted at talocrural joint.  Improved medial calf pain with biking post self mobilization or talocural joint.  NO difficulty with leg press performanc.  Still with noted trendelenburg with ambulation.       Plan: Continue per plan of care.      Precautions: HTN, Aortic Stenosis, Osteoporosis, CKD 2; sjogrens, history of PE, aortic valve replacement, blood thinners  EPOC: 24  HEP: Access Code: [email or web address]  URL: [email or web address]  Date: 10/07/2024  Prepared by: Lester Mccain    Exercises  - Seated Hamstring Stretch  - 1 x daily - 7 x weekly - 3 sets - 10 reps  - Seated Quad Set  - 1 x daily - 7 x weekly - 3 sets - 10 reps  - Seated Knee Extension AAROM  - 1 x daily - 7 x weekly - 3 sets - 10 reps  - Seated Knee Flexion Extension AAROM with Overpressure  - 1 x daily - 7 x weekly - 3 sets - 10 reps  - Sitting Heel Slide with Towel  - 1 x daily - 7 x weekly - 3 sets - 10 reps  - Standing March with Counter Support  - 1 x daily - 7 x weekly - 3 sets - 10 reps  - Heel Raises with Counter Support  - 1 x daily - 7 x weekly - 3 sets - 10 reps      Auth Status: Approved 10/7 10/21 10/25 10/29 11/1 11/6 11/8 11/13     Visit # 1 2 3 4 5 6 7 8     Visits Remaining 11 10 9 8 7 6 5 4     Manuals             L knee PROM   10'  15 min  15 min  15 min  15 min      L patellar ROM             HS stretch                     "      Neuro Re-Ed             Tandem     30\"x3 3x30\" 3x30\" Tandem walk 20 ftx 2     SLS      3x30\" 3x30\"      Star taps      20x NV                                                          Ther Ex             SLR 3 way STD   10x ea bilat 10x ea b/l 2x10 2x10 H ABD and ext 2x10 OTB H ABD and ext OTB 20x ea      Heel slides  20x 20x x20 20x 20x 20x      Quad sets  10x2\" 10x3\"          Seated HS stretch   3x20\" 3x30\" b/l 3x30\"   Strap with gastroc 30\"x2     SLR    2x10 b/l 2x10 2x10 supine 2x10      Bridges    3\" x20 3\"x20  SL 10x 2 ea       Leg press        105# 30x B/L     SAQ   20x 5\" 2x10 b/l 5\" 2x10 20x 20x      LAQ   20x 3x10 b/l 3x10 20x 20x      HR/TR    X10 ea 20x   20x Slant 20x ea      Step up and down       8\" SPC 20x ea       Side stepping      5 laps 10 ft 5 laps 10 ft  5 laps 10 ft     Ther Activity             Bike for ROM   Nv? 6min 6 min  8 min NV 8 min                   Gait Training                Rollator 3 large laps                       Modalities                                                    "

## 2024-11-15 ENCOUNTER — OFFICE VISIT (OUTPATIENT)
Dept: PHYSICAL THERAPY | Facility: CLINIC | Age: 89
End: 2024-11-15
Payer: COMMERCIAL

## 2024-11-15 DIAGNOSIS — Z96.659 MECHANICAL LOOSENING OF PROSTHETIC KNEE, INITIAL ENCOUNTER  (HCC): Primary | ICD-10-CM

## 2024-11-15 DIAGNOSIS — T84.038A MECHANICAL LOOSENING OF PROSTHETIC KNEE, INITIAL ENCOUNTER  (HCC): Primary | ICD-10-CM

## 2024-11-15 PROCEDURE — 97530 THERAPEUTIC ACTIVITIES: CPT | Performed by: PHYSICAL THERAPIST

## 2024-11-15 PROCEDURE — 97110 THERAPEUTIC EXERCISES: CPT | Performed by: PHYSICAL THERAPIST

## 2024-11-15 NOTE — PROGRESS NOTES
Daily Note     Today's date: 11/15/2024  Patient name: Anneliese Garcia  : 1935  MRN: [id number]  Referring provider: Roxana Dale PA-C  Dx:   Encounter Diagnosis     ICD-10-CM    1. Mechanical loosening of prosthetic knee, initial encounter  ([facility])  T84.038A     Z96.659                      Subjective: Doing well overall, no major difficulty with ADLs, stairs or walking.  No AD use.       Objective: See treatment diary below      Assessment: Able to perform lat step down initiation with fair ability.  Balance improved with cone tapping without LOB.  SLS improved to 5 sec without LOB.  With SL hip ABD- continues to have TFL bias and limited endurance and strength with cues for proper positioning into extension.  No difficulty with performance of leg press or step up.  Will plan on potential D/C next session based on pt re-assessment.     Plan: Continue per plan of care.      Precautions: HTN, Aortic Stenosis, Osteoporosis, CKD 2; sjogrens, history of PE, aortic valve replacement, blood thinners  EPOC: 24  HEP: Access Code: [email or web address]  URL: [email or web address]  Date: 10/07/2024  Prepared by: Lester Mccain    Exercises  - Seated Hamstring Stretch  - 1 x daily - 7 x weekly - 3 sets - 10 reps  - Seated Quad Set  - 1 x daily - 7 x weekly - 3 sets - 10 reps  - Seated Knee Extension AAROM  - 1 x daily - 7 x weekly - 3 sets - 10 reps  - Seated Knee Flexion Extension AAROM with Overpressure  - 1 x daily - 7 x weekly - 3 sets - 10 reps  - Sitting Heel Slide with Towel  - 1 x daily - 7 x weekly - 3 sets - 10 reps  - Standing March with Counter Support  - 1 x daily - 7 x weekly - 3 sets - 10 reps  - Heel Raises with Counter Support  - 1 x daily - 7 x weekly - 3 sets - 10 reps      Auth Status: Approved 10/7 10/21 10/25 10/29 11/1 11/6 11/8 11/13 11/15    Visit # 1 2 3 4 5 6 7 8 9    Visits Remaining 11 10 9 8 7 6 5 4 3    Manuals             L knee PROM   10'  15 min  15 min  15 min  15 min  15 min  "    L patellar ROM             HS stretch                          Neuro Re-Ed             Tandem     30\"x3 3x30\" 3x30\" Tandem walk 20 ftx 2 Tandem 20x ft 2    SLS      3x30\" 3x30\"  3x30\"    Star taps      20x NV  Cone taps 20x                                                        Ther Ex             SLR 3 way STD   10x ea bilat 10x ea b/l 2x10 2x10 H ABD and ext 2x10 OTB H ABD and ext OTB 20x ea  OTB 20x ea     Heel slides  20x 20x x20 20x 20x 20x      Quad sets  10x2\" 10x3\"          Seated HS stretch   3x20\" 3x30\" b/l 3x30\"   Strap with gastroc 30\"x2     SLR    2x10 b/l 2x10 2x10 supine 2x10  2x10    Bridges    3\" x20 3\"x20  SL 10x 2 ea       Leg press        105# 30x B/L 105# 30x 65# SL 20x    SAQ   20x 5\" 2x10 b/l 5\" 2x10 20x 20x      LAQ   20x 3x10 b/l 3x10 20x 20x      HR/TR    X10 ea 20x   20x Slant 20x ea  Slant 20x ea     Step up and down       8\" SPC 20x ea   8\" SPC 20x    Side stepping      5 laps 10 ft 5 laps 10 ft  5 laps 10 ft 5 laps 10ft    Ther Activity             Bike for ROM   Nv? 6min 6 min  8 min NV 8 min  8 min                 Gait Training                Rollator 3 large laps                       Modalities                                                      "

## 2024-11-18 LAB
FUNGUS SPEC CULT: NORMAL

## 2024-11-20 ENCOUNTER — EVALUATION (OUTPATIENT)
Dept: PHYSICAL THERAPY | Facility: CLINIC | Age: 89
End: 2024-11-20
Payer: COMMERCIAL

## 2024-11-20 DIAGNOSIS — T84.038A MECHANICAL LOOSENING OF PROSTHETIC KNEE, INITIAL ENCOUNTER  (HCC): Primary | ICD-10-CM

## 2024-11-20 DIAGNOSIS — Z96.659 MECHANICAL LOOSENING OF PROSTHETIC KNEE, INITIAL ENCOUNTER  (HCC): Primary | ICD-10-CM

## 2024-11-20 PROCEDURE — 97140 MANUAL THERAPY 1/> REGIONS: CPT | Performed by: PHYSICAL THERAPIST

## 2024-11-20 NOTE — PROGRESS NOTES
PT Re-Evaluation  and PT Discharge    Today's date: 2024  Patient name: Anneliese Garcia  : 1935  MRN: [id number]  Referring provider: Roxana Dale PA-C  Dx:   Encounter Diagnosis     ICD-10-CM    1. Mechanical loosening of prosthetic knee, initial encounter  ([facility])  T84.038A     Z96.659                      Assessment  Impairments: abnormal coordination, abnormal muscle firing, abnormal muscle tone, abnormal or restricted ROM, abnormal movement, activity intolerance, impaired balance, impaired physical strength, pain with function and poor posture     Assessment details: Problem List:  1) Limited L knee stability due to medial joint stress from valgus moment  2) Limited L hamstring weakness     Anneliese Garcia is a pleasant 89 y.o. female who presents with increased L knee weakness and instability secondary to prosthetic failure and loosening.  This leads to increased instability and risk for falls with limited SLS balance.  This suggests that no further referral appears necessary at this time based upon examination results.  I expect she will benefit from 8 weeks of PT to stabilize her knee, maximize function and return to pain-free ADLs.        Comparable signs:  1) Unable to walk her dogs  2) Unable to stand to do house hold chores     Re-assessment 10/21/24:  Anneliese has returned to OPPT S/P L TKA (DOS : 10/16/24) that is consistent with referring diagnosis and moderately complex due to complex PMH and TKA revision.  Clinically she demonstrates limited L knee flexion and extension as well as increased effusion limiting her ability to walk and perform household chores.  This suggests the need for continued skilled PT to address the above listed impairments.      Re-assessment 24:  Anneliese has attended 10 visits since the initiation of PT and reports a 95% GROC.  Clinically demonstrates normal L knee ROM in all planes and clinically significant improvement with all functional  outcomes with TUG and 5 time sit to stand times and now having normal capacity with ambualtion and stair negotiation.  She has no limitation with ADLs and will subsequently continue independently.    Understanding of Dx/Px/POC: good     Prognosis: good    Goals  Short Term Goals (4 weeks)  1.) Establish independence with HEP- MET  2.) Decrease subjective pain levels from NPRS at least to 2-5/10 at rest and with activity- MET  3.) Improve L knee ROM at least 5-10 degrees into all planes to allow for improved ease of movement with less guarding- MET    Long Term Goals (8 weeks)  1.) Improve L knee ROM to WNL in all planes to restore normal movement with ADLs and function- MET  2.) Improve L quad strength to 5/5 in all planes in order to return to pain-free ADLs and function- MET  3.) Improve FOTO score at least to 75 points showing improved self reported disability - MET       Plan  Patient would benefit from: PT eval and skilled physical therapy  Planned modality interventions: biofeedback, cryotherapy, electrical stimulation/Russian stimulation and TENS    Planned therapy interventions: abdominal trunk stabilization, activity modification, ADL retraining, ADL training, balance, balance/weight bearing training, behavior modification, body mechanics training, coordination, compression, flexibility, functional ROM exercises, gait training, graded activity, graded exercise, graded motor, home exercise program, IADL retraining, transfer training, therapeutic training, therapeutic exercise, therapeutic activities, stretching, strengthening, sensory integrative techniques, self care, postural training, patient/caregiver education, neuromuscular re-education, nerve gliding, muscle pump exercises, motor coordination training, massage, manual therapy, IASTM and joint mobilization  Speech planned therapy intervention: NMES    Frequency: 2x week  Duration in weeks: 8  Plan of Care beginning date: 10/7/2024  Plan of Care  expiration date: 12/9/2024  Treatment plan discussed with: patient  Plan details: D/C      Subjective Evaluation    History of Present Illness  Date of onset: 9/11/2018  Date of surgery: 10/16/2024  Mechanism of injury: Anneliese Garcia is a 89 y.o. female who presents with increased L knee pain S/P TKA from 15 years ago.  Notes that her L knee was never put in correctly from original surgeon with Dr. Fox - has had 4 different Mds suggest that the prosthetic was not placed correctly.  Decided that she wanted to get her L knee revised by Dr. Dueñas because she cannot stand or walk and is in pain all the time.   Decided to seek out MD consult where X-rays were (-) for fx and script for OPPT provided.  Reports increased L knee pain night pain but does not manage with any medication or ice/heat.  Denies any changes in bowel or bladder function.  Denies any NT signs or sxs.  F/U with MD in 1 month.  Wishes to return to PLOF pain-free wishes to be able to stand and walk around her house pain-free. Has had prior surgeries at /S.  Wishes to continue to perform caregiving duties for her dogs.         RE-assessment 10/21/24:  Anneliese returns to OPPT after TKA on on 10/16/24 at [facility].  Notes that she stayed over night the first day and then sent home the same day.  Denies any home PT.  Using a RW at home and having her  help out with bathing and going to the bathroom.  Has been able to sleep in bed without major difficulty but more from money stress that is leading to her discomfort.  F/U with Dr. Dueñas on 11/1/24.  Notes stiffness in the AM.  Denies any NT sxs.  Still unable to perform self care duties such as squatting to the toilet.  Using a stair master for stairs.  Wishes to return to normal around the house and gardening.      Re-assessment 11/20/24:  Anneliese has attended 10 visits since the initiation of PT and reports a 95% GROC.  Reports improved ability to move and not having  pain.  Denies any difficulty with ambulation - able to shop now without difficulty without any knee pain.  Denies any difficulty going up or down stairs.  Improved ability to get out of a chair and denies need for B/L UE support to raise up.  Notes that her anterior L shin pain improved with massage and stretching.  Has a F/U with MD on 24.  Currently 4.5 weeks post op and feeling very independent.            Recurrent probem    Quality of life: good    Patient Goals  Patient goals for therapy: decreased edema, improved balance, decreased pain, increased motion, increased strength, independence with ADLs/IADLs and return to sport/leisure activities  Patient goal: Walk and be able to do everything around her house  Pain  Current pain ratin  At best pain ratin  At worst pain ratin  Location: L knee  Quality: dull ache, sharp and radiating  Relieving factors: rest and support  Aggravating factors: walking, standing and stair climbing  Progression: worsening    Social Support  Steps to enter house: yes  3  Stairs in house: no   Lives in: one-story house  Lives with: spouse    Employment status: not working  Life stress: Moderate      Diagnostic Tests  X-ray: abnormal  Treatments  Previous treatment: physical therapy  Current treatment: physical therapy      Objective     Active Range of Motion     Lumbar   Flexion:  WFL  Extension:  WFL  Left lateral flexion:  Restriction level: moderate  Right lateral flexion:  Restriction level: moderate  Left rotation:  WFL  Right rotation:  WFL  Left Knee   Flexion: 120 degrees   Extension: 2 degrees   Extensor la degrees     Right Knee   Flexion: 120 degrees   Extension: 5 degrees     Strength/Myotome Testing     Left Hip   Planes of Motion   Flexion: 4  Adduction: 5  External rotation: 4-  Internal rotation: 4+    Right Hip   Planes of Motion   Flexion: 5  Adduction: 5  External rotation: 5  Internal rotation: 5    Left Knee   Flexion: 4+  Extension:  "4-    Right Knee   Flexion: 5  Extension: 5    Tests     Lumbar     Left   Negative slump test.     Right   Negative slump test.     Swelling     Left Knee Girth Measurement (cm)   Joint line: 36 cm  10 cm above joint line: 36.5 cm  10 cm below joint line: 31.5 cm    Right Knee Girth Measurement (cm)   Joint line: 35 cm  10 cm above joint line: 35.5 cm  10 cm below joint line: 33 cm    Functional Assessment      Squat    Left within functional limits and right within functional limits.     Single Leg Stance   Left: 2 seconds  Right: 2 seconds  Neuro Exam:     Functional outcomes   5x sit to stand: 14.71 (seconds)  TUG: 10.48 no AD (seconds)             Precautions: HTN, Aortic Stenosis, Osteoporosis, CKD 2; sjogrens, history of PE, aortic valve replacement, blood thinners  EPOC: 12/9/24  HEP: Access Code: [email or web address]  URL: [email or web address]  Date: 10/07/2024  Prepared by: Lester Mccain    Exercises  - Seated Hamstring Stretch  - 1 x daily - 7 x weekly - 3 sets - 10 reps  - Seated Quad Set  - 1 x daily - 7 x weekly - 3 sets - 10 reps  - Seated Knee Extension AAROM  - 1 x daily - 7 x weekly - 3 sets - 10 reps  - Seated Knee Flexion Extension AAROM with Overpressure  - 1 x daily - 7 x weekly - 3 sets - 10 reps  - Sitting Heel Slide with Towel  - 1 x daily - 7 x weekly - 3 sets - 10 reps  - Standing March with Counter Support  - 1 x daily - 7 x weekly - 3 sets - 10 reps  - Heel Raises with Counter Support  - 1 x daily - 7 x weekly - 3 sets - 10 reps      Auth Status: Approved 10/7 10/21 10/25 10/29 11/1 11/6 11/8 11/13 11/15 11/20   Visit # 1 2 3 4 5 6 7 8 9 10   Visits Remaining 11 10 9 8 7 6 5 4 3 2   Manuals             L knee PROM   10'  15 min  15 min  15 min  15 min  15 min  15 min    L patellar ROM             HS stretch             Re-assess          10 min    Neuro Re-Ed             Tandem     30\"x3 3x30\" 3x30\" Tandem walk 20 ftx 2 Tandem 20x ft 2    SLS      3x30\" 3x30\"  3x30\"    Star taps      20x " "NV  Cone taps 20x                                                        Ther Ex             SLR 3 way STD   10x ea bilat 10x ea b/l 2x10 2x10 H ABD and ext 2x10 OTB H ABD and ext OTB 20x ea  OTB 20x ea     Heel slides  20x 20x x20 20x 20x 20x      Quad sets  10x2\" 10x3\"          Seated HS stretch   3x20\" 3x30\" b/l 3x30\"   Strap with gastroc 30\"x2     SLR    2x10 b/l 2x10 2x10 supine 2x10  2x10    Bridges    3\" x20 3\"x20  SL 10x 2 ea       Leg press        105# 30x B/L 105# 30x 65# SL 20x    SAQ   20x 5\" 2x10 b/l 5\" 2x10 20x 20x      LAQ   20x 3x10 b/l 3x10 20x 20x      HR/TR    X10 ea 20x   20x Slant 20x ea  Slant 20x ea     Step up and down       8\" SPC 20x ea   8\" SPC 20x    Side stepping      5 laps 10 ft 5 laps 10 ft  5 laps 10 ft 5 laps 10ft    Ther Activity             Bike for ROM   Nv? 6min 6 min  8 min NV 8 min  8 min                 Gait Training                Rollator 3 large laps                       Modalities                                                      "

## 2024-11-27 ENCOUNTER — APPOINTMENT (OUTPATIENT)
Dept: PHYSICAL THERAPY | Facility: CLINIC | Age: 89
End: 2024-11-27
Payer: COMMERCIAL

## 2024-11-29 ENCOUNTER — APPOINTMENT (OUTPATIENT)
Dept: PHYSICAL THERAPY | Facility: CLINIC | Age: 89
End: 2024-11-29
Payer: COMMERCIAL

## 2024-12-06 ENCOUNTER — OFFICE VISIT (OUTPATIENT)
Dept: OBGYN CLINIC | Facility: CLINIC | Age: 89
End: 2024-12-06

## 2024-12-06 VITALS
HEIGHT: 60 IN | WEIGHT: 124 LBS | SYSTOLIC BLOOD PRESSURE: 122 MMHG | DIASTOLIC BLOOD PRESSURE: 70 MMHG | BODY MASS INDEX: 24.35 KG/M2

## 2024-12-06 DIAGNOSIS — Z96.652 AFTERCARE FOLLOWING LEFT KNEE JOINT REPLACEMENT SURGERY: Primary | ICD-10-CM

## 2024-12-06 DIAGNOSIS — Z47.1 AFTERCARE FOLLOWING LEFT KNEE JOINT REPLACEMENT SURGERY: Primary | ICD-10-CM

## 2024-12-06 PROCEDURE — 99024 POSTOP FOLLOW-UP VISIT: CPT | Performed by: STUDENT IN AN ORGANIZED HEALTH CARE EDUCATION/TRAINING PROGRAM

## 2024-12-06 NOTE — PROGRESS NOTES
Subjective: 89 y.o. female presents to the office 7 weeks s/p left revision total knee arthroplasty performed on 10/16/2024 for aseptic loosening. She has been doing very well overall. She notes some anteromedial mid-tibial pain. She has returned to activities without limitations. Pain controlled. Progressing well. Incision without drainage. Denies fevers or chills. Ambulating un-assisted.     Physical Exam:  Incision: well healed  ROM: 3-120 without pain  5/5 IP/Q/HS/TA/GS, 2+ DP/PT, SILT DP/SP/S/S/TN    No new imaging obtained today    Assessment/Plan:  7 weeks s/p left revision total knee arthroplasty performed on 10/16/2024 for aseptic loosening doing excellent     - continue multi-modal pain control   - Weight bearing status: as tolerated  - DVT ppx: coumadin  - Incision care: no restrictions  - Continue PT/OT exercises. Medial tibial stress syndrome stretches provided  - F/U in 5-6 weeks    Scribe Attestation      I,:   am acting as a scribe while in the presence of the attending physician.:       I,:   personally performed the services described in this documentation    as scribed in my presence.:

## 2025-01-17 ENCOUNTER — OFFICE VISIT (OUTPATIENT)
Dept: OBGYN CLINIC | Facility: CLINIC | Age: 89
End: 2025-01-17
Payer: COMMERCIAL

## 2025-01-17 VITALS — HEIGHT: 60 IN | WEIGHT: 124 LBS | BODY MASS INDEX: 24.35 KG/M2

## 2025-01-17 DIAGNOSIS — I10 HYPERTENSION, UNSPECIFIED TYPE: ICD-10-CM

## 2025-01-17 DIAGNOSIS — Z47.1 AFTERCARE FOLLOWING LEFT KNEE JOINT REPLACEMENT SURGERY: ICD-10-CM

## 2025-01-17 DIAGNOSIS — S86.892A MEDIAL TIBIAL STRESS SYNDROME, LEFT, INITIAL ENCOUNTER: Primary | ICD-10-CM

## 2025-01-17 DIAGNOSIS — Z96.652 AFTERCARE FOLLOWING LEFT KNEE JOINT REPLACEMENT SURGERY: ICD-10-CM

## 2025-01-17 DIAGNOSIS — E03.9 HYPOTHYROIDISM IN ADULT: ICD-10-CM

## 2025-01-17 PROCEDURE — 99213 OFFICE O/P EST LOW 20 MIN: CPT | Performed by: STUDENT IN AN ORGANIZED HEALTH CARE EDUCATION/TRAINING PROGRAM

## 2025-01-17 RX ORDER — LOSARTAN POTASSIUM 50 MG/1
50 TABLET ORAL DAILY
Qty: 90 TABLET | Refills: 0 | Status: SHIPPED | OUTPATIENT
Start: 2025-01-17

## 2025-01-17 RX ORDER — LEVOTHYROXINE SODIUM 112 UG/1
112 TABLET ORAL DAILY
Qty: 90 TABLET | Refills: 0 | Status: SHIPPED | OUTPATIENT
Start: 2025-01-17

## 2025-01-17 NOTE — PROGRESS NOTES
Knee New Office Note    Assessment:     1. Aftercare following left knee joint replacement surgery    2. Medial tibial stress syndrome, left, initial encounter        Plan:     Problem List Items Addressed This Visit    None  Visit Diagnoses         Aftercare following left knee joint replacement surgery    -  Primary      Medial tibial stress syndrome, left, initial encounter               Findings today are consistent with 3 months s/p left revision total knee arthroplasty performed on 10/16/2024 for aseptic loosening. Imaging and prognosis was reviewed with the patient today. Reviewed that her anteromedial mid-tibial pain is consistent with medial tibial stress syndrome. Stretches were demonstrated. She may use topical voltaren to control her pain. She is otherwise very pleased with her outcomes. She may continue activities as tolerated without restrictions.    Subjective:     Patient ID: Anneliese Garcia is a 89 y.o. female.  Chief Complaint:  HPI:  89 y.o. female presents to the office 3 months s/p left revision total knee arthroplasty performed on 10/16/2024 for aseptic loosening. She has been doing very well overall. She continues to experience some anteromedial mid-tibial pain. She has returned to activities without limitations.     Allergy:  Allergies   Allergen Reactions    Sulfa Antibiotics Hives     Medications:  all current active meds have been reviewed  Past Medical History:  Past Medical History:   Diagnosis Date    Arthritis     CAD (coronary artery disease)     CHF (congestive heart failure) (HCC)     Chronic kidney disease     Chronic pain     s/p spinal stimulator    CKD (chronic kidney disease), stage II     baseline Cr 0.8-1.0    COVID     dx with COVID 9/23 - tested on 9/23/24 -- no s/s at this time and seeing PCP 10/2    Depression     Disease of thyroid gland     GERD (gastroesophageal reflux disease)     Heart murmur 2018    History of DVT (deep vein thrombosis)     multiple, Coumadin     History of pulmonary embolism     multiple, Coumadin    History of squamous cell carcinoma excision     HL (hearing loss) 2014    Hyperlipidemia     Hypertension     Hypothyroidism     IBS (irritable bowel syndrome)     diarrheal type    Knee pain, left     Memory loss 2020    MIAH (obstructive sleep apnea)     no CPAP or BiPAP    Osteoarthritis     Osteoporosis     Pneumonia     RLS (restless legs syndrome)     Severe aortic stenosis     Sjogren's syndrome (HCC)      Past Surgical History:  Past Surgical History:   Procedure Laterality Date    CARDIAC CATHETERIZATION N/A 06/06/2024    Procedure: Cardiac catheterization;  Surgeon: Taj Stafford DO;  Location: [facility];  Service: Cardiology    CARDIAC CATHETERIZATION N/A 06/27/2024    Procedure: Cardiac tavr;  Surgeon: Pascual Koo MD;  Location: [facility]IN [facility];  Service: Cardiology    CARDIAC VALVE REPLACEMENT  6/27/2024    Tavr    COLONOSCOPY  07/18/2016     grade 2 internal hemorrhoids but otherwise normal, pathology negative for microscopic colitis    JOINT REPLACEMENT      CO REPLACE AORTIC VALVE OPENFEMORAL ARTERY APPROACH N/A 06/27/2024    Procedure: REPLACEMENT AORTIC VALVE TRANSCATHETER (TAVR) TRANSFEMORAL W/ 23MM  S3 UR VALVE(ACCESS ON LEFT) LEDY;  Surgeon: JESSICA Pal MD;  Location: [facility];  Service: Cardiac Surgery    CO REVJ TOT KNEE ARTHRP FEM&ENTIRE TIBIAL COMPONE Left 10/16/2024    Procedure: ARTHROPLASTY KNEE TOTAL REVISION;  Surgeon: Isaac Dueñas DO;  Location:  MAIN OR;  Service: Orthopedics    REPAIR RECTOCELE      SPINAL STIMULATOR PLACEMENT      9/30/24 Per pt doesnt think she has spinal stimulator - pt reports having bladder stimulator intact    SPINE SURGERY      fusion L1-L5    SQUAMOUS CELL CARCINOMA EXCISION      TOE SURGERY      TONSILLECTOMY AND ADENOIDECTOMY      TOTAL ABDOMINAL HYSTERECTOMY      TOTAL KNEE ARTHROPLASTY Bilateral     TOTAL SHOULDER REPLACEMENT Right     UPPER GASTROINTESTINAL  ENDOSCOPY  01/09/2015     reflux esophagitis, gastritis, duodenitis in the bulb, pathology negative for H pylori, Puckett's, EOE    VENTRICULOPERITONEAL SHUNT       Family History:  Family History   Problem Relation Age of Onset    No Known Problems Mother     Stroke Father     Ovarian cancer Sister 79    Ovarian cancer Daughter 44    No Known Problems Daughter     No Known Problems Daughter     No Known Problems Son     No Known Problems Son     No Known Problems Maternal Grandmother     No Known Problems Maternal Grandfather     No Known Problems Paternal Grandmother     No Known Problems Paternal Grandfather     No Known Problems Maternal Aunt     No Known Problems Maternal Aunt     No Known Problems Paternal Aunt     Colon cancer Neg Hx     Colon polyps Neg Hx     Anesthesia problems Neg Hx      Social History:  Social History     Substance and Sexual Activity   Alcohol Use Never    Comment: Denies anyuse of alcohol     Social History     Substance and Sexual Activity   Drug Use No    Comment: Denies any drug use per pt     Social History     Tobacco Use   Smoking Status Never   Smokeless Tobacco Never   Tobacco Comments    Never smoked         ROS:  General: Per HPI  Skin: Negative, except if noted below  HEENT: Negative  Respiratory: Negative  Cardiovascular: Negative  Gastrointestinal: Negative  Urinary: Negative  Vascular: Negative  Musculoskeletal: Positive per HPI   Neurologic: Positive per HPI  Endocrine: Negative    Objective:  BP Readings from Last 1 Encounters:   12/06/24 122/70      Wt Readings from Last 1 Encounters:   01/17/25 56.2 kg (124 lb)        Respiratory:   non-labored respirations    Lymphatics:  no palpable lymph nodes    Gait:   Steady    Neurologic:   Alert and oriented times 3  Patient with normal sensation except as noted below  Deep tendon reflexes 2+ except as noted in MSK exam    Bilateral Lower Extremity:  Left Knee:      Inspection:  well healed incision    Overall limb alignment  neutral    Effusion: none    ROM 3-120 without pain    Extensor Lag: none    Palpation: anteromedial mid-tibial tenderness to palpation    AP Stability at 90 deg stable    M/L stability in full extension stable    M/L stability in midflexion stable    Motor: 5/5 Q/HS/TA/GS/P    Pulses: 2+ DP / 2+ PT    SILT DP/SP/S/S/TN    Imaging:  No new imaging obtained today    BMI:   Estimated body mass index is 24.22 kg/m² as calculated from the following:    Height as of this encounter: 5' (1.524 m).    Weight as of this encounter: 56.2 kg (124 lb).  BSA:   Estimated body surface area is 1.52 meters squared as calculated from the following:    Height as of this encounter: 5' (1.524 m).    Weight as of this encounter: 56.2 kg (124 lb).           Scribe Attestation      I,:  Nancie Pantoja am acting as a scribe while in the presence of the attending physician.:       I,:  Isaac Dueñas DO personally performed the services described in this documentation    as scribed in my presence.:

## 2025-02-13 ENCOUNTER — APPOINTMENT (OUTPATIENT)
Dept: LAB | Facility: HOSPITAL | Age: 89
End: 2025-02-13
Payer: COMMERCIAL

## 2025-02-13 DIAGNOSIS — M81.0 OSTEOPOROSIS, UNSPECIFIED OSTEOPOROSIS TYPE, UNSPECIFIED PATHOLOGICAL FRACTURE PRESENCE: ICD-10-CM

## 2025-02-13 DIAGNOSIS — E55.9 VITAMIN D DEFICIENCY: ICD-10-CM

## 2025-02-13 DIAGNOSIS — E78.5 HYPERLIPIDEMIA, UNSPECIFIED HYPERLIPIDEMIA TYPE: ICD-10-CM

## 2025-02-13 DIAGNOSIS — E03.9 HYPOTHYROIDISM IN ADULT: ICD-10-CM

## 2025-02-13 DIAGNOSIS — I10 ESSENTIAL HYPERTENSION: ICD-10-CM

## 2025-02-13 DIAGNOSIS — I10 HYPERTENSION, UNSPECIFIED TYPE: ICD-10-CM

## 2025-02-13 DIAGNOSIS — M81.0 AGE-RELATED OSTEOPOROSIS WITHOUT CURRENT PATHOLOGICAL FRACTURE: ICD-10-CM

## 2025-02-13 LAB
25(OH)D3 SERPL-MCNC: 54 NG/ML (ref 30–100)
ALBUMIN SERPL BCG-MCNC: 4.6 G/DL (ref 3.5–5)
ALP SERPL-CCNC: 59 U/L (ref 34–104)
ALT SERPL W P-5'-P-CCNC: 19 U/L (ref 7–52)
ANION GAP SERPL CALCULATED.3IONS-SCNC: 7 MMOL/L (ref 4–13)
AST SERPL W P-5'-P-CCNC: 27 U/L (ref 13–39)
BASOPHILS # BLD AUTO: 0.08 THOUSANDS/ÂΜL (ref 0–0.1)
BASOPHILS NFR BLD AUTO: 1 % (ref 0–1)
BILIRUB SERPL-MCNC: 0.82 MG/DL (ref 0.2–1)
BUN SERPL-MCNC: 25 MG/DL (ref 5–25)
CALCIUM SERPL-MCNC: 10.7 MG/DL (ref 8.4–10.2)
CHLORIDE SERPL-SCNC: 102 MMOL/L (ref 96–108)
CO2 SERPL-SCNC: 29 MMOL/L (ref 21–32)
CREAT SERPL-MCNC: 0.95 MG/DL (ref 0.6–1.3)
EOSINOPHIL # BLD AUTO: 0.4 THOUSAND/ÂΜL (ref 0–0.61)
EOSINOPHIL NFR BLD AUTO: 5 % (ref 0–6)
ERYTHROCYTE [DISTWIDTH] IN BLOOD BY AUTOMATED COUNT: 16.6 % (ref 11.6–15.1)
GFR SERPL CREATININE-BSD FRML MDRD: 54 ML/MIN/1.73SQ M
GLUCOSE P FAST SERPL-MCNC: 97 MG/DL (ref 65–99)
HCT VFR BLD AUTO: 39.3 % (ref 34.8–46.1)
HGB BLD-MCNC: 12.4 G/DL (ref 11.5–15.4)
IMM GRANULOCYTES # BLD AUTO: 0.02 THOUSAND/UL (ref 0–0.2)
IMM GRANULOCYTES NFR BLD AUTO: 0 % (ref 0–2)
LYMPHOCYTES # BLD AUTO: 1.6 THOUSANDS/ÂΜL (ref 0.6–4.47)
LYMPHOCYTES NFR BLD AUTO: 21 % (ref 14–44)
MCH RBC QN AUTO: 27.9 PG (ref 26.8–34.3)
MCHC RBC AUTO-ENTMCNC: 31.6 G/DL (ref 31.4–37.4)
MCV RBC AUTO: 88 FL (ref 82–98)
MONOCYTES # BLD AUTO: 1.17 THOUSAND/ÂΜL (ref 0.17–1.22)
MONOCYTES NFR BLD AUTO: 16 % (ref 4–12)
NEUTROPHILS # BLD AUTO: 4.24 THOUSANDS/ÂΜL (ref 1.85–7.62)
NEUTS SEG NFR BLD AUTO: 57 % (ref 43–75)
NRBC BLD AUTO-RTO: 0 /100 WBCS
PLATELET # BLD AUTO: 201 THOUSANDS/UL (ref 149–390)
PMV BLD AUTO: 12 FL (ref 8.9–12.7)
POTASSIUM SERPL-SCNC: 4.5 MMOL/L (ref 3.5–5.3)
PROT SERPL-MCNC: 8 G/DL (ref 6.4–8.4)
RBC # BLD AUTO: 4.45 MILLION/UL (ref 3.81–5.12)
SODIUM SERPL-SCNC: 138 MMOL/L (ref 135–147)
T4 FREE SERPL-MCNC: 1.45 NG/DL (ref 0.61–1.12)
TSH SERPL DL<=0.05 MIU/L-ACNC: 4.83 UIU/ML (ref 0.45–4.5)
WBC # BLD AUTO: 7.51 THOUSAND/UL (ref 4.31–10.16)

## 2025-02-13 PROCEDURE — 82306 VITAMIN D 25 HYDROXY: CPT

## 2025-02-13 PROCEDURE — 84443 ASSAY THYROID STIM HORMONE: CPT

## 2025-02-13 PROCEDURE — 84439 ASSAY OF FREE THYROXINE: CPT

## 2025-02-13 PROCEDURE — 85025 COMPLETE CBC W/AUTO DIFF WBC: CPT

## 2025-02-13 PROCEDURE — 80053 COMPREHEN METABOLIC PANEL: CPT

## 2025-02-13 PROCEDURE — 36415 COLL VENOUS BLD VENIPUNCTURE: CPT

## 2025-02-20 ENCOUNTER — RESULTS FOLLOW-UP (OUTPATIENT)
Dept: ENDOCRINOLOGY | Facility: HOSPITAL | Age: 89
End: 2025-02-20

## 2025-02-20 ENCOUNTER — DOCUMENTATION (OUTPATIENT)
Dept: ENDOCRINOLOGY | Facility: HOSPITAL | Age: 89
End: 2025-02-20

## 2025-02-20 NOTE — PROGRESS NOTES
Received Summary of Benefits for Prolia. Prior auth is required and on file. She has a 20% copay.

## 2025-03-13 ENCOUNTER — OFFICE VISIT (OUTPATIENT)
Dept: ENDOCRINOLOGY | Facility: HOSPITAL | Age: 89
End: 2025-03-13
Payer: COMMERCIAL

## 2025-03-13 VITALS
DIASTOLIC BLOOD PRESSURE: 80 MMHG | SYSTOLIC BLOOD PRESSURE: 118 MMHG | WEIGHT: 126 LBS | HEART RATE: 52 BPM | HEIGHT: 59 IN | BODY MASS INDEX: 25.4 KG/M2 | OXYGEN SATURATION: 100 %

## 2025-03-13 DIAGNOSIS — E78.5 HYPERLIPIDEMIA, UNSPECIFIED HYPERLIPIDEMIA TYPE: ICD-10-CM

## 2025-03-13 DIAGNOSIS — E03.9 HYPOTHYROIDISM IN ADULT: Primary | ICD-10-CM

## 2025-03-13 DIAGNOSIS — N18.31 STAGE 3A CHRONIC KIDNEY DISEASE (HCC): ICD-10-CM

## 2025-03-13 DIAGNOSIS — M81.0 OSTEOPOROSIS, UNSPECIFIED OSTEOPOROSIS TYPE, UNSPECIFIED PATHOLOGICAL FRACTURE PRESENCE: ICD-10-CM

## 2025-03-13 DIAGNOSIS — M81.0 AGE-RELATED OSTEOPOROSIS WITHOUT CURRENT PATHOLOGICAL FRACTURE: ICD-10-CM

## 2025-03-13 DIAGNOSIS — I10 ESSENTIAL HYPERTENSION: ICD-10-CM

## 2025-03-13 DIAGNOSIS — I10 HYPERTENSION, UNSPECIFIED TYPE: ICD-10-CM

## 2025-03-13 DIAGNOSIS — I44.7 LBBB (LEFT BUNDLE BRANCH BLOCK): ICD-10-CM

## 2025-03-13 DIAGNOSIS — E55.9 VITAMIN D DEFICIENCY: ICD-10-CM

## 2025-03-13 PROCEDURE — 96372 THER/PROPH/DIAG INJ SC/IM: CPT | Performed by: NURSE PRACTITIONER

## 2025-03-13 PROCEDURE — 99214 OFFICE O/P EST MOD 30 MIN: CPT | Performed by: NURSE PRACTITIONER

## 2025-03-13 NOTE — ASSESSMENT & PLAN NOTE
Plan:  1.  Osteoporosis:  Her most recent  DEXA scan from May 23, 2023 shows a T-score of -2.7 in the left forearm.  She will continue supplementation of calcium and vitamin-D. Recheck calcium level prior to next Prolia injection. She is due for her next injection today. Discussed fall risk and safety at length during the time of the visit.  I also suggested that, for stability and safety, she continue to use a cane/walker for safety and stability.   Check comprehensive metabolic panel prior to next office visit.     2.  Hypothyroidism:  TSH is just slightly elevated with a high normal free T4 are normal.  She states that she is taking her levothyroxine consistently and in the proper manner.  I have asked her to continue her levothyroxine 112 mcg daily. Check TSH and free T4 in 6 weeks to reassess and prior to next office visit.     3.  Hypertension: She is normotensive in the office today.  Continue current regimen.  Check comprehensive metabolic panel prior to next visit.     4.  Hyperlipidemia:  Managed by PCP.  Continue simvastatin.     5.  Vitamin-D deficiency:  Stable at 54.0.  Continue supplementation with vitamin D3 daily.

## 2025-03-13 NOTE — ASSESSMENT & PLAN NOTE
Orders:    DXA bone density spine hip and pelvis; Future    Comprehensive metabolic panel; Future    T4, free; Future    TSH, 3rd generation; Future    Vitamin D 25 hydroxy; Future

## 2025-03-13 NOTE — PROGRESS NOTES
"Assessment/Plan:    Anneliese Garcia came into the [facility] Office today 03/13/25 to receive Prolia injection.      Verbal consent obtained.  Consent given by: patient    patient states patient has been medically healthy with no underlining concerns/complications.      Anneliese Garcia presents with no symptoms today.       All instructions were reviewed with the patient.    If the patient should have any questions/concerns, advised patient to contacted [facility].       Subjective:     History provided by: patient    Patient ID: Anneliese Garcia is a 89 y.o. female      Objective:    Vitals:    03/13/25 0744   BP: 118/80   Pulse: (!) 52   SpO2: 100%   Weight: 57.2 kg (126 lb)   Height: 4' 11\" (1.499 m)       Patient tolerated the injection well without any complications.  Injection site/s Left arm.  Medication was provided by the office (buy and bill).    Patient signed consent form yes   Patient signed ABN form yes (If no patient is not a medicare patient).   Patient waited 15 minutes after injection no (This only applies to patient's receiving first time injection).       Last Visit: 2/20/2025  Next visit:Visit date not found     "

## 2025-03-13 NOTE — PROGRESS NOTES
Name: Anneliese Garcia      : 1935      MRN: [id number]  Encounter Provider: CATHERINE Brady  Encounter Date: 3/13/2025   Encounter department: [facility]      :  Assessment & Plan  Hypothyroidism in adult    Orders:    TSH, 3rd generation; Future    T4, free; Future    Comprehensive metabolic panel; Future    T4, free; Future    TSH, 3rd generation; Future    Vitamin D 25 hydroxy; Future    Hypertension, unspecified type    Orders:    Comprehensive metabolic panel; Future    T4, free; Future    TSH, 3rd generation; Future    Vitamin D 25 hydroxy; Future    LBBB (left bundle branch block)    Orders:    Comprehensive metabolic panel; Future    T4, free; Future    TSH, 3rd generation; Future    Vitamin D 25 hydroxy; Future    Osteoporosis, unspecified osteoporosis type, unspecified pathological fracture presence    Orders:    DXA bone density spine hip and pelvis; Future    Comprehensive metabolic panel; Future    T4, free; Future    TSH, 3rd generation; Future    Vitamin D 25 hydroxy; Future    Age-related osteoporosis without current pathological fracture    Orders:    DXA bone density spine hip and pelvis; Future    Comprehensive metabolic panel; Future    T4, free; Future    TSH, 3rd generation; Future    Vitamin D 25 hydroxy; Future    Essential hypertension    Orders:    Comprehensive metabolic panel; Future    T4, free; Future    TSH, 3rd generation; Future    Vitamin D 25 hydroxy; Future    Vitamin D deficiency    Orders:    Comprehensive metabolic panel; Future    T4, free; Future    TSH, 3rd generation; Future    Vitamin D 25 hydroxy; Future    Hyperlipidemia, unspecified hyperlipidemia type    Plan:  1.  Osteoporosis:  Her most recent  DEXA scan from May 23, 2023 shows a T-score of -2.7 in the left forearm.  She will continue supplementation of calcium and vitamin-D. Recheck calcium level prior to next Prolia injection. She is due for her  next injection today. Discussed fall risk and safety at length during the time of the visit.  I also suggested that, for stability and safety, she continue to use a cane/walker for safety and stability.   Check comprehensive metabolic panel prior to next office visit.     2.  Hypothyroidism:  TSH is just slightly elevated with a high normal free T4 are normal.  She states that she is taking her levothyroxine consistently and in the proper manner.  I have asked her to continue her levothyroxine 112 mcg daily. Check TSH and free T4 in 6 weeks to reassess and prior to next office visit.     3.  Hypertension: She is normotensive in the office today.  Continue current regimen.  Check comprehensive metabolic panel prior to next visit.     4.  Hyperlipidemia:  Managed by PCP.  Continue simvastatin.     5.  Vitamin-D deficiency:  Stable at 54.0.  Continue supplementation with vitamin D3 daily.          History of Present Illness     88 y.o. female with history of hypertension, hyperlipidemia, hypothyroidism, vitamin-D deficiency, osteoporosis and GERD who presents for follow up.      For her hypothyroidism, she takes levothyroxine 112 mcg daily.  Her most recent TSH from February 13, 2023 is 4.835 with a free T4 of 1.45.  Overall, she is feeling well but complains of some lingering fatigue.       She has a history of osteoporosis and is currently being treated with Prolia.  Her most recent  DEXA scan from May 23, 2023 shows a T-score of -2.7 in the left forearm indicative of osteoporosis.  She will be due for her injection of Prolia today. She is tolerating it well.  She did sustain a fall on January 6, 2019 but denies any falls since.  She is S/P left knee replacement.     For hypertension, she takes losartan 50 mg daily, metoprolol 25 mg twice daily and hydrochlorothiazide 12.5 mg daily.       For hyperlipidemia, she takes simvastatin 20 mg daily.       For vitamin-D deficiency, she is on 2000 units daily of vitamin-D.   "Her most recent 25 hydroxy vitamin-D level from February 13, 2023 is 54.0.          Review of Systems   Constitutional: Negative.  Negative for chills, fatigue and fever.   HENT:  Positive for hearing loss. Negative for trouble swallowing and voice change.    Eyes: Negative.  Negative for photophobia, pain, discharge, redness, itching and visual disturbance.   Respiratory: Negative.  Negative for chest tightness and shortness of breath.    Cardiovascular: Negative.  Negative for chest pain.   Gastrointestinal: Negative.  Negative for abdominal pain, constipation, diarrhea and vomiting.   Endocrine: Negative for cold intolerance, heat intolerance, polydipsia, polyphagia and polyuria.   Genitourinary: Negative.    Musculoskeletal:  Positive for arthralgias, gait problem (uses cane) and myalgias.   Skin: Negative.    Allergic/Immunologic: Negative.    Neurological:  Negative for dizziness, syncope, light-headedness and headaches.   Hematological: Negative.    Psychiatric/Behavioral: Negative.     All other systems reviewed and are negative.   as per HPI       Objective   /80   Pulse (!) 52   Ht 4' 11\" (1.499 m)   Wt 57.2 kg (126 lb)   LMP  (LMP Unknown) Comment: partial hysterectomy per pt  SpO2 100%   BMI 25.45 kg/m²      Body mass index is 25.45 kg/m².  Wt Readings from Last 3 Encounters:   03/13/25 57.2 kg (126 lb)   01/17/25 56.2 kg (124 lb)   12/06/24 56.2 kg (124 lb)     Physical Exam  Vitals reviewed.   Constitutional:       Appearance: She is well-developed.   HENT:      Head: Normocephalic and atraumatic.   Eyes:      Conjunctiva/sclera: Conjunctivae normal.      Pupils: Pupils are equal, round, and reactive to light.   Cardiovascular:      Rate and Rhythm: Normal rate and regular rhythm.      Heart sounds: Normal heart sounds.   Pulmonary:      Effort: Pulmonary effort is normal.      Breath sounds: Normal breath sounds.   Abdominal:      General: Bowel sounds are normal.      Palpations: Abdomen " is soft.   Musculoskeletal:         General: Normal range of motion.      Cervical back: Normal range of motion and neck supple.   Skin:     General: Skin is warm and dry.   Neurological:      Mental Status: She is alert and oriented to person, place, and time.   Psychiatric:         Behavior: Behavior normal.         Thought Content: Thought content normal.         Judgment: Judgment normal.         Labs:   Lab Results   Component Value Date    HGBA1C 5.0 08/21/2024    HGBA1C 5.2 03/17/2023     Lab Results   Component Value Date    CREATININE 0.95 02/13/2025    CREATININE 0.85 10/17/2024    CREATININE 0.85 08/21/2024    BUN 25 02/13/2025     01/25/2015    K 4.5 02/13/2025     02/13/2025    CO2 29 02/13/2025     eGFRcr   Date Value Ref Range Status   05/09/2023 69 >59 Final     eGFR   Date Value Ref Range Status   02/13/2025 54 ml/min/1.73sq m Final     Lab Results   Component Value Date    HDL 46 (L) 10/28/2022    TRIG 165 (H) 10/28/2022     Lab Results   Component Value Date    ALT 19 02/13/2025    AST 27 02/13/2025    ALKPHOS 59 02/13/2025    BILITOT 0.6 01/21/2015     Lab Results   Component Value Date    PDD2LVEPFIXN 4.835 (H) 02/13/2025    DMF7ZUTLTJWG 2.546 07/18/2024    RSD3TBGCKIWM 1.408 01/24/2024       There are no Patient Instructions on file for this visit.    Discussed with the patient and all questioned fully answered. She will call me if any problems arise.

## 2025-03-13 NOTE — PATIENT INSTRUCTIONS
Stay hydrated with water.     TSH is normal.       Continue Levothyroxine 112 mcg daily - All 7 days of the week.     Check thyroid function in 6 weeks to reassess and lab work prior to next office visit.     Continue supplementation with vitamin D.     Continue treatment with Prolia.     Please obtain lab work prior to next office visit.    Obtain DEXA Scan in late May 2025.

## 2025-03-13 NOTE — ASSESSMENT & PLAN NOTE
Orders:    Comprehensive metabolic panel; Future    T4, free; Future    TSH, 3rd generation; Future    Vitamin D 25 hydroxy; Future

## 2025-04-07 ENCOUNTER — APPOINTMENT (OUTPATIENT)
Dept: LAB | Facility: HOSPITAL | Age: 89
End: 2025-04-07
Payer: COMMERCIAL

## 2025-04-07 DIAGNOSIS — M35.01 SJOGREN'S SYNDROME WITH KERATOCONJUNCTIVITIS SICCA (HCC): ICD-10-CM

## 2025-04-07 DIAGNOSIS — E03.9 HYPOTHYROIDISM IN ADULT: ICD-10-CM

## 2025-04-07 LAB
ALBUMIN SERPL BCG-MCNC: 4.8 G/DL (ref 3.5–5)
ALP SERPL-CCNC: 65 U/L (ref 34–104)
ALT SERPL W P-5'-P-CCNC: 20 U/L (ref 7–52)
ANION GAP SERPL CALCULATED.3IONS-SCNC: 9 MMOL/L (ref 4–13)
AST SERPL W P-5'-P-CCNC: 30 U/L (ref 13–39)
BACTERIA UR QL AUTO: ABNORMAL /HPF
BASOPHILS # BLD AUTO: 0.09 THOUSANDS/ÂΜL (ref 0–0.1)
BASOPHILS NFR BLD AUTO: 1 % (ref 0–1)
BILIRUB SERPL-MCNC: 0.76 MG/DL (ref 0.2–1)
BILIRUB UR QL STRIP: NEGATIVE
BUN SERPL-MCNC: 23 MG/DL (ref 5–25)
C4 SERPL-MCNC: 39 MG/DL (ref 19–52)
CALCIUM SERPL-MCNC: 10.3 MG/DL (ref 8.4–10.2)
CHLORIDE SERPL-SCNC: 102 MMOL/L (ref 96–108)
CLARITY UR: CLEAR
CO2 SERPL-SCNC: 28 MMOL/L (ref 21–32)
COLOR UR: COLORLESS
CREAT SERPL-MCNC: 0.87 MG/DL (ref 0.6–1.3)
CREAT UR-MCNC: 20.2 MG/DL
EOSINOPHIL # BLD AUTO: 0.25 THOUSAND/ÂΜL (ref 0–0.61)
EOSINOPHIL NFR BLD AUTO: 3 % (ref 0–6)
ERYTHROCYTE [DISTWIDTH] IN BLOOD BY AUTOMATED COUNT: 17.2 % (ref 11.6–15.1)
ERYTHROCYTE [SEDIMENTATION RATE] IN BLOOD: 19 MM/HOUR (ref 0–29)
GFR SERPL CREATININE-BSD FRML MDRD: 60 ML/MIN/1.73SQ M
GLUCOSE SERPL-MCNC: 85 MG/DL (ref 65–140)
GLUCOSE UR STRIP-MCNC: NEGATIVE MG/DL
HCT VFR BLD AUTO: 41.6 % (ref 34.8–46.1)
HGB BLD-MCNC: 13.3 G/DL (ref 11.5–15.4)
HGB UR QL STRIP.AUTO: NEGATIVE
IGA SERPL-MCNC: 425 MG/DL (ref 66–433)
IGG SERPL-MCNC: 1239 MG/DL (ref 635–1741)
IGM SERPL-MCNC: 90 MG/DL (ref 45–281)
IMM GRANULOCYTES # BLD AUTO: 0.03 THOUSAND/UL (ref 0–0.2)
IMM GRANULOCYTES NFR BLD AUTO: 0 % (ref 0–2)
KETONES UR STRIP-MCNC: NEGATIVE MG/DL
LEUKOCYTE ESTERASE UR QL STRIP: NEGATIVE
LYMPHOCYTES # BLD AUTO: 2.08 THOUSANDS/ÂΜL (ref 0.6–4.47)
LYMPHOCYTES NFR BLD AUTO: 27 % (ref 14–44)
MCH RBC QN AUTO: 29.2 PG (ref 26.8–34.3)
MCHC RBC AUTO-ENTMCNC: 32 G/DL (ref 31.4–37.4)
MCV RBC AUTO: 91 FL (ref 82–98)
MONOCYTES # BLD AUTO: 1.24 THOUSAND/ÂΜL (ref 0.17–1.22)
MONOCYTES NFR BLD AUTO: 16 % (ref 4–12)
NEUTROPHILS # BLD AUTO: 4.12 THOUSANDS/ÂΜL (ref 1.85–7.62)
NEUTS SEG NFR BLD AUTO: 53 % (ref 43–75)
NITRITE UR QL STRIP: NEGATIVE
NON-SQ EPI CELLS URNS QL MICRO: ABNORMAL /HPF
NRBC BLD AUTO-RTO: 0 /100 WBCS
PH UR STRIP.AUTO: 7.5 [PH]
PLATELET # BLD AUTO: 243 THOUSANDS/UL (ref 149–390)
PMV BLD AUTO: 11.5 FL (ref 8.9–12.7)
POTASSIUM SERPL-SCNC: 5.1 MMOL/L (ref 3.5–5.3)
PROT SERPL-MCNC: 8 G/DL (ref 6.4–8.4)
PROT UR STRIP-MCNC: ABNORMAL MG/DL
PROT UR-MCNC: 39.4 MG/DL
PROT/CREAT UR: 2 MG/G{CREAT} (ref 0–0.1)
RBC # BLD AUTO: 4.55 MILLION/UL (ref 3.81–5.12)
RBC #/AREA URNS AUTO: ABNORMAL /HPF
RHEUMATOID FACT SERPL-ACNC: <10 IU/ML
SODIUM SERPL-SCNC: 139 MMOL/L (ref 135–147)
SP GR UR STRIP.AUTO: 1.01 (ref 1–1.03)
T4 FREE SERPL-MCNC: 1.44 NG/DL (ref 0.61–1.12)
TSH SERPL DL<=0.05 MIU/L-ACNC: 4.4 UIU/ML (ref 0.45–4.5)
UROBILINOGEN UR STRIP-ACNC: <2 MG/DL
WBC # BLD AUTO: 7.81 THOUSAND/UL (ref 4.31–10.16)
WBC #/AREA URNS AUTO: ABNORMAL /HPF

## 2025-04-07 PROCEDURE — 86160 COMPLEMENT ANTIGEN: CPT

## 2025-04-07 PROCEDURE — 82570 ASSAY OF URINE CREATININE: CPT

## 2025-04-07 PROCEDURE — 85025 COMPLETE CBC W/AUTO DIFF WBC: CPT

## 2025-04-07 PROCEDURE — 84439 ASSAY OF FREE THYROXINE: CPT

## 2025-04-07 PROCEDURE — 84443 ASSAY THYROID STIM HORMONE: CPT

## 2025-04-07 PROCEDURE — 81001 URINALYSIS AUTO W/SCOPE: CPT

## 2025-04-07 PROCEDURE — 82784 ASSAY IGA/IGD/IGG/IGM EACH: CPT

## 2025-04-07 PROCEDURE — 86334 IMMUNOFIX E-PHORESIS SERUM: CPT

## 2025-04-07 PROCEDURE — 80053 COMPREHEN METABOLIC PANEL: CPT

## 2025-04-07 PROCEDURE — 85652 RBC SED RATE AUTOMATED: CPT

## 2025-04-07 PROCEDURE — 84156 ASSAY OF PROTEIN URINE: CPT

## 2025-04-07 PROCEDURE — 84165 PROTEIN E-PHORESIS SERUM: CPT

## 2025-04-07 PROCEDURE — 86431 RHEUMATOID FACTOR QUANT: CPT

## 2025-04-07 PROCEDURE — 36415 COLL VENOUS BLD VENIPUNCTURE: CPT

## 2025-04-10 ENCOUNTER — OFFICE VISIT (OUTPATIENT)
Dept: CARDIOLOGY CLINIC | Facility: CLINIC | Age: 89
End: 2025-04-10
Payer: COMMERCIAL

## 2025-04-10 VITALS
SYSTOLIC BLOOD PRESSURE: 136 MMHG | BODY MASS INDEX: 25.8 KG/M2 | HEART RATE: 57 BPM | WEIGHT: 128 LBS | HEIGHT: 59 IN | OXYGEN SATURATION: 98 % | DIASTOLIC BLOOD PRESSURE: 64 MMHG

## 2025-04-10 DIAGNOSIS — I10 PRIMARY HYPERTENSION: ICD-10-CM

## 2025-04-10 DIAGNOSIS — Z95.2 S/P TAVR (TRANSCATHETER AORTIC VALVE REPLACEMENT): ICD-10-CM

## 2025-04-10 DIAGNOSIS — I44.7 LBBB (LEFT BUNDLE BRANCH BLOCK): ICD-10-CM

## 2025-04-10 DIAGNOSIS — I35.0 SEVERE AORTIC STENOSIS: Primary | ICD-10-CM

## 2025-04-10 PROBLEM — I95.1 ORTHOSTATIC HYPOTENSION: Status: RESOLVED | Noted: 2024-10-16 | Resolved: 2025-04-10

## 2025-04-10 LAB
ALBUMIN SERPL ELPH-MCNC: 4.65 G/DL (ref 3.2–5.1)
ALBUMIN SERPL ELPH-MCNC: 58.1 % (ref 48–70)
ALPHA1 GLOB SERPL ELPH-MCNC: 0.31 G/DL (ref 0.15–0.47)
ALPHA1 GLOB SERPL ELPH-MCNC: 3.9 % (ref 1.8–7)
ALPHA2 GLOB SERPL ELPH-MCNC: 0.85 G/DL (ref 0.42–1.04)
ALPHA2 GLOB SERPL ELPH-MCNC: 10.6 % (ref 5.9–14.9)
BETA GLOB ABNORMAL SERPL ELPH-MCNC: 0.55 G/DL (ref 0.31–0.57)
BETA1 GLOB SERPL ELPH-MCNC: 6.9 % (ref 4.7–7.7)
BETA2 GLOB SERPL ELPH-MCNC: 7.1 % (ref 3.1–7.9)
BETA2+GAMMA GLOB SERPL ELPH-MCNC: 0.57 G/DL (ref 0.2–0.58)
GAMMA GLOB ABNORMAL SERPL ELPH-MCNC: 1.07 G/DL (ref 0.4–1.66)
GAMMA GLOB SERPL ELPH-MCNC: 13.4 % (ref 6.9–22.3)
IGG/ALB SER: 1.39 {RATIO} (ref 1.1–1.8)
PROT SERPL-MCNC: 8 G/DL (ref 6.4–8.4)

## 2025-04-10 PROCEDURE — 86334 IMMUNOFIX E-PHORESIS SERUM: CPT | Performed by: STUDENT IN AN ORGANIZED HEALTH CARE EDUCATION/TRAINING PROGRAM

## 2025-04-10 PROCEDURE — 99214 OFFICE O/P EST MOD 30 MIN: CPT | Performed by: INTERNAL MEDICINE

## 2025-04-10 PROCEDURE — 84165 PROTEIN E-PHORESIS SERUM: CPT | Performed by: STUDENT IN AN ORGANIZED HEALTH CARE EDUCATION/TRAINING PROGRAM

## 2025-04-10 NOTE — PROGRESS NOTES
"                                           Cardiology Consultation     Anneliese Garcia  [id number]  1935  [facility] [place] [address] EMILY ASH  [address] [address] [phone]    1. Severe aortic stenosis        2. LBBB (left bundle branch block)        3. Primary hypertension        4. S/P TAVR (transcatheter aortic valve replacement)            Discussion/Summary:    Aortic stenosis - After seeing me for a significant murmur and exertional SOB/Chest tightness Anneliese was found to have severe AS.  She is now S/P TAVR with a 23 mm Dunn KELL 3 Ultra Resilia bioprosthetic valve in June 2024.  She tolerated this well and is now asymptomatic.  Echocardiograms have demonstrated a normally functioning TAVR with normal LV systolic function.  She should take antibiotic prophylaxis for any dental procedures.  We will see her back in 1 year.    Hypertension - She was on the combination of HCTZ and losartan when I met her.  We have since stopped the HCTZ and added Metoprolol.  No changes were made to this today.    LBBB - This was new after her TAVR.  She was monitored for a month in July that did show some short runs of nonsustained atrial tachycardia that improved on the second monitor.  Her average heart rate was in the 60s and there was no significant pauses or AV block.  There were no indications for permanent pacemaker.      HPI:    Mrs. Eldridge comes in for follow-up given her history of  Severe AS, now S/P TAVR.  I met her in consultation in April 2024 her the finding of a \"cardiac murmur\" along with symptoms of shortness of breath with exertion and occasional chest pressure.  Anneliese had no cardiac history prior.  She is treated for hypertension and hyperlipidemia.  She has been on hydroxychloroquine for at least a decade given her history of Sjogren's syndrome and temporal arteritis.     After I met her in consultation I had her undergo an " echocardiogram that confirmed Severe AS.  At that point I referred her to CT surgery.  Her preoperative work-up went well, cardiac cath not showing any significant obstructive CAD.  The on 6/27/2024 Anneliese had a transcatheter aortic valve replacement with a 23 mm Dunn KELL 3 Ultra Resilia bioprosthetic valve via a percutaneous left transfemoral approach. She did have a !st degree AV block and LBBB post-op and was seen by EP.  She wore 2 extended ZIO monitors that spanned most of July 2024.  The first 1 showed sinus rhythm with some short runs of nonsustained atrial tachycardia.  The second 1 showed sinus rhythm throughout with some occasional PACs.  Average heart rate was in the 60s.  She has had 2 echocardiograms since her procedure which show normal LV systolic function and a normally functioning TAVR.    All of Anneliese's symptoms prior to her TAVR are are no longer present.  She denies any shortness of breath.  No signs or symptoms of CHF.  No chest pain or any symptoms of angina.  She denies palpitations, lightheadedness or any syncope.  Since I last saw her she underwent a left revision total knee replacement.  This went well without complications.      Patient Active Problem List   Diagnosis    Sjogren's syndrome (HCC)    History of pulmonary embolism    Hypertension    Hypothyroidism    CKD (chronic kidney disease), stage II    Hyperlipidemia    Vitamin D deficiency    Osteoporosis    Carpal tunnel syndrome    MIAH (obstructive sleep apnea)    Osteoarthritis    Pure hypercholesterolemia    Restless legs syndrome (RLS)    Spinal stenosis of lumbar region without neurogenic claudication    Supervision of normal first pregnancy    Arthritis of right sacroiliac joint (HCC)    Gastroesophageal reflux disease with esophagitis    Hypercoagulable state (HCC)    Functional diarrhea    History of DVT (deep vein thrombosis)    QT prolongation    Severe aortic stenosis    GERD (gastroesophageal reflux disease)     S/P TAVR (transcatheter aortic valve replacement)    LBBB (left bundle branch block)    Pain due to total left knee replacement (HCC)    Orthostatic hypotension    Stage 3a chronic kidney disease (HCC)     Past Medical History:   Diagnosis Date    Arthritis     CAD (coronary artery disease)     CHF (congestive heart failure) ([facility])     Chronic kidney disease     Chronic pain     s/p spinal stimulator    CKD (chronic kidney disease), stage II     baseline Cr 0.8-1.0    COVID     dx with COVID 9/23 - tested on 9/23/24 -- no s/s at this time and seeing PCP 10/2    Depression     Disease of thyroid gland     GERD (gastroesophageal reflux disease)     Heart murmur 2018    History of DVT (deep vein thrombosis)     multiple, Coumadin    History of pulmonary embolism     multiple, Coumadin    History of squamous cell carcinoma excision     HL (hearing loss) 2014    Hyperlipidemia     Hypertension     Hypothyroidism     IBS (irritable bowel syndrome)     diarrheal type    Knee pain, left     Memory loss 2020    MIAH (obstructive sleep apnea)     no CPAP or BiPAP    Osteoarthritis     Osteoporosis     Pneumonia     RLS (restless legs syndrome)     Severe aortic stenosis     Sjogren's syndrome ([facility])      Social History     Socioeconomic History    Marital status: /Civil Union     Spouse name: Not on file    Number of children: 5    Years of education: Not on file    Highest education level: Not on file   Occupational History    Not on file   Tobacco Use    Smoking status: Never    Smokeless tobacco: Never    Tobacco comments:     Never smoked   Vaping Use    Vaping status: Never Used   Substance and Sexual Activity    Alcohol use: Never     Comment: Denies anyuse of alcohol    Drug use: No     Comment: Denies any drug use per pt    Sexual activity: Not Currently     Partners: Male     Birth control/protection: Post-menopausal, Male Sterilization     Comment: Not active at this time   Other Topics Concern    Not on file    Social History Narrative    Not on file     Social Drivers of Health     Financial Resource Strain: Not on file   Food Insecurity: No Food Insecurity (10/17/2024)    Nursing - Inadequate Food Risk Classification     Worried About Running Out of Food in the Last Year: Never true     Ran Out of Food in the Last Year: Never true     Ran Out of Food in the Last Year: Not on file   Transportation Needs: No Transportation Needs (10/17/2024)    PRAPARE - Transportation     Lack of Transportation (Medical): No     Lack of Transportation (Non-Medical): No   Physical Activity: Not on file   Stress: Not on file   Social Connections: Unknown (6/18/2024)    Received from [employer]     How often do you feel lonely or isolated from those around you? (Adult - for ages 18 years and over): Not on file   Intimate Partner Violence: Not on file   Housing Stability: Low Risk  (10/17/2024)    Housing Stability Vital Sign     Unable to Pay for Housing in the Last Year: No     Number of Times Moved in the Last Year: 0     Homeless in the Last Year: No      Family History   Problem Relation Age of Onset    No Known Problems Mother     Stroke Father     Ovarian cancer Sister 79    Ovarian cancer Daughter 44    No Known Problems Daughter     No Known Problems Daughter     No Known Problems Son     No Known Problems Son     No Known Problems Maternal Grandmother     No Known Problems Maternal Grandfather     No Known Problems Paternal Grandmother     No Known Problems Paternal Grandfather     No Known Problems Maternal Aunt     No Known Problems Maternal Aunt     No Known Problems Paternal Aunt     Colon cancer Neg Hx     Colon polyps Neg Hx     Anesthesia problems Neg Hx      Past Surgical History:   Procedure Laterality Date    CARDIAC CATHETERIZATION N/A 06/06/2024    Procedure: Cardiac catheterization;  Surgeon: Taj Stafford DO;  Location: [facility];  Service: Cardiology    CARDIAC CATHETERIZATION  N/A 06/27/2024    Procedure: Cardiac tavr;  Surgeon: Pascual Koo MD;  Location: [facility];  Service: Cardiology    CARDIAC VALVE REPLACEMENT  6/27/2024    Tavr    COLONOSCOPY  07/18/2016     grade 2 internal hemorrhoids but otherwise normal, pathology negative for microscopic colitis    JOINT REPLACEMENT      AK REPLACE AORTIC VALVE OPENFEMORAL ARTERY APPROACH N/A 06/27/2024    Procedure: REPLACEMENT AORTIC VALVE TRANSCATHETER (TAVR) TRANSFEMORAL W/ 23MM  S3 UR VALVE(ACCESS ON LEFT) LEDY;  Surgeon: JESSICA Pal MD;  Location: [facility];  Service: Cardiac Surgery    AK REVJ TOT KNEE ARTHRP FEM&ENTIRE TIBIAL COMPONE Left 10/16/2024    Procedure: ARTHROPLASTY KNEE TOTAL REVISION;  Surgeon: Isaac Dueñas DO;  Location: [facility];  Service: Orthopedics    REPAIR RECTOCELE      SPINAL STIMULATOR PLACEMENT      9/30/24 Per pt doesnt think she has spinal stimulator - pt reports having bladder stimulator intact    SPINE SURGERY      fusion L1-L5    SQUAMOUS CELL CARCINOMA EXCISION      TOE SURGERY      TONSILLECTOMY AND ADENOIDECTOMY      TOTAL ABDOMINAL HYSTERECTOMY      TOTAL KNEE ARTHROPLASTY Bilateral     TOTAL SHOULDER REPLACEMENT Right     UPPER GASTROINTESTINAL ENDOSCOPY  01/09/2015     reflux esophagitis, gastritis, duodenitis in the bulb, pathology negative for H pylori, Puckett's, EOE    VENTRICULOPERITONEAL SHUNT         Current Outpatient Medications:     ascorbic acid (VITAMIN C) 500 MG tablet, Take 1 tablet (500 mg total) by mouth 2 (two) times a day, Disp: 60 tablet, Rfl: 1    Cholecalciferol (VITAMIN D) 2000 UNITS tablet, Take 3,000 Units by mouth daily 9/30/24 prescribed by Dr Pal, Disp: , Rfl:     clotrimazole-betamethasone (LOTRISONE) 1-0.05 % cream, APPLY TOPICALLY 2 TIMES PER DAY (Patient taking differently: as needed), Disp: 30 g, Rfl: 1    Coenzyme Q10 (CO Q 10 PO), Take by mouth if needed, Disp: , Rfl:     cycloSPORINE (RESTASIS) 0.05 % ophthalmic emulsion, Administer 1  "drop to both eyes 2 (two) times a day., Disp: , Rfl:     Diclofenac Sodium (VOLTAREN) 1 %, , Disp: , Rfl:     diphenoxylate-atropine (LOMOTIL) 2.5-0.025 mg per tablet, TAKE 1 TABLET BY MOUTH 4 (FOUR) TIMES A DAY AS NEEDED FOR DIARRHEA, Disp: 60 tablet, Rfl: 3    hydroxychloroquine (PLAQUENIL) 200 mg tablet, Take 200 mg by mouth in the morning, Disp: , Rfl:     levothyroxine 112 mcg tablet, TAKE 1 TABLET BY MOUTH EVERY DAY, Disp: 90 tablet, Rfl: 0    losartan (COZAAR) 50 mg tablet, TAKE 1 TABLET BY MOUTH EVERY DAY, Disp: 90 tablet, Rfl: 0    LOW-DOSE ASPIRIN PO, , Disp: , Rfl:     MAGNESIUM CITRATE PO, Take by mouth in the morning, Disp: , Rfl:     metoprolol tartrate (LOPRESSOR) 25 mg tablet, Take 1 tablet (25 mg total) by mouth every 12 (twelve) hours, Disp: 60 tablet, Rfl: 2    Multiple Vitamins-Minerals (multivitamin with minerals) tablet, Take 1 tablet by mouth daily, Disp: 30 tablet, Rfl: 1    Multiple Vitamins-Minerals (PRESERVISION AREDS PO), Take by mouth, Disp: , Rfl:     NON FORMULARY, 99 mg in the morning Potassium - takes two tabs daily ( OTC supplement), Disp: , Rfl:     omeprazole (PriLOSEC) 20 mg delayed release capsule, Take 20 mg by mouth daily, Disp: , Rfl:     pilocarpine (SALAGEN) 5 mg tablet, Take 5 mg by mouth 4 (four) times a day, Disp: , Rfl:     polyvinyl alcohol (LIQUIFILM TEARS) 1.4 % ophthalmic solution, Administer 1 drop to both eyes as needed for dry eyes, Disp: , Rfl:     simvastatin (ZOCOR) 20 mg tablet, Take 20 mg by mouth daily at bedtime., Disp: , Rfl:     warfarin (COUMADIN) 5 mg tablet, Take 1 tablet (5 mg total) by mouth daily Do not start before March 20, 2023., Disp: , Rfl: 0  Allergies   Allergen Reactions    Sulfa Antibiotics Hives     Vitals:    04/10/25 0840   BP: 136/64   BP Location: Right arm   Patient Position: Sitting   Cuff Size: Standard   Pulse: 57   SpO2: 98%   Weight: 58.1 kg (128 lb)   Height: 4' 11\" (1.499 m)       Labs:  Lab Results   Component Value Date    "  01/25/2015    K 5.1 04/07/2025    K 4.3 05/09/2023     04/07/2025     05/09/2023    CO2 28 04/07/2025    CO2 24 06/27/2024    CO2 27 05/09/2023    BUN 23 04/07/2025    BUN 27 (H) 05/09/2023    CREATININE 0.87 04/07/2025    CREATININE 0.83 05/09/2023    GLUCOSE 102 06/27/2024    GLUCOSE 97 01/25/2015    CALCIUM 10.3 (H) 04/07/2025    CALCIUM 10.4 (H) 05/09/2023     Lab Results   Component Value Date    WBC 7.81 04/07/2025    WBC 7.11 01/25/2015    HGB 13.3 04/07/2025    HGB 10.3 (L) 01/25/2015    HCT 41.6 04/07/2025    HCT 31.7 (L) 01/25/2015    MCV 91 04/07/2025     (H) 01/25/2015     04/07/2025     (L) 01/25/2015     Lab Results   Component Value Date    TRIG 165 (H) 10/28/2022    HDL 46 (L) 10/28/2022     Imaging: ECGs in the recent past have shown Normal sinus rhythm with a left ventricular hypertrophy.    Echo (7/26/2024):    Left Ventricle: Left ventricular cavity size is normal. Wall thickness is mild to moderately increased. There is moderate concentric hypertrophy. The left ventricular ejection fraction is 60% by visual estimation. Systolic function is normal. Wall motion is normal. Diastolic function is moderately abnormal, consistent with grade II (pseudonormal) relaxation.    Right Ventricle: Right ventricular cavity size is normal. Systolic function is normal.    Left Atrium: The atrium is severely dilated (>48 mL/m2).    Aortic Valve: There is an Dunn KELL 3 Ultra Resilia 23 mm TAVR bioprosthetic valve. There is mild paravalvular regurgitation.  There is no evidence of transvalvular regurgitation. The gradient recorded across the prosthetic aortic valve is within the expected range. The aortic valve peak velocity is 2 m/s. The aortic valve mean gradient is 8 mmHg. The dimensionless velocity index is 0.49. The aortic valve area is 1.84 cm2.    Mitral Valve: There is moderate diffuse thickening. There is mild diffuse calcification. There is moderate annular  "calcification. There is mild subvalvular thickening and calcification. There is moderate to severe regurgitation with a centrally directed jet.    Tricuspid Valve: There is mild regurgitation. The tricuspid valve regurgitation jet is central.    Review of Systems:  Review of Systems   Constitutional:  Positive for fatigue.   HENT: Negative.     Eyes: Negative.    Respiratory:  Positive for chest tightness and shortness of breath.    Cardiovascular: Negative.    Gastrointestinal: Negative.    Musculoskeletal: Negative.    Skin: Negative.    Allergic/Immunologic: Negative.    Neurological: Negative.    Hematological: Negative.    Psychiatric/Behavioral: Negative.     All other systems reviewed and are negative.    Vitals:    04/10/25 0840   BP: 136/64   BP Location: Right arm   Patient Position: Sitting   Cuff Size: Standard   Pulse: 57   SpO2: 98%   Weight: 58.1 kg (128 lb)   Height: 4' 11\" (1.499 m)      Physical Exam  Constitutional:       Appearance: She is well-developed.   HENT:      Head: Normocephalic and atraumatic.   Eyes:      General: No scleral icterus.        Right eye: No discharge.         Left eye: No discharge.      Pupils: Pupils are equal, round, and reactive to light.   Neck:      Thyroid: No thyromegaly.      Vascular: No JVD.   Cardiovascular:      Rate and Rhythm: Normal rate and regular rhythm. No extrasystoles are present.     Pulses: Normal pulses. No decreased pulses.      Heart sounds: S1 normal and S2 normal. Murmur heard.      Systolic murmur is present with a grade of 4/6.      No friction rub. No gallop.   Pulmonary:      Effort: Pulmonary effort is normal. No respiratory distress.      Breath sounds: Normal breath sounds. No wheezing or rales.   Abdominal:      General: Bowel sounds are normal. There is no distension.      Palpations: Abdomen is soft.      Tenderness: There is no abdominal tenderness.   Musculoskeletal:         General: No tenderness or deformity. Normal range of " motion.      Cervical back: Normal range of motion and neck supple.      Right lower leg: No edema.      Left lower leg: No edema.   Skin:     General: Skin is warm and dry.      Findings: No rash.   Neurological:      Mental Status: She is alert and oriented to person, place, and time.      Cranial Nerves: No cranial nerve deficit.   Psychiatric:         Thought Content: Thought content normal.         Judgment: Judgment normal.       Counseling / Coordination of Care  Total office time spent today 40 minutes.  Greater than 50% of total time was spent with the patient and / or family counseling and / or coordination of care.

## 2025-04-17 DIAGNOSIS — I10 HYPERTENSION, UNSPECIFIED TYPE: ICD-10-CM

## 2025-04-17 DIAGNOSIS — E03.9 HYPOTHYROIDISM IN ADULT: ICD-10-CM

## 2025-04-18 RX ORDER — LOSARTAN POTASSIUM 50 MG/1
50 TABLET ORAL DAILY
Qty: 90 TABLET | Refills: 1 | Status: SHIPPED | OUTPATIENT
Start: 2025-04-18

## 2025-04-18 RX ORDER — LEVOTHYROXINE SODIUM 112 UG/1
112 TABLET ORAL DAILY
Qty: 90 TABLET | Refills: 1 | Status: SHIPPED | OUTPATIENT
Start: 2025-04-18

## 2025-05-30 ENCOUNTER — HOSPITAL ENCOUNTER (OUTPATIENT)
Dept: BONE DENSITY | Facility: IMAGING CENTER | Age: 89
Discharge: HOME | End: 2025-05-30
Payer: COMMERCIAL

## 2025-05-30 VITALS — HEIGHT: 60 IN | BODY MASS INDEX: 24.94 KG/M2 | WEIGHT: 127 LBS

## 2025-05-30 DIAGNOSIS — M81.0 OSTEOPOROSIS, UNSPECIFIED OSTEOPOROSIS TYPE, UNSPECIFIED PATHOLOGICAL FRACTURE PRESENCE: ICD-10-CM

## 2025-05-30 DIAGNOSIS — M81.0 AGE-RELATED OSTEOPOROSIS WITHOUT CURRENT PATHOLOGICAL FRACTURE: ICD-10-CM

## 2025-05-30 PROCEDURE — 77080 DXA BONE DENSITY AXIAL: CPT

## 2025-06-02 ENCOUNTER — APPOINTMENT (OUTPATIENT)
Dept: LAB | Facility: HOSPITAL | Age: 89
End: 2025-06-02
Payer: COMMERCIAL

## 2025-06-02 ENCOUNTER — TELEPHONE (OUTPATIENT)
Age: 89
End: 2025-06-02

## 2025-06-02 DIAGNOSIS — I35.0 NONRHEUMATIC AORTIC VALVE STENOSIS: ICD-10-CM

## 2025-06-02 DIAGNOSIS — Z95.2 S/P TAVR (TRANSCATHETER AORTIC VALVE REPLACEMENT): ICD-10-CM

## 2025-06-02 DIAGNOSIS — I35.0 NONRHEUMATIC AORTIC VALVE STENOSIS: Primary | ICD-10-CM

## 2025-06-02 LAB
ANION GAP SERPL CALCULATED.3IONS-SCNC: 9 MMOL/L (ref 4–13)
BASOPHILS # BLD AUTO: 0.08 THOUSANDS/ÂΜL (ref 0–0.1)
BASOPHILS NFR BLD AUTO: 1 % (ref 0–1)
BUN SERPL-MCNC: 30 MG/DL (ref 5–25)
CALCIUM SERPL-MCNC: 10 MG/DL (ref 8.4–10.2)
CHLORIDE SERPL-SCNC: 99 MMOL/L (ref 96–108)
CO2 SERPL-SCNC: 27 MMOL/L (ref 21–32)
CREAT SERPL-MCNC: 0.87 MG/DL (ref 0.6–1.3)
EOSINOPHIL # BLD AUTO: 0.28 THOUSAND/ÂΜL (ref 0–0.61)
EOSINOPHIL NFR BLD AUTO: 4 % (ref 0–6)
ERYTHROCYTE [DISTWIDTH] IN BLOOD BY AUTOMATED COUNT: 15.2 % (ref 11.6–15.1)
GFR SERPL CREATININE-BSD FRML MDRD: 60 ML/MIN/1.73SQ M
GLUCOSE SERPL-MCNC: 91 MG/DL (ref 65–140)
HCT VFR BLD AUTO: 39.3 % (ref 34.8–46.1)
HGB BLD-MCNC: 12.8 G/DL (ref 11.5–15.4)
IMM GRANULOCYTES # BLD AUTO: 0.02 THOUSAND/UL (ref 0–0.2)
IMM GRANULOCYTES NFR BLD AUTO: 0 % (ref 0–2)
LYMPHOCYTES # BLD AUTO: 1.5 THOUSANDS/ÂΜL (ref 0.6–4.47)
LYMPHOCYTES NFR BLD AUTO: 19 % (ref 14–44)
MCH RBC QN AUTO: 31.3 PG (ref 26.8–34.3)
MCHC RBC AUTO-ENTMCNC: 32.6 G/DL (ref 31.4–37.4)
MCV RBC AUTO: 96 FL (ref 82–98)
MONOCYTES # BLD AUTO: 0.99 THOUSAND/ÂΜL (ref 0.17–1.22)
MONOCYTES NFR BLD AUTO: 12 % (ref 4–12)
NEUTROPHILS # BLD AUTO: 5.14 THOUSANDS/ÂΜL (ref 1.85–7.62)
NEUTS SEG NFR BLD AUTO: 64 % (ref 43–75)
NRBC BLD AUTO-RTO: 0 /100 WBCS
PLATELET # BLD AUTO: 200 THOUSANDS/UL (ref 149–390)
PMV BLD AUTO: 11.6 FL (ref 8.9–12.7)
POTASSIUM SERPL-SCNC: 4.9 MMOL/L (ref 3.5–5.3)
RBC # BLD AUTO: 4.09 MILLION/UL (ref 3.81–5.12)
SODIUM SERPL-SCNC: 135 MMOL/L (ref 135–147)
WBC # BLD AUTO: 8.01 THOUSAND/UL (ref 4.31–10.16)

## 2025-06-02 PROCEDURE — 85025 COMPLETE CBC W/AUTO DIFF WBC: CPT

## 2025-06-02 PROCEDURE — 80048 BASIC METABOLIC PNL TOTAL CA: CPT

## 2025-06-02 PROCEDURE — 36415 COLL VENOUS BLD VENIPUNCTURE: CPT

## 2025-06-02 NOTE — TELEPHONE ENCOUNTER
Caller: Anneliese Garcia    Doctor: Dr. Pal    Reason for call: Patient has received letter from [facility] regarding testing and follow up office visit. Was informed that the office will reach out to her with appointments. Patient voiced  understanding.    Call back#: [phone]

## 2025-06-06 DIAGNOSIS — Z95.2 S/P TAVR (TRANSCATHETER AORTIC VALVE REPLACEMENT): Primary | ICD-10-CM

## 2025-06-06 RX ORDER — AMOXICILLIN 500 MG/1
2000 CAPSULE ORAL ONCE
Qty: 4 CAPSULE | Refills: 1 | Status: SHIPPED | OUTPATIENT
Start: 2025-06-06 | End: 2025-06-06

## 2025-06-06 NOTE — TELEPHONE ENCOUNTER
Patient called the RX Refill Line. Message is being forwarded to the office.     Patient is requesting antibiotics for 2 dental procedures in the beginning of July. So she would need a rx and atleast a refill as well.    Pharmacy:[facility]/pharmacy #[id number] - [place] - [address] [phone]     Please contact patient at [phone]

## 2025-06-17 ENCOUNTER — RESULTS FOLLOW-UP (OUTPATIENT)
Dept: ENDOCRINOLOGY | Facility: HOSPITAL | Age: 89
End: 2025-06-17

## 2025-07-03 ENCOUNTER — HOSPITAL ENCOUNTER (OUTPATIENT)
Dept: NON INVASIVE DIAGNOSTICS | Age: 89
Discharge: HOME | End: 2025-07-03
Attending: NURSE PRACTITIONER
Payer: COMMERCIAL

## 2025-07-03 ENCOUNTER — TELEPHONE (OUTPATIENT)
Dept: CARDIOLOGY CLINIC | Facility: CLINIC | Age: 89
End: 2025-07-03

## 2025-07-03 VITALS
HEART RATE: 72 BPM | DIASTOLIC BLOOD PRESSURE: 64 MMHG | SYSTOLIC BLOOD PRESSURE: 136 MMHG | HEIGHT: 60 IN | BODY MASS INDEX: 24.94 KG/M2 | WEIGHT: 127 LBS

## 2025-07-03 DIAGNOSIS — I35.0 NONRHEUMATIC AORTIC VALVE STENOSIS: ICD-10-CM

## 2025-07-03 DIAGNOSIS — Z95.2 S/P TAVR (TRANSCATHETER AORTIC VALVE REPLACEMENT): ICD-10-CM

## 2025-07-03 DIAGNOSIS — Z95.2 S/P TAVR (TRANSCATHETER AORTIC VALVE REPLACEMENT): Primary | ICD-10-CM

## 2025-07-03 LAB
AORTIC ROOT: 3 CM
AORTIC VALVE MEAN VELOCITY: 12.5 M/S
ASCENDING AORTA: 2.1 CM
AV AREA BY CONTINUOUS VTI: 1 CM2
AV AREA PEAK VELOCITY: 0.9 CM2
AV LVOT MEAN GRADIENT: 2 MMHG
AV LVOT PEAK GRADIENT: 3 MMHG
AV MEAN PRESS GRAD SYS DOP V1V2: 7 MMHG
AV ORIFICE AREA US: 1.71 CM2
AV PEAK GRADIENT: 13 MMHG
AV VELOCITY RATIO: 0.49
AV VMAX SYS DOP: 1.82 M/S
BSA FOR ECHO PROCEDURE: 1.54 M2
DOP CALC AO VTI: 37.57 CM
DOP CALC LVOT AREA: 3.46 CM2
DOP CALC LVOT CARDIAC INDEX: 1.64 L/MIN/M2
DOP CALC LVOT CARDIAC OUTPUT: 2.52 L/MIN
DOP CALC LVOT DIAMETER: 2.1 CM
DOP CALC LVOT PEAK VEL VTI: 18.52 CM
DOP CALC LVOT PEAK VEL: 0.83 M/S
DOP CALC LVOT STROKE INDEX: 24 ML/M2
DOP CALC LVOT STROKE VOLUME: 64.11 CM3
DOP CALC MV VTI: 38.17 CM
E WAVE DECELERATION TIME: 103 MS
E/A RATIO: 1.52
FRACTIONAL SHORTENING: 41 (ref 28–44)
INTERVENTRICULAR SEPTUM IN DIASTOLE (PARASTERNAL SHORT AXIS VIEW): 1 CM
INTERVENTRICULAR SEPTUM: 1 CM (ref 0.6–1.1)
LAAS-AP2: 30.4 CM2
LAAS-AP4: 22 CM2
LEFT ATRIUM SIZE: 4.4 CM
LEFT ATRIUM VOLUME (MOD BIPLANE): 98 ML
LEFT ATRIUM VOLUME INDEX (MOD BIPLANE): 63.6 ML/M2
LEFT INTERNAL DIMENSION IN SYSTOLE: 2.7 CM (ref 2.1–4)
LEFT VENTRICLE DIASTOLIC VOLUME (MOD BIPLANE): 129 ML
LEFT VENTRICLE DIASTOLIC VOLUME INDEX (MOD BIPLANE): 83.8 ML/M2
LEFT VENTRICLE SYSTOLIC VOLUME (MOD BIPLANE): 49 ML
LEFT VENTRICLE SYSTOLIC VOLUME INDEX (MOD BIPLANE): 31.8 ML/M2
LEFT VENTRICULAR INTERNAL DIMENSION IN DIASTOLE: 4.6 CM (ref 3.5–6)
LEFT VENTRICULAR POSTERIOR WALL IN END DIASTOLE: 1.4 CM
LEFT VENTRICULAR STROKE VOLUME: 71 ML
LV EF BIPLANE MOD: 62 %
LV EF US.2D.A4C+ESTIMATED: 66 %
LVSV (TEICH): 71 ML
MV E'TISSUE VEL-LAT: 12 CM/S
MV E'TISSUE VEL-SEP: 6 CM/S
MV EROA: 0.3 CM2
MV MEAN GRADIENT: 4 MMHG
MV PEAK A VEL: 1.16 M/S
MV PEAK E VEL: 176 CM/S
MV PEAK GRADIENT: 12 MMHG
MV REGURGITANT VOLUME: 66 ML
MV STENOSIS PRESSURE HALF TIME: 30 MS
MV VALVE AREA BY CONTINUITY EQUATION: 1.68 CM2
MV VALVE AREA P 1/2 METHOD: 7.33
PISA MRMAX VEL: 0.34 M/S
PISA RADIUS: 1 CM
RA PRESSURE ESTIMATED: 8 MMHG
RIGHT ATRIAL 2D VOLUME: 52 ML
RIGHT ATRIUM AREA SYSTOLE A4C: 17.9 CM2
RIGHT VENTRICLE ID DIMENSION: 3.2 CM
RV PSP: 73 MMHG
SL CV LEFT ATRIUM LENGTH A2C: 6.4 CM
SL CV LV EF: 40
SL CV PED ECHO LEFT VENTRICLE DIASTOLIC VOLUME (MOD BIPLANE) 2D: 98 ML
SL CV PED ECHO LEFT VENTRICLE SYSTOLIC VOLUME (MOD BIPLANE) 2D: 26 ML
TR MAX PG: 65 MMHG
TR PEAK VELOCITY: 4 M/S
TRICUSPID ANNULAR PLANE SYSTOLIC EXCURSION: 2 CM
TRICUSPID VALVE PEAK REGURGITATION VELOCITY: 4.03 M/S

## 2025-07-03 PROCEDURE — 93306 TTE W/DOPPLER COMPLETE: CPT

## 2025-07-03 PROCEDURE — 93306 TTE W/DOPPLER COMPLETE: CPT | Performed by: INTERNAL MEDICINE

## 2025-07-03 RX ORDER — AMOXICILLIN 500 MG/1
2000 CAPSULE ORAL ONCE
Qty: 4 CAPSULE | Refills: 0 | Status: SHIPPED | OUTPATIENT
Start: 2025-07-03 | End: 2025-07-03

## 2025-07-17 ENCOUNTER — OFFICE VISIT (OUTPATIENT)
Dept: CARDIAC SURGERY | Facility: CLINIC | Age: 89
End: 2025-07-17
Payer: COMMERCIAL

## 2025-07-17 VITALS
BODY MASS INDEX: 24.52 KG/M2 | SYSTOLIC BLOOD PRESSURE: 160 MMHG | DIASTOLIC BLOOD PRESSURE: 74 MMHG | HEIGHT: 60 IN | OXYGEN SATURATION: 98 % | HEART RATE: 61 BPM | WEIGHT: 124.9 LBS

## 2025-07-17 DIAGNOSIS — Z95.2 S/P TAVR (TRANSCATHETER AORTIC VALVE REPLACEMENT): Primary | ICD-10-CM

## 2025-07-17 PROBLEM — I77.1 SUBCLAVIAN ARTERY STENOSIS, LEFT (HCC): Status: ACTIVE | Noted: 2025-07-17

## 2025-07-17 PROCEDURE — 99214 OFFICE O/P EST MOD 30 MIN: CPT | Performed by: THORACIC SURGERY (CARDIOTHORACIC VASCULAR SURGERY)

## 2025-07-17 NOTE — PROGRESS NOTES
1 YEAR FOLLOW UP VISIT S/P TAVR    Procedure: S/P Transcatheter aortic valve replacement with a 23 mm Dunn KELL 3 Ultra Resilia bioprosthetic valve, performed on 6/27/2024    History of Present Illness: Anneliese Garcia is a 89 y.o. year old female  w/PMH of AS (s/p TAVR), MR, mild non-obst CAD, HTN, GERD, HLD, osteoporosis, Sjogren's (follows with Rheum at [facility]), H/o multiple PEs/DVTs/Hypercoagulable state on Coumadin, Hypothyroidism, CKDII, Arthritis, MIAH no CPAP, RLS and left subclavian stenosis (CTA c/a/p 2024)  She presents to our office today for routine one year surgical follow up care from transcatheter aortic valve replacement.  Patient accompanied by her  today. She reports he is doing well, feels good. She denies chest pain, SOB, lightheadedness, palpitations, fatigue, weight gain or edema.   She is able to perform ADL's as she desires without limitations. She has 2 dachshunds she cares for.     Review of Systems:  Review of Systems - History obtained from chart review and the patient  General ROS: negative  Psychological ROS: negative  Ophthalmic ROS: positive for - uses glasses  ENT ROS: negative  Allergy and Immunology ROS: negative  Hematological and Lymphatic ROS: negative  Endocrine ROS: negative  Breast ROS: negative  Respiratory ROS: no cough, shortness of breath, or wheezing  Cardiovascular ROS: no chest pain or dyspnea on exertion  Gastrointestinal ROS: no abdominal pain, change in bowel habits, or black or bloody stools  Genito-Urinary ROS: no dysuria, trouble voiding, or hematuria  Musculoskeletal ROS: positive for - arthralgias  Neurological ROS: no TIA or stroke symptoms  Dermatological ROS: negative     Past Medical History:    Past Medical History:   Diagnosis Date    Arthritis     CAD (coronary artery disease)     CHF (congestive heart failure) (HCC)     Chronic kidney disease     Chronic pain     s/p spinal stimulator    CKD (chronic kidney disease), stage II     baseline  Cr 0.8-1.0    COVID     dx with COVID 9/23 - tested on 9/23/24 -- no s/s at this time and seeing PCP 10/2    Depression     Disease of thyroid gland     GERD (gastroesophageal reflux disease)     Heart murmur 2018    History of DVT (deep vein thrombosis)     multiple, Coumadin    History of pulmonary embolism     multiple, Coumadin    History of squamous cell carcinoma excision     HL (hearing loss) 2014    Hyperlipidemia     Hypertension     Hypothyroidism     IBS (irritable bowel syndrome)     diarrheal type    Knee pain, left     Memory loss 2020    MIAH (obstructive sleep apnea)     no CPAP or BiPAP    Osteoarthritis     Osteoporosis     Pneumonia     RLS (restless legs syndrome)     Severe aortic stenosis     Sjogren's syndrome (HCC)        Past Surgical History:    Past Surgical History:   Procedure Laterality Date    CARDIAC CATHETERIZATION N/A 06/06/2024    Procedure: Cardiac catheterization;  Surgeon: Taj Stafford DO;  Location: [facility];  Service: Cardiology    CARDIAC CATHETERIZATION N/A 06/27/2024    Procedure: Cardiac tavr;  Surgeon: Pascual Koo MD;  Location: [facility];  Service: Cardiology    CARDIAC VALVE REPLACEMENT  6/27/2024    Tavr    COLONOSCOPY  07/18/2016     grade 2 internal hemorrhoids but otherwise normal, pathology negative for microscopic colitis    JOINT REPLACEMENT      WV REPLACE AORTIC VALVE OPENFEMORAL ARTERY APPROACH N/A 06/27/2024    Procedure: REPLACEMENT AORTIC VALVE TRANSCATHETER (TAVR) TRANSFEMORAL W/ 23MM  S3 UR VALVE(ACCESS ON LEFT) LEDY;  Surgeon: JESSICA Pal MD;  Location: [facility];  Service: Cardiac Surgery    WV REVJ TOT KNEE ARTHRP FEM&ENTIRE TIBIAL COMPONE Left 10/16/2024    Procedure: ARTHROPLASTY KNEE TOTAL REVISION;  Surgeon: Isaac Dueñas DO;  Location: [facility];  Service: Orthopedics    REPAIR RECTOCELE      SPINAL STIMULATOR PLACEMENT      9/30/24 Per pt doesnt think she has spinal stimulator - pt reports having bladder  stimulator intact    SPINE SURGERY      fusion L1-L5    SQUAMOUS CELL CARCINOMA EXCISION      TOE SURGERY      TONSILLECTOMY AND ADENOIDECTOMY      TOTAL ABDOMINAL HYSTERECTOMY      TOTAL KNEE ARTHROPLASTY Bilateral     TOTAL SHOULDER REPLACEMENT Right     UPPER GASTROINTESTINAL ENDOSCOPY  01/09/2015     reflux esophagitis, gastritis, duodenitis in the bulb, pathology negative for H pylori, Puckett's, EOE    VENTRICULOPERITONEAL SHUNT       Social History     Tobacco Use    Smoking status: Never    Smokeless tobacco: Never    Tobacco comments:     Never smoked   Vaping Use    Vaping status: Never Used   Substance and Sexual Activity    Alcohol use: Never     Comment: Denies anyuse of alcohol    Drug use: No     Comment: Denies any drug use per pt    Sexual activity: Not Currently     Partners: Male     Birth control/protection: Post-menopausal, Male Sterilization     Comment: Not active at this time     Vital Signs:     Vitals:    07/17/25 1046 07/17/25 1047   BP: 124/60 160/74   BP Location: Left arm Right arm   Patient Position: Sitting Sitting   Cuff Size: Standard Standard   Pulse: 61    SpO2: 98%    Weight: 56.7 kg (124 lb 14.4 oz)    Height: 5' (1.524 m)          Home Medications:     Prior to Admission medications    Medication Sig Start Date End Date Taking? Authorizing Provider   ascorbic acid (VITAMIN C) 500 MG tablet Take 1 tablet (500 mg total) by mouth 2 (two) times a day 8/16/24   Roxana Dale PA-C   Cholecalciferol (VITAMIN D) 2000 UNITS tablet Take 3,000 Units by mouth daily 9/30/24 prescribed by Dr Pal    Historical Provider, MD   clotrimazole-betamethasone (LOTRISONE) 1-0.05 % cream APPLY TOPICALLY 2 TIMES PER DAY  Patient taking differently: as needed 7/29/24   C William Riedel,    Coenzyme Q10 (CO Q 10 PO) Take by mouth if needed    Historical Provider, MD   cycloSPORINE (RESTASIS) 0.05 % ophthalmic emulsion Administer 1 drop to both eyes 2 (two) times a day.    Historical Provider,  MD   Diclofenac Sodium (VOLTAREN) 1 %  11/1/21   Historical Provider, MD   diphenoxylate-atropine (LOMOTIL) 2.5-0.025 mg per tablet TAKE 1 TABLET BY MOUTH 4 (FOUR) TIMES A DAY AS NEEDED FOR DIARRHEA 7/8/21   Lew Art DO   hydroxychloroquine (PLAQUENIL) 200 mg tablet Take 200 mg by mouth in the morning 7/20/24   Historical Provider, MD   levothyroxine 112 mcg tablet TAKE 1 TABLET BY MOUTH EVERY DAY 4/18/25   CATHERINE Brady   losartan (COZAAR) 50 mg tablet TAKE 1 TABLET BY MOUTH EVERY DAY 4/18/25   CATHERINE Brady   LOW-DOSE ASPIRIN PO  6/14/24   Historical Provider, MD   MAGNESIUM CITRATE PO Take by mouth in the morning    Historical Provider, MD   metoprolol tartrate (LOPRESSOR) 25 mg tablet Take 1 tablet (25 mg total) by mouth every 12 (twelve) hours 6/29/24 4/10/25  Tamiko Porras PA-C   Multiple Vitamins-Minerals (multivitamin with minerals) tablet Take 1 tablet by mouth daily 8/16/24   Roxana Dale PA-C   Multiple Vitamins-Minerals (PRESERVISION AREDS PO) Take by mouth    Historical Provider, MD   NON FORMULARY 99 mg in the morning Potassium - takes two tabs daily ( OTC supplement)    Historical Provider, MD   omeprazole (PriLOSEC) 20 mg delayed release capsule Take 20 mg by mouth daily    Historical Provider, MD   pilocarpine (SALAGEN) 5 mg tablet Take 5 mg by mouth 4 (four) times a day 12/15/21   Historical Provider, MD   polyvinyl alcohol (LIQUIFILM TEARS) 1.4 % ophthalmic solution Administer 1 drop to both eyes as needed for dry eyes    Historical Provider, MD   simvastatin (ZOCOR) 20 mg tablet Take 20 mg by mouth daily at bedtime.    Historical Provider, MD   warfarin (COUMADIN) 5 mg tablet Take 1 tablet (5 mg total) by mouth daily Do not start before March 20, 2023. 3/20/23   Yuliana Mott MD       Allergies:    Allergies   Allergen Reactions    Sulfa Antibiotics Hives       Physical Exam:    General: Alert, oriented, well developed, no acute distress  HEENT/NECK:  PERRLA.  No  jugular venous distention.    Cardiac:Regular rate and rhythm, no murmurs rubs or gallops.  Pulmonary:Breath sounds clear bilaterally  Abdomen:  Non-tender, Non-distended.  Positive bowel sounds.  Upper extremities: 2+ radial pulses; brisk capillary refill  Lower extremities: Extremities warm/dry. PT/DP pulses 2+ bilaterally.  No edema B/L  Musculoskeletal: MAEE, stable gait  Neuro: Alert and oriented X 3.  Sensation is grossly intact.  No focal deficits  Skin: Warm/Dry, without rashes or lesions.    Lab Results:   Lab Results   Component Value Date    WBC 8.01 06/02/2025    HGB 12.8 06/02/2025    HCT 39.3 06/02/2025    MCV 96 06/02/2025     06/02/2025     Lab Results   Component Value Date    GLUCOSE 102 06/27/2024    CALCIUM 10.0 06/02/2025     01/25/2015    K 4.9 06/02/2025    CO2 27 06/02/2025    CL 99 06/02/2025    BUN 30 (H) 06/02/2025    CREATININE 0.87 06/02/2025       Imaging Studies:     Echocardiogram:7/3/25         Left Ventricle: Left ventricular cavity size is normal. Wall thickness is mildly increased. The left ventricular ejection fraction is 40-45%. Systolic function is mildly reduced. There is mild global hypokinesis. Diastolic function is moderately abnormal, consistent with grade II (pseudonormal) relaxation.    Right Ventricle: Right ventricular cavity size is normal. Systolic function is normal.    Left Atrium: The atrium is severely dilated.    Aortic Valve: There is an Dunn KELL 3 Ultra 23 mm TAVR bioprosthetic valve. The prosthetic valve appears well-seated. There is no evidence of paravalvular regurgitation. There is mild transvalvular regurgitation. The gradient recorded across the prosthetic aortic valve is within the expected range.    Mitral Valve: There is moderate diffuse calcification with moderate chordal involvement. There is moderately reduced mobility of the anterior leaflet and posterior leaflet. There is severe annular calcification. There is severe  regurgitation with a centrally directed jet. There is mild to moderate stenosis.    Tricuspid Valve: There is mild regurgitation. The right ventricular systolic pressure is severely elevated. The estimated right ventricular systolic pressure is 73.00 mmHg.    Pulmonic Valve: There is mild regurgitation.    Prior TTE study available for comparison. Prior study date: 7/26/2024. Changes noted when compared to prior study. Changes include: LV systolic function is now reduced. Mitral regurgitation is now severe / torrential. .        Findings    Left Ventricle Left ventricular cavity size is normal. Wall thickness is mildly increased. The left ventricular ejection fraction is 40-45%. Systolic function is mildly reduced. There is mild global hypokinesis. Diastolic function is moderately abnormal, consistent with grade II (pseudonormal) relaxation.   Right Ventricle Right ventricular cavity size is normal. Systolic function is normal.   Left Atrium The atrium is severely dilated.   Right Atrium The atrium is normal in size.   Aortic Valve There is an Dunn KELL 3 Ultra 23 mm TAVR bioprosthetic valve. The prosthetic valve appears well-seated. There is no evidence of paravalvular regurgitation. There is mild transvalvular regurgitation. The gradient recorded across the prosthetic aortic valve is within the expected range.   Mitral Valve There is moderate diffuse calcification with moderate chordal involvement. There is moderately reduced mobility of the anterior leaflet and posterior leaflet. There is severe annular calcification.  There is severe regurgitation with a centrally directed jet. There is mild to moderate stenosis.   Tricuspid Valve There is mild regurgitation. There is no evidence of stenosis. The right ventricular systolic pressure is severely elevated. The estimated right ventricular systolic pressure is 73.00 mmHg.   Pulmonic Valve There is mild regurgitation. There is no evidence of stenosis.   Ascending  Aorta The aortic root is normal in size.   IVC/SVC The right atrial pressure is estimated at 8.0 mmHg. The inferior vena cava is dilated. Respirophasic changes were normal.   Pericardium There is no pericardial effusion.     Left Ventricle Measurements    Function/Volumes   A4C EF 66 %         LVOT stroke volume 64.11 cm3         LVOT stroke volume index 24 ml/m2         Left ventricular stroke volume (2D) 71 mL         LV Diastolic Volume (BP) 129 mL         LV Systolic Volume (BP) 49 mL         EF 62 %         LVOT Cardiac Output 2.52 l/min         LVOT Cardiac Index 1.64 l/min/m2         Dimensions   LVIDd 4.6 cm         LVIDS 2.7 cm         IVSd 1 cm         LVPWd 1.4 cm         LVOT area 3.46 cm2         FS 41         Diastolic Filling   MV E' Tissue Velocity Septal 6 cm/s         MV E' Tissue Velocity Lateral 12 cm/s         LA Volume Index (BP) 63.6 mL/m2         E/A ratio 1.52         E wave deceleration time 103 ms         MV Peak E Ellis 176 cm/s         MV Peak A Ellis 1.16 m/s          Report Measurements   AV LVOT peak gradient 3 mmHg              Interventricular Septum Measurements    Shunt Ratio   LVOT peak VTI 18.52 cm         LVOT peak ellis 0.83 m/s              Right Ventricle Measurements    Dimensions   RVID d 3.2 cm         Tricuspid annular plane systolic excursion 2 cm               Left Atrium Measurements    Dimensions   LA size 4.4 cm         LA length (A2C) 6.4 cm         Volumes   LA volume (BP) 98 mL         LA Volume Index (BP) 63.6 mL/m2               Right Atrium Measurements    Dimensions   RA 2D Volume 52 mL         RAA A4C 17.9 cm2               Atrial Septum Measurements    Shunt Ratio   LVOT peak VTI 18.52 cm         LVOT peak ellis 0.83 m/s               Aortic Valve Measurements    Stenosis   Aortic valve peak velocity 1.82 m/s         LVOT peak ellis 0.83 m/s         Ao VTI 37.57 cm         LVOT peak VTI 18.52 cm         AV mean gradient 7 mmHg         LVOT mn grad 2 mmHg         AV  "peak gradient 13 mmHg         AV LVOT peak gradient 3 mmHg         Area/Dimensions   DVI 0.49         AV valve area 1.71 cm2         AV area by cont VTI 1 cm2         AV area peak ellis 0.9 cm2         LVOT diameter 2.1 cm         LVOT area 3.46 cm2               Mitral Valve Measurements    Stenosis   MV mean gradient antegrade 4 mmHg         MV peak gradient antegrade 12 mmHg         MV VTI 38.17 cm         MV stenosis pressure 1/2 time 30 ms         MV valve area p 1/2 method 7.33         MV valve area by continuity eq 1.68 cm2         Regurgitation   MV regurgitant volume 66 mL         MV ERO 0.3 cm2         PISA   Radius 1 cm         MR max velocity 0.34 m/s               Tricuspid Valve Measurements    RVSP Parameters   TR Peak Ellis 4 m/s         Est. RA pres 8 mmHg         Triscuspid Valve Regurgitation Peak Gradient 65 mmHg         Right Ventricular Peak Systolic Pressure 73 mmHg               Aorta Measurements    Aortic Dimensions   Ao root 3 cm         Asc Ao 2.1 cm               IVC/SVC Measurements    IVC/SVC   Est. RA pres 8 mmHg                EK/3/25    NSR  LBBB    Results Review Statement: I personally reviewed the following image studies in PACS and associated radiology reports: Echocardiogram. My interpretation of the radiology images/reports is: stable function of TAVR.    TAVR evaluation Assessment:     [facility]: I    [id number] completed     Assessment:     Aortic stenosis, Non-Rheumatic.   S/P Transcatheter aortic valve replacement with a 23 mm Dunn KELL 3 Ultra Resilia bioprosthetic valve via a percutaneous left transfemoral approach 2024    Unequal BP measurements today in upper extremities: /60  /74  In review of CTA c/a/p on 24 ( pre-op TAVR):  \"Severe atherosclerosis in the proximal left subclavian artery where there is greater than 80% atherosclerotic stenosis just beyond the left subclavian artery origin\"   Patient asymptomatic    Anneliese Garcia returns to " our office today for 1 year routine follow up s/p  transcatheter aortic valve replacement .  Their NYHA functional status is class I.   Recent echocardiogram demonstrates stable function of TAVR. There is MR but that is unchanged in comparison to pre-op echo..   ECG, CBC and BMP are stable.     Plan:     I reviewed test results with patient.  I reviewed medications and made no changes. Continue Aspirin 81 mg daily, lifelong, for antiplatelet therapy for bioprosthetic valve.      Anneliese Garcia does not need to return to our office for follow up in the future but will continue to be managed by their primary care physician and cardiologist for ongoing medical care. We recommend yearly echocardiograms which can be ordered and monitored by their cardiologist.  Anneliese BAI [place] was comfortable with our recommendations and their questions were answered to their satisfaction.    Routine referral to gastroenterology for colonoscopy screening was not indicated, as the patient is over 77 years old    CATHERINE Daugherty  7/17/25  11:10 AM

## 2025-08-04 ENCOUNTER — HOSPITAL ENCOUNTER (EMERGENCY)
Facility: HOSPITAL | Age: 89
End: 2025-08-05
Attending: EMERGENCY MEDICINE | Admitting: EMERGENCY MEDICINE
Payer: COMMERCIAL

## 2025-08-04 ENCOUNTER — APPOINTMENT (EMERGENCY)
Dept: RADIOLOGY | Facility: HOSPITAL | Age: 89
End: 2025-08-04
Payer: COMMERCIAL

## 2025-08-04 ENCOUNTER — APPOINTMENT (EMERGENCY)
Dept: CT IMAGING | Facility: HOSPITAL | Age: 89
End: 2025-08-04
Payer: COMMERCIAL
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
## 2025-08-04 DIAGNOSIS — Z79.01 WARFARIN ANTICOAGULATION: ICD-10-CM

## 2025-08-04 DIAGNOSIS — S30.0XXA TRAUMATIC HEMATOMA OF BUTTOCK, INITIAL ENCOUNTER: ICD-10-CM

## 2025-08-04 DIAGNOSIS — S32.301A CLOSED FRACTURE OF RIGHT ILIAC WING, INITIAL ENCOUNTER (HCC): ICD-10-CM

## 2025-08-04 LAB
ALBUMIN SERPL BCG-MCNC: 4.4 G/DL (ref 3.5–5)
ALP SERPL-CCNC: 72 U/L (ref 34–104)
ALT SERPL W P-5'-P-CCNC: 27 U/L (ref 7–52)
ANION GAP SERPL CALCULATED.3IONS-SCNC: 10 MMOL/L (ref 4–13)
APTT PPP: 45 SECONDS (ref 23–34)
AST SERPL W P-5'-P-CCNC: 34 U/L (ref 13–39)
BASOPHILS # BLD AUTO: 0.12 THOUSANDS/ÂΜL (ref 0–0.1)
BASOPHILS NFR BLD AUTO: 1 % (ref 0–1)
BILIRUB SERPL-MCNC: 0.81 MG/DL (ref 0.2–1)
BUN SERPL-MCNC: 35 MG/DL (ref 5–25)
CALCIUM SERPL-MCNC: 10.5 MG/DL (ref 8.4–10.2)
CHLORIDE SERPL-SCNC: 102 MMOL/L (ref 96–108)
CO2 SERPL-SCNC: 23 MMOL/L (ref 21–32)
CREAT SERPL-MCNC: 1.09 MG/DL (ref 0.6–1.3)
EOSINOPHIL # BLD AUTO: 0.55 THOUSAND/ÂΜL (ref 0–0.61)
EOSINOPHIL NFR BLD AUTO: 4 % (ref 0–6)
ERYTHROCYTE [DISTWIDTH] IN BLOOD BY AUTOMATED COUNT: 14.7 % (ref 11.6–15.1)
GFR SERPL CREATININE-BSD FRML MDRD: 45 ML/MIN/1.73SQ M
GLUCOSE SERPL-MCNC: 134 MG/DL (ref 65–140)
HCT VFR BLD AUTO: 34 % (ref 34.8–46.1)
HGB BLD-MCNC: 11.3 G/DL (ref 11.5–15.4)
IMM GRANULOCYTES # BLD AUTO: 0.15 THOUSAND/UL (ref 0–0.2)
IMM GRANULOCYTES NFR BLD AUTO: 1 % (ref 0–2)
INR PPP: 2.42 (ref 0.85–1.19)
LYMPHOCYTES # BLD AUTO: 1.06 THOUSANDS/ÂΜL (ref 0.6–4.47)
LYMPHOCYTES NFR BLD AUTO: 8 % (ref 14–44)
MCH RBC QN AUTO: 31 PG (ref 26.8–34.3)
MCHC RBC AUTO-ENTMCNC: 33.2 G/DL (ref 31.4–37.4)
MCV RBC AUTO: 93 FL (ref 82–98)
MONOCYTES # BLD AUTO: 1.63 THOUSAND/ÂΜL (ref 0.17–1.22)
MONOCYTES NFR BLD AUTO: 12 % (ref 4–12)
NEUTROPHILS # BLD AUTO: 10.55 THOUSANDS/ÂΜL (ref 1.85–7.62)
NEUTS SEG NFR BLD AUTO: 74 % (ref 43–75)
NRBC BLD AUTO-RTO: 0 /100 WBCS
PLATELET # BLD AUTO: 255 THOUSANDS/UL (ref 149–390)
PMV BLD AUTO: 10.7 FL (ref 8.9–12.7)
POTASSIUM SERPL-SCNC: 4 MMOL/L (ref 3.5–5.3)
PROT SERPL-MCNC: 7.9 G/DL (ref 6.4–8.4)
PROTHROMBIN TIME: 27.3 SECONDS (ref 12.3–15)
RBC # BLD AUTO: 3.65 MILLION/UL (ref 3.81–5.12)
SODIUM SERPL-SCNC: 135 MMOL/L (ref 135–147)
WBC # BLD AUTO: 14.06 THOUSAND/UL (ref 4.31–10.16)

## 2025-08-04 PROCEDURE — 72125 CT NECK SPINE W/O DYE: CPT

## 2025-08-04 PROCEDURE — 36415 COLL VENOUS BLD VENIPUNCTURE: CPT | Performed by: EMERGENCY MEDICINE

## 2025-08-04 PROCEDURE — 85025 COMPLETE CBC W/AUTO DIFF WBC: CPT | Performed by: EMERGENCY MEDICINE

## 2025-08-04 PROCEDURE — 85730 THROMBOPLASTIN TIME PARTIAL: CPT | Performed by: EMERGENCY MEDICINE

## 2025-08-04 PROCEDURE — 99291 CRITICAL CARE FIRST HOUR: CPT | Performed by: EMERGENCY MEDICINE

## 2025-08-04 PROCEDURE — 99285 EMERGENCY DEPT VISIT HI MDM: CPT

## 2025-08-04 PROCEDURE — 72170 X-RAY EXAM OF PELVIS: CPT

## 2025-08-04 PROCEDURE — 96375 TX/PRO/DX INJ NEW DRUG ADDON: CPT

## 2025-08-04 PROCEDURE — 71045 X-RAY EXAM CHEST 1 VIEW: CPT

## 2025-08-04 PROCEDURE — 80053 COMPREHEN METABOLIC PANEL: CPT | Performed by: EMERGENCY MEDICINE

## 2025-08-04 PROCEDURE — 96365 THER/PROPH/DIAG IV INF INIT: CPT

## 2025-08-04 PROCEDURE — 93005 ELECTROCARDIOGRAM TRACING: CPT

## 2025-08-04 PROCEDURE — 70450 CT HEAD/BRAIN W/O DYE: CPT

## 2025-08-04 PROCEDURE — 74177 CT ABD & PELVIS W/CONTRAST: CPT

## 2025-08-04 PROCEDURE — 85610 PROTHROMBIN TIME: CPT | Performed by: EMERGENCY MEDICINE

## 2025-08-04 RX ORDER — FENTANYL CITRATE 50 UG/ML
25 INJECTION, SOLUTION INTRAMUSCULAR; INTRAVENOUS ONCE
Refills: 0 | Status: COMPLETED | OUTPATIENT
Start: 2025-08-04 | End: 2025-08-04

## 2025-08-04 RX ADMIN — Medication 1500 UNITS: at 23:37

## 2025-08-04 RX ADMIN — PHYTONADIONE 10 MG: 10 INJECTION, EMULSION INTRAMUSCULAR; INTRAVENOUS; SUBCUTANEOUS at 23:26

## 2025-08-04 RX ADMIN — FENTANYL CITRATE 25 MCG: 50 INJECTION INTRAMUSCULAR; INTRAVENOUS at 22:07

## 2025-08-04 RX ADMIN — IOHEXOL 100 ML: 350 INJECTION, SOLUTION INTRAVENOUS at 22:30

## 2025-08-05 ENCOUNTER — HOSPITAL ENCOUNTER (INPATIENT)
Facility: HOSPITAL | Age: 89
LOS: 2 days | Discharge: HOME HEALTH | DRG: 543 | End: 2025-08-07
Attending: SURGERY | Admitting: SURGERY
Payer: COMMERCIAL

## 2025-08-05 VITALS
RESPIRATION RATE: 20 BRPM | OXYGEN SATURATION: 95 % | DIASTOLIC BLOOD PRESSURE: 92 MMHG | SYSTOLIC BLOOD PRESSURE: 177 MMHG | TEMPERATURE: 98.2 F | HEART RATE: 80 BPM

## 2025-08-05 PROBLEM — M80.00XA AGE-RELATED OSTEOPOROSIS WITH CURRENT PATHOLOGICAL FRACTURE: Status: ACTIVE | Noted: 2018-08-24

## 2025-08-05 PROBLEM — S32.301A CLOSED FRACTURE OF RIGHT ILIAC WING (HCC): Status: ACTIVE | Noted: 2025-08-05

## 2025-08-05 LAB
ATRIAL RATE: 73 BPM
P AXIS: 82 DEGREES
PR INTERVAL: 160 MS
QRS AXIS: 104 DEGREES
QRSD INTERVAL: 158 MS
QT INTERVAL: 478 MS
QTC INTERVAL: 526 MS
T WAVE AXIS: 12 DEGREES
VENTRICULAR RATE: 73 BPM

## 2025-08-05 PROCEDURE — 93010 ELECTROCARDIOGRAM REPORT: CPT | Performed by: INTERNAL MEDICINE

## 2025-08-08 ENCOUNTER — TRANSITIONAL CARE MANAGEMENT (OUTPATIENT)
Age: 89
End: 2025-08-08

## 2025-08-09 ENCOUNTER — APPOINTMENT (OUTPATIENT)
Dept: LAB | Facility: HOSPITAL | Age: 89
End: 2025-08-09
Payer: COMMERCIAL

## 2025-08-10 ENCOUNTER — HOME CARE VISIT (OUTPATIENT)
Dept: HOME HEALTH SERVICES | Facility: HOME HEALTHCARE | Age: 89
End: 2025-08-10
Payer: COMMERCIAL

## 2025-08-10 PROBLEM — M54.32 SCIATICA, LEFT SIDE: Status: ACTIVE | Noted: 2025-08-10

## 2025-08-10 PROBLEM — M31.6 GIANT CELL ARTERITIS (HCC): Status: ACTIVE | Noted: 2025-08-10

## 2025-08-10 PROBLEM — D68.61 ANTIPHOSPHOLIPID ANTIBODY SYNDROME (HCC): Status: ACTIVE | Noted: 2025-08-10

## 2025-08-10 PROBLEM — I35.1 NONRHEUMATIC AORTIC (VALVE) INSUFFICIENCY: Status: ACTIVE | Noted: 2025-08-10

## 2025-08-11 ENCOUNTER — TELEPHONE (OUTPATIENT)
Age: 89
End: 2025-08-11

## 2025-08-11 ENCOUNTER — HOME CARE VISIT (OUTPATIENT)
Dept: HOME HEALTH SERVICES | Facility: HOME HEALTHCARE | Age: 89
End: 2025-08-11
Payer: COMMERCIAL

## 2025-08-12 ENCOUNTER — HOME CARE VISIT (OUTPATIENT)
Dept: HOME HEALTH SERVICES | Facility: HOME HEALTHCARE | Age: 89
End: 2025-08-12
Attending: NURSE PRACTITIONER
Payer: COMMERCIAL

## 2025-08-13 ENCOUNTER — HOME CARE VISIT (OUTPATIENT)
Dept: HOME HEALTH SERVICES | Facility: HOME HEALTHCARE | Age: 89
End: 2025-08-13
Payer: COMMERCIAL

## 2025-08-14 ENCOUNTER — HOME CARE VISIT (OUTPATIENT)
Dept: HOME HEALTH SERVICES | Facility: HOME HEALTHCARE | Age: 89
End: 2025-08-14
Payer: COMMERCIAL

## 2025-08-18 ENCOUNTER — HOME CARE VISIT (OUTPATIENT)
Dept: HOME HEALTH SERVICES | Facility: HOME HEALTHCARE | Age: 89
End: 2025-08-18
Payer: COMMERCIAL

## 2025-08-19 ENCOUNTER — HOME CARE VISIT (OUTPATIENT)
Dept: HOME HEALTH SERVICES | Facility: HOME HEALTHCARE | Age: 89
End: 2025-08-19
Payer: COMMERCIAL

## 2025-08-19 VITALS — SYSTOLIC BLOOD PRESSURE: 114 MMHG | DIASTOLIC BLOOD PRESSURE: 65 MMHG | HEART RATE: 68 BPM | OXYGEN SATURATION: 95 %

## 2025-08-19 LAB — SL AMB POCT INR: 1.9

## 2025-08-19 PROCEDURE — G0157 HHC PT ASSISTANT EA 15: HCPCS

## 2025-08-20 ENCOUNTER — TELEPHONE (OUTPATIENT)
Age: 89
End: 2025-08-20

## 2025-08-20 ENCOUNTER — ANTICOAG VISIT (OUTPATIENT)
Age: 89
End: 2025-08-20
Payer: COMMERCIAL

## 2025-08-20 PROCEDURE — 93793 ANTICOAG MGMT PT WARFARIN: CPT

## (undated) DEVICE — SUT SILK 0 CT-1 30 IN 424H

## (undated) DEVICE — DGW .035 FC J3MM 150CM TEF: Brand: EMERALD

## (undated) DEVICE — GLOVE SRG BIOGEL ORTHOPEDIC 8.5

## (undated) DEVICE — 3000CC GUARDIAN II: Brand: GUARDIAN

## (undated) DEVICE — GLOVE SRG BIOGEL ECLIPSE 8

## (undated) DEVICE — MICRO HVTSA, 0.5G AND HVTSA SOURCEMARK PRODUCT CODE M1206 AND M1206-01: Brand: EXOFIN MICRO HVTSA, 0.5G

## (undated) DEVICE — ANTIBACTERIAL VIOLET BRAIDED (POLYGLACTIN 910), SYNTHETIC ABSORBABLE SURGICAL SUTURE: Brand: COATED VICRYL

## (undated) DEVICE — IMPERVIOUS STOCKINETTE: Brand: DEROYAL

## (undated) DEVICE — SWAN-GANZ BIPOLAR PACING CATHETER: Brand: SWAN-GANZ

## (undated) DEVICE — INTENDED FOR TISSUE SEPARATION, AND OTHER PROCEDURES THAT REQUIRE A SHARP SURGICAL BLADE TO PUNCTURE OR CUT.: Brand: BARD-PARKER ® CARBON RIB-BACK BLADES

## (undated) DEVICE — CEMENT MIXING SYSTEM WITH FEMORAL BREAKWAY NOZZLE: Brand: REVOLUTION

## (undated) DEVICE — GLOVE INDICATOR PI UNDERGLOVE SZ 8 BLUE

## (undated) DEVICE — GLIDESHEATH SLENDER STAINLESS STEEL KIT: Brand: GLIDESHEATH SLENDER

## (undated) DEVICE — SUT STRATAFIX SPIRAL 3-0 PGA/PCL 30 X 30 CM SXMD2B408

## (undated) DEVICE — CHLORAPREP HI-LITE 26ML ORANGE

## (undated) DEVICE — SURGICAL GOWN, XL SMARTSLEEVE: Brand: CONVERTORS

## (undated) DEVICE — PINNACLE INTRODUCER SHEATH: Brand: PINNACLE

## (undated) DEVICE — PAD GROUNDING DUAL ADULT

## (undated) DEVICE — SUT STRATAFIX SPIRAL 1-0 PDO 36 X 36 CM SXPD2B405

## (undated) DEVICE — 3M™ TEGADERM™ CHG DRESSING 25/CARTON 4 CARTONS/CASE 1659: Brand: TEGADERM™

## (undated) DEVICE — TR BAND RADIAL ARTERY COMPRESSION DEVICE: Brand: TR BAND

## (undated) DEVICE — TRAY FOLEY 16FR SURESTEP TEMP SENS URIMETER STAT LOK

## (undated) DEVICE — ADHESIVE SKIN HIGH VISCOSITY EXOFIN 1ML

## (undated) DEVICE — GAUZE SPONGES,16 PLY: Brand: CURITY

## (undated) DEVICE — SYRINGE 30ML LL

## (undated) DEVICE — NEEDLE 18 G X 1 1/2

## (undated) DEVICE — HANDPIECE SET WITH RETRACTABLE COAXIAL FAN SPRAY TIP AND SUCTION TUBE: Brand: INTERPULSE

## (undated) DEVICE — BETHLEHEM UNIV MAJ EXT ,KIT: Brand: CARDINAL HEALTH

## (undated) DEVICE — MICROPUNCTURE INTRODUCER SET SILHOUETTE TRANSITIONLESS PUSH-PLUS DESIGN - STIFFENED CANNULA WITH NITINOL WIRE GUIDE: Brand: MICROPUNCTURE

## (undated) DEVICE — CARDIOVASCULAR SPLIT DRAPE: Brand: CONVERTORS

## (undated) DEVICE — ACE WRAP 6 IN UNSTERILE

## (undated) DEVICE — 450 ML BOTTLE OF 0.05% CHLORHEXIDINE GLUCONATE IN 99.95% STERILE WATER FOR IRRIGATION, USP AND APPLICATOR.: Brand: IRRISEPT ANTIMICROBIAL WOUND LAVAGE

## (undated) DEVICE — Device

## (undated) DEVICE — DUAL CUT SAGITTAL BLADE

## (undated) DEVICE — PLUMEPEN PRO 10FT

## (undated) DEVICE — SPONGE 4 X 4 XRAY 16 PLY STRL LF RFD

## (undated) DEVICE — CATH F4 INF JL 3.5 100CM: Brand: INFINITI

## (undated) DEVICE — SPONGE SCRUB 4 PCT CHLORHEXIDINE

## (undated) DEVICE — HEAVY DUTY TABLE COVER: Brand: CONVERTORS

## (undated) DEVICE — AMPLATZ EXTRA STIFF WIRE GUIDE: Brand: AMPLATZ

## (undated) DEVICE — DGW .035 FC J3MM 260CM TEF: Brand: EMERALD

## (undated) DEVICE — INTENDED FOR TISSUE SEPARATION, AND OTHER PROCEDURES THAT REQUIRE A SHARP SURGICAL BLADE TO PUNCTURE OR CUT.: Brand: BARD-PARKER SAFETY BLADES SIZE 10, STERILE

## (undated) DEVICE — CAPIT KNEE REVISION ATTUNE W/ SLEEVES

## (undated) DEVICE — BETHLEHEM MAJOR GENERAL PACK: Brand: CARDINAL HEALTH

## (undated) DEVICE — GUIDEWIRE WHOLEY .035 145 CM FLOP STR TIP

## (undated) DEVICE — EXOFIN PRECISION PEN HIGH VISCOSITY TOPICAL SKIN ADHESIVE: Brand: EXOFIN PRECISION PEN, 1G

## (undated) DEVICE — DGW .035 FC STR 260CM TEF: Brand: EMERALD

## (undated) DEVICE — GLOVE INDICATOR PI UNDERGLOVE SZ 8.5 BLUE

## (undated) DEVICE — GUIDEWIRE LUNDERQUIST TSCMG-35-300-LESDC

## (undated) DEVICE — THERMOFLECT BLANKET, L, 25EA                               TS THERMOFLECT BLANKET, 48" X 84", SILVER, 5/BG, 5 BG/CS NW: Brand: THERMOFLECT

## (undated) DEVICE — CATH F4 INF JR 4 100CM: Brand: INFINITI

## (undated) DEVICE — CATH DIAG 5FR IMPULSE 110CM PIG

## (undated) DEVICE — 3M™ STERI-DRAPE™ U-DRAPE 1015: Brand: STERI-DRAPE™

## (undated) DEVICE — RADIFOCUS OPTITORQUE ANGIOGRAPHIC CATHETER: Brand: OPTITORQUE

## (undated) DEVICE — GLOVE INDICATOR PI UNDERGLOVE SZ 6.5 BLUE

## (undated) DEVICE — COBAN 6 IN STERILE

## (undated) DEVICE — GLOVE SRG BIOGEL 6.5

## (undated) DEVICE — GLOVE SRG BIOGEL 8.5

## (undated) DEVICE — CARDIO PERI-GROIN: Brand: CONVERTORS

## (undated) DEVICE — ANTIBACTERIAL UNDYED BRAIDED (POLYGLACTIN 910), SYNTHETIC ABSORBABLE SUTURE: Brand: COATED VICRYL

## (undated) DEVICE — YELLOW BOAT

## (undated) DEVICE — CATH DIAG 5FR IMPULSE 100CM FR4

## (undated) DEVICE — HOOD: Brand: FLYTE, SURGICOOL

## (undated) DEVICE — SYRINGE 3ML LL

## (undated) DEVICE — GUIDEWIRE AMPLATZ SS 260CM J TIP